# Patient Record
Sex: FEMALE | Race: WHITE | Employment: FULL TIME | URBAN - METROPOLITAN AREA
[De-identification: names, ages, dates, MRNs, and addresses within clinical notes are randomized per-mention and may not be internally consistent; named-entity substitution may affect disease eponyms.]

---

## 2017-01-05 ENCOUNTER — OFFICE VISIT (OUTPATIENT)
Dept: FAMILY MEDICINE | Facility: CLINIC | Age: 47
End: 2017-01-05
Payer: COMMERCIAL

## 2017-01-05 VITALS
DIASTOLIC BLOOD PRESSURE: 72 MMHG | TEMPERATURE: 97.1 F | SYSTOLIC BLOOD PRESSURE: 130 MMHG | HEART RATE: 86 BPM | OXYGEN SATURATION: 99 % | BODY MASS INDEX: 19.04 KG/M2 | HEIGHT: 71 IN | WEIGHT: 136 LBS

## 2017-01-05 DIAGNOSIS — R07.1 PAINFUL RESPIRATION: Primary | ICD-10-CM

## 2017-01-05 DIAGNOSIS — Z90.81 S/P SPLENECTOMY: ICD-10-CM

## 2017-01-05 DIAGNOSIS — J06.9 UPPER RESPIRATORY TRACT INFECTION, UNSPECIFIED TYPE: ICD-10-CM

## 2017-01-05 DIAGNOSIS — Z72.0 TOBACCO ABUSE: ICD-10-CM

## 2017-01-05 PROCEDURE — 99214 OFFICE O/P EST MOD 30 MIN: CPT | Performed by: FAMILY MEDICINE

## 2017-01-05 RX ORDER — PREDNISONE 5 MG/1
TABLET ORAL
Qty: 55 TABLET | Refills: 0 | Status: SHIPPED | OUTPATIENT
Start: 2017-01-05 | End: 2017-02-10

## 2017-01-05 RX ORDER — DEXTROMETHORPHAN POLISTIREX 30 MG/5ML
60 SUSPENSION ORAL 2 TIMES DAILY
Qty: 148 ML | Refills: 3 | Status: SHIPPED | OUTPATIENT
Start: 2017-01-05 | End: 2017-02-10

## 2017-01-05 RX ORDER — AZITHROMYCIN 250 MG/1
TABLET, FILM COATED ORAL
Qty: 6 TABLET | Refills: 0 | Status: SHIPPED | OUTPATIENT
Start: 2017-01-05 | End: 2017-02-10

## 2017-01-05 NOTE — MR AVS SNAPSHOT
After Visit Summary   1/5/2017    Coretta Tovar    MRN: [id number]           Patient Information     Date Of Birth          1970        Visit Information        Provider Department      1/5/2017 12:00 PM Cydney Paniagua MD [facility]        Today's Diagnoses     Painful respiration    -  1     S/P splenectomy         Upper respiratory tract infection, unspecified type           Care Instructions    [facility]    If you have any questions regarding to your visit please contact your care team:       Team Purple:   Clinic Hours Telephone Number   [address] Dr.   7am-7pm  Monday - Thursday   7am-5pm  Fridays  [phone]  (Appointment scheduling available 24/7)    Questions about your Visit?   Team Line:  [phone]   Urgent Care - [place] - 11am-9pm Monday-Friday Saturday-Sunday- 9am-5pm   [place] - 5pm-9pm Monday-Friday Saturday-Sunday- 9am-5pm  [phone] - [facility]  [phone] - [place]       What options do I have for visits at the clinic other than the traditional office visit?  To expand how we care for you, many of our providers are utilizing electronic visits (e-visits) and telephone visits, when medically appropriate, for interactions with their patients rather than a visit in the clinic.   We also offer nurse visits for many medical concerns. Just like any other service, we will bill your insurance company for this type of visit based on time spent on the phone with your provider. Not all insurance companies cover these visits. Please check with your medical insurance if this type of visit is covered. You will be responsible for any charges that are not paid by your insurance.      E-visits via [employer]:  generally incur a $35.00 fee.  Telephone visits:  Time spent on the phone: *charged based on time that is spent on the phone in increments of 10  "minutes. Estimated cost:   5-10 mins $30.00   11-20 mins. $59.00   21-30 mins. $85.00     Use Exindahart (secure email communication and access to your chart) to send your primary care provider a message or make an appointment. Ask someone on your Team how to sign up for [employer].  For a Price Quote for your services, please call our [employer] Line at [phone].  As always, Thank you for trusting us with your health care needs!            Follow-ups after your visit        Who to contact     If you have questions or need follow up information about today's clinic visit or your schedule please contact [facility] directly at [phone].  Normal or non-critical lab and imaging results will be communicated to you by Exindahart, letter or phone within 4 business days after the clinic has received the results. If you do not hear from us within 7 days, please contact the clinic through Exindahart or phone. If you have a critical or abnormal lab result, we will notify you by phone as soon as possible.  Submit refill requests through [employer] or call your pharmacy and they will forward the refill request to us. Please allow 3 business days for your refill to be completed.          Additional Information About Your Visit        [employer] Information     [employer] lets you send messages to your doctor, view your test results, renew your prescriptions, schedule appointments and more. To sign up, go to [email or web address] . Click on \"Log in\" on the left side of the screen, which will take you to the Welcome page. Then click on \"Sign up Now\" on the right side of the page.     You will be asked to enter the access code listed below, as well as some personal information. Please follow the directions to create your username and password.     Your access code is: [email or web address]  Expires: 2017  8:12 AM     Your access code will  in 90 days. If you need help or a new code, please call your [place] clinic or " "[phone].        Care EveryWhere ID     This is your Care EveryWhere ID. This could be used by other organizations to access your [place] medical records  [id number]        Your Vitals Were     Pulse Temperature Height BMI (Body Mass Index) Pulse Oximetry       86 97.1  F (36.2  C) 5' 11\" (1.803 m) 18.98 kg/m2 99%        Blood Pressure from Last 3 Encounters:   01/05/17 130/72   11/30/16 140/100   11/29/16 98/58    Weight from Last 3 Encounters:   01/05/17 136 lb (61.689 kg)   11/30/16 133 lb (60.328 kg)   11/29/16 130 lb (58.968 kg)              Today, you had the following     No orders found for display         Today's Medication Changes          These changes are accurate as of: 1/5/17 12:12 PM.  If you have any questions, ask your nurse or doctor.               Start taking these medicines.        Dose/Directions    azithromycin 250 MG tablet   Commonly known as:  ZITHROMAX   Used for:  S/P splenectomy, Upper respiratory tract infection, unspecified type        Two tablets first day, then one tablet daily for four days.   Quantity:  6 tablet   Refills:  0       predniSONE 5 MG tablet   Commonly known as:  DELTASONE   Used for:  Painful respiration        Take 10 tabs today and taper down by one daily   Quantity:  55 tablet   Refills:  0            Where to get your medicines      These medications were sent to [facility] - [place], [place] - [address] AT [facility] [address] [place]  [address] [address]     Phone:  [phone]    - azithromycin 250 MG tablet  - predniSONE 5 MG tablet             Primary Care Provider Office Phone #    [facility] [phone]       No address on file        Thank you!     Thank you for choosing [facility]  for your care. Our goal is always to provide you with excellent care. Hearing back from our patients is one way we can continue to improve our services. Please take a few minutes " to complete the written survey that you may receive in the mail after your visit with us. Thank you!             Your Updated Medication List - Protect others around you: Learn how to safely use, store and throw away your medicines at www.disposemymeds.org.          This list is accurate as of: 1/5/17 12:12 PM.  Always use your most recent med list.                   Brand Name Dispense Instructions for use    acyclovir 5 % ointment    ZOVIRAX    15 g    Apply topically 6 times daily       azithromycin 250 MG tablet    ZITHROMAX    6 tablet    Two tablets first day, then one tablet daily for four days.       omeprazole 40 MG capsule    priLOSEC    30 capsule    Take 1 capsule (40 mg) by mouth daily Take 30-60 minutes before a meal.       ondansetron 4 MG ODT tab    ZOFRAN ODT    12 tablet    Take 1-2 tablets (4-8 mg) by mouth 3 times daily (before meals)       predniSONE 5 MG tablet    DELTASONE    55 tablet    Take 10 tabs today and taper down by one daily       sucralfate 1 GM tablet    CARAFATE    40 tablet    Take 1 tablet (1 g) by mouth 4 times daily       traZODone 50 MG tablet    DESYREL    30 tablet    TAKE 1 TABLET(50 MG) BY MOUTH EVERY NIGHT AS NEEDED FOR SLEEP       TYLENOL PO      Take 1 tablet by mouth as needed       valACYclovir 1000 mg tablet    VALTREX    4 tablet    TAKE 2 TABLETS BY MOUTH TWICE DAILY       ZOLMitriptan 5 MG tablet    ZOMIG    12 tablet    TAKE 1 TABLET BY MOUTH AT ONSET OF HEADACHE FOR MIGRAINE. MAY REPEAT DOSE IN 2 HOURS. DO NOT EXCEED 10MG IN 24 HOURS

## 2017-01-05 NOTE — Clinical Note
[facility]  [address] [place] [address]  [phone]      January 5, 2017      Coretta Tovar  [id number] [facility] [address]        Re Coretta:    Patient was seen by me today. She is ill.           Sincerely,      BRYSON VALLES M.D.

## 2017-01-05 NOTE — PATIENT INSTRUCTIONS
[facility]    If you have any questions regarding to your visit please contact your care team:       Team Purple:   Clinic Hours Telephone Number   [address] Dr.   7am-7pm  Monday - Thursday   7am-5pm  Fridays  [phone]  (Appointment scheduling available 24/7)    Questions about your Visit?   Team Line:  [phone]   Urgent Care - [place] and [facility] - 11am-9pm Monday-Friday Saturday-Sunday- 9am-5pm   [place] - 5pm-9pm Monday-Friday Saturday-Sunday- 9am-5pm  [phone] - [facility]  [phone] - [place]       What options do I have for visits at the clinic other than the traditional office visit?  To expand how we care for you, many of our providers are utilizing electronic visits (e-visits) and telephone visits, when medically appropriate, for interactions with their patients rather than a visit in the clinic.   We also offer nurse visits for many medical concerns. Just like any other service, we will bill your insurance company for this type of visit based on time spent on the phone with your provider. Not all insurance companies cover these visits. Please check with your medical insurance if this type of visit is covered. You will be responsible for any charges that are not paid by your insurance.      E-visits via [employer]:  generally incur a $35.00 fee.  Telephone visits:  Time spent on the phone: *charged based on time that is spent on the phone in increments of 10 minutes. Estimated cost:   5-10 mins $30.00   11-20 mins. $59.00   21-30 mins. $85.00     Use Tizrat (secure email communication and access to your chart) to send your primary care provider a message or make an appointment. Ask someone on your Team how to sign up for [employer].  For a Price Quote for your services, please call our Consumer Price Line at [phone].  As always, Thank you for trusting us with your health care needs!

## 2017-01-05 NOTE — PROGRESS NOTES
SUBJECTIVE:                                                    Coretta Tovar is a 46 year old female who presents to clinic today for the following health issues:      ENT Symptoms             Symptoms: cc Present Absent Comment   Fever/Chills  x     Fatigue  x     Muscle Aches  x     Eye Irritation  x     Sneezing  x     Nasal Epifanio/Drg  x     Sinus Pressure/Pain  x     Loss of smell  x     Dental pain   x    Sore Throat  x     Swollen Glands   x    Ear Pain/Fullness  x     Cough  x     Wheeze  x     Chest Pain  x     Shortness of breath  x     Rash       Other         Symptom duration:  1 week   Symptom severity:  severe   Treatments tried:  mucinex,tesslon pearles, tylenol   Contacts:  no              Problem list and histories reviewed & adjusted, as indicated.  Additional history: as documented    Patient Active Problem List   Diagnosis     Tobacco abuse     CARDIOVASCULAR SCREENING; LDL GOAL LESS THAN 130     S/P splenectomy     Migraines     Idiopathic thrombocytopenic purpura (HCC)     Chemical dependency (H)     Insomnia     Mary-mary disease     Vomiting     Abdominal pain, acute     Cyclical vomiting with nausea, intractability of vomiting not specified     Abdominal pain, right upper quadrant     Past Surgical History   Procedure Laterality Date     Splenectomy  1995     Ankle surgery       Numerous right ankle surgeries     Hernia repair Left 1975     Neck surgery  2000's     Cervical spine     Hysterectomy, pap no longer indicated       benign, endometriosis     Tonsillectomy         Social History   Substance Use Topics     Smoking status: Current Every Day Smoker -- 1.00 packs/day for 15 years     Types: Cigarettes     Last Attempt to Quit: 11/06/2015     Smokeless tobacco: Never Used     Alcohol Use: Yes      Comment: Drinks once in 4 - 5 months     Family History   Problem Relation Age of Onset     Neurologic Disorder Maternal Grandmother      migraines     Breast Cancer Maternal Grandmother       Neurologic Disorder Maternal Grandfather      migraines     Neurologic Disorder Brother      migraines     Breast Cancer Maternal Aunt      x 2     Breast Cancer Paternal Grandmother          Allergies   Allergen Reactions     Acetaminophen-Codeine Hives     Augmentin Nausea and Vomiting and Hives     Compazine      Lock-jaw     Nsaids      D/t ITP     Propoxyphene N-Apap Hives     Sulfa Drugs      Tramadol Hcl Hives            BP Readings from Last 3 Encounters:   01/05/17 130/72   11/30/16 140/100   11/29/16 98/58    Wt Readings from Last 3 Encounters:   01/05/17 136 lb (61.689 kg)   11/30/16 133 lb (60.328 kg)   11/29/16 130 lb (58.968 kg)                  Labs reviewed in EPIC  Problem list, Medication list, Allergies, and Medical/Social/Surgical histories reviewed in [facility] and updated as appropriate.    ROS:  This 46 year old female is here today because she has been ill for about a week with very painful respirations. She is sure she has pleurisy again. She is a daily smoker of 1/2 ppd and is determined to quit this time. She hasn't smoked the past 2 days. She has the nicotine patch at home. Her lungs burn with every breath. No fevers, but her face hurts too. She works for [employer] and has been dragging herself to work every day. She has ITP and can't take NSAIDS so all she can take is tylenol for the pain. In the past, she has been given prednisone for pleurisy. She also has no spleen so she usually needs an antibiotic when she is on prednisone. She also would like an prescription for some cough syrup over the counter as she is allergic to codeine. Also, her insurance will pay for a humidifier if she has a prescription. She feels a humidifier would help so she doesn't get so many upper respiratory illnesses. She has albuterol inhaler at home that she can try for her cough as well.  All other review of systems are negative  Personal, family, and social history reviewed with patient and revised.      "    OBJECTIVE:                                                    /72 mmHg  Pulse 86  Temp(Src) 97.1  F (36.2  C)  Ht 5' 11\" (1.803 m)  Wt 136 lb (61.689 kg)  BMI 18.98 kg/m2  SpO2 99%  Body mass index is 18.98 kg/(m^2).  GENERAL: patient appears tired and ill.   EYES: Eyes grossly normal to inspection, PERRL and conjunctivae and sclerae normal  HENT: ear canals and TM's normal, nose and mouth without ulcers or lesions  NECK: no adenopathy, no asymmetry, masses, or scars and thyroid normal to palpation  RESP: lungs clear to auscultation - no rales, rhonchi or wheezes, but she is splinting with inspiration. She has pleurisy. She is trying hard not to cough.   CV: regular rate and rhythm, normal S1 S2, no S3 or S4, no murmur, click or rub, no peripheral edema  MS: no gross musculoskeletal defects noted, no edema  Mucous membranes are dry. She needs to drink more fluids      Diagnostic Test Results:  none      ASSESSMENT/PLAN:                                                             1. Painful respiration  As above   - predniSONE (DELTASONE) 5 MG tablet; Take 10 tabs today and taper down by one daily  Dispense: 55 tablet; Refill: 0  - order for DME; Equipment being ordered: cool humidifier  Dispense: 1 each; Refill: 0  OK to use albuterol inhaler for bronchospasm cough     2. S/P splenectomy  As above   - azithromycin (ZITHROMAX) 250 MG tablet; Two tablets first day, then one tablet daily for four days.  Dispense: 6 tablet; Refill: 0    3. Upper respiratory tract infection, unspecified type  As above   - azithromycin (ZITHROMAX) 250 MG tablet; Two tablets first day, then one tablet daily for four days.  Dispense: 6 tablet; Refill: 0  - dextromethorphan (DELSYM) 30 MG/5ML liquid; Take 10 mLs (60 mg) by mouth 2 times daily  Dispense: 148 mL; Refill: 3  - order for DME; Equipment being ordered: cool humidifier  Dispense: 1 each; Refill: 0    4. Tobacco abuse [Z72.0]  As above, we discussed this at length. " Very important that she quit smoking.     Return to clinic if no improvement     BRYSON VALLES MD  [facility]

## 2017-01-05 NOTE — NURSING NOTE
"Chief Complaint   Patient presents with     URI       Initial /72 mmHg  Pulse 86  Temp(Src) 97.1  F (36.2  C)  Ht 5' 11\" (1.803 m)  Wt 136 lb (61.689 kg)  BMI 18.98 kg/m2  SpO2 99% Estimated body mass index is 18.98 kg/(m^2) as calculated from the following:    Height as of this encounter: 5' 11\" (1.803 m).    Weight as of this encounter: 136 lb (61.689 kg).  BP completed using cuff size: chelsie Rivers MA      "

## 2017-01-30 ENCOUNTER — TELEPHONE (OUTPATIENT)
Dept: FAMILY MEDICINE | Facility: CLINIC | Age: 47
End: 2017-01-30

## 2017-01-30 DIAGNOSIS — B37.31 YEAST INFECTION OF THE VAGINA: Primary | ICD-10-CM

## 2017-01-30 NOTE — TELEPHONE ENCOUNTER
Spoke with patient she states she was exposed to strep by many family members, patient states she is having difficulty with her insurance and is not able to come in for a visit.  Patient advised she can try zipnosis for this and go in for a lab appointment.  Patient states she would really like for provider to prescribe her the Z-pack.  Patient also states she has a yeast infection, for last 2 days patient has had vaginal itching and odor.  Patient states the store bought meds do not work well so she would like provider to call in diflucan as well. Patient states she can use her flex plan to pay for prescriptions.   Please advise    Tita Brown RN

## 2017-01-31 RX ORDER — FLUCONAZOLE 150 MG/1
150 TABLET ORAL ONCE
Qty: 1 TABLET | Refills: 0 | Status: SHIPPED | OUTPATIENT
Start: 2017-01-31 | End: 2017-01-31

## 2017-01-31 NOTE — TELEPHONE ENCOUNTER
I have sent the script for diflucan for her.  Since strep is rare for adults, we really need to get a positive swab to treat[facility] most [facility] effective way to do that.    Corey Parsons PA-C

## 2017-02-01 NOTE — TELEPHONE ENCOUNTER
Patient notified of providers message as written.  Patient verbalized understanding, no further questions or concerns.    Tita Brown RN

## 2017-02-10 ENCOUNTER — OFFICE VISIT (OUTPATIENT)
Dept: FAMILY MEDICINE | Facility: CLINIC | Age: 47
End: 2017-02-10
Payer: COMMERCIAL

## 2017-02-10 VITALS
SYSTOLIC BLOOD PRESSURE: 106 MMHG | HEART RATE: 79 BPM | WEIGHT: 132 LBS | OXYGEN SATURATION: 99 % | DIASTOLIC BLOOD PRESSURE: 74 MMHG | BODY MASS INDEX: 18.42 KG/M2 | TEMPERATURE: 97.2 F

## 2017-02-10 DIAGNOSIS — F17.200 NEEDS SMOKING CESSATION EDUCATION: ICD-10-CM

## 2017-02-10 DIAGNOSIS — G43.809 OTHER MIGRAINE WITHOUT STATUS MIGRAINOSUS, NOT INTRACTABLE: ICD-10-CM

## 2017-02-10 DIAGNOSIS — B00.1 RECURRENT COLD SORES: ICD-10-CM

## 2017-02-10 DIAGNOSIS — F51.01 PRIMARY INSOMNIA: ICD-10-CM

## 2017-02-10 DIAGNOSIS — F41.8 DEPRESSION WITH ANXIETY: Primary | ICD-10-CM

## 2017-02-10 PROCEDURE — 99214 OFFICE O/P EST MOD 30 MIN: CPT | Performed by: PHYSICIAN ASSISTANT

## 2017-02-10 RX ORDER — ZOLMITRIPTAN 5 MG/1
TABLET, FILM COATED ORAL
Qty: 12 TABLET | Refills: 4 | Status: SHIPPED | OUTPATIENT
Start: 2017-02-10 | End: 2017-07-03

## 2017-02-10 RX ORDER — BUPROPION HYDROCHLORIDE 150 MG/1
150 TABLET ORAL EVERY MORNING
Qty: 30 TABLET | Refills: 3 | Status: SHIPPED | OUTPATIENT
Start: 2017-02-10 | End: 2017-07-03

## 2017-02-10 RX ORDER — TRAZODONE HYDROCHLORIDE 50 MG/1
TABLET, FILM COATED ORAL
Qty: 30 TABLET | Refills: 3 | Status: SHIPPED | OUTPATIENT
Start: 2017-02-10 | End: 2017-03-07

## 2017-02-10 RX ORDER — VALACYCLOVIR HYDROCHLORIDE 1 G/1
TABLET, FILM COATED ORAL
Qty: 4 TABLET | Refills: 11 | Status: SHIPPED | OUTPATIENT
Start: 2017-02-10 | End: 2017-06-05

## 2017-02-10 ASSESSMENT — ANXIETY QUESTIONNAIRES
IF YOU CHECKED OFF ANY PROBLEMS ON THIS QUESTIONNAIRE, HOW DIFFICULT HAVE THESE PROBLEMS MADE IT FOR YOU TO DO YOUR WORK, TAKE CARE OF THINGS AT HOME, OR GET ALONG WITH OTHER PEOPLE: NOT DIFFICULT AT ALL
7. FEELING AFRAID AS IF SOMETHING AWFUL MIGHT HAPPEN: SEVERAL DAYS
3. WORRYING TOO MUCH ABOUT DIFFERENT THINGS: MORE THAN HALF THE DAYS
5. BEING SO RESTLESS THAT IT IS HARD TO SIT STILL: MORE THAN HALF THE DAYS
2. NOT BEING ABLE TO STOP OR CONTROL WORRYING: MORE THAN HALF THE DAYS
1. FEELING NERVOUS, ANXIOUS, OR ON EDGE: MORE THAN HALF THE DAYS
GAD7 TOTAL SCORE: 14
6. BECOMING EASILY ANNOYED OR IRRITABLE: NEARLY EVERY DAY

## 2017-02-10 ASSESSMENT — PAIN SCALES - GENERAL: PAINLEVEL: NO PAIN (0)

## 2017-02-10 ASSESSMENT — PATIENT HEALTH QUESTIONNAIRE - PHQ9: 5. POOR APPETITE OR OVEREATING: MORE THAN HALF THE DAYS

## 2017-02-10 NOTE — PROGRESS NOTES
SUBJECTIVE:                                                    Coretta Tovar is a 46 year old female who presents to clinic today for the following health issues:      Abnormal Mood Symptoms      Duration: 1 month ago    Description:  Depression: YES  Anxiety: YES  Panic attacks: YES     Accompanying signs and symptoms: see PHQ-9 and KATHI scores    History (similar episodes/previous evaluation): Long time ago was on Wellbutrin    Precipitating or alleviating factors: home work and health issues    Therapies tried and outcome: none       Migraine Follow-Up    Headaches symptoms:  Stable     Frequency: every couple of week     Duration of headaches: 1 day    Able to do normal daily activities/work with migraines: Yes    Rescue/Relief medication:Zomig              Effectiveness: moderate relief    Preventative medication: None    Neurologic complications: No new stroke-like symptoms, loss of vision or speech, numbness or weakness    In the past 4 weeks, how often have you gone to Urgent Care or the emergency room because of your headaches?  0       Problem list and histories reviewed & adjusted, as indicated.  Additional history: 45 y/o female here c/o having a lot of stress and anxiety in her life.  She was in the hospital for over a week, and did have to miss some work.  She has also been dealing with some vertigo and has also has been missing work.  Is working with ENT right now.  They do have her diazepam TID for it.  She is not sure that is helping her anxiety.    In the past, she had been on wellbutrin, and did well with that.  She has done counseling in the past, not really she needs that at this time.          Problem list, Medication list, Allergies, and Medical/Social/Surgical histories reviewed in EPIC and updated as appropriate.    ROS:  Constitutional, HEENT, cardiovascular, pulmonary, gi and gu systems are negative, except as otherwise noted.    OBJECTIVE:                                                     /74  Pulse 79  Temp 97.2  F (36.2  C) (Oral)  Wt 132 lb (59.9 kg)  SpO2 99%  BMI 18.41 kg/m2  Body mass index is 18.41 kg/(m^2).  GENERAL: alert and no distress  EYES: Eyes grossly normal to inspection  NECK: no adenopathy, no asymmetry, masses, or scars and thyroid normal to palpation  RESP: lungs clear to auscultation - no rales, rhonchi or wheezes  CV: regular rate and rhythm, normal S1 S2, no S3 or S4, no murmur, click or rub, no peripheral edema and peripheral pulses strong    Diagnostic Test Results:  none      ASSESSMENT/PLAN:                                                            1. Depression with anxiety  Has been on this in the past, and it did help.  She is hopefull that it will also help her with her smoking.  She is already on large daily dose of diazepam, so not really a candidate for anything else for breakthrough anxiety.  Did offer her referral for counseling, she did decline.    - buPROPion (WELLBUTRIN XL) 150 MG 24 hr tablet; Take 1 tablet (150 mg) by mouth every morning  Dispense: 30 tablet; Refill: 3    2. Recurrent cold sores    - valACYclovir (VALTREX) 1000 mg tablet; TAKE 2 TABLETS BY MOUTH TWICE DAILY x 1 day  Dispense: 4 tablet; Refill: 11    3. Other migraine without status migrainosus, not intractable  stable  - ZOLMitriptan (ZOMIG) 5 MG tablet; TAKE 1 TABLET BY MOUTH AT ONSET OF HEADACHE FOR MIGRAINE. MAY REPEAT DOSE IN 2 HOURS. DO NOT EXCEED 10MG IN 24 HOURS  Dispense: 12 tablet; Refill: 4    4. Primary insomnia    - traZODone (DESYREL) 50 MG tablet; TAKE 1 TABLET(50 MG) BY MOUTH EVERY NIGHT AS NEEDED FOR SLEEP  Dispense: 30 tablet; Refill: 3    5. Needs smoking cessation education    - NOVU Referral Smoking Cessation    Follow up 1 month.    Law Parsons PA-C  [facility]

## 2017-02-10 NOTE — PATIENT INSTRUCTIONS
[facility]    If you have any questions regarding to your visit please contact your care team:       Team Purple:   Clinic Hours Telephone Number   [address] Dr[address] Dr.   7am-7pm  Monday - Thursday   7am-5pm  Fridays  [phone]  (Appointment scheduling available 24/7)    Questions about your Visit?   Team Line:  [phone]   Urgent Care - [place] and [facility] - 11am-9pm Monday-Friday Saturday-Sunday- 9am-5pm   [place] - 5pm-9pm Monday-Friday Saturday-Sunday- 9am-5pm  [phone] - [facility]  [phone] - [place]       What options do I have for visits at the clinic other than the traditional office visit?  To expand how we care for you, many of our providers are utilizing electronic visits (e-visits) and telephone visits, when medically appropriate, for interactions with their patients rather than a visit in the clinic.   We also offer nurse visits for many medical concerns. Just like any other service, we will bill your insurance company for this type of visit based on time spent on the phone with your provider. Not all insurance companies cover these visits. Please check with your medical insurance if this type of visit is covered. You will be responsible for any charges that are not paid by your insurance.      E-visits via [employer]:  generally incur a $35.00 fee.  Telephone visits:  Time spent on the phone: *charged based on time that is spent on the phone in increments of 10 minutes. Estimated cost:   5-10 mins $30.00   11-20 mins. $59.00   21-30 mins. $85.00     Use [employer] (secure email communication and access to your chart) to send your primary care provider a message or make an appointment. Ask someone on your Team how to sign up for [employer].  For a Price Quote for your services, please call our Consumer Price Line at [phone].  As always, Thank you for trusting us with your health care needs!

## 2017-02-10 NOTE — MR AVS SNAPSHOT
After Visit Summary   2/10/2017    Coretta Tovar    MRN: [id number]           Patient Information     Date Of Birth          1970        Visit Information        Provider Department      2/10/2017 12:00 PM Law Parsons PA-C [facility]        Today's Diagnoses     Depression with anxiety    -  1     Recurrent cold sores         Other migraine without status migrainosus, not intractable         Primary insomnia         Needs smoking cessation education           Care Instructions    [facility]    If you have any questions regarding to your visit please contact your care team:       Team Purple:   Clinic Hours Telephone Number   [address] Dr.   7am-7pm  Monday - Thursday   7am-5pm  Fridays  [phone]  (Appointment scheduling available 24/7)    Questions about your Visit?   Team Line:  [phone]   Urgent Care - [place] [facility] - 11am-9pm Monday-Friday Saturday-Sunday- 9am-5pm   [place] - 5pm-9pm Monday-Friday Saturday-Sunday- 9am-5pm  [phone] - [facility]  [phone] - [place]       What options do I have for visits at the clinic other than the traditional office visit?  To expand how we care for you, many of our providers are utilizing electronic visits (e-visits) and telephone visits, when medically appropriate, for interactions with their patients rather than a visit in the clinic.   We also offer nurse visits for many medical concerns. Just like any other service, we will bill your insurance company for this type of visit based on time spent on the phone with your provider. Not all insurance companies cover these visits. Please check with your medical insurance if this type of visit is covered. You will be responsible for any charges that are not paid by your insurance.      E-visits via RF Code:  generally incur a $35.00 fee.  Telephone visits:  Time  "spent on the phone: *charged based on time that is spent on the phone in increments of 10 minutes. Estimated cost:   5-10 mins $30.00   11-20 mins. $59.00   21-30 mins. $85.00     Use [employer] (secure email communication and access to your chart) to send your primary care provider a message or make an appointment. Ask someone on your Team how to sign up for ZenCardt.  For a Price Quote for your services, please call our Organizer Line at [phone].  As always, Thank you for trusting us with your health care needs!            Follow-ups after your visit        Additional Services     [facility] Referral Smoking Cessation       Whitt online at [email or web address]                  Who to contact     If you have questions or need follow up information about today's clinic visit or your schedule please contact [facility] directly at [phone].  Normal or non-critical lab and imaging results will be communicated to you by [employer]hart, letter or phone within 4 business days after the clinic has received the results. If you do not hear from us within 7 days, please contact the clinic through ZenCardt or phone. If you have a critical or abnormal lab result, we will notify you by phone as soon as possible.  Submit refill requests through [employer] or call your pharmacy and they will forward the refill request to us. Please allow 3 business days for your refill to be completed.          Additional Information About Your Visit        [employer] Information     [employer] lets you send messages to your doctor, view your test results, renew your prescriptions, schedule appointments and more. To sign up, go to [email or web address] . Click on \"Log in\" on the left side of the screen, which will take you to the Welcome page. Then click on \"Sign up Now\" on the right side of the page.     You will be asked to enter the access code listed below, as well as some personal information. Please follow the directions to " create your username and password.     Your access code is: [email or web address]  Expires: 2017  8:12 AM     Your access code will  in 90 days. If you need help or a new code, please call your [place] clinic or [phone].        Care EveryWhere ID     This is your Care EveryWhere ID. This could be used by other organizations to access your [place] medical records  [id number]        Your Vitals Were     Pulse Temperature Pulse Oximetry             79 97.2  F (36.2  C) (Oral) 99%          Blood Pressure from Last 3 Encounters:   02/10/17 106/74   17 130/72   16 140/100    Weight from Last 3 Encounters:   02/10/17 132 lb (59.875 kg)   17 136 lb (61.689 kg)   16 133 lb (60.328 kg)              We Performed the Following     [facility] Referral Smoking Cessation          Today's Medication Changes          These changes are accurate as of: 2/10/17 12:32 PM.  If you have any questions, ask your nurse or doctor.               Start taking these medicines.        Dose/Directions    buPROPion 150 MG 24 hr tablet   Commonly known as:  WELLBUTRIN XL   Used for:  Depression with anxiety   Started by:  Law Parsons PA-C        Dose:  150 mg   Take 1 tablet (150 mg) by mouth every morning   Quantity:  30 tablet   Refills:  3         These medicines have changed or have updated prescriptions.        Dose/Directions    traZODone 50 MG tablet   Commonly known as:  DESYREL   This may have changed:  See the new instructions.   Used for:  Primary insomnia   Changed by:  Law Parsons PA-C        TAKE 1 TABLET(50 MG) BY MOUTH EVERY NIGHT AS NEEDED FOR SLEEP   Quantity:  30 tablet   Refills:  3       valACYclovir 1000 mg tablet   Commonly known as:  VALTREX   This may have changed:  See the new instructions.   Used for:  Recurrent cold sores   Changed by:  Law Parsons PA-C        TAKE 2 TABLETS BY MOUTH TWICE DAILY x 1 day   Quantity:  4 tablet   Refills:  11            Where to  get your medicines      These medications were sent to [id number] - [place], [place] - [address] AT [facility] [address]  [address], [place] [address]     Phone:  [phone]    - buPROPion 150 MG 24 hr tablet  - traZODone 50 MG tablet  - valACYclovir 1000 mg tablet  - ZOLMitriptan 5 MG tablet             Primary Care Provider Office Phone #    [facility] [phone]       No address on file        Thank you!     Thank you for choosing [facility]  for your care. Our goal is always to provide you with excellent care. Hearing back from our patients is one way we can continue to improve our services. Please take a few minutes to complete the written survey that you may receive in the mail after your visit with us. Thank you!             Your Updated Medication List - Protect others around you: Learn how to safely use, store and throw away your medicines at www.disposemymeds.org.          This list is accurate as of: 2/10/17 12:32 PM.  Always use your most recent med list.                   Brand Name Dispense Instructions for use    acyclovir 5 % ointment    ZOVIRAX    15 g    Apply topically 6 times daily       buPROPion 150 MG 24 hr tablet    WELLBUTRIN XL    30 tablet    Take 1 tablet (150 mg) by mouth every morning       CHANTIX 1 MG tablet   Generic drug:  varenicline     56 tablet    TAKE 1 TABLET BY MOUTH TWICE DAILY       omeprazole 40 MG capsule    priLOSEC    30 capsule    Take 1 capsule (40 mg) by mouth daily Take 30-60 minutes before a meal.       ondansetron 4 MG ODT tab    ZOFRAN ODT    12 tablet    Take 1-2 tablets (4-8 mg) by mouth 3 times daily (before meals)       order for DME     1 each    Equipment being ordered: cool humidifier       sucralfate 1 GM tablet    CARAFATE    40 tablet    Take 1 tablet (1 g) by mouth 4 times daily       traZODone 50 MG tablet    DESYREL    30 tablet    TAKE 1 TABLET(50 MG) BY MOUTH EVERY  NIGHT AS NEEDED FOR SLEEP       TYLENOL PO      Take 1 tablet by mouth as needed       valACYclovir 1000 mg tablet    VALTREX    4 tablet    TAKE 2 TABLETS BY MOUTH TWICE DAILY x 1 day       ZOLMitriptan 5 MG tablet    ZOMIG    12 tablet    TAKE 1 TABLET BY MOUTH AT ONSET OF HEADACHE FOR MIGRAINE. MAY REPEAT DOSE IN 2 HOURS. DO NOT EXCEED 10MG IN 24 HOURS

## 2017-02-11 ASSESSMENT — ANXIETY QUESTIONNAIRES: GAD7 TOTAL SCORE: 14

## 2017-02-11 ASSESSMENT — PATIENT HEALTH QUESTIONNAIRE - PHQ9: SUM OF ALL RESPONSES TO PHQ QUESTIONS 1-9: 14

## 2017-02-14 RX ORDER — DIAZEPAM 2 MG
TABLET ORAL
COMMUNITY
Start: 2017-01-27 | End: 2017-09-07

## 2017-03-07 ENCOUNTER — TELEPHONE (OUTPATIENT)
Dept: FAMILY MEDICINE | Facility: CLINIC | Age: 47
End: 2017-03-07

## 2017-03-07 DIAGNOSIS — F51.01 PRIMARY INSOMNIA: ICD-10-CM

## 2017-03-07 RX ORDER — TRAZODONE HYDROCHLORIDE 150 MG/1
150 TABLET ORAL AT BEDTIME
Qty: 30 TABLET | Refills: 1 | Status: SHIPPED | OUTPATIENT
Start: 2017-03-07 | End: 2017-04-07

## 2017-03-07 NOTE — TELEPHONE ENCOUNTER
Increased to the 150 mg dose.  Did provide refill until she can get back in for recheck.    Corey Parsons PA-C

## 2017-03-07 NOTE — TELEPHONE ENCOUNTER
Patient called RN hotline asking if Vy Parsons was in.  Updated patient that Vy Parsons is now working at the [facility] location.  She stated that she might need to find a new PCP but declined to schedule at this time.  She is hoping Vy Parsons can still address her Trazodone prescription.    Patient stated that she was previously using up to Trazodone 300mg nightly.  Vy Parsons prescribed 50mg nightly which is ineffective.  She is wondering if the dose can be increased back up. Using 150mg tabs and take 1-2 tabs PRN  She is asking the updated dose be sent today as she is due for a refill at this time      Ok to leave detailed message  Val Teran RN

## 2017-03-07 NOTE — TELEPHONE ENCOUNTER
Patient notified of providers message as written.  Patient states she is also having anxiety, and was to wait until she was off of Valium to start new prescription.  Advised patient that to start a new medication she would need visit with provider.  Patient has person issues she is going through right now and will take care of that and then call to schedule a visit.   Tita Brown RN

## 2017-03-08 ENCOUNTER — THERAPY VISIT (OUTPATIENT)
Dept: PHYSICAL THERAPY | Facility: CLINIC | Age: 47
End: 2017-03-08
Payer: COMMERCIAL

## 2017-03-08 DIAGNOSIS — M54.2 CERVICAL PAIN: Primary | ICD-10-CM

## 2017-03-08 PROCEDURE — 97161 PT EVAL LOW COMPLEX 20 MIN: CPT | Mod: GP | Performed by: PHYSICAL THERAPIST

## 2017-03-08 PROCEDURE — 97110 THERAPEUTIC EXERCISES: CPT | Mod: GP | Performed by: PHYSICAL THERAPIST

## 2017-03-08 NOTE — PROGRESS NOTES
[facility] Initial Evaluation      Subjective:    Coretta Tovar is a 46 year old female with a cervical spine (Vertigo, dizziness, neck pain ) condition.  Condition occurred with:  Insidious onset.  Condition occurred: for unknown reasons.  This is a new condition  Pt presents with primary complaint of dizziness,  States the dizziness has been significantly improving. Has had 4 episodes of dizziness over the past week. States the dizziness has lasted about 30 seconds to 1 minute. This is much improved over the past several weeks. States the current dizziness is more associated with head movements. She presents with referral dated 2-28-17 for evaluate and treat for dizziness, neck pain. Pt primary issue is the dizziness at this time. She does have history of cervical fracture with fusion done 11 years ago and she has had migraines since. Her cervical spine has mild pain intermittently. The dizziness started in January for no apparent reason. .    Patient reports pain:  Cervical right side, cervical left side, central cervical spine, upper cervical spine, mid cervical spine and lower cervical spine.  Radiates to:  Head.  Pain is described as aching and is intermittent and reported as 4/10, 5/10, 8/10 and 7/10.  Associated symptoms:  Loss of balance, loss of motion/stiffness, dizziness and headache. Pain is worse during the day.  Symptoms are exacerbated by stress, rotating head, looking up or down and change of position and relieved by NSAID's and analgesics.  Since onset symptoms are unchanged and gradually worsening.  Special tests:  X-ray and MRI.      General health as reported by patient is fair.  Pertinent medical history includes:  Depression, history of fractures, osteoarthritis, smoking, implanted device and migraines (ITP ).  Medical allergies: yes (See EPIC).  Other surgeries include:  Orthopedic surgery.  Current medications:  Anti-depressants, sleep medication, muscle relaxants and  "pain medication.  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  Patient is working in normal job without restrictions.  Primary job tasks include:  Prolonged sitting and repetitive tasks.    Barriers include:  None as reported by the patient.    Red flags:  None as reported by the patient.                      Objective:    Standing Alignment:    Cervical/Thoracic:  Forward head  Shoulder/UE:  Rounded shoulders                                  Cervical/Thoracic Evaluation    AROM:  AROM Cervical:    Flexion:            Chin 2\" to chest, stretching  Extension:       60 deg, painful  Rotation:         Left: 55 sdeg, tightness     Right: 55 tightess   Side Bend:      Left: 45 tihtness     Right:  50 sightness       Headaches: migraine  Cervical Myotomes:  normal                  DTR's:  not assessed          Cervical Dermatomes:  normal                    Cervical Palpation:    Tenderness present at Left:    Sternocleidomstoid; Scalenes; Rhomboids; Upper Trap; Levator; Erector Spinae; Facet and Suboccipitals  Tenderness present at Right:    Sternocleidomstoid; Scalenes; Rhomboids; Upper Trap; Levator; Erector Spinae; Facet and Suboccipitals    Cervical Stability/Joint Clearing:  Stability/joint clearing spine: Oculomoter exam with eye ROM WNL, (-) sacchades, intact VOR and VORc. Halpike franklin testing for BPPV (-) for al l 6 canals     Left negative at: TLA LAT    Right negative at:  TLA LAT  Negative:ALAR Ligament and TLA AP  Spinal Segmental Conclusions:    Level:  Hypo at C2, C3, C4, C5, C6 and C7                                                General     ROS    Assessment/Plan:      Patient is a 46 year old female with cervical complaints.    Patient has the following significant findings with corresponding treatment plan.                Diagnosis 1:  Cervical pain  Pain -  hot/cold therapy, US, electric stimulation, manual therapy, self management, education, " directional preference exercise and home program  Decreased ROM/flexibility - manual therapy, therapeutic exercise and home program  Decreased joint mobility - manual therapy, therapeutic exercise and home program  Inflammation - cold therapy, US, electric stimulation and self management/home program  Impaired muscle performance - neuro re-education and home program  Decreased function - therapeutic activities and home program  Impaired posture - neuro re-education and home program    Therapy Evaluation Codes:   1) History comprised of:   Personal factors that impact the plan of care:      Profession.    Comorbidity factors that impact the plan of care are:      None.     Medications impacting care: Anti-depressant.  2) Examination of Body Systems comprised of:   Body structures and functions that impact the plan of care:      Cervical spine.   Activity limitations that impact the plan of care are:      Working.  3) Clinical presentation characteristics are:   Stable/Uncomplicated.  4) Decision-Making    Low complexity using standardized patient assessment instrument and/or measureable assessment of functional outcome.  Cumulative Therapy Evaluation is: Low complexity.    Previous and current functional limitations:  (See Goal Flow Sheet for this information)    Short term and Long term goals: (See Goal Flow Sheet for this information)     Communication ability:  Patient appears to be able to clearly communicate and understand verbal and written communication and follow directions correctly.  Treatment Explanation - The following has been discussed with the patient:   RX ordered/plan of care  Anticipated outcomes  Possible risks and side effects  This patient would benefit from PT intervention to resume normal activities.   Rehab potential is good.    Frequency:  2 X week, once daily  Duration:  for 2 weeks tapering to 1 X a week over 6 weeks  Discharge Plan:  Achieve all LTG.  Independent in home treatment  program.  Reach maximal therapeutic benefit.    Please refer to the daily flowsheet for treatment today, total treatment time and time spent performing 1:1 timed codes.

## 2017-03-08 NOTE — MR AVS SNAPSHOT
"              After Visit Summary   3/8/2017    Coretta Tovar    MRN: [id number]           Patient Information     Date Of Birth          1970        Visit Information        Provider Department      3/8/2017 5:10 PM Aureliano Lee PT [facility] Physical [facility]        Today's Diagnoses     Cervical pain    -  1       Follow-ups after your visit        Who to contact     If you have questions or need follow up information about today's clinic visit or your schedule please contact [facility] directly at [phone].  Normal or non-critical lab and imaging results will be communicated to you by [employer], letter or phone within 4 business days after the clinic has received the results. If you do not hear from us within 7 days, please contact the clinic through [employer] or phone. If you have a critical or abnormal lab result, we will notify you by phone as soon as possible.  Submit refill requests through [employer]iance or call your pharmacy and they will forward the refill request to us. Please allow 3 business days for your refill to be completed.          Additional Information About Your Visit        MyChart Information     Radiance lets you send messages to your doctor, view your test results, renew your prescriptions, schedule appointments and more. To sign up, go to [email or web address] . Click on \"Log in\" on the left side of the screen, which will take you to the Welcome page. Then click on \"Sign up Now\" on the right side of the page.     You will be asked to enter the access code listed below, as well as some personal information. Please follow the directions to create your username and password.     Your access code is: [email or web address]  Expires: 2017  5:50 PM     Your access code will  in 90 days. If you need help or a new code, please call your [place] clinic or [phone].        Care EveryWhere ID     This is your " Care EveryWhere ID. This could be used by other organizations to access your [place] medical records  [id number]         Blood Pressure from Last 3 Encounters:   02/10/17 106/74   01/05/17 130/72   11/30/16 (!) 140/100    Weight from Last 3 Encounters:   02/10/17 59.9 kg (132 lb)   01/05/17 61.7 kg (136 lb)   11/30/16 60.3 kg (133 lb)              We Performed the Following     HC PT EVAL, LOW COMPLEXITY     NATHAN INITIAL EVAL REPORT     THERAPEUTIC EXERCISES        Primary Care Provider Office Phone #    [facility] [phone]       No address on file        Thank you!     Thank you for choosing [place] [facility]  for your care. Our goal is always to provide you with excellent care. Hearing back from our patients is one way we can continue to improve our services. Please take a few minutes to complete the written survey that you may receive in the mail after your visit with us. Thank you!             Your Updated Medication List - Protect others around you: Learn how to safely use, store and throw away your medicines at www.disposemymeds.org.          This list is accurate as of: 3/8/17  5:50 PM.  Always use your most recent med list.                   Brand Name Dispense Instructions for use    acyclovir 5 % ointment    ZOVIRAX    15 g    Apply topically 6 times daily       buPROPion 150 MG 24 hr tablet    WELLBUTRIN XL    30 tablet    Take 1 tablet (150 mg) by mouth every morning       CHANTIX 1 MG tablet   Generic drug:  varenicline     56 tablet    TAKE 1 TABLET BY MOUTH TWICE DAILY       diazepam 2 MG tablet    VALIUM         omeprazole 40 MG capsule    priLOSEC    30 capsule    Take 1 capsule (40 mg) by mouth daily Take 30-60 minutes before a meal.       ondansetron 4 MG ODT tab    ZOFRAN ODT    12 tablet    Take 1-2 tablets (4-8 mg) by mouth 3 times daily (before meals)       order for DME     1 each    Equipment being ordered: cool  humidifier       sucralfate 1 GM tablet    CARAFATE    40 tablet    Take 1 tablet (1 g) by mouth 4 times daily       traZODone 150 MG tablet    DESYREL    30 tablet    Take 1 tablet (150 mg) by mouth At Bedtime TAKE 1 TABLET(50 MG) BY MOUTH EVERY NIGHT AS NEEDED FOR SLEEP       TYLENOL PO      Take 1 tablet by mouth as needed       valACYclovir 1000 mg tablet    VALTREX    4 tablet    TAKE 2 TABLETS BY MOUTH TWICE DAILY x 1 day       ZOLMitriptan 5 MG tablet    ZOMIG    12 tablet    TAKE 1 TABLET BY MOUTH AT ONSET OF HEADACHE FOR MIGRAINE. MAY REPEAT DOSE IN 2 HOURS. DO NOT EXCEED 10MG IN 24 HOURS

## 2017-03-08 NOTE — LETTER
[facility] PHYSICAL THERAPY  [address]  [facility] [id number] [phone]    2017    Re: Coretta Tovar   :   1970  MRN:  [id number]   REFERRING PHYSICIAN:   Raimundo Sosa    [facility]  Date of Initial Evaluation:  3/8/17  Visits:  Rxs Used: 1  Reason for Referral:  Cervical pain    EVALUATION SUMMARY    [facility]      Subjective:    Coretta Tovar is a 46 year old female with a cervical spine (Vertigo, dizziness, neck pain ) condition.  Condition occurred with:  Insidious onset.  Condition occurred: for unknown reasons.  This is a new condition  Pt presents with primary complaint of dizziness,  States the dizziness has been significantly improving. Has had 4 episodes of dizziness over the past week. States the dizziness has lasted about 30 seconds to 1 minute. This is much improved over the past several weeks. States the current dizziness is more associated with head movements. She presents with referral dated 17 for evaluate and treat for dizziness, neck pain. Pt primary issue is the dizziness at this time. She does have history of cervical fracture with fusion done 11 years ago and she has had migraines since. Her cervical spine has mild pain intermittently. The dizziness started in January for no apparent reason. .    Patient reports pain:  Cervical right side, cervical left side, central cervical spine, upper cervical spine, mid cervical spine and lower cervical spine.  Radiates to:  Head.  Pain is described as aching and is intermittent and reported as 4/10, 5/10, 8/10 and 7/10.  Associated symptoms:  Loss of balance, loss of motion/stiffness, dizziness and headache. Pain is worse during the day.  Symptoms are exacerbated by stress, rotating head, looking up or down and change of position and relieved by NSAID's and analgesics.  Since  "onset symptoms are unchanged and gradually worsening.  Special tests:  X-ray and MRI.      General health as reported by patient is fair.  Pertinent medical history includes:  Depression, history of fractures, osteoarthritis, smoking, implanted device and migraines (ITP ).  Medical allergies: yes (See EPIC).  Other surgeries include:  Orthopedic surgery.  Current medications:  Anti-depressants, sleep medication, muscle relaxants and pain medication.  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  Patient is working in normal job without restrictions.  Primary job tasks include:  Prolonged sitting and repetitive tasks.    Barriers include:  None as reported by the patient.  Red flags:  None as reported by the patient              Objective:    Standing Alignment:    Cervical/Thoracic:  Forward head  Shoulder/UE:  Rounded shoulders         Cervical/Thoracic Evaluation    AROM:  AROM Cervical:  Flexion:            Chin 2\" to chest, stretching  Extension:       60 deg, painful  Rotation:         Left: 55 sdeg, tightness     Right: 55 tightess   Side Bend:      Left: 45 tihtness     Right:  50 sightness     Headaches: migraine    Cervical Myotomes:  normal    DTR's:  not assessed    Cervical Dermatomes:  normal    Cervical Palpation:    Tenderness present at Left:    Sternocleidomstoid; Scalenes; Rhomboids; Upper Trap; Levator; Erector Spinae; Facet and Suboccipitals  Tenderness present at Right:    Sternocleidomstoid; Scalenes; Rhomboids; Upper Trap; Levator; Erector Spinae; Facet and Suboccipitals    Cervical Stability/Joint Clearing:  Stability/joint clearing spine: Oculomoter exam with eye ROM WNL, (-) sacchades, intact VOR and VORc. Halpike franklin testing for BPPV (-) for al l 6 canals   Left negative at: TLA LAT  Right negative at:  TLA LAT  Negative:ALAR Ligament and TLA AP    Spinal Segmental Conclusions:    Level:  Hypo at C2, C3, C4, C5, C6 and C7   "   Assessment/Plan:      Patient is a 46 year old female with cervical complaints.    Patient has the following significant findings with corresponding treatment plan.                Diagnosis 1:  Cervical pain  Pain -  hot/cold therapy, US, electric stimulation, manual therapy, self management, education, directional preference exercise and home program  Decreased ROM/flexibility - manual therapy, therapeutic exercise and home program  Decreased joint mobility - manual therapy, therapeutic exercise and home program  Inflammation - cold therapy, US, electric stimulation and self management/home program  Impaired muscle performance - neuro re-education and home program  Decreased function - therapeutic activities and home program  Impaired posture - neuro re-education and home program    Therapy Evaluation Codes:   1) History comprised of:   Personal factors that impact the plan of care:      Profession.    Comorbidity factors that impact the plan of care are:      None.     Medications impacting care: Anti-depressant.  2) Examination of Body Systems comprised of:   Body structures and functions that impact the plan of care:      Cervical spine.   Activity limitations that impact the plan of care are:      Working.  3) Clinical presentation characteristics are:   Stable/Uncomplicated.  4) Decision-Making    Low complexity using standardized patient assessment instrument and/or measureable assessment of functional outcome.  Cumulative Therapy Evaluation is: Low complexity.    Previous and current functional limitations:  (See Goal Flow Sheet for this information)    Short term and Long term goals: (See Goal Flow Sheet for this information)     Communication ability:  Patient appears to be able to clearly communicate and understand verbal and written communication and follow directions correctly.  Treatment Explanation - The following has been discussed with the patient:   RX ordered/plan of care  Anticipated  outcomes  Possible risks and side effects  This patient would benefit from PT intervention to resume normal activities.   Rehab potential is good.    Frequency:  2 X week, once daily  Duration:  for 2 weeks tapering to 1 X a week over 6 weeks  Discharge Plan:  Achieve all LTG.  Independent in home treatment program.  Reach maximal therapeutic benefit.      Thank you for your referral.    INQUIRIES  Therapist: Aureliano Lee   [facility]  [address] [place] [address] [address]  [facility] [address]  Phone: [phone]  Fax: [phone]

## 2017-03-10 NOTE — PROGRESS NOTES
"  SUBJECTIVE:                                                    Coretta Tovar is a 46 year old female who presents to clinic today for the following health issues:      HPI    Abnormal Mood Symptoms     Onset: \"Years\"    Description:   Depression: YES  Anxiety: YES    Accompanying Signs & Symptoms:  Still participating in activities that you used to enjoy: no  Fatigue: YES  Irritability: YES  Difficulty concentrating: YES  Changes in appetite: YES- \"can't hardly eat any more\"  Problems with sleep: YES  Heart racing/beating fast : YES  Thoughts of hurting yourself or others: none     History:   Recent stress: YES- Haven't been working for 2.5 months  Prior depression hospitalization: None  Family history of depression: YES- Daughter, aunt, brother, cousin  Family history of anxiety: YES- daughter, aunt, brother, cousin      Precipitating factors:   Alcohol/drug use: no     Alleviating factors:  Buspirone - daughter gave her medication when she had a melt down and it helped within 20 mins.       Therapies Tried and outcome: Wellbutrin (Bupropion) - doesn't feel like it's helping    Vertigo:  Left ear - vertigo, pain, vertigo-induced migraines, out of work because of vertigo/presyncope  No vertigo for 2 weeks    Neck pain: Fusion in neck, is doing physical therapy until April 1st  Been at job for 4 years, high stress for 2 years since role change    Weight loss: Before December - cramping pain stomach, vomiting, was hospitalized for one week, upper endoscopy at [facility] - diagnosed with ileus, bowels and bladder are currently working regularly. Appetite - not eating as much since then,  lost 35 lbs in past year.  2 weeks ago - panic attack, crying, took one of daughter's Buspar and it helped a lot    Depression/anxiety: Pt states she has been on antidepressants for years, but have never had symptoms as bad as recently. Daughter and her son moved in with them.  Grandson is 2.5 years old and she is not used to having a " "little one around. Had a panic attack, couldn't breathe, felt like she had a heart attack.  Has been on Wellbutrin for one month, not seeming to do anything but has decreased her sex drive.    Insomnia: Trazodone - 150 mg not effective, wakes up after 3 hours.  Tried 300 mg but makes too sleepy in the morning    Smoking: has quit with Chantix in the past.  Would like to quit again.      Establish care: PCP in [place] moved to [facility] and wants to establish care in [place]    Problem list and histories reviewed & adjusted, as indicated.  Additional history: as documented    Labs reviewed in EPIC    ROS:  Constitutional, HEENT, cardiovascular, pulmonary, gi and gu systems are negative, except as otherwise noted.    OBJECTIVE:                                                    BP (!) 88/58 (BP Location: Right arm, Patient Position: Chair, Cuff Size: Adult Regular)  Pulse 92  Temp 98.9  F (37.2  C) (Temporal)  Resp 16  Ht 5' 9.69\" (1.77 m)  Wt 125 lb 12.8 oz (57.1 kg)  SpO2 98%  BMI 18.21 kg/m2  Body mass index is 18.21 kg/(m^2).  GENERAL: healthy, alert and no distress  NECK: no adenopathy, no asymmetry, masses, or scars and thyroid normal to palpation  RESP: lungs clear to auscultation - no rales, rhonchi or wheezes  CV: regular rate and rhythm, normal S1 S2, no S3 or S4, no murmur, click or rub  ABDOMEN: soft, nontender, no hepatosplenomegaly, no masses and bowel sounds normal  MS: no gross musculoskeletal defects noted  SKIN: no suspicious lesions or rashes  NEURO: Normal strength and tone, mentation intact and speech normal  PSYCH: mentation appears normal, affect normal/bright, well-groomed    Diagnostic Test Results:  none      ASSESSMENT/PLAN:                                                      1. Generalized anxiety disorder  Patient has increased anxiety.  She took some of her daughter's Buspar and felt it worked well to reduce anxiety.  Will start on Buspar and taper up dose.  - busPIRone (BUSPAR) 5 " MG tablet; Take 1 tablet 3 times daily for 3 days, then 1.5 tablets 3 times daily for 3 days, then 2 tablets 3 times daily for 3 days, then 2.5 tablets 3 times daily, then 3 tabs 3 times daily.  Dispense: 150 tablet; Refill: 0    2. Moderate episode of recurrent major depressive disorder (H)  Stop Wellbutrin as patient feels it is not working and has had diminished sex drive.  Discussed that Wellbutrin is least likely to cause sexual side effects. Also discussed increasing to 300 mg daily for one month then reassessing.  Patient is losing weight which can be a side effect of Wellbutrin and ultimate decision was to stop Wellbutrin and start Prozac as patient's daughter is on this and she feels depression is in remission.  Recheck in 4 weeks.    - FLUoxetine (PROZAC) 10 MG capsule; Take 10 mg daily for 7 days then increase to 20 mg daily.  Dispense: 49 capsule; Refill: 0    3. Protein-calorie malnutrition (H)  In past year has lost 20 lbs.  She has not had an appetite particularly for the past several months due to depression and anxiety. Order for protein supplement to take bid.    - Protein POWD; Take 30 g by mouth 2 times daily  Dispense: 454 g; Refill: 1    4. Primary insomnia  Has been taking Trazodone 150 mg and wakes up after about 4 hours but 300 mg makes her too groggy the next day.  She has some trazodone 50 mg tabs at home.  Recommend trying 200 mg and titrating up by 25 mg to try to get to effective dose.  - traZODone (DESYREL) 50 MG tablet; Take 4-5 tablets (200-250 mg) by mouth nightly as needed for sleep  Dispense: 1 tablet; Refill: 0    5. Loss of weight  - Protein POWD; Take 30 g by mouth 2 times daily  Dispense: 454 g; Refill: 1    6. Chemical dependency (H)  History of chemical dependency, patient requesting no substances that she may become dependent on.     7. Tobacco abuse  - varenicline (CHANTIX) 1 MG tablet; Take 1 tablet (1 mg) by mouth 2 times daily  Dispense: 56 tablet; Refill: 3    8.  Idiopathic thrombocytopenic purpura (HCC)  History of ITP.  Currently in remission.    9. Encounter to establish care  Reviewed history with patient.     10. Encounter for smoking cessation counseling  - varenicline (CHANTIX) 1 MG tablet; Take 1 tablet (1 mg) by mouth 2 times daily  Dispense: 56 tablet; Refill: 3    11. Vertigo  Patient reports vertigo-induced migraine per diagnosis from neurologist.  Has been without vertigo for 2 weeks.    Greater than 50% of 45 minute visit were spent on counseling or coordination of care regarding depression, anxiety, weight loss, insomnia, smoking cessation.       Patient Instructions   Buspirone - taper up the dose according to the directions.    Prozac - 10 mg daily for 7 days then increase 20 mg daily.    Protein supplement sent to [facility].    Trazodone - increase to 200 mg nightly then increase to 225 mg or 250 mg if needed.    Stop Wellbutrin.    Adina Maciel, NP-C  [phone]          [facility]

## 2017-03-13 ENCOUNTER — OFFICE VISIT (OUTPATIENT)
Dept: FAMILY MEDICINE | Facility: OTHER | Age: 47
End: 2017-03-13
Payer: COMMERCIAL

## 2017-03-13 VITALS
OXYGEN SATURATION: 98 % | HEIGHT: 70 IN | RESPIRATION RATE: 16 BRPM | SYSTOLIC BLOOD PRESSURE: 88 MMHG | DIASTOLIC BLOOD PRESSURE: 58 MMHG | HEART RATE: 92 BPM | WEIGHT: 125.8 LBS | BODY MASS INDEX: 18.01 KG/M2 | TEMPERATURE: 98.9 F

## 2017-03-13 DIAGNOSIS — F19.20 CHEMICAL DEPENDENCY (H): ICD-10-CM

## 2017-03-13 DIAGNOSIS — Z72.0 TOBACCO ABUSE: ICD-10-CM

## 2017-03-13 DIAGNOSIS — F41.1 GENERALIZED ANXIETY DISORDER: Primary | ICD-10-CM

## 2017-03-13 DIAGNOSIS — F51.01 PRIMARY INSOMNIA: ICD-10-CM

## 2017-03-13 DIAGNOSIS — Z71.6 ENCOUNTER FOR SMOKING CESSATION COUNSELING: ICD-10-CM

## 2017-03-13 DIAGNOSIS — Z76.89 ENCOUNTER TO ESTABLISH CARE: ICD-10-CM

## 2017-03-13 DIAGNOSIS — D69.3 IDIOPATHIC THROMBOCYTOPENIC PURPURA (H): ICD-10-CM

## 2017-03-13 DIAGNOSIS — E46 PROTEIN-CALORIE MALNUTRITION (H): ICD-10-CM

## 2017-03-13 DIAGNOSIS — R42 VERTIGO: ICD-10-CM

## 2017-03-13 DIAGNOSIS — R63.4 LOSS OF WEIGHT: ICD-10-CM

## 2017-03-13 DIAGNOSIS — F33.1 MODERATE EPISODE OF RECURRENT MAJOR DEPRESSIVE DISORDER (H): ICD-10-CM

## 2017-03-13 PROCEDURE — 99215 OFFICE O/P EST HI 40 MIN: CPT | Performed by: STUDENT IN AN ORGANIZED HEALTH CARE EDUCATION/TRAINING PROGRAM

## 2017-03-13 RX ORDER — FLUOXETINE 10 MG/1
CAPSULE ORAL
Qty: 49 CAPSULE | Refills: 0 | Status: SHIPPED | OUTPATIENT
Start: 2017-03-13 | End: 2017-04-07

## 2017-03-13 RX ORDER — KETOCONAZOLE 20 MG/G
CREAM TOPICAL
COMMUNITY
Start: 2016-11-16

## 2017-03-13 RX ORDER — BACLOFEN 10 MG/1
TABLET ORAL
Refills: 11 | COMMUNITY
Start: 2017-03-01 | End: 2017-09-07

## 2017-03-13 RX ORDER — BUSPIRONE HYDROCHLORIDE 5 MG/1
TABLET ORAL
Qty: 150 TABLET | Refills: 0 | Status: SHIPPED
Start: 2017-03-13 | End: 2017-04-07 | Stop reason: DRUGHIGH

## 2017-03-13 RX ORDER — HYDROCORTISONE 2.5 %
CREAM (GRAM) TOPICAL
COMMUNITY
Start: 2016-11-16

## 2017-03-13 RX ORDER — ALBUTEROL SULFATE 90 UG/1
AEROSOL, METERED RESPIRATORY (INHALATION)
COMMUNITY
Start: 2006-04-19 | End: 2017-11-30

## 2017-03-13 RX ORDER — TRAZODONE HYDROCHLORIDE 50 MG/1
200-250 TABLET, FILM COATED ORAL
Qty: 1 TABLET | Refills: 0 | Status: SHIPPED | OUTPATIENT
Start: 2017-03-13 | End: 2017-04-07

## 2017-03-13 RX ORDER — VARENICLINE TARTRATE 1 MG/1
1 TABLET, FILM COATED ORAL 2 TIMES DAILY
Qty: 56 TABLET | Refills: 0 | Status: CANCELLED | OUTPATIENT
Start: 2017-03-13

## 2017-03-13 RX ORDER — TRETINOIN 0.25 MG/G
CREAM TOPICAL
COMMUNITY
Start: 2007-01-15 | End: 2017-07-05

## 2017-03-13 RX ORDER — DOXYCYCLINE 100 MG/1
100 CAPSULE ORAL
COMMUNITY
Start: 2016-11-16 | End: 2017-04-07

## 2017-03-13 ASSESSMENT — PATIENT HEALTH QUESTIONNAIRE - PHQ9
SUM OF ALL RESPONSES TO PHQ QUESTIONS 1-9: 18
10. IF YOU CHECKED OFF ANY PROBLEMS, HOW DIFFICULT HAVE THESE PROBLEMS MADE IT FOR YOU TO DO YOUR WORK, TAKE CARE OF THINGS AT HOME, OR GET ALONG WITH OTHER PEOPLE: VERY DIFFICULT

## 2017-03-13 ASSESSMENT — ANXIETY QUESTIONNAIRES
GAD7 TOTAL SCORE: 17
GAD7 TOTAL SCORE: 17
7. FEELING AFRAID AS IF SOMETHING AWFUL MIGHT HAPPEN: 0 = NOT AT ALL

## 2017-03-13 ASSESSMENT — PAIN SCALES - GENERAL: PAINLEVEL: NO PAIN (0)

## 2017-03-13 NOTE — MR AVS SNAPSHOT
After Visit Summary   3/13/2017    Coretta Tovar    MRN: [id number]           Patient Information     Date Of Birth          1970        Visit Information        Provider Department      3/13/2017 10:30 AM Adina Maciel APRN [facility]        Today's Diagnoses     Generalized anxiety disorder    -  1    Moderate episode of recurrent major depressive disorder (H)        Protein-calorie malnutrition (H)        Loss of weight          Care Instructions    Buspirone - taper up the dose according to the directions.    Prozac - 10 mg daily for 7 days then increase 20 mg daily.    Protein supplement sent to [facility].    Trazodone - increase to 200 mg nightly then increase to 225 mg or 250 mg if needed.    Stop Wellbutrin.    Adina Maciel, NP-C  [phone]            Follow-ups after your visit        Your next 10 appointments already scheduled     Mar 20, 2017  5:50 PM CDT   NATHAN Spine with Aureliano Lee PT   [facility] ([facility]  )    [address]  [facility] [address]   [phone]            Mar 27, 2017 12:00 PM CDT   NATHAN Spine with Aureliano Lee PT   [facility] ([facility]  )    [address]  [facility] [address]   [phone]            Apr 03, 2017  1:50 PM CDT   NATHAN Spine with Aureliano Lee PT   [facility] ([facility]  )    [address]  [facility] [address]   [phone]              Who to contact     If you have questions or need follow up information about today's clinic visit or your schedule please contact [facility] directly at [phone].  Normal or non-critical lab and imaging results will be communicated to you by MyChart, letter or phone within 4 business days after the clinic has received the results. If you do not  "hear from us within 7 days, please contact the clinic through [employer] or phone. If you have a critical or abnormal lab result, we will notify you by phone as soon as possible.  Submit refill requests through [employer] or call your pharmacy and they will forward the refill request to us. Please allow 3 business days for your refill to be completed.          Additional Information About Your Visit        [employer] Information     [employer] lets you send messages to your doctor, view your test results, renew your prescriptions, schedule appointments and more. To sign up, go to [email or web address] . Click on \"Log in\" on the left side of the screen, which will take you to the Welcome page. Then click on \"Sign up Now\" on the right side of the page.     You will be asked to enter the access code listed below, as well as some personal information. Please follow the directions to create your username and password.     Your access code is: [email or web address]  Expires: 2017  6:50 PM     Your access code will  in 90 days. If you need help or a new code, please call your [facility] clinic or [phone].        Care EveryWhere ID     This is your Care EveryWhere ID. This could be used by other organizations to access your [facility] medical records  [id number]        Your Vitals Were     Pulse Temperature Respirations Height Pulse Oximetry BMI (Body Mass Index)    92 98.9  F (37.2  C) (Temporal) 16 5' 9.69\" (1.77 m) 98% 18.21 kg/m2       Blood Pressure from Last 3 Encounters:   17 (!) 88/58   02/10/17 106/74   17 130/72    Weight from Last 3 Encounters:   17 125 lb 12.8 oz (57.1 kg)   02/10/17 132 lb (59.9 kg)   17 136 lb (61.7 kg)              Today, you had the following     No orders found for display         Today's Medication Changes          These changes are accurate as of: 3/13/17 11:34 AM.  If you have any questions, ask your nurse or doctor.               Start taking these medicines.        " Dose/Directions    busPIRone 5 MG tablet   Commonly known as:  BUSPAR   Used for:  Generalized anxiety disorder   Started by:  Adina Maciel APRN CNP        Take 1 tablet 3 times daily for 3 days, then 1.5 tablets 3 times daily for 3 days, then 2 tablets 3 times daily for 3 days, then 2.5 tablets 3 times daily, then 3 tabs 3 times daily.   Quantity:  150 tablet   Refills:  0       FLUoxetine 10 MG capsule   Commonly known as:  PROzac   Used for:  Moderate episode of recurrent major depressive disorder (H)   Started by:  Adina Maciel APRN CNP        Take 10 mg daily for 7 days then increase to 20 mg daily.   Quantity:  49 capsule   Refills:  0       Protein Powd   Used for:  Protein-calorie malnutrition (H), Loss of weight   Started by:  Adina Maciel APRN CNP        Dose:  30 g   Take 30 g by mouth 2 times daily   Quantity:  454 g   Refills:  1            Where to get your medicines      These medications were sent to [facility] [address] [place] [address] [facility] AT [place] [address] & [facility]  [address] [facility] [address]     Phone:  [phone]     busPIRone 5 MG tablet    FLUoxetine 10 MG capsule    Protein Powd                Primary Care Provider Office Phone #    [facility] [phone]       No address on file        Thank you!     Thank you for choosing [facility]  for your care. Our goal is always to provide you with excellent care. Hearing back from our patients is one way we can continue to improve our services. Please take a few minutes to complete the written survey that you may receive in the mail after your visit with us. Thank you!             Your Updated Medication List - Protect others around you: Learn how to safely use, store and throw away your medicines at www.disposemymeds.org.          This list is accurate as of: 3/13/17 11:34 AM.  Always use your most recent med list.                    Brand Name Dispense Instructions for use    acyclovir 5 % ointment    ZOVIRAX    15 g    Apply topically 6 times daily       albuterol 108 (90 BASE) MCG/ACT Inhaler    PROAIR HFA/PROVENTIL HFA/VENTOLIN HFA         baclofen 10 MG tablet    LIORESAL     TK 1 T PO  TID PRN       betamethasone valerate 0.1 % ointment    VALISONE     Apply once a day for 2 weeks as directed       buPROPion 150 MG 24 hr tablet    WELLBUTRIN XL    30 tablet    Take 1 tablet (150 mg) by mouth every morning       busPIRone 5 MG tablet    BUSPAR    150 tablet    Take 1 tablet 3 times daily for 3 days, then 1.5 tablets 3 times daily for 3 days, then 2 tablets 3 times daily for 3 days, then 2.5 tablets 3 times daily, then 3 tabs 3 times daily.       CHANTIX 1 MG tablet   Generic drug:  varenicline     56 tablet    TAKE 1 TABLET BY MOUTH TWICE DAILY       diazepam 2 MG tablet    VALIUM     Reported on 3/13/2017       doxycycline 100 MG capsule    VIBRAMYCIN     Take 100 mg by mouth       FLUoxetine 10 MG capsule    PROzac    49 capsule    Take 10 mg daily for 7 days then increase to 20 mg daily.       hydrocortisone 2.5 % cream          ketoconazole 2 % cream    NIZORAL         omeprazole 40 MG capsule    priLOSEC    30 capsule    Take 1 capsule (40 mg) by mouth daily Take 30-60 minutes before a meal.       ondansetron 4 MG ODT tab    ZOFRAN ODT    12 tablet    Take 1-2 tablets (4-8 mg) by mouth 3 times daily (before meals)       order for DME     1 each    Equipment being ordered: cool humidifier       Protein Powd     454 g    Take 30 g by mouth 2 times daily       RETIN-A 0.025 % cream   Generic drug:  tretinoin      apply to areas of face where acne appears daily       sucralfate 1 GM tablet    CARAFATE    40 tablet    Take 1 tablet (1 g) by mouth 4 times daily       traZODone 150 MG tablet    DESYREL    30 tablet    Take 1 tablet (150 mg) by mouth At Bedtime TAKE 1 TABLET(50 MG) BY MOUTH EVERY NIGHT AS NEEDED FOR SLEEP       TYLENOL  PO      Take 1 tablet by mouth as needed       valACYclovir 1000 mg tablet    VALTREX    4 tablet    TAKE 2 TABLETS BY MOUTH TWICE DAILY x 1 day       ZOLMitriptan 5 MG tablet    ZOMIG    12 tablet    TAKE 1 TABLET BY MOUTH AT ONSET OF HEADACHE FOR MIGRAINE. MAY REPEAT DOSE IN 2 HOURS. DO NOT EXCEED 10MG IN 24 HOURS

## 2017-03-13 NOTE — NURSING NOTE
"Chief Complaint   Patient presents with     Depression     Anxiety     Panel Management     tdap, pneumovax, lipid, height       Initial BP (!) 88/58 (BP Location: Right arm, Patient Position: Chair, Cuff Size: Adult Regular)  Pulse 92  Temp 98.9  F (37.2  C) (Temporal)  Resp 16  Ht 5' 9.69\" (1.77 m)  Wt 125 lb 12.8 oz (57.1 kg)  SpO2 98%  BMI 18.21 kg/m2 Estimated body mass index is 18.21 kg/(m^2) as calculated from the following:    Height as of this encounter: 5' 9.69\" (1.77 m).    Weight as of this encounter: 125 lb 12.8 oz (57.1 kg).  Medication Reconciliation: complete   Chari Simpson CMA      "

## 2017-03-13 NOTE — PATIENT INSTRUCTIONS
Buspirone - taper up the dose according to the directions.    Prozac - 10 mg daily for 7 days then increase 20 mg daily.    Protein supplement sent to [facility].    Trazodone - increase to 200 mg nightly then increase to 225 mg or 250 mg if needed.    Stop Wellbutrin.    Adina Maciel, NP-C  [phone]

## 2017-03-14 ENCOUNTER — TELEPHONE (OUTPATIENT)
Dept: FAMILY MEDICINE | Facility: OTHER | Age: 47
End: 2017-03-14

## 2017-03-14 DIAGNOSIS — F33.1 MODERATE EPISODE OF RECURRENT MAJOR DEPRESSIVE DISORDER (H): Primary | ICD-10-CM

## 2017-03-14 PROBLEM — R42 VERTIGO: Status: ACTIVE | Noted: 2017-03-14

## 2017-03-14 RX ORDER — VARENICLINE TARTRATE 1 MG/1
1 TABLET, FILM COATED ORAL 2 TIMES DAILY
Qty: 56 TABLET | Refills: 3 | Status: SHIPPED | OUTPATIENT
Start: 2017-03-14 | End: 2017-09-07

## 2017-03-14 ASSESSMENT — PATIENT HEALTH QUESTIONNAIRE - PHQ9: SUM OF ALL RESPONSES TO PHQ QUESTIONS 1-9: 18

## 2017-03-14 ASSESSMENT — ANXIETY QUESTIONNAIRES: GAD7 TOTAL SCORE: 17

## 2017-03-14 NOTE — TELEPHONE ENCOUNTER
Fax from [facility] [phone].  They have Rx for:      Protein POWD 454 g 1 3/13/2017  --   Sig: Take 30 g by mouth 2 times daily   Class: E-Prescribe   Route: Oral   Order: [id number]   E-Prescribing Status: Receipt confirmed by pharmacy (3/13/2017 11:34 AM CDT)       They are wondering if you could be more specific about what kind of powder?

## 2017-03-17 ENCOUNTER — TELEPHONE (OUTPATIENT)
Dept: FAMILY MEDICINE | Facility: OTHER | Age: 47
End: 2017-03-17

## 2017-03-17 NOTE — TELEPHONE ENCOUNTER
Med changes are done with a visit. Please refer to process for appointments - can do phone or OV.     Angeles Moran, RN, BSN

## 2017-03-17 NOTE — TELEPHONE ENCOUNTER
Pt was told to up her dosage of Trazodone by Adina Maciel and so she has and she is still having trouble sleeping.  What can she do?

## 2017-03-17 NOTE — TELEPHONE ENCOUNTER
Spoke with patient gave message, she is taking 300mg currently. Patient is waking up at 2 am, 2:40am, and 3 am. Patient wanted to let know that if she is going to be switched she wants something to not feel messed up in the morning and to be non addicting. Yasmin Carter CMA ([facility])  I did reiterate the plan from the last office visit with EM.

## 2017-03-20 ENCOUNTER — THERAPY VISIT (OUTPATIENT)
Dept: PHYSICAL THERAPY | Facility: CLINIC | Age: 47
End: 2017-03-20
Payer: COMMERCIAL

## 2017-03-20 ENCOUNTER — VIRTUAL VISIT (OUTPATIENT)
Dept: FAMILY MEDICINE | Facility: OTHER | Age: 47
End: 2017-03-20
Payer: COMMERCIAL

## 2017-03-20 DIAGNOSIS — M54.2 CERVICAL PAIN: ICD-10-CM

## 2017-03-20 DIAGNOSIS — F19.20 CHEMICAL DEPENDENCY (H): ICD-10-CM

## 2017-03-20 DIAGNOSIS — G43.009 MIGRAINE WITHOUT AURA AND WITHOUT STATUS MIGRAINOSUS, NOT INTRACTABLE: ICD-10-CM

## 2017-03-20 DIAGNOSIS — G47.00 PERSISTENT INSOMNIA: Primary | ICD-10-CM

## 2017-03-20 PROCEDURE — 97112 NEUROMUSCULAR REEDUCATION: CPT | Mod: GP | Performed by: PHYSICAL THERAPIST

## 2017-03-20 PROCEDURE — 97110 THERAPEUTIC EXERCISES: CPT | Mod: GP | Performed by: PHYSICAL THERAPIST

## 2017-03-20 PROCEDURE — 97140 MANUAL THERAPY 1/> REGIONS: CPT | Mod: GP | Performed by: PHYSICAL THERAPIST

## 2017-03-20 PROCEDURE — 98966 PH1 ASSMT&MGMT NQHP 5-10: CPT | Performed by: STUDENT IN AN ORGANIZED HEALTH CARE EDUCATION/TRAINING PROGRAM

## 2017-03-20 NOTE — PROGRESS NOTES
"Coretta Tovar is a 46 year old female who is being evaluated via a telephone visit.      The patient has been notified of following:     \"This telephone visit will be conducted via a call between you and your physician/provider. We have found that certain health care needs can be provided without the need for a physical exam.  This service lets us provide the care you need with a short phone conversation.  If a prescription is necessary we can send it directly to your pharmacy.  If lab work is needed we can place an order for that and you can then stop by our lab to have the test done at a later time.    We will bill your insurance company for this service.  Please check with your medical insurance if this type of visit is covered. You may be responsible for the cost of this type of visit if insurance coverage is denied.  The typical cost is $30 (10min), $59 (11-20min) and $85 (21-30min).  Most often these visits are shorter than 10 minutes.    If during the course of the call the physician/provider feels a telephone visit is not appropriate, you will not be charged for this service.\"       Consent has been obtained for this service by 2 care team members: yes. See the scanned image in the medical record.    Coretta Tovar complains of  Recheck Medication      I have reviewed and updated the patient's Past Medical History, Social History, Family History and Medication List.    ALLERGIES  Acetaminophen-codeine; Amoxicillin-pot clavulanate; Augmentin; Barbiturates; Compazine; Liquid adhesive; No clinical screening - see comments; Nsaids; Prochlorperazine; Propoxyphene n-apap; Sulfa drugs; Tramadol hcl; and Adhesive tape    Chari Simpson CMA    Additional provider notes: ***    Assessment/Plan:  No diagnosis found.    I have reviewed the note as documented above.  This accurately captures the substance of my conversation with the patient,  ***    Total time of call between patient and provider was *** minutes     "

## 2017-03-20 NOTE — PROGRESS NOTES
"Coretta Tovar is a 46 year old female who is being evaluated via a telephone visit.      The patient has been notified of following:     \"This telephone visit will be conducted via a call between you and your physician/provider. We have found that certain health care needs can be provided without the need for a physical exam.  This service lets us provide the care you need with a short phone conversation.  If a prescription is necessary we can send it directly to your pharmacy.  If lab work is needed we can place an order for that and you can then stop by our lab to have the test done at a later time.    We will bill your insurance company for this service.  Please check with your medical insurance if this type of visit is covered. You may be responsible for the cost of this type of visit if insurance coverage is denied.  The typical cost is $30 (10min), $59 (11-20min) and $85 (21-30min).  Most often these visits are shorter than 10 minutes.    If during the course of the call the physician/provider feels a telephone visit is not appropriate, you will not be charged for this service.\"       Consent has been obtained for this service by 2 care team members: yes. See the scanned image in the medical record.    Coretta Tovar complains of  Recheck Medication      I have reviewed and updated the patient's Past Medical History, Social History, Family History and Medication List.    ALLERGIES  Acetaminophen-codeine; Amoxicillin-pot clavulanate; Augmentin; Barbiturates; Compazine; Liquid adhesive; No clinical screening - see comments; Nsaids; Prochlorperazine; Propoxyphene n-apap; Sulfa drugs; Tramadol hcl; and Adhesive tape    Chari Simpson CMA    Additional provider notes:     Trazodone 300 mg nightly and is still not sleeping through the night.  Previously taking this amount of trazodone and made her groggy but not this time.  Requests something non-addicting.  Had amitriptyline in the past twice, once made her sleep " for 2 days, the other time tolerated well (10 mg at [facility] 11/16).  Goes to bed 9 -10 pm on weekends and weeknights 9p-5a.  She routinely wakes up at 2, 2:49, and 3 am.      Starting work back in 2 weeks and is concerned about not sleeping.    Many times this has happened in the past and sleeping only 3-4 hours per night and worsens depression, anxiety, and irritability.      Assessment/Plan:  1. Persistent insomnia  Start amitriptyline 25 q hs.  Patient to follow up in 4-7 days regarding efficacy.  If tolerating without significant side effects, can titrate up dose.  Other possible options if this fails is vistaril, remeron, phenergan, rozerem, surmontil.  - amitriptyline (ELAVIL) 25 MG tablet; Take 25 mg at bedtime.  May titrate dose up to 37.5 - 75 mg as needed.  Dispense: 60 tablet; Refill: 1    2. Migraine without aura and without status migrainosus, not intractable  Amitriptyline may help with prevention of co-morbid migraines.    3. Chemical dependency (H)  Avoiding medications that patient could become dependent on.    This accurately captures the substance of my conversation with the patient,  Coretta Tovar      Total time of call between patient and provider was 7.3 minutes

## 2017-03-20 NOTE — MR AVS SNAPSHOT
After Visit Summary   3/20/2017    Coretta Tovar    MRN: [id number]           Patient Information     Date Of Birth          1970        Visit Information        Provider Department      3/20/2017 8:50 AM Adina Maciel APRN CNP [facility]        Today's Diagnoses     Persistent insomnia    -  1    Migraine without aura and without status migrainosus, not intractable        Chemical dependency (H)           Follow-ups after your visit        Your next 10 appointments already scheduled     Mar 20, 2017  5:50 PM CDT   NATHAN Spine with Aureliano Lee [facility]    [address]  [facility] [address]   [phone]            Mar 27, 2017 12:00 PM CDT   NATHAN Spine with Aureliano Lee [facility]  )    [address]  [facility] [address]   [phone]            Apr 03, 2017  1:50 PM CDT   NATHAN Spine with Aureliano Lee [facility]  )    [address]  [facility] [address]   [phone]            Apr 04, 2017  2:00 PM CDT   Office Visit with NINOSKA Samuel CNP   [facility]    [address] [place] [address] [address]  [facility] [address]   [phone]           Bring a current list of meds and any records pertaining to this visit.  For Physicals, please bring immunization records and any forms needing to be filled out.  Please arrive 10 minutes early to complete paperwork.              Who to contact     If you have questions or need follow up information about today's clinic visit or your schedule please contact [facility] directly at [phone].  Normal or non-critical lab and imaging results will be communicated to you by MyChart, letter or phone within 4  "business days after the clinic has received the results. If you do not hear from us within 7 days, please contact the clinic through iNeed or phone. If you have a critical or abnormal lab result, we will notify you by phone as soon as possible.  Submit refill requests through iNeed or call your pharmacy and they will forward the refill request to us. Please allow 3 business days for your refill to be completed.          Additional Information About Your Visit        iNeed Information     iNeed lets you send messages to your doctor, view your test results, renew your prescriptions, schedule appointments and more. To sign up, go to [email or web address] . Click on \"Log in\" on the left side of the screen, which will take you to the Welcome page. Then click on \"Sign up Now\" on the right side of the page.     You will be asked to enter the access code listed below, as well as some personal information. Please follow the directions to create your username and password.     Your access code is: [email or web address]  Expires: 2017  6:50 PM     Your access code will  in 90 days. If you need help or a new code, please call your [facility] clinic or [phone].        Care EveryWhere ID     This is your Care EveryWhere ID. This could be used by other organizations to access your [facility] medical records  [id number]         Blood Pressure from Last 3 Encounters:   17 (!) 88/58   02/10/17 106/74   17 130/72    Weight from Last 3 Encounters:   17 125 lb 12.8 oz (57.1 kg)   02/10/17 132 lb (59.9 kg)   17 136 lb (61.7 kg)              Today, you had the following     No orders found for display         Today's Medication Changes          These changes are accurate as of: 3/20/17  2:14 PM.  If you have any questions, ask your nurse or doctor.               Start taking these medicines.        Dose/Directions    amitriptyline 25 MG tablet   Commonly known as:  ELAVIL   Used for:  Persistent " insomnia   Started by:  Adina Maciel APRN CNP        Take 25 mg at bedtime.  May titrate dose up to 37.5 - 75 mg as needed.   Quantity:  60 tablet   Refills:  1            Where to get your medicines      These medications [facility] to [facility] - [place], [place] [address] [facility] [address]  [address] [facility] [address], [facility] [address]     Phone:  [phone]     amitriptyline 25 MG tablet                Primary Care Provider Office Phone # Fax #    NINOSKA Samuel [facility] [phone]       [facility] [address]  [facility] [address]        Thank you!     Thank you for choosing [facility]  for your care. Our goal is always to provide you with excellent care. Hearing back from our patients is one way we can continue to improve our services. Please take a few minutes to complete the written survey that you may receive in the mail after your visit with us. Thank you!             Your Updated Medication List - Protect others around you: Learn how to safely use, store and throw away your medicines at www.disposemymeds.org.          This list is accurate as of: 3/20/17  2:14 PM.  Always use your most recent med list.                   Brand Name Dispense Instructions for use    acyclovir 5 % ointment    ZOVIRAX    15 g    Apply topically 6 times daily       albuterol 108 (90 BASE) MCG/ACT Inhaler    PROAIR HFA/PROVENTIL HFA/VENTOLIN HFA         amitriptyline 25 MG tablet    ELAVIL    60 tablet    Take 25 mg at bedtime.  May titrate dose up to 37.5 - 75 mg as needed.       baclofen 10 MG tablet    LIORESAL     TK 1 T PO  TID PRN       betamethasone valerate 0.1 % ointment    VALISONE     Apply once a day for 2 weeks as directed       buPROPion 150 MG 24 hr tablet    WELLBUTRIN XL    30 tablet    Take 1 tablet (150 mg) by mouth every morning       busPIRone 5 MG tablet    BUSPAR    150 tablet    Take 1 tablet  3 times daily for 3 days, then 1.5 tablets 3 times daily for 3 days, then 2 tablets 3 times daily for 3 days, then 2.5 tablets 3 times daily, then 3 tabs 3 times daily.       * CHANTIX 1 MG tablet   Generic drug:  varenicline     56 tablet    TAKE 1 TABLET BY MOUTH TWICE DAILY       * varenicline 1 MG tablet    CHANTIX    56 tablet    Take 1 tablet (1 mg) by mouth 2 times daily       diazepam 2 MG tablet    VALIUM     Reported on 3/13/2017       doxycycline 100 MG capsule    VIBRAMYCIN     Take 100 mg by mouth       FLUoxetine 10 MG capsule    PROzac    49 capsule    Take 10 mg daily for 7 days then increase to 20 mg daily.       hydrocortisone 2.5 % cream          ketoconazole 2 % cream    NIZORAL         omeprazole 40 MG capsule    priLOSEC    30 capsule    Take 1 capsule (40 mg) by mouth daily Take 30-60 minutes before a meal.       ondansetron 4 MG ODT tab    ZOFRAN ODT    12 tablet    Take 1-2 tablets (4-8 mg) by mouth 3 times daily (before meals)       order for DME     1 each    Equipment being ordered: cool humidifier       * Protein Powd     454 g    Take 30 g by mouth 2 times daily       * Protein Powd     454 g    30 g 2 times daily       RETIN-A 0.025 % cream   Generic drug:  tretinoin      Reported on 3/20/2017       sucralfate 1 GM tablet    CARAFATE    40 tablet    Take 1 tablet (1 g) by mouth 4 times daily       * traZODone 150 MG tablet    DESYREL    30 tablet    Take 1 tablet (150 mg) by mouth At Bedtime TAKE 1 TABLET(50 MG) BY MOUTH EVERY NIGHT AS NEEDED FOR SLEEP       * traZODone 50 MG tablet    DESYREL    1 tablet    Take 4-5 tablets (200-250 mg) by mouth nightly as needed for sleep       TYLENOL PO      Take 1 tablet by mouth as needed       valACYclovir 1000 mg tablet    VALTREX    4 tablet    TAKE 2 TABLETS BY MOUTH TWICE DAILY x 1 day       ZOLMitriptan 5 MG tablet    ZOMIG    12 tablet    TAKE 1 TABLET BY MOUTH AT ONSET OF HEADACHE FOR MIGRAINE. MAY REPEAT DOSE IN 2 HOURS. DO NOT EXCEED  10MG IN 24 HOURS       * Notice:  This list has 6 medication(s) that are the same as other medications prescribed for you. Read the directions carefully, and ask your doctor or other care provider to review them with you.

## 2017-03-21 ENCOUNTER — RADIANT APPOINTMENT (OUTPATIENT)
Dept: MAMMOGRAPHY | Facility: OTHER | Age: 47
End: 2017-03-21
Payer: COMMERCIAL

## 2017-03-21 DIAGNOSIS — Z12.31 VISIT FOR SCREENING MAMMOGRAM: ICD-10-CM

## 2017-03-21 PROCEDURE — G0202 SCR MAMMO BI INCL CAD: HCPCS | Mod: TC

## 2017-03-22 PROBLEM — E46 PROTEIN-CALORIE MALNUTRITION (H): Status: ACTIVE | Noted: 2017-03-22

## 2017-03-22 PROBLEM — F51.01 PRIMARY INSOMNIA: Status: ACTIVE | Noted: 2017-03-22

## 2017-03-22 PROBLEM — F33.1 MODERATE EPISODE OF RECURRENT MAJOR DEPRESSIVE DISORDER (H): Status: ACTIVE | Noted: 2017-03-22

## 2017-03-22 PROBLEM — F41.1 GENERALIZED ANXIETY DISORDER: Status: ACTIVE | Noted: 2017-03-22

## 2017-03-23 ENCOUNTER — RADIANT APPOINTMENT (OUTPATIENT)
Dept: GENERAL RADIOLOGY | Facility: OTHER | Age: 47
End: 2017-03-23
Attending: ORTHOPAEDIC SURGERY
Payer: COMMERCIAL

## 2017-03-23 ENCOUNTER — OFFICE VISIT (OUTPATIENT)
Dept: ORTHOPEDICS | Facility: OTHER | Age: 47
End: 2017-03-23
Payer: COMMERCIAL

## 2017-03-23 VITALS — BODY MASS INDEX: 18.18 KG/M2 | TEMPERATURE: 98.5 F | WEIGHT: 127 LBS | HEIGHT: 70 IN

## 2017-03-23 DIAGNOSIS — M25.562 LEFT KNEE PAIN: ICD-10-CM

## 2017-03-23 DIAGNOSIS — M25.562 ANTERIOR KNEE PAIN, LEFT: Primary | ICD-10-CM

## 2017-03-23 PROCEDURE — 20610 DRAIN/INJ JOINT/BURSA W/O US: CPT | Mod: LT | Performed by: ORTHOPAEDIC SURGERY

## 2017-03-23 PROCEDURE — 73564 X-RAY EXAM KNEE 4 OR MORE: CPT | Mod: LT

## 2017-03-23 PROCEDURE — 99203 OFFICE O/P NEW LOW 30 MIN: CPT | Mod: 25 | Performed by: ORTHOPAEDIC SURGERY

## 2017-03-23 RX ORDER — TRIAMCINOLONE ACETONIDE 40 MG/ML
40 INJECTION, SUSPENSION INTRA-ARTICULAR; INTRAMUSCULAR ONCE
Qty: 1 ML | Refills: 0
Start: 2017-03-23 | End: 2017-03-23

## 2017-03-23 ASSESSMENT — PAIN SCALES - GENERAL: PAINLEVEL: NO PAIN (0)

## 2017-03-23 NOTE — PROGRESS NOTES
ORTHOPEDIC CONSULT      Chief Complaint: Coretta Tovar is a 46 year old female who is being seen for Chief Complaint   Patient presents with     Knee Pain     Left knee pain x 1-2 weeks lately     Consult       History of Present Illness:   Mechanism of Injury: No trauma or inciting event.  Location: left knee anterior  Duration of Pain:  Approximately 5 years on and off  Rating of Pain:  mild.    Pain Quality: aching  Pain is better with: Rest  Pain is worse with:  stairs  Treatment so far consists of:  rest.   Associated Features: None  Prior history of related problems:   Received previous steroid injections which resolved her symptoms. She presents today requesting an injection. Unable to take anti-inflammatories secondary to ITP.              Patient's past medical, surgical, social and family histories reviewed.     Past Medical History:   Diagnosis Date     Endometriosis      Mary-mary disease      ITP (idiopathic thrombocytopaenic purpura) 1994    s/p splenectomy     Migraines        Past Surgical History:   Procedure Laterality Date     ANKLE SURGERY      Numerous right ankle surgeries     HERNIA REPAIR Left 1975     HYSTERECTOMY, PAP NO LONGER INDICATED      benign, endometriosis     NECK SURGERY  2000's    Cervical spine     SPLENECTOMY  1995     TONSILLECTOMY         Medications:    Current Outpatient Prescriptions on File Prior to Visit:  FLUoxetine (PROZAC) 20 MG capsule Take 1 capsule (20 mg) by mouth daily   amitriptyline (ELAVIL) 25 MG tablet Take 25 mg at bedtime.  May titrate dose up to 37.5 - 75 mg as needed.   Protein POWD 30 g 2 times daily   varenicline (CHANTIX) 1 MG tablet Take 1 tablet (1 mg) by mouth 2 times daily   albuterol (PROAIR HFA/PROVENTIL HFA/VENTOLIN HFA) 108 (90 BASE) MCG/ACT Inhaler    betamethasone valerate (VALISONE) 0.1 % ointment Apply once a day for 2 weeks as directed   tretinoin (RETIN-A) 0.025 % cream Reported on 3/20/2017   doxycycline (VIBRAMYCIN) 100 MG capsule  Take 100 mg by mouth   hydrocortisone 2.5 % cream    ketoconazole (NIZORAL) 2 % cream    baclofen (LIORESAL) 10 MG tablet TK 1 T PO  TID PRN   FLUoxetine (PROZAC) 10 MG capsule Take 10 mg daily for 7 days then increase to 20 mg daily.   busPIRone (BUSPAR) 5 MG tablet Take 1 tablet 3 times daily for 3 days, then 1.5 tablets 3 times daily for 3 days, then 2 tablets 3 times daily for 3 days, then 2.5 tablets 3 times daily, then 3 tabs 3 times daily.   Protein POWD Take 30 g by mouth 2 times daily   traZODone (DESYREL) 50 MG tablet Take 4-5 tablets (200-250 mg) by mouth nightly as needed for sleep   traZODone (DESYREL) 150 MG tablet Take 1 tablet (150 mg) by mouth At Bedtime TAKE 1 TABLET(50 MG) BY MOUTH EVERY NIGHT AS NEEDED FOR SLEEP   diazepam (VALIUM) 2 MG tablet Reported on 3/13/2017   valACYclovir (VALTREX) 1000 mg tablet TAKE 2 TABLETS BY MOUTH TWICE DAILY x 1 day   ZOLMitriptan (ZOMIG) 5 MG tablet TAKE 1 TABLET BY MOUTH AT ONSET OF HEADACHE FOR MIGRAINE. MAY REPEAT DOSE IN 2 HOURS. DO NOT EXCEED 10MG IN 24 HOURS   buPROPion (WELLBUTRIN XL) 150 MG 24 hr tablet Take 1 tablet (150 mg) by mouth every morning   CHANTIX 1 MG tablet TAKE 1 TABLET BY MOUTH TWICE DAILY   order for DME Equipment being ordered: cool humidifier   sucralfate (CARAFATE) 1 GM tablet Take 1 tablet (1 g) by mouth 4 times daily   omeprazole (PRILOSEC) 40 MG capsule Take 1 capsule (40 mg) by mouth daily Take 30-60 minutes before a meal.   ondansetron (ZOFRAN ODT) 4 MG disintegrating tablet Take 1-2 tablets (4-8 mg) by mouth 3 times daily (before meals)   acyclovir (ZOVIRAX) 5 % ointment Apply topically 6 times daily   Acetaminophen (TYLENOL PO) Take 1 tablet by mouth as needed     Current Facility-Administered Medications on File Prior to Visit:  Lidocaine 1 % injection 9 mL   sodium bicarbonate 8.4 % injection 1 mEq       Allergies   Allergen Reactions     Acetaminophen-Codeine Hives     Amoxicillin-Pot Clavulanate Nausea and Vomiting      "Augmentin Nausea and Vomiting and Hives     Barbiturates      Compazine      Lock-jaw     Liquid Adhesive      Other reaction(s): Other, see comments  blisters     No Clinical Screening - See Comments      PN: LW Reaction: blisters  PN: LW FI1: nka  PN: LW CM1: CONTRAST- nka Reaction :     Nsaids      D/t ITP     Prochlorperazine      Other reaction(s): Edema     Propoxyphene N-Apap Hives     Sulfa Drugs      Tramadol Hcl Hives            Adhesive Tape Rash       Social History     Occupational History     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[employer]     [employer]     Social History Main Topics     Smoking status: Current Every Day Smoker     Packs/day: 1.00     Years: 15.00     Types: Cigarettes     Last attempt to quit: 11/6/2015     Smokeless tobacco: Never Used     Alcohol use Yes      Comment: Drinks once in 4 - 5 months     Drug use: No      Comment: Last exposure to cocaine 2010     Sexual activity: Not Currently     Partners: Male     Birth control/ protection: Surgical       Family History   Problem Relation Age of Onset     Neurologic Disorder Maternal Grandmother      migraines     Breast Cancer Maternal Grandmother      Neurologic Disorder Maternal Grandfather      migraines     Neurologic Disorder Brother      migraines     Breast Cancer Maternal Aunt      x 2     Breast Cancer Paternal Grandmother      Depression Daughter      Anxiety Disorder Daughter        REVIEW OF SYSTEMS  10 point review systems performed otherwise negative as noted as per history of present illness.    Physical Exam:  Vitals: Temp 98.5  F (36.9  C) (Temporal)  Ht 5' 9.6\" (1.768 m)  Wt 127 lb (57.6 kg)  BMI 18.43 kg/m2  BMI= Body mass index is 18.43 kg/(m^2).  Constitutional: healthy, alert and no acute distress   Psychiatric: mentation appears normal and affect normal/bright  NEURO: no focal deficits  RESP: Normal with easy respirations and no use of accessory muscles to breathe, no audible wheezing or " retractions  CV: LLE:  no edema         Regular rate and rhythm by palpation  SKIN: No erythema, rashes, excoriation, or breakdown. No evidence of infection.   JOINT/EXTREMITIES:left knee: No effusion. 0-135  active motion. Negative Mary. Negative Lachman. Patella tracks midline. Some tenderness along the medial and lateral patellar facets. No joint line pain. No instability of varus and valgus testing at 0 and 30 .     GAIT: not tested     Diagnostic Modalities:  left knee X-ray: No fracture, dislocation and or lesion. Normal alignment.  Joint space maintained no significant arthritis. No appreciable soft tissue abnormality  Independent visualization of the images was performed.      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                                          INJECTION PROCEDURE:  The patient was counseled about an  injection, including discussion of risks (including infection), contents of the injection, rationale for performing the injection, and expected benefits of the injection. The skin was prepped with alcohol and betadine and then utilizing sterile technique an injection of the left knee joint from the anterolateral approach in the seated position was performed. The injection consisted 1ml of Kenalog (40mg per 1 ml) mixed with 1ml of 0.5% Marcaine. The patient tolerated the injection well, and there were no complications. The injection site was covered with a Band-Aid. The injection was performed by Pavel Dawn D.O.    Treatment options discussed. I also recommended physical therapy addressing anterior knee program. She is currently in physical therapy for another issue. She will as the therapist.    Return to clinic PRN, or sooner as needed for changes.  Re-x-ray on return: No    Pavel Dawn D.O.

## 2017-03-23 NOTE — NURSING NOTE
"Chief Complaint   Patient presents with     Knee Pain     Left knee pain x 1-2 weeks lately     Consult       Initial Temp 98.5  F (36.9  C) (Temporal)  Ht 5' 9.6\" (1.768 m)  Wt 127 lb (57.6 kg)  BMI 18.43 kg/m2 Estimated body mass index is 18.43 kg/(m^2) as calculated from the following:    Height as of this encounter: 5' 9.6\" (1.768 m).    Weight as of this encounter: 127 lb (57.6 kg).  Medication Reconciliation: complete   Jassi/SALAZAR     "

## 2017-03-23 NOTE — LETTER
3/23/2017       RE: Coretta Tovar  [address] [address] [facility] [address]           Dear Colleague,    Thank you for referring your patient, Coretta Tovar, to the [facility]. Please see a copy of my visit note below.    ORTHOPEDIC CONSULT      Chief Complaint: Coretta Tovar is a 46 year old female who is being seen for Chief Complaint   Patient presents with     Knee Pain     Left knee pain x 1-2 weeks lately     Consult       History of Present Illness:   Mechanism of Injury: No trauma or inciting event.  Location: left knee anterior  Duration of Pain:  Approximately 5 years on and off  Rating of Pain:  mild.    Pain Quality: aching  Pain is better with: Rest  Pain is worse with:  stairs  Treatment so far consists of:  rest.   Associated Features: None  Prior history of related problems:   Received previous steroid injections which resolved her symptoms. She presents today requesting an injection. Unable to take anti-inflammatories secondary to ITP.              Patient's past medical, surgical, social and family histories reviewed.     Past Medical History:   Diagnosis Date     Endometriosis      Mary-mary disease      ITP (idiopathic thrombocytopaenic purpura) 1994    s/p splenectomy     Migraines        Past Surgical History:   Procedure Laterality Date     ANKLE SURGERY      Numerous right ankle surgeries     HERNIA REPAIR Left 1975     HYSTERECTOMY, PAP NO LONGER INDICATED      benign, endometriosis     NECK SURGERY  2000's    Cervical spine     SPLENECTOMY  1995     TONSILLECTOMY         Medications:    Current Outpatient Prescriptions on File Prior to Visit:  FLUoxetine (PROZAC) 20 MG capsule Take 1 capsule (20 mg) by mouth daily   amitriptyline (ELAVIL) 25 MG tablet Take 25 mg at bedtime.  May titrate dose up to 37.5 - 75 mg as needed.   Protein POWD 30 g 2 times daily   varenicline (CHANTIX) 1 MG tablet Take 1 tablet (1 mg) by mouth 2 times daily   albuterol (PROAIR  HFA/PROVENTIL HFA/VENTOLIN HFA) 108 (90 BASE) MCG/ACT Inhaler    betamethasone valerate (VALISONE) 0.1 % ointment Apply once a day for 2 weeks as directed   tretinoin (RETIN-A) 0.025 % cream Reported on 3/20/2017   doxycycline (VIBRAMYCIN) 100 MG capsule Take 100 mg by mouth   hydrocortisone 2.5 % cream    ketoconazole (NIZORAL) 2 % cream    baclofen (LIORESAL) 10 MG tablet TK 1 T PO  TID PRN   FLUoxetine (PROZAC) 10 MG capsule Take 10 mg daily for 7 days then increase to 20 mg daily.   busPIRone (BUSPAR) 5 MG tablet Take 1 tablet 3 times daily for 3 days, then 1.5 tablets 3 times daily for 3 days, then 2 tablets 3 times daily for 3 days, then 2.5 tablets 3 times daily, then 3 tabs 3 times daily.   Protein POWD Take 30 g by mouth 2 times daily   traZODone (DESYREL) 50 MG tablet Take 4-5 tablets (200-250 mg) by mouth nightly as needed for sleep   traZODone (DESYREL) 150 MG tablet Take 1 tablet (150 mg) by mouth At Bedtime TAKE 1 TABLET(50 MG) BY MOUTH EVERY NIGHT AS NEEDED FOR SLEEP   diazepam (VALIUM) 2 MG tablet Reported on 3/13/2017   valACYclovir (VALTREX) 1000 mg tablet TAKE 2 TABLETS BY MOUTH TWICE DAILY x 1 day   ZOLMitriptan (ZOMIG) 5 MG tablet TAKE 1 TABLET BY MOUTH AT ONSET OF HEADACHE FOR MIGRAINE. MAY REPEAT DOSE IN 2 HOURS. DO NOT EXCEED 10MG IN 24 HOURS   buPROPion (WELLBUTRIN XL) 150 MG 24 hr tablet Take 1 tablet (150 mg) by mouth every morning   CHANTIX 1 MG tablet TAKE 1 TABLET BY MOUTH TWICE DAILY   order for DME Equipment being ordered: cool humidifier   sucralfate (CARAFATE) 1 GM tablet Take 1 tablet (1 g) by mouth 4 times daily   omeprazole (PRILOSEC) 40 MG capsule Take 1 capsule (40 mg) by mouth daily Take 30-60 minutes before a meal.   ondansetron (ZOFRAN ODT) 4 MG disintegrating tablet Take 1-2 tablets (4-8 mg) by mouth 3 times daily (before meals)   acyclovir (ZOVIRAX) 5 % ointment Apply topically 6 times daily   Acetaminophen (TYLENOL PO) Take 1 tablet by mouth as needed     Current  "Facility-Administered Medications on File Prior to Visit:  Lidocaine 1 % injection 9 mL   sodium bicarbonate 8.4 % injection 1 mEq       Allergies   Allergen Reactions     Acetaminophen-Codeine Hives     Amoxicillin-Pot Clavulanate Nausea and Vomiting     Augmentin Nausea and Vomiting and Hives     Barbiturates      Compazine      Lock-jaw     Liquid Adhesive      Other reaction(s): Other, see comments  blisters     No Clinical Screening - See Comments      PN: LW Reaction: blisters  PN: LW FI1: nka  PN: LW CM1: CONTRAST- nka Reaction :     Nsaids      D/t ITP     Prochlorperazine      Other reaction(s): Edema     Propoxyphene N-Apap Hives     Sulfa Drugs      Tramadol Hcl Hives            Adhesive Tape Rash       Social History     Occupational History     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[employer]     [employer]     Social History Main Topics     Smoking status: Current Every Day Smoker     Packs/day: 1.00     Years: 15.00     Types: Cigarettes     Last attempt to quit: 11/6/2015     Smokeless tobacco: Never Used     Alcohol use Yes      Comment: Drinks once in 4 - 5 months     Drug use: No      Comment: Last exposure to cocaine 2010     Sexual activity: Not Currently     Partners: Male     Birth control/ protection: Surgical       Family History   Problem Relation Age of Onset     Neurologic Disorder Maternal Grandmother      migraines     Breast Cancer Maternal Grandmother      Neurologic Disorder Maternal Grandfather      migraines     Neurologic Disorder Brother      migraines     Breast Cancer Maternal Aunt      x 2     Breast Cancer Paternal Grandmother      Depression Daughter      Anxiety Disorder Daughter        REVIEW OF SYSTEMS  10 point review systems performed otherwise negative as noted as per history of present illness.    Physical Exam:  Vitals: Temp 98.5  F (36.9  C) (Temporal)  Ht 5' 9.6\" (1.768 m)  Wt 127 lb (57.6 kg)  BMI 18.43 kg/m2  BMI= Body mass index is 18.43 " kg/(m^2).  Constitutional: healthy, alert and no acute distress   Psychiatric: mentation appears normal and affect normal/bright  NEURO: no focal deficits  RESP: Normal with easy respirations and no use of accessory muscles to breathe, no audible wheezing or retractions  CV: LLE:  no edema         Regular rate and rhythm by palpation  SKIN: No erythema, rashes, excoriation, or breakdown. No evidence of infection.   JOINT/EXTREMITIES:left knee: No effusion. 0-135  active motion. Negative Mary. Negative Lachman. Patella tracks midline. Some tenderness along the medial and lateral patellar facets. No joint line pain. No instability of varus and valgus testing at 0 and 30 .     GAIT: not tested     Diagnostic Modalities:  left knee X-ray: No fracture, dislocation and or lesion. Normal alignment.  Joint space maintained no significant arthritis. No appreciable soft tissue abnormality  Independent visualization of the images was performed.      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                                          INJECTION PROCEDURE:  The patient was counseled about an  injection, including discussion of risks (including infection), contents of the injection, rationale for performing the injection, and expected benefits of the injection. The skin was prepped with alcohol and betadine and then utilizing sterile technique an injection of the left knee joint from the anterolateral approach in the seated position was performed. The injection consisted 1ml of Kenalog (40mg per 1 ml) mixed with 1ml of 0.5% Marcaine. The patient tolerated the injection well, and there were no complications. The injection site was covered with a Band-Aid. The injection was performed by Pavel Dawn D.O.    Treatment options discussed. I also recommended physical therapy addressing anterior knee  program. She is currently in physical therapy for another issue. She will as the therapist.    Return to clinic PRN, or sooner as needed for changes.  Re-x-ray on return: No    Pavel Dawn D.O.    Again, thank you for allowing me to participate in the care of your patient.        Sincerely,    Bean Dawn, DO

## 2017-03-23 NOTE — MR AVS SNAPSHOT
After Visit Summary   3/23/2017    Coretta Tovar    MRN: [id number]           Patient Information     Date Of Birth          1970        Visit Information        Provider Department      3/23/2017 2:10 PM Bean Dawn DO [facility]        Today's Diagnoses     Left knee pain    -  1       Follow-ups after your visit        Your next 10 appointments already scheduled     Mar 23, 2017  2:10 PM CDT   New Visit with Bean Dawn DO   [facility] ([facility])    [address]  [address]  [facility] [address]   [phone]            Mar 27, 2017 12:00 PM CDT   NATHAN Spine with Aureliano Lee PT   [facility] ([facility]  )    [address]  [facility] [address]   [phone]            Apr 03, 2017  1:50 PM CDT   NATHAN Spine with Aureliano Lee PT   [place] [facility] ([facility]  )    [address]  [facility] [address]   [phone]            Apr 04, 2017  2:00 PM CDT   Office Visit with NINOSKA Samuel CNP   [facility] ([facility])    [address] [address]   [phone]           Bring a current list of meds and any records pertaining to this visit.  For Physicals, please bring immunization records and any forms needing to be filled out.  Please arrive 10 minutes early to complete paperwork.            Apr 10, 2017  1:50 PM CDT   NATHAN Spine with Aureliano Lee PT   [facility] [facility] ([facility]  )    [address]  [facility] [address]   [phone]              Who to contact     If you have questions or need follow up information about today's clinic visit or your schedule please contact [facility] directly at [phone].  Normal or  "non-critical lab and imaging results will be communicated to you by MyChart, letter or phone within 4 business days after the clinic has received the results. If you do not hear from us within 7 days, please contact the clinic through [employer] or phone. If you have a critical or abnormal lab result, we will notify you by phone as soon as possible.  Submit refill requests through [employer] or call your pharmacy and they will forward the refill request to us. Please allow 3 business days for your refill to be completed.          Additional Information About Your Visit        [facility] Information     [employer] lets you send messages to your doctor, view your test results, renew your prescriptions, schedule appointments and more. To sign up, go to [email or web address] . Click on \"Log in\" on the left side of the screen, which will take you to the Welcome page. Then click on \"Sign up Now\" on the right side of the page.     You will be asked to enter the access code listed below, as well as some personal information. Please follow the directions to create your username and password.     Your access code is: [email or web address]  Expires: 2017  6:50 PM     Your access code will  in 90 days. If you need help or a new code, please call your [facility] clinic or [phone].        Care EveryWhere ID     This is your Care EveryWhere ID. This could be used by other organizations to access your [facility] medical records  [id number]        Your Vitals Were     Temperature Height BMI (Body Mass Index)             98.5  F (36.9  C) (Temporal) 5' 9.6\" (1.768 m) 18.43 kg/m2          Blood Pressure from Last 3 Encounters:   17 (!) 88/58   02/10/17 106/74   17 130/72    Weight from Last 3 Encounters:   17 127 lb (57.6 kg)   17 125 lb 12.8 oz (57.1 kg)   02/10/17 132 lb (59.9 kg)               Primary Care Provider Office Phone # Fax #    NINOSKA Samuel [facility] [phone]       [facility] [address]  [facility] [address]        Thank you!     Thank you for choosing [facility]  for your care. Our goal is always to provide you with excellent care. Hearing back from our patients is one way we can continue to improve our services. Please take a few minutes to complete the written survey that you may receive in the mail after your visit with us. Thank you!             Your Updated Medication List - Protect others around you: Learn how to safely use, store and throw away your medicines at www.disposemymeds.org.          This list is accurate as of: 3/23/17  2:07 PM.  Always use your most recent med list.                   Brand Name Dispense Instructions for use    acyclovir 5 % ointment    ZOVIRAX    15 g    Apply topically 6 times daily       albuterol 108 (90 BASE) MCG/ACT Inhaler    PROAIR HFA/PROVENTIL HFA/VENTOLIN HFA         amitriptyline 25 MG tablet    ELAVIL    60 tablet    Take 25 mg at bedtime.  May titrate dose up to 37.5 - 75 mg as needed.       baclofen 10 MG tablet    LIORESAL     TK 1 T PO  TID PRN       betamethasone valerate 0.1 % ointment    VALISONE     Apply once a day for 2 weeks as directed       buPROPion 150 MG 24 hr tablet    WELLBUTRIN XL    30 tablet    Take 1 tablet (150 mg) by mouth every morning       busPIRone 5 MG tablet    BUSPAR    150 tablet    Take 1 tablet 3 times daily for 3 days, then 1.5 tablets 3 times daily for 3 days, then 2 tablets 3 times daily for 3 days, then 2.5 tablets 3 times daily, then 3 tabs 3 times daily.       * CHANTIX 1 MG tablet   Generic drug:  varenicline     56 tablet    TAKE 1 TABLET BY MOUTH TWICE DAILY       * varenicline 1 MG tablet    CHANTIX    56 tablet    Take 1 tablet (1 mg) by mouth 2 times daily       diazepam 2 MG tablet    VALIUM     Reported on 3/13/2017       doxycycline 100 MG capsule    VIBRAMYCIN     Take 100 mg by mouth       * FLUoxetine 10 MG capsule    PROzac    49 capsule     Take 10 mg daily for 7 days then increase to 20 mg daily.       * FLUoxetine 20 MG capsule    PROzac    30 capsule    Take 1 capsule (20 mg) by mouth daily       hydrocortisone 2.5 % cream          ketoconazole 2 % cream    NIZORAL         omeprazole 40 MG capsule    priLOSEC    30 capsule    Take 1 capsule (40 mg) by mouth daily Take 30-60 minutes before a meal.       ondansetron 4 MG ODT tab    ZOFRAN ODT    12 tablet    Take 1-2 tablets (4-8 mg) by mouth 3 times daily (before meals)       order for DME     1 each    Equipment being ordered: cool humidifier       * Protein Powd     454 g    Take 30 g by mouth 2 times daily       * Protein Powd     454 g    30 g 2 times daily       RETIN-A 0.025 % cream   Generic drug:  tretinoin      Reported on 3/20/2017       sucralfate 1 GM tablet    CARAFATE    40 tablet    Take 1 tablet (1 g) by mouth 4 times daily       * traZODone 150 MG tablet    DESYREL    30 tablet    Take 1 tablet (150 mg) by mouth At Bedtime TAKE 1 TABLET(50 MG) BY MOUTH EVERY NIGHT AS NEEDED FOR SLEEP       * traZODone 50 MG tablet    DESYREL    1 tablet    Take 4-5 tablets (200-250 mg) by mouth nightly as needed for sleep       TYLENOL PO      Take 1 tablet by mouth as needed       valACYclovir 1000 mg tablet    VALTREX    4 tablet    TAKE 2 TABLETS BY MOUTH TWICE DAILY x 1 day       ZOLMitriptan 5 MG tablet    ZOMIG    12 tablet    TAKE 1 TABLET BY MOUTH AT ONSET OF HEADACHE FOR MIGRAINE. MAY REPEAT DOSE IN 2 HOURS. DO NOT EXCEED 10MG IN 24 HOURS       * Notice:  This list has 8 medication(s) that are the same as other medications prescribed for you. Read the directions carefully, and ask your doctor or other care provider to review them with you.

## 2017-03-26 DIAGNOSIS — F41.1 GENERALIZED ANXIETY DISORDER: ICD-10-CM

## 2017-03-27 ENCOUNTER — THERAPY VISIT (OUTPATIENT)
Dept: PHYSICAL THERAPY | Facility: CLINIC | Age: 47
End: 2017-03-27
Payer: COMMERCIAL

## 2017-03-27 DIAGNOSIS — M54.2 CERVICAL PAIN: ICD-10-CM

## 2017-03-27 PROCEDURE — 97112 NEUROMUSCULAR REEDUCATION: CPT | Mod: GP | Performed by: PHYSICAL THERAPIST

## 2017-03-27 PROCEDURE — 97110 THERAPEUTIC EXERCISES: CPT | Mod: GP | Performed by: PHYSICAL THERAPIST

## 2017-03-27 PROCEDURE — 97140 MANUAL THERAPY 1/> REGIONS: CPT | Mod: GP | Performed by: PHYSICAL THERAPIST

## 2017-03-27 NOTE — TELEPHONE ENCOUNTER
Buspirone 5 mg       Last Written Prescription Date: 03/13/2017  Last Fill Quantity: 150; # refills: 0  Last Office Visit with [facility], [facility] or  Health prescribing provider:  03/13/2017   Next 5 appointments (look out 90 days)     Apr 04, 2017  2:00 PM CDT   Office Visit with NINOSKA Samuel CNP   [facility]    [address]  [place] [facility] [place] [address]   [phone]                   Last PHQ-9 score on record=   PHQ-9 SCORE 3/13/2017   Total Score -   Total Score MyChart 18 (Moderately severe depression)   Total Score -       Lab Results   Component Value Date    AST 41 11/21/2016     Lab Results   Component Value Date    ALT 46 11/21/2016

## 2017-03-28 DIAGNOSIS — G43.009 MIGRAINE WITHOUT AURA AND WITHOUT STATUS MIGRAINOSUS, NOT INTRACTABLE: ICD-10-CM

## 2017-03-28 RX ORDER — BUSPIRONE HYDROCHLORIDE 5 MG/1
TABLET ORAL
Qty: 150 TABLET | Refills: 0 | OUTPATIENT
Start: 2017-03-28

## 2017-03-28 RX ORDER — BUSPIRONE HYDROCHLORIDE 15 MG/1
15 TABLET ORAL 3 TIMES DAILY
Qty: 60 TABLET | Refills: 1 | Status: SHIPPED | OUTPATIENT
Start: 2017-03-28 | End: 2017-04-07

## 2017-03-29 NOTE — TELEPHONE ENCOUNTER
Spoke with pharmacy. They state patient does have refills on zolmitriptan at the pharmacy and they will fill this for patient. Please refuse this medications as a duplicate.  Altagracia Narayanan, CMA

## 2017-03-30 RX ORDER — ZOLMITRIPTAN 5 MG/1
TABLET, FILM COATED ORAL
Qty: 12 TABLET | Refills: 0 | OUTPATIENT
Start: 2017-03-30

## 2017-03-31 ENCOUNTER — TELEPHONE (OUTPATIENT)
Dept: FAMILY MEDICINE | Facility: OTHER | Age: 47
End: 2017-03-31

## 2017-03-31 NOTE — TELEPHONE ENCOUNTER
Placed in EM bin. Yasmin Carter [facility] ([facility])

## 2017-03-31 NOTE — TELEPHONE ENCOUNTER
Our goal is to have forms completed with 72 hours, however some forms may require a visit or additional information.    Who is the form from?: [facility] (if other please explain)  Where the form came from: form was faxed in  What clinic location was the form placed at?: [place]  Where the form was placed: 's Box  What number is listed as a contact on the form?: [phone]    Phone call message- patient request for a letter, form or note:    Date needed: as soon as possible  Please fax to [phone]  Has the patient signed a consent form for release of information? NO    Additional comments:     Call taken on 3/31/2017 at 2:16 PM by Solange Anthony    Type of letter, form or note: medical

## 2017-04-03 ENCOUNTER — THERAPY VISIT (OUTPATIENT)
Dept: PHYSICAL THERAPY | Facility: CLINIC | Age: 47
End: 2017-04-03
Payer: COMMERCIAL

## 2017-04-03 DIAGNOSIS — M54.2 CERVICAL PAIN: ICD-10-CM

## 2017-04-03 PROCEDURE — 97110 THERAPEUTIC EXERCISES: CPT | Mod: GP | Performed by: PHYSICAL THERAPIST

## 2017-04-03 PROCEDURE — 97140 MANUAL THERAPY 1/> REGIONS: CPT | Mod: GP | Performed by: PHYSICAL THERAPIST

## 2017-04-03 PROCEDURE — 97112 NEUROMUSCULAR REEDUCATION: CPT | Mod: GP | Performed by: PHYSICAL THERAPIST

## 2017-04-05 NOTE — TELEPHONE ENCOUNTER
Patient was scheduled for office visit yesterday and did not make it to her appointment. She has forms for [facility] that need to be filled out but I would like to visit with her in office regarding this. I have asked you to call her to schedule office visit.  Adina Maciel, PEPE-C

## 2017-04-05 NOTE — TELEPHONE ENCOUNTER
Huddled with EM this morning, pt needs a visit before EM can fill out form. Left message for pt to return call. Please help pt schedule appointment.

## 2017-04-05 NOTE — TELEPHONE ENCOUNTER
Spoke to pt, she made appointment for 4/7/17. Pt states she's not sure why form was sent here and will discuss at her follow up appointment.    Will route to EM for review. I made this a 40 minute appointment. If only need 20, I can change it.

## 2017-04-05 NOTE — TELEPHONE ENCOUNTER
Patient informed. She states she doesn't know of any forms she needs filled out? Please call her.   She did miss her appointment but that was for fu on fluoxetine.    Work -until one today [phone] cell is [phone]

## 2017-04-05 NOTE — PROGRESS NOTES
"  SUBJECTIVE:                                                    Coretta Tovar is a 46 year old female who presents to clinic today for the following health issues:      HPI    Medication Followup of Buspirone and Fluoxetine    Taking Medication as prescribed: yes    Side Effects:  None    Medication Helping Symptoms:  yes       Depression and Anxiety Follow-Up    Status since last visit: Improved     Other associated symptoms:None    Complicating factors:     Significant life event: No     Current substance abuse: None    PHQ-9 SCORE 6/16/2014 2/10/2017 3/13/2017   Total Score 10 - -   Total Score MyChart - - 18 (Moderately severe depression)   Total Score - 14 -     KATHI-7 SCORE 2/10/2017 3/13/2017   Total Score - 17 (severe anxiety)   Total Score 14 -        PHQ-9  English      PHQ-9   Any Language     GAD7     PHQ-9 SCORE 2/10/2017 3/13/2017 4/7/2017   Total Score - - -   Total Score MyChart - 18 (Moderately severe depression) -   Total Score 14 - 1     KATHI-7 SCORE 2/10/2017 3/13/2017 4/7/2017   Total Score - 17 (severe anxiety) -   Total Score 14 - 8     Allergy shots every week - outdoor, animals  Albuterol inhaler and nebulizer      Problem list and histories reviewed & adjusted, as indicated.  Additional history: as documented    Labs reviewed in EPIC    ROS:  Constitutional, HEENT, cardiovascular, pulmonary, gi and gu systems are negative, except as otherwise noted.    OBJECTIVE:                                                    /80 (BP Location: Left arm, Patient Position: Chair, Cuff Size: Adult Regular)  Pulse 90  Temp 99.7  F (37.6  C) (Temporal)  Resp 16  Ht 5' 9.61\" (1.768 m)  Wt 132 lb 12.8 oz (60.2 kg)  SpO2 98%  Breastfeeding? No  BMI 19.27 kg/m2  Body mass index is 19.27 kg/(m^2).  GENERAL: healthy, alert and no distress  RESP: lungs clear to auscultation - no rales, rhonchi or wheezes  CV: regular rate and rhythm, normal S1 S2, no S3 or S4, no murmur, click or rub, no peripheral " edema and peripheral pulses strong  MS: no gross musculoskeletal defects noted, no edema  NEURO: Normal strength and tone, mentation intact and speech normal  PSYCH: mentation appears normal, affect normal/bright    Diagnostic Test Results:  none      ASSESSMENT/PLAN:                                                      1. Primary insomnia  Patient felt the amitriptyline was working well for insomnia but was feeling groggy in the morning.  Would like to try Remeron.  Will start at 15 mg q hs and follow up in 4-6 weeks  - mirtazapine (REMERON) 15 MG tablet; Take 1 tablet (15 mg) by mouth At Bedtime  Dispense: 30 tablet; Refill: 1    2. Moderate episode of recurrent major depressive disorder (H)  PHQ-9 SCORE 2/10/2017 3/13/2017 4/7/2017   Total Score - - -   Total Score MyChart - 18 (Moderately severe depression) -   Total Score 14 - 1     PHQ-9 score has greatly improved.  Continue same dose.  Follow up in 3 months  - FLUoxetine (PROZAC) 20 MG capsule; Take 1 capsule (20 mg) by mouth daily  Dispense: 90 capsule; Refill: 0    3. Generalized anxiety disorder  KATHI-7 SCORE 2/10/2017 3/13/2017 4/7/2017   Total Score - 17 (severe anxiety) -   Total Score 14 - 8     Anxiety has greatly improved on Buspar.  Continue same dose and follow up in 3 months.    - busPIRone (BUSPAR) 15 MG tablet; Take 1 tablet (15 mg) by mouth 3 times daily  Dispense: 90 tablet; Refill: 0    4. Mild intermittent asthma without complication  Patient only uses albuterol as needed.  Has allergy shots every other week and needs to have albuterol with her.  She does not need refills at this time.    5. Tobacco abuse  Patient is starting Chantix in the next few weeks and has the medication at home.  Okay for refills as needed.    Greater than 50% of 25 minute visit were spent on counseling or coordination of care regarding depression, anxiety, insomnia, asthma, tobacco abuse.     NINOSKA [facility]

## 2017-04-07 ENCOUNTER — OFFICE VISIT (OUTPATIENT)
Dept: FAMILY MEDICINE | Facility: OTHER | Age: 47
End: 2017-04-07
Payer: COMMERCIAL

## 2017-04-07 VITALS
WEIGHT: 132.8 LBS | SYSTOLIC BLOOD PRESSURE: 132 MMHG | DIASTOLIC BLOOD PRESSURE: 80 MMHG | RESPIRATION RATE: 16 BRPM | HEART RATE: 90 BPM | BODY MASS INDEX: 19.01 KG/M2 | HEIGHT: 70 IN | OXYGEN SATURATION: 98 % | TEMPERATURE: 99.7 F

## 2017-04-07 DIAGNOSIS — Z72.0 TOBACCO ABUSE: ICD-10-CM

## 2017-04-07 DIAGNOSIS — K21.9 GASTROESOPHAGEAL REFLUX DISEASE, ESOPHAGITIS PRESENCE NOT SPECIFIED: ICD-10-CM

## 2017-04-07 DIAGNOSIS — J45.20 MILD INTERMITTENT ASTHMA WITHOUT COMPLICATION: ICD-10-CM

## 2017-04-07 DIAGNOSIS — F41.1 GENERALIZED ANXIETY DISORDER: ICD-10-CM

## 2017-04-07 DIAGNOSIS — F51.01 PRIMARY INSOMNIA: Primary | ICD-10-CM

## 2017-04-07 DIAGNOSIS — F33.1 MODERATE EPISODE OF RECURRENT MAJOR DEPRESSIVE DISORDER (H): ICD-10-CM

## 2017-04-07 PROCEDURE — 99214 OFFICE O/P EST MOD 30 MIN: CPT | Performed by: STUDENT IN AN ORGANIZED HEALTH CARE EDUCATION/TRAINING PROGRAM

## 2017-04-07 RX ORDER — BUSPIRONE HYDROCHLORIDE 15 MG/1
15 TABLET ORAL 3 TIMES DAILY
Qty: 90 TABLET | Refills: 0 | Status: SHIPPED | OUTPATIENT
Start: 2017-04-07 | End: 2017-05-26

## 2017-04-07 RX ORDER — CLOBETASOL PROPIONATE 0.5 MG/G
OINTMENT TOPICAL
Refills: 1 | COMMUNITY
Start: 2017-03-29 | End: 2019-01-01

## 2017-04-07 RX ORDER — MIRTAZAPINE 15 MG/1
15 TABLET, FILM COATED ORAL AT BEDTIME
Qty: 30 TABLET | Refills: 1 | Status: SHIPPED | OUTPATIENT
Start: 2017-04-07 | End: 2017-06-26

## 2017-04-07 ASSESSMENT — ANXIETY QUESTIONNAIRES
GAD7 TOTAL SCORE: 8
3. WORRYING TOO MUCH ABOUT DIFFERENT THINGS: SEVERAL DAYS
IF YOU CHECKED OFF ANY PROBLEMS ON THIS QUESTIONNAIRE, HOW DIFFICULT HAVE THESE PROBLEMS MADE IT FOR YOU TO DO YOUR WORK, TAKE CARE OF THINGS AT HOME, OR GET ALONG WITH OTHER PEOPLE: NOT DIFFICULT AT ALL
6. BECOMING EASILY ANNOYED OR IRRITABLE: MORE THAN HALF THE DAYS
5. BEING SO RESTLESS THAT IT IS HARD TO SIT STILL: NEARLY EVERY DAY
2. NOT BEING ABLE TO STOP OR CONTROL WORRYING: NOT AT ALL
1. FEELING NERVOUS, ANXIOUS, OR ON EDGE: SEVERAL DAYS
7. FEELING AFRAID AS IF SOMETHING AWFUL MIGHT HAPPEN: NOT AT ALL

## 2017-04-07 ASSESSMENT — PAIN SCALES - GENERAL: PAINLEVEL: MODERATE PAIN (5)

## 2017-04-07 ASSESSMENT — PATIENT HEALTH QUESTIONNAIRE - PHQ9: 5. POOR APPETITE OR OVEREATING: SEVERAL DAYS

## 2017-04-07 NOTE — MR AVS SNAPSHOT
"              After Visit Summary   2017    Coretta Tovar    MRN: [id number]           Patient Information     Date Of Birth          1970        Visit Information        Provider Department      2017 3:20 PM Adina Maciel APRN CNP [facility]        Today's Diagnoses     Primary insomnia    -  1    Moderate episode of recurrent major depressive disorder (H)        Generalized anxiety disorder        Mild intermittent asthma without complication        Tobacco abuse           Follow-ups after your visit        Who to contact     If you have questions or need follow up information about today's clinic visit or your schedule please contact [facility] directly at [phone].  Normal or non-critical lab and imaging results will be communicated to you by MyChart, letter or phone within 4 business days after the clinic has received the results. If you do not hear from us within 7 days, please contact the clinic through MyChart or phone. If you have a critical or abnormal lab result, we will notify you by phone as soon as possible.  Submit refill requests through [employer] or call your pharmacy and they will forward the refill request to us. Please allow 3 business days for your refill to be completed.          Additional Information About Your Visit        MyChart Information     [employer] lets you send messages to your doctor, view your test results, renew your prescriptions, schedule appointments and more. To sign up, go to [email or web address] . Click on \"Log in\" on the left side of the screen, which will take you to the Welcome page. Then click on \"Sign up Now\" on the right side of the page.     You will be asked to enter the access code listed below, as well as some personal information. Please follow the directions to create your username and password.     Your access code is: [email or web address]  Expires: 2017  6:50 PM     Your access code will  in 90 " "days. If you need help or a new code, please call your [place] clinic or [phone].        Care EveryWhere ID     This is your Care EveryWhere ID. This could be used by other organizations to access your [place] medical records  [id number]        Your Vitals Were     Pulse Temperature Respirations Height Pulse Oximetry Breastfeeding?    90 99.7  F (37.6  C) (Temporal) 16 5' 9.61\" (1.768 m) 98% No    BMI (Body Mass Index)                   19.27 kg/m2            Blood Pressure from Last 3 Encounters:   04/07/17 132/80   03/13/17 (!) 88/58   02/10/17 106/74    Weight from Last 3 Encounters:   04/07/17 132 lb 12.8 oz (60.2 kg)   03/23/17 127 lb (57.6 kg)   03/13/17 125 lb 12.8 oz (57.1 kg)              We Performed the Following     Asthma Action Plan (AAP)          Today's Medication Changes          These changes are accurate as of: 4/7/17 11:59 PM.  If you have any questions, ask your nurse or doctor.               Start taking these medicines.        Dose/Directions    mirtazapine 15 MG tablet   Commonly known as:  REMERON   Used for:  Primary insomnia   Started by:  Adina Maciel APRN CNP        Dose:  15 mg   Take 1 tablet (15 mg) by mouth At Bedtime   Quantity:  30 tablet   Refills:  1         These medicines have changed or have updated prescriptions.        Dose/Directions    busPIRone 15 MG tablet   Commonly known as:  BUSPAR   This may have changed:  Another medication with the same name was removed. Continue taking this medication, and follow the directions you see here.   Used for:  Generalized anxiety disorder   Changed by:  Adina Maciel APRN CNP        Dose:  15 mg   Take 1 tablet (15 mg) by mouth 3 times daily   Quantity:  90 tablet   Refills:  0       * omeprazole 40 MG capsule   Commonly known as:  priLOSEC   This may have changed:  Another medication with the same name was added. Make sure you understand how and when to take each.   Used for:  Gastroesophageal " reflux disease, esophagitis presence not specified        Dose:  40 mg   Take 1 capsule (40 mg) by mouth daily Take 30-60 minutes before a meal.   Quantity:  30 capsule   Refills:  0       * omeprazole 40 MG capsule   Commonly known as:  priLOSEC   This may have changed:  You were already taking a medication with the same name, and this prescription was added. Make sure you understand how and when to take each.   Used for:  Gastroesophageal reflux disease, esophagitis presence not specified   Changed by:  Law Parsons PA-C        TAKE 1 CAPSULE(40 MG) BY MOUTH DAILY 30 TO 60 MINUTES BEFORE A MEAL   Quantity:  90 capsule   Refills:  3       * Notice:  This list has 2 medication(s) that are the same as other medications prescribed for you. Read the directions carefully, and ask your doctor or other care provider to review them with you.      Stop taking these medicines if you haven't already. Please contact your care team if you have questions.     amitriptyline 25 MG tablet   Commonly known as:  ELAVIL   Stopped by:  Adina Maciel APRN CNP                Where to get your medicines      These medications were sent to [facility] [address] [place] [address] [facility] [address] [facility] [address]  [address] [facility], [facility] [address]     Phone:  [phone]     busPIRone 15 MG tablet    FLUoxetine 20 MG capsule    mirtazapine 15 MG tablet    omeprazole 40 MG capsule                Primary Care Provider Office Phone # Fax #    NINOSKA Samuel [phone]       [facility] [address] [facility] [address]  [facility] [address]        Thank you!     Thank you for choosing [facility]  for your care. Our goal is always to provide you with excellent care. Hearing back from our patients is one way we can continue to improve our services. Please take a few minutes to complete the written survey that you may receive in the mail after  your visit with us. Thank you!             Your Updated Medication List - Protect others around you: Learn how to safely use, store and throw away your medicines at www.disposemymeds.org.          This list is accurate as of: 4/7/17 11:59 PM.  Always use your most recent med list.                   Brand Name Dispense Instructions for use    acyclovir 5 % ointment    ZOVIRAX    15 g    Apply topically 6 times daily       albuterol 108 (90 BASE) MCG/ACT Inhaler    PROAIR HFA/PROVENTIL HFA/VENTOLIN HFA         baclofen 10 MG tablet    LIORESAL     TK 1 T PO  TID PRN       betamethasone valerate 0.1 % ointment    VALISONE     Apply once a day for 2 weeks as directed       buPROPion 150 MG 24 hr tablet    WELLBUTRIN XL    30 tablet    Take 1 tablet (150 mg) by mouth every morning       busPIRone 15 MG tablet    BUSPAR    90 tablet    Take 1 tablet (15 mg) by mouth 3 times daily       * CHANTIX 1 MG tablet   Generic drug:  varenicline     56 tablet    TAKE 1 TABLET BY MOUTH TWICE DAILY       * varenicline 1 MG tablet    CHANTIX    56 tablet    Take 1 tablet (1 mg) by mouth 2 times daily       clobetasol 0.05 % ointment    TEMOVATE     APPLY TOPICALLY BID       diazepam 2 MG tablet    VALIUM     Reported on 3/13/2017       FLUoxetine 20 MG capsule    PROzac    90 capsule    Take 1 capsule (20 mg) by mouth daily       hydrocortisone 2.5 % cream          ketoconazole 2 % cream    NIZORAL         mirtazapine 15 MG tablet    REMERON    30 tablet    Take 1 tablet (15 mg) by mouth At Bedtime       * omeprazole 40 MG capsule    priLOSEC    30 capsule    Take 1 capsule (40 mg) by mouth daily Take 30-60 minutes before a meal.       * omeprazole 40 MG capsule    priLOSEC    90 capsule    TAKE 1 CAPSULE(40 MG) BY MOUTH DAILY 30 TO 60 MINUTES BEFORE A MEAL       ondansetron 4 MG ODT tab    ZOFRAN ODT    12 tablet    Take 1-2 tablets (4-8 mg) by mouth 3 times daily (before meals)       order for DME     1 each    Equipment being  ordered: cool humidifier       * Protein Powd     454 g    Take 30 g by mouth 2 times daily       * Protein Powd     454 g    30 g 2 times daily       RETIN-A 0.025 % cream   Generic drug:  tretinoin      Reported on 3/20/2017       sucralfate 1 GM tablet    CARAFATE    40 tablet    Take 1 tablet (1 g) by mouth 4 times daily       TYLENOL PO      Take 1 tablet by mouth as needed       valACYclovir 1000 mg tablet    VALTREX    4 tablet    TAKE 2 TABLETS BY MOUTH TWICE DAILY x 1 day       ZOLMitriptan 5 MG tablet    ZOMIG    12 tablet    TAKE 1 TABLET BY MOUTH AT ONSET OF HEADACHE FOR MIGRAINE. MAY REPEAT DOSE IN 2 HOURS. DO NOT EXCEED 10MG IN 24 HOURS       * Notice:  This list has 6 medication(s) that are the same as other medications prescribed for you. Read the directions carefully, and ask your doctor or other care provider to review them with you.

## 2017-04-08 ASSESSMENT — ANXIETY QUESTIONNAIRES: GAD7 TOTAL SCORE: 8

## 2017-04-08 ASSESSMENT — PATIENT HEALTH QUESTIONNAIRE - PHQ9: SUM OF ALL RESPONSES TO PHQ QUESTIONS 1-9: 1

## 2017-04-10 ENCOUNTER — THERAPY VISIT (OUTPATIENT)
Dept: PHYSICAL THERAPY | Facility: CLINIC | Age: 47
End: 2017-04-10
Payer: COMMERCIAL

## 2017-04-10 DIAGNOSIS — M54.2 CERVICAL PAIN: ICD-10-CM

## 2017-04-10 PROCEDURE — 97140 MANUAL THERAPY 1/> REGIONS: CPT | Mod: GP | Performed by: PHYSICAL THERAPIST

## 2017-04-10 PROCEDURE — 97110 THERAPEUTIC EXERCISES: CPT | Mod: GP | Performed by: PHYSICAL THERAPIST

## 2017-04-10 PROCEDURE — 97112 NEUROMUSCULAR REEDUCATION: CPT | Mod: GP | Performed by: PHYSICAL THERAPIST

## 2017-04-10 RX ORDER — OMEPRAZOLE 40 MG/1
CAPSULE, DELAYED RELEASE ORAL
Qty: 90 CAPSULE | Refills: 3 | Status: SHIPPED | OUTPATIENT
Start: 2017-04-10 | End: 2017-09-07

## 2017-04-10 NOTE — TELEPHONE ENCOUNTER
omeprazole (PRILOSEC) 40 MG capsule      Last Written Prescription Date: 11/21/2016  Last Fill Quantity: 30,  # refills: 0   Last Office Visit with [facility], [facility] or [facility] prescribing provider: 04/07/2017                                             Digna Camarillo MA

## 2017-04-10 NOTE — TELEPHONE ENCOUNTER
Omeprazole  Prescription approved per [facility] Refill Protocol.  Stefani Brunner RN

## 2017-04-26 DIAGNOSIS — F33.1 MODERATE EPISODE OF RECURRENT MAJOR DEPRESSIVE DISORDER (H): ICD-10-CM

## 2017-05-15 ENCOUNTER — TELEPHONE (OUTPATIENT)
Dept: FAMILY MEDICINE | Facility: OTHER | Age: 47
End: 2017-05-15

## 2017-05-15 NOTE — TELEPHONE ENCOUNTER
Summary:    Patient is due/failing the following:   ACT and LDL    Action needed:   Patient needs to do ACT. and Patient needs fasting lab only appointment    Type of outreach:    Sent letter.    Questions for provider review:    None                                                                                                                                    Divine Hummel       Chart routed to Care Team .        Panel Management Review      Patient has the following on her problem list:     Depression / Dysthymia review  PHQ-9 SCORE 2/10/2017 3/13/2017 4/7/2017   Total Score - - -   Total Score MyChart - 18 (Moderately severe depression) -   Total Score 14 - 1      Patient is due for:  None    Asthma review   No flowsheet data found.   1. Is Asthma diagnosis on the Problem List? Yes    2. Is Asthma listed on Health Maintenance? Yes    3. Patient is due for:  ACT      Composite cancer screening  Chart review shows that this patient is due/due soon for the following None

## 2017-05-15 NOTE — LETTER
[address]   [facility] [address]  Phone: [phone]  May 15, 2017      Coretta Tovar  [address] [facility] [address]      Dear Coretta,    We care about your health and have reviewed your health plan including your medical conditions, medications, and lab results.  Based on this review, it is recommended that you follow up regarding the following health topic(s):  -Asthma    We recommend you take the following action(s):   -Complete and return the attached ASTHMA CONTROL TEST.  If your total score is 19 or less or you have been to the ER or urgent care for your asthma, then please schedule an asthma followup appointment.     Please call us at the [facility] - [phone] (or use Quid) to address the above recommendations.     Thank you for trusting [facility] and we appreciate the opportunity to serve you.  We look forward to supporting your healthcare needs in the future.    Healthy Regards,    Your Health Care Team  [facility]

## 2017-05-17 DIAGNOSIS — G47.00 PERSISTENT INSOMNIA: ICD-10-CM

## 2017-05-17 NOTE — TELEPHONE ENCOUNTER
amitriptyline (ELAVIL) 25 MG tablet (Discontinued)      Last Written Prescription Date:  3/20/2017  Last Fill Quantity: 60,   # refills: 1  Last Office Visit with [facility], [facility] or [facility] prescribing provider: 4/7/2017  Future Office visit:       Routing refill request to provider for review/approval because:  Drug not active on patient's medication list - side effects

## 2017-05-18 NOTE — TELEPHONE ENCOUNTER
Patient should no longer be taking this medication. Please call and clarify this with her. She is due for a recheck anyway, so please help her set this up.     Angeles Moran, RN, BSN

## 2017-05-19 NOTE — TELEPHONE ENCOUNTER
Called and spoke with patient regarding message below.  Patient stated she is still taking the amitriptyline (Elavil) and that she never picked up the Remeron (mirtazapine).  Patient stated she didn't feel like starting a new medication during the week while she works, cause she does not know the side effects.  Patient stated she will stop the Elavil and  the Remeron to try.  Informed patient that she does need to follow up in clinic soon- patient stated she would check her schedule and call back to make an appointment.  Informed patient to call back with any other concerns or questions.  Melanie Last CMA ([facility])

## 2017-05-26 DIAGNOSIS — F41.1 GENERALIZED ANXIETY DISORDER: ICD-10-CM

## 2017-05-26 DIAGNOSIS — F33.1 MODERATE EPISODE OF RECURRENT MAJOR DEPRESSIVE DISORDER (H): ICD-10-CM

## 2017-05-26 DIAGNOSIS — G47.00 PERSISTENT INSOMNIA: ICD-10-CM

## 2017-05-26 NOTE — TELEPHONE ENCOUNTER
buspar       Last Written Prescription Date: 04/07/17  Last Fill Quantity: 90; # refills: 0  Last Office Visit with [facility], [facility] or  Health prescribing provider:  04/07/17        Last PHQ-9 score on record=   PHQ-9 SCORE 4/7/2017   Total Score -   Total Score MyChart -   Total Score 1       Lab Results   Component Value Date    AST 41 11/21/2016     Lab Results   Component Value Date    ALT 46 11/21/2016       prozac     Last Written Prescription Date: 04/07/17  Last Fill Quantity: 90, # refills: 0  Last Office Visit with [facility] primary care provider:  04/07/17        Last PHQ-9 score on record=   PHQ-9 SCORE 4/7/2017   Total Score -   Total Score MyChart -   Total Score 1         amitripyline      Last Written Prescription Date:    Last Fill Quantity: ,   # refills:   Last Office Visit with [facility], [facility] or [facility] prescribing provider: 04/07/17  Future Office visit:   NA    Routing refill request to provider for review/approval because:  Drug not active on patient's medication list

## 2017-05-30 DIAGNOSIS — B00.1 COLD SORE: ICD-10-CM

## 2017-05-30 RX ORDER — BUSPIRONE HYDROCHLORIDE 15 MG/1
TABLET ORAL
Qty: 90 TABLET | Refills: 1 | Status: SHIPPED | OUTPATIENT
Start: 2017-05-30 | End: 2017-07-05 | Stop reason: DRUGHIGH

## 2017-05-30 NOTE — TELEPHONE ENCOUNTER
Acyclovir (Zovirax) 5 % ointment     Last Written Prescription Date: 4/19/16  Last Fill Quantity: 15 g, # refills: 3  Last Office Visit with [facility], P or [facility] prescribing provider: 4/7/17        Creatinine   Date Value Ref Range Status   11/21/2016 0.73 0.52 - 1.04 mg/dL Final

## 2017-05-30 NOTE — TELEPHONE ENCOUNTER
Buspar:  Prescription approved per [facility] Refill Protocol.  Prozac:  Should have medication through July  Elavil:  Medication was stopped 4/7/17. Refill not appropriate  Angeles Moran, RN, BSN

## 2017-06-01 RX ORDER — ACYCLOVIR 50 MG/G
OINTMENT TOPICAL
Qty: 15 G | Refills: 1 | Status: SHIPPED | OUTPATIENT
Start: 2017-06-01 | End: 2017-06-05

## 2017-06-01 NOTE — TELEPHONE ENCOUNTER
Prescription approved per [facility] Refill Protocol.    Due for Creatinine labs 11/2017.    Kasie Randall, RN, BSN

## 2017-06-02 NOTE — PROGRESS NOTES
"  SUBJECTIVE:                                                    Coretta Tovar is a 46 year old female who presents to clinic today for the following health issues:      HPI    Patient is in clinic to request referrals. She is on Medical Assistance and they are requiring referrals.     Lost job recently, is living with s/o who is an \"alcoholic\", states \"I know I need to get out but now I don't have a job\".  Her daughter lives with her and works for her s/o.  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   Was diagnosed by Dr. Mariajose Melo with chondritis and had some cartilage removed and biopsies.  Steroid injections into ear repeated, causing outer ear pain, pain rating 4-5/10, 10/10 at worst, taking Percocet 4 per day, started seeing pain management 4 months ago.  Saw Dr. Melo last time 4 months ago at [place].  Has an appointment with pain management provider NINOSKA De Leon, CNP at [facility] [place] in [place]     ENT.  Has allergies to pollens, grass, dust, mold, \"everything\", \"I was doing allergy shots but I stopped them\", did shots for over 2 years but doesn't think she can keep doing these, doesn't feel like they've done much    [place] [facility]:  Dr. Bran Lyons - Nela Nela disease treatment, has lesion on both groin and under right arm that Dr. Lyons is treating.    Insomnia  Remeron is working well, was on amitriptyline - was not remembering things    Problem list and histories reviewed & adjusted, as indicated.  Additional history: as documented    Labs reviewed in EPIC    ROS:  Constitutional, HEENT, cardiovascular, pulmonary, gi and gu systems are negative, except as otherwise noted.    OBJECTIVE:                                                    /70 (BP " Location: Left arm, Patient Position: Chair, Cuff Size: Adult Small)  Pulse 95  Temp 98.6  F (37  C) (Temporal)  Resp 20  Wt 137 lb (62.1 kg)  SpO2 98%  BMI 19.88 kg/m2  Body mass index is 19.88 kg/(m^2).  GENERAL: healthy, alert and no distress  HENT: normal cephalic/atraumatic, right ear: narrowed outer ear canal, left ear: narrowed ear canal, tenderness to palpation over tragus and outer canal of ear with examination, nose and mouth without ulcers or lesions, oropharynx clear and oral mucous membranes moist  NECK: no adenopathy, no asymmetry, masses, or scars and thyroid normal to palpation  RESP: lungs clear to auscultation - no rales, rhonchi or wheezes  CV: regular rate and rhythm, normal S1 S2, no S3 or S4, no murmur, click or rub, no peripheral edema  MS: no gross musculoskeletal defects noted, no edema  SKIN: rough scattered lesions on right underarm and bilateral groin, mild erythema  NEURO: Normal strength and tone, mentation intact and speech normal  PSYCH: mentation appears normal, concentration poor, inattentive, affect flat, fatigued, judgement and insight intact and appearance well groomed    Diagnostic Test Results:  none      ASSESSMENT/PLAN:                                                      1. Recurrent cold sores  Patient has history of frequent recurrent cold sores that become quite severe when they do occur.  Refills of oral and topical antivirals sent to pharmacy.   - valACYclovir (VALTREX) 1000 mg tablet; Take 1 tablet (1,000 mg) by mouth 2 times daily for 4 days  Dispense: 8 tablet; Refill: 11  - acyclovir (ZOVIRAX) 5 % ointment; Apply 1 g topically as needed  Dispense: 30 g; Refill: 11    2. Moderate episode of recurrent major depressive disorder (H)/5. Generalized anxiety disorder  Patient has worsening of depression and anxiety symptoms due to situational concerns with her significant other. Patient would like to move out of his home but she states she is unable as she is  "unemployed. Discussed option of living in an alternative location with family of friends but patient does not have ideas of others she could live with. I have increased her dose of Prozac and asked her to return in 3-4 weeks for recheck.  Counseling may be beneficial for her and will discuss this at next appointment.   - FLUoxetine (PROZAC) 40 MG capsule; Take 1 capsule (40 mg) by mouth daily  Dispense: 30 capsule; Refill: 0    3. Chondritis of left external ear/7. Chronic left ear pain  Patient is a part of the Medicaid provider restriction program in which she can only see one provider due to overuse of services.  Referral made to ENT and pain management for chondritis of left ear.  She has had repeated steroid injections into this tissue and also had tissue removed from the left ear due to chondritis.  She reports chronic left ear pain from this condition.  Her left tragus is tender to palpation and bilateral ear canals are narrowed.  I have referred her to pain management. She [facility] the next several weeks.  - OTOLARYNGOLOGY REFERRAL  - PAIN MANAGEMENT EXTERNAL REFERRAL    4. Mary-mary disease  I have referred patient to dermatology for Mary Mary disease management which is a chronic benign condition affecting the adhesion of epidermal keratinocytes.  She currently has lesions in the groin and axillae.   - DERMATOLOGY REFERRAL    6. Seasonal allergic rhinitis, unspecified allergic rhinitis trigger  Patient has chronic allergies to several allergens.  Was going through allergy shots but has stopped these, stating \"I   - olopatadine HCl (PATADAY) 0.2 % SOLN; Place 1 drop into both eyes daily  Dispense: 2.5 mL; Refill: 3  - fluticasone (FLONASE) 50 MCG/ACT spray; Spray 1-2 sprays into both nostrils daily  Dispense: 1 Bottle; Refill: 11  - loratadine (CLARITIN) 10 MG tablet; Take 1 tablet (10 mg) by mouth daily  Dispense: 30 tablet; Refill: 1    Greater than " 50% of 35 minute visit were spent on counseling or coordination of care regarding recurrent cold sores, depression, anxiety, chronic pain in left ear due to chondritis, Nela Nela disease, allergic rhinitis     NINOSKA [facility]

## 2017-06-05 ENCOUNTER — OFFICE VISIT (OUTPATIENT)
Dept: FAMILY MEDICINE | Facility: OTHER | Age: 47
End: 2017-06-05
Payer: MEDICAID

## 2017-06-05 VITALS
HEART RATE: 95 BPM | WEIGHT: 137 LBS | SYSTOLIC BLOOD PRESSURE: 108 MMHG | BODY MASS INDEX: 19.88 KG/M2 | RESPIRATION RATE: 20 BRPM | DIASTOLIC BLOOD PRESSURE: 70 MMHG | TEMPERATURE: 98.6 F | OXYGEN SATURATION: 98 %

## 2017-06-05 DIAGNOSIS — F33.1 MODERATE EPISODE OF RECURRENT MAJOR DEPRESSIVE DISORDER (H): ICD-10-CM

## 2017-06-05 DIAGNOSIS — H61.032 CHONDRITIS OF LEFT EXTERNAL EAR: ICD-10-CM

## 2017-06-05 DIAGNOSIS — Q82.8 HAILEY-HAILEY DISEASE: ICD-10-CM

## 2017-06-05 DIAGNOSIS — H92.02 CHRONIC LEFT EAR PAIN: ICD-10-CM

## 2017-06-05 DIAGNOSIS — F41.1 GENERALIZED ANXIETY DISORDER: ICD-10-CM

## 2017-06-05 DIAGNOSIS — J30.2 SEASONAL ALLERGIC RHINITIS, UNSPECIFIED ALLERGIC RHINITIS TRIGGER: ICD-10-CM

## 2017-06-05 DIAGNOSIS — G89.29 CHRONIC LEFT EAR PAIN: ICD-10-CM

## 2017-06-05 DIAGNOSIS — B00.1 RECURRENT COLD SORES: Primary | ICD-10-CM

## 2017-06-05 PROCEDURE — 99214 OFFICE O/P EST MOD 30 MIN: CPT | Performed by: STUDENT IN AN ORGANIZED HEALTH CARE EDUCATION/TRAINING PROGRAM

## 2017-06-05 RX ORDER — FLUTICASONE PROPIONATE 50 MCG
1-2 SPRAY, SUSPENSION (ML) NASAL DAILY
Qty: 1 BOTTLE | Refills: 11 | Status: SHIPPED | OUTPATIENT
Start: 2017-06-05 | End: 2018-02-05

## 2017-06-05 RX ORDER — FLUOXETINE 40 MG/1
40 CAPSULE ORAL DAILY
Qty: 30 CAPSULE | Refills: 0 | Status: SHIPPED | OUTPATIENT
Start: 2017-06-05 | End: 2017-07-15

## 2017-06-05 RX ORDER — LORATADINE 10 MG/1
10 TABLET ORAL DAILY
Qty: 30 TABLET | Refills: 1 | Status: SHIPPED | OUTPATIENT
Start: 2017-06-05 | End: 2017-09-14

## 2017-06-05 RX ORDER — ACYCLOVIR 50 MG/G
1 OINTMENT TOPICAL PRN
Qty: 30 G | Refills: 11 | Status: SHIPPED | OUTPATIENT
Start: 2017-06-05 | End: 2017-06-10

## 2017-06-05 RX ORDER — VALACYCLOVIR HYDROCHLORIDE 1 G/1
1000 TABLET, FILM COATED ORAL 2 TIMES DAILY
Qty: 8 TABLET | Refills: 11 | Status: SHIPPED | OUTPATIENT
Start: 2017-06-05 | End: 2017-09-07

## 2017-06-05 RX ORDER — OLOPATADINE HYDROCHLORIDE 2 MG/ML
1 SOLUTION/ DROPS OPHTHALMIC DAILY
Qty: 2.5 ML | Refills: 3 | Status: SHIPPED | OUTPATIENT
Start: 2017-06-05 | End: 2018-05-29

## 2017-06-05 NOTE — NURSING NOTE
"Chief Complaint   Patient presents with     Insomnia     Panel Management     tdap, pneumovax, migraine action plan, lipids, ACT       Initial /70 (BP Location: Left arm, Patient Position: Chair, Cuff Size: Adult Small)  Pulse 95  Temp 98.6  F (37  C) (Temporal)  Resp 20  Wt 137 lb (62.1 kg)  SpO2 98%  BMI 19.88 kg/m2 Estimated body mass index is 19.88 kg/(m^2) as calculated from the following:    Height as of 4/7/17: 5' 9.61\" (1.768 m).    Weight as of this encounter: 137 lb (62.1 kg).  Medication Reconciliation: complete     France Griffiths CMA      "

## 2017-06-05 NOTE — PATIENT INSTRUCTIONS
Buspar 30 mg twice daily.    Prozac 40 mg daily.    Allergy medication, Flonase, Claritin, Pataday eye drops sent in.    Derm referral  Pain management referral  ENT referral    LUCIA Forbes

## 2017-06-05 NOTE — MR AVS SNAPSHOT
After Visit Summary   6/5/2017    Coretta Tovar    MRN: [id number]           Patient Information     Date Of Birth          1970        Visit Information        Provider Department      6/5/2017 11:10 AM Adina Maciel APRN [facility]        Today's Diagnoses     Mary-mary disease    -  1    Need for prophylactic vaccination against Streptococcus pneumoniae (pneumococcus)        Need for prophylactic vaccination with tetanus-diphtheria (TD)        Cold sore        Recurrent cold sores        Chondritis of left external ear        Moderate episode of recurrent major depressive disorder (H)        Generalized anxiety disorder        Seasonal allergic rhinitis, unspecified allergic rhinitis trigger        Chronic left ear pain          Care Instructions    Buspar 30 mg twice daily.    Prozac 40 mg daily.    Allergy medication, Flonase, Claritin, Pataday eye drops sent in.    Derm referral  Pain management referral  ENT referral    Adina Maciel, NP-C            Follow-ups after your visit        Additional Services     DERMATOLOGY REFERRAL       Your provider has referred you to: Dr. Bran Lyons at [facility]    Please be aware that coverage of these services is subject to the terms and limitations of your health insurance plan.  Call member services at your health plan with any benefit or coverage questions.      Please bring the following with you to your appointment:    (1) Any X-Rays, CTs or MRIs which have been performed.  Contact the facility where they were done to arrange for  prior to your scheduled appointment.    (2) List of current medications  (3) This referral request   (4) Any documents/labs given to you for this referral            OTOLARYNGOLOGY REFERRAL       Your provider has referred you to: Dr. Mariajose Melo and [facility]    Please be aware that coverage of these services is subject to the terms and limitations of your health  insurance plan.  Call member services at your health plan with any benefit or coverage questions.      Please bring the following with you to your appointment:    (1) Any X-Rays, CTs or MRIs which have been performed.  Contact the facility where they were done to arrange for  prior to your scheduled appointment.   (2) List of current medications  (3) This referral request   (4) Any documents/labs given to you for this referral            PAIN MANAGEMENT EXTERNAL REFERRAL       Your provider has referred you to: [facility]    Please be aware that coverage of these services is subject to the terms and limitations of your health insurance plan.  Call member services at your health plan with any benefit or coverage questions.      Please bring the following with you to your appointment:    (1) Any X-Rays, CTs or MRIs which have been performed.  Contact the facility where they were done to arrange for  prior to your scheduled appointment.  Any new CT, MRI or other procedures ordered by your specialist must be performed at a [facility] facility or coordinated by your clinic's referral office.    (2) List of current medications   (3) This referral request   (4) Any documents/labs given to you for this referral                  Who to contact     If you have questions or need follow up information about today's clinic visit or your schedule please contact [facility] directly at [phone].  Normal or non-critical lab and imaging results will be communicated to you by MyChart, letter or phone within 4 business days after the clinic has received the results. If you do not hear from us within 7 days, please contact the clinic through MyChart or phone. If you have a critical or abnormal lab result, we will notify you by phone as soon as possible.  Submit refill requests through [employer] or call your pharmacy and they will forward the refill request to us. Please allow 3  "business days for your refill to be completed.          Additional Information About Your Visit        Screenhart Information     newScale lets you send messages to your doctor, view your test results, renew your prescriptions, schedule appointments and more. To sign up, go to [email or web address] . Click on \"Log in\" on the left side of the screen, which will take you to the Welcome page. Then click on \"Sign up Now\" on the right side of the page.     You will be asked to enter the access code listed below, as well as some personal information. Please follow the directions to create your username and password.     Your access code is: [email or web address]  Expires: 2017  6:50 PM     Your access code will  in 90 days. If you need help or a new code, please call your [facility] clinic or [phone].        Care EveryWhere ID     This is your Care EveryWhere ID. This could be used by other organizations to access your [facility] medical records  [id number]        Your Vitals Were     Pulse Temperature Respirations Pulse Oximetry BMI (Body Mass Index)       95 98.6  F (37  C) (Temporal) 20 98% 19.88 kg/m2        Blood Pressure from Last 3 Encounters:   17 108/70   17 132/80   17 (!) 88/58    Weight from Last 3 Encounters:   17 137 lb (62.1 kg)   17 132 lb 12.8 oz (60.2 kg)   17 127 lb (57.6 kg)              We Performed the Following     DERMATOLOGY REFERRAL     OTOLARYNGOLOGY REFERRAL     PAIN MANAGEMENT EXTERNAL REFERRAL          Today's Medication Changes          These changes are accurate as of: 17 12:24 PM.  If you have any questions, ask your nurse or doctor.               Start taking these medicines.        Dose/Directions    fluticasone 50 MCG/ACT spray   Commonly known as:  FLONASE   Used for:  Seasonal allergic rhinitis, unspecified allergic rhinitis trigger   Started by:  Adina Maciel APRN CNP        Dose:  1-2 spray   Spray 1-2 sprays into both " nostrils daily   Quantity:  1 Bottle   Refills:  11       loratadine 10 MG tablet   Commonly known as:  CLARITIN   Used for:  Seasonal allergic rhinitis, unspecified allergic rhinitis trigger   Started by:  Adina Maciel APRN CNP        Dose:  10 mg   Take 1 tablet (10 mg) by mouth daily   Quantity:  30 tablet   Refills:  1       olopatadine HCl 0.2 % Soln   Commonly known as:  PATADAY   Used for:  Seasonal allergic rhinitis, unspecified allergic rhinitis trigger   Started by:  Adina Maciel APRN CNP        Dose:  1 drop   Place 1 drop into both eyes daily   Quantity:  2.5 mL   Refills:  3         These medicines have changed or have updated prescriptions.        Dose/Directions    acyclovir 5 % ointment   Commonly known as:  ZOVIRAX   This may have changed:  See the new instructions.   Used for:  Cold sore   Changed by:  Adina Maciel APRN CNP        Dose:  1 g   Apply 1 g topically as needed   Quantity:  30 g   Refills:  11       * FLUoxetine 20 MG capsule   Commonly known as:  PROzac   This may have changed:  Another medication with the same name was added. Make sure you understand how and when to take each.   Used for:  Moderate episode of recurrent major depressive disorder (H)   Changed by:  Adina Maciel APRN CNP        Dose:  20 mg   Take 1 capsule (20 mg) by mouth daily   Quantity:  90 capsule   Refills:  0       * FLUoxetine 40 MG capsule   Commonly known as:  PROzac   This may have changed:  You were already taking a medication with the same name, and this prescription was added. Make sure you understand how and when to take each.   Used for:  Moderate episode of recurrent major depressive disorder (H), Generalized anxiety disorder   Changed by:  Adina Maciel APRN CNP        Dose:  40 mg   Take 1 capsule (40 mg) by mouth daily   Quantity:  30 capsule   Refills:  0       valACYclovir 1000 mg tablet   Commonly known as:  VALTREX   This  may have changed:    - how much to take  - how to take this  - when to take this  - additional instructions   Used for:  Recurrent cold sores   Changed by:  Adina Maciel APRN CNP        Dose:  1000 mg   Take 1 tablet (1,000 mg) by mouth 2 times daily for 4 days   Quantity:  8 tablet   Refills:  11       * Notice:  This list has 2 medication(s) that are the same as other medications prescribed for you. Read the directions carefully, and ask your doctor or other care provider to review them with you.         Where to get your medicines      These medications were sent to [id number] - [facility] [address] [address] AT [facility] [address]  [address] [address], [facility] [address]     Phone:  [phone]     acyclovir 5 % ointment    FLUoxetine 40 MG capsule    fluticasone 50 MCG/ACT spray    loratadine 10 MG tablet    olopatadine HCl 0.2 % Soln    valACYclovir 1000 mg tablet                Primary Care Provider Office Phone # Fax #    NINOSKA Samuel [facility] [phone]       [facility] [address]  [facility] [address]        Thank you!     Thank you for choosing [facility]  for your care. Our goal is always to provide you with excellent care. Hearing back from our patients is one way we can continue to improve our services. Please take a few minutes to complete the written survey that you may receive in the mail after your visit with us. Thank you!             Your Updated Medication List - Protect others around you: Learn how to safely use, store and throw away your medicines at www.disposemymeds.org.          This list is accurate as of: 6/5/17 12:24 PM.  Always use your most recent med list.                   Brand Name Dispense Instructions for use    acyclovir 5 % ointment    ZOVIRAX    30 g    Apply 1 g topically as needed       albuterol 108 (90 BASE) MCG/ACT Inhaler    PROAIR HFA/PROVENTIL HFA/VENTOLIN  HFA         baclofen 10 MG tablet    LIORESAL     TK 1 T PO  TID PRN       betamethasone valerate 0.1 % ointment    VALISONE     Apply once a day for 2 weeks as directed       buPROPion 150 MG 24 hr tablet    WELLBUTRIN XL    30 tablet    Take 1 tablet (150 mg) by mouth every morning       busPIRone 15 MG tablet    BUSPAR    90 tablet    TAKE 1 TABLET(15 MG) BY MOUTH THREE TIMES DAILY       * CHANTIX 1 MG tablet   Generic drug:  varenicline     56 tablet    TAKE 1 TABLET BY MOUTH TWICE DAILY       * varenicline 1 MG tablet    CHANTIX    56 tablet    Take 1 tablet (1 mg) by mouth 2 times daily       clobetasol 0.05 % ointment    TEMOVATE     APPLY TOPICALLY BID       diazepam 2 MG tablet    VALIUM     Reported on 3/13/2017       * FLUoxetine 20 MG capsule    PROzac    90 capsule    Take 1 capsule (20 mg) by mouth daily       * FLUoxetine 40 MG capsule    PROzac    30 capsule    Take 1 capsule (40 mg) by mouth daily       fluticasone 50 MCG/ACT spray    FLONASE    1 Bottle    Spray 1-2 sprays into both nostrils daily       hydrocortisone 2.5 % cream          ketoconazole 2 % cream    NIZORAL         loratadine 10 MG tablet    CLARITIN    30 tablet    Take 1 tablet (10 mg) by mouth daily       mirtazapine 15 MG tablet    REMERON    30 tablet    Take 1 tablet (15 mg) by mouth At Bedtime       olopatadine HCl 0.2 % Soln    PATADAY    2.5 mL    Place 1 drop into both eyes daily       * omeprazole 40 MG capsule    priLOSEC    30 capsule    Take 1 capsule (40 mg) by mouth daily Take 30-60 minutes before a meal.       * omeprazole 40 MG capsule    priLOSEC    90 capsule    TAKE 1 CAPSULE(40 MG) BY MOUTH DAILY 30 TO 60 MINUTES BEFORE A MEAL       ondansetron 4 MG ODT tab    ZOFRAN ODT    12 tablet    Take 1-2 tablets (4-8 mg) by mouth 3 times daily (before meals)       order for DME     1 each    Equipment being ordered: cool humidifier       * Protein Powd     454 g    Take 30 g by mouth 2 times daily       * Protein Powd      454 g    30 g 2 times daily       RETIN-A 0.025 % cream   Generic drug:  tretinoin      Reported on 3/20/2017       sucralfate 1 GM tablet    CARAFATE    40 tablet    Take 1 tablet (1 g) by mouth 4 times daily       TYLENOL PO      Take 1 tablet by mouth as needed       valACYclovir 1000 mg tablet    VALTREX    8 tablet    Take 1 tablet (1,000 mg) by mouth 2 times daily for 4 days       ZOLMitriptan 5 MG tablet    ZOMIG    12 tablet    TAKE 1 TABLET BY MOUTH AT ONSET OF HEADACHE FOR MIGRAINE. MAY REPEAT DOSE IN 2 HOURS. DO NOT EXCEED 10MG IN 24 HOURS       * Notice:  This list has 8 medication(s) that are the same as other medications prescribed for you. Read the directions carefully, and ask your doctor or other care provider to review them with you.

## 2017-06-10 ENCOUNTER — HOSPITAL ENCOUNTER (EMERGENCY)
Facility: CLINIC | Age: 47
Discharge: HOME | End: 2017-06-10
Attending: FAMILY MEDICINE | Admitting: FAMILY MEDICINE
Payer: MEDICAID

## 2017-06-10 VITALS
TEMPERATURE: 98.1 F | HEART RATE: 71 BPM | DIASTOLIC BLOOD PRESSURE: 77 MMHG | RESPIRATION RATE: 18 BRPM | SYSTOLIC BLOOD PRESSURE: 131 MMHG | OXYGEN SATURATION: 99 %

## 2017-06-10 DIAGNOSIS — R10.84 GENERALIZED ABDOMINAL PAIN: ICD-10-CM

## 2017-06-10 DIAGNOSIS — F19.91 HISTORY OF RECREATIONAL DRUG USE: ICD-10-CM

## 2017-06-10 DIAGNOSIS — E86.0 DEHYDRATION: ICD-10-CM

## 2017-06-10 PROCEDURE — 96375 TX/PRO/DX INJ NEW DRUG ADDON: CPT | Performed by: FAMILY MEDICINE

## 2017-06-10 PROCEDURE — 25000125 ZZHC RX 250: Performed by: FAMILY MEDICINE

## 2017-06-10 PROCEDURE — 25000128 H RX IP 250 OP 636: Performed by: FAMILY MEDICINE

## 2017-06-10 PROCEDURE — 96374 THER/PROPH/DIAG INJ IV PUSH: CPT | Performed by: FAMILY MEDICINE

## 2017-06-10 PROCEDURE — 96361 HYDRATE IV INFUSION ADD-ON: CPT | Performed by: FAMILY MEDICINE

## 2017-06-10 PROCEDURE — 99285 EMERGENCY DEPT VISIT HI MDM: CPT | Mod: 25 | Performed by: FAMILY MEDICINE

## 2017-06-10 PROCEDURE — 99285 EMERGENCY DEPT VISIT HI MDM: CPT | Mod: Z6 | Performed by: FAMILY MEDICINE

## 2017-06-10 RX ORDER — FENTANYL CITRATE 50 UG/ML
50 INJECTION, SOLUTION INTRAMUSCULAR; INTRAVENOUS ONCE
Status: COMPLETED | OUTPATIENT
Start: 2017-06-10 | End: 2017-06-10

## 2017-06-10 RX ORDER — ACYCLOVIR 50 MG/G
1 OINTMENT TOPICAL PRN
Qty: 30 G | Refills: 11 | Status: SHIPPED | OUTPATIENT
Start: 2017-06-10 | End: 2017-11-30

## 2017-06-10 RX ORDER — LORAZEPAM 2 MG/ML
.5-1 INJECTION INTRAMUSCULAR ONCE
Status: COMPLETED | OUTPATIENT
Start: 2017-06-10 | End: 2017-06-10

## 2017-06-10 RX ORDER — LIDOCAINE 40 MG/G
CREAM TOPICAL
Status: DISCONTINUED | OUTPATIENT
Start: 2017-06-10 | End: 2017-06-10 | Stop reason: HOSPADM

## 2017-06-10 RX ORDER — ONDANSETRON 2 MG/ML
4 INJECTION INTRAMUSCULAR; INTRAVENOUS EVERY 30 MIN PRN
Status: DISCONTINUED | OUTPATIENT
Start: 2017-06-10 | End: 2017-06-10 | Stop reason: HOSPADM

## 2017-06-10 RX ADMIN — FENTANYL CITRATE 50 MCG: 50 INJECTION INTRAMUSCULAR; INTRAVENOUS at 17:41

## 2017-06-10 RX ADMIN — SODIUM CHLORIDE 2000 ML: 9 INJECTION, SOLUTION INTRAVENOUS at 17:01

## 2017-06-10 RX ADMIN — LORAZEPAM 1 MG: 2 INJECTION INTRAMUSCULAR; INTRAVENOUS at 18:26

## 2017-06-10 RX ADMIN — ONDANSETRON 4 MG: 2 INJECTION INTRAMUSCULAR; INTRAVENOUS at 17:02

## 2017-06-10 ASSESSMENT — ENCOUNTER SYMPTOMS
DIARRHEA: 1
VOMITING: 1
NAUSEA: 1
TREMORS: 1
CHILLS: 1
ABDOMINAL PAIN: 1

## 2017-06-10 NOTE — ED AVS SNAPSHOT
[facility]    [address]     [place] [address]    Phone:  [phone]    Fax:  [phone]                                       Coretta Tovar   MRN: [id number]    Department:  [facility]   Date of Visit:  6/10/2017           Patient Information     Date Of Birth          1970        Your diagnoses for this visit were:     Generalized abdominal pain     Dehydration        You were seen by Jhonatan Bills MD.      Follow-up Information     Schedule an appointment as soon as possible for a visit with Adina Maciel APRN CNP.    Specialty:  Nurse Practitioner - Family    Why:  As needed    Contact information:    [facility]  [address]  [facility] [address]  [phone]          Discharge Instructions         *Abdominal Pain, likely due to Dehydration    The exact cause of your abdominal (stomach) pain is not certain. This does not mean that this is something to worry about, or the right tests were not done. Everyone likes to know the exact cause of the problem, but sometimes with abdominal pain, there is no clear-cut cause, and this could be a good thing. The good news is that your symptoms can be treated, and you will feel better.   Your condition does not seem serious now; however, sometimes the signs of a serious problem may take more time to appear. For this reason, it is important for you to watch for any new symptoms, problems, or worsening of your condition.  Over the next few days, the abdominal pain may come and go, or be continuous. Other common symptoms can include nausea and vomiting. Sometimes it can be difficult to tell if you feel nauseous, you may just feel bad and not associate that feeling with nausea. Constipation, diarrhea, and a fever may go along with the pain.  The pain may continue even if treated correctly over the following days. Depending on how things go, sometimes  the cause can become clear and may require further or different treatment. Additional evaluations, medications, or tests may be needed.  Home care  Your health care provider may prescribe medications for pain, symptoms, or an infection.  Follow the health care provider's instructions for taking these medications.  General care    Rest until your next exam. No strenuous activities.    Try to find positions that ease discomfort. A small pillow placed on the abdomen may help relieve pain.    Something warm on your abdomen (such as a heating pad) may help, but be careful not to burn yourself.  Diet    Do not force yourself to eat, especially if having cramps, vomiting, or diarrhea.    Water is important so you do not get dehydrated. Soup may also be good. Sports drinks may also help, especially if they are not too acidic. Make sure you don't drink sugary drinks as this can make things worse. Take liquids in small amounts. Do not guzzle them.    Caffeine sometimes makes the pain and cramping worse.    Avoid dairy products if you have vomiting or diarrhea.    Don't eat large amounts at a time. Wait a few minutes between bites.    Eat a diet low in fiber (called a low-residue diet). Foods allowed include refined breads, white rice, fruit and vegetable juices without pulp, tender meats. These foods will pass more easily through the intestine.    Avoid fried or fatty foods, dairy, alcohol and spicy foods until your symptoms go away.  Follow-up care  Follow up with your health care provider as instructed, or if your pain does not begin to improve in the next 24 hours.  When to seek medical care  Seek prompt medical care if any of the following occur:    Pain gets worse or moves to the right lower abdomen    New or worsening vomiting or diarrhea    Swelling of the abdomen    Unable to pass stool for more than three days    New fever over 101  F (38.3 C), or rising fever    Blood in vomit or bowel movements (dark red or black  color)    Jaundice (yellow color of eyes and skin)    Weakness, dizziness    Chest, arm, back, neck or jaw pain    Unexpected vaginal bleeding or missed period  Call 911  Call emergency services if any of the following occur:    Trouble breathing    Confusion    Fainting or loss of consciousness    Rapid heart rate    Seizure  Thank you for choosing our Emergency Department for your care.     Sincerely,    Dr Frederick Bills M.D.          24 Hour Appointment Hotline       To make an appointment at any [facility], call [phone] ([phone]). If you don't have a family doctor or clinic, we will help you find one. [place] clinics are conveniently located to serve the needs of you and your family.             Review of your medicines      Our records show that you are taking the medicines listed below. If these are incorrect, please call your family doctor or clinic.        Dose / Directions Last dose taken    acyclovir 5 % ointment   Commonly known as:  ZOVIRAX   Dose:  1 g   Quantity:  30 g        Apply 1 g topically as needed   Refills:  11        albuterol 108 (90 BASE) MCG/ACT Inhaler   Commonly known as:  PROAIR HFA/PROVENTIL HFA/VENTOLIN HFA        Refills:  0        baclofen 10 MG tablet   Commonly known as:  LIORESAL        TK 1 T PO  TID PRN   Refills:  11        betamethasone valerate 0.1 % ointment   Commonly known as:  VALISONE        Apply once a day for 2 weeks as directed   Refills:  0        buPROPion 150 MG 24 hr tablet   Commonly known as:  WELLBUTRIN XL   Dose:  150 mg   Quantity:  30 tablet        Take 1 tablet (150 mg) by mouth every morning   Refills:  3        busPIRone 15 MG tablet   Commonly known as:  BUSPAR   Quantity:  90 tablet        TAKE 1 TABLET(15 MG) BY MOUTH THREE TIMES DAILY   Refills:  1        * CHANTIX 1 MG tablet   Quantity:  56 tablet   Generic drug:  varenicline        TAKE 1 TABLET BY MOUTH TWICE DAILY   Refills:  0        * varenicline 1 MG tablet   Commonly  known as:  CHANTIX   Dose:  1 mg   Quantity:  56 tablet        Take 1 tablet (1 mg) by mouth 2 times daily   Refills:  3        clobetasol 0.05 % ointment   Commonly known as:  TEMOVATE        APPLY TOPICALLY BID   Refills:  1        diazepam 2 MG tablet   Commonly known as:  VALIUM        Reported on 3/13/2017   Refills:  0        * FLUoxetine 20 MG capsule   Commonly known as:  PROzac   Dose:  20 mg   Quantity:  90 capsule        Take 1 capsule (20 mg) by mouth daily   Refills:  0        * FLUoxetine 40 MG capsule   Commonly known as:  PROzac   Dose:  40 mg   Quantity:  30 capsule        Take 1 capsule (40 mg) by mouth daily   Refills:  0        fluticasone 50 MCG/ACT spray   Commonly known as:  FLONASE   Dose:  1-2 spray   Quantity:  1 Bottle        Spray 1-2 sprays into both nostrils daily   Refills:  11        hydrocortisone 2.5 % cream        Refills:  0        ketoconazole 2 % cream   Commonly known as:  NIZORAL        Refills:  0        loratadine 10 MG tablet   Commonly known as:  CLARITIN   Dose:  10 mg   Quantity:  30 tablet        Take 1 tablet (10 mg) by mouth daily   Refills:  1        mirtazapine 15 MG tablet   Commonly known as:  REMERON   Dose:  15 mg   Quantity:  30 tablet        Take 1 tablet (15 mg) by mouth At Bedtime   Refills:  1        olopatadine HCl 0.2 % Soln   Commonly known as:  PATADAY   Dose:  1 drop   Quantity:  2.5 mL        Place 1 drop into both eyes daily   Refills:  3        * omeprazole 40 MG capsule   Commonly known as:  priLOSEC   Dose:  40 mg   Quantity:  30 capsule        Take 1 capsule (40 mg) by mouth daily Take 30-60 minutes before a meal.   Refills:  0        * omeprazole 40 MG capsule   Commonly known as:  priLOSEC   Quantity:  90 capsule        TAKE 1 CAPSULE(40 MG) BY MOUTH DAILY 30 TO 60 MINUTES BEFORE A MEAL   Refills:  3        ondansetron 4 MG ODT tab   Commonly known as:  ZOFRAN ODT   Dose:  4-8 mg   Quantity:  12 tablet        Take 1-2 tablets (4-8 mg) by mouth  3 times daily (before meals)   Refills:  0        order for DME   Quantity:  1 each        Equipment being ordered: cool humidifier   Refills:  0        * Protein Powd   Dose:  30 g   Quantity:  454 g        Take 30 g by mouth 2 times daily   Refills:  1        * Protein Powd   Dose:  30 g   Quantity:  454 g        30 g 2 times daily   Refills:  1        RETIN-A 0.025 % cream   Generic drug:  tretinoin        Reported on 3/20/2017   Refills:  0        sucralfate 1 GM tablet   Commonly known as:  CARAFATE   Dose:  1 g   Quantity:  40 tablet        Take 1 tablet (1 g) by mouth 4 times daily   Refills:  1        TYLENOL PO   Dose:  1 tablet        Take 1 tablet by mouth as needed   Refills:  0        valACYclovir 1000 mg tablet   Commonly known as:  VALTREX   Dose:  1000 mg   Quantity:  8 tablet        Take 1 tablet (1,000 mg) by mouth 2 times daily for 4 days   Refills:  11        ZOLMitriptan 5 MG tablet   Commonly known as:  ZOMIG   Quantity:  12 tablet        TAKE 1 TABLET BY MOUTH AT ONSET OF HEADACHE FOR MIGRAINE. MAY REPEAT DOSE IN 2 HOURS. DO NOT EXCEED 10MG IN 24 HOURS   Refills:  4        * Notice:  This list has 8 medication(s) that are the same as other medications prescribed for you. Read the directions carefully, and ask your doctor or other care provider to review them with you.            Orders Needing Specimen Collection     None      Pending Results     No orders found from 6/8/2017 to 6/11/2017.            Pending Culture Results     No orders found from 6/8/2017 to 6/11/2017.            Pending Results Instructions     If you had any lab results that were not finalized at the time of your Discharge, you can call the ED Lab Result RN at [phone]. You will be contacted by this team for any positive Lab results or changes in treatment. The nurses are available 7 days a week from 10A to 6:30P.  You can leave a message 24 hours per day and they will return your call.        Thank you for choosing  "[facility]       Thank you for choosing [facility] for your care. Our goal is always to provide you with excellent care. Hearing back from our patients is one way we can continue to improve our services. Please take a few minutes to complete the written survey that you may receive in the mail after you visit with us. Thank you!        [employer]secharAWAK Information     [employer] lets you send messages to your doctor, view your test results, renew your prescriptions, schedule appointments and more. To sign up, go to [email or web address] . Click on \"Log in\" on the left side of the screen, which will take you to the Welcome page. Then click on \"Sign up Now\" on the right side of the page.     You will be asked to enter the access code listed below, as well as some personal information. Please follow the directions to create your username and password.     Your access code is: [email or web address]  Expires: 2017  7:09 PM     Your access code will  in 90 days. If you need help or a new code, please call your [facility] clinic or [phone].        Care EveryWhere ID     This is your Care EveryWhere ID. This could be used by other organizations to access your [facility] medical records  [id number]        After Visit Summary       This is your record. Keep this with you and show to your community pharmacist(s) and doctor(s) at your next visit.                  "

## 2017-06-10 NOTE — ED PROVIDER NOTES
"  History     Chief Complaint   Patient presents with     Abdominal Pain     The history is provided by the patient.     Coretta Tovar is a 46 year old female who presents to the emergency department with abdominal pain.  Patient was on a motorcycle ride when she started to get a bad abdominal pain and nausea. Once she arrived at her destination, she felt overheated and her mouth started watering.  She got off her motorcycle and vomited. She walked into the bar and applied ice to cool her off, but she states that her stomach pain was \"really bad'.  Patient says she filled 5 solo cups with vomit shortly after and experienced tremors and chills.  Patient is feeling slightly better now but is still experiencing abdominal pain and nausea. She is not producing hardly any urine and has diarrhea \"really bad\". Patient denies street drug usage.     I have reviewed the Medications, Allergies, Past Medical and Surgical History, and Social History in the Epic system.    Patient Active Problem List   Diagnosis     Tobacco abuse     S/P splenectomy     Migraines     Idiopathic thrombocytopenic purpura (HCC)     Chemical dependency (H)     Insomnia     Mary-mary disease     Vomiting     Abdominal pain, acute     Disorder of stomach     Abdominal pain, right upper quadrant     Cervical pain     Vertigo     Asthma     Nondependent cocaine abuse in remission     Gastroesophageal reflux disease     Herpetic gingivostomatitis     Immune thrombocytopenic purpura (H)     Muscle pain     Personality disorder     Nondependent cannabis abuse, episodic     Atopic rhinitis     Nutritional disorder     Abnormal weight loss     Mixed, or nondependent drug abuse     Moderate episode of recurrent major depressive disorder (H)     Generalized anxiety disorder     Protein-calorie malnutrition (H)     Primary insomnia     Past Medical History:   Diagnosis Date     Endometriosis      Mary-mary disease      ITP (idiopathic thrombocytopaenic " purpura) 1994    s/p splenectomy     Migraines        Past Surgical History:   Procedure Laterality Date     ANKLE SURGERY      Numerous right ankle surgeries     HERNIA REPAIR Left 1975     HYSTERECTOMY, PAP NO LONGER INDICATED      benign, endometriosis     NECK SURGERY  2000's    Cervical spine     SPLENECTOMY  1995     TONSILLECTOMY         Family History   Problem Relation Age of Onset     Neurologic Disorder Maternal Grandmother      migraines     Breast Cancer Maternal Grandmother      Neurologic Disorder Maternal Grandfather      migraines     Neurologic Disorder Brother      migraines     Breast Cancer Maternal Aunt      x 2     Breast Cancer Paternal Grandmother      Depression Daughter      Anxiety Disorder Daughter        Social History   Substance Use Topics     Smoking status: Current Every Day Smoker     Packs/day: 1.00     Years: 15.00     Types: Cigarettes     Last attempt to quit: 11/6/2015     Smokeless tobacco: Never Used     Alcohol use Yes      Comment: Drinks once in 4 - 5 months        Immunization History   Administered Date(s) Administered     Influenza (IIV3) 11/03/2006, 09/23/2014, 10/01/2016     Pneumococcal 23 valent 10/24/2002          Allergies   Allergen Reactions     Acetaminophen-Codeine Hives     Amoxicillin-Pot Clavulanate Nausea and Vomiting     Augmentin Nausea and Vomiting and Hives     Barbiturates      Compazine      Lock-jaw     Liquid Adhesive      Other reaction(s): Other, see comments  blisters     No Clinical Screening - See Comments      PN: LW Reaction: blisters  PN: LW FI1: nka  PN: LW CM1: CONTRAST- nka Reaction :     Nsaids      D/t ITP     Prochlorperazine      Other reaction(s): Edema     Propoxyphene N-Apap Hives     Sulfa Drugs      Tramadol Hcl Hives            Adhesive Tape Rash       Current Outpatient Prescriptions   Medication Sig Dispense Refill     acyclovir (ZOVIRAX) 5 % ointment Apply 1 g topically as needed 30 g 11     valACYclovir (VALTREX) 1000 mg  tablet Take 1 tablet (1,000 mg) by mouth 2 times daily for 4 days 8 tablet 11     FLUoxetine (PROZAC) 40 MG capsule Take 1 capsule (40 mg) by mouth daily 30 capsule 0     olopatadine HCl (PATADAY) 0.2 % SOLN Place 1 drop into both eyes daily 2.5 mL 3     fluticasone (FLONASE) 50 MCG/ACT spray Spray 1-2 sprays into both nostrils daily 1 Bottle 11     loratadine (CLARITIN) 10 MG tablet Take 1 tablet (10 mg) by mouth daily 30 tablet 1     busPIRone (BUSPAR) 15 MG tablet TAKE 1 TABLET(15 MG) BY MOUTH THREE TIMES DAILY 90 tablet 1     omeprazole (PRILOSEC) 40 MG capsule TAKE 1 CAPSULE(40 MG) BY MOUTH DAILY 30 TO 60 MINUTES BEFORE A MEAL 90 capsule 3     clobetasol (TEMOVATE) 0.05 % ointment APPLY TOPICALLY BID  1     mirtazapine (REMERON) 15 MG tablet Take 1 tablet (15 mg) by mouth At Bedtime 30 tablet 1     FLUoxetine (PROZAC) 20 MG capsule Take 1 capsule (20 mg) by mouth daily 90 capsule 0     Protein POWD 30 g 2 times daily 454 g 1     varenicline (CHANTIX) 1 MG tablet Take 1 tablet (1 mg) by mouth 2 times daily 56 tablet 3     albuterol (PROAIR HFA/PROVENTIL HFA/VENTOLIN HFA) 108 (90 BASE) MCG/ACT Inhaler        betamethasone valerate (VALISONE) 0.1 % ointment Apply once a day for 2 weeks as directed       tretinoin (RETIN-A) 0.025 % cream Reported on 3/20/2017       hydrocortisone 2.5 % cream        ketoconazole (NIZORAL) 2 % cream        baclofen (LIORESAL) 10 MG tablet TK 1 T PO  TID PRN  11     Protein POWD Take 30 g by mouth 2 times daily 454 g 1     diazepam (VALIUM) 2 MG tablet Reported on 3/13/2017       ZOLMitriptan (ZOMIG) 5 MG tablet TAKE 1 TABLET BY MOUTH AT ONSET OF HEADACHE FOR MIGRAINE. MAY REPEAT DOSE IN 2 HOURS. DO NOT EXCEED 10MG IN 24 HOURS 12 tablet 4     buPROPion (WELLBUTRIN XL) 150 MG 24 hr tablet Take 1 tablet (150 mg) by mouth every morning (Patient not taking: Reported on 6/5/2017) 30 tablet 3     CHANTIX 1 MG tablet TAKE 1 TABLET BY MOUTH TWICE DAILY 56 tablet 0     order for DME Equipment  being ordered: cool humidifier 1 each 0     sucralfate (CARAFATE) 1 GM tablet Take 1 tablet (1 g) by mouth 4 times daily 40 tablet 1     omeprazole (PRILOSEC) 40 MG capsule Take 1 capsule (40 mg) by mouth daily Take 30-60 minutes before a meal. 30 capsule 0     ondansetron (ZOFRAN ODT) 4 MG disintegrating tablet Take 1-2 tablets (4-8 mg) by mouth 3 times daily (before meals) 12 tablet 0     Acetaminophen (TYLENOL PO) Take 1 tablet by mouth as needed         Allergies:   Allergies   Allergen Reactions     Acetaminophen-Codeine Hives     Amoxicillin-Pot Clavulanate Nausea and Vomiting     Augmentin Nausea and Vomiting and Hives     Barbiturates      Compazine      Lock-jaw     Liquid Adhesive      Other reaction(s): Other, see comments  blisters     No Clinical Screening - See Comments      PN: LW Reaction: blisters  PN: LW FI1: nka  PN: LW CM1: CONTRAST- nka Reaction :     Nsaids      D/t ITP     Prochlorperazine      Other reaction(s): Edema     Propoxyphene N-Apap Hives     Sulfa Drugs      Tramadol Hcl Hives            Adhesive Tape Rash         Current Facility-Administered Medications on File Prior to Encounter:  Lidocaine 1 % injection 9 mL   sodium bicarbonate 8.4 % injection 1 mEq     Current Outpatient Prescriptions on File Prior to Encounter:  acyclovir (ZOVIRAX) 5 % ointment Apply 1 g topically as needed   valACYclovir (VALTREX) 1000 mg tablet Take 1 tablet (1,000 mg) by mouth 2 times daily for 4 days   FLUoxetine (PROZAC) 40 MG capsule Take 1 capsule (40 mg) by mouth daily   olopatadine HCl (PATADAY) 0.2 % SOLN Place 1 drop into both eyes daily   fluticasone (FLONASE) 50 MCG/ACT spray Spray 1-2 sprays into both nostrils daily   loratadine (CLARITIN) 10 MG tablet Take 1 tablet (10 mg) by mouth daily   busPIRone (BUSPAR) 15 MG tablet TAKE 1 TABLET(15 MG) BY MOUTH THREE TIMES DAILY   omeprazole (PRILOSEC) 40 MG capsule TAKE 1 CAPSULE(40 MG) BY MOUTH DAILY 30 TO 60 MINUTES BEFORE A MEAL   clobetasol  (TEMOVATE) 0.05 % ointment APPLY TOPICALLY BID   mirtazapine (REMERON) 15 MG tablet Take 1 tablet (15 mg) by mouth At Bedtime   FLUoxetine (PROZAC) 20 MG capsule Take 1 capsule (20 mg) by mouth daily   Protein POWD 30 g 2 times daily   varenicline (CHANTIX) 1 MG tablet Take 1 tablet (1 mg) by mouth 2 times daily   albuterol (PROAIR HFA/PROVENTIL HFA/VENTOLIN HFA) 108 (90 BASE) MCG/ACT Inhaler    betamethasone valerate (VALISONE) 0.1 % ointment Apply once a day for 2 weeks as directed   tretinoin (RETIN-A) 0.025 % cream Reported on 3/20/2017   hydrocortisone 2.5 % cream    ketoconazole (NIZORAL) 2 % cream    baclofen (LIORESAL) 10 MG tablet TK 1 T PO  TID PRN   Protein POWD Take 30 g by mouth 2 times daily   diazepam (VALIUM) 2 MG tablet Reported on 3/13/2017   ZOLMitriptan (ZOMIG) 5 MG tablet TAKE 1 TABLET BY MOUTH AT ONSET OF HEADACHE FOR MIGRAINE. MAY REPEAT DOSE IN 2 HOURS. DO NOT EXCEED 10MG IN 24 HOURS   buPROPion (WELLBUTRIN XL) 150 MG 24 hr tablet Take 1 tablet (150 mg) by mouth every morning (Patient not taking: Reported on 6/5/2017)   CHANTIX 1 MG tablet TAKE 1 TABLET BY MOUTH TWICE DAILY   order for DME Equipment being ordered: cool humidifier   sucralfate (CARAFATE) 1 GM tablet Take 1 tablet (1 g) by mouth 4 times daily   omeprazole (PRILOSEC) 40 MG capsule Take 1 capsule (40 mg) by mouth daily Take 30-60 minutes before a meal.   ondansetron (ZOFRAN ODT) 4 MG disintegrating tablet Take 1-2 tablets (4-8 mg) by mouth 3 times daily (before meals)   Acetaminophen (TYLENOL PO) Take 1 tablet by mouth as needed       Patient Active Problem List   Diagnosis     Tobacco abuse     S/P splenectomy     Migraines     Idiopathic thrombocytopenic purpura (HCC)     Chemical dependency (H)     Insomnia     Mary-mary disease     Vomiting     Abdominal pain, acute     Disorder of stomach     Abdominal pain, right upper quadrant     Cervical pain     Vertigo     Asthma     Nondependent cocaine abuse in remission      "Gastroesophageal reflux disease     Herpetic gingivostomatitis     Immune thrombocytopenic purpura (H)     Muscle pain     Personality disorder     Nondependent cannabis abuse, episodic     Atopic rhinitis     Nutritional disorder     Abnormal weight loss     Mixed, or nondependent drug abuse     Moderate episode of recurrent major depressive disorder (H)     Generalized anxiety disorder     Protein-calorie malnutrition (H)     Primary insomnia       Past Surgical History:   Procedure Laterality Date     ANKLE SURGERY      Numerous right ankle surgeries     HERNIA REPAIR Left 1975     HYSTERECTOMY, PAP NO LONGER INDICATED      benign, endometriosis     NECK SURGERY  2000's    Cervical spine     SPLENECTOMY  1995     TONSILLECTOMY         Social History   Substance Use Topics     Smoking status: Current Every Day Smoker     Packs/day: 1.00     Years: 15.00     Types: Cigarettes     Last attempt to quit: 11/6/2015     Smokeless tobacco: Never Used     Alcohol use Yes      Comment: Drinks once in 4 - 5 months       Most Recent Immunizations   Administered Date(s) Administered     Influenza (IIV3) 10/01/2016     Pneumococcal 23 valent 10/24/2002       BMI: Estimated body mass index is 19.88 kg/(m^2) as calculated from the following:    Height as of 4/7/17: 1.768 m (5' 9.61\").    Weight as of 6/5/17: 62.1 kg (137 lb).      Review of Systems   Constitutional: Positive for chills.        Overheated.   Gastrointestinal: Positive for abdominal pain, diarrhea, nausea and vomiting.   Genitourinary: Positive for decreased urine volume.   Neurological: Positive for tremors.   All other systems reviewed and are negative.      Physical Exam   BP: (!) 116/98  Pulse: 73  Temp: 98.7  F (37.1  C)  Resp: 18  SpO2: 98 %    Physical Exam   Constitutional: She is oriented to person, place, and time. She appears well-developed and well-nourished. She appears distressed.   HENT:   Head: Normocephalic and atraumatic.   Right Ear: External " ear normal.   Left Ear: External ear normal.   Eyes: Conjunctivae and EOM are normal.   Neck: Normal range of motion. Neck supple.   Cardiovascular: Normal rate, regular rhythm, normal heart sounds and intact distal pulses.    No murmur heard.  Pulmonary/Chest: Effort normal and breath sounds normal. No respiratory distress. She has no wheezes.   Abdominal: Soft. Bowel sounds are normal. She exhibits no distension. There is tenderness. There is guarding.   Musculoskeletal: She exhibits no edema or tenderness.   Neurological: She is alert and oriented to person, place, and time.   Skin: Skin is warm and dry. No rash noted. She is not diaphoretic. No erythema.   Nursing note and vitals reviewed.      ED Course     ED Course     Procedures             Critical Care time:  none      No results found for this or any previous visit (from the past 24 hour(s)).    Medications   lidocaine 1 % 1 mL (not administered)   lidocaine (LMX4) kit (not administered)   sodium chloride (PF) 0.9% PF flush 3 mL (not administered)   sodium chloride (PF) 0.9% PF flush 3 mL (not administered)   ondansetron (ZOFRAN) injection 4 mg (4 mg Intravenous Given 6/10/17 1702)   0.9% sodium chloride BOLUS (0 mLs Intravenous Stopped 6/10/17 1902)   fentaNYL Citrate (PF) (SUBLIMAZE) injection 50 mcg (50 mcg Intravenous Given 6/10/17 1741)   LORazepam (ATIVAN) injection 0.5-1 mg (1 mg Intravenous Given 6/10/17 1826)       Assessments & Plan (with Medical Decision Making)  Coretta is a 46-year-old female here with abdominal pain.  She was on a motorcycle ride and that the next body stopped as she had retching and abdominal pain.  It's very, very hot hot outside and very dry and it't likely she is dehydrated.  She does have a history of street drug use, but denies any street drug use today.  Her vital signs on arrival were normal.  Her exam was pretty unremarkable, although she reports tenderness in her abdomen with any palpation.  We gave her 2 L of fluid,  1 dose of fentanyl and 1 dose of Ativan.  She did get Zofran.  She had improvement of her symptoms and we did not give her any pain medicine to go home with.  The patient was discharged from the ED.       I have reviewed the nursing notes.    I have reviewed the findings, diagnosis, plan and need for follow up with the patient.      Discharge Medication List as of 6/10/2017  7:09 PM          Final diagnoses:   Generalized abdominal pain   Dehydration     This document serves as a record of services personally performed by Jhonatan Bills MD. It was created on their behalf by Ced Slade, a trained medical scribe. The creation of this record is based on the provider's personal observations and the statements of the patient. This document has been checked and approved by the attending provider.    Note: Chart documentation done in part with Dragon Voice Recognition software. Although reviewed after completion, some word and grammatical errors may remain.    6/10/2017   [facility] EMERGENCY DEPARTMENT     Jhonatan Bills MD  06/10/17 1926

## 2017-06-10 NOTE — ED NOTES
Pt feeling better after 1st liter in.  REalizes that she was dehydrated.  Heat packs to abd seem to help her pain.  Ice chips also given for oral comfort per pt request.

## 2017-06-10 NOTE — ED NOTES
"Pt was out riding a motorcycle when she noted \"severe periumbilical pain\" and vomited x 2 -no food-and moderate amt of diarrhea.  Medics arrived and pt was given fentanyl 150mcg IV 8/10-constant achy sharp pain.  IV started by medics.  Pt to ED w/pain improved some, but cont to have abd discomfort.  States she has had this b/4 and been seen for it.    Hx of total hysterectomy, denies any uti sx.   "

## 2017-06-10 NOTE — ED AVS SNAPSHOT
[facility] Emergency Department    [address] [place] [address]    Phone:  [phone]    Fax:  [phone]                                       Coretta Tovar   MRN: [id number]    Department:  [facility] Emergency Department   Date of Visit:  6/10/2017           After Visit Summary Signature Page     I have received my discharge instructions, and my questions have been answered. I have discussed any challenges I see with this plan with the nurse or doctor.    ..........................................................................................................................................  Patient/Patient Representative Signature      ..........................................................................................................................................  Patient Representative Print Name and Relationship to Patient    ..................................................               ................................................  Date                                            Time    ..........................................................................................................................................  Reviewed by Signature/Title    ...................................................              ..............................................  Date                                                            Time

## 2017-06-11 NOTE — DISCHARGE INSTRUCTIONS
*Abdominal Pain, likely due to Dehydration    The exact cause of your abdominal (stomach) pain is not certain. This does not mean that this is something to worry about, or the right tests were not done. Everyone likes to know the exact cause of the problem, but sometimes with abdominal pain, there is no clear-cut cause, and this could be a good thing. The good news is that your symptoms can be treated, and you will feel better.   Your condition does not seem serious now; however, sometimes the signs of a serious problem may take more time to appear. For this reason, it is important for you to watch for any new symptoms, problems, or worsening of your condition.  Over the next few days, the abdominal pain may come and go, or be continuous. Other common symptoms can include nausea and vomiting. Sometimes it can be difficult to tell if you feel nauseous, you may just feel bad and not associate that feeling with nausea. Constipation, diarrhea, and a fever may go along with the pain.  The pain may continue even if treated correctly over the following days. Depending on how things go, sometimes the cause can become clear and may require further or different treatment. Additional evaluations, medications, or tests may be needed.  Home care  Your health care provider may prescribe medications for pain, symptoms, or an infection.  Follow the health care provider's instructions for taking these medications.  General care    Rest until your next exam. No strenuous activities.    Try to find positions that ease discomfort. A small pillow placed on the abdomen may help relieve pain.    Something warm on your abdomen (such as a heating pad) may help, but be careful not to burn yourself.  Diet    Do not force yourself to eat, especially if having cramps, vomiting, or diarrhea.    Water is important so you do not get dehydrated. Soup may also be good. Sports drinks may also help, especially if they are not too acidic. Make sure  you don't drink sugary drinks as this can make things worse. Take liquids in small amounts. Do not guzzle them.    Caffeine sometimes makes the pain and cramping worse.    Avoid dairy products if you have vomiting or diarrhea.    Don't eat large amounts at a time. Wait a few minutes between bites.    Eat a diet low in fiber (called a low-residue diet). Foods allowed include refined breads, white rice, fruit and vegetable juices without pulp, tender meats. These foods will pass more easily through the intestine.    Avoid fried or fatty foods, dairy, alcohol and spicy foods until your symptoms go away.  Follow-up care  Follow up with your health care provider as instructed, or if your pain does not begin to improve in the next 24 hours.  When to seek medical care  Seek prompt medical care if any of the following occur:    Pain gets worse or moves to the right lower abdomen    New or worsening vomiting or diarrhea    Swelling of the abdomen    Unable to pass stool for more than three days    New fever over 101  F (38.3 C), or rising fever    Blood in vomit or bowel movements (dark red or black color)    Jaundice (yellow color of eyes and skin)    Weakness, dizziness    Chest, arm, back, neck or jaw pain    Unexpected vaginal bleeding or missed period  Call 911  Call emergency services if any of the following occur:    Trouble breathing    Confusion    Fainting or loss of consciousness    Rapid heart rate    Seizure  Thank you for choosing our Emergency Department for your care.     Sincerely,    Dr Frederick Bills M.D.

## 2017-06-23 ENCOUNTER — RADIANT APPOINTMENT (OUTPATIENT)
Dept: GENERAL RADIOLOGY | Facility: CLINIC | Age: 47
End: 2017-06-23
Attending: FAMILY MEDICINE
Payer: MEDICAID

## 2017-06-23 ENCOUNTER — OFFICE VISIT (OUTPATIENT)
Dept: URGENT CARE | Facility: URGENT CARE | Age: 47
End: 2017-06-23
Payer: MEDICAID

## 2017-06-23 VITALS
TEMPERATURE: 97.4 F | SYSTOLIC BLOOD PRESSURE: 106 MMHG | HEART RATE: 74 BPM | DIASTOLIC BLOOD PRESSURE: 64 MMHG | BODY MASS INDEX: 20.17 KG/M2 | WEIGHT: 139 LBS

## 2017-06-23 DIAGNOSIS — M79.671 RIGHT FOOT PAIN: Primary | ICD-10-CM

## 2017-06-23 DIAGNOSIS — M79.671 RIGHT FOOT PAIN: ICD-10-CM

## 2017-06-23 PROCEDURE — 99213 OFFICE O/P EST LOW 20 MIN: CPT | Performed by: FAMILY MEDICINE

## 2017-06-23 PROCEDURE — 73630 X-RAY EXAM OF FOOT: CPT | Mod: RT

## 2017-06-23 ASSESSMENT — PAIN SCALES - GENERAL: PAINLEVEL: EXTREME PAIN (8)

## 2017-06-23 NOTE — MR AVS SNAPSHOT
After Visit Summary   6/23/2017    Coretta Tovar    MRN: [id number]           Patient Information     Date Of Birth          1970        Visit Information        Provider Department      6/23/2017 6:55 PM Julio Mensah MD [facility]        Today's Diagnoses     Right foot pain    -  1      Care Instructions      Foot Sprain    A sprain is a stretching or tearing of the ligaments that hold a joint together. There are no broken bones. Sprains generally take from 3-6 weeks to heal. A sprain may be treated with a splint, walking cast, or special boot. Mild sprains may not need any additional support.  Home care  The following guidelines will help you care for your injury at home:    Keep your leg elevated when sitting or lying down. This is very important during the first 48 hours to reduce swelling. Stay off the injured foot as much as possible until you can walk on it without pain. If needed, you may use crutches during the first week for this purpose. Crutches can be rented at many pharmacies or surgical/orthopedic supply stores.    You may be given a cast shoe to wear to prevent movement in your foot. If not, you can use a sandal or any shoe that does not put pressure on the injured area until the swelling and pain go away. If using a sandal, be careful not to hit your foot against anything, since another injury could make the sprain worse.    Apply an ice pack over the injured area for 15 to 20 minutes every 3 to 6 hours. You should do this for the first 24 to 48 hours. You can make an ice pack by filling a plastic bag that seals at the top with ice cubes and then wrapping it with a thin towel. Continue to use ice packs for relief of pain and swelling as needed. As the ice melts, avoid getting any wrap, splint, or cast wet. After 48 hours, apply heat from a warm shower or bath for 20 minutes several times daily. Alternating ice and heat may also be helpful.    You may  use over-the-counter pain medicine to control pain, unless another medicine was prescribed. If you have chronic liver or kidney disease or ever had a stomach ulcer or GI bleeding, talk with your healthcare provider before using these medicines.    If you were given a splint or cast, keep it dry. Bathe with your splint or cast well out of the water, protected with 2 large plastic bags, rubber-banded at the top end. If a fiberglass splint or cast gets wet, you can dry it with a hair dryer.    You may return to sports after healing, when you can run without pain.  Follow-up care  Follow up with your healthcare provider as directed. Sometimes fractures don t show up on the first X-ray. Bruises and sprains can sometimes hurt as much as a fracture. These injuries can take time to heal completely. If your symptoms don t improve or they get worse, talk with your healthcare provider. You may need a repeat X-ray.  When to seek medical advice  Call your healthcare provider right away if any of these occur:    The plaster cast or splint gets wet or soft    The fiberglass cast or splint gets wet and does not dry for 24 hours    Pain or swelling increases, or redness appears    A bad odor comes from within the cast    Fever of 100.4 F (38 C) or above lasting for 24 to 48 hours    Toes on the injured foot become cold, blue, numb, or tingly  Date Last Reviewed: 11/20/2015 2000-2017 [employer]. [address] [place] [address]. All rights reserved. This information is not intended as a substitute for professional medical care. Always follow your healthcare professional's instructions.        R.I.C.E.    R.I.C.E. stands for Rest, Ice, Compression, and Elevation. Doing these things helps limit pain and swelling after an injury. R.I.C.E. also helps injuries heal faster. Use R.I.C.E. for sprains, strains, and severe bruises or bumps. Follow the tips on this handout and begin R.I.C.E. as soon as possible after  an injury.  ? Rest  Pain is your body s way of telling you to rest an injured area. Whether you have hurt an elbow, hand, foot, or knee, limiting its use will prevent further injury and help you heal.  ? Ice  Applying ice right after an injury helps prevent swelling and reduce pain. Don t place ice directly on your skin.    Wrap a cold pack or bag of ice in a thin cloth. Place it over the injured area.    Ice for 10 minutes every 3 hours. Don t ice for more than 20 minutes at a time.  ? Compression  Putting pressure (compression) on an injury helps prevent swelling and provides support.    Wrap the injured area firmly with an elastic bandage. If your hand or foot tingles, becomes discolored, or feels cold to the touch, the bandage may be too tight. Rewrap it more loosely.    If your bandage becomes too loose, rewrap it.    Do not wear an elastic bandage overnight.  ? Elevation  Keeping an injury elevated helps reduce swelling, pain, and throbbing. Elevation is most effective when the injury is kept elevated higher than the heart.     Call your healthcare provider if you notice any of the following:    Fingers or toes feel numb, are cold to the touch, or change color    Skin looks shiny or tight    Pain, swelling, or bruising worsens and is not improved with elevation   Date Last Reviewed: 9/3/2015    6491-2168 [employer]. [address] [place] [address]. All rights reserved. This information is not intended as a substitute for professional medical care. Always follow your healthcare professional's instructions.                Follow-ups after your visit        Who to contact     If you have questions or need follow up information about today's clinic visit or your schedule please contact [facility] directly at [phone].  Normal or non-critical lab and imaging results will be communicated to you by MyChart, letter or phone within 4 business days after the clinic has received  "the results. If you do not hear from us within 7 days, please contact the clinic through [employer] or phone. If you have a critical or abnormal lab result, we will notify you by phone as soon as possible.  Submit refill requests through [employer] or call your pharmacy and they will forward the refill request to us. Please allow 3 business days for your refill to be completed.          Additional Information About Your Visit        [employer] Information     [employer] lets you send messages to your doctor, view your test results, renew your prescriptions, schedule appointments and more. To sign up, go to [email or web address] . Click on \"Log in\" on the left side of the screen, which will take you to the Welcome page. Then click on \"Sign up Now\" on the right side of the page.     You will be asked to enter the access code listed below, as well as some personal information. Please follow the directions to create your username and password.     Your access code is: [email or web address]  Expires: 2017  7:09 PM     Your access code will  in 90 days. If you need help or a new code, please call your [facility] clinic or [phone].        Care EveryWhere ID     This is your Care EveryWhere ID. This could be used by other organizations to access your [facility] medical records  [id number]        Your Vitals Were     Pulse Temperature BMI (Body Mass Index)             74 97.4  F (36.3  C) (Oral) 20.17 kg/m2          Blood Pressure from Last 3 Encounters:   17 106/64   06/10/17 131/77   17 108/70    Weight from Last 3 Encounters:   17 139 lb (63 kg)   17 137 lb (62.1 kg)   17 132 lb 12.8 oz (60.2 kg)               Primary Care Provider Office Phone # Fax NINOSKA Alves [facility] [phone]       [facility] [address]  [facility] [address]        Equal Access to Services     ARELY JASON AH: Andrea coleman Soadama, waaxda luqadaha, qaybta " pelon bucio kendellpaul hitesh alegre ah. [facility] [phone].    ATENCIÓN: Si rhett edwards, tiene a roberts disposición servicios gratuitos de asistencia lingüística. Sravanthi al [phone].    We comply with applicable federal civil rights laws and [place] laws. We do not discriminate on the basis of race, color, national origin, age, disability sex, sexual orientation or gender identity.            Thank you!     Thank you for choosing [facility]  for your care. Our goal is always to provide you with excellent care. Hearing back from our patients is one way we can continue to improve our services. Please take a few minutes to complete the written survey that you may receive in the mail after your visit with us. Thank you!             Your Updated Medication List - Protect others around you: Learn how to safely use, store and throw away your medicines at www.disposemymeds.org.          This list is accurate as of: 6/23/17  7:28 PM.  Always use your most recent med list.                   Brand Name Dispense Instructions for use Diagnosis    acyclovir 5 % ointment    ZOVIRAX    30 g    Apply 1 g topically as needed    Recurrent cold sores       albuterol 108 (90 BASE) MCG/ACT Inhaler    PROAIR HFA/PROVENTIL HFA/VENTOLIN HFA          baclofen 10 MG tablet    LIORESAL     TK 1 T PO  TID PRN        betamethasone valerate 0.1 % ointment    VALISONE     Apply once a day for 2 weeks as directed        buPROPion 150 MG 24 hr tablet    WELLBUTRIN XL    30 tablet    Take 1 tablet (150 mg) by mouth every morning    Depression with anxiety       busPIRone 15 MG tablet    BUSPAR    90 tablet    TAKE 1 TABLET(15 MG) BY MOUTH THREE TIMES DAILY    Generalized anxiety disorder       * CHANTIX 1 MG tablet   Generic drug:  varenicline     56 tablet    TAKE 1 TABLET BY MOUTH TWICE DAILY    Tobacco abuse       * varenicline 1 MG tablet    CHANTIX    56 tablet    Take 1 tablet (1 mg) by mouth 2 times  daily    Tobacco abuse, Encounter for smoking cessation counseling       clobetasol 0.05 % ointment    TEMOVATE     APPLY TOPICALLY BID        diazepam 2 MG tablet    VALIUM     Reported on 3/13/2017        * FLUoxetine 20 MG capsule    PROzac    90 capsule    Take 1 capsule (20 mg) by mouth daily    Moderate episode of recurrent major depressive disorder (H)       * FLUoxetine 40 MG capsule    PROzac    30 capsule    Take 1 capsule (40 mg) by mouth daily    Moderate episode of recurrent major depressive disorder (H), Generalized anxiety disorder       fluticasone 50 MCG/ACT spray    FLONASE    1 Bottle    Spray 1-2 sprays into both nostrils daily    Seasonal allergic rhinitis, unspecified allergic rhinitis trigger       hydrocortisone 2.5 % cream           ketoconazole 2 % cream    NIZORAL          loratadine 10 MG tablet    CLARITIN    30 tablet    Take 1 tablet (10 mg) by mouth daily    Seasonal allergic rhinitis, unspecified allergic rhinitis trigger       mirtazapine 15 MG tablet    REMERON    30 tablet    Take 1 tablet (15 mg) by mouth At Bedtime    Primary insomnia       olopatadine HCl 0.2 % Soln    PATADAY    2.5 mL    Place 1 drop into both eyes daily    Seasonal allergic rhinitis, unspecified allergic rhinitis trigger       * omeprazole 40 MG capsule    priLOSEC    30 capsule    Take 1 capsule (40 mg) by mouth daily Take 30-60 minutes before a meal.    Gastroesophageal reflux disease, esophagitis presence not specified       * omeprazole 40 MG capsule    priLOSEC    90 capsule    TAKE 1 CAPSULE(40 MG) BY MOUTH DAILY 30 TO 60 MINUTES BEFORE A MEAL    Gastroesophageal reflux disease, esophagitis presence not specified       ondansetron 4 MG ODT tab    ZOFRAN ODT    12 tablet    Take 1-2 tablets (4-8 mg) by mouth 3 times daily (before meals)    Migraine without status migrainosus, not intractable, unspecified migraine type       order for DME     1 each    Equipment being ordered: cool humidifier    Painful  respiration, Upper respiratory tract infection, unspecified type       * Protein Powd     454 g    Take 30 g by mouth 2 times daily    Protein-calorie malnutrition (H), Loss of weight       * Protein Powd     454 g    30 g 2 times daily    Protein-calorie malnutrition (H)       RETIN-A 0.025 % cream   Generic drug:  tretinoin      Reported on 3/20/2017        sucralfate 1 GM tablet    CARAFATE    40 tablet    Take 1 tablet (1 g) by mouth 4 times daily    Gastroesophageal reflux disease without esophagitis       TYLENOL PO      Take 1 tablet by mouth as needed    Chondritis of both external ears       valACYclovir 1000 mg tablet    VALTREX    8 tablet    Take 1 tablet (1,000 mg) by mouth 2 times daily for 4 days    Recurrent cold sores       ZOLMitriptan 5 MG tablet    ZOMIG    12 tablet    TAKE 1 TABLET BY MOUTH AT ONSET OF HEADACHE FOR MIGRAINE. MAY REPEAT DOSE IN 2 HOURS. DO NOT EXCEED 10MG IN 24 HOURS    Other migraine without status migrainosus, not intractable       * Notice:  This list has 8 medication(s) that are the same as other medications prescribed for you. Read the directions carefully, and ask your doctor or other care provider to review them with you.

## 2017-06-24 NOTE — PATIENT INSTRUCTIONS
Foot Sprain    A sprain is a stretching or tearing of the ligaments that hold a joint together. There are no broken bones. Sprains generally take from 3-6 weeks to heal. A sprain may be treated with a splint, walking cast, or special boot. Mild sprains may not need any additional support.  Home care  The following guidelines will help you care for your injury at home:    Keep your leg elevated when sitting or lying down. This is very important during the first 48 hours to reduce swelling. Stay off the injured foot as much as possible until you can walk on it without pain. If needed, you may use crutches during the first week for this purpose. Crutches can be rented at many pharmacies or surgical/orthopedic supply stores.    You may be given a cast shoe to wear to prevent movement in your foot. If not, you can use a sandal or any shoe that does not put pressure on the injured area until the swelling and pain go away. If using a sandal, be careful not to hit your foot against anything, since another injury could make the sprain worse.    Apply an ice pack over the injured area for 15 to 20 minutes every 3 to 6 hours. You should do this for the first 24 to 48 hours. You can make an ice pack by filling a plastic bag that seals at the top with ice cubes and then wrapping it with a thin towel. Continue to use ice packs for relief of pain and swelling as needed. As the ice melts, avoid getting any wrap, splint, or cast wet. After 48 hours, apply heat from a warm shower or bath for 20 minutes several times daily. Alternating ice and heat may also be helpful.    You may use over-the-counter pain medicine to control pain, unless another medicine was prescribed. If you have chronic liver or kidney disease or ever had a stomach ulcer or GI bleeding, talk with your healthcare provider before using these medicines.    If you were given a splint or cast, keep it dry. Bathe with your splint or cast well out of the water,  protected with 2 large plastic bags, rubber-banded at the top end. If a fiberglass splint or cast gets wet, you can dry it with a hair dryer.    You may return to sports after healing, when you can run without pain.  Follow-up care  Follow up with your healthcare provider as directed. Sometimes fractures don t show up on the first X-ray. Bruises and sprains can sometimes hurt as much as a fracture. These injuries can take time to heal completely. If your symptoms don t improve or they get worse, talk with your healthcare provider. You may need a repeat X-ray.  When to seek medical advice  Call your healthcare provider right away if any of these occur:    The plaster cast or splint gets wet or soft    The fiberglass cast or splint gets wet and does not dry for 24 hours    Pain or swelling increases, or redness appears    A bad odor comes from within the cast    Fever of 100.4 F (38 C) or above lasting for 24 to 48 hours    Toes on the injured foot become cold, blue, numb, or tingly  Date Last Reviewed: 11/20/2015 2000-2017 [employer]. [address] [place] [address]. All rights reserved. This information is not intended as a substitute for professional medical care. Always follow your healthcare professional's instructions.        R.I.C.E.    R.I.C.E. stands for Rest, Ice, Compression, and Elevation. Doing these things helps limit pain and swelling after an injury. R.I.C.E. also helps injuries heal faster. Use R.I.C.E. for sprains, strains, and severe bruises or bumps. Follow the tips on this handout and begin R.I.C.E. as soon as possible after an injury.  ? Rest  Pain is your body s way of telling you to rest an injured area. Whether you have hurt an elbow, hand, foot, or knee, limiting its use will prevent further injury and help you heal.  ? Ice  Applying ice right after an injury helps prevent swelling and reduce pain. Don t place ice directly on your skin.    Wrap a cold pack or bag  of ice in a thin cloth. Place it over the injured area.    Ice for 10 minutes every 3 hours. Don t ice for more than 20 minutes at a time.  ? Compression  Putting pressure (compression) on an injury helps prevent swelling and provides support.    Wrap the injured area firmly with an elastic bandage. If your hand or foot tingles, becomes discolored, or feels cold to the touch, the bandage may be too tight. Rewrap it more loosely.    If your bandage becomes too loose, rewrap it.    Do not wear an elastic bandage overnight.  ? Elevation  Keeping an injury elevated helps reduce swelling, pain, and throbbing. Elevation is most effective when the injury is kept elevated higher than the heart.     Call your healthcare provider if you notice any of the following:    Fingers or toes feel numb, are cold to the touch, or change color    Skin looks shiny or tight    Pain, swelling, or bruising worsens and is not improved with elevation   Date Last Reviewed: 9/3/2015    1089-1471 [employer]. [address] [place] [address], [place] [address]. All rights reserved. This information is not intended as a substitute for professional medical care. Always follow your healthcare professional's instructions.

## 2017-06-24 NOTE — NURSING NOTE
"Chief Complaint   Patient presents with     Musculoskeletal Problem     right foot pain        Initial /64  Pulse 74  Temp 97.4  F (36.3  C) (Oral)  Wt 139 lb (63 kg)  BMI 20.17 kg/m2 Estimated body mass index is 20.17 kg/(m^2) as calculated from the following:    Height as of 4/7/17: 5' 9.61\" (1.768 m).    Weight as of this encounter: 139 lb (63 kg).  Medication Reconciliation: complete       Toya Gibbons CMA      "

## 2017-06-24 NOTE — PROGRESS NOTES
SUBJECTIVE:                                                    Coretta Tovar is a 46 year old female who presents to clinic today with self because of:    Chief Complaint   Patient presents with     Musculoskeletal Problem     right foot pain         HPI:  Concerns: Patient is here today for a right foot injury. Patient states she fell yesterday, was getting up when foot get rapped up on the blanket, she tripped and fell. No head injury or LOC. Patient rates her pain currently 8/10.        ROS:  Negative for constitutional, eye, ear, nose, throat, skin, respiratory, cardiac, and gastrointestinal other than those outlined in the HPI.    PROBLEM LIST:  Patient Active Problem List    Diagnosis Date Noted     Moderate episode of recurrent major depressive disorder (H) 03/22/2017     Priority: Medium     Generalized anxiety disorder 03/22/2017     Priority: Medium     Protein-calorie malnutrition (H) 03/22/2017     Priority: Medium     Primary insomnia 03/22/2017     Priority: Medium     Vertigo 03/14/2017     Priority: Medium     Cervical pain 03/08/2017     Priority: Medium     Nondependent cocaine abuse in remission 11/23/2016     Priority: Medium     Nutritional disorder 11/21/2016     Priority: Medium     Disorder of stomach 11/20/2016     Priority: Medium     Overview:   Per Biopsy obtained on EGD 11/22/2016. Reactive gastropathy was quoted to be frequently associated with ongoing non-inflammatory type mucosal injury due to a chemical type of injury; this may be due to ingestion of non-steroidal anti-inflammatory drugs, aspirin, excess alcohol, or corticosteroids.        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  History of crack cocaine abuse.       Asthma 12/20/2006     Priority: Medium     Gastroesophageal reflux disease 12/20/2006     Priority: Medium     Herpetic gingivostomatitis 12/20/2006     Priority: Medium     Muscle pain 12/20/2006     Priority: Medium     Atopic rhinitis 12/20/2006     Priority: Medium     Abnormal weight loss 12/20/2006     Priority: Medium     Chemical dependency (H) 02/24/2015     Insomnia 02/24/2015     Migraines      Idiopathic thrombocytopenic purpura (HCC)      s/p splenectomy        MEDICATIONS:  Current Outpatient Prescriptions   Medication Sig Dispense Refill     acyclovir (ZOVIRAX) 5 % ointment Apply 1 g topically as needed 30 g 11     FLUoxetine (PROZAC) 40 MG capsule Take 1 capsule (40 mg) by mouth daily 30 capsule 0     olopatadine HCl (PATADAY) 0.2 % SOLN Place 1 drop into both eyes daily 2.5 mL 3     fluticasone (FLONASE) 50 MCG/ACT spray Spray 1-2 sprays into both nostrils daily 1 Bottle 11     loratadine (CLARITIN) 10 MG tablet Take 1 tablet (10 mg) by mouth daily 30 tablet 1     busPIRone (BUSPAR) 15 MG tablet TAKE 1 TABLET(15 MG) BY MOUTH THREE TIMES DAILY 90 tablet 1     omeprazole (PRILOSEC) 40 MG capsule TAKE 1 CAPSULE(40 MG) BY MOUTH DAILY 30 TO 60 MINUTES BEFORE A MEAL 90 capsule 3     clobetasol (TEMOVATE) 0.05 % ointment APPLY TOPICALLY BID  1     mirtazapine (REMERON) 15 MG tablet Take 1 tablet (15 mg) by mouth At Bedtime 30 tablet 1     FLUoxetine (PROZAC) 20 MG capsule Take 1 capsule (20 mg) by mouth daily 90 capsule 0     Protein POWD 30 g 2 times daily 454 g 1     varenicline (CHANTIX) 1 MG tablet Take  1 tablet (1 mg) by mouth 2 times daily 56 tablet 3     albuterol (PROAIR HFA/PROVENTIL HFA/VENTOLIN HFA) 108 (90 BASE) MCG/ACT Inhaler        betamethasone valerate (VALISONE) 0.1 % ointment Apply once a day for 2 weeks as directed       tretinoin (RETIN-A) 0.025 % cream Reported on 3/20/2017       hydrocortisone 2.5 % cream        ketoconazole (NIZORAL) 2 % cream        baclofen (LIORESAL) 10 MG tablet TK 1 T PO  TID PRN  11     Protein POWD Take 30 g by mouth 2 times daily 454 g 1     diazepam (VALIUM) 2 MG tablet Reported on 3/13/2017       ZOLMitriptan (ZOMIG) 5 MG tablet TAKE 1 TABLET BY MOUTH AT ONSET OF HEADACHE FOR MIGRAINE. MAY REPEAT DOSE IN 2 HOURS. DO NOT EXCEED 10MG IN 24 HOURS 12 tablet 4     buPROPion (WELLBUTRIN XL) 150 MG 24 hr tablet Take 1 tablet (150 mg) by mouth every morning 30 tablet 3     CHANTIX 1 MG tablet TAKE 1 TABLET BY MOUTH TWICE DAILY 56 tablet 0     order for DME Equipment being ordered: cool humidifier 1 each 0     sucralfate (CARAFATE) 1 GM tablet Take 1 tablet (1 g) by mouth 4 times daily 40 tablet 1     omeprazole (PRILOSEC) 40 MG capsule Take 1 capsule (40 mg) by mouth daily Take 30-60 minutes before a meal. 30 capsule 0     ondansetron (ZOFRAN ODT) 4 MG disintegrating tablet Take 1-2 tablets (4-8 mg) by mouth 3 times daily (before meals) 12 tablet 0     Acetaminophen (TYLENOL PO) Take 1 tablet by mouth as needed       valACYclovir (VALTREX) 1000 mg tablet Take 1 tablet (1,000 mg) by mouth 2 times daily for 4 days 8 tablet 11      ALLERGIES:  Allergies   Allergen Reactions     Acetaminophen-Codeine Hives     Amoxicillin-Pot Clavulanate Nausea and Vomiting     Augmentin Nausea and Vomiting and Hives     Barbiturates      Compazine      Lock-jaw     Liquid Adhesive      Other reaction(s): Other, see comments  blisters     No Clinical Screening - See Comments      PN: LW Reaction: blisters  PN: LW FI1: nka  PN: LW CM1: CONTRAST- nka Reaction :     Nsaids      D/t ITP      Prochlorperazine      Other reaction(s): Edema     Propoxyphene N-Apap Hives     Sulfa Drugs      Tramadol Hcl Hives            Adhesive Tape Rash       Problem list and histories reviewed & adjusted, as indicated.    OBJECTIVE:                                                      /64  Pulse 74  Temp 97.4  F (36.3  C) (Oral)  Wt 139 lb (63 kg)  BMI 20.17 kg/m2   GENERAL: Active, alert, in no acute distress.  HEAD: Normocephalic.  EYES:  No discharge or erythema. Normal pupils and EOM.  EARS: Normal canals. Tympanic membranes are normal; gray and translucent.  NOSE: Normal without discharge.  MOUTH/THROAT: Clear. No oral lesions. Teeth intact without obvious abnormalities.  NECK: Supple, no masses.  LYMPH NODES: No adenopathy  LUNGS: Clear. No rales, rhonchi, wheezing or retractions  EXTREMITIES: mildly swollen right forefoot, tender with some bruising, ROM limited due to pain, sensation to touch and pressure intact pulses 3+  RIGHT FOOT THREE OR MORE VIEWS   6/23/2017 7:20 PM      HISTORY: Foot pain, fell yesterday. Pain in right foot.     COMPARISON: 11/18/2014.     FINDINGS: Negative right foot. Prior fusion right ankle. Nothing  acute. No interval change.         IMPRESSION: Nothing acute and no interval change.    ASSESSMENT/PLAN:                                                        ICD-10-CM    1. Right foot pain M79.671 XR Foot Right G/E 3 Views       Discussed in detail differentials and further management. Symptoms are likely secondary to right foot sprain. X-ray findings explained. Recommended RICE therapy, OTC analgesia and well hydration. Written instructions/information provided. Patient understood and in agreement with the above plan. All questions are answered. Follow-up if symptoms persist or worsen.      Patient Instructions       Foot Sprain    A sprain is a stretching or tearing of the ligaments that hold a joint together. There are no broken bones. Sprains generally take from 3-6 weeks  to heal. A sprain may be treated with a splint, walking cast, or special boot. Mild sprains may not need any additional support.  Home care  The following guidelines will help you care for your injury at home:    Keep your leg elevated when sitting or lying down. This is very important during the first 48 hours to reduce swelling. Stay off the injured foot as much as possible until you can walk on it without pain. If needed, you may use crutches during the first week for this purpose. Crutches can be rented at many pharmacies or surgical/orthopedic supply stores.    You may be given a cast shoe to wear to prevent movement in your foot. If not, you can use a sandal or any shoe that does not put pressure on the injured area until the swelling and pain go away. If using a sandal, be careful not to hit your foot against anything, since another injury could make the sprain worse.    Apply an ice pack over the injured area for 15 to 20 minutes every 3 to 6 hours. You should do this for the first 24 to 48 hours. You can make an ice pack by filling a plastic bag that seals at the top with ice cubes and then wrapping it with a thin towel. Continue to use ice packs for relief of pain and swelling as needed. As the ice melts, avoid getting any wrap, splint, or cast wet. After 48 hours, apply heat from a warm shower or bath for 20 minutes several times daily. Alternating ice and heat may also be helpful.    You may use over-the-counter pain medicine to control pain, unless another medicine was prescribed. If you have chronic liver or kidney disease or ever had a stomach ulcer or GI bleeding, talk with your healthcare provider before using these medicines.    If you were given a splint or cast, keep it dry. Bathe with your splint or cast well out of the water, protected with 2 large plastic bags, rubber-banded at the top end. If a fiberglass splint or cast gets wet, you can dry it with a hair dryer.    You may return to sports  after healing, when you can run without pain.  Follow-up care  Follow up with your healthcare provider as directed. Sometimes fractures don t show up on the first X-ray. Bruises and sprains can sometimes hurt as much as a fracture. These injuries can take time to heal completely. If your symptoms don t improve or they get worse, talk with your healthcare provider. You may need a repeat X-ray.  When to seek medical advice  Call your healthcare provider right away if any of these occur:    The plaster cast or splint gets wet or soft    The fiberglass cast or splint gets wet and does not dry for 24 hours    Pain or swelling increases, or redness appears    A bad odor comes from within the cast    Fever of 100.4 F (38 C) or above lasting for 24 to 48 hours    Toes on the injured foot become cold, blue, numb, or tingly  Date Last Reviewed: 11/20/2015 2000-2017 [employer]. [address] [place] [address]. All rights reserved. This information is not intended as a substitute for professional medical care. Always follow your healthcare professional's instructions.        R.I.C.E.    R.I.C.E. stands for Rest, Ice, Compression, and Elevation. Doing these things helps limit pain and swelling after an injury. R.I.C.E. also helps injuries heal faster. Use R.I.C.E. for sprains, strains, and severe bruises or bumps. Follow the tips on this handout and begin R.I.C.E. as soon as possible after an injury.  ? Rest  Pain is your body s way of telling you to rest an injured area. Whether you have hurt an elbow, hand, foot, or knee, limiting its use will prevent further injury and help you heal.  ? Ice  Applying ice right after an injury helps prevent swelling and reduce pain. Don t place ice directly on your skin.    Wrap a cold pack or bag of ice in a thin cloth. Place it over the injured area.    Ice for 10 minutes every 3 hours. Don t ice for more than 20 minutes at a time.  ? Compression  Putting pressure  (compression) on an injury helps prevent swelling and provides support.    Wrap the injured area firmly with an elastic bandage. If your hand or foot tingles, becomes discolored, or feels cold to the touch, the bandage may be too tight. Rewrap it more loosely.    If your bandage becomes too loose, rewrap it.    Do not wear an elastic bandage overnight.  ? Elevation  Keeping an injury elevated helps reduce swelling, pain, and throbbing. Elevation is most effective when the injury is kept elevated higher than the heart.     Call your healthcare provider if you notice any of the following:    Fingers or toes feel numb, are cold to the touch, or change color    Skin looks shiny or tight    Pain, swelling, or bruising worsens and is not improved with elevation   Date Last Reviewed: 9/3/2015    [id number] [employer]. [address] [place] [address]. All rights reserved. This information is not intended as a substitute for professional medical care. Always follow your healthcare professional's instructions.            Julio Mensah MD

## 2017-06-26 DIAGNOSIS — F51.01 PRIMARY INSOMNIA: ICD-10-CM

## 2017-06-26 DIAGNOSIS — F41.1 GENERALIZED ANXIETY DISORDER: ICD-10-CM

## 2017-06-26 DIAGNOSIS — F33.1 MODERATE EPISODE OF RECURRENT MAJOR DEPRESSIVE DISORDER (H): ICD-10-CM

## 2017-06-26 NOTE — TELEPHONE ENCOUNTER
prozac     Last Written Prescription Date: 06/05/17  Last Fill Quantity: 30, # refills: 0  [place] Office Visit with [facility] [place] provider:  06/05/17        Last PHQ-9 score on record=   PHQ-9 SCORE 4/7/2017   Total Score -   Total Score MyChart -   Total Score 1

## 2017-06-26 NOTE — TELEPHONE ENCOUNTER
remeron       Last Written Prescription Date: 04/07/17  Last Fill Quantity: 30; # refills: 1  Last Office Visit with [facility], [facility] or  Health prescribing provider:  06/05/17        Last PHQ-9 score on record=   PHQ-9 SCORE 4/7/2017   Total Score -   Total Score MyChart -   Total Score 1       Lab Results   Component Value Date    AST 41 11/21/2016     Lab Results   Component Value Date    ALT 46 11/21/2016

## 2017-06-28 RX ORDER — MIRTAZAPINE 15 MG/1
TABLET, FILM COATED ORAL
Qty: 30 TABLET | Refills: 0 | Status: SHIPPED | OUTPATIENT
Start: 2017-06-28 | End: 2017-07-03 | Stop reason: SINTOL

## 2017-06-28 RX ORDER — FLUOXETINE 40 MG/1
CAPSULE ORAL
Qty: 30 CAPSULE | Refills: 0 | OUTPATIENT
Start: 2017-06-28

## 2017-06-28 NOTE — TELEPHONE ENCOUNTER
Medication is being filled for 1 time refill only due to:  Patient needs to be seen because due for mood follow up..   Angeles Moran, RN, BSN

## 2017-06-28 NOTE — TELEPHONE ENCOUNTER
Upcoming appointment scheduled for 6/30. Last script was sent 6/5/17, which should get her through until the appointment. Medication prepped in visit.     Next 5 appointments (look out 90 days)     Jun 30, 2017  3:20 PM CDT   Office Visit with [facility]    [address] [address]   [phone]                Angeles Moran, RN, BSN

## 2017-07-03 ENCOUNTER — OFFICE VISIT (OUTPATIENT)
Dept: FAMILY MEDICINE | Facility: OTHER | Age: 47
End: 2017-07-03
Payer: MEDICAID

## 2017-07-03 VITALS
RESPIRATION RATE: 16 BRPM | TEMPERATURE: 98.9 F | HEART RATE: 90 BPM | DIASTOLIC BLOOD PRESSURE: 72 MMHG | HEIGHT: 70 IN | OXYGEN SATURATION: 97 % | SYSTOLIC BLOOD PRESSURE: 130 MMHG | BODY MASS INDEX: 20.13 KG/M2 | WEIGHT: 140.6 LBS

## 2017-07-03 DIAGNOSIS — F51.01 PRIMARY INSOMNIA: Primary | ICD-10-CM

## 2017-07-03 DIAGNOSIS — Z72.0 TOBACCO ABUSE: ICD-10-CM

## 2017-07-03 DIAGNOSIS — G43.909 MIGRAINE WITHOUT STATUS MIGRAINOSUS, NOT INTRACTABLE, UNSPECIFIED MIGRAINE TYPE: ICD-10-CM

## 2017-07-03 DIAGNOSIS — K21.9 GASTROESOPHAGEAL REFLUX DISEASE WITHOUT ESOPHAGITIS: ICD-10-CM

## 2017-07-03 DIAGNOSIS — R63.4 ABNORMAL WEIGHT LOSS: ICD-10-CM

## 2017-07-03 DIAGNOSIS — Q82.8 HAILEY-HAILEY DISEASE: ICD-10-CM

## 2017-07-03 DIAGNOSIS — F41.1 GENERALIZED ANXIETY DISORDER: ICD-10-CM

## 2017-07-03 PROCEDURE — 99214 OFFICE O/P EST MOD 30 MIN: CPT | Performed by: STUDENT IN AN ORGANIZED HEALTH CARE EDUCATION/TRAINING PROGRAM

## 2017-07-03 RX ORDER — ZOLMITRIPTAN 5 MG/1
TABLET, FILM COATED ORAL
Qty: 12 TABLET | Refills: 4 | Status: SHIPPED | OUTPATIENT
Start: 2017-07-03 | End: 2017-10-31

## 2017-07-03 RX ORDER — BUSPIRONE HYDROCHLORIDE 30 MG/1
30 TABLET ORAL 2 TIMES DAILY
Qty: 60 TABLET | Refills: 3 | Status: SHIPPED | OUTPATIENT
Start: 2017-07-03 | End: 2017-09-14

## 2017-07-03 RX ORDER — SUCRALFATE 1 G/1
1 TABLET ORAL 4 TIMES DAILY
Qty: 40 TABLET | Refills: 1 | Status: SHIPPED | OUTPATIENT
Start: 2017-07-03 | End: 2017-11-30

## 2017-07-03 RX ORDER — DIAZEPAM 2 MG
TABLET ORAL
Qty: 60 TABLET | Status: CANCELLED | OUTPATIENT
Start: 2017-07-03

## 2017-07-03 RX ORDER — TRETINOIN 0.25 MG/G
CREAM TOPICAL
Status: CANCELLED | OUTPATIENT
Start: 2017-07-03

## 2017-07-03 RX ORDER — TRAZODONE HYDROCHLORIDE 100 MG/1
100-300 TABLET ORAL
Qty: 90 TABLET | Refills: 1 | Status: SHIPPED | OUTPATIENT
Start: 2017-07-03 | End: 2017-09-07

## 2017-07-03 RX ORDER — ONDANSETRON 4 MG/1
4-8 TABLET, ORALLY DISINTEGRATING ORAL
Qty: 12 TABLET | Refills: 0 | Status: SHIPPED | OUTPATIENT
Start: 2017-07-03 | End: 2017-07-25

## 2017-07-03 ASSESSMENT — PAIN SCALES - GENERAL: PAINLEVEL: NO PAIN (0)

## 2017-07-03 NOTE — NURSING NOTE
"Chief Complaint   Patient presents with     Depression     Insomnia       Initial /72 (BP Location: Right arm, Patient Position: Chair, Cuff Size: Adult Regular)  Pulse 90  Temp 98.9  F (37.2  C) (Oral)  Resp 16  Ht 5' 9.61\" (1.768 m)  Wt 140 lb 9.6 oz (63.8 kg)  SpO2 97%  BMI 20.4 kg/m2 Estimated body mass index is 20.4 kg/(m^2) as calculated from the following:    Height as of this encounter: 5' 9.61\" (1.768 m).    Weight as of this encounter: 140 lb 9.6 oz (63.8 kg).  Medication Reconciliation: complete   Chari Simpson CMA      "

## 2017-07-03 NOTE — MR AVS SNAPSHOT
"              After Visit Summary   7/3/2017    Coretta Tovar    MRN: [id number]           Patient Information     Date Of Birth          1970        Visit Information        Provider Department      7/3/2017 4:30 PM Adina Maciel APRN CNP [facility]        Today's Diagnoses     Primary insomnia    -  1    Generalized anxiety disorder        Migraine without status migrainosus, not intractable, unspecified migraine type        Gastroesophageal reflux disease without esophagitis        Tobacco abuse        Mary-mary disease        Abnormal weight loss           Follow-ups after your visit        Who to contact     If you have questions or need follow up information about today's clinic visit or your schedule please contact [facility] directly at [phone].  Normal or non-critical lab and imaging results will be communicated to you by valuklikhart, letter or phone within 4 business days after the clinic has received the results. If you do not hear from us within 7 days, please contact the clinic through valuklikhart or phone. If you have a critical or abnormal lab result, we will notify you by phone as soon as possible.  Submit refill requests through [facility] or call your pharmacy and they will forward the refill request to us. Please allow 3 business days for your refill to be completed.          Additional Information About Your Visit        MyChart Information     [facility] lets you send messages to your doctor, view your test results, renew your prescriptions, schedule appointments and more. To sign up, go to [email or web address] . Click on \"Log in\" on the left side of the screen, which will take you to the Welcome page. Then click on \"Sign up Now\" on the right side of the page.     You will be asked to enter the access code listed below, as well as some personal information. Please follow the directions to create your username and password.     Your access code " "is: [email or web address]  Expires: 2017  7:09 PM     Your access code will  in 90 days. If you need help or a new code, please call your [facility] clinic or [phone].        Care EveryWhere ID     This is your Care EveryWhere ID. This could be used by other organizations to access your [facility] medical records  [id number]        Your Vitals Were     Pulse Temperature Respirations Height Pulse Oximetry BMI (Body Mass Index)    90 98.9  F (37.2  C) (Oral) 16 5' 9.61\" (1.768 m) 97% 20.4 kg/m2       Blood Pressure from Last 3 Encounters:   17 130/72   17 106/64   06/10/17 131/77    Weight from Last 3 Encounters:   17 140 lb 9.6 oz (63.8 kg)   17 139 lb (63 kg)   17 137 lb (62.1 kg)              Today, you had the following     No orders found for display         Today's Medication Changes          These changes are accurate as of: 7/3/17 11:59 PM.  If you have any questions, ask your nurse or doctor.               Start taking these medicines.        Dose/Directions    traZODone 100 MG tablet   Commonly known as:  DESYREL   Used for:  Primary insomnia   Started by:  Adina Maciel APRN CNP        Dose:  100-300 mg   Take 1-3 tablets (100-300 mg) by mouth nightly as needed for sleep   Quantity:  90 tablet   Refills:  1         These medicines have changed or have updated prescriptions.        Dose/Directions    BusPIRone HCl 30 MG Tabs   This may have changed:  See the new instructions.   Used for:  Generalized anxiety disorder   Changed by:  Adina Maciel APRN CNP        Dose:  30 mg   Take 30 mg by mouth 2 times daily   Quantity:  60 tablet   Refills:  3       FLUoxetine 40 MG capsule   Commonly known as:  PROzac   This may have changed:  Another medication with the same name was removed. Continue taking this medication, and follow the directions you see here.   Used for:  Moderate episode of recurrent major depressive disorder (H), Generalized anxiety " disorder   Changed by:  Adina Maciel APRN CNP        Dose:  40 mg   Take 1 capsule (40 mg) by mouth daily   Quantity:  30 capsule   Refills:  0         Stop taking these medicines if you haven't already. Please contact your care team if you have questions.     mirtazapine 15 MG tablet   Commonly known as:  REMERON   Stopped by:  Adina Maciel APRN CNP                Where to get your medicines      These medications were sent to [facility] [address] [place] [address] [address] [facility]  [address] [place] [id number]     Phone:  [phone]     BusPIRone HCl 30 MG Tabs    ondansetron 4 MG ODT tab    sucralfate 1 GM tablet    traZODone 100 MG tablet    ZOLMitriptan 5 MG tablet                Primary Care Provider Office Phone # Fax #    NINOSKA Samuel [phone]       [address]  [facility] [address]        Equal Access to Services     [facility] AH: Hadii aad ku hadasho Soomaali, waaxda luqadaha, qaybta kaalmada adeegyada, pelon alegre . So [facility] [phone].    ATENCIÓN: Si habla español, tiene a roberts disposición servicios gratuitos de asistencia lingüística. [facility] [phone].    We comply with applicable federal civil rights laws and [place] laws. We do not discriminate on the basis of race, color, national origin, age, disability sex, sexual orientation or gender identity.            Thank you!     Thank you for choosing [facility]  for your care. Our goal is always to provide you with excellent care. Hearing back from our patients is one way we can continue to improve our services. Please take a few minutes to complete the written survey that you may receive in the mail after your visit with us. Thank you!             Your Updated Medication List - Protect others around you: Learn how to safely use, store and throw away your  medicines at www.disposemymeds.org.          This list is accurate as of: 7/3/17 11:59 PM.  Always use your most recent med list.                   Brand Name Dispense Instructions for use Diagnosis    acyclovir 5 % ointment    ZOVIRAX    30 g    Apply 1 g topically as needed    Recurrent cold sores       albuterol 108 (90 BASE) MCG/ACT Inhaler    PROAIR HFA/PROVENTIL HFA/VENTOLIN HFA          baclofen 10 MG tablet    LIORESAL     TK 1 T PO  TID PRN        betamethasone valerate 0.1 % ointment    VALISONE     Apply once a day for 2 weeks as directed        BusPIRone HCl 30 MG Tabs     60 tablet    Take 30 mg by mouth 2 times daily    Generalized anxiety disorder       * CHANTIX 1 MG tablet   Generic drug:  varenicline     56 tablet    TAKE 1 TABLET BY MOUTH TWICE DAILY    Tobacco abuse       * varenicline 1 MG tablet    CHANTIX    56 tablet    Take 1 tablet (1 mg) by mouth 2 times daily    Tobacco abuse, Encounter for smoking cessation counseling       clobetasol 0.05 % ointment    TEMOVATE     APPLY TOPICALLY BID        diazepam 2 MG tablet    VALIUM     Reported on 3/13/2017        FLUoxetine 40 MG capsule    PROzac    30 capsule    Take 1 capsule (40 mg) by mouth daily    Moderate episode of recurrent major depressive disorder (H), Generalized anxiety disorder       fluticasone 50 MCG/ACT spray    FLONASE    1 Bottle    Spray 1-2 sprays into both nostrils daily    Seasonal allergic rhinitis, unspecified allergic rhinitis trigger       hydrocortisone 2.5 % cream           ketoconazole 2 % cream    NIZORAL          loratadine 10 MG tablet    CLARITIN    30 tablet    Take 1 tablet (10 mg) by mouth daily    Seasonal allergic rhinitis, unspecified allergic rhinitis trigger       olopatadine HCl 0.2 % Soln    PATADAY    2.5 mL    Place 1 drop into both eyes daily    Seasonal allergic rhinitis, unspecified allergic rhinitis trigger       * omeprazole 40 MG capsule    priLOSEC    30 capsule    Take 1 capsule (40 mg) by  mouth daily Take 30-60 minutes before a meal.    Gastroesophageal reflux disease, esophagitis presence not specified       * omeprazole 40 MG capsule    priLOSEC    90 capsule    TAKE 1 CAPSULE(40 MG) BY MOUTH DAILY 30 TO 60 MINUTES BEFORE A MEAL    Gastroesophageal reflux disease, esophagitis presence not specified       ondansetron 4 MG ODT tab    ZOFRAN ODT    12 tablet    Take 1-2 tablets (4-8 mg) by mouth 3 times daily (before meals)    Migraine without status migrainosus, not intractable, unspecified migraine type       order for DME     1 each    Equipment being ordered: cool humidifier    Painful respiration, Upper respiratory tract infection, unspecified type       * Protein Powd     454 g    Take 30 g by mouth 2 times daily    Protein-calorie malnutrition (H), Loss of weight       * Protein Powd     454 g    30 g 2 times daily    Protein-calorie malnutrition (H)       sucralfate 1 GM tablet    CARAFATE    40 tablet    Take 1 tablet (1 g) by mouth 4 times daily    Gastroesophageal reflux disease without esophagitis       traZODone 100 MG tablet    DESYREL    90 tablet    Take 1-3 tablets (100-300 mg) by mouth nightly as needed for sleep    Primary insomnia       TYLENOL PO      Take 1 tablet by mouth as needed    Chondritis of both external ears       valACYclovir 1000 mg tablet    VALTREX    8 tablet    Take 1 tablet (1,000 mg) by mouth 2 times daily for 4 days    Recurrent cold sores       ZOLMitriptan 5 MG tablet    ZOMIG    12 tablet    TAKE 1 TABLET BY MOUTH AT ONSET OF HEADACHE FOR MIGRAINE. MAY REPEAT DOSE IN 2 HOURS. DO NOT EXCEED 10MG IN 24 HOURS        * Notice:  This list has 6 medication(s) that are the same as other medications prescribed for you. Read the directions carefully, and ask your doctor or other care provider to review them with you.

## 2017-07-04 ASSESSMENT — ASTHMA QUESTIONNAIRES: ACT_TOTALSCORE: 22

## 2017-07-15 DIAGNOSIS — F33.1 MODERATE EPISODE OF RECURRENT MAJOR DEPRESSIVE DISORDER (H): ICD-10-CM

## 2017-07-15 DIAGNOSIS — F41.1 GENERALIZED ANXIETY DISORDER: ICD-10-CM

## 2017-07-17 ENCOUNTER — TRANSFERRED RECORDS (OUTPATIENT)
Dept: HEALTH INFORMATION MANAGEMENT | Facility: CLINIC | Age: 47
End: 2017-07-17

## 2017-07-17 LAB
ALT SERPL-CCNC: 15 IU/L (ref 8–45)
AST SERPL-CCNC: 19 IU/L (ref 2–40)

## 2017-07-17 NOTE — TELEPHONE ENCOUNTER
FLUoxetine (PROZAC) 40 MG capsule     Last Written Prescription Date: 6/5/17  Last Fill Quantity: 30, # refills: 0  Last Office Visit with G primary care provider:  7/3/17        Last PHQ-9 score on record=   PHQ-9 SCORE 4/7/2017   Total Score -   Total Score MyChart -   Total Score 1

## 2017-07-18 ENCOUNTER — TRANSFERRED RECORDS (OUTPATIENT)
Dept: HEALTH INFORMATION MANAGEMENT | Facility: CLINIC | Age: 47
End: 2017-07-18

## 2017-07-19 RX ORDER — FLUOXETINE 40 MG/1
CAPSULE ORAL
Qty: 30 CAPSULE | Refills: 5 | Status: SHIPPED | OUTPATIENT
Start: 2017-07-19 | End: 2017-11-06

## 2017-07-21 ENCOUNTER — TELEPHONE (OUTPATIENT)
Dept: CARE COORDINATION | Facility: CLINIC | Age: 47
End: 2017-07-21

## 2017-07-21 ENCOUNTER — CARE COORDINATION (OUTPATIENT)
Dept: CARE COORDINATION | Facility: CLINIC | Age: 47
End: 2017-07-21

## 2017-07-21 DIAGNOSIS — R11.2 INTRACTABLE VOMITING WITH NAUSEA: Primary | ICD-10-CM

## 2017-07-21 NOTE — PROGRESS NOTES
Clinic Care Coordination Contact  [facility]/Voicemail       Clinical Data: Care Coordinator Outreach  DC'd from [facility] on 7/20 to home self care   Primary Problem: Intractable vomiting  LOS: 2.6    Outreach attempted x 1.  Left message on voicemail with call back information and requested return call.  Plan: Care Coordinator will mail out care coordination introduction letter with care coordinator contact information and explanation of care coordination services. Care Coordinator will try to reach patient again in 1-2 business days.    Justin Fletcher RN  Clinic Care Coordinator  [facility]  [phone]

## 2017-07-21 NOTE — TELEPHONE ENCOUNTER
DC'd from [facility] on 7/20 to home self care   Primary Problem: Intractable vomiting  LOS: 2.6

## 2017-07-25 DIAGNOSIS — G43.909 MIGRAINE WITHOUT STATUS MIGRAINOSUS, NOT INTRACTABLE, UNSPECIFIED MIGRAINE TYPE: ICD-10-CM

## 2017-07-26 ENCOUNTER — TELEPHONE (OUTPATIENT)
Dept: FAMILY MEDICINE | Facility: OTHER | Age: 47
End: 2017-07-26

## 2017-07-26 NOTE — TELEPHONE ENCOUNTER
Ondansetron (Zofran) 4 MG      Last Written Prescription Date: 7/3/17  Last Fill Quantity: 12,  # refills: 0   Last Office Visit with [facility], [facility] or [facility] prescribing provider: 7/3/17

## 2017-07-26 NOTE — TELEPHONE ENCOUNTER
Coretta Tovar is a 47 year old female who calls with extreme abdominal pain and vomiting.    NURSING ASSESSMENT:  Description:  Pt states she was admitted to [facility] last week, Monday through Thursday.  She states she was discharged on Thursday due to being able to keep 2 bites of food down.  She states she doesn't think she was well enough to leave and they didn't find out what was causing her abdominal pain.  Pt states she has been able to keep water and 7-up down but as soon as she eats food (toast) she vomits.   Onset/duration:  Last week  Precip. factors: none  Associated symptoms:   Lower abdominal pain (across her whole lower abdomen below her navel), vomiting, nausea, weakness, dizziness, light headedness.  Pt denies fever.  Improves/worsens symptoms:  Has been taking Zofran but doesn't seem to be helping.  Pain scale (0-10)   9/10    Allergies:   Allergies   Allergen Reactions     Acetaminophen-Codeine Hives     Amoxicillin-Pot Clavulanate Nausea and Vomiting     Augmentin Nausea and Vomiting and Hives     Barbiturates      Compazine      Lock-jaw     Liquid Adhesive      Other reaction(s): Other, see comments  blisters     No Clinical Screening - See Comments      PN: LW Reaction: blisters  PN: LW FI1: nka  PN: LW CM1: CONTRAST- nka Reaction :     Nsaids      D/t ITP     Prochlorperazine      Other reaction(s): Edema     Propoxyphene N-Apap Hives     Sulfa Drugs      Tramadol Hcl Hives            Adhesive Tape Rash       RECOMMENDED DISPOSITION:  To ED.  Pt states she is going to go to the [facility].  Will comply with recommendation: Yes  If further questions/concerns or if symptoms do not improve, worsen or new symptoms develop, call your PCP or [facility] as soon as possible.      Guideline used: abdominal pain  Telephone Triage Protocols for Nurses, Fifth Edition, Halley Castro RN

## 2017-07-27 NOTE — TELEPHONE ENCOUNTER
Routing refill request to provider for review/approval because:  Unsure if medication is indicated for chronic use. First prescription 07/03/17    Rosaura Fierro RN, BSN

## 2017-07-28 RX ORDER — ONDANSETRON 4 MG/1
4-8 TABLET, ORALLY DISINTEGRATING ORAL DAILY PRN
Qty: 12 TABLET | Refills: 0 | Status: SHIPPED | OUTPATIENT
Start: 2017-07-28 | End: 2017-09-07

## 2017-08-01 ENCOUNTER — OFFICE VISIT (OUTPATIENT)
Dept: FAMILY MEDICINE | Facility: CLINIC | Age: 47
End: 2017-08-01
Payer: MEDICAID

## 2017-08-01 VITALS
DIASTOLIC BLOOD PRESSURE: 74 MMHG | HEART RATE: 89 BPM | SYSTOLIC BLOOD PRESSURE: 115 MMHG | HEIGHT: 71 IN | WEIGHT: 130 LBS | BODY MASS INDEX: 18.2 KG/M2 | TEMPERATURE: 99.3 F

## 2017-08-01 DIAGNOSIS — R21 RASH: Primary | ICD-10-CM

## 2017-08-01 DIAGNOSIS — M54.50 ACUTE BILATERAL LOW BACK PAIN WITHOUT SCIATICA: ICD-10-CM

## 2017-08-01 PROCEDURE — 99214 OFFICE O/P EST MOD 30 MIN: CPT | Performed by: FAMILY MEDICINE

## 2017-08-01 RX ORDER — CYCLOBENZAPRINE HCL 10 MG
5-10 TABLET ORAL 3 TIMES DAILY PRN
Qty: 30 TABLET | Refills: 1 | Status: SHIPPED | OUTPATIENT
Start: 2017-08-01 | End: 2017-09-07

## 2017-08-01 RX ORDER — TRIAMCINOLONE ACETONIDE 1 MG/G
OINTMENT TOPICAL
Qty: 80 G | Refills: 0 | Status: SHIPPED | OUTPATIENT
Start: 2017-08-01

## 2017-08-01 NOTE — NURSING NOTE
"Chief Complaint   Patient presents with     Back Pain       Initial /74  Pulse 89  Temp 99.3  F (37.4  C) (Oral)  Ht 5' 11\" (1.803 m)  Wt 130 lb (59 kg)  BMI 18.13 kg/m2 Estimated body mass index is 18.13 kg/(m^2) as calculated from the following:    Height as of this encounter: 5' 11\" (1.803 m).    Weight as of this encounter: 130 lb (59 kg).  Medication Reconciliation: complete     Iram Pena MA      "

## 2017-08-01 NOTE — PROGRESS NOTES
"  SUBJECTIVE:                                                    Coretta Tovar is a 47 year old female who presents to clinic today for the following health issues:        Back Pain       Duration: 2 days         Specific cause: none    Description:   Location of pain: low back both  Character of pain: burning  Pain radiation:none  New numbness or weakness in legs, not attributed to pain:  no     Intensity: severe    History:   Pain interferes with job: yes with daily life, unemployed  History of back problems: no prior back problems  Any previous MRI or X-rays: None  Sees a specialist for back pain:  No  Therapies tried without relief: icey hot patch, burnt back    Tylenol     Alleviating factors:   Improved by: none       Precipitating factors:  Worsened by: touching     Functional and Psychosocial Screen (Juanita STarT Back):      Not performed today            Accompanying Signs & Symptoms:  Risk of Fracture:  None  Risk of Cauda Equina:  None  Risk of Infection:  None  Risk of Cancer:  None  Risk of Ankylosing Spondylitis:  Onset at age <35, male, AND morning back stiffness. no       Pt here with symptoms above. No radicular pain  Has had intermittent back pains but none this bad  Put a pad with icy hot on her back overnight and next day woke up with her back all irritated and painful, probably from the menthol in the pad    Reviewed . Pt was seeing pain clinic but she states she quit.             Problem list and histories reviewed & adjusted, as indicated.  Additional history: as documented    Labs reviewed in EPIC    Reviewed and updated as needed this visit by clinical staff  Tobacco  Allergies  Meds  Med Hx  Surg Hx  Fam Hx  Soc Hx      Reviewed and updated as needed this visit by Provider         ROS:  Constitutional, HEENT, cardiovascular, pulmonary, gi and gu systems are negative, except as otherwise noted.      OBJECTIVE:   /74  Pulse 89  Temp 99.3  F (37.4  C) (Oral)  Ht 5' 11\" " (1.803 m)  Wt 130 lb (59 kg)  BMI 18.13 kg/m2  Body mass index is 18.13 kg/(m^2).  GENERAL: healthy, alert and no distress  SKIN: erythematous confluent patch of skin over most of lower back with rectangular shape consistent with icy hot patch. No vesicular lesions noted  BACK: Pt jumped and gasped at very light touch to her spine, however her rash is over the skin on her spine, difficult back exam due to skin irritation  Negative SLR test.     Diagnostic Test Results:  none     ASSESSMENT/PLAN:     1. Rash  FU if not improving  - triamcinolone (KENALOG) 0.1 % ointment; Apply sparingly to affected area on back two times daily til clear.  Dispense: 80 g; Refill: 0    2. Acute bilateral low back pain without sciatica  Muscle relaxers as needed. FU if not improving  - cyclobenzaprine (FLEXERIL) 10 MG tablet; Take 0.5-1 tablets (5-10 mg) by mouth 3 times daily as needed for muscle spasms  Dispense: 30 tablet; Refill: 1        Niki Kwong MD  [facility]

## 2017-08-01 NOTE — MR AVS SNAPSHOT
"              After Visit Summary   2017    Coretta Tovar    MRN: [id number]           Patient Information     Date Of Birth          1970        Visit Information        Provider Department      2017 9:40 AM Niki Brush MD [facility]        Today's Diagnoses     Rash    -  1    Acute bilateral low back pain without sciatica           Follow-ups after your visit        Who to contact     If you have questions or need follow up information about today's clinic visit or your schedule please contact [facility] directly at [phone].  Normal or non-critical lab and imaging results will be communicated to you by [employer], letter or phone within 4 business days after the clinic has received the results. If you do not hear from us within 7 days, please contact the clinic through [employer] or phone. If you have a critical or abnormal lab result, we will notify you by phone as soon as possible.  Submit refill requests through [employer] or call your pharmacy and they will forward the refill request to us. Please allow 3 business days for your refill to be completed.          Additional Information About Your Visit        MyChart Information     [employer] lets you send messages to your doctor, view your test results, renew your prescriptions, schedule appointments and more. To sign up, go to [email or web address] . Click on \"Log in\" on the left side of the screen, which will take you to the Welcome page. Then click on \"Sign up Now\" on the right side of the page.     You will be asked to enter the access code listed below, as well as some personal information. Please follow the directions to create your username and password.     Your access code is: [email or web address]  Expires: 2017  7:09 PM     Your access code will  in 90 days. If you need help or a new code, please call your [facility] or [phone].        Care EveryWhere ID     This is your Care EveryWhere ID. This " "could be used by other organizations to access your [facility] medical records  [id number]        Your Vitals Were     Pulse Temperature Height BMI (Body Mass Index)          89 99.3  F (37.4  C) (Oral) 5' 11\" (1.803 m) 18.13 kg/m2         Blood Pressure from Last 3 Encounters:   08/01/17 115/74   07/03/17 130/72   06/23/17 106/64    Weight from Last 3 Encounters:   08/01/17 130 lb (59 kg)   07/03/17 140 lb 9.6 oz (63.8 kg)   06/23/17 139 lb (63 kg)              Today, you had the following     No orders found for display         Today's Medication Changes          These changes are accurate as of: 8/1/17 10:58 AM.  If you have any questions, ask your nurse or doctor.               Start taking these medicines.        Dose/Directions    cyclobenzaprine 10 MG tablet   Commonly known as:  FLEXERIL   Used for:  Acute bilateral low back pain without sciatica   Started by:  Niki Brush MD        Dose:  5-10 mg   Take 0.5-1 tablets (5-10 mg) by mouth 3 times daily as needed for muscle spasms   Quantity:  30 tablet   Refills:  1       triamcinolone 0.1 % ointment   Commonly known as:  KENALOG   Used for:  Rash   Started by:  Niki Brush MD        Apply sparingly to affected area on back two times daily til clear.   Quantity:  80 g   Refills:  0            Where to get your medicines      These medications were sent to [facility] [address] [place] [address], [facility] [[email or web address]] [address]  [address] [address] [address]     Phone:  [phone]     cyclobenzaprine 10 MG tablet    triamcinolone 0.1 % ointment                Primary Care Provider Office Phone # Fax #    NINOSKA [facility] [phone]       [facility] [address] [facility] [address]  [facility] [address]        Equal Access to Services     ARELY JASON AH: Andrea coleman Soadama, waaxda luqadaha, qaybta kaalsera stevenson, pelon alegre ah. So [facility] " [phone].    ATENCIÓN: Si rhett [facility], tiene a roberts disposición servicios gratuitos de asistencia lingüística. Sravanthi song [phone].    We comply with applicable federal civil rights laws and [place] laws. We do not discriminate on the basis of race, color, national origin, age, disability sex, sexual orientation or gender identity.            Thank you!     Thank you for [facility]  [facility]. Our goal is always to provide you with excellent care. Hearing back from our patients is one way we can continue to improve our services. Please take a few minutes to complete the written survey that you may receive in the mail after your visit with us. Thank you!             Your Updated Medication List - Protect others around you: Learn how to safely use, store and throw away your medicines at www.disposemymeds.org.          This list is accurate as of: 8/1/17 10:58 AM.  Always use your most recent med list.                   Brand Name Dispense Instructions for use Diagnosis    acyclovir 5 % ointment    ZOVIRAX    30 g    Apply 1 g topically as needed    Recurrent cold sores       albuterol 108 (90 BASE) MCG/ACT Inhaler    PROAIR HFA/PROVENTIL HFA/VENTOLIN HFA          baclofen 10 MG tablet    LIORESAL     TK 1 T PO  TID PRN        betamethasone valerate 0.1 % ointment    VALISONE     Apply once a day for 2 weeks as directed        BusPIRone HCl 30 MG Tabs     60 tablet    Take 30 mg by mouth 2 times daily    Generalized anxiety disorder       * CHANTIX 1 MG tablet   Generic drug:  varenicline     56 tablet    TAKE 1 TABLET BY MOUTH TWICE DAILY    Tobacco abuse       * varenicline 1 MG tablet    CHANTIX    56 tablet    Take 1 tablet (1 mg) by mouth 2 times daily    Tobacco abuse, Encounter for smoking cessation counseling       clobetasol 0.05 % ointment    TEMOVATE     APPLY TOPICALLY BID        cyclobenzaprine 10 MG tablet    FLEXERIL    30 tablet    Take 0.5-1 tablets (5-10 mg) by mouth  3 times daily as needed for muscle spasms    Acute bilateral low back pain without sciatica       diazepam 2 MG tablet    VALIUM     Reported on 3/13/2017        FLUoxetine 40 MG capsule    PROzac    30 capsule    TAKE 1 CAPSULE(40 MG) BY MOUTH DAILY    Moderate episode of recurrent major depressive disorder (H), Generalized anxiety disorder       fluticasone 50 MCG/ACT spray    FLONASE    1 Bottle    Spray 1-2 sprays into both nostrils daily    Seasonal allergic rhinitis, unspecified allergic rhinitis trigger       hydrocortisone 2.5 % cream           ketoconazole 2 % cream    NIZORAL          loratadine 10 MG tablet    CLARITIN    30 tablet    Take 1 tablet (10 mg) by mouth daily    Seasonal allergic rhinitis, unspecified allergic rhinitis trigger       olopatadine HCl 0.2 % Soln    PATADAY    2.5 mL    Place 1 drop into both eyes daily    Seasonal allergic rhinitis, unspecified allergic rhinitis trigger       * omeprazole 40 MG capsule    priLOSEC    30 capsule    Take 1 capsule (40 mg) by mouth daily Take 30-60 minutes before a meal.    Gastroesophageal reflux disease, esophagitis presence not specified       * omeprazole 40 MG capsule    priLOSEC    90 capsule    TAKE 1 CAPSULE(40 MG) BY MOUTH DAILY 30 TO 60 MINUTES BEFORE A MEAL    Gastroesophageal reflux disease, esophagitis presence not specified       ondansetron 4 MG ODT tab    ZOFRAN-ODT    12 tablet    Take 1-2 tablets (4-8 mg) by mouth daily as needed for nausea    Migraine without status migrainosus, not intractable, unspecified migraine type       order for DME     1 each    Equipment being ordered: cool humidifier    Painful respiration, Upper respiratory tract infection, unspecified type       * Protein Powd     454 g    Take 30 g by mouth 2 times daily    Protein-calorie malnutrition (H), Loss of weight       * Protein Powd     454 g    30 g 2 times daily    Protein-calorie malnutrition (H)       sucralfate 1 GM tablet    CARAFATE    40 tablet     Take 1 tablet (1 g) by mouth 4 times daily    Gastroesophageal reflux disease without esophagitis       traZODone 100 MG tablet    DESYREL    90 tablet    Take 1-3 tablets (100-300 mg) by mouth nightly as needed for sleep    Primary insomnia       triamcinolone 0.1 % ointment    KENALOG    80 g    Apply sparingly to affected area on back two times daily til clear.    Rash       TYLENOL PO      Take 1 tablet by mouth as needed    Chondritis of both external ears       valACYclovir 1000 mg tablet    VALTREX    8 tablet    Take 1 tablet (1,000 mg) by mouth 2 times daily for 4 days    Recurrent cold sores       ZOLMitriptan 5 MG tablet    ZOMIG    12 tablet    TAKE 1 TABLET BY MOUTH AT ONSET OF HEADACHE FOR MIGRAINE. MAY REPEAT DOSE IN 2 HOURS. DO NOT EXCEED 10MG IN 24 HOURS        * Notice:  This list has 6 medication(s) that are the same as other medications prescribed for you. Read the directions carefully, and ask your doctor or other care provider to review them with you.

## 2017-08-03 ENCOUNTER — TELEPHONE (OUTPATIENT)
Dept: FAMILY MEDICINE | Facility: OTHER | Age: 47
End: 2017-08-03

## 2017-08-03 NOTE — TELEPHONE ENCOUNTER
"Coretta Tovar is a 47 year old female who calls with burn on her lower back from Icy Hot Patch.    NURSING ASSESSMENT:  Description:  Burn on her lower back after using Icy Hot Patch that was applied for 6 hours. Applying ointment on her back that is called Triamcinolone ointment that was prescribed by Urgent Care 3 days ago. Has been taking Tylenol and has not been helping. Stated after the patch was removed the area was red, raised, inflamed and blistered. At this time the blisters have popped and the area is very painful.  Onset/duration:  Sunday   Pain scale (0-10)   \"very painful\"  LMP/preg/breast feeding:  No LMP recorded. Patient has had a hysterectomy.  Last exam/Treatment:  08/01/2017  Allergies:   Allergies   Allergen Reactions     Acetaminophen-Codeine Hives     Amoxicillin-Pot Clavulanate Nausea and Vomiting     Augmentin Nausea and Vomiting and Hives     Barbiturates      Compazine      Lock-jaw     Liquid Adhesive      Other reaction(s): Other, see comments  blisters     No Clinical Screening - See Comments      PN: LW Reaction: blisters  PN: LW FI1: nka  PN: LW CM1: CONTRAST- nka Reaction :     Nsaids      D/t ITP     Prochlorperazine      Other reaction(s): Edema     Propoxyphene N-Apap Hives     Sulfa Drugs      Tramadol Hcl Hives            Adhesive Tape Rash     NURSING PLAN: Nursing advice to patient to follow up in clinic tomorrow with EM per specialty notes only to see EM. Discussed if worsens tonight then to be seen in a more urgent care center such as back to the  or ED    RECOMMENDED DISPOSITION:  See in 24 hours - recheck burned area  Will comply with recommendation: Yes  If further questions/concerns or if symptoms do not improve, worsen or new symptoms develop, call your PCP or [facility] as soon as possible.    NOTES:  Disposition was determined by the first positive assessment question, therefore all previous assessment questions were negative    Guideline " used:  Telephone Triage Protocols for Nurses, Fifth Edition, Halley Vazquez  Conrad, Chemical  Conrad, Thermal   Nursing Judgement     Kasie Randall, RN, BSN

## 2017-08-03 NOTE — TELEPHONE ENCOUNTER
Requested Provider:  Shahnaz or TITO    PCP: Adina Maciel    Reason for visit: burn form a icy hot patch    Duration of symptoms: this happened on Sunday    Have you been treated for this in the past? No    Additional comments: I offered her appts with a different provider tomorrow and she declined. She will only see Shahnaz or TITO

## 2017-08-04 ENCOUNTER — OFFICE VISIT (OUTPATIENT)
Dept: FAMILY MEDICINE | Facility: OTHER | Age: 47
End: 2017-08-04
Payer: MEDICAID

## 2017-08-04 VITALS
WEIGHT: 135.2 LBS | HEART RATE: 94 BPM | HEIGHT: 71 IN | TEMPERATURE: 98.2 F | DIASTOLIC BLOOD PRESSURE: 56 MMHG | RESPIRATION RATE: 12 BRPM | OXYGEN SATURATION: 98 % | BODY MASS INDEX: 18.93 KG/M2 | SYSTOLIC BLOOD PRESSURE: 122 MMHG

## 2017-08-04 DIAGNOSIS — M54.50 ACUTE BILATERAL LOW BACK PAIN WITHOUT SCIATICA: ICD-10-CM

## 2017-08-04 DIAGNOSIS — L23.1 ALLERGIC CONTACT DERMATITIS DUE TO ADHESIVES: Primary | ICD-10-CM

## 2017-08-04 DIAGNOSIS — F14.10 COCAINE ABUSE (H): ICD-10-CM

## 2017-08-04 PROCEDURE — 99214 OFFICE O/P EST MOD 30 MIN: CPT | Performed by: STUDENT IN AN ORGANIZED HEALTH CARE EDUCATION/TRAINING PROGRAM

## 2017-08-04 RX ORDER — OXYCODONE AND ACETAMINOPHEN 5; 325 MG/1; MG/1
1 TABLET ORAL EVERY 4 HOURS PRN
Qty: 4 TABLET | Refills: 0 | Status: SHIPPED | OUTPATIENT
Start: 2017-08-04 | End: 2017-09-07

## 2017-08-04 ASSESSMENT — PAIN SCALES - GENERAL: PAINLEVEL: EXTREME PAIN (8)

## 2017-08-04 NOTE — PROGRESS NOTES
"  SUBJECTIVE:                                                    Coretta Tovar is a 47 year old female who presents to clinic today for the following health issues:    HPI    Rash  Onset: 4 days    Description:   Location: Low back  Character: round, painful, burning, red  Itching (Pruritis): no     Progression of Symptoms:  worsening    Accompanying Signs & Symptoms:  Fever: no   Body aches or joint pain: no   Sore throat symptoms: no   Recent cold symptoms: no     History:   Previous similar rash: no     Precipitating factors:   Exposure to similar rash: no   New exposures: medication Icy Hot patches   Recent travel: no     Alleviating factors:  None    Therapies Tried and outcome: triamcinolone acetonide - unable to tell if it helps    Patient states she used icy hot patches on 8/1 on low back and broke out from rash. Was given triamcinolone acetonide 0.1% ointment, patient states she doesn't know if it helps. States back is burning and painful.     Relapsed with cocaine, has been a long time since last relapse, break up with boyfriend sent her into a depression, is working with sponsor to get sober and has a plan to quit.    Problem list and histories reviewed & adjusted, as indicated.  Additional history: as documented    Labs reviewed in EPIC    ROS:  Constitutional, HEENT, cardiovascular, pulmonary, gi and gu systems are negative, except as otherwise noted.      OBJECTIVE:   /56 (BP Location: Right arm, Patient Position: Chair, Cuff Size: Adult Regular)  Pulse 94  Temp 98.2  F (36.8  C) (Oral)  Resp 12  Ht 5' 10.98\" (1.803 m)  Wt 135 lb 3.2 oz (61.3 kg)  SpO2 98%  BMI 18.87 kg/m2  Body mass index is 18.87 kg/(m^2).  GENERAL: healthy, alert and no distress  EYES: Eyes grossly normal to inspection, PERRL and conjunctivae and sclerae normal  NECK: no adenopathy, no asymmetry, masses, or scars and thyroid normal to palpation  RESP: lungs clear to auscultation - no rales, rhonchi or wheezes  CV: " regular rate and rhythm, normal S1 S2, no S3 or S4, no murmur, click or rub, no peripheral edema and   ABDOMEN: soft, nontender, no hepatosplenomegaly, no masses and bowel sounds normal  MS: no gross musculoskeletal defects noted, no edema  SKIN: scabbing rash on entire portion of lower back that is also mildly edematous and erythematous, very tender to palpation, no purulent drainage.  NEURO: Normal strength and tone  PSYCH: mentation appears normal, inattentive, tangential, affect flat, tearful, anxious, judgement and insight intact and appearance disheveled    Diagnostic Test Results:  none     ASSESSMENT/PLAN:     1. Allergic contact dermatitis due to adhesives  Advised patient to use an ointment on rash as it has scabbed over and I believe an ointment will provide some relief from the pain she is experiencing.  She had been using triamcinolone which was not effective and may have been too aggressive given the nature of the wound.  I did give her 4 tablets of Percocet to use only at night.  She may NOT get any refills of this.  She admitted to a relapse with cocaine and is at high risk for abuse of Percocet.  She was clearly in pain during the visit and was the reasoning for the 4 tablets to be used at night to help her pain so she can sleep.  - Neomycin-Bacitracin-Polymyxin ([employer] TRIPLE ANTIBIOTIC) OINT; Externally apply topically 4 times daily  Dispense: 60 g; Refill: 3    2. Acute bilateral low back pain without sciatica  - oxyCODONE-acetaminophen (PERCOCET) 5-325 MG per tablet; Take 1 tablet by mouth every 4 hours as needed for pain maximum 1 tablet(s) per day  Dispense: 4 tablet; Refill: 0    3. Cocaine abuse  Current relapse.  Is working with sponsor to gain sobriety again.    NINOSKA Wadsworth [facility] [place]

## 2017-08-04 NOTE — NURSING NOTE
"Chief Complaint   Patient presents with     Derm Problem       Initial /56 (BP Location: Right arm, Patient Position: Chair, Cuff Size: Adult Regular)  Pulse 94  Temp 98.2  F (36.8  C) (Oral)  Resp 12  Ht 5' 10.98\" (1.803 m)  Wt 135 lb 3.2 oz (61.3 kg)  SpO2 98%  BMI 18.87 kg/m2 Estimated body mass index is 18.87 kg/(m^2) as calculated from the following:    Height as of this encounter: 5' 10.98\" (1.803 m).    Weight as of this encounter: 135 lb 3.2 oz (61.3 kg).  Medication Reconciliation: complete   Chari Simpson CMA      "

## 2017-08-04 NOTE — MR AVS SNAPSHOT
"              After Visit Summary   2017    Coretta Tovar    MRN: [id number]           Patient Information     Date Of Birth          1970        Visit Information        Provider Department      2017 1:20 PM Adina Maciel APRN CNP [facility]        Today's Diagnoses     Allergic contact dermatitis due to adhesives    -  1    Acute bilateral low back pain without sciatica        Cocaine abuse           Follow-ups after your visit        Who to contact     If you have questions or need follow up information about today's clinic visit or your schedule please contact [facility] directly at [phone].  Normal or non-critical lab and imaging results will be communicated to you by Siva Powerhart, letter or phone within 4 business days after the clinic has received the results. If you do not hear from us within 7 days, please contact the clinic through Siva Powerhart or phone. If you have a critical or abnormal lab result, we will notify you by phone as soon as possible.  Submit refill requests through [facility] or call your pharmacy and they will forward the refill request to us. Please allow 3 business days for your refill to be completed.          Additional Information About Your Visit        MyChart Information     [facility] lets you send messages to your doctor, view your test results, renew your prescriptions, schedule appointments and more. To sign up, go to [email or web address] . Click on \"Log in\" on the left side of the screen, which will take you to the Welcome page. Then click on \"Sign up Now\" on the right side of the page.     You will be asked to enter the access code listed below, as well as some personal information. Please follow the directions to create your username and password.     Your access code is: [email or web address]  Expires: 2017  7:09 PM     Your access code will  in 90 days. If you need help or a new code, please call your [facility] or " "[phone].        Care EveryWhere ID     This is your Care EveryWhere ID. This could be used by other organizations to access your [facility] medical records  [id number]        Your Vitals Were     Pulse Temperature Respirations Height Pulse Oximetry BMI (Body Mass Index)    94 98.2  F (36.8  C) (Oral) 12 5' 10.98\" (1.803 m) 98% 18.87 kg/m2       Blood Pressure from Last 3 Encounters:   08/04/17 122/56   08/01/17 115/74   07/03/17 130/72    Weight from Last 3 Encounters:   08/04/17 135 lb 3.2 oz (61.3 kg)   08/01/17 130 lb (59 kg)   07/03/17 140 lb 9.6 oz (63.8 kg)              Today, you had the following     No orders found for display         Today's Medication Changes          These changes are accurate as of: 8/4/17 11:59 PM.  If you have any questions, ask your nurse or doctor.               Start taking these medicines.        Dose/Directions    [employer] TRIPLE ANTIBIOTIC Oint   Used for:  Allergic contact dermatitis due to adhesives   Started by:  Adina Maciel APRN CNP        Externally apply topically 4 times daily   Quantity:  60 g   Refills:  3       oxyCODONE-acetaminophen 5-325 MG per tablet   Commonly known as:  PERCOCET   Used for:  Acute bilateral low back pain without sciatica   Started by:  Adina Maciel APRN CNP        Dose:  1 tablet   Take 1 tablet by mouth every 4 hours as needed for pain maximum 1 tablet(s) per day   Quantity:  4 tablet   Refills:  0            Where to get your medicines      These medications were sent to [facility] [id number] [place] [address] - [address] [facility] AT [facility]  [address] [facility] [address]     Phone:  [phone]     [facility] TRIPLE ANTIBIOTIC Oint         Some of these will need a paper prescription and others can be bought over the counter.  Ask your nurse if you have questions.     Bring a paper prescription for each of these medications     oxyCODONE-acetaminophen 5-325 MG per tablet                Primary " Care Provider Office Phone # Fax #    Adina Maciel, APRN [facility] [phone]       [address] [place] [address] [address]  [facility] [address]        Equal Access to Services     ARELY JASON : Hadii teodoro ferrer hadlyo Soomaali, waaxda luqadaha, qaybta kaalmada adeegyada, waxdannie ashvinin hayaan hitesh munir sis malcolm. So [facility] [phone].    ATENCIÓN: Si habla español, tiene a roberts disposición servicios gratuitos de asistencia lingüística. Llame al [phone].    We comply with applicable federal civil rights laws and [place] laws. We do not discriminate on the basis of race, color, national origin, age, disability sex, sexual orientation or gender identity.            Thank you!     Thank you for choosing [facility]  for your care. Our goal is always to provide you with excellent care. Hearing back from our patients is one way we can continue to improve our services. Please take a few minutes to complete the written survey that you may receive in the mail after your visit with us. Thank you!             Your Updated Medication List - Protect others around you: Learn how to safely use, store and throw away your medicines at www.disposemymeds.org.          This list is accurate as of: 8/4/17 11:59 PM.  Always use your most recent med list.                   Brand Name Dispense Instructions for use Diagnosis    acyclovir 5 % ointment    ZOVIRAX    30 g    Apply 1 g topically as needed    Recurrent cold sores       albuterol 108 (90 BASE) MCG/ACT Inhaler    PROAIR HFA/PROVENTIL HFA/VENTOLIN HFA          baclofen 10 MG tablet    LIORESAL     TK 1 T PO  TID PRN        betamethasone valerate 0.1 % ointment    VALISONE     Apply once a day for 2 weeks as directed        BusPIRone HCl 30 MG Tabs     60 tablet    Take 30 mg by mouth 2 times daily    Generalized anxiety disorder       * CHANTIX 1 MG tablet   Generic drug:  varenicline     56 tablet    TAKE 1 TABLET BY MOUTH TWICE DAILY    Tobacco abuse        * varenicline 1 MG tablet    CHANTIX    56 tablet    Take 1 tablet (1 mg) by mouth 2 times daily    Tobacco abuse, Encounter for smoking cessation counseling       clobetasol 0.05 % ointment    TEMOVATE     APPLY TOPICALLY BID        [employer]S TRIPLE ANTIBIOTIC Oint     60 g    Externally apply topically 4 times daily    Allergic contact dermatitis due to adhesives       cyclobenzaprine 10 MG tablet    FLEXERIL    30 tablet    Take 0.5-1 tablets (5-10 mg) by mouth 3 times daily as needed for muscle spasms    Acute bilateral low back pain without sciatica       diazepam 2 MG tablet    VALIUM     Reported on 3/13/2017        FLUoxetine 40 MG capsule    PROzac    30 capsule    TAKE 1 CAPSULE(40 MG) BY MOUTH DAILY    Moderate episode of recurrent major depressive disorder (H), Generalized anxiety disorder       fluticasone 50 MCG/ACT spray    FLONASE    1 Bottle    Spray 1-2 sprays into both nostrils daily    Seasonal allergic rhinitis, unspecified allergic rhinitis trigger       hydrocortisone 2.5 % cream           ketoconazole 2 % cream    NIZORAL          loratadine 10 MG tablet    CLARITIN    30 tablet    Take 1 tablet (10 mg) by mouth daily    Seasonal allergic rhinitis, unspecified allergic rhinitis trigger       olopatadine HCl 0.2 % Soln    PATADAY    2.5 mL    Place 1 drop into both eyes daily    Seasonal allergic rhinitis, unspecified allergic rhinitis trigger       * omeprazole 40 MG capsule    priLOSEC    30 capsule    Take 1 capsule (40 mg) by mouth daily Take 30-60 minutes before a meal.    Gastroesophageal reflux disease, esophagitis presence not specified       * omeprazole 40 MG capsule    priLOSEC    90 capsule    TAKE 1 CAPSULE(40 MG) BY MOUTH DAILY 30 TO 60 MINUTES BEFORE A MEAL    Gastroesophageal reflux disease, esophagitis presence not specified       ondansetron 4 MG ODT tab    ZOFRAN-ODT    12 tablet    Take 1-2 tablets (4-8 mg) by mouth daily as needed for nausea    Migraine without status  migrainosus, not intractable, unspecified migraine type       order for DME     1 each    Equipment being ordered: cool humidifier    Painful respiration, Upper respiratory tract infection, unspecified type       oxyCODONE-acetaminophen 5-325 MG per tablet    PERCOCET    4 tablet    Take 1 tablet by mouth every 4 hours as needed for pain maximum 1 tablet(s) per day    Acute bilateral low back pain without sciatica       * Protein Powd     454 g    Take 30 g by mouth 2 times daily    Protein-calorie malnutrition (H), Loss of weight       * Protein Powd     454 g    30 g 2 times daily    Protein-calorie malnutrition (H)       sucralfate 1 GM tablet    CARAFATE    40 tablet    Take 1 tablet (1 g) by mouth 4 times daily    Gastroesophageal reflux disease without esophagitis       traZODone 100 MG tablet    DESYREL    90 tablet    Take 1-3 tablets (100-300 mg) by mouth nightly as needed for sleep    Primary insomnia       triamcinolone 0.1 % ointment    KENALOG    80 g    Apply sparingly to affected area on back two times daily til clear.    Rash       TYLENOL PO      Take 1 tablet by mouth as needed    Chondritis of both external ears       valACYclovir 1000 mg tablet    VALTREX    8 tablet    Take 1 tablet (1,000 mg) by mouth 2 times daily for 4 days    Recurrent cold sores       ZOLMitriptan 5 MG tablet    ZOMIG    12 tablet    TAKE 1 TABLET BY MOUTH AT ONSET OF HEADACHE FOR MIGRAINE. MAY REPEAT DOSE IN 2 HOURS. DO NOT EXCEED 10MG IN 24 HOURS        * Notice:  This list has 6 medication(s) that are the same as other medications prescribed for you. Read the directions carefully, and ask your doctor or other care provider to review them with you.

## 2017-08-08 PROBLEM — F14.10 COCAINE ABUSE (H): Status: ACTIVE | Noted: 2017-08-08

## 2017-09-05 NOTE — PROGRESS NOTES
SUBJECTIVE:   Coretta Tovar is a 47 year old female who presents to clinic today for the following health issues:      ABDOMINAL   PAIN     Onset: 2 months    Description:   Character: Cramping  Location: loren-umbilical region  Radiation: None    Intensity: severe    Progression of Symptoms:  worsening and intermittent    Accompanying Signs & Symptoms:  Fever/Chills?: no   Gas/Bloating: YES  Nausea: YES  Vomitting: YES  Diarrhea?: YES  Constipation:YES  Dysuria or Hematuria: no    History:   Trauma: no   Previous similar pain: YES   Previous tests done: CT and EGD    Precipitating factors:   Does the pain change with:     Food: YES     BM: YES    Urination: no     Alleviating factors:  nothing    Therapies Tried and outcome: Yes,  Unsure of medications    LMP:  not applicable     Patient's symptoms started in 11/2016.  She developed severe abdominal pain and was seen in the ED 3 times and was finally hospitalized at [facility] for an Illeus in 11/2016.  Patient was hospitalized again at [facility] for intractable nausea and vomiting.  Patient had EGD in 11/2016 which showed non-erosive gastropathy.  Patient had normal CT of abdomen/pelvis again in 7/17.   Patient is now developing worsening abdominal pain near her umbilicus.  It occasionally happens after eating.  She notes that she has diarrhea more frequently than constipation.  She denies melena, hematochezia.  Patient denies nausea, vomiting in recent weeks.  She thinks she may have had food poisoning several weeks ago.  She continues to have nausea.  Patient has not had a colonoscopy.  Patient denies excessive GERD and is not taking omeprazole consistently.    Patient has multiple other concerns that we do not have time for today.    Problem list and histories reviewed & adjusted, as indicated.  Additional history: as documented    Patient Active Problem List   Diagnosis     Tobacco abuse     S/P splenectomy     Migraines     Idiopathic thrombocytopenic purpura (HCC)      Chemical dependency (H)     Insomnia     Amry-mary disease     Vomiting     Abdominal pain, acute     Disorder of stomach     Cervical pain     Vertigo     Asthma     Gastroesophageal reflux disease     Herpetic gingivostomatitis     Immune thrombocytopenic purpura (H)     Muscle pain     Personality disorder     Nondependent cannabis abuse, episodic     Atopic rhinitis     Nutritional disorder     Abnormal weight loss     Mixed, or nondependent drug abuse     Moderate episode of recurrent major depressive disorder (H)     Generalized anxiety disorder     Protein-calorie malnutrition (H)     Primary insomnia     History of cocaine dependence (H)     Past Surgical History:   Procedure Laterality Date     ANKLE SURGERY      Numerous right ankle surgeries     HERNIA REPAIR Left 1975     HYSTERECTOMY, PAP NO LONGER INDICATED      benign, endometriosis     NECK SURGERY  2000's    Cervical spine     SPLENECTOMY  1995     TONSILLECTOMY         Social History   Substance Use Topics     Smoking status: Current Every Day Smoker     Packs/day: 1.00     Years: 15.00     Types: Cigarettes     Last attempt to quit: 11/6/2015     Smokeless tobacco: Never Used     Alcohol use Yes      Comment: Drinks once in 4 - 5 months     Family History   Problem Relation Age of Onset     Neurologic Disorder Maternal Grandmother      migraines     Breast Cancer Maternal Grandmother      Neurologic Disorder Maternal Grandfather      migraines     Neurologic Disorder Brother      migraines     Breast Cancer Maternal Aunt      x 2     Breast Cancer Paternal Grandmother      Depression Daughter      Anxiety Disorder Daughter          Current Outpatient Prescriptions   Medication Sig Dispense Refill     valACYclovir (VALTREX) 1000 mg tablet Take 1 tablet (1,000 mg) by mouth 2 times daily 8 tablet 11     ondansetron (ZOFRAN-ODT) 4 MG ODT tab Take 1-2 tablets (4-8 mg) by mouth daily as needed for nausea 12 tablet 0     traZODone (DESYREL) 100  MG tablet Take 1-3 tablets (100-300 mg) by mouth nightly as needed for sleep 90 tablet 1     omeprazole (PRILOSEC) 40 MG capsule TAKE 1 CAPSULE(40 MG) BY MOUTH DAILY 30 TO 60 MINUTES BEFORE A MEAL 90 capsule 3     dicyclomine (BENTYL) 10 MG capsule Take 1 capsule (10 mg) by mouth 4 times daily as needed 120 capsule 1     Neomycin-Bacitracin-Polymyxin (CVS TRIPLE ANTIBIOTIC) OINT Externally apply topically 4 times daily 60 g 3     triamcinolone (KENALOG) 0.1 % ointment Apply sparingly to affected area on back two times daily til clear. 80 g 0     FLUoxetine (PROZAC) 40 MG capsule TAKE 1 CAPSULE(40 MG) BY MOUTH DAILY 30 capsule 5     sucralfate (CARAFATE) 1 GM tablet Take 1 tablet (1 g) by mouth 4 times daily 40 tablet 1     ZOLMitriptan (ZOMIG) 5 MG tablet TAKE 1 TABLET BY MOUTH AT ONSET OF HEADACHE FOR MIGRAINE. MAY REPEAT DOSE IN 2 HOURS. DO NOT EXCEED 10MG IN 24 HOURS 12 tablet 4     BusPIRone HCl 30 MG TABS Take 30 mg by mouth 2 times daily 60 tablet 3     acyclovir (ZOVIRAX) 5 % ointment Apply 1 g topically as needed 30 g 11     olopatadine HCl (PATADAY) 0.2 % SOLN Place 1 drop into both eyes daily 2.5 mL 3     fluticasone (FLONASE) 50 MCG/ACT spray Spray 1-2 sprays into both nostrils daily 1 Bottle 11     loratadine (CLARITIN) 10 MG tablet Take 1 tablet (10 mg) by mouth daily 30 tablet 1     clobetasol (TEMOVATE) 0.05 % ointment APPLY TOPICALLY BID  1     albuterol (PROAIR HFA/PROVENTIL HFA/VENTOLIN HFA) 108 (90 BASE) MCG/ACT Inhaler        betamethasone valerate (VALISONE) 0.1 % ointment Apply once a day for 2 weeks as directed       hydrocortisone 2.5 % cream        ketoconazole (NIZORAL) 2 % cream        Acetaminophen (TYLENOL PO) Take 1 tablet by mouth as needed       [DISCONTINUED] traZODone (DESYREL) 100 MG tablet Take 1-3 tablets (100-300 mg) by mouth nightly as needed for sleep 90 tablet 1     [DISCONTINUED] valACYclovir (VALTREX) 1000 mg tablet Take 1 tablet (1,000 mg) by mouth 2 times daily for 4 days  8 tablet 11     Protein POWD 30 g 2 times daily (Patient not taking: Reported on 9/7/2017) 454 g 1     Protein POWD Take 30 g by mouth 2 times daily (Patient not taking: Reported on 9/7/2017) 454 g 1     order for DME Equipment being ordered: cool humidifier 1 each 0     Allergies   Allergen Reactions     Acetaminophen-Codeine Hives     Amoxicillin-Pot Clavulanate Nausea and Vomiting     Augmentin Nausea and Vomiting and Hives     Barbiturates      Compazine      Lock-jaw     Liquid Adhesive      Other reaction(s): Other, see comments  blisters     No Clinical Screening - See Comments      PN: LW Reaction: blisters  PN: LW FI1: nka  PN: LW CM1: CONTRAST- nka Reaction :     Nsaids      D/t ITP     Prochlorperazine      Other reaction(s): Edema     Propoxyphene N-Apap Hives     Sulfa Drugs      Tramadol Hcl Hives            Adhesive Tape Rash     BP Readings from Last 3 Encounters:   09/07/17 96/60   08/04/17 122/56   08/01/17 115/74    Wt Readings from Last 3 Encounters:   09/07/17 131 lb (59.4 kg)   08/04/17 135 lb 3.2 oz (61.3 kg)   08/01/17 130 lb (59 kg)                  Labs reviewed in EPIC        Reviewed and updated as needed this visit by clinical staff       Reviewed and updated as needed this visit by Provider         ROS:  Constitutional, HEENT, cardiovascular, pulmonary, gi and gu systems are negative, except as otherwise noted.      OBJECTIVE:   BP 96/60  Pulse 94  Temp 98.2  F (36.8  C) (Oral)  Wt 131 lb (59.4 kg)  SpO2 100%  BMI 18.28 kg/m2  Body mass index is 18.28 kg/(m^2).  GENERAL: alert, no distress and fatigued  RESP: lungs clear to auscultation - no rales, rhonchi or wheezes  CV: regular rate and rhythm, normal S1 S2, no S3 or S4, no murmur, click or rub, no peripheral edema and peripheral pulses strong  ABDOMEN: tenderness generalized, no organomegaly or masses, bowel sounds normal and no palpable or pulsatile masses  MS: no gross musculoskeletal defects noted, no edema    Diagnostic Test  Results:  pending    ASSESSMENT/PLAN:     1. Abdominal pain, generalized  Possibly IBS, but patient has not undergone colonoscopy in the past.  Will recheck labs, send for colonoscopy and try bentyl.  - CBC with platelets differential  - Comprehensive metabolic panel  - *UA reflex to Microscopic and Culture ([place] and [facility] (except [place] and [place])  - GASTROENTEROLOGY ADULT REF PROCEDURE ONLY  - dicyclomine (BENTYL) 10 MG capsule; Take 1 capsule (10 mg) by mouth 4 times daily as needed  Dispense: 120 capsule; Refill: 1    2. Recurrent cold sores  Refill requested.  - valACYclovir (VALTREX) 1000 mg tablet; Take 1 tablet (1,000 mg) by mouth 2 times daily  Dispense: 8 tablet; Refill: 11    3. Migraine without status migrainosus, not intractable, unspecified migraine type    - ondansetron (ZOFRAN-ODT) 4 MG ODT tab; Take 1-2 tablets (4-8 mg) by mouth daily as needed for nausea  Dispense: 12 tablet; Refill: 0    4. Primary insomnia    - traZODone (DESYREL) 100 MG tablet; Take 1-3 tablets (100-300 mg) by mouth nightly as needed for sleep  Dispense: 90 tablet; Refill: 1    5. History of cocaine dependence (H)  Patient reports a relapse in July, but is now sober again and is attending AA regularly.    6. Gastroesophageal reflux disease, esophagitis presence not specified  Patient to resume.  - omeprazole (PRILOSEC) 40 MG capsule; TAKE 1 CAPSULE(40 MG) BY MOUTH DAILY 30 TO 60 MINUTES BEFORE A MEAL  Dispense: 90 capsule; Refill: 3    7. S/P splenectomy  Patient needs to have immunizations updated.  We discussed and will address further at next appointment.    8. Need for prophylactic vaccination and inoculation against influenza    - FLU VAC, SPLIT VIRUS IM > 3 YO (QUADRIVALENT) [70977]  - Vaccine Administration, Initial [03623]    FUTURE APPOINTMENTS:       - Follow-up visit in 1 week.    NINOSKA Smith CNP  [facility]

## 2017-09-07 ENCOUNTER — OFFICE VISIT (OUTPATIENT)
Dept: INTERNAL MEDICINE | Facility: CLINIC | Age: 47
End: 2017-09-07
Payer: MEDICAID

## 2017-09-07 VITALS
HEART RATE: 94 BPM | SYSTOLIC BLOOD PRESSURE: 96 MMHG | TEMPERATURE: 98.2 F | BODY MASS INDEX: 18.28 KG/M2 | WEIGHT: 131 LBS | OXYGEN SATURATION: 100 % | DIASTOLIC BLOOD PRESSURE: 60 MMHG

## 2017-09-07 DIAGNOSIS — F51.01 PRIMARY INSOMNIA: ICD-10-CM

## 2017-09-07 DIAGNOSIS — K21.9 GASTROESOPHAGEAL REFLUX DISEASE, ESOPHAGITIS PRESENCE NOT SPECIFIED: ICD-10-CM

## 2017-09-07 DIAGNOSIS — Z23 INFLUENZA VACCINE NEEDED: ICD-10-CM

## 2017-09-07 DIAGNOSIS — B00.1 RECURRENT COLD SORES: ICD-10-CM

## 2017-09-07 DIAGNOSIS — G43.909 MIGRAINE WITHOUT STATUS MIGRAINOSUS, NOT INTRACTABLE, UNSPECIFIED MIGRAINE TYPE: ICD-10-CM

## 2017-09-07 DIAGNOSIS — R10.84 ABDOMINAL PAIN, GENERALIZED: Primary | ICD-10-CM

## 2017-09-07 DIAGNOSIS — F14.21 HISTORY OF COCAINE DEPENDENCE (H): ICD-10-CM

## 2017-09-07 DIAGNOSIS — Z90.81 S/P SPLENECTOMY: ICD-10-CM

## 2017-09-07 DIAGNOSIS — Z23 NEED FOR PROPHYLACTIC VACCINATION AND INOCULATION AGAINST INFLUENZA: ICD-10-CM

## 2017-09-07 PROBLEM — F14.10 COCAINE ABUSE (H): Status: RESOLVED | Noted: 2017-08-08 | Resolved: 2017-09-07

## 2017-09-07 LAB
ALBUMIN SERPL-MCNC: 4.1 G/DL (ref 3.4–5)
ALBUMIN UR-MCNC: NEGATIVE MG/DL
ALP SERPL-CCNC: 71 U/L (ref 40–150)
ALT SERPL W P-5'-P-CCNC: 24 U/L (ref 0–50)
ANION GAP SERPL CALCULATED.3IONS-SCNC: 6 MMOL/L (ref 3–14)
APPEARANCE UR: CLEAR
AST SERPL W P-5'-P-CCNC: 16 U/L (ref 0–45)
BASOPHILS # BLD AUTO: 0.1 10E9/L (ref 0–0.2)
BASOPHILS NFR BLD AUTO: 0.6 %
BILIRUB SERPL-MCNC: 0.3 MG/DL (ref 0.2–1.3)
BILIRUB UR QL STRIP: NEGATIVE
BUN SERPL-MCNC: 7 MG/DL (ref 7–30)
CALCIUM SERPL-MCNC: 9.1 MG/DL (ref 8.5–10.1)
CHLORIDE SERPL-SCNC: 103 MMOL/L (ref 94–109)
CO2 SERPL-SCNC: 29 MMOL/L (ref 20–32)
COLOR UR AUTO: YELLOW
CREAT SERPL-MCNC: 0.67 MG/DL (ref 0.52–1.04)
DIFFERENTIAL METHOD BLD: ABNORMAL
EOSINOPHIL # BLD AUTO: 0.1 10E9/L (ref 0–0.7)
EOSINOPHIL NFR BLD AUTO: 1.8 %
ERYTHROCYTE [DISTWIDTH] IN BLOOD BY AUTOMATED COUNT: 16.4 % (ref 10–15)
GFR SERPL CREATININE-BSD FRML MDRD: >90 ML/MIN/1.7M2
GLUCOSE SERPL-MCNC: 89 MG/DL (ref 70–99)
GLUCOSE UR STRIP-MCNC: NEGATIVE MG/DL
HCT VFR BLD AUTO: 40.3 % (ref 35–47)
HGB BLD-MCNC: 13.5 G/DL (ref 11.7–15.7)
HGB UR QL STRIP: NEGATIVE
KETONES UR STRIP-MCNC: NEGATIVE MG/DL
LEUKOCYTE ESTERASE UR QL STRIP: NEGATIVE
LYMPHOCYTES # BLD AUTO: 2.4 10E9/L (ref 0.8–5.3)
LYMPHOCYTES NFR BLD AUTO: 30.5 %
MCH RBC QN AUTO: 31.3 PG (ref 26.5–33)
MCHC RBC AUTO-ENTMCNC: 33.5 G/DL (ref 31.5–36.5)
MCV RBC AUTO: 93 FL (ref 78–100)
MONOCYTES # BLD AUTO: 0.7 10E9/L (ref 0–1.3)
MONOCYTES NFR BLD AUTO: 8.5 %
NEUTROPHILS # BLD AUTO: 4.5 10E9/L (ref 1.6–8.3)
NEUTROPHILS NFR BLD AUTO: 58.6 %
NITRATE UR QL: NEGATIVE
PH UR STRIP: 6 PH (ref 5–7)
PLATELET # BLD AUTO: 462 10E9/L (ref 150–450)
POTASSIUM SERPL-SCNC: 4 MMOL/L (ref 3.4–5.3)
PROT SERPL-MCNC: 8.1 G/DL (ref 6.8–8.8)
RBC # BLD AUTO: 4.32 10E12/L (ref 3.8–5.2)
SODIUM SERPL-SCNC: 138 MMOL/L (ref 133–144)
SOURCE: NORMAL
SP GR UR STRIP: 1.01 (ref 1–1.03)
UROBILINOGEN UR STRIP-ACNC: 0.2 EU/DL (ref 0.2–1)
WBC # BLD AUTO: 7.8 10E9/L (ref 4–11)

## 2017-09-07 PROCEDURE — 80053 COMPREHEN METABOLIC PANEL: CPT | Performed by: NURSE PRACTITIONER

## 2017-09-07 PROCEDURE — 81003 URINALYSIS AUTO W/O SCOPE: CPT | Performed by: NURSE PRACTITIONER

## 2017-09-07 PROCEDURE — 99214 OFFICE O/P EST MOD 30 MIN: CPT | Mod: 25 | Performed by: NURSE PRACTITIONER

## 2017-09-07 PROCEDURE — 90471 IMMUNIZATION ADMIN: CPT | Performed by: NURSE PRACTITIONER

## 2017-09-07 PROCEDURE — 36415 COLL VENOUS BLD VENIPUNCTURE: CPT | Performed by: NURSE PRACTITIONER

## 2017-09-07 PROCEDURE — 85025 COMPLETE CBC W/AUTO DIFF WBC: CPT | Performed by: NURSE PRACTITIONER

## 2017-09-07 PROCEDURE — 90686 IIV4 VACC NO PRSV 0.5 ML IM: CPT | Performed by: NURSE PRACTITIONER

## 2017-09-07 RX ORDER — OMEPRAZOLE 40 MG/1
CAPSULE, DELAYED RELEASE ORAL
Qty: 90 CAPSULE | Refills: 3 | Status: SHIPPED | OUTPATIENT
Start: 2017-09-07 | End: 2017-12-01

## 2017-09-07 RX ORDER — TRAZODONE HYDROCHLORIDE 100 MG/1
100-300 TABLET ORAL
Qty: 90 TABLET | Refills: 1 | Status: SHIPPED | OUTPATIENT
Start: 2017-09-07 | End: 2017-11-03

## 2017-09-07 RX ORDER — DICYCLOMINE HYDROCHLORIDE 10 MG/1
10 CAPSULE ORAL 4 TIMES DAILY PRN
Qty: 120 CAPSULE | Refills: 1 | Status: SHIPPED | OUTPATIENT
Start: 2017-09-07 | End: 2017-09-14

## 2017-09-07 RX ORDER — ONDANSETRON 4 MG/1
4-8 TABLET, ORALLY DISINTEGRATING ORAL DAILY PRN
Qty: 12 TABLET | Refills: 0 | Status: SHIPPED | OUTPATIENT
Start: 2017-09-07 | End: 2018-01-02

## 2017-09-07 RX ORDER — VALACYCLOVIR HYDROCHLORIDE 1 G/1
1000 TABLET, FILM COATED ORAL 2 TIMES DAILY
Qty: 8 TABLET | Refills: 11 | Status: SHIPPED | OUTPATIENT
Start: 2017-09-07 | End: 2017-11-30

## 2017-09-07 ASSESSMENT — PAIN SCALES - GENERAL: PAINLEVEL: SEVERE PAIN (6)

## 2017-09-07 NOTE — MR AVS SNAPSHOT
After Visit Summary   9/7/2017    Coretta Tovar    MRN: [id number]           Patient Information     Date Of Birth          1970        Visit Information        Provider Department      9/7/2017 10:40 AM Sarah Montemayor APRN [facility]        Today's Diagnoses     Abdominal pain, generalized    -  1    Recurrent cold sores        Migraine without status migrainosus, not intractable, unspecified migraine type        Primary insomnia        History of cocaine dependence (H)        Gastroesophageal reflux disease, esophagitis presence not specified        Influenza vaccine needed        S/P splenectomy          Care Instructions    Restart omeprazole.  Schedule colonoscopy.  Try bentyl for pain.  Follow-up in 1 week.    [facility]    If you have any questions regarding to your visit please contact your care team:     Team Pink:   Clinic Hours Telephone Number   Internal Medicine:  Dr. Kristie Montemayor, NP       7am-7pm  Monday - Thursday   7am-5pm  Fridays  [phone]  (Appointment scheduling available 24/7)    Questions about your visit?  Team Line  [phone]   Urgent Care - [place] - 11am-9pm Monday-Friday Saturday-Sunday- 9am-5pm   [place] - 5pm-9pm Monday-Friday Saturday-Sunday- 9am-5pm  [phone] - [place]   [phone] - [place]       What options do I have for visits at the clinic other than the traditional office visit?  To expand how we care for you, many of our providers are utilizing electronic visits (e-visits) and telephone visits, when medically appropriate, for interactions with their patients rather than a visit in the clinic.   We also offer nurse visits for many medical concerns. Just like any other service, we will bill your insurance company for this type of visit based on time spent on the phone with your provider. Not all insurance companies cover these  visits. Please check with your medical insurance if this type of visit is covered. You will be responsible for any charges that are not paid by your insurance.      E-visits via Paydianthart:  generally incur a $35.00 fee.  Telephone visits:  Time spent on the phone: *charged based on time that is spent on the phone in increments of 10 minutes. Estimated cost:   5-10 mins $30.00   11-20 mins. $59.00   21-30 mins. $85.00   Use Mashed Pixel (secure email communication and access to your chart) to send your primary care provider a message or make an appointment. Ask someone on your Team how to sign up for Mashed Pixel.    For a Price Quote for your services, please call our Vixlo Line at [phone].    As always, Thank you for trusting us with your health care needs!    Discharge by RICHARD OLIVARES             Follow-ups after your visit        Additional Services     GASTROENTEROLOGY ADULT REF PROCEDURE ONLY       Last Lab Result: Creatinine (mg/dL)       Date                     Value                 11/21/2016               0.73             ----------  Body mass index is 18.28 kg/(m^2).      Patient will be contacted to schedule procedure.     Please be aware that coverage of these services is subject to the terms and limitations of your health insurance plan.  Call member services at your health plan with any benefit or coverage questions.  Any procedures must be performed at a [facility] facility OR coordinated by your clinic's referral office.    Please bring the following with you to your appointment:    (1) Any X-Rays, CTs or MRIs which have been performed.  Contact the facility where they were done to arrange for  prior to your scheduled appointment.    (2) List of current medications   (3) This referral request   (4) Any documents/labs given to you for this referral                  Who to contact     If you have questions or need follow up information about today's clinic visit or your schedule please contact  "[facility] directly at [phone].  Normal or non-critical lab and imaging results will be communicated to you by MyChart, letter or phone within 4 business days after the clinic has received the results. If you do not hear from us within 7 days, please contact the clinic through Prism Microwavehart or phone. If you have a critical or abnormal lab result, we will notify you by phone as soon as possible.  Submit refill requests through Apptera or call your pharmacy and they will forward the refill request to us. Please allow 3 business days for your refill to be completed.          Additional Information About Your Visit        Prism MicrowaveharParastructure Information     Apptera lets you send messages to your doctor, view your test results, renew your prescriptions, schedule appointments and more. To sign up, go to [email or web address] . Click on \"Log in\" on the left side of the screen, which will take you to the Welcome page. Then click on \"Sign up Now\" on the right side of the page.     You will be asked to enter the access code listed below, as well as some personal information. Please follow the directions to create your username and password.     Your access code is: [email or web address]  Expires: 2017  7:09 PM     Your access code will  in 90 days. If you need help or a new code, please call your [place] clinic or [phone].        Care EveryWhere ID     This is your Care EveryWhere ID. This could be used by other organizations to access your [place] medical records  [id number]        Your Vitals Were     Pulse Temperature Pulse Oximetry BMI (Body Mass Index)          94 98.2  F (36.8  C) (Oral) 100% 18.28 kg/m2         Blood Pressure from Last 3 Encounters:   17 96/60   17 122/56   17 115/74    Weight from Last 3 Encounters:   17 131 lb (59.4 kg)   17 135 lb 3.2 oz (61.3 kg)   17 130 lb (59 kg)              We Performed the Following     *UA reflex to Microscopic and Culture " ([place] [facility] (except [place] and [place])     CBC with platelets differential     Comprehensive metabolic panel     GASTROENTEROLOGY ADULT REF PROCEDURE ONLY          Today's Medication Changes          These changes are accurate as of: 9/7/17 11:29 AM.  If you have any questions, ask your nurse or doctor.               Start taking these medicines.        Dose/Directions    dicyclomine 10 MG capsule   Commonly known as:  BENTYL   Used for:  Abdominal pain, generalized   Started by:  Sarah Montemayor APRN CNP        Dose:  10 mg   Take 1 capsule (10 mg) by mouth 4 times daily as needed   Quantity:  120 capsule   Refills:  1         These medicines have changed or have updated prescriptions.        Dose/Directions    omeprazole 40 MG capsule   Commonly known as:  priLOSEC   This may have changed:  See the new instructions.   Used for:  Gastroesophageal reflux disease, esophagitis presence not specified   Changed by:  Sarah Montemayor APRN CNP        TAKE 1 CAPSULE(40 MG) BY MOUTH DAILY 30 TO 60 MINUTES BEFORE A MEAL   Quantity:  90 capsule   Refills:  3            Where to get your medicines      These medications were sent to [facility] [address] [place] [address] & [address] [address]     Phone:  [phone]     dicyclomine 10 MG capsule    omeprazole 40 MG capsule    ondansetron 4 MG ODT tab    traZODone 100 MG tablet    valACYclovir 1000 mg tablet                Primary Care Provider Office Phone # Fax #    Adina NINOSKA Maldonado [phone]       [address] [place] [id number]        Equal Access to Services     [facility] : Hadii teodoro ferrer hadlyo Sozebali, waaxda luqadaha, qaybta kaalmada adeegyada, pelon malcolm. So [facility] [phone].    ATENCIÓN: Si habla español, tiene a roberts disposición servicios gratuitos de asistencia lingüística. Llame al  [phone].    We comply with applicable federal civil rights laws and [place] laws. We do not discriminate on the basis of race, color, national origin, age, disability sex, sexual orientation or gender identity.            Thank you!     Thank you for choosing [facility]  for your care. Our goal is always to provide you with excellent care. Hearing back from our patients is one way we can continue to improve our services. Please take a few minutes to complete the written survey that you may receive in the mail after your visit with us. Thank you!             Your Updated Medication List - Protect others around you: Learn how to safely use, store and throw away your medicines at www.disposemymeds.org.          This list is accurate as of: 9/7/17 11:29 AM.  Always use your most recent med list.                   Brand Name Dispense Instructions for use Diagnosis    acyclovir 5 % ointment    ZOVIRAX    30 g    Apply 1 g topically as needed    Recurrent cold sores       albuterol 108 (90 BASE) MCG/ACT Inhaler    PROAIR HFA/PROVENTIL HFA/VENTOLIN HFA          betamethasone valerate 0.1 % ointment    VALISONE     Apply once a day for 2 weeks as directed        BusPIRone HCl 30 MG Tabs     60 tablet    Take 30 mg by mouth 2 times daily    Generalized anxiety disorder       clobetasol 0.05 % ointment    TEMOVATE     APPLY TOPICALLY BID        [employer] TRIPLE ANTIBIOTIC Oint     60 g    Externally apply topically 4 times daily    Allergic contact dermatitis due to adhesives       dicyclomine 10 MG capsule    BENTYL    120 capsule    Take 1 capsule (10 mg) by mouth 4 times daily as needed    Abdominal pain, generalized       FLUoxetine 40 MG capsule    PROzac    30 capsule    TAKE 1 CAPSULE(40 MG) BY MOUTH DAILY    Moderate episode of recurrent major depressive disorder (H), Generalized anxiety disorder       fluticasone 50 MCG/ACT spray    FLONASE    1 Bottle    Spray 1-2 sprays into both nostrils daily     Seasonal allergic rhinitis, unspecified allergic rhinitis trigger       hydrocortisone 2.5 % cream           ketoconazole 2 % cream    NIZORAL          loratadine 10 MG tablet    CLARITIN    30 tablet    Take 1 tablet (10 mg) by mouth daily    Seasonal allergic rhinitis, unspecified allergic rhinitis trigger       olopatadine HCl 0.2 % Soln    PATADAY    2.5 mL    Place 1 drop into both eyes daily    Seasonal allergic rhinitis, unspecified allergic rhinitis trigger       omeprazole 40 MG capsule    priLOSEC    90 capsule    TAKE 1 CAPSULE(40 MG) BY MOUTH DAILY 30 TO 60 MINUTES BEFORE A MEAL    Gastroesophageal reflux disease, esophagitis presence not specified       ondansetron 4 MG ODT tab    ZOFRAN-ODT    12 tablet    Take 1-2 tablets (4-8 mg) by mouth daily as needed for nausea    Migraine without status migrainosus, not intractable, unspecified migraine type       order for DME     1 each    Equipment being ordered: cool humidifier    Painful respiration, Upper respiratory tract infection, unspecified type       * Protein Powd     454 g    Take 30 g by mouth 2 times daily    Protein-calorie malnutrition (H), Loss of weight       * Protein Powd     454 g    30 g 2 times daily    Protein-calorie malnutrition (H)       sucralfate 1 GM tablet    CARAFATE    40 tablet    Take 1 tablet (1 g) by mouth 4 times daily    Gastroesophageal reflux disease without esophagitis       traZODone 100 MG tablet    DESYREL    90 tablet    Take 1-3 tablets (100-300 mg) by mouth nightly as needed for sleep    Primary insomnia       triamcinolone 0.1 % ointment    KENALOG    80 g    Apply sparingly to affected area on back two times daily til clear.    Rash       TYLENOL PO      Take 1 tablet by mouth as needed    Chondritis of both external ears       valACYclovir 1000 mg tablet    VALTREX    8 tablet    Take 1 tablet (1,000 mg) by mouth 2 times daily    Recurrent cold sores       ZOLMitriptan 5 MG tablet    ZOMIG    12 tablet    TAKE  1 TABLET BY MOUTH AT ONSET OF HEADACHE FOR MIGRAINE. MAY REPEAT DOSE IN 2 HOURS. DO NOT EXCEED 10MG IN 24 HOURS        * Notice:  This list has 2 medication(s) that are the same as other medications prescribed for you. Read the directions carefully, and ask your doctor or other care provider to review them with you.

## 2017-09-07 NOTE — PROGRESS NOTES
Injectable Influenza Immunization Documentation    1.  Is the person to be vaccinated sick today?  No    2. Does the person to be vaccinated have an allergy to eggs or to a component of the vaccine?  No    3. Has the person to be vaccinated today ever had a serious reaction to influenza vaccine in the past?  No    4. Has the person to be vaccinated ever had Guillain-[place] syndrome?  No     Form completed by Patient

## 2017-09-07 NOTE — PATIENT INSTRUCTIONS
Restart omeprazole.  Schedule colonoscopy.  Try bentyl for pain.  Follow-up in 1 week.    [facility]    If you have any questions regarding to your visit please contact your care team:     Team Pink:   Clinic Hours Telephone Number   Internal Medicine:  Dr. Kristie Montemayor, NP       7am-7pm  Monday - Thursday   7am-5pm  Fridays  [phone]  (Appointment scheduling available 24/7)    Questions about your visit?  Team Line  [phone]   [facility] [place] [facility] 11am-9pm Monday-Friday Saturday-Sunday- 9am-5pm   [place] - 5pm-9pm Monday-Friday Saturday-Sunday- 9am-5pm  [phone] - [place]   [phone] - [place]       What options do I have for visits at the clinic other than the traditional office visit?  To expand how we care for you, many of our providers are utilizing electronic visits (e-visits) and telephone visits, when medically appropriate, for interactions with their patients rather than a visit in the clinic.   We also offer nurse visits for many medical concerns. Just like any other service, we will bill your insurance company for this type of visit based on time spent on the phone with your provider. Not all insurance companies cover these visits. Please check with your medical insurance if this type of visit is covered. You will be responsible for any charges that are not paid by your insurance.      E-visits via 360Learning:  generally incur a $35.00 fee.  Telephone visits:  Time spent on the phone: *charged based on time that is spent on the phone in increments of 10 minutes. Estimated cost:   5-10 mins $30.00   11-20 mins. $59.00   21-30 mins. $85.00   Use Play It Interactivet (secure email communication and access to your chart) to send your primary care provider a message or make an appointment. Ask someone on your Team how to sign up for 360Learning.    For a Price Quote for your services, please call our Consumer Price Line at  [phone].    As always, Thank you for trusting us with your health care needs!    Discharge by RICHARD OLIVARES

## 2017-09-07 NOTE — LETTER
[address] [place] [address]    September 8, 2017    Coretta Tovar  [address] [facility] [address]          Dear Coretta,    Normal kidney function and electrolytes.   Elevated platelets are consistent with history of splenectomy.   Normal urine    Enclosed is a copy of your results.     Results for orders placed or performed in visit on 09/07/17   CBC with platelets differential   Result Value Ref [place]    WBC 7.8 4.0 - 11.0 10e9/L    RBC Count 4.32 3.8 - 5.2 10e12/L    Hemoglobin 13.5 11.7 - 15.7 g/dL    Hematocrit 40.3 35.0 - 47.0 %    MCV 93 78 - 100 fl    MCH 31.3 26.5 - 33.0 pg    MCHC 33.5 31.5 - 36.5 g/dL    RDW 16.4 (H) 10.0 - 15.0 %    Platelet Count 462 (H) 150 - 450 10e9/L    Diff Method Automated Method     % Neutrophils 58.6 %    % Lymphocytes 30.5 %    % Monocytes 8.5 %    % Eosinophils 1.8 %    % Basophils 0.6 %    Absolute Neutrophil 4.5 1.6 - 8.3 10e9/L    Absolute Lymphocytes 2.4 0.8 - 5.3 10e9/L    Absolute Monocytes 0.7 0.0 - 1.3 10e9/L    Absolute Eosinophils 0.1 0.0 - 0.7 10e9/L    Absolute Basophils 0.1 0.0 - 0.2 10e9/L   Comprehensive metabolic panel   Result Value Ref [place]    Sodium 138 133 - 144 mmol/L    Potassium 4.0 3.4 - 5.3 mmol/L    Chloride 103 94 - 109 mmol/L    Carbon Dioxide 29 20 - 32 mmol/L    Anion Gap 6 3 - 14 mmol/L    Glucose 89 70 - 99 mg/dL    Urea Nitrogen 7 7 - 30 mg/dL    Creatinine 0.67 0.52 - 1.04 mg/dL    GFR Estimate >90 >60 mL/min/1.7m2    GFR Estimate If Black >90 >60 mL/min/1.7m2    Calcium 9.1 8.5 - 10.1 mg/dL    Bilirubin Total 0.3 0.2 - 1.3 mg/dL    Albumin 4.1 3.4 - 5.0 g/dL    Protein Total 8.1 6.8 - 8.8 g/dL    Alkaline Phosphatase 71 40 - 150 U/L    ALT 24 0 - 50 U/L    AST 16 0 - 45 U/L   *UA reflex to Microscopic and Culture ([place] [facility] (except [place] and [place])   Result Value Ref [place]    Color Urine Yellow     Appearance Urine  Clear     Glucose Urine Negative NEG^Negative mg/dL    Bilirubin Urine Negative NEG^Negative    Ketones Urine Negative NEG^Negative mg/dL    Specific Gravity Urine 1.010 1.003 - 1.035    Blood Urine Negative NEG^Negative    pH Urine 6.0 5.0 - 7.0 pH    Protein Albumin Urine Negative NEG^Negative mg/dL    Urobilinogen Urine 0.2 0.2 - 1.0 EU/dL    Nitrite Urine Negative NEG^Negative    Leukocyte Esterase Urine Negative NEG^Negative    Source Midstream Urine        If you have any questions or concerns, please call myself or my nurse at [phone].      Sincerely,        Sarah Montemayor CNP/ALEXANDER

## 2017-09-08 ENCOUNTER — RADIANT APPOINTMENT (OUTPATIENT)
Dept: GENERAL RADIOLOGY | Facility: CLINIC | Age: 47
End: 2017-09-08
Attending: ORTHOPAEDIC SURGERY
Payer: MEDICAID

## 2017-09-08 ENCOUNTER — OFFICE VISIT (OUTPATIENT)
Dept: ORTHOPEDICS | Facility: CLINIC | Age: 47
End: 2017-09-08
Payer: MEDICAID

## 2017-09-08 VITALS
WEIGHT: 130.8 LBS | BODY MASS INDEX: 18.31 KG/M2 | SYSTOLIC BLOOD PRESSURE: 86 MMHG | DIASTOLIC BLOOD PRESSURE: 65 MMHG | HEIGHT: 71 IN | HEART RATE: 102 BPM | OXYGEN SATURATION: 96 %

## 2017-09-08 DIAGNOSIS — G89.29 CHRONIC PAIN OF BOTH KNEES: Primary | ICD-10-CM

## 2017-09-08 DIAGNOSIS — M25.561 KNEE PAIN, BILATERAL: ICD-10-CM

## 2017-09-08 DIAGNOSIS — M22.42 CHONDROMALACIA OF PATELLOFEMORAL JOINT, LEFT: ICD-10-CM

## 2017-09-08 DIAGNOSIS — M25.561 CHRONIC PAIN OF BOTH KNEES: Primary | ICD-10-CM

## 2017-09-08 DIAGNOSIS — M22.41 CHONDROMALACIA OF PATELLOFEMORAL JOINT, RIGHT: ICD-10-CM

## 2017-09-08 DIAGNOSIS — M25.562 CHRONIC PAIN OF BOTH KNEES: Primary | ICD-10-CM

## 2017-09-08 DIAGNOSIS — M25.562 KNEE PAIN, BILATERAL: ICD-10-CM

## 2017-09-08 PROCEDURE — 73562 X-RAY EXAM OF KNEE 3: CPT | Mod: LT

## 2017-09-08 PROCEDURE — 20610 DRAIN/INJ JOINT/BURSA W/O US: CPT | Mod: 50 | Performed by: ORTHOPAEDIC SURGERY

## 2017-09-08 PROCEDURE — 99203 OFFICE O/P NEW LOW 30 MIN: CPT | Mod: 25 | Performed by: ORTHOPAEDIC SURGERY

## 2017-09-08 RX ORDER — METHYLPREDNISOLONE ACETATE 80 MG/ML
80 INJECTION, SUSPENSION INTRA-ARTICULAR; INTRALESIONAL; INTRAMUSCULAR; SOFT TISSUE ONCE
Qty: 2 ML | Refills: 0 | OUTPATIENT
Start: 2017-09-08 | End: 2017-09-08

## 2017-09-08 ASSESSMENT — PAIN SCALES - GENERAL: PAINLEVEL: SEVERE PAIN (7)

## 2017-09-08 NOTE — PROGRESS NOTES
chief complaint:   Chief Complaint   Patient presents with     Knee Pain     Bilateral knee pain. Onset: years ago. Left knee is worse than right. NKI. Pain just came on and has gotten worse. Pain is mainly anterior but has some posterior pain. She has had cortisone injections in the past, last one in left knee only with Dr. Dawn on 3/23/17. Pain with walking up and down stairs.        HISTORY OF PRESENT ILLNESS    Coretta Tovar is a 47 year old female seen for evaluation of a bilateral anterior knee pain (L>R) that started years ago. She presents today with severe pain, rated a 7/10. Pain is located over the anterior knee, under the kneecaps, and posterior knees. Aggravated with stairs and prolonged walking. She has had injections in the past, last injection being on 3/23/2017. She notes that injections work well for her, lasting 8 months to 1 year. Previously had injections with Dr. Dawn.    History of ankle injury.    Present symptoms: severe pain.  Anterior, diffuse. No locking, catching or giving way.  The frequency of symptoms frequently.  Pain severity: 7/10  Pain quality: aching and sharp  Exacerbating Factors: weight bearing, stairs, prolonged walking  Relieving Factors: at rest   Night Pain: No  Pain while at rest: No   Patient has tried:     NSAIDS: No      Physical Therapy: No      Activity modification: Yes      Bracing: No      Injections: Yes, 3/23/2017     Ice: No      Other: none      Usual level of recreational activity: sedentary  Usual level of work activity: unemployed    Orthopedic PMH: known bilateral knee osteoarthritis     Other PMH:  has a past medical history of Endometriosis; Mary-mary disease; ITP (idiopathic thrombocytopaenic purpura) (1994); and Migraines.  Patient Active Problem List    Diagnosis Date Noted     History of cocaine dependence (H) 09/07/2017     Priority: Medium     Moderate episode of recurrent major depressive disorder (H) 03/22/2017     Priority: Medium      Generalized anxiety disorder 03/22/2017     Priority: Medium     Protein-calorie malnutrition (H) 03/22/2017     Priority: Medium     Primary insomnia 03/22/2017     Priority: Medium     Vertigo 03/14/2017     Priority: Medium     Cervical pain 03/08/2017     Priority: Medium     Nutritional disorder 11/21/2016     Priority: Medium     Disorder of stomach 11/20/2016     Priority: Medium     Overview:   Per Biopsy obtained on EGD 11/22/2016. Reactive gastropathy was quoted to be frequently associated with ongoing non-inflammatory type mucosal injury due to a chemical type of injury; this may be due to ingestion of non-steroidal anti-inflammatory drugs, aspirin, excess alcohol, or corticosteroids.        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     Priority: Medium     Chemical dependency (H) 02/24/2015     Priority: Medium     Insomnia 02/24/2015     Priority: Medium     S/P splenectomy 01/13/2015     Priority: Medium     Migraines      Priority: Medium     Idiopathic thrombocytopenic purpura (HCC)      Priority: Medium     s/p splenectomy       Tobacco abuse 05/30/2014     Priority: Medium     Immune thrombocytopenic purpura (H) 01/14/2008     Priority: Medium     Personality disorder 01/14/2008     Priority: Medium     Overview:   cluster B traits  borderline, antisocial       Nondependent cannabis abuse, episodic 01/14/2008     Priority: Medium     Mixed, or nondependent drug abuse 01/26/2007     Priority: Medium     Overview:   recurrent issues with opioids.  History of crack cocaine abuse.       Asthma 12/20/2006     Priority: Medium     Gastroesophageal reflux disease 12/20/2006     Priority: Medium     Herpetic gingivostomatitis 12/20/2006     Priority: Medium     Muscle pain 12/20/2006     Priority: Medium     Atopic rhinitis 12/20/2006     Priority: Medium     Abnormal weight loss 12/20/2006     Priority: Medium        Surgical Hx:  has a past surgical history that includes splenectomy (1995); Ankle surgery; hernia repair (Left, 1975); Neck surgery (2000's); hysterectomy, pap no longer indicated; and tonsillectomy.    Medications:   Current Outpatient Prescriptions:      valACYclovir (VALTREX) 1000 mg tablet, Take 1 tablet (1,000 mg) by mouth 2 times daily, Disp: 8 tablet, Rfl: 11     ondansetron (ZOFRAN-ODT) 4 MG ODT tab, Take 1-2 tablets (4-8 mg) by mouth daily as needed for nausea, Disp: 12 tablet, Rfl: 0     traZODone (DESYREL) 100 MG tablet, Take 1-3 tablets (100-300 mg) by mouth nightly as needed for sleep, Disp: 90 tablet, Rfl: 1     omeprazole (PRILOSEC) 40 MG capsule, TAKE 1 CAPSULE(40 MG) BY MOUTH DAILY 30 TO 60 MINUTES BEFORE A MEAL, Disp: 90 capsule, Rfl: 3     dicyclomine (BENTYL) 10 MG capsule, Take 1 capsule (10 mg) by mouth 4 times daily as needed, Disp: 120 capsule, Rfl: 1     Neomycin-Bacitracin-Polymyxin ([employer] TRIPLE ANTIBIOTIC) OINT, Externally apply topically 4 times daily, Disp: 60 g, Rfl: 3     triamcinolone (KENALOG) 0.1 % ointment, Apply sparingly to affected area on back two times daily til clear., Disp: 80 g, Rfl: 0     FLUoxetine (PROZAC) 40 MG capsule, TAKE 1 CAPSULE(40 MG) BY MOUTH DAILY, Disp: 30 capsule, Rfl: 5     sucralfate (CARAFATE) 1 GM tablet, Take 1 tablet (1 g) by mouth 4 times daily, Disp: 40 tablet, Rfl: 1     ZOLMitriptan (ZOMIG) 5 MG tablet, TAKE 1 TABLET BY MOUTH AT ONSET OF HEADACHE FOR MIGRAINE. MAY REPEAT DOSE IN 2 HOURS. DO NOT EXCEED 10MG IN 24 HOURS, Disp: 12 tablet, Rfl: 4     BusPIRone HCl 30 MG TABS, Take 30 mg by mouth 2 times daily, Disp: 60 tablet, Rfl: 3     acyclovir (ZOVIRAX) 5 % ointment, Apply 1 g topically as needed, Disp: 30 g, Rfl: 11     olopatadine HCl (PATADAY) 0.2 % SOLN, Place 1 drop into both eyes daily, Disp: 2.5 mL, Rfl: 3     fluticasone (FLONASE) 50 MCG/ACT spray, Spray 1-2 sprays into both nostrils daily, Disp: 1 Bottle, Rfl: 11     loratadine  (CLARITIN) 10 MG tablet, Take 1 tablet (10 mg) by mouth daily, Disp: 30 tablet, Rfl: 1     clobetasol (TEMOVATE) 0.05 % ointment, APPLY TOPICALLY BID, Disp: , Rfl: 1     Protein POWD, 30 g 2 times daily, Disp: 454 g, Rfl: 1     albuterol (PROAIR HFA/PROVENTIL HFA/VENTOLIN HFA) 108 (90 BASE) MCG/ACT Inhaler, , Disp: , Rfl:      betamethasone valerate (VALISONE) 0.1 % ointment, Apply once a day for 2 weeks as directed, Disp: , Rfl:      hydrocortisone 2.5 % cream, , Disp: , Rfl:      ketoconazole (NIZORAL) 2 % cream, , Disp: , Rfl:      Protein POWD, Take 30 g by mouth 2 times daily, Disp: 454 g, Rfl: 1     order for DME, Equipment being ordered: cool humidifier, Disp: 1 each, Rfl: 0     Acetaminophen (TYLENOL PO), Take 1 tablet by mouth as needed, Disp: , Rfl:   No current facility-administered medications for this visit.     Facility-Administered Medications Ordered in Other Visits:      Lidocaine 1 % injection 9 mL, 9 mL, Intradermal, Once, Susanna Li, RN     sodium bicarbonate 8.4 % injection 1 mEq, 1 mEq, Intradermal, Once, Susanna Li, RN    Allergies:   Allergies   Allergen Reactions     Acetaminophen-Codeine Hives     Amoxicillin-Pot Clavulanate Nausea and Vomiting     Augmentin Nausea and Vomiting and Hives     Barbiturates      Compazine      Lock-jaw     Liquid Adhesive      Other reaction(s): Other, see comments  blisters     No Clinical Screening - See Comments      PN: LW Reaction: blisters  PN: LW FI1: nka  PN: LW CM1: CONTRAST- nka Reaction :     Nsaids      D/t ITP     Prochlorperazine      Other reaction(s): Edema     Propoxyphene N-Apap Hives     Sulfa Drugs      Tramadol Hcl Hives            Adhesive Tape Rash       Social Hx: unemployed.  reports that she has been smoking Cigarettes.  She has a 15.00 pack-year smoking history. She has never used smokeless tobacco. She reports that she drinks alcohol. She reports that she does not use illicit drugs.    Family Hx: family history  "includes Anxiety Disorder in her daughter; Breast Cancer in her maternal aunt, maternal grandmother, and paternal grandmother; Depression in her daughter; Neurologic Disorder in her brother, maternal grandfather, and maternal grandmother.    REVIEW OF SYSTEMS: 10 point ROS neg other than the symptoms noted above in the HPI and PMH. Notables include  CONSTITUTIONAL:NEGATIVE for fever, chills, change in weight  INTEGUMENTARY/SKIN: NEGATIVE for worrisome rashes, moles or lesions  MUSCULOSKELETAL:See HPI above  NEURO: NEGATIVE for weakness, dizziness or paresthesias    This document serves as a record of the services and decisions personally performed and made by Lance Jefferson MD. It was created on his behalf by Justyna Fonseca, a trained medical scribe. The creation of this document is based the provider's statements to the medical scribe.    Scribe Justyna Fonseca 9:38 AM 9/8/2017     PHYSICAL EXAM:  BP (!) 86/65  Pulse 102  Ht 1.803 m (5' 11\")  Wt 59.3 kg (130 lb 12.8 oz)  SpO2 96%  BMI 18.24 kg/m2   GENERAL APPEARANCE: healthy, alert, no distress  SKIN: no suspicious lesions or rashes  NEURO: Normal strength and tone, mentation intact and speech normal  PSYCH:  mentation appears normal and affect normal, not anxious  RESPIRATORY: No increased work of breathing.    Hands: no clubbing, nail pitting or nodes.    BILATERAL LOWER EXTREMITIES:  Gait: normal  Alignment: varus.  No gross deformities or masses.  Bilateral Quad atrophy, strength normal.  Intact sensation deep peroneal nerve, superficial peroneal nerve, med/lat tibial nerve, sural nerve, saphenous nerve  Intact EHL, EDL, TA, FHL, GS, quadriceps hamstrings and hip flexors  Toes warm and well perfused, brisk capillary refill. Palpable 2+ dp pulses.  Bilateral calf soft and nttp or squeeze.  No palpable popliteal lymphadenopathy.  DTRs: achilles 2+, patella 2+.  Edema: none    LEFT KNEE EXAM:    Skin: intact, no ecchymosis or erythema  Squat: 25 %, with anterior knee " pain     ROM: 0 extension to 145 flexion  Tight hamstrings on straight leg raise.  Effusion: none  Tender: hypersensitivity to light touch along med/lat joint line, anterior knee diffuse tenderness.  McMurrays: negative    Valgus stress/MCL: stable, and non-painful at both 0 and 30 degrees knee flexion  Varus stress: stable, and non-painful at both 0 and 30 degrees knee flexion  Lachmans: neg, firm endpoint  Posterior Drawer stable  Patellofemoral joint:                Extensor Lag: none              Q Angle: normal              Patellar Mobility: normal              Apprehension: negative              Crepitations: minimal   Grind: positive    RIGHT KNEE EXAM:    Skin: intact, no ecchymosis or erythema  Squat: 25 %, with anterior knee pain    ROM: 0 extension to 145 flexion  Tight hamstrings on straight leg raise.  Effusion: trace  Tender: hypersensitivity to light touch along med/lat joint line, anterior knee, diffuse tenderness.  McMurrays: negative    Valgus stress/MCL: stable, and non-painful at both 0 and 30 degrees knee flexion  Varus stress: stable, and non-painful at both 0 and 30 degrees knee flexion  Lachmans: neg, firm endpoint  Posterior Drawer stable  Patellofemoral joint:                Extensor Lag: none              Q Angle: normal              Patellar Mobility: normal              Apprehension: negative              Crepitations: minimal   Grind: positive    X-RAY:  3 views bilateral knee from 9/8/2017 were reviewed in clinic today. On my review, no obvious fractures or dislocations. Mild lateral patello-femoral joint space narrowing bilateral, right more than the left.      Impression:  47 year old female with bilateral anterior knee pain, patello-femoral chondromalacia.    Plan:    * reviewed imaging studies with patient  * exam, history consistent with patello-femoral chondromalacia, anterior knee pain.      * rest, sitting  * Activity modification - avoid impact activities or activities that  "aggravate symptoms.  * NSAIDS (non-steroidal anti-inflammatory medications; e.g. Aleve, advil, motrin, ibuprofen) - regular use for inflammation ( twice daily or three times daily), with food, as long as no contra-indications Please discuss with primary care doctor if needed  * ice, 15-20 minutes at a time several times a day or as needed.  * Strengthening of quadriceps muscles  * Physical Therapy for strengthening, stretching and range of motion exercises of legs  * Tylenol as needed for pain, consider Tylenol arthritis or similar  * Weight loss: Weight loss:  Body mass index is 18.24 kg/(m^2).. weight loss benefits, not only for the current pain symptoms, but also overall health. Recommend a good diet plan that works for the patient, with the assistance of a dietician or primary care doctor as needed. Also, a good, low-impact exercise program for at least 20 minutes per day, 3 times per week, such as exercise bike, elliptical , or pool.  * Exercise: low impact such as stationary bike, elliptical, pool.  * Injections: cortisone versus viscosupplementation (hyaluronic acid, \"rooster comb\", \"gel shots\"); risks and perceived benefits discussed today. Patient elects to proceed.  * Bracing: bracing the knee may offer some relief of symptoms when worn and provide some stability.  * over the counter supplements such as glucosamine and chondroitin sulfate may help with joint pain.  * topical ointments may help as well    * return to clinic as needed.    PROCEDURE NOTE:  The risks, perceived benefits and potential complications (including but not limited to: bleeding, infection, pain, scar, damage to adjacent structures, atrophy or necrosis of soft tissue, skin blanching, failure to relieve symptoms, worsening of symptoms, allergic reaction) of injection were discussed with the patient. Questions were addressed and answered.The patient elected to proceed. Written informed consent was obtained. The correct procedural " site was identified and confirmed. A RIGHT knee intraarticular injection was performed using 1mL Depo Medrol 80mg per mL and 7mL (4mL 1% lidocaine, 3mL 0.25% marcaine)  of local anesthetic after sterile prep, to the correct procedural site. Sterile bandaid applied. This was tolerated well by the patient. No apparent complications. Did also discuss that if diabetic, recommend close monitoring of blood sugars over the next week as cortisone injections can temporarily elevate blood sugars.    The risks, perceived benefits and potential complications (including but not limited to: bleeding, infection, pain, scar, damage to adjacent structures, atrophy or necrosis of soft tissue, skin blanching, failure to relieve symptoms, worsening of symptoms, allergic reaction) of injection were discussed with the patient. Questions were addressed and answered.The patient elected to proceed. Written informed consent was obtained. The correct procedural site was identified and confirmed. A LEFT knee intraarticular injection was performed using 1mL Depo Medrol 80mg per mL and 7mL (4mL 1% lidocaine, 3mL 0.25% marcaine)  of local anesthetic after sterile prep, to the correct procedural site. Sterile bandaid applied. This was tolerated well by the patient. No apparent complications. Did also discuss that if diabetic, recommend close monitoring of blood sugars over the next week as cortisone injections can temporarily elevate blood sugars.       The information in this document, created by a scribe for me, accurately reflects the services I personally performed and the decisions made by me. I have reviewed and approved this document for accuracy.      Lance Jefferson M.D., M.S.  Dept. of Orthopaedic Surgery  Health system

## 2017-09-08 NOTE — PROGRESS NOTES
The patient's Bilateral knees were prepped with betadine solution after verification of allergies. Area approximately 10 cm x 10 cm prepped in a sterile fashion. After injection, betadine removed with soap and water and band-aids applied.    4cc Lidocaine 1% NDC 9913-6039-74, LOT -[id number],  [id number]  3cc Bupivacaine 0.25% NDC 2200-7810-91, LOT -[id number],  2017  1cc Depo Medrol 80 mg/ml NDC 1656-3008-45, LOT [id number],  2019     injected into patient's Bilateral Knees by:  Ollie Brown PA-C, CAQ (Ortho)  Supervising Physician: Lance Jefferson M.D., M.S.  Dept. of Orthopaedic Surgery  [facility]

## 2017-09-08 NOTE — LETTER
9/8/2017         RE: Coretta Tovar  [address] [facility] [address]        Dear Colleague,    Thank you for referring your patient, Coretta Tovar, to the [facility]. Please see a copy of my visit note below.    chief complaint:   Chief Complaint   Patient presents with     Knee Pain     Bilateral knee pain. Onset: years ago. Left knee is worse than right. NKI. Pain just came on and has gotten worse. Pain is mainly anterior but has some posterior pain. She has had cortisone injections in the past, last one in left knee only with Dr. Dawn on 3/23/17. Pain with walking up and down stairs.        HISTORY OF PRESENT ILLNESS    Coretta Tovar is a 47 year old female seen for evaluation of a bilateral anterior knee pain (L>R) that started years ago. She presents today with severe pain, rated a 7/10. Pain is located over the anterior knee, under the kneecaps, and posterior knees. Aggravated with stairs and prolonged walking. She has had injections in the past, last injection being on 3/23/2017. She notes that injections work well for her, lasting 8 months to 1 year. Previously had injections with Dr. Dawn.    History of ankle injury.    Present symptoms: severe pain.  Anterior, diffuse. No locking, catching or giving way.  The frequency of symptoms frequently.  Pain severity: 7/10  Pain quality: aching and sharp  Exacerbating Factors: weight bearing, stairs, prolonged walking  Relieving Factors: at rest   Night Pain: No  Pain while at rest: No   Patient has tried:     NSAIDS: No      Physical Therapy: No      Activity modification: Yes      Bracing: No      Injections: Yes, 3/23/2017     Ice: No      Other: none      Usual level of recreational activity: sedentary  Usual level of work activity: unemployed    Orthopedic PMH: known bilateral knee osteoarthritis     Other PMH:  has a past medical history of Endometriosis; Mary-mary disease; ITP (idiopathic thrombocytopaenic purpura) (1994);  and Migraines.  Patient Active Problem List    Diagnosis Date Noted     History of cocaine dependence (H) 09/07/2017     Priority: Medium     Moderate episode of recurrent major depressive disorder (H) 03/22/2017     Priority: Medium     Generalized anxiety disorder 03/22/2017     Priority: Medium     Protein-calorie malnutrition (H) 03/22/2017     Priority: Medium     Primary insomnia 03/22/2017     Priority: Medium     Vertigo 03/14/2017     Priority: Medium     Cervical pain 03/08/2017     Priority: Medium     Nutritional disorder 11/21/2016     Priority: Medium     Disorder of stomach 11/20/2016     Priority: Medium     Overview:   Per Biopsy obtained on EGD 11/22/2016. Reactive gastropathy was quoted to be frequently associated with ongoing non-inflammatory type mucosal injury due to a chemical type of injury; this may be due to ingestion of non-steroidal anti-inflammatory drugs, aspirin, excess alcohol, or corticosteroids.        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     Priority: Medium     Chemical dependency (H) 02/24/2015     Priority: Medium     Insomnia 02/24/2015     Priority: Medium     S/P splenectomy 01/13/2015     Priority: Medium     Migraines      Priority: Medium     Idiopathic thrombocytopenic purpura (HCC)      Priority: Medium     s/p splenectomy       Tobacco abuse 05/30/2014     Priority: Medium     Immune thrombocytopenic purpura (H) 01/14/2008     Priority: Medium     Personality disorder 01/14/2008     Priority: Medium     Overview:   cluster B traits  borderline, antisocial       Nondependent cannabis abuse, episodic 01/14/2008     Priority: Medium     Mixed, or nondependent drug abuse 01/26/2007     Priority: Medium     Overview:   recurrent issues with opioids.  History of crack cocaine abuse.       Asthma 12/20/2006     Priority: Medium     Gastroesophageal reflux disease 12/20/2006      Priority: Medium     Herpetic gingivostomatitis 12/20/2006     Priority: Medium     Muscle pain 12/20/2006     Priority: Medium     Atopic rhinitis 12/20/2006     Priority: Medium     Abnormal weight loss 12/20/2006     Priority: Medium       Surgical Hx:  has a past surgical history that includes splenectomy (1995); Ankle surgery; hernia repair (Left, 1975); Neck surgery (2000's); hysterectomy, pap no longer indicated; and tonsillectomy.    Medications:   Current Outpatient Prescriptions:      valACYclovir (VALTREX) 1000 mg tablet, Take 1 tablet (1,000 mg) by mouth 2 times daily, Disp: 8 tablet, Rfl: 11     ondansetron (ZOFRAN-ODT) 4 MG ODT tab, Take 1-2 tablets (4-8 mg) by mouth daily as needed for nausea, Disp: 12 tablet, Rfl: 0     traZODone (DESYREL) 100 MG tablet, Take 1-3 tablets (100-300 mg) by mouth nightly as needed for sleep, Disp: 90 tablet, Rfl: 1     omeprazole (PRILOSEC) 40 MG capsule, TAKE 1 CAPSULE(40 MG) BY MOUTH DAILY 30 TO 60 MINUTES BEFORE A MEAL, Disp: 90 capsule, Rfl: 3     dicyclomine (BENTYL) 10 MG capsule, Take 1 capsule (10 mg) by mouth 4 times daily as needed, Disp: 120 capsule, Rfl: 1     Neomycin-Bacitracin-Polymyxin ([employer] TRIPLE ANTIBIOTIC) OINT, Externally apply topically 4 times daily, Disp: 60 g, Rfl: 3     triamcinolone (KENALOG) 0.1 % ointment, Apply sparingly to affected area on back two times daily til clear., Disp: 80 g, Rfl: 0     FLUoxetine (PROZAC) 40 MG capsule, TAKE 1 CAPSULE(40 MG) BY MOUTH DAILY, Disp: 30 capsule, Rfl: 5     sucralfate (CARAFATE) 1 GM tablet, Take 1 tablet (1 g) by mouth 4 times daily, Disp: 40 tablet, Rfl: 1     ZOLMitriptan (ZOMIG) 5 MG tablet, TAKE 1 TABLET BY MOUTH AT ONSET OF HEADACHE FOR MIGRAINE. MAY REPEAT DOSE IN 2 HOURS. DO NOT EXCEED 10MG IN 24 HOURS, Disp: 12 tablet, Rfl: 4     BusPIRone HCl 30 MG TABS, Take 30 mg by mouth 2 times daily, Disp: 60 tablet, Rfl: 3     acyclovir (ZOVIRAX) 5 % ointment, Apply 1 g topically as needed, Disp: 30 g,  Rfl: 11     olopatadine HCl (PATADAY) 0.2 % SOLN, Place 1 drop into both eyes daily, Disp: 2.5 mL, Rfl: 3     fluticasone (FLONASE) 50 MCG/ACT spray, Spray 1-2 sprays into both nostrils daily, Disp: 1 Bottle, Rfl: 11     loratadine (CLARITIN) 10 MG tablet, Take 1 tablet (10 mg) by mouth daily, Disp: 30 tablet, Rfl: 1     clobetasol (TEMOVATE) 0.05 % ointment, APPLY TOPICALLY BID, Disp: , Rfl: 1     Protein POWD, 30 g 2 times daily, Disp: 454 g, Rfl: 1     albuterol (PROAIR HFA/PROVENTIL HFA/VENTOLIN HFA) 108 (90 BASE) MCG/ACT Inhaler, , Disp: , Rfl:      betamethasone valerate (VALISONE) 0.1 % ointment, Apply once a day for 2 weeks as directed, Disp: , Rfl:      hydrocortisone 2.5 % cream, , Disp: , Rfl:      ketoconazole (NIZORAL) 2 % cream, , Disp: , Rfl:      Protein POWD, Take 30 g by mouth 2 times daily, Disp: 454 g, Rfl: 1     order for DME, Equipment being ordered: cool humidifier, Disp: 1 each, Rfl: 0     Acetaminophen (TYLENOL PO), Take 1 tablet by mouth as needed, Disp: , Rfl:   No current facility-administered medications for this visit.     Facility-Administered Medications Ordered in Other Visits:      Lidocaine 1 % injection 9 mL, 9 mL, Intradermal, Once, Susanna Li RN     sodium bicarbonate 8.4 % injection 1 mEq, 1 mEq, Intradermal, Once, Susanna Li RN    Allergies:   Allergies   Allergen Reactions     Acetaminophen-Codeine Hives     Amoxicillin-Pot Clavulanate Nausea and Vomiting     Augmentin Nausea and Vomiting and Hives     Barbiturates      Compazine      Lock-jaw     Liquid Adhesive      Other reaction(s): Other, see comments  blisters     No Clinical Screening - See Comments      PN: LW Reaction: blisters  PN: LW FI1: nka  PN: LW CM1: CONTRAST- nka Reaction :     Nsaids      D/t ITP     Prochlorperazine      Other reaction(s): Edema     Propoxyphene N-Apap Hives     Sulfa Drugs      Tramadol Hcl Hives            Adhesive Tape Rash       Social Hx: unemployed.  reports that she  "has been smoking Cigarettes.  She has a 15.00 pack-year smoking history. She has never used smokeless tobacco. She reports that she drinks alcohol. She reports that she does not use illicit drugs.    Family Hx: family history includes Anxiety Disorder in her daughter; Breast Cancer in her maternal aunt, maternal grandmother, and paternal grandmother; Depression in her daughter; Neurologic Disorder in her brother, maternal grandfather, and maternal grandmother.    REVIEW OF SYSTEMS: 10 point ROS neg other than the symptoms noted above in the HPI and PMH. Notables include  CONSTITUTIONAL:NEGATIVE for fever, chills, change in weight  INTEGUMENTARY/SKIN: NEGATIVE for worrisome rashes, moles or lesions  MUSCULOSKELETAL:See HPI above  NEURO: NEGATIVE for weakness, dizziness or paresthesias    This document serves as a record of the services and decisions personally performed and made by Lance Jefferson MD. It was created on his behalf by Justyna Fonseca, a trained medical scribe. The creation of this document is based the provider's statements to the medical scribe.    Scribe Justyna Fonseca 9:38 AM 9/8/2017     PHYSICAL EXAM:  BP (!) 86/65  Pulse 102  Ht 1.803 m (5' 11\")  Wt 59.3 kg (130 lb 12.8 oz)  SpO2 96%  BMI 18.24 kg/m2   GENERAL APPEARANCE: healthy, alert, no distress  SKIN: no suspicious lesions or rashes  NEURO: Normal strength and tone, mentation intact and speech normal  PSYCH:  mentation appears normal and affect normal, not anxious  RESPIRATORY: No increased work of breathing.    Hands: no clubbing, nail pitting or nodes.    BILATERAL LOWER EXTREMITIES:  Gait: normal  Alignment: varus.  No gross deformities or masses.  Bilateral Quad atrophy, strength normal.  Intact sensation deep peroneal nerve, superficial peroneal nerve, med/lat tibial nerve, sural nerve, saphenous nerve  Intact EHL, EDL, TA, FHL, GS, quadriceps hamstrings and hip flexors  Toes warm and well perfused, brisk capillary refill. Palpable 2+ dp " pulses.  Bilateral calf soft and nttp or squeeze.  No palpable popliteal lymphadenopathy.  DTRs: achilles 2+, patella 2+.  Edema: none    LEFT KNEE EXAM:    Skin: intact, no ecchymosis or erythema  Squat: 25 %, with anterior knee pain     ROM: 0 extension to 145 flexion  Tight hamstrings on straight leg raise.  Effusion: none  Tender: hypersensitivity to light touch along med/lat joint line, anterior knee diffuse tenderness.  McMurrays: negative    Valgus stress/MCL: stable, and non-painful at both 0 and 30 degrees knee flexion  Varus stress: stable, and non-painful at both 0 and 30 degrees knee flexion  Lachmans: neg, firm endpoint  Posterior Drawer stable  Patellofemoral joint:                Extensor Lag: none              Q Angle: normal              Patellar Mobility: normal              Apprehension: negative              Crepitations: minimal   Grind: positive    RIGHT KNEE EXAM:    Skin: intact, no ecchymosis or erythema  Squat: 25 %, with anterior knee pain    ROM: 0 extension to 145 flexion  Tight hamstrings on straight leg raise.  Effusion: trace  Tender: hypersensitivity to light touch along med/lat joint line, anterior knee, diffuse tenderness.  McMurrays: negative    Valgus stress/MCL: stable, and non-painful at both 0 and 30 degrees knee flexion  Varus stress: stable, and non-painful at both 0 and 30 degrees knee flexion  Lachmans: neg, firm endpoint  Posterior Drawer stable  Patellofemoral joint:                Extensor Lag: none              Q Angle: normal              Patellar Mobility: normal              Apprehension: negative              Crepitations: minimal   Grind: positive    X-RAY:  3 views bilateral knee from 9/8/2017 were reviewed in clinic today. On my review, no obvious fractures or dislocations. Mild lateral patello-femoral joint space narrowing bilateral, right more than the left.      Impression:  47 year old female with bilateral anterior knee pain, patello-femoral  "chondromalacia.    Plan:    * reviewed imaging studies with patient  * exam, history consistent with patello-femoral chondromalacia, anterior knee pain.      * rest, sitting  * Activity modification - avoid impact activities or activities that aggravate symptoms.  * NSAIDS (non-steroidal anti-inflammatory medications; e.g. Aleve, advil, motrin, ibuprofen) - regular use for inflammation ( twice daily or three times daily), with food, as long as no contra-indications Please discuss with primary care doctor if needed  * ice, 15-20 minutes at a time several times a day or as needed.  * Strengthening of quadriceps muscles  * Physical Therapy for strengthening, stretching and range of motion exercises of legs  * Tylenol as needed for pain, consider Tylenol arthritis or similar  * Weight loss: Weight loss:  Body mass index is 18.24 kg/(m^2).. weight loss benefits, not only for the current pain symptoms, but also overall health. Recommend a good diet plan that works for the patient, with the assistance of a dietician or primary care doctor as needed. Also, a good, low-impact exercise program for at least 20 minutes per day, 3 times per week, such as exercise bike, elliptical , or pool.  * Exercise: low impact such as stationary bike, elliptical, pool.  * Injections: cortisone versus viscosupplementation (hyaluronic acid, \"rooster comb\", \"gel shots\"); risks and perceived benefits discussed today. Patient elects to proceed.  * Bracing: bracing the knee may offer some relief of symptoms when worn and provide some stability.  * over the counter supplements such as glucosamine and chondroitin sulfate may help with joint pain.  * topical ointments may help as well    * return to clinic as needed.    PROCEDURE NOTE:  The risks, perceived benefits and potential complications (including but not limited to: bleeding, infection, pain, scar, damage to adjacent structures, atrophy or necrosis of soft tissue, skin blanching, " failure to relieve symptoms, worsening of symptoms, allergic reaction) of injection were discussed with the patient. Questions were addressed and answered.The patient elected to proceed. Written informed consent was obtained. The correct procedural site was identified and confirmed. A RIGHT knee intraarticular injection was performed using 1mL Depo Medrol 80mg per mL and 7mL (4mL 1% lidocaine, 3mL 0.25% marcaine)  of local anesthetic after sterile prep, to the correct procedural site. Sterile bandaid applied. This was tolerated well by the patient. No apparent complications. Did also discuss that if diabetic, recommend close monitoring of blood sugars over the next week as cortisone injections can temporarily elevate blood sugars.    The risks, perceived benefits and potential complications (including but not limited to: bleeding, infection, pain, scar, damage to adjacent structures, atrophy or necrosis of soft tissue, skin blanching, failure to relieve symptoms, worsening of symptoms, allergic reaction) of injection were discussed with the patient. Questions were addressed and answered.The patient elected to proceed. Written informed consent was obtained. The correct procedural site was identified and confirmed. A LEFT knee intraarticular injection was performed using 1mL Depo Medrol 80mg per mL and 7mL (4mL 1% lidocaine, 3mL 0.25% marcaine)  of local anesthetic after sterile prep, to the correct procedural site. Sterile bandaid applied. This was tolerated well by the patient. No apparent complications. Did also discuss that if diabetic, recommend close monitoring of blood sugars over the next week as cortisone injections can temporarily elevate blood sugars.       The information in this document, created by a scribe for me, accurately reflects the services I personally performed and the decisions made by me. I have reviewed and approved this document for accuracy.      Lance Jefferson M.D., M.S.  Dept. of  Orthopaedic Surgery  [facility]      The patient's Bilateral knees were prepped with betadine solution after verification of allergies. Area approximately 10 cm x 10 cm prepped in a sterile fashion. After injection, betadine removed with soap and water and band-aids applied.    4cc Lidocaine 1% NDC 9642-5645-30, LOT -[id number],  [id number]  3cc Bupivacaine 0.25% NDC 8523-9630-41, LOT -[id number],  2017  1cc Depo Medrol 80 mg/ml NDC 0849-6956-33, LOT [id number],  2019     injected into patient's Bilateral Knees by:  Ollie Brown PA-C, CAQ (Ortho)  Supervising Physician: Lance Jefferson M.D., M.S.  Dept. of Orthopaedic Surgery  [facility]              Again, thank you for allowing me to participate in the care of your patient.        Sincerely,        Lance Jefferson MD

## 2017-09-08 NOTE — MR AVS SNAPSHOT
After Visit Summary   9/8/2017    Coretta Tovar    MRN: [id number]           Patient Information     Date Of Birth          1970        Visit Information        Provider Department      9/8/2017 9:15 AM Lance Jefferson MD [facility]        Today's Diagnoses     Knee pain, bilateral    -  1      Care Instructions    Please remember to call and schedule a follow up appointment if one was recommended at your earliest convenience.  Orthopedics CLINIC HOURS TELEPHONE NUMBER   Dr. Ronny Frias  Certified Medical Assistant   Monday & Wednesday   8am - 5pm  Thursday 1pm - 5pm  Friday 8am -11:30am Specialty schedulers:   [phone] to schedule your surgery.  Main Clinic:   [phone] to make an appointment with any provider.    Urgent Care locations:    [facility] Monday-Friday Closed  Saturday-Sunday 9am-5pm      Monday-Friday 12pm - 8pm  Saturday-Sunday 9am-5pm [phone]     If SURGERY has been recommended, please call our Specialty Schedulers at [phone] to schedule your procedure.    If you need a medication refill, please contact your pharmacy. Please allow 3 business days for your refill to be completed.    If an MRI or CT scan has been recommended, please call [facility] Schedulers at [phone] to schedule your appointment.  Use Adyent (secure e-mail communication and access to your chart) to send a message or to make an appointment. Please ask how you can sign up for Ideabove.  Your care team's suggested websites for health information:   [email or web address] : Up to date and easily searchable information on multiple topics.   [email or web address] : [place] dept of heatlh, public health issues in [place], N1N1              Follow-ups after your visit        Your next 10 appointments already scheduled     Sep 14, 2017 10:40 AM CDT   SHORT with NINOSKA Mitchell CNP   [facility]  "([facility])    [address]  [place] [address]   [phone]              Future tests that were ordered for you today     Open Future Orders        Priority Expected Expires Ordered    XR Knee Bilateral 3 Views Routine 2017            Who to contact     If you have questions or need follow up information about today's clinic visit or your schedule please contact [facility] directly at [phone].  Normal or non-critical lab and imaging results will be communicated to you by MAG Interactivehart, letter or phone within 4 business days after the clinic has received the results. If you do not hear from us within 7 days, please contact the clinic through MAG Interactivehart or phone. If you have a critical or abnormal lab result, we will notify you by phone as soon as possible.  Submit refill requests through Zanbato or call your pharmacy and they will forward the refill request to us. Please allow 3 business days for your refill to be completed.          Additional Information About Your Visit        Zanbato Information     Zanbato lets you send messages to your doctor, view your test results, renew your prescriptions, schedule appointments and more. To sign up, go to [email or web address] . Click on \"Log in\" on the left side of the screen, which will take you to the Welcome page. Then click on \"Sign up Now\" on the right side of the page.     You will be asked to enter the access code listed below, as well as some personal information. Please follow the directions to create your username and password.     Your access code is: [email or web address]  Expires: 2017  7:09 PM     Your access code will  in 90 days. If you need help or a new code, please call your [place] clinic or [phone].        Care EveryWhere ID     This is your Care EveryWhere ID. This could be used by other organizations to access your [place] medical records  [id number]        Your Vitals Were     " "Pulse Height Pulse Oximetry BMI (Body Mass Index)          102 1.803 m (5' 11\") 96% 18.24 kg/m2         Blood Pressure from Last 3 Encounters:   09/08/17 (!) 86/65   09/07/17 96/60   08/04/17 122/56    Weight from Last 3 Encounters:   09/08/17 59.3 kg (130 lb 12.8 oz)   09/07/17 59.4 kg (131 lb)   08/04/17 61.3 kg (135 lb 3.2 oz)               Primary Care Provider Office Phone # Fax #    Adina Chahaln, APRN [phone]       [address]  [place] [place] [facility] [address]        Equal Access to Services     ARELY JASON : Hadii aad ku hadasho Sozebali, waaxda luqadaha, qaybta kaalmada adeegyada, waxdannie alegre . So [facility] [phone].    ATENCIÓN: Si habla español, tiene a orberts disposición servicios gratuitos de asistencia lingüística. [facility] [phone].    We comply with applicable federal civil rights laws and [place] laws. We do not discriminate on the basis of race, color, national origin, age, disability sex, sexual orientation or gender identity.            Thank you!     Thank you for choosing [facility]  for your care. Our goal is always to provide you with excellent care. Hearing back from our patients is one way we can continue to improve our services. Please take a few minutes to complete the written survey that you may receive in the mail after your visit with us. Thank you!             Your Updated Medication List - Protect others around you: Learn how to safely use, store and throw away your medicines at www.disposemymeds.org.          This list is accurate as of: 9/8/17  9:15 AM.  Always use your most recent med list.                   Brand Name Dispense Instructions for use Diagnosis    acyclovir 5 % ointment    ZOVIRAX    30 g    Apply 1 g topically as needed    Recurrent cold sores       albuterol 108 (90 BASE) MCG/ACT Inhaler    PROAIR HFA/PROVENTIL HFA/VENTOLIN HFA          betamethasone valerate 0.1 % ointment    VALISONE     " Apply once a day for 2 weeks as directed        BusPIRone HCl 30 MG Tabs     60 tablet    Take 30 mg by mouth 2 times daily    Generalized anxiety disorder       clobetasol 0.05 % ointment    TEMOVATE     APPLY TOPICALLY BID        [employer] TRIPLE ANTIBIOTIC Oint     60 g    Externally apply topically 4 times daily    Allergic contact dermatitis due to adhesives       dicyclomine 10 MG capsule    BENTYL    120 capsule    Take 1 capsule (10 mg) by mouth 4 times daily as needed    Abdominal pain, generalized       FLUoxetine 40 MG capsule    PROzac    30 capsule    TAKE 1 CAPSULE(40 MG) BY MOUTH DAILY    Moderate episode of recurrent major depressive disorder (H), Generalized anxiety disorder       fluticasone 50 MCG/ACT spray    FLONASE    1 Bottle    Spray 1-2 sprays into both nostrils daily    Seasonal allergic rhinitis, unspecified allergic rhinitis trigger       hydrocortisone 2.5 % cream           ketoconazole 2 % cream    NIZORAL          loratadine 10 MG tablet    CLARITIN    30 tablet    Take 1 tablet (10 mg) by mouth daily    Seasonal allergic rhinitis, unspecified allergic rhinitis trigger       olopatadine HCl 0.2 % Soln    PATADAY    2.5 mL    Place 1 drop into both eyes daily    Seasonal allergic rhinitis, unspecified allergic rhinitis trigger       omeprazole 40 MG capsule    priLOSEC    90 capsule    TAKE 1 CAPSULE(40 MG) BY MOUTH DAILY 30 TO 60 MINUTES BEFORE A MEAL    Gastroesophageal reflux disease, esophagitis presence not specified       ondansetron 4 MG ODT tab    ZOFRAN-ODT    12 tablet    Take 1-2 tablets (4-8 mg) by mouth daily as needed for nausea    Migraine without status migrainosus, not intractable, unspecified migraine type       order for DME     1 each    Equipment being ordered: cool humidifier    Painful respiration, Upper respiratory tract infection, unspecified type       * Protein Powd     454 g    Take 30 g by mouth 2 times daily    Protein-calorie malnutrition (H), Loss of weight        * Protein Powd     454 g    30 g 2 times daily    Protein-calorie malnutrition (H)       sucralfate 1 GM tablet    CARAFATE    40 tablet    Take 1 tablet (1 g) by mouth 4 times daily    Gastroesophageal reflux disease without esophagitis       traZODone 100 MG tablet    DESYREL    90 tablet    Take 1-3 tablets (100-300 mg) by mouth nightly as needed for sleep    Primary insomnia       triamcinolone 0.1 % ointment    KENALOG    80 g    Apply sparingly to affected area on back two times daily til clear.    Rash       TYLENOL PO      Take 1 tablet by mouth as needed    Chondritis of both external ears       valACYclovir 1000 mg tablet    VALTREX    8 tablet    Take 1 tablet (1,000 mg) by mouth 2 times daily    Recurrent cold sores       ZOLMitriptan 5 MG tablet    ZOMIG    12 tablet    TAKE 1 TABLET BY MOUTH AT ONSET OF HEADACHE FOR MIGRAINE. MAY REPEAT DOSE IN 2 HOURS. DO NOT EXCEED 10MG IN 24 HOURS        * Notice:  This list has 2 medication(s) that are the same as other medications prescribed for you. Read the directions carefully, and ask your doctor or other care provider to review them with you.

## 2017-09-08 NOTE — NURSING NOTE
"Chief Complaint   Patient presents with     Knee Pain     Bilateral knee pain. Onset: years ago. Left knee is worse than right. NKI. Pain just came on and has gotten worse. Pain is mainly anterior but has some posterior pain. She has had cortisone injections in the past, last one in left knee only with Dr. Dawn on 3/23/17. Pain with walking up and down stairs.        Initial BP (!) 86/65  Pulse 102  Ht 1.803 m (5' 11\")  Wt 59.3 kg (130 lb 12.8 oz)  SpO2 96%  BMI 18.24 kg/m2 Estimated body mass index is 18.24 kg/(m^2) as calculated from the following:    Height as of this encounter: 1.803 m (5' 11\").    Weight as of this encounter: 59.3 kg (130 lb 12.8 oz).  Medication Reconciliation: complete   Altagracia Eastman Certified Medical Assistant   "

## 2017-09-08 NOTE — PATIENT INSTRUCTIONS
Please remember to call and schedule a follow up appointment if one was recommended at your earliest convenience.  Orthopedics CLINIC HOURS TELEPHONE NUMBER   Dr. Ronny Frias  Certified Medical Assistant   Monday & Wednesday   8am - 5pm  Thursday 1pm - 5pm  Friday 8am -11:30am Specialty schedulers:   [phone] to schedule your surgery.  Main Clinic:   [phone] to make an appointment with any provider.    Urgent Care locations:    [facility] Monday-Friday Closed  Saturday-Sunday 9am-5pm      Monday-Friday 12pm - 8pm  Saturday-Sunday 9am-5pm [phone]     If SURGERY has been recommended, please call our Specialty Schedulers at [phone] to schedule your procedure.    If you need a medication refill, please contact your pharmacy. Please allow 3 business days for your refill to be completed.    If an MRI or CT scan has been recommended, please call [facility] Schedulers at [phone] to schedule your appointment.  Use [employer] (secure e-mail communication and access to your chart) to send a message or to make an appointment. Please ask how you can sign up for [employer].  Your care team's suggested websites for health information:   [email or web address] : Up to date and easily searchable information on multiple topics.   [email or web address] : [place] dept of heat, public health issues in [place], N1N1

## 2017-09-08 NOTE — PROGRESS NOTES
Dear Coretta,    Your recent test results are attached.      Normal kidney function and electrolytes.  Elevated platelets are consistent with history of splenectomy.  Normal urine.      NARCISA Smith CNP      If you have any questions please feel free to contact [phone] or myself via [employer].    Sincerely,  Sarah Montemayor CNP

## 2017-09-14 ENCOUNTER — OFFICE VISIT (OUTPATIENT)
Dept: INTERNAL MEDICINE | Facility: CLINIC | Age: 47
End: 2017-09-14
Payer: MEDICAID

## 2017-09-14 VITALS
WEIGHT: 128.2 LBS | SYSTOLIC BLOOD PRESSURE: 90 MMHG | BODY MASS INDEX: 17.88 KG/M2 | DIASTOLIC BLOOD PRESSURE: 58 MMHG | HEART RATE: 97 BPM | TEMPERATURE: 97.4 F | OXYGEN SATURATION: 98 %

## 2017-09-14 DIAGNOSIS — R10.84 ABDOMINAL PAIN, GENERALIZED: Primary | ICD-10-CM

## 2017-09-14 DIAGNOSIS — F41.1 GENERALIZED ANXIETY DISORDER: ICD-10-CM

## 2017-09-14 DIAGNOSIS — J30.89 CHRONIC NONSEASONAL ALLERGIC RHINITIS DUE TO POLLEN: ICD-10-CM

## 2017-09-14 DIAGNOSIS — Z23 NEED FOR VACCINATION: ICD-10-CM

## 2017-09-14 DIAGNOSIS — Z90.81 S/P SPLENECTOMY: ICD-10-CM

## 2017-09-14 DIAGNOSIS — Z23 NEED FOR VACCINATION WITH 13-POLYVALENT PNEUMOCOCCAL CONJUGATE VACCINE: ICD-10-CM

## 2017-09-14 PROCEDURE — 90472 IMMUNIZATION ADMIN EACH ADD: CPT | Performed by: NURSE PRACTITIONER

## 2017-09-14 PROCEDURE — 90670 PCV13 VACCINE IM: CPT | Performed by: NURSE PRACTITIONER

## 2017-09-14 PROCEDURE — 90715 TDAP VACCINE 7 YRS/> IM: CPT | Performed by: NURSE PRACTITIONER

## 2017-09-14 PROCEDURE — 90471 IMMUNIZATION ADMIN: CPT | Performed by: NURSE PRACTITIONER

## 2017-09-14 PROCEDURE — 99213 OFFICE O/P EST LOW 20 MIN: CPT | Mod: 25 | Performed by: NURSE PRACTITIONER

## 2017-09-14 RX ORDER — LORATADINE 10 MG/1
10 TABLET ORAL DAILY
Qty: 30 TABLET | Refills: 11 | Status: SHIPPED | OUTPATIENT
Start: 2017-09-14 | End: 2017-11-30

## 2017-09-14 RX ORDER — BUSPIRONE HYDROCHLORIDE 30 MG/1
30 TABLET ORAL 2 TIMES DAILY
Qty: 60 TABLET | Refills: 3 | Status: SHIPPED | OUTPATIENT
Start: 2017-09-14 | End: 2018-01-02

## 2017-09-14 ASSESSMENT — PAIN SCALES - GENERAL: PAINLEVEL: NO PAIN (0)

## 2017-09-14 ASSESSMENT — PATIENT HEALTH QUESTIONNAIRE - PHQ9: SUM OF ALL RESPONSES TO PHQ QUESTIONS 1-9: 6

## 2017-09-14 NOTE — NURSING NOTE
"Chief Complaint   Patient presents with     RECHECK     Abdominal pain, has not been able to schedule colonoscopy.       Initial BP 90/58  Pulse 97  Temp 97.4  F (36.3  C) (Oral)  Wt 128 lb 3.2 oz (58.2 kg)  SpO2 98%  BMI 17.88 kg/m2 Estimated body mass index is 17.88 kg/(m^2) as calculated from the following:    Height as of 9/8/17: 5' 11\" (1.803 m).    Weight as of this encounter: 128 lb 3.2 oz (58.2 kg).  Medication Reconciliation: complete    "

## 2017-09-14 NOTE — PROGRESS NOTES
"  SUBJECTIVE:   Coretta Tovar is a 47 year old female who presents to clinic today for the following health issues:      Chief Complaint   Patient presents with     RECHECK     Abdominal pain, has not been able to schedule colonoscopy.     Patient has not yet been able to schedule her colonoscopy.  She tried bentyl, but felt that it made her dizzy.  Patient feels her abdominal pain has been \"okay\".  Patient note diarrhea currently.  She denies melena, hematochezia.  She continues to have some nausea, but feels it's improved.  Patient is taking omeprazole consisently.    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  She is not up to date on her immunizations.  Per patient, she was told by her oncologist that she would only need pneumonia vaccines and flu vaccines.  She is not immunized for HIB or meningitis.    Problem list and histories reviewed & adjusted, as indicated.  Additional history: as documented    Patient Active Problem List   Diagnosis     Tobacco abuse     S/P splenectomy     Migraines     Idiopathic thrombocytopenic purpura (HCC)     Chemical dependency (H)     Insomnia     Mary-mary disease     Vomiting     Abdominal pain, acute     Disorder of stomach     Cervical pain     Vertigo     Asthma     Gastroesophageal reflux disease     Herpetic gingivostomatitis     Immune thrombocytopenic purpura (H)     Muscle pain     Personality disorder     Nondependent cannabis abuse, episodic     Atopic rhinitis     Nutritional disorder     Abnormal weight loss     Mixed, or nondependent drug abuse     Moderate episode of recurrent major depressive disorder (H)     Generalized anxiety disorder     Protein-calorie malnutrition (H)     Primary insomnia     History of cocaine dependence (H)     Past Surgical History:   Procedure Laterality Date     ANKLE SURGERY      Numerous right ankle surgeries     HERNIA REPAIR Left 1975     HYSTERECTOMY, PAP NO LONGER INDICATED      benign, endometriosis     NECK SURGERY  " 2000's    Cervical spine     SPLENECTOMY  1995     TONSILLECTOMY         Social History   Substance Use Topics     Smoking status: Current Every Day Smoker     Packs/day: 1.00     Years: 15.00     Types: Cigarettes     Last attempt to quit: 11/6/2015     Smokeless tobacco: Never Used     Alcohol use Yes      Comment: Drinks once in 4 - 5 months     Family History   Problem Relation Age of Onset     Neurologic Disorder Maternal Grandmother      migraines     Breast Cancer Maternal Grandmother      Neurologic Disorder Maternal Grandfather      migraines     Neurologic Disorder Brother      migraines     Breast Cancer Maternal Aunt      x 2     Breast Cancer Paternal Grandmother      Depression Daughter      Anxiety Disorder Daughter          Current Outpatient Prescriptions   Medication Sig Dispense Refill     loratadine (CLARITIN) 10 MG tablet Take 1 tablet (10 mg) by mouth daily 30 tablet 11     BusPIRone HCl 30 MG TABS Take 30 mg by mouth 2 times daily 60 tablet 3     valACYclovir (VALTREX) 1000 mg tablet Take 1 tablet (1,000 mg) by mouth 2 times daily 8 tablet 11     ondansetron (ZOFRAN-ODT) 4 MG ODT tab Take 1-2 tablets (4-8 mg) by mouth daily as needed for nausea 12 tablet 0     traZODone (DESYREL) 100 MG tablet Take 1-3 tablets (100-300 mg) by mouth nightly as needed for sleep 90 tablet 1     omeprazole (PRILOSEC) 40 MG capsule TAKE 1 CAPSULE(40 MG) BY MOUTH DAILY 30 TO 60 MINUTES BEFORE A MEAL 90 capsule 3     Neomycin-Bacitracin-Polymyxin (CVS TRIPLE ANTIBIOTIC) OINT Externally apply topically 4 times daily 60 g 3     triamcinolone (KENALOG) 0.1 % ointment Apply sparingly to affected area on back two times daily til clear. 80 g 0     FLUoxetine (PROZAC) 40 MG capsule TAKE 1 CAPSULE(40 MG) BY MOUTH DAILY 30 capsule 5     sucralfate (CARAFATE) 1 GM tablet Take 1 tablet (1 g) by mouth 4 times daily 40 tablet 1     ZOLMitriptan (ZOMIG) 5 MG tablet TAKE 1 TABLET BY MOUTH AT ONSET OF HEADACHE FOR MIGRAINE. MAY  REPEAT DOSE IN 2 HOURS. DO NOT EXCEED 10MG IN 24 HOURS 12 tablet 4     acyclovir (ZOVIRAX) 5 % ointment Apply 1 g topically as needed 30 g 11     olopatadine HCl (PATADAY) 0.2 % SOLN Place 1 drop into both eyes daily 2.5 mL 3     fluticasone (FLONASE) 50 MCG/ACT spray Spray 1-2 sprays into both nostrils daily 1 Bottle 11     clobetasol (TEMOVATE) 0.05 % ointment APPLY TOPICALLY BID  1     albuterol (PROAIR HFA/PROVENTIL HFA/VENTOLIN HFA) 108 (90 BASE) MCG/ACT Inhaler        betamethasone valerate (VALISONE) 0.1 % ointment Apply once a day for 2 weeks as directed       hydrocortisone 2.5 % cream        ketoconazole (NIZORAL) 2 % cream        order for DME Equipment being ordered: cool humidifier 1 each 0     Acetaminophen (TYLENOL PO) Take 1 tablet by mouth as needed       Protein POWD 30 g 2 times daily (Patient not taking: Reported on 9/14/2017) 454 g 1     Protein POWD Take 30 g by mouth 2 times daily (Patient not taking: Reported on 9/14/2017) 454 g 1     Allergies   Allergen Reactions     Acetaminophen-Codeine Hives     Amoxicillin-Pot Clavulanate Nausea and Vomiting     Augmentin Nausea and Vomiting and Hives     Barbiturates      Bentyl [Dicyclomine] Other (See Comments)     dizziness     Compazine      Lock-jaw     Liquid Adhesive      Other reaction(s): Other, see comments  blisters     No Clinical Screening - See Comments      PN: LW Reaction: blisters  PN: LW FI1: nka  PN: LW CM1: CONTRAST- nka Reaction :     Nsaids      D/t ITP     Prochlorperazine      Other reaction(s): Edema     Propoxyphene N-Apap Hives     Sulfa Drugs      Tramadol Hcl Hives            Adhesive Tape Rash     BP Readings from Last 3 Encounters:   09/14/17 90/58   09/08/17 (!) 86/65   09/07/17 96/60    Wt Readings from Last 3 Encounters:   09/14/17 128 lb 3.2 oz (58.2 kg)   09/08/17 130 lb 12.8 oz (59.3 kg)   09/07/17 131 lb (59.4 kg)                  Labs reviewed in EPIC        Reviewed and updated as needed this visit by clinical  [facility]  Allergies  Meds       Reviewed and updated as needed this visit by Provider         ROS:  Constitutional, HEENT, cardiovascular, pulmonary, gi and gu systems are negative, except as otherwise noted.      OBJECTIVE:   BP 90/58  Pulse 97  Temp 97.4  F (36.3  C) (Oral)  Wt 128 lb 3.2 oz (58.2 kg)  SpO2 98%  BMI 17.88 kg/m2  Body mass index is 17.88 kg/(m^2).  GENERAL: healthy, alert and no distress  RESP: lungs clear to auscultation - no rales, rhonchi or wheezes  CV: regular rate and rhythm, normal S1 S2, no S3 or S4, no murmur, click or rub, no peripheral edema and peripheral pulses strong  ABDOMEN: soft, nontender, no hepatosplenomegaly, no masses and bowel sounds normal  MS: no gross musculoskeletal defects noted, no edema    Diagnostic Test Results:  none     ASSESSMENT/PLAN:     1. Abdominal pain, generalized  Improved.  Patient encouraged to schedule colonoscopy and continue omeprazole.  If colonoscopy negative, consider gastric emptying study.    2. Generalized anxiety disorder  Stable.  Continue current treatment plan and medications.    - BusPIRone HCl 30 MG TABS; Take 30 mg by mouth 2 times daily  Dispense: 60 tablet; Refill: 3    3. Chronic nonseasonal allergic rhinitis due to pollen    - loratadine (CLARITIN) 10 MG tablet; Take 1 tablet (10 mg) by mouth daily  Dispense: 30 tablet; Refill: 11    4. Need for vaccination with 13-polyvalent pneumococcal conjugate vaccine    - Pneumococcal vaccine 13 valent PCV13 IM (Prevnar) [91079]  - ADMIN: Vaccine, Initial (50477)    5. S/P splenectomy  Patient declines HIB, meningococcal vaccination.  - Pneumococcal vaccine 13 valent PCV13 IM (Prevnar) [17069]    6. Need for vaccination    - TDAP VACCINE (ADACEL) [77919.002]  - 1st  Administration  [06575]    FUTURE APPOINTMENTS:       - Follow-up visit after colonoscopy.    NINOSKA Smith [facility]

## 2017-09-14 NOTE — MR AVS SNAPSHOT
After Visit Summary   9/14/2017    Coretta Tovar    MRN: [id number]           Patient Information     Date Of Birth          1970        Visit Information        Provider Department      9/14/2017 10:40 AM Sarah Montemayor APRN [facility] (formerly [facility])        Today's Diagnoses     Abdominal pain, generalized    -  1    Generalized anxiety disorder        Chronic nonseasonal allergic rhinitis due to pollen        Need for diphtheria-tetanus-pertussis (Tdap) vaccine        Need for vaccination with 13-polyvalent pneumococcal conjugate vaccine        S/P splenectomy          Care Instructions    For scheduling at [facility] ([facility], [facility], [place]), call [phone] or [phone]     For scheduling in the North ([facility], [facility]) call  [phone] or  [phone]        For scheduling in the South ([facility], [facility]) call [phone]  or   [phone]        [facility]    If you have any questions regarding to your visit please contact your care team:     Team Pink:   Clinic Hours Telephone Number   Internal Medicine:  Dr. Kristie Montemayor, NP       7am-7pm  Monday - Thursday   7am-5pm  Fridays  [phone]  (Appointment scheduling available 24/7)    Questions about your visit?  Team Line  [phone]   Urgent Care - [place] - 11am-9pm Monday-Friday Saturday-Sunday- 9am-5pm   [place] - 5pm-9pm Monday-Friday Saturday-Sunday- 9am-5pm  [phone] - [place]   [phone] - [place]       What options do I have for visits at the clinic other than the traditional office visit?  To expand how we care for you, many of our providers are utilizing electronic visits (e-visits) and telephone visits, when medically appropriate, for interactions with their patients rather than a visit in the clinic.   We also  offer nurse visits for many medical concerns. Just like any other service, we will bill your insurance company for this type of visit based on time spent on the phone with your provider. Not all insurance companies cover these visits. Please check with your medical insurance if this type of visit is covered. You will be responsible for any charges that are not paid by your insurance.      E-visits via [employer]:  generally incur a $35.00 fee.  Telephone visits:  Time spent on the phone: *charged based on time that is spent on the phone in increments of 10 minutes. Estimated cost:   5-10 mins $30.00   11-20 mins. $59.00   21-30 mins. $85.00   Use Dapper (secure email communication and access to your chart) to send your primary care provider a message or make an appointment. Ask someone on your Team how to sign up for Dapper.    For a Price Quote for your services, please call our Yupp[employer] Line at [phone].    As always, Thank you for trusting us with your health care needs!              Follow-ups after your visit        Who to contact     If you have questions or need follow up information about today's clinic visit or your schedule please contact [facility] directly at [phone].  Normal or non-critical lab and imaging results will be communicated to you by [employer], letter or phone within 4 business days after the clinic has received the results. If you do not hear from us within 7 days, please contact the clinic through [employer] or phone. If you have a critical or abnormal lab result, we will notify you by phone as soon as possible.  Submit refill requests through Dapper or call your pharmacy and they will forward the refill request to us. Please allow 3 business days for your refill to be completed.          Additional Information About Your Visit        Dapper Information     Dapper lets you send messages to your doctor, view your test results, renew your prescriptions, schedule  "appointments and more. To sign up, go to [email or web address] . Click on \"Log in\" on the left side of the screen, which will take you to the Welcome page. Then click on \"Sign up Now\" on the right side of the page.     You will be asked to enter the access code listed below, as well as some personal information. Please follow the directions to create your username and password.     Your access code is: [email or web address]  Expires: 2017 11:18 AM     Your access code will  in 90 days. If you need help or a new code, please call your [place] clinic or [phone].        Care EveryWhere ID     This is your Care EveryWhere ID. This could be used by other organizations to access your [place] medical records  [id number]        Your Vitals Were     Pulse Temperature Pulse Oximetry BMI (Body Mass Index)          97 97.4  F (36.3  C) (Oral) 98% 17.88 kg/m2         Blood Pressure from Last 3 Encounters:   17 90/58   17 (!) 86/65   17 96/60    Weight from Last 3 Encounters:   17 128 lb 3.2 oz (58.2 kg)   17 130 lb 12.8 oz (59.3 kg)   17 131 lb (59.4 kg)              We Performed the Following     ADMIN: Vaccine, Initial (25818)     Pneumococcal vaccine 13 valent PCV13 IM (Prevnar) [12899]     TDAP VACCINE (BOOSTRIX)          Where to get your medicines      [facility] [address] [place] [address] -  [facility] AT [facility]  [address] [facility] [address]     Phone:  [phone]     BusPIRone HCl 30 MG Tabs    loratadine 10 MG tablet          Primary Care Provider Office Phone # Fax #    NINOSKA Samuel [phone]       [address] [place] [address]        Equal Access to Services     [facility] AH: juan Llanosadaha, de stevenson, pelon malcolm. [facility]ly [facility] [phone].    ATENCIÓN: Si jessica betts a " roberts disposición servicios gratuitos de asistencia lingüística. Sravanthi song [phone].    We comply with applicable federal civil rights laws and [place] laws. We do not discriminate on the basis of race, color, national origin, age, disability sex, sexual orientation or gender identity.            Thank you!     Thank you for choosing [facility]  for your care. Our goal is always to provide you with excellent care. Hearing back from our patients is one way we can continue to improve our services. Please take a few minutes to complete the written survey that you may receive in the mail after your visit with us. Thank you!             Your Updated Medication List - Protect others around you: Learn how to safely use, store and throw away your medicines at www.disposemymeds.org.          This list is accurate as of: 9/14/17 11:18 AM.  Always use your most recent med list.                   Brand Name Dispense Instructions for use Diagnosis    acyclovir 5 % ointment    ZOVIRAX    30 g    Apply 1 g topically as needed    Recurrent cold sores       albuterol 108 (90 BASE) MCG/ACT Inhaler    PROAIR HFA/PROVENTIL HFA/VENTOLIN HFA          betamethasone valerate 0.1 % ointment    VALISONE     Apply once a day for 2 weeks as directed        BusPIRone HCl 30 MG Tabs     60 tablet    Take 30 mg by mouth 2 times daily    Generalized anxiety disorder       clobetasol 0.05 % ointment    TEMOVATE     APPLY TOPICALLY BID        [employer]S TRIPLE ANTIBIOTIC Oint     60 g    Externally apply topically 4 times daily    Allergic contact dermatitis due to adhesives       FLUoxetine 40 MG capsule    PROzac    30 capsule    TAKE 1 CAPSULE(40 MG) BY MOUTH DAILY    Moderate episode of recurrent major depressive disorder (H), Generalized anxiety disorder       fluticasone 50 MCG/ACT spray    FLONASE    1 Bottle    Spray 1-2 sprays into both nostrils daily    Seasonal allergic rhinitis, unspecified allergic rhinitis trigger        hydrocortisone 2.5 % cream           ketoconazole 2 % cream    NIZORAL          loratadine 10 MG tablet    CLARITIN    30 tablet    Take 1 tablet (10 mg) by mouth daily    Chronic nonseasonal allergic rhinitis due to pollen       olopatadine HCl 0.2 % Soln    PATADAY    2.5 mL    Place 1 drop into both eyes daily    Seasonal allergic rhinitis, unspecified allergic rhinitis trigger       omeprazole 40 MG capsule    priLOSEC    90 capsule    TAKE 1 CAPSULE(40 MG) BY MOUTH DAILY 30 TO 60 MINUTES BEFORE A MEAL    Gastroesophageal reflux disease, esophagitis presence not specified       ondansetron 4 MG ODT tab    ZOFRAN-ODT    12 tablet    Take 1-2 tablets (4-8 mg) by mouth daily as needed for nausea    Migraine without status migrainosus, not intractable, unspecified migraine type       order for DME     1 each    Equipment being ordered: cool humidifier    Painful respiration, Upper respiratory tract infection, unspecified type       * Protein Powd     454 g    Take 30 g by mouth 2 times daily    Protein-calorie malnutrition (H), Loss of weight       * Protein Powd     454 g    30 g 2 times daily    Protein-calorie malnutrition (H)       sucralfate 1 GM tablet    CARAFATE    40 tablet    Take 1 tablet (1 g) by mouth 4 times daily    Gastroesophageal reflux disease without esophagitis       traZODone 100 MG tablet    DESYREL    90 tablet    Take 1-3 tablets (100-300 mg) by mouth nightly as needed for sleep    Primary insomnia       triamcinolone 0.1 % ointment    KENALOG    80 g    Apply sparingly to affected area on back two times daily til clear.    Rash       TYLENOL PO      Take 1 tablet by mouth as needed    Chondritis of both external ears       valACYclovir 1000 mg tablet    VALTREX    8 tablet    Take 1 tablet (1,000 mg) by mouth 2 times daily    Recurrent cold sores       ZOLMitriptan 5 MG tablet    ZOMIG    12 tablet    TAKE 1 TABLET BY MOUTH AT ONSET OF HEADACHE FOR MIGRAINE. MAY REPEAT DOSE IN 2 HOURS. DO  NOT EXCEED 10MG IN 24 HOURS        * Notice:  This list has 2 medication(s) that are the same as other medications prescribed for you. Read the directions carefully, and ask your doctor or other care provider to review them with you.

## 2017-09-14 NOTE — PATIENT INSTRUCTIONS
For scheduling at [facility] ([facility], [facility], [place]), call [phone] or [phone]     For scheduling in the North ([place], [facility], and [place]) call  [phone] or  [phone]        For scheduling in the South ([facility], [facility]) call [phone]  or   [phone]        [facility]    If you have any questions regarding to your visit please contact your care team:     Team Pink:   Clinic Hours Telephone Number   Internal Medicine:  Dr. Kristie Montemayor, NP       7am-7pm  Monday - Thursday   7am-5pm  Fridays  [phone]  (Appointment scheduling available 24/7)    Questions about your visit?  Team Line  [phone]   [facility] [place] [facility] - 11am-9pm Monday-Friday Saturday-Sunday- 9am-5pm   [place] - 5pm-9pm Monday-Friday Saturday-Sunday- 9am-5pm  [phone] - [place]   [phone] - [place]       What options do I have for visits at the clinic other than the traditional office visit?  To expand how we care for you, many of our providers are utilizing electronic visits (e-visits) and telephone visits, when medically appropriate, for interactions with their patients rather than a visit in the clinic.   We also offer nurse visits for many medical concerns. Just like any other service, we will bill your insurance company for this type of visit based on time spent on the phone with your provider. Not all insurance companies cover these visits. Please check with your medical insurance if this type of visit is covered. You will be responsible for any charges that are not paid by your insurance.      E-visits via [employer]:  generally incur a $35.00 fee.  Telephone visits:  Time spent on the phone: *charged based on time that is spent on the phone in increments of 10 minutes. Estimated cost:   5-10 mins $30.00   11-20 mins. $59.00   21-30  mins. $85.00   Use Factor.iot (secure email communication and access to your chart) to send your primary care provider a message or make an appointment. Ask someone on your Team how to sign up for Zixi.    For a Price Quote for your services, please call our Consumer Price Line at [phone].    As always, Thank you for trusting us with your health care needs!

## 2017-09-21 ENCOUNTER — TELEPHONE (OUTPATIENT)
Dept: GASTROENTEROLOGY | Facility: OUTPATIENT CENTER | Age: 47
End: 2017-09-21

## 2017-09-21 DIAGNOSIS — R10.84 ABDOMINAL PAIN, GENERALIZED: Primary | ICD-10-CM

## 2017-09-21 NOTE — TELEPHONE ENCOUNTER
Patient taking any blood thinners ? no    Heart disease ? denies    Lung disease ? Asthma. Advised patient to bring inhaler      Sleep apnea ? denies    Diabetic ? denies    Kidney disease ? denies    Dialysis ? n/a     Electronic implanted medical devices ? denies    Are you taking any narcotic pain medication ? no  What is your daily dosage ?    PTSD ? n/a    Prep instructions reviewed with patient ? Instructions,  policy, MAC sedation plan reviewed. Advised patient to have someone stay with her post exam    Pharmacy : [facility]    Indication for procedure :   Associated Diagnoses      Abdominal pain, generalized [R10.84]  - Primary            Referring provider :  Providers      Authorizing Provider Encounter Provider     Sarah Montemayor, NINOSKA CNP

## 2017-09-25 ENCOUNTER — DOCUMENTATION ONLY (OUTPATIENT)
Dept: GASTROENTEROLOGY | Facility: OUTPATIENT CENTER | Age: 47
End: 2017-09-25

## 2017-09-25 ENCOUNTER — TRANSFERRED RECORDS (OUTPATIENT)
Dept: HEALTH INFORMATION MANAGEMENT | Facility: CLINIC | Age: 47
End: 2017-09-25

## 2017-09-25 DIAGNOSIS — R10.84 ABDOMINAL PAIN, GENERALIZED: Primary | ICD-10-CM

## 2017-09-25 DIAGNOSIS — Z80.41 FAMILY HISTORY OF MALIGNANT NEOPLASM OF OVARY: Primary | ICD-10-CM

## 2017-09-27 LAB — COPATH REPORT: NORMAL

## 2017-10-04 ENCOUNTER — TELEPHONE (OUTPATIENT)
Dept: SURGERY | Facility: CLINIC | Age: 47
End: 2017-10-04

## 2017-10-04 NOTE — TELEPHONE ENCOUNTER
Called patient, inform her of her biopsy results. Asked her if she had made an appt to see a genetic counselor per Dr. Hernandez's recommendation. Patient said she's more concern with getting the rest of the polyps remove. She wants to discuss that first before making an appt to see the genetic counselor. Inform her Dr. Hernandez is off this week, I will talk with him next week and connect with her again to see what Dr. Hernandez recommends.   Kylah ODELL LPN

## 2017-10-14 ENCOUNTER — TRANSFERRED RECORDS (OUTPATIENT)
Dept: HEALTH INFORMATION MANAGEMENT | Facility: CLINIC | Age: 47
End: 2017-10-14

## 2017-10-16 ENCOUNTER — TELEPHONE (OUTPATIENT)
Dept: SURGERY | Facility: CLINIC | Age: 47
End: 2017-10-16

## 2017-10-16 NOTE — TELEPHONE ENCOUNTER
Per Dr. Hernandez:   It's not clear that the rest of the polyps need to be removed because they are all hyperplastic-- not associated with cancer.  But they could possibly be related to her FH of breast cancer,  So:   1.  She should see a genetic counselor   2.  I'd probably re-colonoscope in a year to be sure things arenot progressing but would not suggest coming back to remove this kind of polyp   3.  I'd be very happy to see her in clinic to discuss as it is a bit confusing.     Lm for patient regarding Dr. Hernandez's recommendation. Instruct patient to call back with any questions or if she would like to make an appt with Dr. Hernandez to discuss in more details.   Kylah ODELL LPN

## 2017-10-16 NOTE — TELEPHONE ENCOUNTER
FREDDIE for patient, called to discuss colonoscopy path results. Inform patient will call her back later to discuss.  Kylah ODELL LPN

## 2017-10-30 NOTE — PROGRESS NOTES
SUBJECTIVE:   Coretta Tovar is a 47 year old female who presents to clinic today for the following health issues:      ENT Symptoms             Symptoms: cc Present Absent Comment   Fever/Chills  x  Chill  Low grade fever   Fatigue  x     Muscle Aches  x     Eye Irritation   x    Sneezing   x    Nasal Epifanio/Drg   x    Sinus Pressure/Pain  x     Loss of smell   x    Dental pain   x    Sore Throat   x    Swollen Glands   x    Ear Pain/Fullness  x     Cough  x  Dry cough   Wheeze  x     Chest Pain  x     Shortness of breath  x     Rash       Other         Symptom duration:  3 weeks   Symptom severity:  Severe   Treatments tried:  AROLDO orr,    Contacts:         Patient complains of poor appetite, but is drinking and urinating per normal.    Patient admits to narcotic drug abuse and is looking to go to treatment.  She reports frequent cravings and is wondering about suboxone.  She was told she needed to get on it prior to treatment.  She notes that she has not used narcotics in the past 3 days, but has tried her nephew's suboxone with improvement in cravings.    Patient recently had colonoscopy with multiple polyps noted.  She was asked to pursue genetic counseling by GI and repeat colonoscopy in 1 year.  Patient has not yet completed genetic counseling.    Problem list and histories reviewed & adjusted, as indicated.  Additional history: as documented    Patient Active Problem List   Diagnosis     Tobacco abuse     S/P splenectomy     Migraines     Idiopathic thrombocytopenic purpura (HCC)     Chemical dependency (H)     Insomnia     Mary-mary disease     Vomiting     Abdominal pain, acute     Disorder of stomach     Cervical pain     Vertigo     Asthma     Gastroesophageal reflux disease     Herpetic gingivostomatitis     Immune thrombocytopenic purpura (H)     Muscle pain     Personality disorder     Nondependent cannabis abuse, episodic     Atopic rhinitis     Nutritional disorder     Abnormal weight loss      Mixed, or nondependent drug abuse     Moderate episode of recurrent major depressive disorder (H)     Generalized anxiety disorder     Protein-calorie malnutrition (H)     Primary insomnia     History of cocaine dependence (H)     Past Surgical History:   Procedure Laterality Date     ANKLE SURGERY      Numerous right ankle surgeries     HERNIA REPAIR Left 1975     HYSTERECTOMY, PAP NO LONGER INDICATED      benign, endometriosis     NECK SURGERY  2000's    Cervical spine     SPLENECTOMY  1995     TONSILLECTOMY         Social History   Substance Use Topics     Smoking status: Current Every Day Smoker     Packs/day: 1.00     Years: 15.00     Types: Cigarettes     Last attempt to quit: 11/6/2015     Smokeless tobacco: Never Used     Alcohol use Yes      Comment: Drinks once in 4 - 5 months     Family History   Problem Relation Age of Onset     Neurologic Disorder Maternal Grandmother      migraines     Breast Cancer Maternal Grandmother      Neurologic Disorder Maternal Grandfather      migraines     Neurologic Disorder Brother      migraines     Breast Cancer Maternal Aunt      x 2     Breast Cancer Paternal Grandmother      Depression Daughter      Anxiety Disorder Daughter          Current Outpatient Prescriptions   Medication Sig Dispense Refill     albuterol (PROAIR HFA/PROVENTIL HFA/VENTOLIN HFA) 108 (90 BASE) MCG/ACT Inhaler Inhale 2 puffs into the lungs every 6 hours as needed for shortness of breath / dyspnea or wheezing 1 Inhaler 0     cefdinir (OMNICEF) 300 MG capsule Take 1 capsule (300 mg) by mouth 2 times daily 20 capsule 0     ZOLMitriptan (ZOMIG) 5 MG tablet TAKE 1 TABLET BY MOUTH AT ONSET OF HEADACHE FOR MIGRAINE. MAY REPEAT DOSE IN 2 HOURS. DO NOT EXCEED 10MG IN 24 HOURS 12 tablet 4     ISOtretinoin (ACCUTANE PO)        loratadine (CLARITIN) 10 MG tablet Take 1 tablet (10 mg) by mouth daily 30 tablet 11     BusPIRone HCl 30 MG TABS Take 30 mg by mouth 2 times daily 60 tablet 3     valACYclovir  (VALTREX) 1000 mg tablet Take 1 tablet (1,000 mg) by mouth 2 times daily 8 tablet 11     ondansetron (ZOFRAN-ODT) 4 MG ODT tab Take 1-2 tablets (4-8 mg) by mouth daily as needed for nausea 12 tablet 0     traZODone (DESYREL) 100 MG tablet Take 1-3 tablets (100-300 mg) by mouth nightly as needed for sleep 90 tablet 1     omeprazole (PRILOSEC) 40 MG capsule TAKE 1 CAPSULE(40 MG) BY MOUTH DAILY 30 TO 60 MINUTES BEFORE A MEAL 90 capsule 3     Neomycin-Bacitracin-Polymyxin (CVS TRIPLE ANTIBIOTIC) OINT Externally apply topically 4 times daily 60 g 3     triamcinolone (KENALOG) 0.1 % ointment Apply sparingly to affected area on back two times daily til clear. 80 g 0     FLUoxetine (PROZAC) 40 MG capsule TAKE 1 CAPSULE(40 MG) BY MOUTH DAILY 30 capsule 5     sucralfate (CARAFATE) 1 GM tablet Take 1 tablet (1 g) by mouth 4 times daily 40 tablet 1     acyclovir (ZOVIRAX) 5 % ointment Apply 1 g topically as needed 30 g 11     olopatadine HCl (PATADAY) 0.2 % SOLN Place 1 drop into both eyes daily 2.5 mL 3     fluticasone (FLONASE) 50 MCG/ACT spray Spray 1-2 sprays into both nostrils daily 1 Bottle 11     clobetasol (TEMOVATE) 0.05 % ointment APPLY TOPICALLY BID  1     albuterol (PROAIR HFA/PROVENTIL HFA/VENTOLIN HFA) 108 (90 BASE) MCG/ACT Inhaler        betamethasone valerate (VALISONE) 0.1 % ointment Apply once a day for 2 weeks as directed       hydrocortisone 2.5 % cream        ketoconazole (NIZORAL) 2 % cream        order for DME Equipment being ordered: cool humidifier 1 each 0     Acetaminophen (TYLENOL PO) Take 1 tablet by mouth as needed       [DISCONTINUED] ZOLMitriptan (ZOMIG) 5 MG tablet TAKE 1 TABLET BY MOUTH AT ONSET OF HEADACHE FOR MIGRAINE. MAY REPEAT DOSE IN 2 HOURS. DO NOT EXCEED 10MG IN 24 HOURS 12 tablet 4     Protein POWD 30 g 2 times daily (Patient not taking: Reported on 9/14/2017) 454 g 1     Protein POWD Take 30 g by mouth 2 times daily (Patient not taking: Reported on 10/31/2017) 454 g 1     Allergies    Allergen Reactions     Acetaminophen-Codeine Hives     Amoxicillin-Pot Clavulanate Nausea and Vomiting     Augmentin Nausea and Vomiting and Hives     Barbiturates      Bentyl [Dicyclomine] Other (See Comments)     dizziness     Compazine      Lock-jaw     Liquid Adhesive      Other reaction(s): Other, see comments  blisters     No Clinical Screening - See Comments      PN: LW Reaction: blisters  PN: LW FI1: nka  PN: LW CM1: CONTRAST- nka Reaction :     Nsaids      D/t ITP     Prochlorperazine      Other reaction(s): Edema     Propoxyphene N-Apap Hives     Sulfa Drugs      Tramadol Hcl Hives            Adhesive Tape Rash     BP Readings from Last 3 Encounters:   10/31/17 98/66   09/14/17 90/58   09/08/17 (!) 86/65    Wt Readings from Last 3 Encounters:   10/31/17 130 lb (59 kg)   09/14/17 128 lb 3.2 oz (58.2 kg)   09/08/17 130 lb 12.8 oz (59.3 kg)                  Labs reviewed in EPIC        Reviewed and updated as needed this visit by clinical staff     Reviewed and updated as needed this visit by Provider         ROS:  Constitutional, HEENT, cardiovascular, pulmonary, gi and gu systems are negative, except as otherwise noted.      OBJECTIVE:   BP 98/66  Pulse 81  Temp 98.8  F (37.1  C) (Oral)  Wt 130 lb (59 kg)  SpO2 95%  BMI 18.13 kg/m2  Body mass index is 18.13 kg/(m^2).  GENERAL: healthy, alert and no distress  EYES: Eyes grossly normal to inspection, PERRL and conjunctivae and sclerae normal  HENT: normal cephalic/atraumatic, ear canals and TM's normal, nose and mouth without ulcers or lesions, nasal mucosa edematous , oropharynx clear and oral mucous membranes moist  NECK: no adenopathy, no asymmetry, masses, or scars and thyroid normal to palpation  RESP: lungs clear to auscultation - no rales, rhonchi or wheezes  CV: regular rate and rhythm, normal S1 S2, no S3 or S4, no murmur, click or rub, no peripheral edema and peripheral pulses strong  MS: no gross musculoskeletal defects noted, no  edema    Diagnostic Test Results:  none     ASSESSMENT/PLAN:     1. Chemical dependency (H)  Patient is looking into treatment.  Will have care coordination assist patient and will refer to addiction medicine for potential suboxone prescription.  - CARE COORDINATION REFERRAL  - ADDICTION MEDICINE REFERRAL    2. Multiple polyps of sigmoid colon    - GENETICS REFERRAL    3. Acute sinusitis with symptoms > 10 days    - cefdinir (OMNICEF) 300 MG capsule; Take 1 capsule (300 mg) by mouth 2 times daily  Dispense: 20 capsule; Refill: 0    4. Acute bronchitis with symptoms > 10 days    - albuterol (PROAIR HFA/PROVENTIL HFA/VENTOLIN HFA) 108 (90 BASE) MCG/ACT Inhaler; Inhale 2 puffs into the lungs every 6 hours as needed for shortness of breath / dyspnea or wheezing  Dispense: 1 Inhaler; Refill: 0  - cefdinir (OMNICEF) 300 MG capsule; Take 1 capsule (300 mg) by mouth 2 times daily  Dispense: 20 capsule; Refill: 0    5. Migraine without aura and without status migrainosus, not intractable    - ZOLMitriptan (ZOMIG) 5 MG tablet; TAKE 1 TABLET BY MOUTH AT ONSET OF HEADACHE FOR MIGRAINE. MAY REPEAT DOSE IN 2 HOURS. DO NOT EXCEED 10MG IN 24 HOURS  Dispense: 12 tablet; Refill: 4    FUTURE APPOINTMENTS:       - Follow-up for annual visit or as needed    NINOSKA [facility]

## 2017-10-31 ENCOUNTER — OFFICE VISIT (OUTPATIENT)
Dept: INTERNAL MEDICINE | Facility: CLINIC | Age: 47
End: 2017-10-31
Payer: MEDICAID

## 2017-10-31 VITALS
DIASTOLIC BLOOD PRESSURE: 66 MMHG | SYSTOLIC BLOOD PRESSURE: 98 MMHG | BODY MASS INDEX: 18.13 KG/M2 | OXYGEN SATURATION: 95 % | TEMPERATURE: 98.8 F | WEIGHT: 130 LBS | HEART RATE: 81 BPM

## 2017-10-31 DIAGNOSIS — F19.20 CHEMICAL DEPENDENCY (H): ICD-10-CM

## 2017-10-31 DIAGNOSIS — J20.9 ACUTE BRONCHITIS WITH SYMPTOMS > 10 DAYS: ICD-10-CM

## 2017-10-31 DIAGNOSIS — G43.009 MIGRAINE WITHOUT AURA AND WITHOUT STATUS MIGRAINOSUS, NOT INTRACTABLE: ICD-10-CM

## 2017-10-31 DIAGNOSIS — J01.90 ACUTE SINUSITIS WITH SYMPTOMS > 10 DAYS: Primary | ICD-10-CM

## 2017-10-31 DIAGNOSIS — K63.5 MULTIPLE POLYPS OF SIGMOID COLON: ICD-10-CM

## 2017-10-31 PROCEDURE — 99214 OFFICE O/P EST MOD 30 MIN: CPT | Performed by: NURSE PRACTITIONER

## 2017-10-31 RX ORDER — ALBUTEROL SULFATE 90 UG/1
2 AEROSOL, METERED RESPIRATORY (INHALATION) EVERY 6 HOURS PRN
Qty: 1 INHALER | Refills: 0 | Status: SHIPPED | OUTPATIENT
Start: 2017-10-31 | End: 2017-11-18

## 2017-10-31 RX ORDER — CEFDINIR 300 MG/1
300 CAPSULE ORAL 2 TIMES DAILY
Qty: 20 CAPSULE | Refills: 0 | Status: SHIPPED | OUTPATIENT
Start: 2017-10-31 | End: 2017-11-30

## 2017-10-31 RX ORDER — ZOLMITRIPTAN 5 MG/1
TABLET, FILM COATED ORAL
Qty: 12 TABLET | Refills: 4 | Status: SHIPPED | OUTPATIENT
Start: 2017-10-31 | End: 2017-11-30

## 2017-10-31 ASSESSMENT — PAIN SCALES - GENERAL: PAINLEVEL: NO PAIN (0)

## 2017-10-31 NOTE — PATIENT INSTRUCTIONS
[facility]    If you have any questions regarding to your visit please contact your care team:     Team Pink:   Clinic Hours Telephone Number   Internal Medicine:  Dr. Kristie Montemayor NP       7am-7pm  Monday - Thursday   7am-5pm  Fridays  [phone]  (Appointment scheduling available 24/7)    Questions about your visit?  Team Line  [phone]   Urgent Care - [place] and [facility] [place] - 11am-9pm Monday-Friday Saturday-Sunday- 9am-5pm   [place] - 5pm-9pm Monday-Friday Saturday-Sunday- 9am-5pm  [phone] - [place]   [phone] - [place]       What options do I have for visits at the clinic other than the traditional office visit?  To expand how we care for you, many of our providers are utilizing electronic visits (e-visits) and telephone visits, when medically appropriate, for interactions with their patients rather than a visit in the clinic.   We also offer nurse visits for many medical concerns. Just like any other service, we will bill your insurance company for this type of visit based on time spent on the phone with your provider. Not all insurance companies cover these visits. Please check with your medical insurance if this type of visit is covered. You will be responsible for any charges that are not paid by your insurance.      E-visits via [employer]:  generally incur a $35.00 fee.  Telephone visits:  Time spent on the phone: *charged based on time that is spent on the phone in increments of 10 minutes. Estimated cost:   5-10 mins $30.00   11-20 mins. $59.00   21-30 mins. $85.00   Use [employer] (secure email communication and access to your chart) to send your primary care provider a message or make an appointment. Ask someone on your Team how to sign up for [employer].    For a Price Quote for your services, please call our Consumer Price Line at [phone].    As always, Thank you for trusting us with your health care  needs!      Meeta Cox, CMA

## 2017-10-31 NOTE — NURSING NOTE
"Chief Complaint   Patient presents with     Cold Symptoms     Personal Problem     Discuss Colonoscopy  DAP       Initial BP 98/66  Pulse 81  Temp 98.8  F (37.1  C) (Oral)  Wt 130 lb (59 kg)  SpO2 95%  BMI 18.13 kg/m2 Estimated body mass index is 18.13 kg/(m^2) as calculated from the following:    Height as of 9/8/17: 5' 11\" (1.803 m).    Weight as of this encounter: 130 lb (59 kg).  Medication Reconciliation: complete   Sandy Rivers MA    "

## 2017-10-31 NOTE — MR AVS SNAPSHOT
After Visit Summary   10/31/2017    Coretta Tovar    MRN: [id number]           Patient Information     Date Of Birth          1970        Visit Information        Provider Department      10/31/2017 12:20 PM Sarah Montemayor APRN [facility]        Today's Diagnoses     Acute sinusitis with symptoms > 10 days    -  1    Chemical dependency (H)        Multiple polyps of sigmoid colon        Acute bronchitis with symptoms > 10 days        Migraine without aura and without status migrainosus, not intractable          Care Instructions    [facility]    If you have any questions regarding to your visit please contact your care team:     Team Pink:   Clinic Hours Telephone Number   Internal Medicine:  Dr. Kristie Montemayor, NP       7am-7pm  Monday - Thursday   7am-5pm  Fridays  [phone]  (Appointment scheduling available 24/7)    Questions about your visit?  Team Line  [phone]   Urgent Care - [place] and [place] - 11am-9pm Monday-Friday Saturday-Sunday- 9am-5pm   [place] - 5pm-9pm Monday-Friday Saturday-Sunday- 9am-5pm  [phone] - [place]   [phone] - [place]       What options do I have for visits at the clinic other than the traditional office visit?  To expand how we care for you, many of our providers are utilizing electronic visits (e-visits) and telephone visits, when medically appropriate, for interactions with their patients rather than a visit in the clinic.   We also offer nurse visits for many medical concerns. Just like any other service, we will bill your insurance company for this type of visit based on time spent on the phone with your provider. Not all insurance companies cover these visits. Please check with your medical insurance if this type of visit is covered. You will be responsible for any charges that are not paid by your insurance.      E-visits via [employer]:   generally incur a $35.00 fee.  Telephone visits:  Time spent on the phone: *charged based on time that is spent on the phone in increments of 10 minutes. Estimated cost:   5-10 mins $30.00   11-20 mins. $59.00   21-30 mins. $85.00   Use [employer] (secure email communication and access to your chart) to send your primary care provider a message or make an appointment. Ask someone on your Team how to sign up for [employer].    For a Price Quote for your services, please call our [employer] Connections Line at [phone].    As always, Thank you for trusting us with your health care needs!      Meeta Cox CMA            Follow-ups after your visit        Additional Services     ADDICTION MEDICINE REFERRAL       The Addiction Medicine Service is prepared to provide consultation for and, if necessary, ongoing care for patients with the disease of addiction, ages 16 and up.     For acute detox, please send your patient to the ER or contact [facility] at [phone].     Common problems that may warrant referral to the Addiction Medicine Service are:  Alcohol use disorder - diagnosis, treatment referral, acute and protracted withdrawal management, and ongoing medication assisted treatment including Antabuse and Naltrexone.  Opioid Use Disorder - medication assisted treatment including Buprenorphine (Suboxone) or extended release Naltrexone (Vivitrol)  Benzodiazepine dependence - extended outpatient detoxification  Many other issues pertaining to addiction, relapse, and recovery    Please contact our scheduling staff at [phone] with any questions.    Referrals to the Addiction Medicine Service assume that the referring provider has discussed the referral with the patient.  Referral will be reviewed and if appropriate, the patient will be contacted to schedule appointment.  If not appropriate, the provider will be contacted with further information.    Please answer the following questions so we can better  service your patient:    Drug of choice: opoids  Do they have a [place] PCP:  Yes     Please bring the following to your appointment:  >>   List of current medications   >>   Any relevant history            CARE COORDINATION REFERRAL       Services are provided by a Care Coordinator for people with complex needs such as: medical, social, or financial troubles.  The Care Coordinator works with the patient and their Primary Care Provider to determine health goals, obtain resources, achieve outcomes, and develop care plans that help coordinate the patient's care.     Reason for Referral: Chemical Dependence: Patient interested in treatment options    Provide additional details for Care Coordination to best meet the patient's current needs: Patient has a narcotic dependence and wishes to go through treatment.  She interested in suboxone, but was told she would need it prescribed outside of treatment.  No providers here prescribe suboxone.  Can you help her navigate this?    Clinical Staff have discussed the Care Coordination Referral with the patient and/or caregiver: yes            GENETICS REFERRAL       Your provider has referred you to: [facility]: [facility] [phone]   [email or web address]     Please be aware that coverage of these services is subject to the terms and limitations of your health insurance plan.  Call member services at your health plan with any benefit or coverage questions.      Please bring the following with you to your appointment:    (1) Any X-Rays, CTs or MRIs which have been performed.  Contact the facility where they were done to arrange for  prior to your scheduled appointment.   (2) List of current medications   (3) This referral request   (4) Any documents/labs given to you for this referral                  Who to contact     If you have questions or need follow up information  "about today's clinic visit or your schedule please contact [facility] directly at [phone].  Normal or non-critical lab and imaging results will be communicated to you by [employer], letter or phone within 4 business days after the clinic has received the results. If you do not hear from us within 7 days, please contact the clinic through [employer] or phone. If you have a critical or abnormal lab result, we will notify you by phone as soon as possible.  Submit refill requests through [employer] or call your pharmacy and they will forward the refill request to us. Please allow 3 business days for your refill to be completed.          Additional Information About Your Visit        [employer] Information     [employer] lets you send messages to your doctor, view your test results, renew your prescriptions, schedule appointments and more. To sign up, go to [email or web address] . Click on \"Log in\" on the left side of the screen, which will take you to the Welcome page. Then click on \"Sign up Now\" on the right side of the page.     You will be asked to enter the access code listed below, as well as some personal information. Please follow the directions to create your username and password.     Your access code is: [email or web address]  Expires: 2017 11:18 AM     Your access code will  in 90 days. If you need help or a new code, please call your [place] clinic or [phone].        Care EveryWhere ID     This is your Care EveryWhere ID. This could be used by other organizations to access your [place] medical records  [id number]        Your Vitals Were     Pulse Temperature Pulse Oximetry BMI (Body Mass Index)          81 98.8  F (37.1  C) (Oral) 95% 18.13 kg/m2         Blood Pressure from Last 3 Encounters:   10/31/17 98/66   17 90/58   17 (!) 86/65    Weight from Last 3 Encounters:   10/31/17 130 lb (59 kg)   17 128 lb 3.2 oz (58.2 kg)   17 130 lb 12.8 oz (59.3 kg)              We " Performed the Following     ADDICTION MEDICINE REFERRAL     CARE COORDINATION REFERRAL     GENETICS REFERRAL          Today's Medication Changes          These changes are accurate as of: 10/31/17  1:08 PM.  If you have any questions, ask your nurse or doctor.               Start taking these medicines.        Dose/Directions    cefdinir 300 MG capsule   Commonly known as:  OMNICEF   Used for:  Acute bronchitis with symptoms > 10 days, Acute sinusitis with symptoms > 10 days   Started by:  Sarah Montemayor APRN CNP        Dose:  300 mg   Take 1 capsule (300 mg) by mouth 2 times daily   Quantity:  20 capsule   Refills:  0         These medicines have changed or have updated prescriptions.        Dose/Directions    * albuterol 108 (90 BASE) MCG/ACT Inhaler   Commonly known as:  PROAIR HFA/PROVENTIL HFA/VENTOLIN HFA   This may have changed:  Another medication with the same name was added. Make sure you understand how and when to take each.   Changed by:  Adina Maciel APRN CNP        Refills:  0       * albuterol 108 (90 BASE) MCG/ACT Inhaler   Commonly known as:  PROAIR HFA/PROVENTIL HFA/VENTOLIN HFA   This may have changed:  You were already taking a medication with the same name, and this prescription was added. Make sure you understand how and when to take each.   Used for:  Acute bronchitis with symptoms > 10 days   Changed by:  Sarah Montemayor APRN CNP        Dose:  2 puff   Inhale 2 puffs into the lungs every 6 hours as needed for shortness of breath / dyspnea or wheezing   Quantity:  1 Inhaler   Refills:  0       * Notice:  This list has 2 medication(s) that are the same as other medications prescribed for you. Read the directions carefully, and ask your doctor or other care provider to review them with you.         Where to get your medicines      These medications were sent to [facility] [address] [place] [address] [address] [place] [address] & [address], [place] [address]     Phone:  [phone]     albuterol 108 (90 BASE) MCG/ACT Inhaler    cefdinir 300 MG capsule    ZOLMitriptan 5 MG tablet                Primary Care Provider Office Phone # Fax #    NINOSKA Samuel [phone]       [address]  [facility] [address]        Equal Access to Services     SALEEM JASON : Hadii aad ku hadasho Soomaali, waaxda luqadaha, qaybta kaalmada adeegyada, waxay idiin hayaan adeeg kharash la'mary . So [facility] [phone].    ATENCIÓN: Si liatla espaimee, tiene a roberts disposición servicios gratuitos de asistencia lingüística. [facility] [phone].    We comply with applicable federal civil rights laws and [place] laws. We do not discriminate on the basis of race, color, national origin, age, disability, sex, sexual orientation, or gender identity.            Thank you!     Thank you for choosing [facility]  for your care. Our goal is always to provide you with excellent care. Hearing back from our patients is one way we can continue to improve our services. Please take a few minutes to complete the written survey that you may receive in the mail after your visit with us. Thank you!             Your Updated Medication List - Protect others around you: Learn how to safely use, store and throw away your medicines at www.disposemymeds.org.          This list is accurate as of: 10/31/17  1:08 PM.  Always use your most recent med list.                   Brand Name Dispense Instructions for use Diagnosis    ACCUTANE PO           acyclovir 5 % ointment    ZOVIRAX    30 g    Apply 1 g topically as needed    Recurrent cold sores       * albuterol 108 (90 BASE) MCG/ACT Inhaler    PROAIR HFA/PROVENTIL HFA/VENTOLIN HFA          * albuterol 108 (90 BASE) MCG/ACT Inhaler    PROAIR HFA/PROVENTIL HFA/VENTOLIN HFA    1 Inhaler    Inhale 2 puffs into the lungs every 6 hours as needed for shortness of breath / dyspnea or wheezing    Acute  bronchitis with symptoms > 10 days       betamethasone valerate 0.1 % ointment    VALISONE     Apply once a day for 2 weeks as directed        BusPIRone HCl 30 MG Tabs     60 tablet    Take 30 mg by mouth 2 times daily    Generalized anxiety disorder       cefdinir 300 MG capsule    OMNICEF    20 capsule    Take 1 capsule (300 mg) by mouth 2 times daily    Acute bronchitis with symptoms > 10 days, Acute sinusitis with symptoms > 10 days       clobetasol 0.05 % ointment    TEMOVATE     APPLY TOPICALLY BID        [employer] TRIPLE ANTIBIOTIC Oint     60 g    Externally apply topically 4 times daily    Allergic contact dermatitis due to adhesives       FLUoxetine 40 MG capsule    PROzac    30 capsule    TAKE 1 CAPSULE(40 MG) BY MOUTH DAILY    Moderate episode of recurrent major depressive disorder (H), Generalized anxiety disorder       fluticasone 50 MCG/ACT spray    FLONASE    1 Bottle    Spray 1-2 sprays into both nostrils daily    Seasonal allergic rhinitis, unspecified allergic rhinitis trigger       hydrocortisone 2.5 % cream           ketoconazole 2 % cream    NIZORAL          loratadine 10 MG tablet    CLARITIN    30 tablet    Take 1 tablet (10 mg) by mouth daily    Chronic nonseasonal allergic rhinitis due to pollen       olopatadine HCl 0.2 % Soln    PATADAY    2.5 mL    Place 1 drop into both eyes daily    Seasonal allergic rhinitis, unspecified allergic rhinitis trigger       omeprazole 40 MG capsule    priLOSEC    90 capsule    TAKE 1 CAPSULE(40 MG) BY MOUTH DAILY 30 TO 60 MINUTES BEFORE A MEAL    Gastroesophageal reflux disease, esophagitis presence not specified       ondansetron 4 MG ODT tab    ZOFRAN-ODT    12 tablet    Take 1-2 tablets (4-8 mg) by mouth daily as needed for nausea    Migraine without status migrainosus, not intractable, unspecified migraine type       order for DME     1 each    Equipment being ordered: cool humidifier    Painful respiration, Upper respiratory tract infection, unspecified  type       * Protein Powd     454 g    Take 30 g by mouth 2 times daily    Protein-calorie malnutrition (H), Loss of weight       * Protein Powd     454 g    30 g 2 times daily    Protein-calorie malnutrition (H)       sucralfate 1 GM tablet    CARAFATE    40 tablet    Take 1 tablet (1 g) by mouth 4 times daily    Gastroesophageal reflux disease without esophagitis       traZODone 100 MG tablet    DESYREL    90 tablet    Take 1-3 tablets (100-300 mg) by mouth nightly as needed for sleep    Primary insomnia       triamcinolone 0.1 % ointment    KENALOG    80 g    Apply sparingly to affected area on back two times daily til clear.    Rash       TYLENOL PO      Take 1 tablet by mouth as needed    Chondritis of both external ears       valACYclovir 1000 mg tablet    VALTREX    8 tablet    Take 1 tablet (1,000 mg) by mouth 2 times daily    Recurrent cold sores       ZOLMitriptan 5 MG tablet    ZOMIG    12 tablet    TAKE 1 TABLET BY MOUTH AT ONSET OF HEADACHE FOR MIGRAINE. MAY REPEAT DOSE IN 2 HOURS. DO NOT EXCEED 10MG IN 24 HOURS    Migraine without aura and without status migrainosus, not intractable       * Notice:  This list has 4 medication(s) that are the same as other medications prescribed for you. Read the directions carefully, and ask your doctor or other care provider to review them with you.

## 2017-11-01 ENCOUNTER — TELEPHONE (OUTPATIENT)
Dept: ADDICTION MEDICINE | Facility: CLINIC | Age: 47
End: 2017-11-01

## 2017-11-01 NOTE — TELEPHONE ENCOUNTER
----- Message from Jennifer Chong sent at 10/31/2017  2:23 PM CDT -----  Regarding: Addiction Med Referral  Please review.

## 2017-11-01 NOTE — TELEPHONE ENCOUNTER
Writer attempted to reach pt; no answer. LVM requesting a call back for an appt. Two more attempts will be made.     Kasie Mcginnis

## 2017-11-03 DIAGNOSIS — J30.2 SEASONAL ALLERGIC RHINITIS: ICD-10-CM

## 2017-11-06 DIAGNOSIS — F33.1 MODERATE EPISODE OF RECURRENT MAJOR DEPRESSIVE DISORDER (H): ICD-10-CM

## 2017-11-06 DIAGNOSIS — F41.1 GENERALIZED ANXIETY DISORDER: ICD-10-CM

## 2017-11-06 RX ORDER — LORATADINE 10 MG/1
TABLET ORAL
Qty: 30 TABLET | Refills: 0 | OUTPATIENT
Start: 2017-11-06

## 2017-11-06 NOTE — TELEPHONE ENCOUNTER
Writer spoke with pt; she is scheduled to be seen 11/15/17. Writer is closing this encounter, no further actions needed.     Kasie Mcginnis

## 2017-11-07 RX ORDER — FLUOXETINE 40 MG/1
CAPSULE ORAL
Qty: 30 CAPSULE | Refills: 5 | Status: SHIPPED | OUTPATIENT
Start: 2017-11-07 | End: 2017-12-01

## 2017-11-07 NOTE — TELEPHONE ENCOUNTER
Requested Prescriptions   Pending Prescriptions Disp Refills     FLUoxetine (PROZAC) 40 MG capsule 30 capsule 5    SSRIs Protocol Failed    11/6/2017 11:39 AM       Failed - PHQ-9 score less than 5 in past 6 months    Please review PHQ-9 score.   PHQ-9 score:    PHQ-9 SCORE 9/14/2017   Total Score -   Total Score MyChart -   Total Score 6            Passed - Medication is NOT Bupropion    If the medication is Bupropion (Wellbutrin), and the patient is taking for smoking cessation; OK to refill.         Passed - Patient is age 18 or older       Passed - No active pregnancy on record       Passed - No positive pregnancy test in last 12 months       Passed - Recent (6 mo) or future visit with authorizing provider's specialty    Patient had office visit in the last 6 months or has a visit in the next 30 days with authorizing provider.  See chart review.     Last ov 10/31/2017          Prescription approved per [facility] Refill Protocol.  Mayco Keys, RN, BSN

## 2017-11-07 NOTE — TELEPHONE ENCOUNTER
Refill not appropriate.  Rx sent to [facility] in [place] on 9/14/17 with a one month supply and 11 additional refills.    Solange Castro RN

## 2017-11-08 ENCOUNTER — TELEPHONE (OUTPATIENT)
Dept: SURGERY | Facility: CLINIC | Age: 47
End: 2017-11-08

## 2017-11-08 NOTE — TELEPHONE ENCOUNTER
Discussed path results showing only hyperplastic polyps.  Given unusally large size (some 1cm) I think these bear f/u.  Rec repeat colonoscopy in 1-2 years.  Pt understands and agrees.

## 2017-11-15 ENCOUNTER — OFFICE VISIT (OUTPATIENT)
Dept: ADDICTION MEDICINE | Facility: CLINIC | Age: 47
End: 2017-11-15
Payer: MEDICAID

## 2017-11-15 VITALS — SYSTOLIC BLOOD PRESSURE: 118 MMHG | HEART RATE: 89 BPM | DIASTOLIC BLOOD PRESSURE: 62 MMHG | TEMPERATURE: 98.5 F

## 2017-11-15 DIAGNOSIS — F17.200 NICOTINE USE DISORDER: ICD-10-CM

## 2017-11-15 DIAGNOSIS — F14.21 HISTORY OF COCAINE DEPENDENCE (H): ICD-10-CM

## 2017-11-15 DIAGNOSIS — D69.3 IDIOPATHIC THROMBOCYTOPENIC PURPURA (H): ICD-10-CM

## 2017-11-15 DIAGNOSIS — F41.1 GENERALIZED ANXIETY DISORDER: ICD-10-CM

## 2017-11-15 DIAGNOSIS — F11.20 OPIOID USE DISORDER, SEVERE, DEPENDENCE (H): Primary | ICD-10-CM

## 2017-11-15 DIAGNOSIS — F33.1 MODERATE EPISODE OF RECURRENT MAJOR DEPRESSIVE DISORDER (H): ICD-10-CM

## 2017-11-15 LAB
AMPHETAMINES UR QL: NOT DETECTED NG/ML
BARBITURATES UR QL SCN: NOT DETECTED NG/ML
BENZODIAZ UR QL SCN: NOT DETECTED NG/ML
BUPRENORPHINE UR QL: NOT DETECTED NG/ML
CANNABINOIDS UR QL: ABNORMAL NG/ML
COCAINE UR QL SCN: NOT DETECTED NG/ML
D-METHAMPHET UR QL: NOT DETECTED NG/ML
METHADONE UR QL SCN: NOT DETECTED NG/ML
OPIATES UR QL SCN: NOT DETECTED NG/ML
OXYCODONE UR QL SCN: ABNORMAL NG/ML
PCP UR QL SCN: NOT DETECTED NG/ML
PROPOXYPH UR QL: NOT DETECTED NG/ML
TRICYCLICS UR QL SCN: NOT DETECTED NG/ML

## 2017-11-15 PROCEDURE — 99205 OFFICE O/P NEW HI 60 MIN: CPT | Performed by: PEDIATRICS

## 2017-11-15 PROCEDURE — 80306 DRUG TEST PRSMV INSTRMNT: CPT | Performed by: PEDIATRICS

## 2017-11-15 RX ORDER — BUPRENORPHINE 8 MG/1
8 TABLET SUBLINGUAL ONCE
Qty: 5 EACH | Refills: 0 | Status: SHIPPED | OUTPATIENT
Start: 2017-11-15 | End: 2017-11-15

## 2017-11-15 NOTE — PROGRESS NOTES
SUBJECTIVE:                                                        Coretta Tovar is a 47 year old female who presents for  initial visit for addiction consultation and management referred by Sarah Rodriguez CNP    HPI:   Currently looking into going into treatment in [place], [place].        CD History:     DRUG OF CHOICE - Opioids.        LAST USE:  11/14  Hx of multiple surgeries (>15).   Since age 14  9 surgeries on right foot in past 15months for cpmplex fx ankle and foot.  C spine fusion many years ago.   Hx migraine headache.  S/p hysterectomy, tonsillectomy, splenectomy, ear surgeries.  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      Taking percocet 10mg up to 15 tab/days on average.  Previous Oxycodone until tapered.  Heroin use few times.    Has been getting from additional sources.   Seeking treatment as above.  They are ok with Suboxone.      Last percocet 30mg yesterday and few x this week.   Suboxone 8mg tab from friend (started with 4mg some days up to 16mg ).  Has had loss of control, craving, withdrawal sx, use despite consequences.          LONGEST PERIOD OF SOBRIETY-minimal    PREVIOUS DETOX/TREATMENT PROGRAMS-Previous for cocaine use     HISTORY OF OVERDOSE-none    PREVIOUS MEDICATION ASSISTED TREATMENT:  None prescribed.       Other Substances:    ALCOHOL-hardly ever  MARIJUANA-sporadically.    COCAINE/CRACK-Last July few days.  Crack regular use living on streets 6-7yr.    METH/AMPHETAMINES-few x  OPIATES-above  BENZODIAZEPINES-taken and has withdrawn off of in past.  No recent use.     NICOTINE-smoke PPD   Desire to quit -wants to quit           PAST PSYCHIATRIC HISTORY   Depression, anxiety.  No SI.    Several illness in past year with weight loss.      Patient Active Problem List    Diagnosis Date Noted     History of cocaine dependence (H) 09/07/2017     Priority: Medium     Moderate episode of recurrent major depressive disorder (H) 03/22/2017     Priority: Medium     Generalized anxiety disorder  03/22/2017     Priority: Medium     Protein-calorie malnutrition (H) 03/22/2017     Priority: Medium     Primary insomnia 03/22/2017     Priority: Medium     Vertigo 03/14/2017     Priority: Medium     Cervical pain 03/08/2017     Priority: Medium     Nutritional disorder 11/21/2016     Priority: Medium     Disorder of stomach 11/20/2016     Priority: Medium     Overview:   Per Biopsy obtained on EGD 11/22/2016. Reactive gastropathy was quoted to be frequently associated with ongoing non-inflammatory type mucosal injury due to a chemical type of injury; this may be due to ingestion of non-steroidal anti-inflammatory drugs, aspirin, excess alcohol, or corticosteroids.        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     Priority: Medium     Chemical dependency (H) 02/24/2015     Priority: Medium     Insomnia 02/24/2015     Priority: Medium     S/P splenectomy 01/13/2015     Priority: Medium     Migraines      Priority: Medium     Idiopathic thrombocytopenic purpura (HCC)      Priority: Medium     s/p splenectomy       Tobacco abuse 05/30/2014     Priority: Medium     Immune thrombocytopenic purpura (H) 01/14/2008     Priority: Medium     Personality disorder 01/14/2008     Priority: Medium     Overview:   cluster B traits  borderline, antisocial       Nondependent cannabis abuse, episodic 01/14/2008     Priority: Medium     Mixed, or nondependent drug abuse 01/26/2007     Priority: Medium     Overview:   recurrent issues with opioids.  History of crack cocaine abuse.       Asthma 12/20/2006     Priority: Medium     Gastroesophageal reflux disease 12/20/2006     Priority: Medium     Herpetic gingivostomatitis 12/20/2006     Priority: Medium     Muscle pain 12/20/2006     Priority: Medium     Atopic rhinitis 12/20/2006     Priority: Medium     Abnormal weight loss 12/20/2006     Priority: Medium       Problem list and  histories reviewed & adjusted, as indicated.  Additional history: as documented     Past Medical History:   Diagnosis Date     Endometriosis      Mary-mary disease      ITP (idiopathic thrombocytopaenic purpura) 1994    s/p splenectomy     Migraines        Past Surgical History:   Procedure Laterality Date     ANKLE SURGERY      Numerous right ankle surgeries     HERNIA REPAIR Left 1975     HYSTERECTOMY, PAP NO LONGER INDICATED      benign, endometriosis     NECK SURGERY  2000's    Cervical spine     SPLENECTOMY  1995     TONSILLECTOMY           Family History   Problem Relation Age of Onset     Neurologic Disorder Maternal Grandmother      migraines     Breast Cancer Maternal Grandmother      Neurologic Disorder Maternal Grandfather      migraines     Neurologic Disorder Brother      migraines     Breast Cancer Maternal Aunt      x 2     Breast Cancer Paternal Grandmother      Depression Daughter      Anxiety Disorder Daughter          Social History     Social History Narrative    Lives with dad and brother in [place].      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      Daughter with recent DWI (she has child).          Current Outpatient Prescriptions   Medication Sig Dispense Refill     FLUoxetine (PROZAC) 40 MG capsule TAKE 1 CAPSULE(40 MG) BY MOUTH DAILY 30 capsule 5     traZODone (DESYREL) 100 MG tablet TAKE 1 TO 3 TABLETS(100  MG) BY MOUTH NIGHTLY AS NEEDED FOR SLEEP 90 tablet 3     albuterol (PROAIR HFA/PROVENTIL HFA/VENTOLIN HFA) 108 (90 BASE) MCG/ACT Inhaler Inhale 2 puffs into the lungs every 6 hours as needed for shortness of breath / dyspnea or wheezing 1 Inhaler 0     cefdinir (OMNICEF) 300 MG capsule Take 1 capsule (300 mg) by mouth 2 times daily 20 capsule 0     ZOLMitriptan (ZOMIG) 5 MG tablet TAKE 1 TABLET BY MOUTH AT ONSET OF HEADACHE FOR MIGRAINE. MAY REPEAT DOSE IN 2 HOURS. DO NOT EXCEED 10MG IN 24 HOURS 12 tablet 4     ISOtretinoin (ACCUTANE PO)        loratadine (CLARITIN) 10 MG tablet Take 1  tablet (10 mg) by mouth daily 30 tablet 11     BusPIRone HCl 30 MG TABS Take 30 mg by mouth 2 times daily 60 tablet 3     valACYclovir (VALTREX) 1000 mg tablet Take 1 tablet (1,000 mg) by mouth 2 times daily 8 tablet 11     ondansetron (ZOFRAN-ODT) 4 MG ODT tab Take 1-2 tablets (4-8 mg) by mouth daily as needed for nausea 12 tablet 0     omeprazole (PRILOSEC) 40 MG capsule TAKE 1 CAPSULE(40 MG) BY MOUTH DAILY 30 TO 60 MINUTES BEFORE A MEAL 90 capsule 3     Neomycin-Bacitracin-Polymyxin (CVS TRIPLE ANTIBIOTIC) OINT Externally apply topically 4 times daily 60 g 3     triamcinolone (KENALOG) 0.1 % ointment Apply sparingly to affected area on back two times daily til clear. 80 g 0     sucralfate (CARAFATE) 1 GM tablet Take 1 tablet (1 g) by mouth 4 times daily 40 tablet 1     acyclovir (ZOVIRAX) 5 % ointment Apply 1 g topically as needed 30 g 11     olopatadine HCl (PATADAY) 0.2 % SOLN Place 1 drop into both eyes daily 2.5 mL 3     fluticasone (FLONASE) 50 MCG/ACT spray Spray 1-2 sprays into both nostrils daily 1 Bottle 11     clobetasol (TEMOVATE) 0.05 % ointment APPLY TOPICALLY BID  1     Protein POWD 30 g 2 times daily (Patient not taking: Reported on 9/14/2017) 454 g 1     albuterol (PROAIR HFA/PROVENTIL HFA/VENTOLIN HFA) 108 (90 BASE) MCG/ACT Inhaler        betamethasone valerate (VALISONE) 0.1 % ointment Apply once a day for 2 weeks as directed       hydrocortisone 2.5 % cream        ketoconazole (NIZORAL) 2 % cream        Protein POWD Take 30 g by mouth 2 times daily (Patient not taking: Reported on 10/31/2017) 454 g 1     order for DME Equipment being ordered: cool humidifier 1 each 0     Acetaminophen (TYLENOL PO) Take 1 tablet by mouth as needed           Allergies   Allergen Reactions     Acetaminophen-Codeine Hives     Amoxicillin-Pot Clavulanate Nausea and Vomiting     Augmentin Nausea and Vomiting and Hives     Barbiturates      Bentyl [Dicyclomine] Other (See Comments)     dizziness     Compazine       Lock-jaw     Liquid Adhesive      Other reaction(s): Other, see comments  blisters     No Clinical Screening - See Comments      PN: LW Reaction: blisters  PN: LW FI1: nka  PN: LW CM1: CONTRAST- nka Reaction :     Nsaids      D/t ITP     Prochlorperazine      Other reaction(s): Edema     Propoxyphene N-Apap Hives     Sulfa Drugs      Tramadol Hcl Hives            Adhesive Tape Rash         [place] Board of Pharmacy Data Base Reviewed:    YES; No Concerns Noted   11/16/2017        REVIEW OF SYSTEMS:    General: No acute withdrawal symptoms.  No recent infections or fever  Eyes: Negative for vision changes or eye problems  ENT: No problems with ears, nose or throat.  No difficulty swallowing.  Resp: No coughing, wheezing or shortness of breath  CV: No chest pains or palpitations  GI: No nausea, vomiting, abdominal pain, diarrhea, constipation  : No urinary frequency or dysuria,     Musculoskeletal: No significant muscle or joint pains, No edema  Neurologic: No numbness, tingling, weakness, problems with balance or coordination  Psychiatric: see above.   Skin: No rashes        OBJECTIVE:                                                      EXAM    Blood pressure 118/62, pulse 89, temperature 98.5  F (36.9  C), temperature source Oral, not currently breastfeeding.    GENERAL APPEARANCE: healthy, alert and no distress  EYES: Eyes grossly normal to inspection, PERRL and no nystagmus   HENT: ear canals and TM's normal, nose and mouth without ulcers or lesions and normal cephalic/atraumatic  NECK: no adenopathy, thyromegaly or masses  RESP: lungs clear to auscultation - no rales, rhonchi or wheezes and no resp distress  CV: regular rates and rhythm, normal S1 S2, no S3 or S4 and no murmur, click or rub  ABDOMEN: soft, nontender, without hepatosplenomegaly or masses  MS: extremities normal- no gross deformities noted, gait normal, peripheral pulses normal and no edema  SKIN: no rashes, no jaundice, no obvious masses.    NEURO: Normal strength and tone, sensory exam grossly normal, no tremor  MENTAL STATUS EXAM:  Appearance/Behavior: No apparent distress  Speech: Normal  Mood/Affect: normal affect  Insight: Adequate    Results for orders placed or performed in visit on 11/15/17   Urine Drugs of Abuse Screen Panel 13   Result Value Ref Range    Cannabinoids (07-[id number]-4-[id number]-9-THC) Detected, Abnormal Result (A) NDET^Not Detected ng/mL    Phencyclidine (Phencyclidine) Not Detected NDET^Not Detected ng/mL    Cocaine (Benzoylecgonine) Not Detected NDET^Not Detected ng/mL    Methamphetamine (d-Methamphetamine) Not Detected NDET^Not Detected ng/mL    Opiates (Morphine) Not Detected NDET^Not Detected ng/mL    Amphetamine (d-Amphetamine) Not Detected NDET^Not Detected ng/mL    Benzodiazepines (Nordiazepam) Not Detected NDET^Not Detected ng/mL    Tricyclic Antidepressants (Desipramine) Not Detected NDET^Not Detected ng/mL    Methadone (Methadone) Not Detected NDET^Not Detected ng/mL    Barbiturates (Butalbital) Not Detected NDET^Not Detected ng/mL    Oxycodone (Oxycodone) Detected, Abnormal Result (A) NDET^Not Detected ng/mL    Propoxyphene (Norpropoxyphene) Not Detected NDET^Not Detected ng/mL    Buprenorphine (Buprenorphine) Not Detected NDET^Not Detected ng/mL                        ASSESSMENT/PLAN:    (F11.20) Opioid use disorder, severe, dependence (H)  (primary encounter diagnosis)  Comment: Recent use.   Plan: buprenorphine (SUBUTEX) 8 MG SUBL sublingual         tablet        Begin with Suboxone 4 mg once withdrawal sx well established (>24 hr from last use) to avoid precipitated withdrawal.   Patient is aware of need for this.  May repeat dose in several hours if tolerated.   Then begin Suboxone  8mg daily  Follow up 2 days     Suboxone risk/benefit/side effect and intended purposes reviewed.    Risk of relapse/overdose with abrupt discontinuation discussed.    Risk of use of other substances such as other opioids/other medications  especially benzodiazepines/alcohol including risk of overdose/death reviewed  Encourage other services    Counseling   12 step or other self-help organizations     Refer to higher level of services as needed    Naloxone offered.  Patient has RX.       Opioid warning reviewed.  Risk of overdose following a period of abstinence due to decrease tolerance was discussed including risk of death.   Risk of overdose if using Suboxone with other substances particuarly benzodiazepines/alcohol was reviewed.       (F14.21) History of cocaine dependence (H)  Plan: no recent use.  Encourage ongoing sobriety    (F33.1) Moderate episode of recurrent major depressive disorder (H)  (F41.1) Generalized anxiety disorder  Plan: Continue prozac.  Follow with PCP    (D69.3) Idiopathic thrombocytopenic purpura (HCC)  Comment: No recent episodes  Plan: follow with PCP.  Avoid NSAIDS    (F17.200) Nicotine use disorder  Comment: not ready to quit  Plan: Encouraged Abstinence.  Increase risk of relapse with other substances with return to nicotine use discussed.  Risk of Ecig/Vaping also reviewed.                  ENCOUNTER FOR LONG TERM USE OF HIGH RISK MEDICATION   High Risk Drug Monitoring?  YES   Drug being monitored: Suboxone   Reason for drug: Opioid Use Disorder   What is being monitored?: Dosage, Cravings, Triggers and side effects         Jessy Brenner MD  [facility] PRIMARY CARE

## 2017-11-15 NOTE — MR AVS SNAPSHOT
After Visit Summary   11/15/2017    Coretta Tovar    MRN: [id number]           Patient Information     Date Of Birth          1970        Visit Information        Provider Department      11/15/2017 3:20 PM Jessy Brenner MD [facility]        Today's Diagnoses     Opioid use disorder, severe, dependence (H)    -  1    History of cocaine dependence (H)        Moderate episode of recurrent major depressive disorder (H)        Generalized anxiety disorder        Idiopathic thrombocytopenic purpura (HCC)        Nicotine use disorder           Follow-ups after your visit        Your next 10 appointments already scheduled     Nov 17, 2017  2:00 PM CST   [place] Visit with Jessy Brenner [place]   [facility] [place]    [address] [place] [address]  [address]  [facility] [id number] [phone]              Future tests that were ordered for you today     Open Standing Orders        Priority Remaining Interval Expires Ordered    Urine Drugs of Abuse Screen Panel 13 Routine 11/12 11/13/2018 11/13/2017            Who to contact     If you have questions or need follow up information about today's clinic visit or your schedule please contact [facility] [place] at [phone].  Normal or non-critical lab and imaging results will be communicated to you by MyChart, letter or phone within 4 business days after the clinic has received the results. If you do not hear from us within 7 days, please contact the clinic through MyChart or phone. If you have a critical or abnormal lab result, we will notify you by phone as soon as possible.  Submit refill requests through [employer] or call your pharmacy and they will forward the refill request to us. Please allow 3 business days for your refill to be completed.          Additional Information About Your Visit        MyChart Information      "Front Up lets you send messages to your doctor, view your test results, renew your prescriptions, schedule appointments and more. To sign up, go to [email or web address] . Click on \"Log in\" on the left side of the screen, which will take you to the Welcome page. Then click on \"Sign up Now\" on the right side of the page.     You will be asked to enter the access code listed below, as well as some personal information. Please follow the directions to create your username and password.     Your access code is: [email or web address]  Expires: 2017 10:18 AM     Your access code will  in 90 days. If you need help or a new code, please call your [place] clinic or [phone].        Care EveryWhere ID     This is your Care EveryWhere ID. This could be used by other organizations to access your [facility] [place] medical records  [id number]        Your Vitals Were     Pulse Temperature                89 98.5  F (36.9  C) (Oral)           Blood Pressure from Last 3 Encounters:   11/15/17 118/62   10/31/17 98/66   17 90/58    Weight from Last 3 Encounters:   10/31/17 130 lb (59 kg)   17 128 lb 3.2 oz (58.2 kg)   17 130 lb 12.8 oz (59.3 kg)              We Performed the Following     Urine Drugs of Abuse Screen Panel 13          Today's Medication Changes          These changes are accurate as of: 11/15/17 11:59 PM.  If you have any questions, ask your nurse or doctor.               Start taking these medicines.        Dose/Directions    buprenorphine 8 MG Subl sublingual tablet   Commonly known as:  SUBUTEX   Used for:  Opioid use disorder, severe, dependence (H)   Started by:  Jessy Brenner MD        Dose:  8 mg   Place 1 tablet (8 mg) under the tongue once for 1 dose   Quantity:  5 each   Refills:  0            Where to get your medicines      Some of these will need a paper prescription and others can be bought over the counter.  Ask your nurse if you have questions.     Bring a paper prescription " for each of these medications     buprenorphine 8 MG Subl sublingual tablet                Primary Care Provider Office Phone # Fax #    NINOSKA [facility] [phone] [address] [address] [place] [id number]        Equal Access to Services     ARELY JASON : Hadii aad ku hadasho Soomaali, waaxda luqadaha, qaybta kaalmada adeegyada, waxay ashvinin haysapnan adeeg munir sis . So [facility] [phone].    ATENCIÓN: Si habla español, tiene a roberts disposición servicios gratuitos de asistencia lingüística. Llame al [phone].    We comply with applicable federal civil rights laws and [place] laws. We do not discriminate on the basis of race, color, national origin, age, disability, sex, sexual orientation, or gender identity.            Thank you!     Thank you for choosing [facility] PRIMARY [facility]ARE  for your care. Our goal is always to provide you with excellent care. Hearing back from our patients is one way we can continue to improve our services. Please take a few minutes to complete the written survey that you may receive in the mail after your visit with us. Thank you!             Your Updated Medication List - Protect others around you: Learn how to safely use, store and throw away your medicines at www.disposemymeds.org.          This list is accurate as of: 11/15/17 11:59 PM.  Always use your most recent med list.                   Brand Name Dispense Instructions for use Diagnosis    ACCUTANE PO           acyclovir 5 % ointment    ZOVIRAX    30 g    Apply 1 g topically as needed    Recurrent cold sores       * albuterol 108 (90 BASE) MCG/ACT Inhaler    PROAIR HFA/PROVENTIL HFA/VENTOLIN HFA          * albuterol 108 (90 BASE) MCG/ACT Inhaler    PROAIR HFA/PROVENTIL HFA/VENTOLIN HFA    1 Inhaler    Inhale 2 puffs into the lungs every 6 hours as needed for shortness of breath / dyspnea or wheezing    Acute bronchitis with symptoms > 10 days       betamethasone valerate  0.1 % ointment    VALISONE     Apply once a day for 2 weeks as directed        buprenorphine 8 MG Subl sublingual tablet    SUBUTEX    5 each    Place 1 tablet (8 mg) under the tongue once for 1 dose    Opioid use disorder, severe, dependence (H)       BusPIRone HCl 30 MG Tabs     60 tablet    Take 30 mg by mouth 2 times daily    Generalized anxiety disorder       cefdinir 300 MG capsule    OMNICEF    20 capsule    Take 1 capsule (300 mg) by mouth 2 times daily    Acute bronchitis with symptoms > 10 days, Acute sinusitis with symptoms > 10 days       clobetasol 0.05 % ointment    TEMOVATE     APPLY TOPICALLY BID        CVS TRIPLE ANTIBIOTIC Oint     60 g    Externally apply topically 4 times daily    Allergic contact dermatitis due to adhesives       FLUoxetine 40 MG capsule    PROzac    30 capsule    TAKE 1 CAPSULE(40 MG) BY MOUTH DAILY    Moderate episode of recurrent major depressive disorder (H), Generalized anxiety disorder       fluticasone 50 MCG/ACT spray    FLONASE    1 Bottle    Spray 1-2 sprays into both nostrils daily    Seasonal allergic rhinitis, unspecified allergic rhinitis trigger       hydrocortisone 2.5 % cream           ketoconazole 2 % cream    NIZORAL          loratadine 10 MG tablet    CLARITIN    30 tablet    Take 1 tablet (10 mg) by mouth daily    Chronic nonseasonal allergic rhinitis due to pollen       olopatadine HCl 0.2 % Soln    PATADAY    2.5 mL    Place 1 drop into both eyes daily    Seasonal allergic rhinitis, unspecified allergic rhinitis trigger       omeprazole 40 MG capsule    priLOSEC    90 capsule    TAKE 1 CAPSULE(40 MG) BY MOUTH DAILY 30 TO 60 MINUTES BEFORE A MEAL    Gastroesophageal reflux disease, esophagitis presence not specified       ondansetron 4 MG ODT tab    ZOFRAN-ODT    12 tablet    Take 1-2 tablets (4-8 mg) by mouth daily as needed for nausea    Migraine without status migrainosus, not intractable, unspecified migraine type       order for DME     1 each     Equipment being ordered: cool humidifier    Painful respiration, Upper respiratory tract infection, unspecified type       * Protein Powd     454 g    Take 30 g by mouth 2 times daily    Protein-calorie malnutrition (H), Loss of weight       * Protein Powd     454 g    30 g 2 times daily    Protein-calorie malnutrition (H)       sucralfate 1 GM tablet    CARAFATE    40 tablet    Take 1 tablet (1 g) by mouth 4 times daily    Gastroesophageal reflux disease without esophagitis       traZODone 100 MG tablet    DESYREL    90 tablet    TAKE 1 TO 3 TABLETS(100  MG) BY MOUTH NIGHTLY AS NEEDED FOR SLEEP    Primary insomnia       triamcinolone 0.1 % ointment    KENALOG    80 g    Apply sparingly to affected area on back two times daily til clear.    Rash       TYLENOL PO      Take 1 tablet by mouth as needed    Chondritis of both external ears       valACYclovir 1000 mg tablet    VALTREX    8 tablet    Take 1 tablet (1,000 mg) by mouth 2 times daily    Recurrent cold sores       ZOLMitriptan 5 MG tablet    ZOMIG    12 tablet    TAKE 1 TABLET BY MOUTH AT ONSET OF HEADACHE FOR MIGRAINE. MAY REPEAT DOSE IN 2 HOURS. DO NOT EXCEED 10MG IN 24 HOURS    Migraine without aura and without status migrainosus, not intractable       * Notice:  This list has 4 medication(s) that are the same as other medications prescribed for you. Read the directions carefully, and ask your doctor or other care provider to review them with you.

## 2017-11-16 ENCOUNTER — TELEPHONE (OUTPATIENT)
Dept: INTERNAL MEDICINE | Facility: CLINIC | Age: 47
End: 2017-11-16

## 2017-11-16 ENCOUNTER — TELEPHONE (OUTPATIENT)
Dept: ADDICTION MEDICINE | Facility: CLINIC | Age: 47
End: 2017-11-16

## 2017-11-16 NOTE — TELEPHONE ENCOUNTER
She has an appt tomorrow and will discuss with the provider. Shes crying, panic attacks, some depression. Going to treatment soon and is having anxiety around that as well. Asked her to call us back if needing more help or appt..Marlena Lou RN

## 2017-11-16 NOTE — TELEPHONE ENCOUNTER
Prior Authorization Approval    Authorization Effective Date: 11/16/2017  Authorization Expiration Date: 11/10/2018  Medication: Buprenorphine HCI- approved   Approved Dose/Quantity:    Reference #:     Insurance Company: [place] Medicaid ([facility]) - Phone [phone] Fax [phone]  Expected CoPay: 0.00     CoPay Card Available:      Foundation Assistance Needed:    Which Pharmacy is filling the prescription (Not needed for infusion/clinic administered): [facility] [address] [place] [address]  Pharmacy Notified: Yes  Patient Notified: Yes

## 2017-11-16 NOTE — TELEPHONE ENCOUNTER
Reason for call:  Other   Patient called regarding (reason for call): call back  Additional comments: Patient would like to speak with A Rohit Roberts regarding medications and dosage. She is having anxiety.      Phone number to reach patient:  Home number on file [phone] (home)    Best Time:  Anytime    Can we leave a detailed message on this number?  YES

## 2017-11-16 NOTE — TELEPHONE ENCOUNTER
PA Initiation    Medication: Buprenorphine HCI- INITIATED   Insurance Company: [place] Medicaid ([facility] - Phone [phone] Fax [phone]  Pharmacy Filling the Rx: [facility] [address] [place] [address] - [address] [place] [address] & [facility]  Filling Pharmacy Phone: [phone]  Filling Pharmacy Fax:    Start Date: 11/16/2017

## 2017-11-16 NOTE — TELEPHONE ENCOUNTER
Prior Authorization Retail Medication Request  Medication/Dose: Buprenorphine HCI  Diagnosis and ICD code:   New/Renewal/Insurance Change PA: New  Previously Tried and Failed Therapies:     Insurance ID (if provided): Submit via [employer]. Key: [id number]  Insurance Phone (if provided):     Any additional info from fax request:     If you received a fax notification from an outside Pharmacy:  Pharmacy Name:[facility]  Pharmacy #:[phone]  Pharmacy Fax:[phone]

## 2017-11-17 ENCOUNTER — OFFICE VISIT (OUTPATIENT)
Dept: ADDICTION MEDICINE | Facility: CLINIC | Age: 47
End: 2017-11-17
Payer: MEDICAID

## 2017-11-17 VITALS
WEIGHT: 128.5 LBS | BODY MASS INDEX: 17.92 KG/M2 | OXYGEN SATURATION: 98 % | SYSTOLIC BLOOD PRESSURE: 102 MMHG | RESPIRATION RATE: 16 BRPM | DIASTOLIC BLOOD PRESSURE: 68 MMHG | HEART RATE: 88 BPM

## 2017-11-17 DIAGNOSIS — F11.20 OPIOID USE DISORDER, SEVERE, DEPENDENCE (H): ICD-10-CM

## 2017-11-17 DIAGNOSIS — F51.01 PRIMARY INSOMNIA: ICD-10-CM

## 2017-11-17 LAB
AMPHETAMINES UR QL: NOT DETECTED NG/ML
BARBITURATES UR QL SCN: NOT DETECTED NG/ML
BENZODIAZ UR QL SCN: ABNORMAL NG/ML
BUPRENORPHINE UR QL: ABNORMAL NG/ML
CANNABINOIDS UR QL: ABNORMAL NG/ML
COCAINE UR QL SCN: NOT DETECTED NG/ML
D-METHAMPHET UR QL: NOT DETECTED NG/ML
METHADONE UR QL SCN: NOT DETECTED NG/ML
OPIATES UR QL SCN: NOT DETECTED NG/ML
OXYCODONE UR QL SCN: ABNORMAL NG/ML
PCP UR QL SCN: NOT DETECTED NG/ML
PROPOXYPH UR QL: NOT DETECTED NG/ML
TRICYCLICS UR QL SCN: NOT DETECTED NG/ML

## 2017-11-17 PROCEDURE — 80306 DRUG TEST PRSMV INSTRMNT: CPT | Performed by: PEDIATRICS

## 2017-11-17 PROCEDURE — 80346 BENZODIAZEPINES1-12: CPT | Mod: 90 | Performed by: PEDIATRICS

## 2017-11-17 PROCEDURE — 99000 SPECIMEN HANDLING OFFICE-LAB: CPT | Performed by: PEDIATRICS

## 2017-11-17 PROCEDURE — 99214 OFFICE O/P EST MOD 30 MIN: CPT | Performed by: PEDIATRICS

## 2017-11-17 RX ORDER — TRAZODONE HYDROCHLORIDE 100 MG/1
150 TABLET ORAL AT BEDTIME
Qty: 90 TABLET | Refills: 3 | COMMUNITY
Start: 2017-11-17 | End: 2018-01-02

## 2017-11-17 RX ORDER — MIRTAZAPINE 15 MG/1
15 TABLET, FILM COATED ORAL AT BEDTIME
Qty: 30 TABLET | Refills: 0 | Status: SHIPPED | OUTPATIENT
Start: 2017-11-17 | End: 2017-11-30

## 2017-11-17 RX ORDER — BUPRENORPHINE HYDROCHLORIDE AND NALOXONE HYDROCHLORIDE DIHYDRATE 8; 2 MG/1; MG/1
1 TABLET SUBLINGUAL 2 TIMES DAILY
Qty: 28 EACH | Refills: 0 | Status: SHIPPED | OUTPATIENT
Start: 2017-11-17 | End: 2017-12-01

## 2017-11-17 NOTE — PROGRESS NOTES
SUBJECTIVE:                                                    OPIOID USE DISORDER - SUBOXONE FOLLOW UP:    Coretta Tovar is a 47 year old female who presents to clinic today for follow up of Suboxone MAT for opioid use disorder.     Date of last visit:  11/16/2017      Plan at last visit:   Suboxone start 4mg then increase to 8mg daily    [place] Board of Pharmacy Data Base Reviewed:    YES; No Concerns Noted   11/22/2017      HPI:  Patient was not able to fill medication until few days after last visit.   Started with Suboxone 4mg yesterday and currently taking 8mg daily. .  Has helped with craving.  Has been treated in past with Suboxone 8mg bid with good effect.  Still having high craving in pm. Still planning follow up treatment in [place], [place].   Intial rx was filled inadvertently for Subutex.  Differences between Suboxone and Subutex discussed.  Request refill of Remeron.   Denies use since last visit.        Status since last visit:      Since last visit patient has been:stable    There has been: moderate craving.      Suboxone Dose: too little.   Progression of Symptoms:     Cues to use and relapse triggers: craving    Recovery program has been: active.   Accompanying Signs & Symptoms:    Side Effects: none.    Sobriety:     Status: no use since last visit.      Drug Screen: obtained.   Precipitating factors:    Triggers have been: mild.   Alleviating factors:    Contact with sponsor has been: no sponsor.     Family and support system has been: helpful.   Other Therapies Tried :     Patient has been going to recovery meetings:sporadically.      Patient has been participating in professional counseling/therapy: planning            Social History     Social History Narrative    Lives with dad and brother in [place].      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      Daughter with recent DWI (she has child).        Patient Active Problem List    Diagnosis Date Noted     History of cocaine dependence (H) 09/07/2017      Priority: Medium     Moderate episode of recurrent major depressive disorder (H) 03/22/2017     Priority: Medium     Generalized anxiety disorder 03/22/2017     Priority: Medium     Protein-calorie malnutrition (H) 03/22/2017     Priority: Medium     Primary insomnia 03/22/2017     Priority: Medium     Vertigo 03/14/2017     Priority: Medium     Cervical pain 03/08/2017     Priority: Medium     Nutritional disorder 11/21/2016     Priority: Medium     Disorder of stomach 11/20/2016     Priority: Medium     Overview:   Per Biopsy obtained on EGD 11/22/2016. Reactive gastropathy was quoted to be frequently associated with ongoing non-inflammatory type mucosal injury due to a chemical type of injury; this may be due to ingestion of non-steroidal anti-inflammatory drugs, aspirin, excess alcohol, or corticosteroids.        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     Priority: Medium     Chemical dependency (H) 02/24/2015     Priority: Medium     Insomnia 02/24/2015     Priority: Medium     S/P splenectomy 01/13/2015     Priority: Medium     Migraines      Priority: Medium     Idiopathic thrombocytopenic purpura (HCC)      Priority: Medium     s/p splenectomy       Tobacco abuse 05/30/2014     Priority: Medium     Immune thrombocytopenic purpura (H) 01/14/2008     Priority: Medium     Personality disorder 01/14/2008     Priority: Medium     Overview:   cluster B traits  borderline, antisocial       Nondependent cannabis abuse, episodic 01/14/2008     Priority: Medium     Mixed, or nondependent drug abuse 01/26/2007     Priority: Medium     Overview:   recurrent issues with opioids.  History of crack cocaine abuse.       Asthma 12/20/2006     Priority: Medium     Gastroesophageal reflux disease 12/20/2006     Priority: Medium     Herpetic gingivostomatitis 12/20/2006     Priority: Medium     Muscle pain 12/20/2006     Priority:  Medium     Atopic rhinitis 12/20/2006     Priority: Medium     Abnormal weight loss 12/20/2006     Priority: Medium     Problem list and histories reviewed & adjusted, as indicated.  Additional history: as documented        Current Outpatient Prescriptions on File Prior to Visit:  FLUoxetine (PROZAC) 40 MG capsule TAKE 1 CAPSULE(40 MG) BY MOUTH DAILY   traZODone (DESYREL) 100 MG tablet TAKE 1 TO 3 TABLETS(100  MG) BY MOUTH NIGHTLY AS NEEDED FOR SLEEP   albuterol (PROAIR HFA/PROVENTIL HFA/VENTOLIN HFA) 108 (90 BASE) MCG/ACT Inhaler Inhale 2 puffs into the lungs every 6 hours as needed for shortness of breath / dyspnea or wheezing   cefdinir (OMNICEF) 300 MG capsule Take 1 capsule (300 mg) by mouth 2 times daily   ZOLMitriptan (ZOMIG) 5 MG tablet TAKE 1 TABLET BY MOUTH AT ONSET OF HEADACHE FOR MIGRAINE. MAY REPEAT DOSE IN 2 HOURS. DO NOT EXCEED 10MG IN 24 HOURS   ISOtretinoin (ACCUTANE PO)    loratadine (CLARITIN) 10 MG tablet Take 1 tablet (10 mg) by mouth daily   BusPIRone HCl 30 MG TABS Take 30 mg by mouth 2 times daily   valACYclovir (VALTREX) 1000 mg tablet Take 1 tablet (1,000 mg) by mouth 2 times daily   ondansetron (ZOFRAN-ODT) 4 MG ODT tab Take 1-2 tablets (4-8 mg) by mouth daily as needed for nausea   omeprazole (PRILOSEC) 40 MG capsule TAKE 1 CAPSULE(40 MG) BY MOUTH DAILY 30 TO 60 MINUTES BEFORE A MEAL   Neomycin-Bacitracin-Polymyxin ([employer]S TRIPLE ANTIBIOTIC) OINT Externally apply topically 4 times daily   triamcinolone (KENALOG) 0.1 % ointment Apply sparingly to affected area on back two times daily til clear.   sucralfate (CARAFATE) 1 GM tablet Take 1 tablet (1 g) by mouth 4 times daily   acyclovir (ZOVIRAX) 5 % ointment Apply 1 g topically as needed   olopatadine HCl (PATADAY) 0.2 % SOLN Place 1 drop into both eyes daily   fluticasone (FLONASE) 50 MCG/ACT spray Spray 1-2 sprays into both nostrils daily   clobetasol (TEMOVATE) 0.05 % ointment APPLY TOPICALLY BID   Protein POWD 30 g 2 times daily  (Patient not taking: Reported on 9/14/2017)   albuterol (PROAIR HFA/PROVENTIL HFA/VENTOLIN HFA) 108 (90 BASE) MCG/ACT Inhaler    betamethasone valerate (VALISONE) 0.1 % ointment Apply once a day for 2 weeks as directed   hydrocortisone 2.5 % cream    ketoconazole (NIZORAL) 2 % cream    Protein POWD Take 30 g by mouth 2 times daily (Patient not taking: Reported on 10/31/2017)   order for DME Equipment being ordered: cool humidifier   Acetaminophen (TYLENOL PO) Take 1 tablet by mouth as needed     Current Facility-Administered Medications on File Prior to Visit:  Lidocaine 1 % injection 9 mL   sodium bicarbonate 8.4 % injection 1 mEq          Allergies   Allergen Reactions     Acetaminophen-Codeine Hives     Amoxicillin-Pot Clavulanate Nausea and Vomiting     Augmentin Nausea and Vomiting and Hives     Barbiturates      Bentyl [Dicyclomine] Other (See Comments)     dizziness     Compazine      Lock-jaw     Liquid Adhesive      Other reaction(s): Other, see comments  blisters     No Clinical Screening - See Comments      PN: LW Reaction: blisters  PN: LW FI1: nka  PN: LW CM1: CONTRAST- nka Reaction :     Nsaids      D/t ITP     Prochlorperazine      Other reaction(s): Edema     Propoxyphene N-Apap Hives     Sulfa Drugs      Tramadol Hcl Hives            Adhesive Tape Rash           REVIEW OF SYSTEMS:  General: No acute withdrawal symptoms.  No recent infections or fever  Resp: No coughing, wheezing or shortness of breath  CV: No chest pains or palpitations  GI: No nausea, vomiting, abdominal pain, diarrhea, constipation  : No urinary frequency or dysuria,     Musculoskeletal: No significant muscle or joint pains, No edema  Neurologic: No numbness, tingling, weakness, problems with balance or coordination  Psychiatric: see above  Skin: No rashes    OBJECTIVE:    PHYSICAL EXAM:  /68  Pulse 88  Resp 16  Wt 128 lb 8 oz (58.3 kg)  SpO2 98%  BMI 17.92 kg/m2    GENERAL APPEARANCE: healthy, alert and no  distress  EYES: Eyes grossly normal to inspection, PERRL and conjunctivae and sclerae normal  NEURO:  Gait normal.  No tremor. Coordination intact.   MENTAL STATUS EXAM:  Appearance/Behavior: No apparent distress  Speech: Normal  Mood/Affect: normal affect  Insight: Adequate      Results for orders placed or performed in visit on 11/17/17   Urine Drugs of Abuse Screen Panel 13   Result Value Ref Range    Cannabinoids (27-[id number]-[id number]-9-THC) Detected, Abnormal Result (A) NDET^Not Detected ng/mL    Phencyclidine (Phencyclidine) Not Detected NDET^Not Detected ng/mL    Cocaine (Benzoylecgonine) Not Detected NDET^Not Detected ng/mL    Methamphetamine (d-Methamphetamine) Not Detected NDET^Not Detected ng/mL    Opiates (Morphine) Not Detected NDET^Not Detected ng/mL    Amphetamine (d-Amphetamine) Not Detected NDET^Not Detected ng/mL    Benzodiazepines (Nordiazepam) Detected, Abnormal Result (A) NDET^Not Detected ng/mL    Tricyclic Antidepressants (Desipramine) Not Detected NDET^Not Detected ng/mL    Methadone (Methadone) Not Detected NDET^Not Detected ng/mL    Barbiturates (Butalbital) Not Detected NDET^Not Detected ng/mL    Oxycodone (Oxycodone) Detected, Abnormal Result (A) NDET^Not Detected ng/mL    Propoxyphene (Norpropoxyphene) Not Detected NDET^Not Detected ng/mL    Buprenorphine (Buprenorphine) Detected, Abnormal Result (A) NDET^Not Detected ng/mL       ASSESSMENT/PLAN:    (F11.20) Opioid use disorder, severe, dependence (H)  (primary encounter diagnosis)  Comment: Recent use.   Plan:    Suboxone  8mg bid.  Rx #28  Follow up 2 weeks.      Suboxone risk/benefit/side effect and intended purposes reviewed.    Risk of relapse/overdose with abrupt discontinuation discussed.    Risk of use of other substances such as other opioids/other medications especially benzodiazepines/alcohol including risk of overdose/death reviewed  Encourage other services                         Counseling                        12 step or  other self-help organizations      Refer to higher level of services as needed        Opioid warning reviewed.  Risk of overdose following a period of abstinence due to decrease tolerance was discussed including risk of death.   Risk of overdose if using Suboxone with other substances particuarly benzodiazepines/alcohol was reviewed.         (F14.21) History of cocaine dependence (H)  Plan: no recent use.  Encourage ongoing sobriety     (F33.1) Moderate episode of recurrent major depressive disorder (H)  (F41.1) Generalized anxiety disorder  Plan: Continue prozac.  Follow with PCP     (D69.3) Idiopathic thrombocytopenic purpura (HCC)  Comment: No recent episodes  Plan: follow with PCP.  Avoid NSAIDS     (F17.200) Nicotine use disorder  Comment: not ready to quit  Plan: Encouraged Abstinence.  Increase risk of relapse with other substances with return to nicotine use discussed.  Risk of Ecig/Vaping also reviewed.             ENCOUNTER FOR LONG TERM USE OF HIGH RISK MEDICATION   High Risk Drug Monitoring?  YES   Drug being monitored: Suboxone   Reason for drug: Opioid Use Disorder   What is being monitored?: Dosage, Cravings, Trigger, side effects, and continued abstinence.      Opioid warning reviewed.  Risk of overdose following a period of abstinence due to decrease tolerance was discussed including risk of death.   Risk of overdose if using Suboxone with other substances particuarly benzodiazepines/alcohol was reviewed.           Jessy Brenner MD  [facility]  [phone]

## 2017-11-17 NOTE — MR AVS SNAPSHOT
"              After Visit Summary   11/17/2017    Coretta Tovar    MRN: [id number]           Patient Information     Date Of Birth          1970        Visit Information        Provider Department      11/17/2017 2:00 PM Jessy Brenner MD [facility]        Today's Diagnoses     Opioid use disorder, severe, dependence (H)        Primary insomnia           Follow-ups after your visit        Your next 10 appointments already scheduled     Dec 01, 2017  8:00 AM CST   Return Visit with Jessy Brenner MD   [facility] ([facility]    [address]  [address]  [facility] [address] [phone]            Dec 01, 2017  8:30 AM CST   New Visit with Radha Winston MD   [facility] ([facility])    [address]  [facility] [id number] [phone]              Who to contact     If you have questions or need follow up information about today's clinic visit or your schedule please contact [facility] directly at [phone].  Normal or non-critical lab and imaging results will be communicated to you by Unifysquarehart, letter or phone within 4 business days after the clinic has received the results. If you do not hear from us within 7 days, please contact the clinic through Unifysquarehart or phone. If you have a critical or abnormal lab result, we will notify you by phone as soon as possible.  Submit refill requests through [employer] or call your pharmacy and they will forward the refill request to us. Please allow 3 business days for your refill to be completed.          Additional Information About Your Visit        Unifysquarehart Information     [employer]Spaces lets you send messages to your doctor, view your test results, renew your prescriptions, schedule appointments and more. To sign up, go to [email or web address] . Click on \"Log in\" on the " "left side of the screen, which will take you to the Welcome page. Then click on \"Sign up Now\" on the right side of the page.     You will be asked to enter the access code listed below, as well as some personal information. Please follow the directions to create your username and password.     Your access code is: [email or web address]  Expires: 2017 10:18 AM     Your access code will  in 90 days. If you need help or a new code, please call your [facility] or [phone].        Care EveryWhere ID     This is your Care EveryWhere ID. This could be used by other organizations to access your [facility] medical records  [id number]        Your Vitals Were     Pulse Respirations Pulse Oximetry BMI (Body Mass Index)          88 16 98% 17.92 kg/m2         Blood Pressure from Last 3 Encounters:   17 102/68   11/15/17 118/62   10/31/17 98/66    Weight from Last 3 Encounters:   17 128 lb 8 oz (58.3 kg)   10/31/17 130 lb (59 kg)   17 128 lb 3.2 oz (58.2 kg)              We Performed the Following     Benzodiazepine Confirmatory Urine Qual     Urine Drugs of Abuse Screen Panel 13          Today's Medication Changes          These changes are accurate as of: 17 11:59 PM.  If you have any questions, ask your nurse or doctor.               Start taking these medicines.        Dose/Directions    buprenorphine-naloxone 8-2 MG Subl sublingual tablet   Commonly known as:  SUBOXONE   Used for:  Opioid use disorder, severe, dependence (H)   Started by:  Jessy Brenner MD        Dose:  1 tablet   Place 1 tablet under the tongue 2 times daily   Quantity:  28 each   Refills:  0       mirtazapine 15 MG tablet   Commonly known as:  REMERON   Used for:  Primary insomnia   Started by:  Jessy Brenner MD        Dose:  15 mg   Take 1 tablet (15 mg) by mouth At Bedtime   Quantity:  30 tablet   Refills:  0         These medicines have changed or have updated prescriptions.        Dose/Directions    " traZODone 100 MG tablet   Commonly known as:  DESYREL   This may have changed:  See the new instructions.   Used for:  Primary insomnia   Changed by:  Jessy Brenner MD        Dose:  150 mg   1.5 tablets (150 mg) At Bedtime   Quantity:  90 tablet   Refills:  3            Where to get your medicines      These medications were sent to [facility] [address] [place], [place] - [address] [facility] [address] [facility]  [address] [facility] [address]     Phone:  [phone]     mirtazapine 15 MG tablet         Some of these will need a paper prescription and others can be bought over the counter.  Ask your nurse if you have questions.     Bring a paper prescription for each of these medications     buprenorphine-naloxone 8-2 MG Subl sublingual tablet                Primary Care Provider Office Phone # Fax #    Adina NINOSKA Maldonado [facility] [phone] [address] [address] [place] [id number]        Equal Access to Services     SALEEM JASON AH: Hadii aad ku hadasho Soomaali, waaxda luqadaha, qaybta kaalmada adeegyada, waxay idiin hayaan hitesh alegre . So [facility] [phone].    ATENCIÓN: Si habla español, tiene a roberts disposición servicios gratuitos de asistencia lingüística. [facility] [phone].    We comply with applicable federal civil rights laws and [place] laws. We do not discriminate on the basis of race, color, national origin, age, disability, sex, sexual orientation, or gender identity.            Thank you!     Thank you for choosing [facility]  for your care. Our goal is always to provide you with excellent care. Hearing back from our patients is one way we can continue to improve our services. Please take a few minutes to complete the written survey that you may receive in the mail after your visit with us. Thank you!             Your Updated Medication List - Protect others around you: Learn how to safely use, store and throw away  your medicines at www.disposemymeds.org.          This list is accurate as of: 11/17/17 11:59 PM.  Always use your most recent med list.                   Brand Name Dispense Instructions for use Diagnosis    ACCUTANE PO           acyclovir 5 % ointment    ZOVIRAX    30 g    Apply 1 g topically as needed    Recurrent cold sores       albuterol 108 (90 BASE) MCG/ACT Inhaler    PROAIR HFA/PROVENTIL HFA/VENTOLIN HFA          betamethasone valerate 0.1 % ointment    VALISONE     Apply once a day for 2 weeks as directed        buprenorphine-naloxone 8-2 MG Subl sublingual tablet    SUBOXONE    28 each    Place 1 tablet under the tongue 2 times daily    Opioid use disorder, severe, dependence (H)       BusPIRone HCl 30 MG Tabs     60 tablet    Take 30 mg by mouth 2 times daily    Generalized anxiety disorder       cefdinir 300 MG capsule    OMNICEF    20 capsule    Take 1 capsule (300 mg) by mouth 2 times daily    Acute bronchitis with symptoms > 10 days, Acute sinusitis with symptoms > 10 days       clobetasol 0.05 % ointment    TEMOVATE     APPLY TOPICALLY BID        [employer]S TRIPLE ANTIBIOTIC Oint     60 g    Externally apply topically 4 times daily    Allergic contact dermatitis due to adhesives       FLUoxetine 40 MG capsule    PROzac    30 capsule    TAKE 1 CAPSULE(40 MG) BY MOUTH DAILY    Moderate episode of recurrent major depressive disorder (H), Generalized anxiety disorder       fluticasone 50 MCG/ACT spray    FLONASE    1 Bottle    Spray 1-2 sprays into both nostrils daily    Seasonal allergic rhinitis, unspecified allergic rhinitis trigger       hydrocortisone 2.5 % cream           ketoconazole 2 % cream    NIZORAL          loratadine 10 MG tablet    CLARITIN    30 tablet    Take 1 tablet (10 mg) by mouth daily    Chronic nonseasonal allergic rhinitis due to pollen       mirtazapine 15 MG tablet    REMERON    30 tablet    Take 1 tablet (15 mg) by mouth At Bedtime    Primary insomnia       olopatadine HCl 0.2 %  Soln    PATADAY    2.5 mL    Place 1 drop into both eyes daily    Seasonal allergic rhinitis, unspecified allergic rhinitis trigger       omeprazole 40 MG capsule    priLOSEC    90 capsule    TAKE 1 CAPSULE(40 MG) BY MOUTH DAILY 30 TO 60 MINUTES BEFORE A MEAL    Gastroesophageal reflux disease, esophagitis presence not specified       ondansetron 4 MG ODT tab    ZOFRAN-ODT    12 tablet    Take 1-2 tablets (4-8 mg) by mouth daily as needed for nausea    Migraine without status migrainosus, not intractable, unspecified migraine type       order for DME     1 each    Equipment being ordered: cool humidifier    Painful respiration, Upper respiratory tract infection, unspecified type       * Protein Powd     454 g    Take 30 g by mouth 2 times daily    Protein-calorie malnutrition (H), Loss of weight       * Protein Powd     454 g    30 g 2 times daily    Protein-calorie malnutrition (H)       sucralfate 1 GM tablet    CARAFATE    40 tablet    Take 1 tablet (1 g) by mouth 4 times daily    Gastroesophageal reflux disease without esophagitis       traZODone 100 MG tablet    DESYREL    90 tablet    1.5 tablets (150 mg) At Bedtime    Primary insomnia       triamcinolone 0.1 % ointment    KENALOG    80 g    Apply sparingly to affected area on back two times daily til clear.    Rash       TYLENOL PO      Take 1 tablet by mouth as needed    Chondritis of both external ears       valACYclovir 1000 mg tablet    VALTREX    8 tablet    Take 1 tablet (1,000 mg) by mouth 2 times daily    Recurrent cold sores       ZOLMitriptan 5 MG tablet    ZOMIG    12 tablet    TAKE 1 TABLET BY MOUTH AT ONSET OF HEADACHE FOR MIGRAINE. MAY REPEAT DOSE IN 2 HOURS. DO NOT EXCEED 10MG IN 24 HOURS    Migraine without aura and without status migrainosus, not intractable       * Notice:  This list has 2 medication(s) that are the same as other medications prescribed for you. Read the directions carefully, and ask your doctor or other care provider to  review them with you.

## 2017-11-18 DIAGNOSIS — J20.9 ACUTE BRONCHITIS WITH SYMPTOMS > 10 DAYS: ICD-10-CM

## 2017-11-21 ENCOUNTER — TELEPHONE (OUTPATIENT)
Dept: ADDICTION MEDICINE | Facility: CLINIC | Age: 47
End: 2017-11-21

## 2017-11-21 ENCOUNTER — TELEPHONE (OUTPATIENT)
Dept: INTERNAL MEDICINE | Facility: CLINIC | Age: 47
End: 2017-11-21

## 2017-11-21 RX ORDER — ALBUTEROL SULFATE 90 UG/1
AEROSOL, METERED RESPIRATORY (INHALATION)
Qty: 18 G | Refills: 2 | Status: SHIPPED | OUTPATIENT
Start: 2017-11-21 | End: 2018-01-29

## 2017-11-21 NOTE — TELEPHONE ENCOUNTER
Reason for Call:  Other call back    Detailed comments: Pt would like a call back from a nurse to discuss what to do while her subx PA is being processed. Pt is restricted to a PCP at the [facility], and that clinic has no yet completed a request for Dr. Brenner to be added as a prescribing provider, pending. Pt is very worried about going to withdrawal and would like a call back to discuss what to do next, since she has been off subx for a few days.     Phone Number Patient can be reached at: Home number on file [phone] (home)    Best Time: anytime    Can we leave a detailed message on this number? YES    Call taken on 11/21/2017 at 3:22 PM by Kasie Mcginnis

## 2017-11-21 NOTE — TELEPHONE ENCOUNTER
See 10/31/17 addiction medicine referral  Please advise if there is something missing in this    Val Teran RN

## 2017-11-21 NOTE — TELEPHONE ENCOUNTER
Review of chart indicates that this is actively being worked on from Pt's PCP office.      Writer called Pt and discussed that writer reviewed chart and sees that clinic is actively working on RRR.    Uzair Flores RN

## 2017-11-21 NOTE — TELEPHONE ENCOUNTER
Sending to referrals team to please assist with the referral form for restricted patients    Val Teran RN

## 2017-11-21 NOTE — TELEPHONE ENCOUNTER
Reason for Call:  Other prescription    Detailed comments: juanjo Huerta  prior [facility] department called to state:  Due to the patient being restricted to [facility], an referral form to see Dr. Brenner is needed for the patient to receive the script Suboxone,     Phone Number Patient can be reached at: Other phone number:  Please contact juanjo to discuss further if needed    Best Time: today    Can we leave a detailed message on this number? Not Applicable    Call taken on 11/21/2017 at 2:23 PM by Jhonny Gary

## 2017-11-21 NOTE — TELEPHONE ENCOUNTER
A referral needs to be put into [facility] before we can complete the restricted referral. Please advise when this has been done and we will process.     Thank you,   Tenisha Farias   Referral Department  [phone]

## 2017-11-21 NOTE — TELEPHONE ENCOUNTER
Patient is asking for urgency to get the referral sent over to [facility] and to Dr. Brenner office. She is having withdraws. VM OK.  Thank-you,  .Ofe Alvarez

## 2017-11-22 NOTE — TELEPHONE ENCOUNTER
Call to Aniya Clay and received all information to complete restricted [facility] form online. This has been submitted for treatment dates of 11/21/17-11/20/18 for patient to see Dr. Jessy Brenner.    Thank you,   Tenisha Farias   [facility]  [phone]

## 2017-11-25 LAB
A-OH ALPRAZ UR QL CFM: NEGATIVE
ALPRAZ UR QL CFM: NEGATIVE
LORAZEPAM UR QL CFM: NEGATIVE
NORDIAZEPAM UR QL CFM: NEGATIVE
OXAZEPAM UR QL CFM: NEGATIVE
TEMAZEPAM UR QL CFM: NEGATIVE

## 2017-11-29 NOTE — PROGRESS NOTES
SUBJECTIVE:   Coretta Tovar is a 47 year old female who presents to clinic today for the following health issues:      RESPIRATORY SYMPTOMS      Duration: 4 days    Description  nasal congestion, rhinorrhea, sore throat, facial pain/pressure, cough, wheezing, chills, headache, fatigue/malaise, hoarse voice, myalgias and nausea    Severity: moderate    Accompanying signs and symptoms: None    History (predisposing factors):  Inpatient treatment and was exposed to strep and other respiratory issues    Precipitating or alleviating factors: None    Therapies tried and outcome:  None, not able to get medications.    Patient has history of splenectomy.  She is currently in inpatient rehab at [facility].  Patient is also restricted to [facility] through [facility] and asks for referrals so that she can continues to see her dermatologist, ENT, and get care for colds through providers at Rehab.  She also needs several of her medications changed to prn so that rehab staff does not give her routinely.    Problem list and histories reviewed & adjusted, as indicated.  Additional history: as documented    Patient Active Problem List   Diagnosis     Tobacco abuse     S/P splenectomy     Migraines     Idiopathic thrombocytopenic purpura (HCC)     Chemical dependency (H)     Insomnia     Mary-mary disease     Vomiting     Abdominal pain, acute     Disorder of stomach     Cervical pain     Vertigo     Asthma     Gastroesophageal reflux disease     Herpetic gingivostomatitis     Immune thrombocytopenic purpura (H)     Muscle pain     Personality disorder     Nondependent cannabis abuse, episodic     Atopic rhinitis     Nutritional disorder     Abnormal weight loss     Mixed, or nondependent drug abuse     Moderate episode of recurrent major depressive disorder (H)     Generalized anxiety disorder     Protein-calorie malnutrition (H)     Primary insomnia     History of cocaine dependence (H)     Past Surgical History:   Procedure  Laterality Date     ANKLE SURGERY      Numerous right ankle surgeries     HERNIA REPAIR Left 1975     HYSTERECTOMY, PAP NO LONGER INDICATED      benign, endometriosis     NECK SURGERY  2000's    Cervical spine     SPLENECTOMY  1995     TONSILLECTOMY         Social History   Substance Use Topics     Smoking status: Current Every Day Smoker     Packs/day: 1.00     Years: 15.00     Types: Cigarettes     Last attempt to quit: 11/6/2015     Smokeless tobacco: Never Used     Alcohol use Yes      Comment: Drinks once in 4 - 5 months     Family History   Problem Relation Age of Onset     Neurologic Disorder Maternal Grandmother      migraines     Breast Cancer Maternal Grandmother      Neurologic Disorder Maternal Grandfather      migraines     Neurologic Disorder Brother      migraines     Breast Cancer Maternal Aunt      x 2     Breast Cancer Paternal Grandmother      Depression Daughter      Anxiety Disorder Daughter          Current Outpatient Prescriptions   Medication Sig Dispense Refill     ZOLMitriptan (ZOMIG) 5 MG tablet TAKE 1 TABLET BY MOUTH AT ONSET OF HEADACHE FOR MIGRAINE. MAY REPEAT DOSE IN 2 HOURS. DO NOT EXCEED 10MG IN 24 HOURS 12 tablet 4     loratadine (CLARITIN) 10 MG tablet Take 1 tablet (10 mg) by mouth daily as needed for allergies 30 tablet 11     valACYclovir (VALTREX) 1000 mg tablet Take 1 tablet (1,000 mg) by mouth 2 times daily At the onset of skin lesions as needed 8 tablet 11     acyclovir (ZOVIRAX) 5 % ointment Apply 1 g topically as needed 30 g 11     nicotine (NICODERM CQ) 21 MG/24HR 24 hr patch Place 1 patch onto the skin every 24 hours As needed. 28 patch 1     albuterol (PROAIR HFA/PROVENTIL HFA/VENTOLIN HFA) 108 (90 BASE) MCG/ACT Inhaler Inhale 1-2 puffs into the lungs every 4 hours as needed for shortness of breath / dyspnea or wheezing 18 g 3     azithromycin (ZITHROMAX) 250 MG tablet Two tablets first day, then one tablet daily for four days. 6 tablet 0     dextromethorphan (DELSYM)  30 MG/5ML liquid Take 5 mLs (30 mg) by mouth 2 times daily as needed for cough 148 mL 1     [DISCONTINUED] mirtazapine (REMERON) 15 MG tablet Take 1 tablet (15 mg) by mouth At Bedtime 30 tablet 3     VENTOLIN  (90 BASE) MCG/ACT Inhaler INHALE 2 PUFFS INTO THE LUNGS EVERY 6 HOURS AS NEEDED FOR SHORTNESS OF BREATH OR DIFFICULT BREATHING OR WHEEZING 18 g 2     traZODone (DESYREL) 100 MG tablet 1.5 tablets (150 mg) At Bedtime 90 tablet 3     [DISCONTINUED] FLUoxetine (PROZAC) 40 MG capsule TAKE 1 CAPSULE(40 MG) BY MOUTH DAILY 30 capsule 5     ISOtretinoin (ACCUTANE PO)        BusPIRone HCl 30 MG TABS Take 30 mg by mouth 2 times daily 60 tablet 3     ondansetron (ZOFRAN-ODT) 4 MG ODT tab Take 1-2 tablets (4-8 mg) by mouth daily as needed for nausea 12 tablet 0     Neomycin-Bacitracin-Polymyxin ([employer] TRIPLE ANTIBIOTIC) OINT Externally apply topically 4 times daily 60 g 3     triamcinolone (KENALOG) 0.1 % ointment Apply sparingly to affected area on back two times daily til clear. 80 g 0     olopatadine HCl (PATADAY) 0.2 % SOLN Place 1 drop into both eyes daily 2.5 mL 3     fluticasone (FLONASE) 50 MCG/ACT spray Spray 1-2 sprays into both nostrils daily 1 Bottle 11     clobetasol (TEMOVATE) 0.05 % ointment APPLY TOPICALLY BID  1     betamethasone valerate (VALISONE) 0.1 % ointment Apply once a day for 2 weeks as directed       hydrocortisone 2.5 % cream        ketoconazole (NIZORAL) 2 % cream        Acetaminophen (TYLENOL PO) Take 1 tablet by mouth as needed       FLUoxetine (PROZAC) 40 MG capsule TAKE 1 CAPSULE(40 MG) BY MOUTH DAILY. Take with 20mg Fluoxetine by mouth daily to equal total daily dose of 60mg per day. 30 capsule 0     FLUoxetine (PROZAC) 20 MG capsule Take 1 capsule (20 mg) by mouth daily Take with 40mg of fluoxetine daily to equal total daily dose of 60 mg per day. 30 capsule 0     mirtazapine (REMERON) 7.5 MG TABS tablet Take 1 tablet (7.5 mg) by mouth At Bedtime 30 tablet 0     traZODone (DESYREL)  "50 MG tablet Take 50 to 100mg by mouth at night as needed for insomnia. 30 tablet 0     omeprazole (PRILOSEC) 40 MG capsule TAKE 1 CAPSULE(40 MG) BY MOUTH DAILY 30 TO 60 MINUTES BEFORE A MEAL 90 capsule 3     docusate sodium (COLACE) 100 MG tablet Take 100 mg by mouth 2 times daily as needed for constipation 60 tablet 1     buprenorphine-naloxone (SUBOXONE) 8-2 MG SUBL sublingual tablet Place 1 tablet under the tongue 2 times daily 60 each 1     order for DME Equipment being ordered: cool humidifier 1 each 0     Allergies   Allergen Reactions     Acetaminophen-Codeine Hives     Amoxicillin-Pot Clavulanate Nausea and Vomiting     Augmentin Nausea and Vomiting and Hives     Barbiturates      Bentyl [Dicyclomine] Other (See Comments)     dizziness     Compazine      Lock-jaw     Liquid Adhesive      Other reaction(s): Other, see comments  blisters     No Clinical Screening - See Comments      PN: LW Reaction: blisters  PN: LW FI1: nka  PN: LW CM1: CONTRAST- nka Reaction :     Nsaids      D/t ITP     Prochlorperazine      Other reaction(s): Edema     Propoxyphene N-Apap Hives     Sulfa Drugs      Tramadol Hcl Hives            Adhesive Tape Rash     BP Readings from Last 3 Encounters:   12/01/17 98/62   11/30/17 90/60   11/17/17 102/68    Wt Readings from Last 3 Encounters:   12/01/17 129 lb (58.5 kg)   11/30/17 131 lb (59.4 kg)   11/17/17 128 lb 8 oz (58.3 kg)                  Labs reviewed in EPIC        Reviewed and updated as needed this visit by clinical staff       Reviewed and updated as needed this visit by Provider         ROS:  Constitutional, HEENT, cardiovascular, pulmonary, gi and gu systems are negative, except as otherwise noted.      OBJECTIVE:   BP 90/60  Pulse 74  Temp 97.3  F (36.3  C) (Oral)  Resp 16  Ht 5' 11\" (1.803 m)  Wt 131 lb (59.4 kg)  SpO2 98%  Breastfeeding? No  BMI 18.27 kg/m2  Body mass index is 18.27 kg/(m^2).  GENERAL: healthy, alert and no distress  EYES: Eyes grossly normal to " inspection, PERRL and conjunctivae and sclerae normal  HENT: normal cephalic/atraumatic, ear canals and TM's normal, nose and mouth without ulcers or lesions, nasal mucosa edematous , oropharynx clear and oral mucous membranes moist  NECK: no adenopathy, no asymmetry, masses, or scars and thyroid normal to palpation  RESP: lungs clear to auscultation - no rales, rhonchi or wheezes  CV: regular rate and rhythm, normal S1 S2, no S3 or S4, no murmur, click or rub, no peripheral edema and peripheral pulses strong  MS: no gross musculoskeletal defects noted, no edema    Diagnostic Test Results:  Results for orders placed or performed in visit on 11/30/17   Strep, Rapid Screen   Result Value Ref Range    Specimen Description Throat     Rapid Strep A Screen       NEGATIVE: No Group A streptococcal antigen detected by immunoassay, await culture report.   Beta strep group A culture   Result Value Ref Range    Specimen Description Throat     Culture Micro No beta hemolytic Streptococcus Group A isolated        ASSESSMENT/PLAN:     1. URI with cough and congestion  Will treat given history of splenectomy.  - azithromycin (ZITHROMAX) 250 MG tablet; Two tablets first day, then one tablet daily for four days.  Dispense: 6 tablet; Refill: 0  - dextromethorphan (DELSYM) 30 MG/5ML liquid; Take 5 mLs (30 mg) by mouth 2 times daily as needed for cough  Dispense: 148 mL; Refill: 1    2. Throat pain  Referral placed for rehab provider to see patient in the future for minor illness.  - Strep, Rapid Screen  - FAMILY PRACTICE REFERRAL  - Beta strep group A culture    3. Tobacco use disorder  Patient wishes to continue to quit smoking.  - nicotine (NICODERM CQ) 21 MG/24HR 24 hr patch; Place 1 patch onto the skin every 24 hours As needed.  Dispense: 28 patch; Refill: 1    4. Migraine without aura and without status migrainosus, not intractable    - ZOLMitriptan (ZOMIG) 5 MG tablet; TAKE 1 TABLET BY MOUTH AT ONSET OF HEADACHE FOR MIGRAINE.  MAY REPEAT DOSE IN 2 HOURS. DO NOT EXCEED 10MG IN 24 HOURS  Dispense: 12 tablet; Refill: 4    5. Chronic nonseasonal allergic rhinitis due to pollen    - loratadine (CLARITIN) 10 MG tablet; Take 1 tablet (10 mg) by mouth daily as needed for allergies  Dispense: 30 tablet; Refill: 11    6. Recurrent cold sores    - valACYclovir (VALTREX) 1000 mg tablet; Take 1 tablet (1,000 mg) by mouth 2 times daily At the onset of skin lesions as needed  Dispense: 8 tablet; Refill: 11  - acyclovir (ZOVIRAX) 5 % ointment; Apply 1 g topically as needed  Dispense: 30 g; Refill: 11    7. Acne, unspecified acne type    - DERMATOLOGY REFERRAL    8. Chronic pain of both ears    - OTOLARYNGOLOGY REFERRAL    9. Mild intermittent reactive airway disease without complication    - albuterol (PROAIR HFA/PROVENTIL HFA/VENTOLIN HFA) 108 (90 BASE) MCG/ACT Inhaler; Inhale 1-2 puffs into the lungs every 4 hours as needed for shortness of breath / dyspnea or wheezing  Dispense: 18 g; Refill: 3      FUTURE APPOINTMENTS:       - Follow-up for annual visit or as needed    NINOSKA Smith [facility]

## 2017-11-30 ENCOUNTER — OFFICE VISIT (OUTPATIENT)
Dept: FAMILY MEDICINE | Facility: CLINIC | Age: 47
End: 2017-11-30
Payer: MEDICAID

## 2017-11-30 VITALS
BODY MASS INDEX: 18.34 KG/M2 | OXYGEN SATURATION: 98 % | RESPIRATION RATE: 16 BRPM | TEMPERATURE: 97.3 F | WEIGHT: 131 LBS | HEART RATE: 74 BPM | SYSTOLIC BLOOD PRESSURE: 90 MMHG | DIASTOLIC BLOOD PRESSURE: 60 MMHG | HEIGHT: 71 IN

## 2017-11-30 DIAGNOSIS — G89.29 CHRONIC PAIN OF BOTH EARS: ICD-10-CM

## 2017-11-30 DIAGNOSIS — J30.89 CHRONIC NONSEASONAL ALLERGIC RHINITIS DUE TO POLLEN: ICD-10-CM

## 2017-11-30 DIAGNOSIS — F17.200 TOBACCO USE DISORDER: ICD-10-CM

## 2017-11-30 DIAGNOSIS — L70.9 ACNE, UNSPECIFIED ACNE TYPE: ICD-10-CM

## 2017-11-30 DIAGNOSIS — H92.03 CHRONIC PAIN OF BOTH EARS: ICD-10-CM

## 2017-11-30 DIAGNOSIS — B00.1 RECURRENT COLD SORES: ICD-10-CM

## 2017-11-30 DIAGNOSIS — R07.0 THROAT PAIN: ICD-10-CM

## 2017-11-30 DIAGNOSIS — J45.20 MILD INTERMITTENT REACTIVE AIRWAY DISEASE WITHOUT COMPLICATION: ICD-10-CM

## 2017-11-30 DIAGNOSIS — J06.9 URI WITH COUGH AND CONGESTION: Primary | ICD-10-CM

## 2017-11-30 DIAGNOSIS — G43.009 MIGRAINE WITHOUT AURA AND WITHOUT STATUS MIGRAINOSUS, NOT INTRACTABLE: ICD-10-CM

## 2017-11-30 LAB
DEPRECATED S PYO AG THROAT QL EIA: NORMAL
SPECIMEN SOURCE: NORMAL

## 2017-11-30 PROCEDURE — 87081 CULTURE SCREEN ONLY: CPT | Performed by: NURSE PRACTITIONER

## 2017-11-30 PROCEDURE — 87880 STREP A ASSAY W/OPTIC: CPT | Performed by: NURSE PRACTITIONER

## 2017-11-30 PROCEDURE — 99214 OFFICE O/P EST MOD 30 MIN: CPT | Performed by: NURSE PRACTITIONER

## 2017-11-30 RX ORDER — NICOTINE 21 MG/24HR
1 PATCH, TRANSDERMAL 24 HOURS TRANSDERMAL EVERY 24 HOURS
Qty: 28 PATCH | Refills: 1 | Status: SHIPPED | OUTPATIENT
Start: 2017-11-30 | End: 2018-01-29

## 2017-11-30 RX ORDER — DEXTROMETHORPHAN POLISTIREX 30 MG/5ML
30 SUSPENSION ORAL 2 TIMES DAILY PRN
Qty: 148 ML | Refills: 1 | Status: SHIPPED | OUTPATIENT
Start: 2017-11-30 | End: 2018-01-29

## 2017-11-30 RX ORDER — ZOLMITRIPTAN 5 MG/1
TABLET, FILM COATED ORAL
Qty: 12 TABLET | Refills: 4 | Status: SHIPPED | OUTPATIENT
Start: 2017-11-30 | End: 2019-01-01

## 2017-11-30 RX ORDER — AZITHROMYCIN 250 MG/1
TABLET, FILM COATED ORAL
Qty: 6 TABLET | Refills: 0 | Status: SHIPPED | OUTPATIENT
Start: 2017-11-30 | End: 2018-01-29

## 2017-11-30 RX ORDER — MIRTAZAPINE 15 MG/1
15 TABLET, FILM COATED ORAL AT BEDTIME
Qty: 30 TABLET | Refills: 3 | Status: SHIPPED | OUTPATIENT
Start: 2017-11-30 | End: 2017-12-01

## 2017-11-30 RX ORDER — ALBUTEROL SULFATE 90 UG/1
1-2 AEROSOL, METERED RESPIRATORY (INHALATION) EVERY 4 HOURS PRN
Qty: 18 G | Refills: 3 | Status: SHIPPED | OUTPATIENT
Start: 2017-11-30 | End: 2018-01-01

## 2017-11-30 RX ORDER — VALACYCLOVIR HYDROCHLORIDE 1 G/1
1000 TABLET, FILM COATED ORAL 2 TIMES DAILY
Qty: 8 TABLET | Refills: 11 | Status: SHIPPED | OUTPATIENT
Start: 2017-11-30 | End: 2019-01-01

## 2017-11-30 RX ORDER — LORATADINE 10 MG/1
10 TABLET ORAL DAILY PRN
Qty: 30 TABLET | Refills: 11 | Status: SHIPPED | OUTPATIENT
Start: 2017-11-30 | End: 2019-01-01

## 2017-11-30 RX ORDER — ACYCLOVIR 50 MG/G
1 OINTMENT TOPICAL PRN
Qty: 30 G | Refills: 11 | Status: SHIPPED | OUTPATIENT
Start: 2017-11-30

## 2017-11-30 ASSESSMENT — PAIN SCALES - GENERAL: PAINLEVEL: MODERATE PAIN (4)

## 2017-11-30 NOTE — NURSING NOTE
"Chief Complaint   Patient presents with     URI       Initial BP 90/60  Pulse 74  Temp 97.3  F (36.3  C) (Oral)  Resp 16  Ht 5' 11\" (1.803 m)  Wt 131 lb (59.4 kg)  SpO2 98%  Breastfeeding? No  BMI 18.27 kg/m2 Estimated body mass index is 18.27 kg/(m^2) as calculated from the following:    Height as of this encounter: 5' 11\" (1.803 m).    Weight as of this encounter: 131 lb (59.4 kg).  Medication Reconciliation: complete   Mandy Corrales CMA (AAMA)      "

## 2017-11-30 NOTE — MR AVS SNAPSHOT
After Visit Summary   11/30/2017    Coretta Tovar    MRN: [id number]           Patient Information     Date Of Birth          1970        Visit Information        Provider Department      11/30/2017 9:00 AM Sarah Montemayor APRN [facility]        Today's Diagnoses     URI with cough and congestion    -  1    Throat pain        Tobacco use disorder        Primary insomnia        Migraine without aura and without status migrainosus, not intractable        Chronic nonseasonal allergic rhinitis due to pollen        Recurrent cold sores        Acne, unspecified acne type        Chronic pain of both ears        Mild intermittent reactive airway disease without complication          Care Instructions    [facility]    If you have any questions regarding to your visit please contact your care team:     Team Pink:   Clinic Hours Telephone Number   Internal Medicine:  Dr. Kristie Montemayor, NP       7am-7pm  Monday - Thursday   7am-5pm  Fridays  [phone]  (Appointment scheduling available 24/7)    Questions about your visit?  Team Line  [phone]   Urgent Care - [place] - 11am-9pm Monday-Friday Saturday-Sunday- 9am-5pm   [place] - 5pm-9pm Monday-Friday Saturday-Sunday- 9am-5pm  [phone] - [place]   [phone] - [place]       What options do I have for visits at the clinic other than the traditional office visit?  To expand how we care for you, many of our providers are utilizing electronic visits (e-visits) and telephone visits, when medically appropriate, for interactions with their patients rather than a visit in the clinic.   We also offer nurse visits for many medical concerns. Just like any other service, we will bill your insurance company for this type of visit based on time spent on the phone with your provider. Not all insurance companies cover these visits. Please check  with your medical insurance if this type of visit is covered. You will be responsible for any charges that are not paid by your insurance.      E-visits via [employer]:  generally incur a $35.00 fee.  Telephone visits:  Time spent on the phone: *charged based on time that is spent on the phone in increments of 10 minutes. Estimated cost:   5-10 mins $30.00   11-20 mins. $59.00   21-30 mins. $85.00   Use Snaptt (secure email communication and access to your chart) to send your primary care provider a message or make an appointment. Ask someone on your Team how to sign up for [employer].    For a Price Quote for your services, please call our [employer] Line at [phone].    As always, Thank you for trusting us with your health care needs!    Discharged by SANDY Weinstein            Follow-ups after your visit        Additional Services     DERMATOLOGY REFERRAL       Your provider has referred you to: Dr. Bran Lyons, Dr. Kosta Lyons, Dr. Garrido- [facility]    Please be aware that coverage of these services is subject to the terms and limitations of your health insurance plan.  Call member services at your health plan with any benefit or coverage questions.      Please bring the following with you to your appointment:    (1) Any X-Rays, CTs or MRIs which have been performed.  Contact the facility where they were done to arrange for  prior to your scheduled appointment.    (2) List of current medications  (3) This referral request   (4) Any documents/labs given to you for this referral            FAMILY PRACTICE REFERRAL       Your provider has referred you to: [facility] (physician at [facility])    Please be aware that coverage of these services is subject to the terms and limitations of your health insurance plan.  Call member services at your health plan with any benefit or coverage questions.      Please bring the following with you to your appointment:    (1) Any X-Rays, CTs  or MRIs which have been performed.  Contact the facility where they were done to arrange for  prior to your scheduled appointment.    (2) List of current medications   (3) This referral request   (4) Any documents/labs given to you for this referral            OTOLARYNGOLOGY REFERRAL       Your provider has referred you to: Dr. Aury Garcia    Please be aware that coverage of these services is subject to the terms and limitations of your health insurance plan.  Call member services at your health plan with any benefit or coverage questions.      Please bring the following with you to your appointment:    (1) Any X-Rays, CTs or MRIs which have been performed.  Contact the facility where they were done to arrange for  prior to your scheduled appointment.   (2) List of current medications  (3) This referral request   (4) Any documents/labs given to you for this referral                  Your next 10 appointments already scheduled     Dec 01, 2017  8:00 AM CST   Return Visit with Jessy Brenner MD   [facility]    [address]  [address]  [facility] [address]   [phone]            Dec 01, 2017  8:30 AM CST   New Visit with Radha Winston MD   [facility] ([facility])    [address]  [facility] [address]   [phone]              Who to contact     If you have questions or need follow up information about today's clinic visit or your schedule please contact [facility] directly at [phone].  Normal or non-critical lab and imaging results will be communicated to you by MyChart, letter or phone within 4 business days after the clinic has received the results. If you do not hear from us within 7 days, please contact the clinic through MyChart or phone. If you have a critical or abnormal lab result, we will notify you by phone as soon as  "possible.  Submit refill requests through DigitalMR or call your pharmacy and they will forward the refill request to us. Please allow 3 business days for your refill to be completed.          Additional Information About Your Visit        DigitalMR Information     DigitalMR lets you send messages to your doctor, view your test results, renew your prescriptions, schedule appointments and more. To sign up, go to [email or web address] . Click on \"Log in\" on the left side of the screen, which will take you to the Welcome page. Then click on \"Sign up Now\" on the right side of the page.     You will be asked to enter the access code listed below, as well as some personal information. Please follow the directions to create your username and password.     Your access code is: [email or web address]  Expires: 2017 10:18 AM     Your access code will  in 90 days. If you need help or a new code, please call your [place] clinic or [phone].        Care EveryWhere ID     This is your Care EveryWhere ID. This could be used by other organizations to access your [place] medical records  [id number]        Your Vitals Were     Pulse Temperature Respirations Height Pulse Oximetry Breastfeeding?    74 97.3  F (36.3  C) (Oral) 16 5' 11\" (1.803 m) 98% No    BMI (Body Mass Index)                   18.27 kg/m2            Blood Pressure from Last 3 Encounters:   17 90/60   17 102/68   11/15/17 118/62    Weight from Last 3 Encounters:   17 131 lb (59.4 kg)   17 128 lb 8 oz (58.3 kg)   10/31/17 130 lb (59 kg)              We Performed the Following     DERMATOLOGY REFERRAL     FAMILY PRACTICE REFERRAL     OTOLARYNGOLOGY REFERRAL     Strep, Rapid Screen          Today's Medication Changes          These changes are accurate as of: 17 10:15 AM.  If you have any questions, ask your nurse or doctor.               Start taking these medicines.        Dose/Directions    azithromycin 250 MG tablet   Commonly known " as:  ZITHROMAX   Used for:  URI with cough and congestion   Started by:  Sarah Montemayor APRN CNP        Two tablets first day, then one tablet daily for four days.   Quantity:  6 tablet   Refills:  0       dextromethorphan 30 MG/5ML liquid   Commonly known as:  DELSYM   Used for:  URI with cough and congestion   Started by:  Sarah Montemayor APRN CNP        Dose:  30 mg   Take 5 mLs (30 mg) by mouth 2 times daily as needed for cough   Quantity:  148 mL   Refills:  1       nicotine 21 MG/24HR 24 hr patch   Commonly known as:  NICODERM CQ   Used for:  Tobacco use disorder   Started by:  Sarah Montemayor APRN CNP        Dose:  1 patch   Place 1 patch onto the skin every 24 hours As needed.   Quantity:  28 patch   Refills:  1         These medicines have changed or have updated prescriptions.        Dose/Directions    loratadine 10 MG tablet   Commonly known as:  CLARITIN   This may have changed:    - when to take this  - reasons to take this   Used for:  Chronic nonseasonal allergic rhinitis due to pollen   Changed by:  Sarah Montemayor APRN CNP        Dose:  10 mg   Take 1 tablet (10 mg) by mouth daily as needed for allergies   Quantity:  30 tablet   Refills:  11       valACYclovir 1000 mg tablet   Commonly known as:  VALTREX   This may have changed:  additional instructions   Used for:  Recurrent cold sores   Changed by:  Sarah Montemayor APRN CNP        Dose:  1000 mg   Take 1 tablet (1,000 mg) by mouth 2 times daily At the onset of skin lesions as needed   Quantity:  8 tablet   Refills:  11       * VENTOLIN  (90 BASE) MCG/ACT Inhaler   This may have changed:  Another medication with the same name was changed. Make sure you understand how and when to take each.   Used for:  Acute bronchitis with symptoms > 10 days   Generic drug:  albuterol   Changed by:  Sarah Montemayor APRN CNP        INHALE 2 PUFFS INTO THE LUNGS EVERY 6 HOURS AS NEEDED FOR SHORTNESS  OF BREATH OR DIFFICULT BREATHING OR WHEEZING   Quantity:  18 g   Refills:  2       * albuterol 108 (90 BASE) MCG/ACT Inhaler   Commonly known as:  PROAIR HFA/PROVENTIL HFA/VENTOLIN HFA   This may have changed:    - how much to take  - when to take this  - reasons to take this   Used for:  Mild intermittent reactive airway disease without complication   Changed by:  Sarah Montemayor APRN CNP        Dose:  1-2 puff   Inhale 1-2 puffs into the lungs every 4 hours as needed for shortness of breath / dyspnea or wheezing   Quantity:  18 g   Refills:  3       * Notice:  This list has 2 medication(s) that are the same as other medications prescribed for you. Read the directions carefully, and ask your doctor or other care provider to review them with you.      Stop taking these medicines if you haven't already. Please contact your care team if you have questions.     Protein Powd   Stopped by:  Sarah Montemayor APRN CNP           sucralfate 1 GM tablet   Commonly known as:  CARAFATE   Stopped by:  Sarah Montemayor APRN CNP                Where to get your medicines      These medications were sent to [facility] [address] [place] [address] [facility]  [address] [facility] [address]     Phone:  [phone]     acyclovir 5 % ointment    albuterol 108 (90 BASE) MCG/ACT Inhaler    azithromycin 250 MG tablet    dextromethorphan 30 MG/5ML liquid    loratadine 10 MG tablet    mirtazapine 15 MG tablet    nicotine 21 MG/24HR 24 hr patch    valACYclovir 1000 mg tablet    ZOLMitriptan 5 MG tablet                Primary Care Provider Office Phone # Fax #    Adina NINOSKA Maldonado [phone] [address] [address] [place] [address]        Equal Access to Services     ARELY JASON AH: Andrea Weeks, waaxda luqadaha, qaybta kaalmada graham, pelon malcolm. So [facility] [phone].    ATENCIÓN: Si  rhett edwards, tiene a roberts disposición servicios gratuitos de asistencia lingüística. Sravanthi song [phone].    We comply with applicable federal civil rights laws and [place] laws. We do not discriminate on the basis of race, color, national origin, age, disability, sex, sexual orientation, or gender identity.            Thank you!     Thank you for choosing [facility]  for your care. Our goal is always to provide you with excellent care. Hearing back from our patients is one way we can continue to improve our services. Please take a few minutes to complete the written survey that you may receive in the mail after your visit with us. Thank you!             Your Updated Medication List - Protect others around you: Learn how to safely use, store and throw away your medicines at www.disposemymeds.org.          This list is accurate as of: 11/30/17 10:15 AM.  Always use your most recent med list.                   Brand Name Dispense Instructions for use Diagnosis    ACCUTANE PO           acyclovir 5 % ointment    ZOVIRAX    30 g    Apply 1 g topically as needed    Recurrent cold sores       azithromycin 250 MG tablet    ZITHROMAX    6 tablet    Two tablets first day, then one tablet daily for four days.    URI with cough and congestion       betamethasone valerate 0.1 % ointment    VALISONE     Apply once a day for 2 weeks as directed        buprenorphine-naloxone 8-2 MG Subl sublingual tablet    SUBOXONE    28 each    Place 1 tablet under the tongue 2 times daily    Opioid use disorder, severe, dependence (H)       BusPIRone HCl 30 MG Tabs     60 tablet    Take 30 mg by mouth 2 times daily    Generalized anxiety disorder       clobetasol 0.05 % ointment    TEMOVATE     APPLY TOPICALLY BID        [employer] TRIPLE ANTIBIOTIC Oint     60 g    Externally apply topically 4 times daily    Allergic contact dermatitis due to adhesives       dextromethorphan 30 MG/5ML liquid    DELSYM    148 mL    Take 5 mLs (30 mg)  by mouth 2 times daily as needed for cough    URI with cough and congestion       FLUoxetine 40 MG capsule    PROzac    30 capsule    TAKE 1 CAPSULE(40 MG) BY MOUTH DAILY    Moderate episode of recurrent major depressive disorder (H), Generalized anxiety disorder       fluticasone 50 MCG/ACT spray    FLONASE    1 Bottle    Spray 1-2 sprays into both nostrils daily    Seasonal allergic rhinitis, unspecified allergic rhinitis trigger       hydrocortisone 2.5 % cream           ketoconazole 2 % cream    NIZORAL          loratadine 10 MG tablet    CLARITIN    30 tablet    Take 1 tablet (10 mg) by mouth daily as needed for allergies    Chronic nonseasonal allergic rhinitis due to pollen       mirtazapine 15 MG tablet    REMERON    30 tablet    Take 1 tablet (15 mg) by mouth At Bedtime    Primary insomnia       nicotine 21 MG/24HR 24 hr patch    NICODERM CQ    28 patch    Place 1 patch onto the skin every 24 hours As needed.    Tobacco use disorder       olopatadine HCl 0.2 % Soln    PATADAY    2.5 mL    Place 1 drop into both eyes daily    Seasonal allergic rhinitis, unspecified allergic rhinitis trigger       omeprazole 40 MG capsule    priLOSEC    90 capsule    TAKE 1 CAPSULE(40 MG) BY MOUTH DAILY 30 TO 60 MINUTES BEFORE A MEAL    Gastroesophageal reflux disease, esophagitis presence not specified       ondansetron 4 MG ODT tab    ZOFRAN-ODT    12 tablet    Take 1-2 tablets (4-8 mg) by mouth daily as needed for nausea    Migraine without status migrainosus, not intractable, unspecified migraine type       order for DME     1 each    Equipment being ordered: cool humidifier    Painful respiration, Upper respiratory tract infection, unspecified type       traZODone 100 MG tablet    DESYREL    90 tablet    1.5 tablets (150 mg) At Bedtime    Primary insomnia       triamcinolone 0.1 % ointment    KENALOG    80 g    Apply sparingly to affected area on back two times daily til clear.    Rash       TYLENOL PO      Take 1 tablet  by mouth as needed    Chondritis of both external ears       valACYclovir 1000 mg tablet    VALTREX    8 tablet    Take 1 tablet (1,000 mg) by mouth 2 times daily At the onset of skin lesions as needed    Recurrent cold sores       * VENTOLIN  (90 BASE) MCG/ACT Inhaler   Generic drug:  albuterol     18 g    INHALE 2 PUFFS INTO THE LUNGS EVERY 6 HOURS AS NEEDED FOR SHORTNESS OF BREATH OR DIFFICULT BREATHING OR WHEEZING    Acute bronchitis with symptoms > 10 days       * albuterol 108 (90 BASE) MCG/ACT Inhaler    PROAIR HFA/PROVENTIL HFA/VENTOLIN HFA    18 g    Inhale 1-2 puffs into the lungs every 4 hours as needed for shortness of breath / dyspnea or wheezing    Mild intermittent reactive airway disease without complication       ZOLMitriptan 5 MG tablet    ZOMIG    12 tablet    TAKE 1 TABLET BY MOUTH AT ONSET OF HEADACHE FOR MIGRAINE. MAY REPEAT DOSE IN 2 HOURS. DO NOT EXCEED 10MG IN 24 HOURS    Migraine without aura and without status migrainosus, not intractable       * Notice:  This list has 2 medication(s) that are the same as other medications prescribed for you. Read the directions carefully, and ask your doctor or other care provider to review them with you.

## 2017-11-30 NOTE — PATIENT INSTRUCTIONS
[facility]    If you have any questions regarding to your visit please contact your care team:     Team Pink:   Clinic Hours Telephone Number   Internal Medicine:  Dr. Kristie Montemayor NP       7am-7pm  Monday - Thursday   7am-5pm  Fridays  [phone]  (Appointment scheduling available 24/7)    Questions about your visit?  Team Line  [phone]   Urgent Care - [place] [facility] - 11am-9pm Monday-Friday Saturday-Sunday- 9am-5pm   [place] - 5pm-9pm Monday-Friday Saturday-Sunday- 9am-5pm  [phone] - [place]   [phone] - [place]       What options do I have for visits at the clinic other than the traditional office visit?  To expand how we care for you, many of our providers are utilizing electronic visits (e-visits) and telephone visits, when medically appropriate, for interactions with their patients rather than a visit in the clinic.   We also offer nurse visits for many medical concerns. Just like any other service, we will bill your insurance company for this type of visit based on time spent on the phone with your provider. Not all insurance companies cover these visits. Please check with your medical insurance if this type of visit is covered. You will be responsible for any charges that are not paid by your insurance.      E-visits via [employer]:  generally incur a $35.00 fee.  Telephone visits:  Time spent on the phone: *charged based on time that is spent on the phone in increments of 10 minutes. Estimated cost:   5-10 mins $30.00   11-20 mins. $59.00   21-30 mins. $85.00   Use [employer] (secure email communication and access to your chart) to send your primary care provider a message or make an appointment. Ask someone on your Team how to sign up for [employer].    For a Price Quote for your services, please call our Consumer Price Line at [phone].    As always, Thank you for trusting us with your health care  needs!    Discharged by SANDY Weinstein

## 2017-12-01 ENCOUNTER — OFFICE VISIT (OUTPATIENT)
Dept: PSYCHIATRY | Facility: CLINIC | Age: 47
End: 2017-12-01
Payer: MEDICAID

## 2017-12-01 ENCOUNTER — OFFICE VISIT (OUTPATIENT)
Dept: ADDICTION MEDICINE | Facility: CLINIC | Age: 47
End: 2017-12-01
Payer: MEDICAID

## 2017-12-01 VITALS
HEART RATE: 83 BPM | BODY MASS INDEX: 17.99 KG/M2 | SYSTOLIC BLOOD PRESSURE: 98 MMHG | DIASTOLIC BLOOD PRESSURE: 62 MMHG | RESPIRATION RATE: 20 BRPM | WEIGHT: 129 LBS | OXYGEN SATURATION: 94 %

## 2017-12-01 DIAGNOSIS — F41.1 GENERALIZED ANXIETY DISORDER: ICD-10-CM

## 2017-12-01 DIAGNOSIS — F33.1 MODERATE EPISODE OF RECURRENT MAJOR DEPRESSIVE DISORDER (H): ICD-10-CM

## 2017-12-01 DIAGNOSIS — F51.01 PRIMARY INSOMNIA: ICD-10-CM

## 2017-12-01 DIAGNOSIS — F17.200 NICOTINE USE DISORDER: ICD-10-CM

## 2017-12-01 DIAGNOSIS — K21.9 GASTROESOPHAGEAL REFLUX DISEASE, ESOPHAGITIS PRESENCE NOT SPECIFIED: ICD-10-CM

## 2017-12-01 DIAGNOSIS — F11.20 OPIOID USE DISORDER, SEVERE, DEPENDENCE (H): Primary | ICD-10-CM

## 2017-12-01 LAB
AMPHETAMINES UR QL: NOT DETECTED NG/ML
BACTERIA SPEC CULT: NORMAL
BARBITURATES UR QL SCN: NOT DETECTED NG/ML
BENZODIAZ UR QL SCN: NOT DETECTED NG/ML
BUPRENORPHINE UR QL: ABNORMAL NG/ML
CANNABINOIDS UR QL: NOT DETECTED NG/ML
COCAINE UR QL SCN: NOT DETECTED NG/ML
D-METHAMPHET UR QL: NOT DETECTED NG/ML
METHADONE UR QL SCN: NOT DETECTED NG/ML
OPIATES UR QL SCN: NOT DETECTED NG/ML
OXYCODONE UR QL SCN: NOT DETECTED NG/ML
PCP UR QL SCN: NOT DETECTED NG/ML
PROPOXYPH UR QL: NOT DETECTED NG/ML
SPECIMEN SOURCE: NORMAL
TRICYCLICS UR QL SCN: NOT DETECTED NG/ML

## 2017-12-01 PROCEDURE — 99215 OFFICE O/P EST HI 40 MIN: CPT | Performed by: PSYCHIATRY & NEUROLOGY

## 2017-12-01 PROCEDURE — 80306 DRUG TEST PRSMV INSTRMNT: CPT | Performed by: PEDIATRICS

## 2017-12-01 PROCEDURE — 99214 OFFICE O/P EST MOD 30 MIN: CPT | Performed by: PEDIATRICS

## 2017-12-01 RX ORDER — OMEPRAZOLE 40 MG/1
CAPSULE, DELAYED RELEASE ORAL
Qty: 90 CAPSULE | Refills: 3 | Status: SHIPPED | OUTPATIENT
Start: 2017-12-01 | End: 2017-12-06

## 2017-12-01 RX ORDER — BUPRENORPHINE HYDROCHLORIDE AND NALOXONE HYDROCHLORIDE DIHYDRATE 8; 2 MG/1; MG/1
1 TABLET SUBLINGUAL 2 TIMES DAILY
Qty: 60 EACH | Refills: 1 | Status: SHIPPED | OUTPATIENT
Start: 2017-12-01 | End: 2017-12-06

## 2017-12-01 RX ORDER — ASPIRIN 81 MG
100 TABLET, DELAYED RELEASE (ENTERIC COATED) ORAL 2 TIMES DAILY PRN
Qty: 60 TABLET | Refills: 1 | Status: SHIPPED | OUTPATIENT
Start: 2017-12-01 | End: 2017-12-06

## 2017-12-01 RX ORDER — FLUOXETINE 40 MG/1
CAPSULE ORAL
Qty: 30 CAPSULE | Refills: 0 | Status: SHIPPED | OUTPATIENT
Start: 2017-12-01 | End: 2017-12-06

## 2017-12-01 RX ORDER — MIRTAZAPINE 7.5 MG/1
7.5 TABLET, FILM COATED ORAL AT BEDTIME
Qty: 30 TABLET | Refills: 0 | Status: SHIPPED | OUTPATIENT
Start: 2017-12-01 | End: 2017-12-06

## 2017-12-01 RX ORDER — TRAZODONE HYDROCHLORIDE 50 MG/1
TABLET, FILM COATED ORAL
Qty: 30 TABLET | Refills: 0 | Status: SHIPPED | OUTPATIENT
Start: 2017-12-01 | End: 2017-12-06

## 2017-12-01 NOTE — MR AVS SNAPSHOT
"              After Visit Summary   12/1/2017    Coretta Tovar    MRN: [id number]           Patient Information     Date Of Birth          1970        Visit Information        Provider Department      12/1/2017 8:00 AM Jessy Brenner MD [facility]        Today's Diagnoses     Opioid use disorder, severe, dependence (H)    -  1    Moderate episode of recurrent major depressive disorder (H)        Generalized anxiety disorder        Primary insomnia        Gastroesophageal reflux disease, esophagitis presence not specified        Nicotine use disorder           Follow-ups after your visit        Your next 10 appointments already scheduled     Jan 05, 2018  9:00 AM CST   Return Visit with Jessy Brenner MD   [facility] ([facility])    [address] [place] [address]  [address]  [facility] [address] [phone]              Who to contact     If you have questions or need follow up information about today's clinic visit or your schedule please contact [facility] directly at [phone].  Normal or non-critical lab and imaging results will be communicated to you by [email or web address], letter or phone within 4 business days after the clinic has received the results. If you do not hear from us within 7 days, please contact the clinic through FSIt or phone. If you have a critical or abnormal lab result, we will notify you by phone as soon as possible.  Submit refill requests through HiWiFi or call your pharmacy and they will forward the refill request to us. Please allow 3 business days for your refill to be completed.          Additional Information About Your Visit        [email or web address] Information     HiWiFi lets you send messages to your doctor, view your test results, renew your prescriptions, schedule appointments and more. To sign up, go to [email or web address] . Click on \"Log in\" on the left " "side of the screen, which will take you to the Welcome page. Then click on \"Sign up Now\" on the right side of the page.     You will be asked to enter the access code listed below, as well as some personal information. Please follow the directions to create your username and password.     Your access code is: [email or web address]  Expires: 2017 10:18 AM     Your access code will  in 90 days. If you need help or a new code, please call your [place] clinic or [phone].        Care EveryWhere ID     This is your Care EveryWhere ID. This could be used by other organizations to access your [place] medical records  [id number]        Your Vitals Were     Pulse Respirations Pulse Oximetry BMI (Body Mass Index)          83 20 94% 17.99 kg/m2         Blood Pressure from Last 3 Encounters:   17 98/62   17 90/60   17 102/68    Weight from Last 3 Encounters:   17 129 lb (58.5 kg)   17 131 lb (59.4 kg)   17 128 lb 8 oz (58.3 kg)              We Performed the Following     Urine Drugs of Abuse Screen Panel 13          Today's Medication Changes          These changes are accurate as of: 17 10:34 PM.  If you have any questions, ask your nurse or doctor.               Start taking these medicines.        Dose/Directions    docusate sodium 100 MG tablet   Commonly known as:  COLACE   Used for:  Gastroesophageal reflux disease, esophagitis presence not specified   Started by:  Jessy Brenner MD        Dose:  100 mg   Take 100 mg by mouth 2 times daily as needed for constipation   Quantity:  60 tablet   Refills:  1         These medicines have changed or have updated prescriptions.        Dose/Directions    * FLUoxetine 40 MG capsule   Commonly known as:  PROzac   This may have changed:  additional instructions   Used for:  Moderate episode of recurrent major depressive disorder (H), Generalized anxiety disorder   Changed by:  Radha Winston MD        TAKE 1 CAPSULE(40 MG) BY " MOUTH DAILY. Take with 20mg Fluoxetine by mouth daily to equal total daily dose of 60mg per day.   Quantity:  30 capsule   Refills:  0       * FLUoxetine 20 MG capsule   Commonly known as:  PROzac   This may have changed:  You were already taking a medication with the same name, and this prescription was added. Make sure you understand how and when to take each.   Used for:  Moderate episode of recurrent major depressive disorder (H), Generalized anxiety disorder   Changed by:  Radha Winston MD        Dose:  20 mg   Take 1 capsule (20 mg) by mouth daily Take with 40mg of fluoxetine daily to equal total daily dose of 60 mg per day.   Quantity:  30 capsule   Refills:  0       mirtazapine 7.5 MG Tabs tablet   Commonly known as:  REMERON   This may have changed:    - medication strength  - how much to take   Used for:  Primary insomnia   Changed by:  Radha Winston MD        Dose:  7.5 mg   Take 1 tablet (7.5 mg) by mouth At Bedtime   Quantity:  30 tablet   Refills:  0       * traZODone 100 MG tablet   Commonly known as:  DESYREL   This may have changed:  Another medication with the same name was added. Make sure you understand how and when to take each.   Used for:  Primary insomnia   Changed by:  Jessy Brenner MD        Dose:  150 mg   1.5 tablets (150 mg) At Bedtime   Quantity:  90 tablet   Refills:  3       * traZODone 50 MG tablet   Commonly known as:  DESYREL   This may have changed:  You were already taking a medication with the same name, and this prescription was added. Make sure you understand how and when to take each.   Used for:  Primary insomnia   Changed by:  Radha Winston MD        Take 50 to 100mg by mouth at night as needed for insomnia.   Quantity:  30 tablet   Refills:  0       * Notice:  This list has 4 medication(s) that are the same as other medications prescribed for you. Read the directions carefully, and ask your doctor or other care provider to review them with you.         Where to get  your medicines      These medications were sent to [facility] - [place], [place] - [address]  [address] [facility] [address] [address] [facility] [address]     Phone:  [phone]     docusate sodium 100 MG tablet    FLUoxetine 20 MG capsule    FLUoxetine 40 MG capsule    mirtazapine 7.5 MG Tabs tablet    omeprazole 40 MG capsule    traZODone 50 MG tablet         Some of these will need a paper prescription and others can be bought over the counter.  Ask your nurse if you have questions.     Bring a paper prescription for each of these medications     buprenorphine-naloxone 8-2 MG Subl sublingual tablet                Primary Care Provider Office Phone # Fax #    Adina Maciel, APRN [facility] [phone] [address] [facility] [address]        Equal Access to Services     ARELY JASON : Andrea phippso Soadama, waaxda luqadaha, qaybta kaalmada adeegyada, pelon alegre . So [facility] [phone].    ATENCIÓN: Si habla español, tiene a roberts disposición servicios gratuitos de asistencia lingüística. [facility] [phone].    We comply with applicable federal civil rights laws and [place] laws. We do not discriminate on the basis of race, color, national origin, age, disability, sex, sexual orientation, or gender identity.            Thank you!     Thank you for choosing [facility] PRIMARY [facility]  for your care. Our goal is always to provide you with excellent care. Hearing back from our patients is one way we can continue to improve our services. Please take a few minutes to complete the written survey that you may receive in the mail after your visit with us. Thank you!             Your Updated Medication List - Protect others around you: Learn how to safely use, store and throw away your medicines at www.disposemymeds.org.          This list is accurate as of: 12/1/17 10:34 PM.  Always use your most recent med list.                   Brand Name Dispense  Instructions for use Diagnosis    ACCUTANE PO           acyclovir 5 % ointment    ZOVIRAX    30 g    Apply 1 g topically as needed    Recurrent cold sores       azithromycin 250 MG tablet    ZITHROMAX    6 tablet    Two tablets first day, then one tablet daily for four days.    URI with cough and congestion       betamethasone valerate 0.1 % ointment    VALISONE     Apply once a day for 2 weeks as directed        buprenorphine-naloxone 8-2 MG Subl sublingual tablet    SUBOXONE    60 each    Place 1 tablet under the tongue 2 times daily    Opioid use disorder, severe, dependence (H)       BusPIRone HCl 30 MG Tabs     60 tablet    Take 30 mg by mouth 2 times daily    Generalized anxiety disorder       clobetasol 0.05 % ointment    TEMOVATE     APPLY TOPICALLY BID        [employer] TRIPLE ANTIBIOTIC Oint     60 g    Externally apply topically 4 times daily    Allergic contact dermatitis due to adhesives       dextromethorphan 30 MG/5ML liquid    DELSYM    148 mL    Take 5 mLs (30 mg) by mouth 2 times daily as needed for cough    URI with cough and congestion       docusate sodium 100 MG tablet    COLACE    60 tablet    Take 100 mg by mouth 2 times daily as needed for constipation    Gastroesophageal reflux disease, esophagitis presence not specified       * FLUoxetine 40 MG capsule    PROzac    30 capsule    TAKE 1 CAPSULE(40 MG) BY MOUTH DAILY. Take with 20mg Fluoxetine by mouth daily to equal total daily dose of 60mg per day.    Moderate episode of recurrent major depressive disorder (H), Generalized anxiety disorder       * FLUoxetine 20 MG capsule    PROzac    30 capsule    Take 1 capsule (20 mg) by mouth daily Take with 40mg of fluoxetine daily to equal total daily dose of 60 mg per day.    Moderate episode of recurrent major depressive disorder (H), Generalized anxiety disorder       fluticasone 50 MCG/ACT spray    FLONASE    1 Bottle    Spray 1-2 sprays into both nostrils daily    Seasonal allergic rhinitis,  unspecified allergic rhinitis trigger       hydrocortisone 2.5 % cream           ketoconazole 2 % cream    NIZORAL          loratadine 10 MG tablet    CLARITIN    30 tablet    Take 1 tablet (10 mg) by mouth daily as needed for allergies    Chronic nonseasonal allergic rhinitis due to pollen       mirtazapine 7.5 MG Tabs tablet    REMERON    30 tablet    Take 1 tablet (7.5 mg) by mouth At Bedtime    Primary insomnia       nicotine 21 MG/24HR 24 hr patch    NICODERM CQ    28 patch    Place 1 patch onto the skin every 24 hours As needed.    Tobacco use disorder       olopatadine HCl 0.2 % Soln    PATADAY    2.5 mL    Place 1 drop into both eyes daily    Seasonal allergic rhinitis, unspecified allergic rhinitis trigger       omeprazole 40 MG capsule    priLOSEC    90 capsule    TAKE 1 CAPSULE(40 MG) BY MOUTH DAILY 30 TO 60 MINUTES BEFORE A MEAL    Gastroesophageal reflux disease, esophagitis presence not specified       ondansetron 4 MG ODT tab    ZOFRAN-ODT    12 tablet    Take 1-2 tablets (4-8 mg) by mouth daily as needed for nausea    Migraine without status migrainosus, not intractable, unspecified migraine type       order for DME     1 each    Equipment being ordered: cool humidifier    Painful respiration, Upper respiratory tract infection, unspecified type       * traZODone 100 MG tablet    DESYREL    90 tablet    1.5 tablets (150 mg) At Bedtime    Primary insomnia       * traZODone 50 MG tablet    DESYREL    30 tablet    Take 50 to 100mg by mouth at night as needed for insomnia.    Primary insomnia       triamcinolone 0.1 % ointment    KENALOG    80 g    Apply sparingly to affected area on back two times daily til clear.    Rash       TYLENOL PO      Take 1 tablet by mouth as needed    Chondritis of both external ears       valACYclovir 1000 mg tablet    VALTREX    8 tablet    Take 1 tablet (1,000 mg) by mouth 2 times daily At the onset of skin lesions as needed    Recurrent cold sores       * VENTOLIN   (90 BASE) MCG/ACT Inhaler   Generic drug:  albuterol     18 g    INHALE 2 PUFFS INTO THE LUNGS EVERY 6 HOURS AS NEEDED FOR SHORTNESS OF BREATH OR DIFFICULT BREATHING OR WHEEZING    Acute bronchitis with symptoms > 10 days       * albuterol 108 (90 BASE) MCG/ACT Inhaler    PROAIR HFA/PROVENTIL HFA/VENTOLIN HFA    18 g    Inhale 1-2 puffs into the lungs every 4 hours as needed for shortness of breath / dyspnea or wheezing    Mild intermittent reactive airway disease without complication       ZOLMitriptan 5 MG tablet    ZOMIG    12 tablet    TAKE 1 TABLET BY MOUTH AT ONSET OF HEADACHE FOR MIGRAINE. MAY REPEAT DOSE IN 2 HOURS. DO NOT EXCEED 10MG IN 24 HOURS    Migraine without aura and without status migrainosus, not intractable       * Notice:  This list has 6 medication(s) that are the same as other medications prescribed for you. Read the directions carefully, and ask your doctor or other care provider to review them with you.

## 2017-12-01 NOTE — MR AVS SNAPSHOT
MRN:[id number]                      After Visit Summary   12/1/2017    Coretta Tovar    MRN: [id number]           Visit Information        Provider Department      12/1/2017 8:30 AM Radha Winston MD [facility]        Care Instructions    - Increase Prozac (FLuoxetine) to 60mg by mouth once daily.  - Decrease Remeron (Mirtazapine) to 7.5mg by mouth at bedtime.  - Decrease Trazodone to 50- 100mg by mouth at bedtime as needed.  - Further follow ups with primary care provider and Dr. Brenner.  - No further follow ups with me.   - 24/7 Crisis Hotlines:    National Suicide Prevention Hotline   744-829-GAIQ (1631)    Crisis Hotlines by County:    [place] Crisis Line     [phone]  [place] Crisis Line   [phone]  [place] Crisis Line     [phone]  [place] COPE for Adults   [phone]  [place] for Children    [phone]  [facility] for Adults    [phone]  [place] for Children    [phone]  [facility] Crisis Line    [phone]    [facility] Crisis Response Team  [phone] ([phone])  [facility] Crisis is available 24 hours a day. The team provides callers with a needs assessment and referrals to appropriate resources. If medically necessary, the Crisis Response Team assists individuals by traveling to their location to provide face-to-face interventions and assessments. Crisis services are available for adults and children in [facility] and [facility]. Adult Crisis beds are also available if appropriate.    Walk-In Centers and Mobile Teams  [facility] Acute [facility] Service Walk-In Crisis Intervention  [phone]  [facility] COPE (18+) Crisis Mobile Team    [phone]  [facility] Child (under 17) Crisis Mobile Team [phone]  [place] [facility] for [facility] Walk-In & Mobile Teams [phone]    Additional Crisis Hotlines:  Bridge for Youth      769.938.9229  Crisis Connection  "     [phone]  [facility] ([facility] Crisis Services)  [phone]  [facility] ([facility])    [phone]  Men s Crisis Line      411-070-ZSWD ([phone])  National Suicide Prevention Hotline   127-425-RYHY (6430)  [facility] Crisis Line    [phone]  Suicide Hotline      258.148.7862  [employer] (formerly First Call for Help)  Dial -3 (977-226-7554)    Domestic Violence, Rape and Sexual Abuse Hotlines:  [place] Crisis Line     [phone]  [place]: [facility] Helpline  [phone]  Domestic Abuse Project (DAP)    1-392.124.5555*  [place] Crisis Line     [phone]  [facility] for Battered Women  2-567-731-5259*  [facility]       [phone] ([phone]*)  National Domestic Violence Hotline   2-232-048-CZHL  Rape/Sexual Abuse Center Crisis Line    [phone]   Sexual Violence Center (SVC)    [phone]  [employer]     [phone] ([phone]*)    ESTEPHANIA Shipman           MyChart Information     Wiperhart lets you send messages to your doctor, view your test results, renew your prescriptions, schedule appointments and more. To sign up, go to [email or web address] . Click on \"Log in\" on the left side of the screen, which will take you to the Welcome page. Then click on \"Sign up Now\" on the right side of the page.     You will be asked to enter the access code listed below, as well as some personal information. Please follow the directions to create your username and password.     Your access code is: [email or web address]  Expires: 2017 10:18 AM     Your access code will  in 90 days. If you need help or a new code, please call your [place] clinic or [phone].        Care EveryWhere ID     This is your Care EveryWhere ID. This could be used by other organizations to access your [place] medical records  [id number]        Equal Access to Services     ARELY ROBLES: Andrea Weeks, " juan cheney, warrenta marley stevenson, pelon alegre ah. So [facility] [phone].    ATENCIÓN: Si habla español, tiene a roberts disposición servicios gratuitos de asistencia lingüística. Llame al [phone].    We comply with applicable federal civil rights laws and [place] laws. We do not discriminate on the basis of race, color, national origin, age, disability, sex, sexual orientation, or gender identity.

## 2017-12-01 NOTE — PATIENT INSTRUCTIONS
- Increase Prozac (FLuoxetine) to 60mg by mouth once daily.  - Decrease Remeron (Mirtazapine) to 7.5mg by mouth at bedtime.  - Decrease Trazodone to 50- 100mg by mouth at bedtime as needed.  - Further follow ups with primary care provider and Dr. Brenner.  - No further follow ups with me.   - 24/7 Crisis Hotlines:    National Suicide Prevention Hotline   798-889-CFKM (8868)    Crisis Hotlines by County:    [facility] Crisis Line     614.614.1345  [place] Crisis Line   [phone]  [facility] Crisis Line     [phone]  [facility] COPE for Adults   [phone]  [facility] for Children    [phone]  [facility] for Adults    [phone]  [facility] for Children    [phone]  [facility] Crisis Line    [phone]    [facility] Crisis Response Team  [phone] ([phone])  [facility] Crisis is available 24 hours a day. The team provides callers with a needs assessment and referrals to appropriate resources. If medically necessary, the Crisis Response Team assists individuals by traveling to their location to provide face-to-face interventions and assessments. Crisis services are available for adults and children in [facility] and [facility]. Adult Crisis beds are also available if appropriate.    Walk-In Centers and Mobile Teams  [facility] Walk-In Crisis Intervention  [phone]  [facility] COPE (18+) Crisis Mobile Team    [phone]  [facility] Child (under 17) Crisis Mobile Team [phone]  [facility] UrgentCare for Adults Walk-In & Mobile Teams [phone]    Additional Crisis Hotlines:  Bridge for Youth      705.480.9820  Crisis Connection      [phone]  [facility] ([facility] Crisis Services)  [phone]  [facility] Social Service (S)    [phone]  Men s Crisis Line      889-102-ILXT ([phone])  National Suicide Prevention Hotline   329-825-NJVF (7052)  [facility] Crisis Line    931.959.9679  Suicide  Hotline      770.906.5803  [facility] (formerly First Call for Help)  Dial 2-1-3 (212-197-4505)    Domestic Violence, Rape and Sexual Abuse Hotlines:  [place] Crisis Line     [phone]  [place]: [facility] Helpline  [phone]  Domestic Abuse Project (DAP)    5-735-092-4852*  [facility] Crisis Line     [phone]  [place] [facility] for Battered Women  1-183.495.8608*  [facility]       [phone] ([phone]*)  National Domestic Violence Hotline   2-745-755-OLXT  Rape/Sexual Abuse Center Crisis Line    [phone]   Sexual Violence Center (SVC)    [phone]  [employer]     [phone] ([phone]*)    ZEFERINO ShipmanBS

## 2017-12-01 NOTE — PROGRESS NOTES
SUBJECTIVE:                                                    OPIOID USE DISORDER - SUBOXONE FOLLOW UP:    Coretta Tovar is a 47 year old female who presents to clinic today for follow up of Suboxone MAT for opioid use disorder.     Date of last visit:  11/21/2017      Plan at last visit:   Suboxone increase to 8mg bid    [place] Board [employer] Data Base Reviewed:    YES; 11/22 Suboxone 8mg #28    HPI:    Having URI sx and dry heaving-sx improving after several days.  Currently in treatment [facility] currently since Monday. Feels this will be helpful.   At last visit Suboxone Increase to 8mg bid  Saw Dr. Winston today.   Increase Prozac and decrease Remeron and trazodone continue for sleep.  Not sleeping well with current URI sx.  Needs refill of omeprazole for treatment.       Status since last visit:      Since last visit patient has been:stable    There has been: no craving.      Suboxone Dose: adequate.   Progression of Symptoms:     Cues to use and relapse triggers: poor sleep    Recovery program has been: active.   Accompanying Signs & Symptoms:    Side Effects: none.    Sobriety:     Status: no use since last visit.      Drug Screen: obtained.   Precipitating factors:    Triggers have been: mild.   Alleviating factors:    Contact with sponsor has been: no sponsor.     Family and support system has been: helpful.   Other Therapies Tried :     Patient has been going to recovery meetings:sporadically.      Patient has been participating in professional counseling/therapy: YES            Social History     Social History Narrative    Lives with dad and brother in [place].      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      Daughter with recent DWI (she has child).        Patient Active Problem List    Diagnosis Date Noted     History of cocaine dependence (H) 09/07/2017     Priority: Medium     Moderate episode of recurrent major depressive disorder (H) 03/22/2017     Priority: Medium     Generalized anxiety disorder  03/22/2017     Priority: Medium     Protein-calorie malnutrition (H) 03/22/2017     Priority: Medium     Primary insomnia 03/22/2017     Priority: Medium     Vertigo 03/14/2017     Priority: Medium     Cervical pain 03/08/2017     Priority: Medium     Nutritional disorder 11/21/2016     Priority: Medium     Disorder of stomach 11/20/2016     Priority: Medium     Overview:   Per Biopsy obtained on EGD 11/22/2016. Reactive gastropathy was quoted to be frequently associated with ongoing non-inflammatory type mucosal injury due to a chemical type of injury; this may be due to ingestion of non-steroidal anti-inflammatory drugs, aspirin, excess alcohol, or corticosteroids.        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     Priority: Medium     Chemical dependency (H) 02/24/2015     Priority: Medium     Insomnia 02/24/2015     Priority: Medium     S/P splenectomy 01/13/2015     Priority: Medium     Migraines      Priority: Medium     Idiopathic thrombocytopenic purpura (HCC)      Priority: Medium     s/p splenectomy       Tobacco abuse 05/30/2014     Priority: Medium     Immune thrombocytopenic purpura (H) 01/14/2008     Priority: Medium     Personality disorder 01/14/2008     Priority: Medium     Overview:   cluster B traits  borderline, antisocial       Nondependent cannabis abuse, episodic 01/14/2008     Priority: Medium     Mixed, or nondependent drug abuse 01/26/2007     Priority: Medium     Overview:   recurrent issues with opioids.  History of crack cocaine abuse.       Asthma 12/20/2006     Priority: Medium     Gastroesophageal reflux disease 12/20/2006     Priority: Medium     Herpetic gingivostomatitis 12/20/2006     Priority: Medium     Muscle pain 12/20/2006     Priority: Medium     Atopic rhinitis 12/20/2006     Priority: Medium     Abnormal weight loss 12/20/2006     Priority: Medium     Problem list and histories  reviewed & adjusted, as indicated.  Additional history: as documented        Current Outpatient Prescriptions on File Prior to Visit:  mirtazapine (REMERON) 15 MG tablet Take 1 tablet (15 mg) by mouth At Bedtime   ZOLMitriptan (ZOMIG) 5 MG tablet TAKE 1 TABLET BY MOUTH AT ONSET OF HEADACHE FOR MIGRAINE. MAY REPEAT DOSE IN 2 HOURS. DO NOT EXCEED 10MG IN 24 HOURS   loratadine (CLARITIN) 10 MG tablet Take 1 tablet (10 mg) by mouth daily as needed for allergies   valACYclovir (VALTREX) 1000 mg tablet Take 1 tablet (1,000 mg) by mouth 2 times daily At the onset of skin lesions as needed   acyclovir (ZOVIRAX) 5 % ointment Apply 1 g topically as needed   nicotine (NICODERM CQ) 21 MG/24HR 24 hr patch Place 1 patch onto the skin every 24 hours As needed.   albuterol (PROAIR HFA/PROVENTIL HFA/VENTOLIN HFA) 108 (90 BASE) MCG/ACT Inhaler Inhale 1-2 puffs into the lungs every 4 hours as needed for shortness of breath / dyspnea or wheezing   azithromycin (ZITHROMAX) 250 MG tablet Two tablets first day, then one tablet daily for four days.   dextromethorphan (DELSYM) 30 MG/5ML liquid Take 5 mLs (30 mg) by mouth 2 times daily as needed for cough   VENTOLIN  (90 BASE) MCG/ACT Inhaler INHALE 2 PUFFS INTO THE LUNGS EVERY 6 HOURS AS NEEDED FOR SHORTNESS OF BREATH OR DIFFICULT BREATHING OR WHEEZING   buprenorphine-naloxone (SUBOXONE) 8-2 MG SUBL sublingual tablet Place 1 tablet under the tongue 2 times daily   traZODone (DESYREL) 100 MG tablet 1.5 tablets (150 mg) At Bedtime   FLUoxetine (PROZAC) 40 MG capsule TAKE 1 CAPSULE(40 MG) BY MOUTH DAILY   ISOtretinoin (ACCUTANE PO)    BusPIRone HCl 30 MG TABS Take 30 mg by mouth 2 times daily   ondansetron (ZOFRAN-ODT) 4 MG ODT tab Take 1-2 tablets (4-8 mg) by mouth daily as needed for nausea   omeprazole (PRILOSEC) 40 MG capsule TAKE 1 CAPSULE(40 MG) BY MOUTH DAILY 30 TO 60 MINUTES BEFORE A MEAL   Neomycin-Bacitracin-Polymyxin ([employer] TRIPLE ANTIBIOTIC) OINT Externally apply topically 4  times daily   triamcinolone (KENALOG) 0.1 % ointment Apply sparingly to affected area on back two times daily til clear.   olopatadine HCl (PATADAY) 0.2 % SOLN Place 1 drop into both eyes daily   fluticasone (FLONASE) 50 MCG/ACT spray Spray 1-2 sprays into both nostrils daily   clobetasol (TEMOVATE) 0.05 % ointment APPLY TOPICALLY BID   betamethasone valerate (VALISONE) 0.1 % ointment Apply once a day for 2 weeks as directed   hydrocortisone 2.5 % cream    ketoconazole (NIZORAL) 2 % cream    order for DME Equipment being ordered: cool humidifier   Acetaminophen (TYLENOL PO) Take 1 tablet by mouth as needed     Current Facility-Administered Medications on File Prior to Visit:  Lidocaine 1 % injection 9 mL   sodium bicarbonate 8.4 % injection 1 mEq          Allergies   Allergen Reactions     Acetaminophen-Codeine Hives     Amoxicillin-Pot Clavulanate Nausea and Vomiting     Augmentin Nausea and Vomiting and Hives     Barbiturates      Bentyl [Dicyclomine] Other (See Comments)     dizziness     Compazine      Lock-jaw     Liquid Adhesive      Other reaction(s): Other, see comments  blisters     No Clinical Screening - See Comments      PN: LW Reaction: blisters  PN: LW FI1: nka  PN: LW CM1: CONTRAST- nka Reaction :     Nsaids      D/t ITP     Prochlorperazine      Other reaction(s): Edema     Propoxyphene N-Apap Hives     Sulfa Drugs      Tramadol Hcl Hives            Adhesive Tape Rash           REVIEW OF SYSTEMS:  General: No acute withdrawal symptoms.  No recent infections or fever  Resp: No coughing, wheezing or shortness of breath  CV: No chest pains or palpitations  GI: No nausea, vomiting, abdominal pain, diarrhea, constipation  : No urinary frequency or dysuria,     Musculoskeletal: No significant muscle or joint pains, No edema  Neurologic: No numbness, tingling, weakness, problems with balance or coordination  Psychiatric: see above  Skin: No rashes    OBJECTIVE:    PHYSICAL EXAM:  BP 98/62  Pulse 83   Resp 20  Wt 129 lb (58.5 kg)  SpO2 94%  BMI 17.99 kg/m2    GENERAL APPEARANCE: healthy, alert and no distress  EYES: Eyes grossly normal to inspection, PERRL and conjunctivae and sclerae normal  NEURO:  Gait normal.  No tremor. Coordination intact.   MENTAL STATUS EXAM:  Appearance/Behavior: No apparent distress  Speech: Normal  Mood/Affect: normal affect  Insight: Adequate      Results for orders placed or performed in visit on 12/01/17   Urine Drugs of Abuse Screen Panel 13   Result Value Ref Range    Cannabinoids ([id number]-THC) Not Detected NDET^Not Detected ng/mL    Phencyclidine (Phencyclidine) Not Detected NDET^Not Detected ng/mL    Cocaine (Benzoylecgonine) Not Detected NDET^Not Detected ng/mL    Methamphetamine (d-Methamphetamine) Not Detected NDET^Not Detected ng/mL    Opiates (Morphine) Not Detected NDET^Not Detected ng/mL    Amphetamine (d-Amphetamine) Not Detected NDET^Not Detected ng/mL    Benzodiazepines (Nordiazepam) Not Detected NDET^Not Detected ng/mL    Tricyclic Antidepressants (Desipramine) Not Detected NDET^Not Detected ng/mL    Methadone (Methadone) Not Detected NDET^Not Detected ng/mL    Barbiturates (Butalbital) Not Detected NDET^Not Detected ng/mL    Oxycodone (Oxycodone) Not Detected NDET^Not Detected ng/mL    Propoxyphene (Norpropoxyphene) Not Detected NDET^Not Detected ng/mL    Buprenorphine (Buprenorphine) Detected, Abnormal Result (A) NDET^Not Detected ng/mL       ASSESSMENT/PLAN:      (F11.20) Opioid use disorder, severe, dependence (H)  (primary encounter diagnosis)  Comment:    Suboxone  8mg bid.  Rx #60  1 refill  Follow up 5-6 weeks due to provider availability.  Follow up by phone sooner with any concerns.    Continue current treatment.   Encourage meeting attendance and sponsorship       Opioid warning reviewed.  Risk of overdose following a period of abstinence due to decrease tolerance was discussed including risk of death.   Risk of overdose if using Suboxone with  other substances particuarly benzodiazepines/alcohol was reviewed.           (F14.21) History of cocaine dependence (H)  Plan: no recent use.  Encourage ongoing sobriety      (F33.1) Moderate episode of recurrent major depressive disorder (H)  (F41.1) Generalized anxiety disorder  Plan: Recommendations as above.     Follow with PCP     (D69.3) Idiopathic thrombocytopenic purpura (HCC)  Comment: No recent episodes  Plan: follow with PCP.  Avoid NSAIDS      (F17.200) Nicotine use disorder  Comment: not ready to quit  Plan: Encouraged Abstinence.  Increase risk of relapse with other substances with return to nicotine use discussed.  Risk of Ecig/Vaping also reviewed.            ENCOUNTER FOR LONG TERM USE OF HIGH RISK MEDICATION   High Risk Drug Monitoring?  YES   Drug being monitored: Suboxone   Reason for drug: Opioid Use Disorder   What is being monitored?: Dosage, Cravings, Trigger, side effects, and continued abstinence.      Opioid warning reviewed.  Risk of overdose following a period of abstinence due to decrease tolerance was discussed including risk of death.   Risk of overdose if using Suboxone with other substances particuarly benzodiazepines/alcohol was reviewed.           Jessy Brenner MD  [facility]  [phone]
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
## 2017-12-06 DIAGNOSIS — F51.01 PRIMARY INSOMNIA: ICD-10-CM

## 2017-12-06 DIAGNOSIS — F41.1 GENERALIZED ANXIETY DISORDER: ICD-10-CM

## 2017-12-06 DIAGNOSIS — K21.9 GASTROESOPHAGEAL REFLUX DISEASE, ESOPHAGITIS PRESENCE NOT SPECIFIED: ICD-10-CM

## 2017-12-06 DIAGNOSIS — F11.20 OPIOID USE DISORDER, SEVERE, DEPENDENCE (H): ICD-10-CM

## 2017-12-06 DIAGNOSIS — F33.1 MODERATE EPISODE OF RECURRENT MAJOR DEPRESSIVE DISORDER (H): ICD-10-CM

## 2017-12-06 RX ORDER — TRAZODONE HYDROCHLORIDE 50 MG/1
TABLET, FILM COATED ORAL
Qty: 30 TABLET | Refills: 0 | Status: SHIPPED | OUTPATIENT
Start: 2017-12-06 | End: 2018-01-29

## 2017-12-06 RX ORDER — MIRTAZAPINE 7.5 MG/1
7.5 TABLET, FILM COATED ORAL AT BEDTIME
Qty: 30 TABLET | Refills: 0 | Status: SHIPPED | OUTPATIENT
Start: 2017-12-06 | End: 2018-01-02

## 2017-12-06 RX ORDER — FLUOXETINE 40 MG/1
CAPSULE ORAL
Qty: 30 CAPSULE | Refills: 0 | Status: SHIPPED | OUTPATIENT
Start: 2017-12-06 | End: 2018-01-02

## 2017-12-06 RX ORDER — BUPRENORPHINE HYDROCHLORIDE AND NALOXONE HYDROCHLORIDE DIHYDRATE 8; 2 MG/1; MG/1
1 TABLET SUBLINGUAL 2 TIMES DAILY
Qty: 60 EACH | Refills: 1 | Status: SHIPPED | OUTPATIENT
Start: 2017-12-06 | End: 2018-01-05

## 2017-12-06 RX ORDER — ASPIRIN 81 MG
100 TABLET, DELAYED RELEASE (ENTERIC COATED) ORAL 2 TIMES DAILY PRN
Qty: 60 TABLET | Refills: 1 | Status: SHIPPED | OUTPATIENT
Start: 2017-12-06 | End: 2018-01-02

## 2017-12-06 RX ORDER — OMEPRAZOLE 40 MG/1
CAPSULE, DELAYED RELEASE ORAL
Qty: 90 CAPSULE | Refills: 3 | Status: SHIPPED | OUTPATIENT
Start: 2017-12-06 | End: 2018-01-02

## 2017-12-06 NOTE — PROGRESS NOTES
Received call from treatment center that rX sent 12-1-17 could not be filled at [facility] pharmacy due to restriction.  All sent (suboxone called) to [facility] as requested.

## 2017-12-13 NOTE — PROGRESS NOTES
SUBJECTIVE:   Coretta Tovar is a 47 year old female who presents to clinic today for the following health issues:      ENT Symptoms             Symptoms: cc Present Absent Comment   Fever/Chills  x  Chills   Fatigue  x     Muscle Aches  x     Eye Irritation   x    Sneezing  x     Nasal Epifanio/Drg  x     Sinus Pressure/Pain  x     Loss of smell   x    Dental pain   x    Sore Throat  x     Swollen Glands   x    Ear Pain/Fullness  x  Left ear   Cough  x  Productive cough   Wheeze  x     Chest Pain   x    Shortness of breath  x     Rash   x    Other   x      Symptom duration:  3 weeks on and off   Symptom severity:  Moderate   Treatments tried:  Cough Drops, azithromycin   Contacts:  Yes, girls in her home.      Patient is not using her inhaler.  She states she does not have asthma, but uses inhaler with her seasonal allergies are bad.  She also notes it generally takes several antibiotics for her to get through a URI due to her history splenectomy.    Patient notes increasing panic attacks.  She wonders what else she can take for panic attacks.  She is currently on fluoxetine, Remeron, buspirone, trazodone.    Problem list and histories reviewed & adjusted, as indicated.  Additional history: as documented    Patient Active Problem List   Diagnosis     Tobacco abuse     S/P splenectomy     Migraines     Idiopathic thrombocytopenic purpura (HCC)     Chemical dependency (H)     Insomnia     Mary-mary disease     Vomiting     Abdominal pain, acute     Disorder of stomach     Cervical pain     Vertigo     Gastroesophageal reflux disease     Herpetic gingivostomatitis     Immune thrombocytopenic purpura (H)     Muscle pain     Personality disorder     Nondependent cannabis abuse, episodic     Atopic rhinitis     Nutritional disorder     Abnormal weight loss     Mixed, or nondependent drug abuse     Moderate episode of recurrent major depressive disorder (H)     Generalized anxiety disorder     Protein-calorie  malnutrition (H)     Primary insomnia     History of cocaine dependence (H)     Past Surgical History:   Procedure Laterality Date     ANKLE SURGERY      Numerous right ankle surgeries     HERNIA REPAIR Left 1975     HYSTERECTOMY, PAP NO LONGER INDICATED      benign, endometriosis     NECK SURGERY  2000's    Cervical spine     SPLENECTOMY  1995     TONSILLECTOMY         Social History   Substance Use Topics     Smoking status: Current Every Day Smoker     Packs/day: 1.00     Years: 15.00     Types: Cigarettes     Last attempt to quit: 11/6/2015     Smokeless tobacco: Never Used     Alcohol use Yes      Comment: Drinks once in 4 - 5 months     Family History   Problem Relation Age of Onset     Neurologic Disorder Maternal Grandmother      migraines     Breast Cancer Maternal Grandmother      Neurologic Disorder Maternal Grandfather      migraines     Neurologic Disorder Brother      migraines     Breast Cancer Maternal Aunt      x 2     Breast Cancer Paternal Grandmother      Depression Daughter      Anxiety Disorder Daughter          Current Outpatient Prescriptions   Medication Sig Dispense Refill     hydrOXYzine (ATARAX) 25 MG tablet Take 1 tablet (25 mg) by mouth every 8 hours as needed for anxiety 60 tablet 1     cefdinir (OMNICEF) 300 MG capsule Take 1 capsule (300 mg) by mouth 2 times daily for 7 days 14 capsule 0     FLUoxetine (PROZAC) 40 MG capsule TAKE 1 CAPSULE(40 MG) BY MOUTH DAILY. Take with 20mg Fluoxetine by mouth daily to equal total daily dose of 60mg per day. 30 capsule 0     FLUoxetine (PROZAC) 20 MG capsule Take 1 capsule (20 mg) by mouth daily Take with 40mg of fluoxetine daily to equal total daily dose of 60 mg per day. 30 capsule 0     mirtazapine (REMERON) 7.5 MG TABS tablet Take 1 tablet (7.5 mg) by mouth At Bedtime 30 tablet 0     traZODone (DESYREL) 50 MG tablet Take 50 to 100mg by mouth at night as needed for insomnia. 30 tablet 0     omeprazole (PRILOSEC) 40 MG capsule TAKE 1 CAPSULE(40  MG) BY MOUTH DAILY 30 TO 60 MINUTES BEFORE A MEAL 90 capsule 3     docusate sodium (COLACE) 100 MG tablet Take 100 mg by mouth 2 times daily as needed for constipation 60 tablet 1     buprenorphine-naloxone (SUBOXONE) 8-2 MG SUBL sublingual tablet Place 1 tablet under the tongue 2 times daily 60 each 1     ZOLMitriptan (ZOMIG) 5 MG tablet TAKE 1 TABLET BY MOUTH AT ONSET OF HEADACHE FOR MIGRAINE. MAY REPEAT DOSE IN 2 HOURS. DO NOT EXCEED 10MG IN 24 HOURS 12 tablet 4     loratadine (CLARITIN) 10 MG tablet Take 1 tablet (10 mg) by mouth daily as needed for allergies 30 tablet 11     valACYclovir (VALTREX) 1000 mg tablet Take 1 tablet (1,000 mg) by mouth 2 times daily At the onset of skin lesions as needed 8 tablet 11     acyclovir (ZOVIRAX) 5 % ointment Apply 1 g topically as needed 30 g 11     nicotine (NICODERM CQ) 21 MG/24HR 24 hr patch Place 1 patch onto the skin every 24 hours As needed. 28 patch 1     albuterol (PROAIR HFA/PROVENTIL HFA/VENTOLIN HFA) 108 (90 BASE) MCG/ACT Inhaler Inhale 1-2 puffs into the lungs every 4 hours as needed for shortness of breath / dyspnea or wheezing 18 g 3     azithromycin (ZITHROMAX) 250 MG tablet Two tablets first day, then one tablet daily for four days. 6 tablet 0     dextromethorphan (DELSYM) 30 MG/5ML liquid Take 5 mLs (30 mg) by mouth 2 times daily as needed for cough 148 mL 1     VENTOLIN  (90 BASE) MCG/ACT Inhaler INHALE 2 PUFFS INTO THE LUNGS EVERY 6 HOURS AS NEEDED FOR SHORTNESS OF BREATH OR DIFFICULT BREATHING OR WHEEZING 18 g 2     traZODone (DESYREL) 100 MG tablet 1.5 tablets (150 mg) At Bedtime 90 tablet 3     ISOtretinoin (ACCUTANE PO)        BusPIRone HCl 30 MG TABS Take 30 mg by mouth 2 times daily 60 tablet 3     ondansetron (ZOFRAN-ODT) 4 MG ODT tab Take 1-2 tablets (4-8 mg) by mouth daily as needed for nausea 12 tablet 0     Neomycin-Bacitracin-Polymyxin ([employer] TRIPLE ANTIBIOTIC) OINT Externally apply topically 4 times daily 60 g 3     triamcinolone  (KENALOG) 0.1 % ointment Apply sparingly to affected area on back two times daily til clear. 80 g 0     olopatadine HCl (PATADAY) 0.2 % SOLN Place 1 drop into both eyes daily 2.5 mL 3     fluticasone (FLONASE) 50 MCG/ACT spray Spray 1-2 sprays into both nostrils daily 1 Bottle 11     clobetasol (TEMOVATE) 0.05 % ointment APPLY TOPICALLY BID  1     betamethasone valerate (VALISONE) 0.1 % ointment Apply once a day for 2 weeks as directed       hydrocortisone 2.5 % cream        ketoconazole (NIZORAL) 2 % cream        order for DME Equipment being ordered: cool humidifier 1 each 0     Acetaminophen (TYLENOL PO) Take 1 tablet by mouth as needed       Allergies   Allergen Reactions     Acetaminophen-Codeine Hives     Amoxicillin-Pot Clavulanate Nausea and Vomiting     Augmentin Nausea and Vomiting and Hives     Barbiturates      Bentyl [Dicyclomine] Other (See Comments)     dizziness     Compazine      Lock-jaw     Liquid Adhesive      Other reaction(s): Other, see comments  blisters     No Clinical Screening - See Comments      PN: LW Reaction: blisters  PN: LW FI1: nka  PN: LW CM1: CONTRAST- nka Reaction :     Nsaids      D/t ITP     Prochlorperazine      Other reaction(s): Edema     Propoxyphene N-Apap Hives     Sulfa Drugs      Tramadol Hcl Hives            Adhesive Tape Rash     BP Readings from Last 3 Encounters:   12/15/17 100/62   12/01/17 98/62   11/30/17 90/60    Wt Readings from Last 3 Encounters:   12/15/17 132 lb 8 oz (60.1 kg)   12/01/17 129 lb (58.5 kg)   11/30/17 131 lb (59.4 kg)                  Labs reviewed in EPIC        Reviewed and updated as needed this visit by clinical staff     Reviewed and updated as needed this visit by Provider         ROS:  Constitutional, HEENT, cardiovascular, pulmonary, gi and gu systems are negative, except as otherwise noted.      OBJECTIVE:   /62  Pulse 72  Temp 97.7  F (36.5  C) (Oral)  Wt 132 lb 8 oz (60.1 kg)  SpO2 97%  BMI 18.48 kg/m2  Body mass index is  18.48 kg/(m^2).  GENERAL: healthy, alert and no distress  EYES: Eyes grossly normal to inspection, PERRL and conjunctivae and sclerae normal  HENT: normal cephalic/atraumatic, ear canals and TM's normal, nose and mouth without ulcers or lesions, nasal mucosa edematous , oropharynx clear and oral mucous membranes moist  NECK: no adenopathy, no asymmetry, masses, or scars and thyroid normal to palpation  RESP: expiratory wheezes throughout  CV: regular rate and rhythm, normal S1 S2, no S3 or S4, no murmur, click or rub, no peripheral edema and peripheral pulses strong  MS: no gross musculoskeletal defects noted, no edema    Diagnostic Test Results:  none     ASSESSMENT/PLAN:     1. URI with cough and congestion    - cefdinir (OMNICEF) 300 MG capsule; Take 1 capsule (300 mg) by mouth 2 times daily for 7 days  Dispense: 14 capsule; Refill: 0    2. Generalized anxiety disorder    - hydrOXYzine (ATARAX) 25 MG tablet; Take 1 tablet (25 mg) by mouth every 8 hours as needed for anxiety  Dispense: 60 tablet; Refill: 1    FUTURE APPOINTMENTS:       - Follow-up for annual visit or as needed    NINOSKA Smith [facility]

## 2017-12-15 ENCOUNTER — OFFICE VISIT (OUTPATIENT)
Dept: FAMILY MEDICINE | Facility: CLINIC | Age: 47
End: 2017-12-15
Payer: MEDICAID

## 2017-12-15 VITALS
TEMPERATURE: 97.7 F | BODY MASS INDEX: 18.48 KG/M2 | OXYGEN SATURATION: 97 % | SYSTOLIC BLOOD PRESSURE: 100 MMHG | DIASTOLIC BLOOD PRESSURE: 62 MMHG | WEIGHT: 132.5 LBS | HEART RATE: 72 BPM

## 2017-12-15 DIAGNOSIS — F41.1 GENERALIZED ANXIETY DISORDER: ICD-10-CM

## 2017-12-15 DIAGNOSIS — J06.9 URI WITH COUGH AND CONGESTION: Primary | ICD-10-CM

## 2017-12-15 PROCEDURE — 99213 OFFICE O/P EST LOW 20 MIN: CPT | Performed by: NURSE PRACTITIONER

## 2017-12-15 RX ORDER — CEFDINIR 300 MG/1
300 CAPSULE ORAL 2 TIMES DAILY
Qty: 14 CAPSULE | Refills: 0 | Status: SHIPPED | OUTPATIENT
Start: 2017-12-15 | End: 2017-12-22

## 2017-12-15 RX ORDER — HYDROXYZINE HYDROCHLORIDE 25 MG/1
25 TABLET, FILM COATED ORAL EVERY 8 HOURS PRN
Qty: 60 TABLET | Refills: 1 | Status: SHIPPED | OUTPATIENT
Start: 2017-12-15 | End: 2018-01-02

## 2017-12-15 NOTE — NURSING NOTE
"Chief Complaint   Patient presents with     URI     Due for DAP and ACT     URI     x 3 weeks       Initial /62  Pulse 72  Temp 97.7  F (36.5  C) (Oral)  Wt 132 lb 8 oz (60.1 kg)  SpO2 97%  BMI 18.48 kg/m2 Estimated body mass index is 18.48 kg/(m^2) as calculated from the following:    Height as of 11/30/17: 5' 11\" (1.803 m).    Weight as of this encounter: 132 lb 8 oz (60.1 kg).  Medication Reconciliation: complete   Kaycee Parsons MA      "

## 2017-12-15 NOTE — PATIENT INSTRUCTIONS
Use your albuterol inhaler 2 puffs every 4 hours as needed.  Start hydroxyzine 25 mg every 8 hours as needed for anxiety.  Start doxycycline 100 mg twice daily for 7 days.      [facility]    If you have any questions regarding to your visit please contact your care team:     Team Pink:   Clinic Hours Telephone Number   Internal Medicine:  Dr. Kristie Montemayor NP       7am-7pm  Monday - Thursday   7am-5pm  Fridays  [phone]  (Appointment scheduling available 24/7)    Questions about your visit?  Team Line  [phone]   [facility] [place] [facility] 11am-9pm Monday-Friday Saturday-Sunday- 9am-5pm   [place] - 5pm-9pm Monday-Friday Saturday-Sunday- 9am-5pm  [phone] - [place]   [phone] - [place]       What options do I have for visits at the clinic other than the traditional office visit?  To expand how we care for you, many of our providers are utilizing electronic visits (e-visits) and telephone visits, when medically appropriate, for interactions with their patients rather than a visit in the clinic.   We also offer nurse visits for many medical concerns. Just like any other service, we will bill your insurance company for this type of visit based on time spent on the phone with your provider. Not all insurance companies cover these visits. Please check with your medical insurance if this type of visit is covered. You will be responsible for any charges that are not paid by your insurance.      E-visits via [employer]:  generally incur a $35.00 fee.  Telephone visits:  Time spent on the phone: *charged based on time that is spent on the phone in increments of 10 minutes. Estimated cost:   5-10 mins $30.00   11-20 mins. $59.00   21-30 mins. $85.00   Use [employer] (secure email communication and access to your chart) to send your primary care provider a message or make an appointment. Ask someone on your Team how to sign up for  [place].    For a Price Quote for your services, please call our Consumer Price Line at [phone].    As always, Thank you for trusting us with your health care needs!  Kaycee Parsons MA

## 2017-12-15 NOTE — MR AVS SNAPSHOT
After Visit Summary   12/15/2017    Coretta Tovar    MRN: [id number]           Patient Information     Date Of Birth          1970        Visit Information        Provider Department      12/15/2017 10:00 AM Sarah Montemayor APRN [facility]        Today's Diagnoses     URI with cough and congestion    -  1    Generalized anxiety disorder          Care Instructions    Use your albuterol inhaler 2 puffs every 4 hours as needed.  Start hydroxyzine 25 mg every 8 hours as needed for anxiety.  Start doxycycline 100 mg twice daily for 7 days.      [facility]    If you have any questions regarding to your visit please contact your care team:     Team Pink:   Clinic Hours Telephone Number   Internal Medicine:  Dr. Kristie Montemayor, NP       7am-7pm  Monday - Thursday   7am-5pm  Fridays  [phone]  (Appointment scheduling available 24/7)    Questions about your visit?  Team Line  [phone]   Urgent Care - [place] and [place] - 11am-9pm Monday-Friday Saturday-Sunday- 9am-5pm   [place] - 5pm-9pm Monday-Friday Saturday-Sunday- 9am-5pm  [phone] - [place]enny   [phone] - Arnett       What options do I have for visits at the clinic other than the traditional office visit?  To expand how we care for you, many of our providers are utilizing electronic visits (e-visits) and telephone visits, when medically appropriate, for interactions with their patients rather than a visit in the clinic.   We also offer nurse visits for many medical concerns. Just like any other service, we will bill your insurance company for this type of visit based on time spent on the phone with your provider. Not all insurance companies cover these visits. Please check with your medical insurance if this type of visit is covered. You will be responsible for any charges that are not paid by your insurance.      E-visits via  [employer]:  generally incur a $35.00 fee.  Telephone visits:  Time spent on the phone: *charged based on time that is spent on the phone in increments of 10 minutes. Estimated cost:   5-10 mins $30.00   11-20 mins. $59.00   21-30 mins. $85.00   Use [employer]hart (secure email communication and access to your chart) to send your primary care provider a message or make an appointment. Ask someone on your Team how to sign up for iChartst.    For a Price Quote for your services, please call our Is That [facility] Price Line at [phone].    As always, Thank you for trusting us with your health care needs!  Kaycee Parsons MA            Follow-ups after your visit        Your next 10 appointments already scheduled     Dec 15, 2017 10:00 AM CST   [facility] with NINOSKA Mitchell CNP   [facility]    [address] [place] [id number]   [phone]            Jan 05, 2018  9:00 AM CST   Return Visit with Jessy Brenner MD   [facility] ([facility])    [address] [place] [address]  [address]  [facility] [address] [phone]              Who to contact     If you have questions or need follow up information about today's clinic visit or your schedule please contact [facility] directly at [phone].  Normal or non-critical lab and imaging results will be communicated to you by MyChart, letter or phone within 4 business days after the clinic has received the results. If you do not hear from us within 7 days, please contact the clinic through MyChart or phone. If you have a critical or abnormal lab result, we will notify you by phone as soon as possible.  Submit refill requests through [employer] or call your pharmacy and they will forward the refill request to us. Please allow 3 business days for your refill to be completed.          Additional Information About Your Visit        MyChart Information     iChartst  "lets you send messages to your doctor, view your test results, renew your prescriptions, schedule appointments and more. To sign up, go to [email or web address] . Click on \"Log in\" on the left side of the screen, which will take you to the Welcome page. Then click on \"Sign up Now\" on the right side of the page.     You will be asked to enter the access code listed below, as well as some personal information. Please follow the directions to create your username and password.     Your access code is: [email or web address]  Expires: 3/15/2018  9:50 AM     Your access code will  in 90 days. If you need help or a new code, please call your [place] clinic or [phone].        Care EveryWhere ID     This is your Care EveryWhere ID. This could be used by other organizations to access your [place] medical records  [id number]        Your Vitals Were     Pulse Temperature Pulse Oximetry BMI (Body Mass Index)          72 97.7  F (36.5  C) (Oral) 97% 18.48 kg/m2         Blood Pressure from Last 3 Encounters:   12/15/17 100/62   17 98/62   17 90/60    Weight from Last 3 Encounters:   12/15/17 132 lb 8 oz (60.1 kg)   17 129 lb (58.5 kg)   17 131 lb (59.4 kg)              Today, you had the following     No orders found for display         Today's Medication Changes          These changes are accurate as of: 12/15/17  9:50 AM.  If you have any questions, ask your nurse or doctor.               Start taking these medicines.        Dose/Directions    cefdinir 300 MG capsule   Commonly known as:  OMNICEF   Used for:  URI with cough and congestion   Started by:  Sarah Montemayor APRN CNP        Dose:  300 mg   Take 1 capsule (300 mg) by mouth 2 times daily for 7 days   Quantity:  14 capsule   Refills:  0       hydrOXYzine 25 MG tablet   Commonly known as:  ATARAX   Used for:  Generalized anxiety disorder   Started by:  Sarah Montemayor APRN CNP        Dose:  25 mg   Take 1 tablet (25 mg) " by mouth every 8 hours as needed for anxiety   Quantity:  60 tablet   Refills:  1            Where to get your medicines      [facility] [place], [place] - [address] AT [facility]  [address] [place] [address]     Phone:  [phone]     cefdinir 300 MG capsule    hydrOXYzine 25 MG tablet                Primary Care Provider Office Phone # Fax #    Sarah Montemayor, APRN [facility] [phone] [address] [address]        Equal Access to Services     [facility]: Hadii aad ku hadasho Soomaali, waaxda luqadaha, qaybta kaalmada adeegyada, waxay idiin hayaan adeeg khmoe alegre . So [facility] [phone].    ATENCIÓN: Si habla español, tiene a roberts disposición servicios gratuitos de asistencia lingüística. [facility] [phone].    We comply with applicable federal civil rights laws and [place] laws. We do not discriminate on the basis of race, color, national origin, age, disability, sex, sexual orientation, or gender identity.            Thank you!     Thank you for choosing [facility]  for your care. Our goal is always to provide you with excellent care. Hearing back from our patients is one way we can continue to improve our services. Please take a few minutes to complete the written survey that you may receive in the mail after your visit with us. Thank you!             Your Updated Medication List - Protect others around you: Learn how to safely use, store and throw away your medicines at www.disposemymeds.org.          This list is accurate as of: 12/15/17  9:50 AM.  Always use your most recent med list.                   Brand Name Dispense Instructions for use Diagnosis    ACCUTANE PO           acyclovir 5 % ointment    ZOVIRAX    30 g    Apply 1 g topically as needed    Recurrent cold sores       azithromycin 250 MG tablet    ZITHROMAX    6 tablet    Two tablets first day, then one  tablet daily for four days.    URI with cough and congestion       betamethasone valerate 0.1 % ointment    VALISONE     Apply once a day for 2 weeks as directed        buprenorphine-naloxone 8-2 MG Subl sublingual tablet    SUBOXONE    60 each    Place 1 tablet under the tongue 2 times daily    Opioid use disorder, severe, dependence (H)       BusPIRone HCl 30 MG Tabs     60 tablet    Take 30 mg by mouth 2 times daily    Generalized anxiety disorder       cefdinir 300 MG capsule    OMNICEF    14 capsule    Take 1 capsule (300 mg) by mouth 2 times daily for 7 days    URI with cough and congestion       clobetasol 0.05 % ointment    TEMOVATE     APPLY TOPICALLY BID        [employer] TRIPLE ANTIBIOTIC Oint     60 g    Externally apply topically 4 times daily    Allergic contact dermatitis due to adhesives       dextromethorphan 30 MG/5ML liquid    DELSYM    148 mL    Take 5 mLs (30 mg) by mouth 2 times daily as needed for cough    URI with cough and congestion       docusate sodium 100 MG tablet    COLACE    60 tablet    Take 100 mg by mouth 2 times daily as needed for constipation    Gastroesophageal reflux disease, esophagitis presence not specified       * FLUoxetine 40 MG capsule    PROzac    30 capsule    TAKE 1 CAPSULE(40 MG) BY MOUTH DAILY. Take with 20mg Fluoxetine by mouth daily to equal total daily dose of 60mg per day.    Moderate episode of recurrent major depressive disorder (H), Generalized anxiety disorder       * FLUoxetine 20 MG capsule    PROzac    30 capsule    Take 1 capsule (20 mg) by mouth daily Take with 40mg of fluoxetine daily to equal total daily dose of 60 mg per day.    Moderate episode of recurrent major depressive disorder (H), Generalized anxiety disorder       fluticasone 50 MCG/ACT spray    FLONASE    1 Bottle    Spray 1-2 sprays into both nostrils daily    Seasonal allergic rhinitis, unspecified allergic rhinitis trigger       hydrocortisone 2.5 % cream           hydrOXYzine 25 MG tablet     ATARAX    60 tablet    Take 1 tablet (25 mg) by mouth every 8 hours as needed for anxiety    Generalized anxiety disorder       ketoconazole 2 % cream    NIZORAL          loratadine 10 MG tablet    CLARITIN    30 tablet    Take 1 tablet (10 mg) by mouth daily as needed for allergies    Chronic nonseasonal allergic rhinitis due to pollen       mirtazapine 7.5 MG Tabs tablet    REMERON    30 tablet    Take 1 tablet (7.5 mg) by mouth At Bedtime    Primary insomnia       nicotine 21 MG/24HR 24 hr patch    NICODERM CQ    28 patch    Place 1 patch onto the skin every 24 hours As needed.    Tobacco use disorder       olopatadine HCl 0.2 % Soln    PATADAY    2.5 mL    Place 1 drop into both eyes daily    Seasonal allergic rhinitis, unspecified allergic rhinitis trigger       omeprazole 40 MG capsule    priLOSEC    90 capsule    TAKE 1 CAPSULE(40 MG) BY MOUTH DAILY 30 TO 60 MINUTES BEFORE A MEAL    Gastroesophageal reflux disease, esophagitis presence not specified       ondansetron 4 MG ODT tab    ZOFRAN-ODT    12 tablet    Take 1-2 tablets (4-8 mg) by mouth daily as needed for nausea    Migraine without status migrainosus, not intractable, unspecified migraine type       order for DME     1 each    Equipment being ordered: cool humidifier    Painful respiration, Upper respiratory tract infection, unspecified type       * traZODone 100 MG tablet    DESYREL    90 tablet    1.5 tablets (150 mg) At Bedtime    Primary insomnia       * traZODone 50 MG tablet    DESYREL    30 tablet    Take 50 to 100mg by mouth at night as needed for insomnia.    Primary insomnia       triamcinolone 0.1 % ointment    KENALOG    80 g    Apply sparingly to affected area on back two times daily til clear.    Rash       TYLENOL PO      Take 1 tablet by mouth as needed    Chondritis of both external ears       valACYclovir 1000 mg tablet    VALTREX    8 tablet    Take 1 tablet (1,000 mg) by mouth 2 times daily At the onset of skin lesions as needed     Recurrent cold sores       * VENTOLIN  (90 BASE) MCG/ACT Inhaler   Generic drug:  albuterol     18 g    INHALE 2 PUFFS INTO THE LUNGS EVERY 6 HOURS AS NEEDED FOR SHORTNESS OF BREATH OR DIFFICULT BREATHING OR WHEEZING    Acute bronchitis with symptoms > 10 days       * albuterol 108 (90 BASE) MCG/ACT Inhaler    PROAIR HFA/PROVENTIL HFA/VENTOLIN HFA    18 g    Inhale 1-2 puffs into the lungs every 4 hours as needed for shortness of breath / dyspnea or wheezing    Mild intermittent reactive airway disease without complication       ZOLMitriptan 5 MG tablet    ZOMIG    12 tablet    TAKE 1 TABLET BY MOUTH AT ONSET OF HEADACHE FOR MIGRAINE. MAY REPEAT DOSE IN 2 HOURS. DO NOT EXCEED 10MG IN 24 HOURS    Migraine without aura and without status migrainosus, not intractable       * Notice:  This list has 6 medication(s) that are the same as other medications prescribed for you. Read the directions carefully, and ask your doctor or other care provider to review them with you.

## 2017-12-22 ENCOUNTER — TELEPHONE (OUTPATIENT)
Dept: ADDICTION MEDICINE | Facility: CLINIC | Age: 47
End: 2017-12-22

## 2017-12-22 NOTE — TELEPHONE ENCOUNTER
[facility] stating that pt requested that her subx dose be increased.   Please follow up. Contact info: [phone]    Kasie Mcginnis

## 2017-12-22 NOTE — TELEPHONE ENCOUNTER
Writer called Anna back.      Pt is currently in IP Treatment at [facility].      Pt is craving really badly in the middle of the day as per Anna.    Pt has been trying to distracting herself and staying busy to help symptoms.      Writer was told that before pt was admitted she was on a higher dose of Suboxone.      Next Appt is on 1/5/17.      Will forward to Dr. Brenner to review and advise.    Uzair Flores RN

## 2017-12-22 NOTE — TELEPHONE ENCOUNTER
Currently taking 8mg bid.  Will trial 4mg qid for now.  Nursing staff at facility will relay and also have her report sx.  Will discuss with team about other coping strategies.  appt 1/5

## 2017-12-27 NOTE — PROGRESS NOTES
SUBJECTIVE:   Coretta Tovar is a 47 year old female who presents to clinic today for the following health issues:    Patient presents with:  Recheck Medication    Patient is here for refills of her anxiety and depression meds.  She was seen by psychiatry once and her fluoxetine was increased to 60 mg daily and she was started on remeron and was to taper down her trazodone.  She is taking hydroxyzine prn, but can't take it during the day due to fatigue.  Patient notes her anxiety is high and she is having difficulty with it throughout the day.  Patient denies thoughts of suicide or self harm.  Patient is not sleeping well.  She feels the hydroxyzine helps at night and would like to increase to 50 mg at bedtime prn.  She continues to live at [facility] for opioid abuse    Patient feels her colace is causing gas and bloating, along with cramping.  She is only having bowel movements about once per week.  She denies melena, hematochezia.      Problem list and histories reviewed & adjusted, as indicated.  Additional history: as documented    Patient Active Problem List   Diagnosis     Tobacco abuse     S/P splenectomy     Migraines     Idiopathic thrombocytopenic purpura (HCC)     Chemical dependency (H)     Insomnia     Mary-mary disease     Vomiting     Abdominal pain, acute     Disorder of stomach     Cervical pain     Vertigo     Gastroesophageal reflux disease     Herpetic gingivostomatitis     Immune thrombocytopenic purpura (H)     Muscle pain     Personality disorder     Nondependent cannabis abuse, episodic     Atopic rhinitis     Nutritional disorder     Abnormal weight loss     Mixed, or nondependent drug abuse     Moderate episode of recurrent major depressive disorder (H)     Generalized anxiety disorder     Protein-calorie malnutrition (H)     Primary insomnia     History of cocaine dependence (H)     Past Surgical History:   Procedure Laterality Date     ANKLE SURGERY      Numerous  right ankle surgeries     HERNIA REPAIR Left 1975     HYSTERECTOMY, PAP NO LONGER INDICATED      benign, endometriosis     NECK SURGERY  2000's    Cervical spine     SPLENECTOMY  1995     TONSILLECTOMY         Social History   Substance Use Topics     Smoking status: Current Every Day Smoker     Packs/day: 1.00     Years: 15.00     Types: Cigarettes     Last attempt to quit: 11/6/2015     Smokeless tobacco: Never Used     Alcohol use Yes      Comment: Drinks once in 4 - 5 months     Family History   Problem Relation Age of Onset     Neurologic Disorder Maternal Grandmother      migraines     Breast Cancer Maternal Grandmother      Neurologic Disorder Maternal Grandfather      migraines     Neurologic Disorder Brother      migraines     Breast Cancer Maternal Aunt      x 2     Breast Cancer Paternal Grandmother      Depression Daughter      Anxiety Disorder Daughter          Current Outpatient Prescriptions   Medication Sig Dispense Refill     hydrOXYzine (ATARAX) 25 MG tablet Take 1 tablet (25 mg) by mouth every 8 hours as needed for anxiety 60 tablet 1     FLUoxetine (PROZAC) 40 MG capsule TAKE 1 CAPSULE(40 MG) BY MOUTH DAILY. Take with 20mg Fluoxetine by mouth daily to equal total daily dose of 60mg per day. 30 capsule 0     FLUoxetine (PROZAC) 20 MG capsule Take 1 capsule (20 mg) by mouth daily Take with 40mg of fluoxetine daily to equal total daily dose of 60 mg per day. 30 capsule 0     mirtazapine (REMERON) 7.5 MG TABS tablet Take 1 tablet (7.5 mg) by mouth At Bedtime 30 tablet 0     omeprazole (PRILOSEC) 40 MG capsule TAKE 1 CAPSULE(40 MG) BY MOUTH DAILY 30 TO 60 MINUTES BEFORE A MEAL 90 capsule 3     docusate sodium (COLACE) 100 MG tablet Take 100 mg by mouth 2 times daily as needed for constipation 60 tablet 1     buprenorphine-naloxone (SUBOXONE) 8-2 MG SUBL sublingual tablet Place 1 tablet under the tongue 2 times daily 60 each 1     ZOLMitriptan (ZOMIG) 5 MG tablet TAKE 1 TABLET BY MOUTH AT ONSET OF  HEADACHE FOR MIGRAINE. MAY REPEAT DOSE IN 2 HOURS. DO NOT EXCEED 10MG IN 24 HOURS 12 tablet 4     loratadine (CLARITIN) 10 MG tablet Take 1 tablet (10 mg) by mouth daily as needed for allergies 30 tablet 11     valACYclovir (VALTREX) 1000 mg tablet Take 1 tablet (1,000 mg) by mouth 2 times daily At the onset of skin lesions as needed 8 tablet 11     VENTOLIN  (90 BASE) MCG/ACT Inhaler INHALE 2 PUFFS INTO THE LUNGS EVERY 6 HOURS AS NEEDED FOR SHORTNESS OF BREATH OR DIFFICULT BREATHING OR WHEEZING 18 g 2     traZODone (DESYREL) 100 MG tablet 1.5 tablets (150 mg) At Bedtime 90 tablet 3     BusPIRone HCl 30 MG TABS Take 30 mg by mouth 2 times daily 60 tablet 3     ondansetron (ZOFRAN-ODT) 4 MG ODT tab Take 1-2 tablets (4-8 mg) by mouth daily as needed for nausea 12 tablet 0     triamcinolone (KENALOG) 0.1 % ointment Apply sparingly to affected area on back two times daily til clear. 80 g 0     clobetasol (TEMOVATE) 0.05 % ointment APPLY TOPICALLY BID  1     order for DME Equipment being ordered: cool humidifier 1 each 0     Acetaminophen (TYLENOL PO) Take 1 tablet by mouth as needed       traZODone (DESYREL) 50 MG tablet Take 50 to 100mg by mouth at night as needed for insomnia. (Patient not taking: Reported on 1/2/2018) 30 tablet 0     acyclovir (ZOVIRAX) 5 % ointment Apply 1 g topically as needed 30 g 11     nicotine (NICODERM CQ) 21 MG/24HR 24 hr patch Place 1 patch onto the skin every 24 hours As needed. (Patient not taking: Reported on 1/2/2018) 28 patch 1     albuterol (PROAIR HFA/PROVENTIL HFA/VENTOLIN HFA) 108 (90 BASE) MCG/ACT Inhaler Inhale 1-2 puffs into the lungs every 4 hours as needed for shortness of breath / dyspnea or wheezing (Patient not taking: Reported on 1/2/2018) 18 g 3     azithromycin (ZITHROMAX) 250 MG tablet Two tablets first day, then one tablet daily for four days. (Patient not taking: Reported on 1/2/2018) 6 tablet 0     dextromethorphan (DELSYM) 30 MG/5ML liquid Take 5 mLs (30 mg)  "by mouth 2 times daily as needed for cough (Patient not taking: Reported on 1/2/2018) 148 mL 1     ISOtretinoin (ACCUTANE PO)        Neomycin-Bacitracin-Polymyxin ([employer] TRIPLE ANTIBIOTIC) OINT Externally apply topically 4 times daily (Patient not taking: Reported on 1/2/2018) 60 g 3     olopatadine HCl (PATADAY) 0.2 % SOLN Place 1 drop into both eyes daily (Patient not taking: Reported on 1/2/2018) 2.5 mL 3     fluticasone (FLONASE) 50 MCG/ACT spray Spray 1-2 sprays into both nostrils daily (Patient not taking: Reported on 1/2/2018) 1 Bottle 11     betamethasone valerate (VALISONE) 0.1 % ointment Apply once a day for 2 weeks as directed       hydrocortisone 2.5 % cream        ketoconazole (NIZORAL) 2 % cream        Allergies   Allergen Reactions     Acetaminophen-Codeine Hives     Amoxicillin-Pot Clavulanate Nausea and Vomiting     Augmentin Nausea and Vomiting and Hives     Barbiturates      Bentyl [Dicyclomine] Other (See Comments)     dizziness     Compazine      Lock-jaw     Liquid Adhesive      Other reaction(s): Other, see comments  blisters     No Clinical Screening - See Comments      PN: LW Reaction: blisters  PN: LW FI1: nka  PN: LW CM1: CONTRAST- nka Reaction :     Nsaids      D/t ITP     Prochlorperazine      Other reaction(s): Edema     Propoxyphene N-Apap Hives     Sulfa Drugs      Tramadol Hcl Hives            Adhesive Tape Rash         Reviewed and updated as needed this visit by clinical staff     Reviewed and updated as needed this visit by Provider         ROS:  Constitutional, HEENT, cardiovascular, pulmonary, gi and gu systems are negative, except as otherwise noted.      OBJECTIVE:   BP 96/58  Pulse 73  Temp 97.2  F (36.2  C) (Oral)  Resp 20  Ht 5' 10\" (1.778 m)  Wt 136 lb (61.7 kg)  SpO2 98%  BMI 19.51 kg/m2  Body mass index is 19.51 kg/(m^2).  GENERAL: healthy, alert and no distress  RESP: lungs clear to auscultation - no rales, rhonchi or wheezes  CV: regular rate and rhythm, normal S1 " S2, no S3 or S4, no murmur, click or rub, no peripheral edema and peripheral pulses strong  MS: no gross musculoskeletal defects noted, no edema  PSYCH: mentation appears normal, affect normal/bright, speech pressured, judgement and insight intact and appearance well groomed    Diagnostic Test Results:  none     ASSESSMENT/PLAN:     1. Moderate episode of recurrent major depressive disorder (H)  Patient to follow-up with psychiatry.  I feel her history of mental health disorders and substance abuse, make her difficult to treat from a primary care standpoint.  Patient prefers to follow-up with [facility] in [place].  Continue prozac at 60 mg daily.  - FLUoxetine (PROZAC) 40 MG capsule; TAKE 1 CAPSULE(40 MG) BY MOUTH DAILY. Take with 20mg Fluoxetine by mouth daily to equal total daily dose of 60mg per day.  Dispense: 30 capsule; Refill: 3  - FLUoxetine (PROZAC) 20 MG capsule; Take 1 capsule (20 mg) by mouth daily With 40 mg pill daily for total dose of 60 mg daily.  Dispense: 30 capsule; Refill: 1  - MENTAL HEALTH REFERRAL  - Adult; Psychiatry and Medication Management; Psychiatry; Other: Not Listed - Enter Referral Details in Scheduling Comments Below; Patient call to schedule    2. Generalized anxiety disorder  Patient to take hydroxyzine at night.  Will dalia increasing remeron at bedtime to help with anxiety and sleep.  May need to further wean trazodone if patient is too sedated during the day.  - FLUoxetine (PROZAC) 40 MG capsule; TAKE 1 CAPSULE(40 MG) BY MOUTH DAILY. Take with 20mg Fluoxetine by mouth daily to equal total daily dose of 60mg per day.  Dispense: 30 capsule; Refill: 3  - hydrOXYzine (ATARAX) 25 MG tablet; Take 2 tablets (50 mg) by mouth nightly as needed for anxiety  Dispense: 60 tablet; Refill: 3  - BusPIRone HCl 30 MG TABS; Take 30 mg by mouth 2 times daily  Dispense: 60 tablet; Refill: 3  - MENTAL HEALTH REFERRAL  - Adult; Psychiatry and Medication Management; Psychiatry; Other: Not  Listed - Enter Referral Details in Scheduling Comments Below; Patient call to schedule    3. Primary insomnia  As above.   - mirtazapine (REMERON) 15 MG tablet; Take 1 tablet (15 mg) by mouth At Bedtime Please contact clinic if this makes you too sedated during the day.  Dispense: 30 tablet; Refill: 3  - traZODone (DESYREL) 100 MG tablet; Take 1 tablet (100 mg) by mouth At Bedtime  Dispense: 90 tablet; Refill: 1    4. Gastroesophageal reflux disease, esophagitis presence not specified    - omeprazole (PRILOSEC) 40 MG capsule; TAKE 1 CAPSULE(40 MG) BY MOUTH DAILY 30 TO 60 MINUTES BEFORE A MEAL  Dispense: 90 capsule; Refill: 3  - docusate sodium (COLACE) 100 MG tablet; Take 100 mg by mouth 2 times daily as needed for constipation  Dispense: 60 tablet; Refill: 1    5. Migraine without status migrainosus, not intractable, unspecified migraine type  Refill requested.  - ondansetron (ZOFRAN-ODT) 4 MG ODT tab; Take 1-2 tablets (4-8 mg) by mouth every 8 hours as needed for nausea  Dispense: 30 tablet; Refill: 1    6. Drug-induced constipation    - polyethylene glycol (MIRALAX) powder; Take 17 g (1 capful) by mouth daily  Dispense: 510 g; Refill: 3    FUTURE APPOINTMENTS:       - Follow-up visit in 2-3 weeks.    NINOSKA Smith [facility]

## 2017-12-28 ENCOUNTER — TELEPHONE (OUTPATIENT)
Dept: ADDICTION MEDICINE | Facility: CLINIC | Age: 47
End: 2017-12-28

## 2017-12-28 NOTE — TELEPHONE ENCOUNTER
Second incoming call from Gabi with [facility] checking on the status request. She would like a call back as soon as possible.  Please follow up. Contact info: [phone], direct line (Ok to leave detailed message)    Kasie Mcginnis

## 2017-12-28 NOTE — TELEPHONE ENCOUNTER
Gabi wanted to clarify that pt continue her subx as ordered, writer advised that pt should continue her subx as ordered.  Lynn Mobley RN

## 2017-12-28 NOTE — TELEPHONE ENCOUNTER
Incoming call from Gabi with [facility] stating that pt was prescribed percocet by an ER provider yesterday, and staff is wondering if it is appropriate for pt continue taking subx as well.     Please follow up. Contact info: [phone], Gabi (Ok to leave detailed message)      Kasie Mcginnis

## 2017-12-28 NOTE — TELEPHONE ENCOUNTER
FYI  Patient received two tablets of Percocet from [place] ED yesterday for left hand pain, acute chest pain. Pt gave conflicting story to treatment center staff. Did not receive script for percocet just administered two tablets in ED.  Lynn Mobley RN

## 2018-01-01 ENCOUNTER — TRANSFERRED RECORDS (OUTPATIENT)
Dept: HEALTH INFORMATION MANAGEMENT | Facility: CLINIC | Age: 48
End: 2018-01-01

## 2018-01-01 ENCOUNTER — RADIANT APPOINTMENT (OUTPATIENT)
Dept: GENERAL RADIOLOGY | Facility: CLINIC | Age: 48
End: 2018-01-01
Attending: FAMILY MEDICINE
Payer: COMMERCIAL

## 2018-01-01 ENCOUNTER — TELEPHONE (OUTPATIENT)
Dept: FAMILY MEDICINE | Facility: CLINIC | Age: 48
End: 2018-01-01

## 2018-01-01 ENCOUNTER — TELEPHONE (OUTPATIENT)
Dept: INTERNAL MEDICINE | Facility: CLINIC | Age: 48
End: 2018-01-01

## 2018-01-01 ENCOUNTER — TELEPHONE (OUTPATIENT)
Dept: ADDICTION MEDICINE | Facility: CLINIC | Age: 48
End: 2018-01-01

## 2018-01-01 ENCOUNTER — OFFICE VISIT (OUTPATIENT)
Dept: URGENT CARE | Facility: URGENT CARE | Age: 48
End: 2018-01-01
Payer: COMMERCIAL

## 2018-01-01 ENCOUNTER — OFFICE VISIT (OUTPATIENT)
Dept: RHEUMATOLOGY | Facility: CLINIC | Age: 48
End: 2018-01-01
Attending: NURSE PRACTITIONER
Payer: COMMERCIAL

## 2018-01-01 ENCOUNTER — HEALTH MAINTENANCE LETTER (OUTPATIENT)
Age: 48
End: 2018-01-01

## 2018-01-01 ENCOUNTER — OFFICE VISIT (OUTPATIENT)
Dept: ADDICTION MEDICINE | Facility: CLINIC | Age: 48
End: 2018-01-01
Payer: COMMERCIAL

## 2018-01-01 ENCOUNTER — RADIANT APPOINTMENT (OUTPATIENT)
Dept: GENERAL RADIOLOGY | Facility: CLINIC | Age: 48
End: 2018-01-01
Attending: NURSE PRACTITIONER
Payer: COMMERCIAL

## 2018-01-01 ENCOUNTER — TELEPHONE (OUTPATIENT)
Dept: RHEUMATOLOGY | Facility: CLINIC | Age: 48
End: 2018-01-01

## 2018-01-01 ENCOUNTER — OFFICE VISIT (OUTPATIENT)
Dept: FAMILY MEDICINE | Facility: CLINIC | Age: 48
End: 2018-01-01
Payer: COMMERCIAL

## 2018-01-01 VITALS
BODY MASS INDEX: 20.58 KG/M2 | SYSTOLIC BLOOD PRESSURE: 103 MMHG | TEMPERATURE: 97.3 F | DIASTOLIC BLOOD PRESSURE: 73 MMHG | OXYGEN SATURATION: 97 % | HEART RATE: 86 BPM | WEIGHT: 143.4 LBS

## 2018-01-01 VITALS
WEIGHT: 146 LBS | RESPIRATION RATE: 16 BRPM | OXYGEN SATURATION: 97 % | SYSTOLIC BLOOD PRESSURE: 115 MMHG | DIASTOLIC BLOOD PRESSURE: 58 MMHG | HEART RATE: 68 BPM | BODY MASS INDEX: 20.95 KG/M2 | TEMPERATURE: 98.7 F

## 2018-01-01 VITALS
OXYGEN SATURATION: 97 % | SYSTOLIC BLOOD PRESSURE: 100 MMHG | BODY MASS INDEX: 21.64 KG/M2 | WEIGHT: 151.2 LBS | HEART RATE: 80 BPM | HEIGHT: 70 IN | TEMPERATURE: 97.2 F | RESPIRATION RATE: 20 BRPM | DIASTOLIC BLOOD PRESSURE: 50 MMHG

## 2018-01-01 VITALS — BODY MASS INDEX: 21.52 KG/M2 | WEIGHT: 150 LBS

## 2018-01-01 VITALS
SYSTOLIC BLOOD PRESSURE: 111 MMHG | DIASTOLIC BLOOD PRESSURE: 69 MMHG | WEIGHT: 144 LBS | OXYGEN SATURATION: 97 % | RESPIRATION RATE: 15 BRPM | BODY MASS INDEX: 20.66 KG/M2 | HEART RATE: 98 BPM | TEMPERATURE: 99.3 F

## 2018-01-01 VITALS
BODY MASS INDEX: 22.81 KG/M2 | DIASTOLIC BLOOD PRESSURE: 64 MMHG | OXYGEN SATURATION: 96 % | HEART RATE: 73 BPM | WEIGHT: 159 LBS | TEMPERATURE: 98.5 F | SYSTOLIC BLOOD PRESSURE: 107 MMHG

## 2018-01-01 VITALS
DIASTOLIC BLOOD PRESSURE: 68 MMHG | RESPIRATION RATE: 20 BRPM | TEMPERATURE: 97.7 F | SYSTOLIC BLOOD PRESSURE: 104 MMHG | OXYGEN SATURATION: 98 % | WEIGHT: 147 LBS | HEART RATE: 109 BPM | BODY MASS INDEX: 21.09 KG/M2

## 2018-01-01 VITALS
HEART RATE: 56 BPM | DIASTOLIC BLOOD PRESSURE: 61 MMHG | BODY MASS INDEX: 20.76 KG/M2 | HEIGHT: 70 IN | SYSTOLIC BLOOD PRESSURE: 103 MMHG | OXYGEN SATURATION: 97 % | WEIGHT: 145 LBS

## 2018-01-01 DIAGNOSIS — F14.21 HISTORY OF COCAINE DEPENDENCE (H): Primary | ICD-10-CM

## 2018-01-01 DIAGNOSIS — M65.932 EXTENSOR TENOSYNOVITIS OF LEFT WRIST: ICD-10-CM

## 2018-01-01 DIAGNOSIS — F33.1 MODERATE EPISODE OF RECURRENT MAJOR DEPRESSIVE DISORDER (H): ICD-10-CM

## 2018-01-01 DIAGNOSIS — K59.00 CONSTIPATION, UNSPECIFIED CONSTIPATION TYPE: ICD-10-CM

## 2018-01-01 DIAGNOSIS — D64.9 ANEMIA, UNSPECIFIED TYPE: ICD-10-CM
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
## 2018-01-01 DIAGNOSIS — F41.1 GENERALIZED ANXIETY DISORDER: ICD-10-CM

## 2018-01-01 DIAGNOSIS — N30.00 ACUTE CYSTITIS WITHOUT HEMATURIA: Primary | ICD-10-CM

## 2018-01-01 DIAGNOSIS — F19.20 CHEMICAL DEPENDENCY (H): ICD-10-CM

## 2018-01-01 DIAGNOSIS — Z72.0 TOBACCO ABUSE: ICD-10-CM

## 2018-01-01 DIAGNOSIS — F11.20 OPIOID USE DISORDER, SEVERE, DEPENDENCE (H): ICD-10-CM

## 2018-01-01 DIAGNOSIS — F51.01 PRIMARY INSOMNIA: ICD-10-CM

## 2018-01-01 DIAGNOSIS — M79.642 BILATERAL HAND PAIN: Primary | ICD-10-CM

## 2018-01-01 DIAGNOSIS — G43.009 MIGRAINE WITHOUT AURA AND WITHOUT STATUS MIGRAINOSUS, NOT INTRACTABLE: ICD-10-CM

## 2018-01-01 DIAGNOSIS — M79.641 BILATERAL HAND PAIN: Primary | ICD-10-CM

## 2018-01-01 DIAGNOSIS — Z12.11 SPECIAL SCREENING FOR MALIGNANT NEOPLASMS, COLON: ICD-10-CM

## 2018-01-01 DIAGNOSIS — R76.8 POSITIVE ANA (ANTINUCLEAR ANTIBODY): ICD-10-CM

## 2018-01-01 DIAGNOSIS — F14.21 HISTORY OF COCAINE DEPENDENCE (H): ICD-10-CM

## 2018-01-01 DIAGNOSIS — M79.642 BILATERAL HAND PAIN: ICD-10-CM

## 2018-01-01 DIAGNOSIS — M79.641 BILATERAL HAND PAIN: ICD-10-CM

## 2018-01-01 DIAGNOSIS — Z13.220 LIPID SCREENING: ICD-10-CM

## 2018-01-01 DIAGNOSIS — M25.50 ARTHRALGIA, UNSPECIFIED JOINT: Primary | ICD-10-CM

## 2018-01-01 DIAGNOSIS — Z13.220 LIPID SCREENING: Primary | ICD-10-CM

## 2018-01-01 DIAGNOSIS — J45.20 MILD INTERMITTENT REACTIVE AIRWAY DISEASE WITHOUT COMPLICATION: ICD-10-CM

## 2018-01-01 DIAGNOSIS — Z12.31 VISIT FOR SCREENING MAMMOGRAM: Primary | ICD-10-CM

## 2018-01-01 DIAGNOSIS — F11.20 OPIOID USE DISORDER, SEVERE, DEPENDENCE (H): Primary | ICD-10-CM

## 2018-01-01 DIAGNOSIS — R60.0 PEDAL EDEMA: Primary | ICD-10-CM

## 2018-01-01 DIAGNOSIS — J30.89 CHRONIC NONSEASONAL ALLERGIC RHINITIS DUE TO POLLEN: Primary | ICD-10-CM

## 2018-01-01 DIAGNOSIS — R60.0 PEDAL EDEMA: ICD-10-CM

## 2018-01-01 DIAGNOSIS — R74.8 ELEVATED LIVER ENZYMES: ICD-10-CM

## 2018-01-01 DIAGNOSIS — J30.1 ACUTE SEASONAL ALLERGIC RHINITIS DUE TO POLLEN: ICD-10-CM

## 2018-01-01 DIAGNOSIS — M25.50 ARTHRALGIA, UNSPECIFIED JOINT: ICD-10-CM

## 2018-01-01 DIAGNOSIS — Z02.89 ENCOUNTER FOR EXAMINATION REQUIRED BY DEPARTMENT OF TRANSPORTATION (DOT): Primary | ICD-10-CM

## 2018-01-01 DIAGNOSIS — G43.909 MIGRAINE WITHOUT STATUS MIGRAINOSUS, NOT INTRACTABLE, UNSPECIFIED MIGRAINE TYPE: ICD-10-CM

## 2018-01-01 DIAGNOSIS — Z71.6 ENCOUNTER FOR SMOKING CESSATION COUNSELING: ICD-10-CM

## 2018-01-01 DIAGNOSIS — F11.20 CONTINUOUS OPIOID DEPENDENCE (H): Primary | ICD-10-CM

## 2018-01-01 DIAGNOSIS — T78.40XD ALLERGIC STATE, SUBSEQUENT ENCOUNTER: ICD-10-CM

## 2018-01-01 DIAGNOSIS — F33.1 MODERATE EPISODE OF RECURRENT MAJOR DEPRESSIVE DISORDER (H): Primary | ICD-10-CM

## 2018-01-01 LAB
A-OH ALPRAZ UR QL CFM: NEGATIVE
A-OH ALPRAZ UR QL CFM: NEGATIVE
ALBUMIN SERPL-MCNC: 3.4 G/DL (ref 3.4–5)
ALBUMIN SERPL-MCNC: 3.6 G/DL (ref 3.4–5)
ALBUMIN SERPL-MCNC: 3.7 G/DL (ref 3.4–5)
ALBUMIN SERPL-MCNC: NORMAL G/DL (ref 3.4–5)
ALBUMIN UR-MCNC: ABNORMAL MG/DL
ALBUMIN UR-MCNC: NEGATIVE MG/DL
ALP SERPL-CCNC: 88 U/L (ref 40–150)
ALP SERPL-CCNC: 89 U/L (ref 40–150)
ALP SERPL-CCNC: 95 U/L (ref 40–150)
ALP SERPL-CCNC: NORMAL U/L (ref 40–150)
ALPRAZ UR QL CFM: NEGATIVE
ALPRAZ UR QL CFM: NEGATIVE
ALT SERPL W P-5'-P-CCNC: 213 U/L (ref 0–50)
ALT SERPL W P-5'-P-CCNC: 22 U/L (ref 0–50)
ALT SERPL W P-5'-P-CCNC: 34 U/L (ref 0–50)
ALT SERPL W P-5'-P-CCNC: NORMAL U/L (ref 0–50)
AMPHETAMINES UR CFM-MCNC: NEGATIVE NG/ML
AMPHETAMINES UR QL: NOT DETECTED NG/ML
ANA PAT SER IF-IMP: ABNORMAL
ANA SER QL IF: ABNORMAL
ANA TITR SER IF: ABNORMAL {TITER}
ANION GAP SERPL CALCULATED.3IONS-SCNC: 4 MMOL/L (ref 3–14)
ANION GAP SERPL CALCULATED.3IONS-SCNC: 6 MMOL/L (ref 3–14)
ANION GAP SERPL CALCULATED.3IONS-SCNC: NORMAL MMOL/L (ref 6–17)
APPEARANCE UR: ABNORMAL
APPEARANCE UR: CLEAR
AST SERPL W P-5'-P-CCNC: 176 U/L (ref 0–45)
AST SERPL W P-5'-P-CCNC: 21 U/L (ref 0–45)
AST SERPL W P-5'-P-CCNC: 23 U/L (ref 0–45)
AST SERPL W P-5'-P-CCNC: NORMAL U/L (ref 0–45)
BACTERIA #/AREA URNS HPF: ABNORMAL /HPF
BACTERIA SPEC CULT: NORMAL
BARBITURATES UR QL SCN: NOT DETECTED NG/ML
BASOPHILS # BLD AUTO: 0.1 10E9/L (ref 0–0.2)
BASOPHILS NFR BLD AUTO: 1.3 %
BENZODIAZ UR QL SCN: ABNORMAL NG/ML
BENZODIAZ UR QL SCN: ABNORMAL NG/ML
BENZODIAZ UR QL SCN: NOT DETECTED NG/ML
BILIRUB DIRECT SERPL-MCNC: <0.1 MG/DL (ref 0–0.2)
BILIRUB SERPL-MCNC: 0.2 MG/DL (ref 0.2–1.3)
BILIRUB SERPL-MCNC: 0.4 MG/DL (ref 0.2–1.3)
BILIRUB SERPL-MCNC: 0.5 MG/DL (ref 0.2–1.3)
BILIRUB SERPL-MCNC: NORMAL MG/DL (ref 0.2–1.3)
BILIRUB UR QL STRIP: ABNORMAL
BILIRUB UR QL STRIP: NEGATIVE
BUN SERPL-MCNC: 7 MG/DL (ref 7–30)
BUN SERPL-MCNC: 7 MG/DL (ref 7–30)
BUN SERPL-MCNC: NORMAL MG/DL (ref 7–30)
BUPRENORPHINE UR QL: ABNORMAL NG/ML
C3 SERPL-MCNC: 91 MG/DL (ref 76–169)
C3 SERPL-MCNC: 97 MG/DL (ref 76–169)
C4 SERPL-MCNC: 18 MG/DL (ref 15–50)
C4 SERPL-MCNC: 19 MG/DL (ref 15–50)
CALCIUM SERPL-MCNC: 8.8 MG/DL (ref 8.5–10.1)
CALCIUM SERPL-MCNC: 8.8 MG/DL (ref 8.5–10.1)
CALCIUM SERPL-MCNC: NORMAL MG/DL (ref 8.5–10.1)
CANNABINOIDS UR QL: NOT DETECTED NG/ML
CCP AB SER IA-ACNC: 2 U/ML
CHLORIDE SERPL-SCNC: 107 MMOL/L (ref 94–109)
CHLORIDE SERPL-SCNC: 108 MMOL/L (ref 94–109)
CHLORIDE SERPL-SCNC: NORMAL MMOL/L (ref 94–109)
CHOLEST SERPL-MCNC: 190 MG/DL
CO2 SERPL-SCNC: 28 MMOL/L (ref 20–32)
CO2 SERPL-SCNC: 32 MMOL/L (ref 20–32)
CO2 SERPL-SCNC: NORMAL MMOL/L (ref 20–32)
COCAINE UR QL SCN: NOT DETECTED NG/ML
COLOR UR AUTO: ABNORMAL
COLOR UR AUTO: YELLOW
CREAT SERPL-MCNC: 0.66 MG/DL (ref 0.52–1.04)
CREAT SERPL-MCNC: 0.68 MG/DL (ref 0.52–1.04)
CREAT SERPL-MCNC: NORMAL MG/DL (ref 0.52–1.04)
CRP SERPL-MCNC: 3.4 MG/L (ref 0–8)
CRP SERPL-MCNC: <2.9 MG/L (ref 0–8)
D DIMER PPP FEU-MCNC: NORMAL UG/ML FEU (ref 0–0.5)
D-METHAMPHET UR QL: ABNORMAL NG/ML
D-METHAMPHET UR QL: NOT DETECTED NG/ML
DIFFERENTIAL METHOD BLD: ABNORMAL
DSDNA AB SER-ACNC: 1 IU/ML
ENA RNP IGG SER IA-ACNC: <0.2 AI (ref 0–0.9)
ENA SCL70 IGG SER IA-ACNC: <0.2 AI (ref 0–0.9)
ENA SM IGG SER-ACNC: <0.2 AI (ref 0–0.9)
ENA SS-A IGG SER IA-ACNC: <0.2 AI (ref 0–0.9)
ENA SS-B IGG SER IA-ACNC: <0.2 AI (ref 0–0.9)
EOSINOPHIL # BLD AUTO: 1.3 10E9/L (ref 0–0.7)
EOSINOPHIL NFR BLD AUTO: 17 %
ERYTHROCYTE [DISTWIDTH] IN BLOOD BY AUTOMATED COUNT: 15.3 % (ref 10–15)
ERYTHROCYTE [DISTWIDTH] IN BLOOD BY AUTOMATED COUNT: 15.5 % (ref 10–15)
ERYTHROCYTE [DISTWIDTH] IN BLOOD BY AUTOMATED COUNT: 16.1 % (ref 10–15)
ERYTHROCYTE [SEDIMENTATION RATE] IN BLOOD BY WESTERGREN METHOD: 23 MM/H (ref 0–20)
ERYTHROCYTE [SEDIMENTATION RATE] IN BLOOD BY WESTERGREN METHOD: 26 MM/H (ref 0–20)
GAMMA INTERFERON BACKGROUND BLD IA-ACNC: 0.05 IU/ML
GFR SERPL CREATININE-BSD FRML MDRD: >90 ML/MIN/1.7M2
GFR SERPL CREATININE-BSD FRML MDRD: >90 ML/MIN/1.7M2
GFR SERPL CREATININE-BSD FRML MDRD: NORMAL ML/MIN/1.7M2
GLUCOSE SERPL-MCNC: 106 MG/DL (ref 70–99)
GLUCOSE SERPL-MCNC: 70 MG/DL (ref 70–99)
GLUCOSE SERPL-MCNC: NORMAL MG/DL (ref 70–99)
GLUCOSE UR STRIP-MCNC: ABNORMAL MG/DL
GLUCOSE UR STRIP-MCNC: NEGATIVE MG/DL
HBV CORE AB SERPL QL IA: NONREACTIVE
HBV SURFACE AG SERPL QL IA: NONREACTIVE
HCT VFR BLD AUTO: 31.2 % (ref 35–47)
HCT VFR BLD AUTO: 33.8 % (ref 35–47)
HCT VFR BLD AUTO: 35.9 % (ref 35–47)
HCV AB SERPL QL IA: NONREACTIVE
HDLC SERPL-MCNC: 35 MG/DL
HGB BLD-MCNC: 10.3 G/DL (ref 11.7–15.7)
HGB BLD-MCNC: 11.3 G/DL (ref 11.7–15.7)
HGB BLD-MCNC: 11.7 G/DL (ref 11.7–15.7)
HGB UR QL STRIP: ABNORMAL
HGB UR QL STRIP: ABNORMAL
KETONES UR STRIP-MCNC: ABNORMAL MG/DL
KETONES UR STRIP-MCNC: NEGATIVE MG/DL
LDLC SERPL CALC-MCNC: 132 MG/DL
LEUKOCYTE ESTERASE UR QL STRIP: ABNORMAL
LEUKOCYTE ESTERASE UR QL STRIP: ABNORMAL
LORAZEPAM UR QL CFM: NEGATIVE
LORAZEPAM UR QL CFM: NEGATIVE
LYMPHOCYTES # BLD AUTO: 2.5 10E9/L (ref 0.8–5.3)
LYMPHOCYTES NFR BLD AUTO: 33.7 %
M TB IFN-G BLD-IMP: NEGATIVE
M TB IFN-G CD4+ BCKGRND COR BLD-ACNC: 7.66 IU/ML
MCH RBC QN AUTO: 29.2 PG (ref 26.5–33)
MCH RBC QN AUTO: 30.1 PG (ref 26.5–33)
MCH RBC QN AUTO: 30.7 PG (ref 26.5–33)
MCHC RBC AUTO-ENTMCNC: 32.6 G/DL (ref 31.5–36.5)
MCHC RBC AUTO-ENTMCNC: 33 G/DL (ref 31.5–36.5)
MCHC RBC AUTO-ENTMCNC: 33.4 G/DL (ref 31.5–36.5)
MCV RBC AUTO: 90 FL (ref 78–100)
MCV RBC AUTO: 90 FL (ref 78–100)
MCV RBC AUTO: 93 FL (ref 78–100)
METHADONE UR QL SCN: NOT DETECTED NG/ML
METHAMPHET UR CFM-MCNC: NEGATIVE NG/ML
MITOGEN IGNF BCKGRD COR BLD-ACNC: 0 IU/ML
MITOGEN IGNF BCKGRD COR BLD-ACNC: 0.02 IU/ML
MONOCYTES # BLD AUTO: 0.8 10E9/L (ref 0–1.3)
MONOCYTES NFR BLD AUTO: 11.2 %
NEUTROPHILS # BLD AUTO: 2.7 10E9/L (ref 1.6–8.3)
NEUTROPHILS NFR BLD AUTO: 36.8 %
NITRATE UR QL: ABNORMAL
NITRATE UR QL: NEGATIVE
NON-SQ EPI CELLS #/AREA URNS LPF: ABNORMAL /LPF
NONHDLC SERPL-MCNC: 155 MG/DL
NORDIAZEPAM UR QL CFM: NEGATIVE
NORDIAZEPAM UR QL CFM: NEGATIVE
NT-PROBNP SERPL-MCNC: NORMAL PG/ML (ref 0–125)
OPIATES UR QL SCN: NOT DETECTED NG/ML
OXAZEPAM UR QL CFM: NEGATIVE
OXAZEPAM UR QL CFM: NEGATIVE
OXYCODONE UR QL SCN: NOT DETECTED NG/ML
PCP UR QL SCN: NOT DETECTED NG/ML
PH UR STRIP: 6 PH (ref 5–7)
PH UR STRIP: ABNORMAL PH (ref 5–7)
PLATELET # BLD AUTO: 344 10E9/L (ref 150–450)
PLATELET # BLD AUTO: 387 10E9/L (ref 150–450)
PLATELET # BLD AUTO: 405 10E9/L (ref 150–450)
POTASSIUM SERPL-SCNC: 3.4 MMOL/L (ref 3.4–5.3)
POTASSIUM SERPL-SCNC: 3.5 MMOL/L (ref 3.4–5.3)
POTASSIUM SERPL-SCNC: NORMAL MMOL/L (ref 3.4–5.3)
PROPOXYPH UR QL: NOT DETECTED NG/ML
PROT SERPL-MCNC: 6.7 G/DL (ref 6.8–8.8)
PROT SERPL-MCNC: 7.4 G/DL (ref 6.8–8.8)
PROT SERPL-MCNC: 7.5 G/DL (ref 6.8–8.8)
PROT SERPL-MCNC: NORMAL G/DL (ref 6.8–8.8)
RBC # BLD AUTO: 3.36 10E12/L (ref 3.8–5.2)
RBC # BLD AUTO: 3.76 10E12/L (ref 3.8–5.2)
RBC # BLD AUTO: 4.01 10E12/L (ref 3.8–5.2)
RBC #/AREA URNS AUTO: ABNORMAL /HPF
RHEUMATOID FACT SER NEPH-ACNC: <20 IU/ML (ref 0–20)
SODIUM SERPL-SCNC: 142 MMOL/L (ref 133–144)
SODIUM SERPL-SCNC: 143 MMOL/L (ref 133–144)
SODIUM SERPL-SCNC: NORMAL MMOL/L (ref 133–144)
SOURCE: ABNORMAL
SOURCE: ABNORMAL
SP GR UR STRIP: <=1.005 (ref 1–1.03)
SP GR UR STRIP: ABNORMAL (ref 1–1.03)
SPECIMEN SOURCE: NORMAL
TEMAZEPAM UR QL CFM: NEGATIVE
TEMAZEPAM UR QL CFM: NEGATIVE
TRICYCLICS UR QL SCN: ABNORMAL NG/ML
TRICYCLICS UR QL SCN: NOT DETECTED NG/ML
TRIGL SERPL-MCNC: 116 MG/DL
TROPONIN I SERPL-MCNC: NORMAL UG/L (ref 0–0.04)
UROBILINOGEN UR STRIP-ACNC: 0.2 EU/DL (ref 0.2–1)
UROBILINOGEN UR STRIP-ACNC: ABNORMAL EU/DL (ref 0.2–1)
WBC # BLD AUTO: 7.1 10E9/L (ref 4–11)
WBC # BLD AUTO: 7.5 10E9/L (ref 4–11)
WBC # BLD AUTO: 9.7 10E9/L (ref 4–11)
WBC #/AREA URNS AUTO: ABNORMAL /HPF

## 2018-01-01 PROCEDURE — 71046 X-RAY EXAM CHEST 2 VIEWS: CPT | Mod: FY

## 2018-01-01 PROCEDURE — 93000 ELECTROCARDIOGRAM COMPLETE: CPT | Performed by: FAMILY MEDICINE

## 2018-01-01 PROCEDURE — 86431 RHEUMATOID FACTOR QUANT: CPT | Performed by: NURSE PRACTITIONER

## 2018-01-01 PROCEDURE — 86140 C-REACTIVE PROTEIN: CPT | Performed by: STUDENT IN AN ORGANIZED HEALTH CARE EDUCATION/TRAINING PROGRAM

## 2018-01-01 PROCEDURE — 36415 COLL VENOUS BLD VENIPUNCTURE: CPT | Performed by: NURSE PRACTITIONER

## 2018-01-01 PROCEDURE — 86235 NUCLEAR ANTIGEN ANTIBODY: CPT | Performed by: STUDENT IN AN ORGANIZED HEALTH CARE EDUCATION/TRAINING PROGRAM

## 2018-01-01 PROCEDURE — 86140 C-REACTIVE PROTEIN: CPT | Performed by: NURSE PRACTITIONER

## 2018-01-01 PROCEDURE — 85027 COMPLETE CBC AUTOMATED: CPT | Performed by: STUDENT IN AN ORGANIZED HEALTH CARE EDUCATION/TRAINING PROGRAM

## 2018-01-01 PROCEDURE — 99213 OFFICE O/P EST LOW 20 MIN: CPT | Performed by: NURSE PRACTITIONER

## 2018-01-01 PROCEDURE — 86038 ANTINUCLEAR ANTIBODIES: CPT | Performed by: NURSE PRACTITIONER

## 2018-01-01 PROCEDURE — 85025 COMPLETE CBC W/AUTO DIFF WBC: CPT | Performed by: NURSE PRACTITIONER

## 2018-01-01 PROCEDURE — 99214 OFFICE O/P EST MOD 30 MIN: CPT | Performed by: PEDIATRICS

## 2018-01-01 PROCEDURE — 80306 DRUG TEST PRSMV INSTRMNT: CPT | Performed by: PEDIATRICS

## 2018-01-01 PROCEDURE — 80346 BENZODIAZEPINES1-12: CPT | Mod: 90 | Performed by: PEDIATRICS

## 2018-01-01 PROCEDURE — 86225 DNA ANTIBODY NATIVE: CPT | Performed by: NURSE PRACTITIONER

## 2018-01-01 PROCEDURE — 99204 OFFICE O/P NEW MOD 45 MIN: CPT | Performed by: STUDENT IN AN ORGANIZED HEALTH CARE EDUCATION/TRAINING PROGRAM

## 2018-01-01 PROCEDURE — 86803 HEPATITIS C AB TEST: CPT | Performed by: STUDENT IN AN ORGANIZED HEALTH CARE EDUCATION/TRAINING PROGRAM

## 2018-01-01 PROCEDURE — 80061 LIPID PANEL: CPT | Performed by: NURSE PRACTITIONER

## 2018-01-01 PROCEDURE — 86039 ANTINUCLEAR ANTIBODIES (ANA): CPT | Performed by: NURSE PRACTITIONER

## 2018-01-01 PROCEDURE — 99000 SPECIMEN HANDLING OFFICE-LAB: CPT | Performed by: PEDIATRICS

## 2018-01-01 PROCEDURE — 99214 OFFICE O/P EST MOD 30 MIN: CPT | Performed by: NURSE PRACTITIONER

## 2018-01-01 PROCEDURE — 86160 COMPLEMENT ANTIGEN: CPT | Performed by: STUDENT IN AN ORGANIZED HEALTH CARE EDUCATION/TRAINING PROGRAM

## 2018-01-01 PROCEDURE — 99214 OFFICE O/P EST MOD 30 MIN: CPT | Performed by: FAMILY MEDICINE

## 2018-01-01 PROCEDURE — 99215 OFFICE O/P EST HI 40 MIN: CPT | Performed by: PEDIATRICS

## 2018-01-01 PROCEDURE — 86480 TB TEST CELL IMMUN MEASURE: CPT | Performed by: STUDENT IN AN ORGANIZED HEALTH CARE EDUCATION/TRAINING PROGRAM

## 2018-01-01 PROCEDURE — 85652 RBC SED RATE AUTOMATED: CPT | Performed by: NURSE PRACTITIONER

## 2018-01-01 PROCEDURE — 85652 RBC SED RATE AUTOMATED: CPT | Performed by: STUDENT IN AN ORGANIZED HEALTH CARE EDUCATION/TRAINING PROGRAM

## 2018-01-01 PROCEDURE — G0499 HEPB SCREEN HIGH RISK INDIV: HCPCS | Performed by: STUDENT IN AN ORGANIZED HEALTH CARE EDUCATION/TRAINING PROGRAM

## 2018-01-01 PROCEDURE — 80076 HEPATIC FUNCTION PANEL: CPT | Performed by: NURSE PRACTITIONER

## 2018-01-01 PROCEDURE — 85027 COMPLETE CBC AUTOMATED: CPT | Performed by: FAMILY MEDICINE

## 2018-01-01 PROCEDURE — 86160 COMPLEMENT ANTIGEN: CPT | Performed by: NURSE PRACTITIONER

## 2018-01-01 PROCEDURE — 36415 COLL VENOUS BLD VENIPUNCTURE: CPT | Performed by: STUDENT IN AN ORGANIZED HEALTH CARE EDUCATION/TRAINING PROGRAM

## 2018-01-01 PROCEDURE — 86200 CCP ANTIBODY: CPT | Performed by: NURSE PRACTITIONER

## 2018-01-01 PROCEDURE — 80053 COMPREHEN METABOLIC PANEL: CPT | Performed by: STUDENT IN AN ORGANIZED HEALTH CARE EDUCATION/TRAINING PROGRAM

## 2018-01-01 PROCEDURE — 86704 HEP B CORE ANTIBODY TOTAL: CPT | Performed by: STUDENT IN AN ORGANIZED HEALTH CARE EDUCATION/TRAINING PROGRAM

## 2018-01-01 PROCEDURE — 36415 COLL VENOUS BLD VENIPUNCTURE: CPT | Performed by: FAMILY MEDICINE

## 2018-01-01 PROCEDURE — 81001 URINALYSIS AUTO W/SCOPE: CPT | Performed by: NURSE PRACTITIONER

## 2018-01-01 PROCEDURE — 73130 X-RAY EXAM OF HAND: CPT | Mod: LT

## 2018-01-01 PROCEDURE — 80053 COMPREHEN METABOLIC PANEL: CPT | Performed by: NURSE PRACTITIONER

## 2018-01-01 PROCEDURE — 87086 URINE CULTURE/COLONY COUNT: CPT | Performed by: NURSE PRACTITIONER

## 2018-01-01 RX ORDER — PREDNISONE 5 MG/1
TABLET ORAL
Qty: 90 TABLET | Refills: 2 | Status: SHIPPED | OUTPATIENT
Start: 2018-01-01 | End: 2018-01-01

## 2018-01-01 RX ORDER — BUPRENORPHINE HYDROCHLORIDE AND NALOXONE HYDROCHLORIDE DIHYDRATE 8; 2 MG/1; MG/1
1 TABLET SUBLINGUAL 2 TIMES DAILY
Qty: 60 EACH | Refills: 1 | Status: SHIPPED | OUTPATIENT
Start: 2018-01-01 | End: 2018-01-01

## 2018-01-01 RX ORDER — BUPRENORPHINE HYDROCHLORIDE AND NALOXONE HYDROCHLORIDE DIHYDRATE 8; 2 MG/1; MG/1
1 TABLET SUBLINGUAL 2 TIMES DAILY
Qty: 60 EACH | Refills: 0 | Status: SHIPPED | OUTPATIENT
Start: 2018-01-01 | End: 2018-01-01

## 2018-01-01 RX ORDER — NITROFURANTOIN 25; 75 MG/1; MG/1
100 CAPSULE ORAL 2 TIMES DAILY
Qty: 14 CAPSULE | Refills: 0 | Status: SHIPPED | OUTPATIENT
Start: 2018-01-01 | End: 2018-01-01

## 2018-01-01 RX ORDER — PREDNISONE 5 MG/1
TABLET ORAL
Qty: 90 TABLET | Refills: 2 | Status: SHIPPED | OUTPATIENT
Start: 2018-01-01 | End: 2019-01-01

## 2018-01-01 RX ORDER — FLUOXETINE 40 MG/1
CAPSULE ORAL
Qty: 30 CAPSULE | Refills: 1 | Status: SHIPPED | OUTPATIENT
Start: 2018-01-01 | End: 2018-01-01

## 2018-01-01 RX ORDER — DOCUSATE SODIUM 100 MG/1
CAPSULE, LIQUID FILLED ORAL
Qty: 60 CAPSULE | Refills: 3 | Status: SHIPPED | OUTPATIENT
Start: 2018-01-01 | End: 2019-01-01

## 2018-01-01 RX ORDER — BUPRENORPHINE HYDROCHLORIDE AND NALOXONE HYDROCHLORIDE DIHYDRATE 8; 2 MG/1; MG/1
1 TABLET SUBLINGUAL 2 TIMES DAILY
Qty: 60 EACH | Refills: 1 | Status: SHIPPED | OUTPATIENT
Start: 2018-01-01 | End: 2019-01-01

## 2018-01-01 RX ORDER — HYDROXYZINE HYDROCHLORIDE 25 MG/1
TABLET, FILM COATED ORAL
Qty: 60 TABLET | Refills: 0 | OUTPATIENT
Start: 2018-01-01

## 2018-01-01 RX ORDER — FLUOXETINE 40 MG/1
CAPSULE ORAL
Qty: 30 CAPSULE | Refills: 0 | Status: SHIPPED | OUTPATIENT
Start: 2018-01-01 | End: 2018-01-01

## 2018-01-01 RX ORDER — NITROFURANTOIN 25; 75 MG/1; MG/1
100 CAPSULE ORAL 2 TIMES DAILY
Qty: 14 CAPSULE | Refills: 0 | Status: SHIPPED | OUTPATIENT
Start: 2018-01-01 | End: 2019-01-01

## 2018-01-01 RX ORDER — LIDOCAINE 50 MG/G
OINTMENT TOPICAL 4 TIMES DAILY PRN
Qty: 50 G | Refills: 1 | Status: SHIPPED | OUTPATIENT
Start: 2018-01-01 | End: 2019-01-01

## 2018-01-01 RX ORDER — VARENICLINE TARTRATE 1 MG/1
1 TABLET, FILM COATED ORAL 2 TIMES DAILY
Qty: 56 TABLET | Refills: 1 | Status: SHIPPED | OUTPATIENT
Start: 2018-01-01 | End: 2019-01-01

## 2018-01-01 RX ORDER — GABAPENTIN 300 MG/1
300 CAPSULE ORAL 3 TIMES DAILY
Qty: 90 CAPSULE | Refills: 0 | Status: SHIPPED | OUTPATIENT
Start: 2018-01-01 | End: 2019-01-01

## 2018-01-01 RX ORDER — OXYCODONE AND ACETAMINOPHEN 5; 325 MG/1; MG/1
1 TABLET ORAL EVERY 6 HOURS PRN
Qty: 12 TABLET | Refills: 0 | Status: SHIPPED | OUTPATIENT
Start: 2018-01-01 | End: 2019-01-01

## 2018-01-01 RX ORDER — BUPRENORPHINE HYDROCHLORIDE AND NALOXONE HYDROCHLORIDE DIHYDRATE 8; 2 MG/1; MG/1
1 TABLET SUBLINGUAL 2 TIMES DAILY
Qty: 70 EACH | Refills: 0 | Status: SHIPPED | OUTPATIENT
Start: 2018-01-01 | End: 2018-01-01

## 2018-01-01 RX ORDER — ALBUTEROL SULFATE 90 UG/1
1-2 AEROSOL, METERED RESPIRATORY (INHALATION) EVERY 4 HOURS PRN
Qty: 18 G | Refills: 3 | Status: SHIPPED | OUTPATIENT
Start: 2018-01-01

## 2018-01-01 RX ORDER — VARENICLINE TARTRATE 1 MG/1
1 TABLET, FILM COATED ORAL 2 TIMES DAILY
Qty: 56 TABLET | Refills: 0 | Status: SHIPPED | OUTPATIENT
Start: 2018-01-01 | End: 2018-01-01

## 2018-01-01 RX ORDER — VARENICLINE TARTRATE 1 MG/1
TABLET, FILM COATED ORAL
Qty: 56 TABLET | Refills: 0 | Status: SHIPPED | OUTPATIENT
Start: 2018-01-01 | End: 2018-01-01

## 2018-01-01 RX ORDER — CETIRIZINE HYDROCHLORIDE 10 MG/1
10 TABLET ORAL EVERY EVENING
Qty: 90 TABLET | Refills: 1 | Status: SHIPPED | OUTPATIENT
Start: 2018-01-01 | End: 2019-01-01

## 2018-01-01 RX ORDER — GABAPENTIN 300 MG/1
300 CAPSULE ORAL 3 TIMES DAILY
Qty: 90 CAPSULE | Refills: 1 | Status: SHIPPED | OUTPATIENT
Start: 2018-01-01 | End: 2018-01-01

## 2018-01-01 RX ORDER — MIRTAZAPINE 15 MG/1
TABLET, FILM COATED ORAL
Qty: 30 TABLET | Refills: 0 | Status: SHIPPED | OUTPATIENT
Start: 2018-01-01 | End: 2018-01-01

## 2018-01-01 RX ORDER — FLUOXETINE 40 MG/1
CAPSULE ORAL
Qty: 30 CAPSULE | Refills: 1 | Status: SHIPPED | OUTPATIENT
Start: 2018-01-01 | End: 2019-01-01

## 2018-01-01 RX ORDER — METHOCARBAMOL 500 MG/1
500 TABLET, FILM COATED ORAL 3 TIMES DAILY
Qty: 90 TABLET | Refills: 0 | Status: SHIPPED | OUTPATIENT
Start: 2018-01-01 | End: 2019-01-01

## 2018-01-01 RX ORDER — CLONIDINE HYDROCHLORIDE 0.1 MG/1
0.1 TABLET ORAL AT BEDTIME
Qty: 60 TABLET | Refills: 3 | Status: SHIPPED | OUTPATIENT
Start: 2018-01-01 | End: 2019-01-01

## 2018-01-01 RX ORDER — ONDANSETRON 4 MG/1
4-8 TABLET, ORALLY DISINTEGRATING ORAL EVERY 8 HOURS PRN
Qty: 30 TABLET | Refills: 1 | Status: SHIPPED | OUTPATIENT
Start: 2018-01-01

## 2018-01-01 RX ORDER — HYDROXYZINE PAMOATE 25 MG/1
50 CAPSULE ORAL
Qty: 120 CAPSULE | Refills: 1 | Status: SHIPPED | OUTPATIENT
Start: 2018-01-01

## 2018-01-01 ASSESSMENT — PAIN SCALES - GENERAL
PAINLEVEL: SEVERE PAIN (7)
PAINLEVEL: EXTREME PAIN (8)
PAINLEVEL: EXTREME PAIN (9)
PAINLEVEL: MILD PAIN (2)
PAINLEVEL: SEVERE PAIN (6)

## 2018-01-01 ASSESSMENT — PATIENT HEALTH QUESTIONNAIRE - PHQ9: SUM OF ALL RESPONSES TO PHQ QUESTIONS 1-9: 8

## 2018-01-02 ENCOUNTER — OFFICE VISIT (OUTPATIENT)
Dept: FAMILY MEDICINE | Facility: CLINIC | Age: 48
End: 2018-01-02
Payer: COMMERCIAL

## 2018-01-02 VITALS
RESPIRATION RATE: 20 BRPM | TEMPERATURE: 97.2 F | HEIGHT: 70 IN | HEART RATE: 73 BPM | OXYGEN SATURATION: 98 % | DIASTOLIC BLOOD PRESSURE: 58 MMHG | WEIGHT: 136 LBS | BODY MASS INDEX: 19.47 KG/M2 | SYSTOLIC BLOOD PRESSURE: 96 MMHG

## 2018-01-02 DIAGNOSIS — K21.9 GASTROESOPHAGEAL REFLUX DISEASE, ESOPHAGITIS PRESENCE NOT SPECIFIED: ICD-10-CM

## 2018-01-02 DIAGNOSIS — F41.1 GENERALIZED ANXIETY DISORDER: ICD-10-CM

## 2018-01-02 DIAGNOSIS — F33.1 MODERATE EPISODE OF RECURRENT MAJOR DEPRESSIVE DISORDER (H): Primary | ICD-10-CM

## 2018-01-02 DIAGNOSIS — K59.03 DRUG-INDUCED CONSTIPATION: ICD-10-CM

## 2018-01-02 DIAGNOSIS — G43.909 MIGRAINE WITHOUT STATUS MIGRAINOSUS, NOT INTRACTABLE, UNSPECIFIED MIGRAINE TYPE: ICD-10-CM

## 2018-01-02 DIAGNOSIS — F51.01 PRIMARY INSOMNIA: ICD-10-CM

## 2018-01-02 PROCEDURE — 99213 OFFICE O/P EST LOW 20 MIN: CPT | Performed by: NURSE PRACTITIONER

## 2018-01-02 RX ORDER — TRAZODONE HYDROCHLORIDE 100 MG/1
100 TABLET ORAL AT BEDTIME
Qty: 90 TABLET | Refills: 1 | COMMUNITY
Start: 2018-01-02 | End: 2018-06-13

## 2018-01-02 RX ORDER — HYDROXYZINE HYDROCHLORIDE 25 MG/1
50 TABLET, FILM COATED ORAL
Qty: 60 TABLET | Refills: 3 | Status: SHIPPED | OUTPATIENT
Start: 2018-01-02 | End: 2018-06-13

## 2018-01-02 RX ORDER — ASPIRIN 81 MG
100 TABLET, DELAYED RELEASE (ENTERIC COATED) ORAL 2 TIMES DAILY PRN
Qty: 60 TABLET | Refills: 1 | Status: SHIPPED | OUTPATIENT
Start: 2018-01-02 | End: 2018-02-21

## 2018-01-02 RX ORDER — BUSPIRONE HYDROCHLORIDE 30 MG/1
30 TABLET ORAL 2 TIMES DAILY
Qty: 60 TABLET | Refills: 3 | Status: SHIPPED | OUTPATIENT
Start: 2018-01-02 | End: 2018-06-09

## 2018-01-02 RX ORDER — ONDANSETRON 4 MG/1
4-8 TABLET, ORALLY DISINTEGRATING ORAL EVERY 8 HOURS PRN
Qty: 30 TABLET | Refills: 1 | Status: SHIPPED | OUTPATIENT
Start: 2018-01-02 | End: 2018-01-01

## 2018-01-02 RX ORDER — OMEPRAZOLE 40 MG/1
CAPSULE, DELAYED RELEASE ORAL
Qty: 90 CAPSULE | Refills: 3 | Status: SHIPPED | OUTPATIENT
Start: 2018-01-02 | End: 2019-01-01

## 2018-01-02 RX ORDER — MIRTAZAPINE 15 MG/1
15 TABLET, FILM COATED ORAL AT BEDTIME
Qty: 30 TABLET | Refills: 3 | Status: SHIPPED | OUTPATIENT
Start: 2018-01-02 | End: 2018-04-14

## 2018-01-02 RX ORDER — POLYETHYLENE GLYCOL 3350 17 G/17G
1 POWDER, FOR SOLUTION ORAL DAILY
Qty: 510 G | Refills: 3 | Status: SHIPPED | OUTPATIENT
Start: 2018-01-02 | End: 2019-01-01

## 2018-01-02 RX ORDER — FLUOXETINE 40 MG/1
CAPSULE ORAL
Qty: 30 CAPSULE | Refills: 3 | Status: SHIPPED | OUTPATIENT
Start: 2018-01-02 | End: 2018-04-14

## 2018-01-02 ASSESSMENT — PATIENT HEALTH QUESTIONNAIRE - PHQ9: SUM OF ALL RESPONSES TO PHQ QUESTIONS 1-9: 16

## 2018-01-02 ASSESSMENT — PAIN SCALES - GENERAL: PAINLEVEL: NO PAIN (0)

## 2018-01-02 NOTE — MR AVS SNAPSHOT
After Visit Summary   1/2/2018    Coretta Tovar    MRN: [id number]           Patient Information     Date Of Birth          1970        Visit Information        Provider Department      1/2/2018 10:20 AM Sarah Montemayor APRN [facility]        Today's Diagnoses     Moderate episode of recurrent major depressive disorder (H)    -  1    Generalized anxiety disorder        Primary insomnia        Gastroesophageal reflux disease, esophagitis presence not specified        Migraine without status migrainosus, not intractable, unspecified migraine type        Drug-induced constipation          Care Instructions    Increase remeron to 15 mg at bedtime.  Start miralax 1 capful daily for constipation.  Schedule psychiatry appointment.  Follow-up in 2-3 weeks.    [facility]    If you have any questions regarding to your visit please contact your care team:     Team Pink:   Clinic Hours Telephone Number   Internal Medicine:  Dr. Kristie Montemayor, NP       7am-7pm  Monday - Thursday   7am-5pm  Fridays  [phone]  (Appointment scheduling available 24/7)    Questions about your visit?  Team Line  [phone]   Urgent Care - [place] and [place] - 11am-9pm Monday-Friday Saturday-Sunday- 9am-5pm   [place] - 5pm-9pm Monday-Friday Saturday-Sunday- 9am-5pm  [phone] - [place]   [phone] - [place]       What options do I have for visits at the clinic other than the traditional office visit?  To expand how we care for you, many of our providers are utilizing electronic visits (e-visits) and telephone visits, when medically appropriate, for interactions with their patients rather than a visit in the clinic.   We also offer nurse visits for many medical concerns. Just like any other service, we will bill your insurance company for this type of visit based on time spent on the phone with your provider. Not all  insurance companies cover these visits. Please check with your medical insurance if this type of visit is covered. You will be responsible for any charges that are not paid by your insurance.      E-visits via Jaman[employer]:  generally incur a $35.00 fee.  Telephone visits:  Time spent on the phone: *charged based on time that is spent on the phone in increments of 10 minutes. Estimated cost:   5-10 mins $30.00   11-20 mins. $59.00   21-30 mins. $85.00   Use Witel (secure email communication and access to your chart) to send your primary care provider a message or make an appointment. Ask someone on your Team how to sign up for Witel.    For a Price Quote for your services, please call our [employer] Line at [phone].    As always, Thank you for trusting us with your health care needs!    Kaycee LARSON MA            Follow-ups after your visit        Additional Services     MENTAL HEALTH REFERRAL  - Adult; Psychiatry and Medication Management; Psychiatry; Other: Not Listed - Enter Referral Details in Scheduling Comments Below; Patient call to schedule       [facility]   [address]  [place], [place]  [phone]          All scheduling is subject to the client's specific insurance plan & benefits, provider/location availability, and provider clinical specialities.  Please arrive 15 minutes early for your first appointment and bring your completed paperwork.    Please be aware that coverage of these services is subject to the terms and limitations of your health insurance plan.  Call member services at your health plan with any benefit or coverage questions.                  Your next 10 appointments already scheduled     Jan 05, 2018  9:00 AM CST   Return Visit with Jessy Brenner MD   [facility] ([facility])    [address] [place] [address]  [address]  [facility] [address] [phone]              Who to contact     If you  "have questions or need follow up information about today's clinic visit or your schedule please contact [facility] directly at [phone].  Normal or non-critical lab and imaging results will be communicated to you by MyChart, letter or phone within 4 business days after the clinic has received the results. If you do not hear from us within 7 days, please contact the clinic through [employer] or phone. If you have a critical or abnormal lab result, we will notify you by phone as soon as possible.  Submit refill requests through CellPly or call your pharmacy and they will forward the refill request to us. Please allow 3 business days for your refill to be completed.          Additional Information About Your Visit        [employer] Information     CellPly lets you send messages to your doctor, view your test results, renew your prescriptions, schedule appointments and more. To sign up, go to [email or web address] . Click on \"Log in\" on the left side of the screen, which will take you to the Welcome page. Then click on \"Sign up Now\" on the right side of the page.     You will be asked to enter the access code listed below, as well as some personal information. Please follow the directions to create your username and password.     Your access code is: [email or web address]  Expires: 3/15/2018  9:50 AM     Your access code will  in 90 days. If you need help or a new code, please call your [place] clinic or [phone].        Care EveryWhere ID     This is your Care EveryWhere ID. This could be used by other organizations to access your [place] medical records  [id number]        Your Vitals Were     Pulse Temperature Respirations Height Pulse Oximetry BMI (Body Mass Index)    73 97.2  F (36.2  C) (Oral) 20 5' 10\" (1.778 m) 98% 19.51 kg/m2       Blood Pressure from Last 3 Encounters:   18 96/58   12/15/17 100/62   17 98/62    Weight from Last 3 Encounters:   18 136 lb (61.7 kg)   12/15/17 " 132 lb 8 oz (60.1 kg)   12/01/17 129 lb (58.5 kg)              We Performed the Following     MENTAL HEALTH REFERRAL  - Adult; Psychiatry and Medication Management; Psychiatry; Other: Not Listed - Enter Referral Details in Scheduling Comments Below; Patient call to schedule          Today's Medication Changes          These changes are accurate as of: 1/2/18 11:05 AM.  If you have any questions, ask your nurse or doctor.               Start taking these medicines.        Dose/Directions    polyethylene glycol powder   Commonly known as:  MIRALAX   Used for:  Drug-induced constipation   Started by:  Sarah Montemayor APRN CNP        Dose:  1 capful   Take 17 g (1 capful) by mouth daily   Quantity:  510 g   Refills:  3         These medicines have changed or have updated prescriptions.        Dose/Directions    * FLUoxetine 20 MG capsule   Commonly known as:  PROzac   This may have changed:  Another medication with the same name was added. Make sure you understand how and when to take each.   Used for:  Moderate episode of recurrent major depressive disorder (H), Generalized anxiety disorder   Changed by:  Jessy Brenner MD        Dose:  20 mg   Take 1 capsule (20 mg) by mouth daily Take with 40mg of fluoxetine daily to equal total daily dose of 60 mg per day.   Quantity:  30 capsule   Refills:  0       * FLUoxetine 40 MG capsule   Commonly known as:  PROzac   This may have changed:  Another medication with the same name was added. Make sure you understand how and when to take each.   Used for:  Moderate episode of recurrent major depressive disorder (H), Generalized anxiety disorder   Changed by:  Sarah Montemayor APRN CNP        TAKE 1 CAPSULE(40 MG) BY MOUTH DAILY. Take with 20mg Fluoxetine by mouth daily to equal total daily dose of 60mg per day.   Quantity:  30 capsule   Refills:  3       * FLUoxetine 20 MG capsule   Commonly known as:  PROzac   This may have changed:  You were already  taking a medication with the same name, and this prescription was added. Make sure you understand how and when to take each.   Used for:  Moderate episode of recurrent major depressive disorder (H)   Changed by:  Sarah Montemayor APRN CNP        Dose:  20 mg   Take 1 capsule (20 mg) by mouth daily With 40 mg pill daily for total dose of 60 mg daily.   Quantity:  30 capsule   Refills:  1       hydrOXYzine 25 MG tablet   Commonly known as:  ATARAX   This may have changed:    - how much to take  - when to take this   Used for:  Generalized anxiety disorder   Changed by:  Sarah Montemayor APRN CNP        Dose:  50 mg   Take 2 tablets (50 mg) by mouth nightly as needed for anxiety   Quantity:  60 tablet   Refills:  3       mirtazapine 15 MG tablet   Commonly known as:  REMERON   This may have changed:    - medication strength  - how much to take  - additional instructions   Used for:  Primary insomnia   Changed by:  Sarah Montemayor APRN CNP        Dose:  15 mg   Take 1 tablet (15 mg) by mouth At Bedtime Please contact clinic if this makes you too sedated during the day.   Quantity:  30 tablet   Refills:  3       ondansetron 4 MG ODT tab   Commonly known as:  ZOFRAN-ODT   This may have changed:  when to take this   Used for:  Migraine without status migrainosus, not intractable, unspecified migraine type   Changed by:  Sarah Montemayor APRN CNP        Dose:  4-8 mg   Take 1-2 tablets (4-8 mg) by mouth every 8 hours as needed for nausea   Quantity:  30 tablet   Refills:  1       * traZODone 50 MG tablet   Commonly known as:  DESYREL   This may have changed:  Another medication with the same name was changed. Make sure you understand how and when to take each.   Used for:  Primary insomnia   Changed by:  Jessy Brenner MD        Take 50 to 100mg by mouth at night as needed for insomnia.   Quantity:  30 tablet   Refills:  0       * traZODone 100 MG tablet   Commonly known as:   CONSTANTINO   This may have changed:    - how much to take  - how to take this   Used for:  Primary insomnia   Changed by:  Sarah Montemayor APRN CNP        Dose:  100 mg   Take 1 tablet (100 mg) by mouth At Bedtime   Quantity:  90 tablet   Refills:  1       * Notice:  This list has 5 medication(s) that are the same as other medications prescribed for you. Read the directions carefully, and ask your doctor or other care provider to review them with you.         Where to get your medicines      These medications were sent to [facility] [address] [place] [address] [address] [facility] & [address] [address]     Phone:  [phone]     BusPIRone HCl 30 MG Tabs    docusate sodium 100 MG tablet    FLUoxetine 20 MG capsule    FLUoxetine 40 MG capsule    hydrOXYzine 25 MG tablet    mirtazapine 15 MG tablet    omeprazole 40 MG capsule    ondansetron 4 MG ODT tab    polyethylene glycol powder                Primary Care Provider Office Phone # Fax #    NINOSKA Mitchell [phone]       [address] [place] [address]        Equal Access to Services     [facility] AH: Hadii aad ku hadasho Soomaali, waaxda luqadaha, qaybta kaalmada adeegyada, waxay idiin hayaan hitehs mendozaarasanti alegre . So [facility] [phone].    ATENCIÓN: Si habla español, tiene a roberts disposición servicios gratuitos de asistencia lingüística. [facility] [phone].    We comply with applicable federal civil rights laws and [place] laws. We do not discriminate on the basis of race, color, national origin, age, disability, sex, sexual orientation, or gender identity.            Thank you!     Thank you for choosing [facility]  for your care. Our goal is always to provide you with excellent care. Hearing back from our patients is one way we can continue to improve our services. Please take a few minutes to complete the written survey that you may receive in the  mail after your visit with us. Thank you!             Your Updated Medication List - Protect others around you: Learn how to safely use, store and throw away your medicines at www.disposemymeds.org.          This list is accurate as of: 1/2/18 11:05 AM.  Always use your most recent med list.                   Brand Name Dispense Instructions for use Diagnosis    ACCUTANE PO           acyclovir 5 % ointment    ZOVIRAX    30 g    Apply 1 g topically as needed    Recurrent cold sores       azithromycin 250 MG tablet    ZITHROMAX    6 tablet    Two tablets first day, then one tablet daily for four days.    URI with cough and congestion       betamethasone valerate 0.1 % ointment    VALISONE     Apply once a day for 2 weeks as directed        buprenorphine-naloxone 8-2 MG Subl sublingual tablet    SUBOXONE    60 each    Place 1 tablet under the tongue 2 times daily    Opioid use disorder, severe, dependence (H)       BusPIRone HCl 30 MG Tabs     60 tablet    Take 30 mg by mouth 2 times daily    Generalized anxiety disorder       clobetasol 0.05 % ointment    TEMOVATE     APPLY TOPICALLY BID        [employer] TRIPLE ANTIBIOTIC Oint     60 g    Externally apply topically 4 times daily    Allergic contact dermatitis due to adhesives       dextromethorphan 30 MG/5ML liquid    DELSYM    148 mL    Take 5 mLs (30 mg) by mouth 2 times daily as needed for cough    URI with cough and congestion       docusate sodium 100 MG tablet    COLACE    60 tablet    Take 100 mg by mouth 2 times daily as needed for constipation    Gastroesophageal reflux disease, esophagitis presence not specified       * FLUoxetine 20 MG capsule    PROzac    30 capsule    Take 1 capsule (20 mg) by mouth daily Take with 40mg of fluoxetine daily to equal total daily dose of 60 mg per day.    Moderate episode of recurrent major depressive disorder (H), Generalized anxiety disorder       * FLUoxetine 40 MG capsule    PROzac    30 capsule    TAKE 1 CAPSULE(40 MG) BY  MOUTH DAILY. Take with 20mg Fluoxetine by mouth daily to equal total daily dose of 60mg per day.    Moderate episode of recurrent major depressive disorder (H), Generalized anxiety disorder       * FLUoxetine 20 MG capsule    PROzac    30 capsule    Take 1 capsule (20 mg) by mouth daily With 40 mg pill daily for total dose of 60 mg daily.    Moderate episode of recurrent major depressive disorder (H)       fluticasone 50 MCG/ACT spray    FLONASE    1 Bottle    Spray 1-2 sprays into both nostrils daily    Seasonal allergic rhinitis, unspecified allergic rhinitis trigger       hydrocortisone 2.5 % cream           hydrOXYzine 25 MG tablet    ATARAX    60 tablet    Take 2 tablets (50 mg) by mouth nightly as needed for anxiety    Generalized anxiety disorder       ketoconazole 2 % cream    NIZORAL          loratadine 10 MG tablet    CLARITIN    30 tablet    Take 1 tablet (10 mg) by mouth daily as needed for allergies    Chronic nonseasonal allergic rhinitis due to pollen       mirtazapine 15 MG tablet    REMERON    30 tablet    Take 1 tablet (15 mg) by mouth At Bedtime Please contact clinic if this makes you too sedated during the day.    Primary insomnia       nicotine 21 MG/24HR 24 hr patch    NICODERM CQ    28 patch    Place 1 patch onto the skin every 24 hours As needed.    Tobacco use disorder       olopatadine HCl 0.2 % Soln    PATADAY    2.5 mL    Place 1 drop into both eyes daily    Seasonal allergic rhinitis, unspecified allergic rhinitis trigger       omeprazole 40 MG capsule    priLOSEC    90 capsule    TAKE 1 CAPSULE(40 MG) BY MOUTH DAILY 30 TO 60 MINUTES BEFORE A MEAL    Gastroesophageal reflux disease, esophagitis presence not specified       ondansetron 4 MG ODT tab    ZOFRAN-ODT    30 tablet    Take 1-2 tablets (4-8 mg) by mouth every 8 hours as needed for nausea    Migraine without status migrainosus, not intractable, unspecified migraine type       order for DME     1 each    Equipment being ordered:  cool humidifier    Painful respiration, Upper respiratory tract infection, unspecified type       polyethylene glycol powder    MIRALAX    510 g    Take 17 g (1 capful) by mouth daily    Drug-induced constipation       * traZODone 50 MG tablet    DESYREL    30 tablet    Take 50 to 100mg by mouth at night as needed for insomnia.    Primary insomnia       * traZODone 100 MG tablet    DESYREL    90 tablet    Take 1 tablet (100 mg) by mouth At Bedtime    Primary insomnia       triamcinolone 0.1 % ointment    KENALOG    80 g    Apply sparingly to affected area on back two times daily til clear.    Rash       TYLENOL PO      Take 1 tablet by mouth as needed    Chondritis of both external ears       valACYclovir 1000 mg tablet    VALTREX    8 tablet    Take 1 tablet (1,000 mg) by mouth 2 times daily At the onset of skin lesions as needed    Recurrent cold sores       * VENTOLIN  (90 BASE) MCG/ACT Inhaler   Generic drug:  albuterol     18 g    INHALE 2 PUFFS INTO THE LUNGS EVERY 6 HOURS AS NEEDED FOR SHORTNESS OF BREATH OR DIFFICULT BREATHING OR WHEEZING    Acute bronchitis with symptoms > 10 days       * albuterol 108 (90 BASE) MCG/ACT Inhaler    PROAIR HFA/PROVENTIL HFA/VENTOLIN HFA    18 g    Inhale 1-2 puffs into the lungs every 4 hours as needed for shortness of breath / dyspnea or wheezing    Mild intermittent reactive airway disease without complication       ZOLMitriptan 5 MG tablet    ZOMIG    12 tablet    TAKE 1 TABLET BY MOUTH AT ONSET OF HEADACHE FOR MIGRAINE. MAY REPEAT DOSE IN 2 HOURS. DO NOT EXCEED 10MG IN 24 HOURS    Migraine without aura and without status migrainosus, not intractable       * Notice:  This list has 7 medication(s) that are the same as other medications prescribed for you. Read the directions carefully, and ask your doctor or other care provider to review them with you.

## 2018-01-02 NOTE — LETTER
My Depression Action Plan  Name: Coretta Tovar   Date of Birth 1970  Date: 1/2/2018    My doctor: Sarah Montemayor   My clinic: [address]  [place] [address]  [phone]          GREEN    ZONE   Good Control    What it looks like:     Things are going generally well. You have normal up s and down s. You may even feel depressed from time to time, but bad moods usually last less than a day.   What you need to do:  1. Continue to care for yourself (see self care plan)  2. Check your depression survival kit and update it as needed  3. Follow your physician s recommendations including any medication.  4. Do not stop taking medication unless you consult with your physician first.           YELLOW         ZONE Getting Worse    What it looks like:     Depression is starting to interfere with your life.     It may be hard to get out of bed; you may be starting to isolate yourself from others.    Symptoms of depression are starting to last most all day and this has happened for several days.     You may have suicidal thoughts but they are not constant.   What you need to do:     1. Call your care team, your response to treatment will improve if you keep your care team informed of your progress. Yellow periods are signs an adjustment may need to be made.     2. Continue your self-care, even if you have to fake it!    3. Talk to someone in your support network    4. Open up your depression survival kit           RED    ZONE Medical Alert - Get Help    What it looks like:     Depression is seriously interfering with your life.     You may experience these or other symptoms: You can t get out of bed most days, can t work or engage in other necessary activities, you have trouble taking care of basic hygiene, or basic responsibilities, thoughts of suicide or death that will not go away, self-injurious behavior.     What you need to do:  1. Call your care team  and request a same-day appointment. If they are not available (weekends or after hours) call your local crisis line, emergency room or 911.      Electronically signed by: Sandy Rivers, January 2, 2018    Depression Self Care Plan / Survival Kit    Self-Care for Depression  Here s the deal. Your body and mind are really not as separate as most people think.  What you do and think affects how you feel and how you feel influences what you do and think. This means if you do things that people who feel good do, it will help you feel better.  Sometimes this is all it takes.  There is also a place for medication and therapy depending on how severe your depression is, so be sure to consult with your medical provider and/ or Behavioral Health Consultant if your symptoms are worsening or not improving.     In order to better manage my stress, I will:    Exercise  Get some form of exercise, every day. This will help reduce pain and release endorphins, the  feel good  chemicals in your brain. This is almost as good as taking antidepressants!  This is not the same as joining a gym and then never going! (they count on that by the way ) It can be as simple as just going for a walk or doing some gardening, anything that will get you moving.      Hygiene   Maintain good hygiene (Get out of bed in the morning, Make your bed, Brush your teeth, Take a shower, and Get dressed like you were going to work, even if you are unemployed).  If your clothes don't fit try to get ones that do.    Diet  I will strive to eat foods that are good for me, drink plenty of water, and avoid excessive sugar, caffeine, alcohol, and other mood-altering substances.  Some foods that are helpful in depression are: complex carbohydrates, B vitamins, flaxseed, fish or fish oil, fresh fruits and vegetables.    Psychotherapy  I agree to participate in Individual Therapy (if recommended).    Medication  If prescribed medications, I agree to take them.  Missing  doses can result in serious side effects.  I understand that drinking alcohol, or other illicit drug use, may cause potential side effects.  I will not stop my medication abruptly without first discussing it with my provider.    Staying Connected With Others  I will stay in touch with my friends, family members, and my primary care provider/team.    Use your imagination  Be creative.  We all have a creative side; it doesn t matter if it s oil painting, sand castles, or mud pies! This will also kick up the endorphins.    Witness Beauty  (AKA stop and smell the roses) Take a look outside, even in mid-winter. Notice colors, textures. Watch the squirrels and birds.     Service to others  Be of service to others.  There is always someone else in need.  By helping others we can  get out of ourselves  and remember the really important things.  This also provides opportunities for practicing all the other parts of the program.    Humor  Laugh and be silly!  Adjust your TV habits for less news and crime-drama and more comedy.    Control your stress  Try breathing deep, massage therapy, biofeedback, and meditation. Find time to relax each day.     My support system    Clinic Contact:  Phone number:    Contact 1:  Phone number:    Contact 2:  Phone number:    Amish/:  Phone number:    Therapist:  Phone number:    Local crisis center:    Phone number:    Other community support:  Phone number:

## 2018-01-02 NOTE — PROCEDURES
"  SUBJECTIVE:   Coretta Tovar is a 47 year old female who presents to clinic today for the following health issues:      Medication Followup     Taking Medication as prescribed: yes    Side Effects:  None    Medication Helping Symptoms:  yes         {additional problems for provider to add:324256}    Problem list and histories reviewed & adjusted, as indicated.  Additional history: {NONE - AS DOCUMENTED:365353::\"as documented\"}    {HIST REVIEW/ LINKS 2:779193}    Reviewed and updated as needed this visit by clinical staffTobacco  Allergies  Meds  Med Hx  Surg Hx  Fam Hx  Soc Hx      Reviewed and updated as needed this visit by Provider         {PROVIDER CHARTING PREFERENCE:096701}  "

## 2018-01-02 NOTE — PATIENT INSTRUCTIONS
Increase remeron to 15 mg at bedtime.  Start miralax 1 capful daily for constipation.  Schedule psychiatry appointment.  Follow-up in 2-3 weeks.    [facility]    If you have any questions regarding to your visit please contact your care team:     Team Pink:   Clinic Hours Telephone Number   Internal Medicine:  Dr. Kristie Montemayor, NP       7am-7pm  Monday - Thursday   7am-5pm  Fridays  [phone]  (Appointment scheduling available 24/7)    Questions about your visit?  Team Line  [phone]   Urgent Care - [place] - 11am-9pm Monday-Friday Saturday-Sunday- 9am-5pm   [place] - 5pm-9pm Monday-Friday Saturday-Sunday- 9am-5pm  [phone] - [place]   [phone] - [place]       What options do I have for visits at the clinic other than the traditional office visit?  To expand how we care for you, many of our providers are utilizing electronic visits (e-visits) and telephone visits, when medically appropriate, for interactions with their patients rather than a visit in the clinic.   We also offer nurse visits for many medical concerns. Just like any other service, we will bill your insurance company for this type of visit based on time spent on the phone with your provider. Not all insurance companies cover these visits. Please check with your medical insurance if this type of visit is covered. You will be responsible for any charges that are not paid by your insurance.      E-visits via Face to Face Live:  generally incur a $35.00 fee.  Telephone visits:  Time spent on the phone: *charged based on time that is spent on the phone in increments of 10 minutes. Estimated cost:   5-10 mins $30.00   11-20 mins. $59.00   21-30 mins. $85.00   Use Face to Face Live (secure email communication and access to your chart) to send your primary care provider a message or make an appointment. Ask someone on your Team how to sign up for Face to Face Live.    For a Price Quote  for your services, please call our Consumer Price Line at [phone].    As always, Thank you for trusting us with your health care needs!    Kaycee LARSON MA

## 2018-01-05 ENCOUNTER — OFFICE VISIT (OUTPATIENT)
Dept: ADDICTION MEDICINE | Facility: CLINIC | Age: 48
End: 2018-01-05
Payer: COMMERCIAL

## 2018-01-05 ENCOUNTER — TELEPHONE (OUTPATIENT)
Dept: ADDICTION MEDICINE | Facility: CLINIC | Age: 48
End: 2018-01-05

## 2018-01-05 VITALS
WEIGHT: 139 LBS | DIASTOLIC BLOOD PRESSURE: 68 MMHG | BODY MASS INDEX: 19.94 KG/M2 | SYSTOLIC BLOOD PRESSURE: 102 MMHG | RESPIRATION RATE: 18 BRPM | OXYGEN SATURATION: 95 % | HEART RATE: 87 BPM

## 2018-01-05 DIAGNOSIS — F11.20 OPIOID USE DISORDER, SEVERE, DEPENDENCE (H): ICD-10-CM

## 2018-01-05 LAB
AMPHETAMINES UR QL: NOT DETECTED NG/ML
BARBITURATES UR QL SCN: NOT DETECTED NG/ML
BENZODIAZ UR QL SCN: NOT DETECTED NG/ML
BUPRENORPHINE UR QL: ABNORMAL NG/ML
CANNABINOIDS UR QL: NOT DETECTED NG/ML
COCAINE UR QL SCN: NOT DETECTED NG/ML
D-METHAMPHET UR QL: NOT DETECTED NG/ML
METHADONE UR QL SCN: NOT DETECTED NG/ML
OPIATES UR QL SCN: NOT DETECTED NG/ML
OXYCODONE UR QL SCN: NOT DETECTED NG/ML
PCP UR QL SCN: NOT DETECTED NG/ML
PROPOXYPH UR QL: NOT DETECTED NG/ML
TRICYCLICS UR QL SCN: NOT DETECTED NG/ML

## 2018-01-05 PROCEDURE — 99215 OFFICE O/P EST HI 40 MIN: CPT | Performed by: PEDIATRICS

## 2018-01-05 PROCEDURE — 80306 DRUG TEST PRSMV INSTRMNT: CPT | Performed by: PEDIATRICS

## 2018-01-05 RX ORDER — BUPRENORPHINE HYDROCHLORIDE AND NALOXONE HYDROCHLORIDE DIHYDRATE 8; 2 MG/1; MG/1
1 TABLET SUBLINGUAL 2 TIMES DAILY
Qty: 60 EACH | Refills: 0 | Status: SHIPPED | OUTPATIENT
Start: 2018-01-05 | End: 2018-02-05

## 2018-01-05 RX ORDER — CLONIDINE HYDROCHLORIDE 0.1 MG/1
0.1 TABLET ORAL 2 TIMES DAILY
Qty: 60 TABLET | Refills: 1 | Status: SHIPPED | OUTPATIENT
Start: 2018-01-05 | End: 2018-02-21

## 2018-01-05 NOTE — PROGRESS NOTES
SUBJECTIVE:                                                    OPIOID USE DISORDER - BUPRENORPHINE FOLLOW UP:    Coretta Tovar is a 47 year old female who presents to clinic today for follow up of  MAT for opioid use disorder.     Date of last visit:  12/06/2017    Dose at last visit:   Suboxone tab 8mg bid.     [place] Board of Pharmacy Data Base Reviewed:    YES;     Suboxone 8mg #60 12/6    HPI: Current taking 4mg qid but would prefer 8mg bid.  Currently at [facility].     Seen by PCP 1/2. Referred to psychiatry.  Increased remeron. Miralax recommended.    Will going to new psychiatry soon in [place].    Seen in ED given 2 tab Percocet 12/27. Denies other use.   Some meeting attendance.  Worried about daughter who is using opioids.  Wondering about referral process.     Status since last visit: Since last visit patient has been:stable    Intensity:     There has been: no craving    Suboxone Dose: adequate.   Progression of Symptoms:     Cues to use and relapse triggers: None    Recovery program has been: active  Accompanying Signs & Symptoms:    Side Effects:     constipation  Sobriety:     Status: No substance use     Drug Screen: obtained  Precipitating factors:    Triggers have been: mild.   Alleviating factors:    Contact with sponsor has been: helpful.     Family and support system has been: neutral.   Other Therapies Tried :     Patient has been going to recovery meetings: regularly    Patient has been participating in professional counseling/therapy: YES        Social History     Social History Narrative    Lives with dad and brother in [place].      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      Daughter with recent DWI (she has child).        Patient Active Problem List    Diagnosis Date Noted     History of cocaine dependence (H) 09/07/2017     Priority: Medium     Moderate episode of recurrent major depressive disorder (H) 03/22/2017     Priority: Medium     Generalized anxiety disorder  03/22/2017     Priority: Medium     Protein-calorie malnutrition (H) 03/22/2017     Priority: Medium     Primary insomnia 03/22/2017     Priority: Medium     Vertigo 03/14/2017     Priority: Medium     Cervical pain 03/08/2017     Priority: Medium     Nutritional disorder 11/21/2016     Priority: Medium     Disorder of stomach 11/20/2016     Priority: Medium     Overview:   Per Biopsy obtained on EGD 11/22/2016. Reactive gastropathy was quoted to be frequently associated with ongoing non-inflammatory type mucosal injury due to a chemical type of injury; this may be due to ingestion of non-steroidal anti-inflammatory drugs, aspirin, excess alcohol, or corticosteroids.        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     Priority: Medium     Chemical dependency (H) 02/24/2015     Priority: Medium     Insomnia 02/24/2015     Priority: Medium     S/P splenectomy 01/13/2015     Priority: Medium     Migraines      Priority: Medium     Idiopathic thrombocytopenic purpura (HCC)      Priority: Medium     s/p splenectomy       Tobacco abuse 05/30/2014     Priority: Medium     Immune thrombocytopenic purpura (H) 01/14/2008     Priority: Medium     Personality disorder 01/14/2008     Priority: Medium     Overview:   cluster B traits  borderline, antisocial       Nondependent cannabis abuse, episodic 01/14/2008     Priority: Medium     Mixed, or nondependent drug abuse 01/26/2007     Priority: Medium     Overview:   recurrent issues with opioids.  History of crack cocaine abuse.       Gastroesophageal reflux disease 12/20/2006     Priority: Medium     Herpetic gingivostomatitis 12/20/2006     Priority: Medium     Muscle pain 12/20/2006     Priority: Medium     Atopic rhinitis 12/20/2006     Priority: Medium     Abnormal weight loss 12/20/2006     Priority: Medium       Problem list and histories reviewed & adjusted, as  indicated.  Additional history: as documented        Current Outpatient Prescriptions on File Prior to Visit:  FLUoxetine (PROZAC) 40 MG capsule TAKE 1 CAPSULE(40 MG) BY MOUTH DAILY. Take with 20mg Fluoxetine by mouth daily to equal total daily dose of 60mg per day.   hydrOXYzine (ATARAX) 25 MG tablet Take 2 tablets (50 mg) by mouth nightly as needed for anxiety   mirtazapine (REMERON) 15 MG tablet Take 1 tablet (15 mg) by mouth At Bedtime Please contact clinic if this makes you too sedated during the day.   omeprazole (PRILOSEC) 40 MG capsule TAKE 1 CAPSULE(40 MG) BY MOUTH DAILY 30 TO 60 MINUTES BEFORE A MEAL   docusate sodium (COLACE) 100 MG tablet Take 100 mg by mouth 2 times daily as needed for constipation   ondansetron (ZOFRAN-ODT) 4 MG ODT tab Take 1-2 tablets (4-8 mg) by mouth every 8 hours as needed for nausea   BusPIRone HCl 30 MG TABS Take 30 mg by mouth 2 times daily   FLUoxetine (PROZAC) 20 MG capsule Take 1 capsule (20 mg) by mouth daily With 40 mg pill daily for total dose of 60 mg daily.   traZODone (DESYREL) 100 MG tablet Take 1 tablet (100 mg) by mouth At Bedtime   polyethylene glycol (MIRALAX) powder Take 17 g (1 capful) by mouth daily   FLUoxetine (PROZAC) 20 MG capsule Take 1 capsule (20 mg) by mouth daily Take with 40mg of fluoxetine daily to equal total daily dose of 60 mg per day.   traZODone (DESYREL) 50 MG tablet Take 50 to 100mg by mouth at night as needed for insomnia. (Patient not taking: Reported on 1/2/2018)   buprenorphine-naloxone (SUBOXONE) 8-2 MG SUBL sublingual tablet Place 1 tablet under the tongue 2 times daily   ZOLMitriptan (ZOMIG) 5 MG tablet TAKE 1 TABLET BY MOUTH AT ONSET OF HEADACHE FOR MIGRAINE. MAY REPEAT DOSE IN 2 HOURS. DO NOT EXCEED 10MG IN 24 HOURS   loratadine (CLARITIN) 10 MG tablet Take 1 tablet (10 mg) by mouth daily as needed for allergies   valACYclovir (VALTREX) 1000 mg tablet Take 1 tablet (1,000 mg) by mouth 2 times daily At the onset of skin lesions as  needed   acyclovir (ZOVIRAX) 5 % ointment Apply 1 g topically as needed   nicotine (NICODERM CQ) 21 MG/24HR 24 hr patch Place 1 patch onto the skin every 24 hours As needed. (Patient not taking: Reported on 1/2/2018)   albuterol (PROAIR HFA/PROVENTIL HFA/VENTOLIN HFA) 108 (90 BASE) MCG/ACT Inhaler Inhale 1-2 puffs into the lungs every 4 hours as needed for shortness of breath / dyspnea or wheezing (Patient not taking: Reported on 1/2/2018)   azithromycin (ZITHROMAX) 250 MG tablet Two tablets first day, then one tablet daily for four days. (Patient not taking: Reported on 1/2/2018)   dextromethorphan (DELSYM) 30 MG/5ML liquid Take 5 mLs (30 mg) by mouth 2 times daily as needed for cough (Patient not taking: Reported on 1/2/2018)   VENTOLIN  (90 BASE) MCG/ACT Inhaler INHALE 2 PUFFS INTO THE LUNGS EVERY 6 HOURS AS NEEDED FOR SHORTNESS OF BREATH OR DIFFICULT BREATHING OR WHEEZING   ISOtretinoin (ACCUTANE PO)    Neomycin-Bacitracin-Polymyxin ([employer] TRIPLE ANTIBIOTIC) OINT Externally apply topically 4 times daily (Patient not taking: Reported on 1/2/2018)   triamcinolone (KENALOG) 0.1 % ointment Apply sparingly to affected area on back two times daily til clear.   olopatadine HCl (PATADAY) 0.2 % SOLN Place 1 drop into both eyes daily (Patient not taking: Reported on 1/2/2018)   fluticasone (FLONASE) 50 MCG/ACT spray Spray 1-2 sprays into both nostrils daily (Patient not taking: Reported on 1/2/2018)   clobetasol (TEMOVATE) 0.05 % ointment APPLY TOPICALLY BID   betamethasone valerate (VALISONE) 0.1 % ointment Apply once a day for 2 weeks as directed   hydrocortisone 2.5 % cream    ketoconazole (NIZORAL) 2 % cream    order for DME Equipment being ordered: cool humidifier   Acetaminophen (TYLENOL PO) Take 1 tablet by mouth as needed     Current Facility-Administered Medications on File Prior to Visit:  Lidocaine 1 % injection 9 mL   sodium bicarbonate 8.4 % injection 1 mEq          Allergies   Allergen Reactions      Acetaminophen-Codeine Hives     Amoxicillin-Pot Clavulanate Nausea and Vomiting     Augmentin Nausea and Vomiting and Hives     Barbiturates      Bentyl [Dicyclomine] Other (See Comments)     dizziness     Compazine      Lock-jaw     Liquid Adhesive      Other reaction(s): Other, see comments  blisters     No Clinical Screening - See Comments      PN: LW Reaction: blisters  PN: LW FI1: nka  PN: LW CM1: CONTRAST- nka Reaction :     Nsaids      D/t ITP     Prochlorperazine      Other reaction(s): Edema     Propoxyphene N-Apap Hives     Sulfa Drugs      Tramadol Hcl Hives            Adhesive Tape Rash         REVIEW OF SYSTEMS:  General: No acute withdrawal symptoms.  No recent infections or fever  Resp: No coughing, wheezing or shortness of breath  CV: No chest pains or palpitations  GI: No nausea, vomiting, abdominal pain, diarrhea, constipation  : No urinary frequency or dysuria,     Musculoskeletal: No significant muscle or joint pains, No edema  Neurologic: No numbness, tingling, weakness, problems with balance or coordination  Psychiatric: No acute concerns  Skin: No rashes    OBJECTIVE:    PHYSICAL EXAM:  There were no vitals taken for this visit.    GENERAL APPEARANCE:  alert, comfortable appearing  EYES:Eyes grossly normal to inspection  NEURO:  Gait normal.  No tremor. Coordination intact.   MENTAL STATUS EXAM:  Appearance/Behavior: No appearant distress  Speech: Normal  Mood/Affect: normal affect  Insight: Adequate      Results for orders placed or performed in visit on 01/05/18   Urine Drugs of Abuse Screen Panel 13   Result Value Ref Range    Cannabinoids (91-chm-2-carboxy-9-THC) Not Detected NDET^Not Detected ng/mL    Phencyclidine (Phencyclidine) Not Detected NDET^Not Detected ng/mL    Cocaine (Benzoylecgonine) Not Detected NDET^Not Detected ng/mL    Methamphetamine (d-Methamphetamine) Not Detected NDET^Not Detected ng/mL    Opiates (Morphine) Not Detected NDET^Not Detected ng/mL    Amphetamine  (d-Amphetamine) Not Detected NDET^Not Detected ng/mL    Benzodiazepines (Nordiazepam) Not Detected NDET^Not Detected ng/mL    Tricyclic Antidepressants (Desipramine) Not Detected NDET^Not Detected ng/mL    Methadone (Methadone) Not Detected NDET^Not Detected ng/mL    Barbiturates (Butalbital) Not Detected NDET^Not Detected ng/mL    Oxycodone (Oxycodone) Not Detected NDET^Not Detected ng/mL    Propoxyphene (Norpropoxyphene) Not Detected NDET^Not Detected ng/mL    Buprenorphine (Buprenorphine) Detected, Abnormal Result (A) NDET^Not Detected ng/mL           ASSESSMENT/PLAN:      (F11.20) Opioid use disorder, severe, dependence (H)  (primary encounter diagnosis)   Suboxone  8mg bid.  Rx #60  1 refill  Follow up 2/5/18 at 2:20    Continue current treatment.   Encourage meeting attendance and sponsorship        Opioid warning reviewed.  Risk of overdose following a period of abstinence due to decrease tolerance was discussed including risk of death.   Risk of overdose if using Suboxone with other substances particuarly benzodiazepines/alcohol was reviewed.           (F14.21) History of cocaine dependence (H)  Plan: no recent use.  Encourage ongoing sobriety      (F33.1) Moderate episode of recurrent major depressive disorder (H)  (F41.1) Generalized anxiety disorder  Plan: Recommendations as above.     Follow with PCP/psychiatry as planned.        (D69.3) Idiopathic thrombocytopenic purpura (HCC)  Comment: No recent episodes  Plan: follow with PCP.  Avoid NSAIDS      (F17.200) Nicotine use disorder  Comment: not ready to quit  Plan: Encouraged Abstinence.  Increase risk of relapse with other substances with return to nicotine use discussed.  Risk of Ecig/Vaping also reviewed.      Constipation      ENCOUNTER FOR LONG TERM USE OF HIGH RISK MEDICATION   High Risk Drug Monitoring?  YES   Drug being monitored: Buprenorphine   Reason for drug: Opioid Use Disorder   What is being monitored?: Dosage, Cravings, Trigger, side  effects, and continued abstinence.      Opioid warning reviewed.  Risk of overdose following a period of abstinence due to decrease tolerance was discussed including risk of death.   Risk of overdose if using Suboxone with other substances particuarly benzodiazepines/alcohol was reviewed.    Prescription refills for Suboxone are ONLY done at in person patient visits.  If you cannot make your appointment please reschedule immediately.  The addiction medicine clinic number is [phone].  A Suboxone medication bridge will not be given until your appointment is rescheduled.  Our clinic is open from M-Friday 0800-4:30pm and there is not an ON CALL after hours service.  If medical care is needed on the weekend you will need to contact your primary care physician or go to an Urgent Care or ER.          Jessy Brenner MD  [facility]  [phone]

## 2018-01-05 NOTE — PATIENT INSTRUCTIONS
Clonidine 0.1 mg prn bid for late withdrawal sx.   Suboxone change dosing to 8mg 2 x day  Follow up Feb 5th 2:20 pm.

## 2018-01-05 NOTE — MR AVS SNAPSHOT
"              After Visit Summary   1/5/2018    Coretta Tovar    MRN: [id number]           Patient Information     Date Of Birth          1970        Visit Information        Provider Department      1/5/2018 9:00 AM Jessy Brenner MD [facility] Primary [facility]        Today's Diagnoses     Opioid use disorder, severe, dependence (H)          Care Instructions    Clonidine 0.1 mg prn bid for late withdrawal sx.   Suboxone change dosing to 8mg 2 x day  Follow up Feb 5th 2:20 pm.           Follow-ups after your visit        Who to contact     If you have questions or need follow up information about today's clinic visit or your schedule please contact [facility] directly at [phone].  Normal or non-critical lab and imaging results will be communicated to you by Good[employer], letter or phone within 4 business days after the clinic has received the results. If you do not hear from us within 7 days, please contact the clinic through Goodfilms[employer] or phone. If you have a critical or abnormal lab result, we will notify you by phone as soon as possible.  Submit refill requests through [employer] or call your pharmacy and they will forward the refill request to us. Please allow 3 business days for your refill to be completed.          Additional Information About Your Visit        MyChart Information     [employer] lets you send messages to your doctor, view your test results, renew your prescriptions, schedule appointments and more. To sign up, go to [email or web address] . Click on \"Log in\" on the left side of the screen, which will take you to the Welcome page. Then click on \"Sign up Now\" on the right side of the page.     You will be asked to enter the access code listed below, as well as some personal information. Please follow the directions to create your username and password.     Your access code is: [email or web address]  Expires: 3/15/2018  9:50 AM     Your access code will "  in 90 days. If you need help or a new code, please call your [place] clinic or [phone].        Care EveryWhere ID     This is your Care EveryWhere ID. This could be used by other organizations to access your [place] medical records  [id number]        Your Vitals Were     Pulse Respirations Pulse Oximetry BMI (Body Mass Index)          87 18 95% 19.94 kg/m2         Blood Pressure from Last 3 Encounters:   18 102/68   18 96/58   12/15/17 100/62    Weight from Last 3 Encounters:   18 139 lb (63 kg)   18 136 lb (61.7 kg)   12/15/17 132 lb 8 oz (60.1 kg)              We Performed the Following     Urine Drugs of Abuse Screen Panel 13          Today's Medication Changes          These changes are accurate as of: 18  9:29 AM.  If you have any questions, ask your nurse or doctor.               Start taking these medicines.        Dose/Directions    cloNIDine 0.1 MG tablet   Commonly known as:  CATAPRES   Used for:  Opioid use disorder, severe, dependence (H)   Started by:  Jessy Brenner MD        Dose:  0.1 mg   Take 1 tablet (0.1 mg) by mouth 2 times daily Prn late withdrawal symptoms.   Quantity:  60 tablet   Refills:  1            Where to get your medicines      These medications were sent to [facility] [address] [place] [address] & [address] [address]     Phone:  [phone]     cloNIDine 0.1 MG tablet         Some of these will need a paper prescription and others can be bought over the counter.  Ask your nurse if you have questions.     Bring a paper prescription for each of these medications     buprenorphine-naloxone 8-2 MG Subl sublingual tablet                Primary Care Provider Office Phone # Fax #    NINOSKA Mitchell [facility] [phone] [address] [facility] [address]        Equal Access to Services     ARELY JASON AH: juan Llanos  arabellade lozano waxay idiin hayaan adeeg kharash la'aan ah. [facility] [phone].    ATENCIÓN: Si rhett edwards, tiene a roberts disposición servicios gratuitos de asistencia lingüística. Sravanthi al [phone].    We comply with applicable federal civil rights laws and [place] laws. We do not discriminate on the basis of race, color, national origin, age, disability, sex, sexual orientation, or gender identity.            Thank you!     Thank you for choosing [facility]  for your care. Our goal is always to provide you with excellent care. Hearing back from our patients is one way we can continue to improve our services. Please take a few minutes to complete the written survey that you may receive in the mail after your visit with us. Thank you!             Your Updated Medication List - Protect others around you: Learn how to safely use, store and throw away your medicines at www.disposemymeds.org.          This list is accurate as of: 1/5/18  9:29 AM.  Always use your most recent med list.                   Brand Name Dispense Instructions for use Diagnosis    ACCUTANE PO           acyclovir 5 % ointment    ZOVIRAX    30 g    Apply 1 g topically as needed    Recurrent cold sores       azithromycin 250 MG tablet    ZITHROMAX    6 tablet    Two tablets first day, then one tablet daily for four days.    URI with cough and congestion       betamethasone valerate 0.1 % ointment    VALISONE     Apply once a day for 2 weeks as directed        buprenorphine-naloxone 8-2 MG Subl sublingual tablet    SUBOXONE    60 each    Place 1 tablet under the tongue 2 times daily    Opioid use disorder, severe, dependence (H)       BusPIRone HCl 30 MG Tabs     60 tablet    Take 30 mg by mouth 2 times daily    Generalized anxiety disorder       clobetasol 0.05 % ointment    TEMOVATE     APPLY TOPICALLY BID        cloNIDine 0.1 MG tablet    CATAPRES    60 tablet    Take 1 tablet (0.1 mg) by  mouth 2 times daily Prn late withdrawal symptoms.    Opioid use disorder, severe, dependence (H)       [employer] TRIPLE ANTIBIOTIC Oint     60 g    Externally apply topically 4 times daily    Allergic contact dermatitis due to adhesives       dextromethorphan 30 MG/5ML liquid    DELSYM    148 mL    Take 5 mLs (30 mg) by mouth 2 times daily as needed for cough    URI with cough and congestion       docusate sodium 100 MG tablet    COLACE    60 tablet    Take 100 mg by mouth 2 times daily as needed for constipation    Gastroesophageal reflux disease, esophagitis presence not specified       * FLUoxetine 20 MG capsule    PROzac    30 capsule    Take 1 capsule (20 mg) by mouth daily Take with 40mg of fluoxetine daily to equal total daily dose of 60 mg per day.    Moderate episode of recurrent major depressive disorder (H), Generalized anxiety disorder       * FLUoxetine 40 MG capsule    PROzac    30 capsule    TAKE 1 CAPSULE(40 MG) BY MOUTH DAILY. Take with 20mg Fluoxetine by mouth daily to equal total daily dose of 60mg per day.    Moderate episode of recurrent major depressive disorder (H), Generalized anxiety disorder       * FLUoxetine 20 MG capsule    PROzac    30 capsule    Take 1 capsule (20 mg) by mouth daily With 40 mg pill daily for total dose of 60 mg daily.    Moderate episode of recurrent major depressive disorder (H)       fluticasone 50 MCG/ACT spray    FLONASE    1 Bottle    Spray 1-2 sprays into both nostrils daily    Seasonal allergic rhinitis, unspecified allergic rhinitis trigger       hydrocortisone 2.5 % cream           hydrOXYzine 25 MG tablet    ATARAX    60 tablet    Take 2 tablets (50 mg) by mouth nightly as needed for anxiety    Generalized anxiety disorder       ketoconazole 2 % cream    NIZORAL          loratadine 10 MG tablet    CLARITIN    30 tablet    Take 1 tablet (10 mg) by mouth daily as needed for allergies    Chronic nonseasonal allergic rhinitis due to pollen       mirtazapine 15 MG  tablet    REMERON    30 tablet    Take 1 tablet (15 mg) by mouth At Bedtime Please contact clinic if this makes you too sedated during the day.    Primary insomnia       nicotine 21 MG/24HR 24 hr patch    NICODERM CQ    28 patch    Place 1 patch onto the skin every 24 hours As needed.    Tobacco use disorder       olopatadine HCl 0.2 % Soln    PATADAY    2.5 mL    Place 1 drop into both eyes daily    Seasonal allergic rhinitis, unspecified allergic rhinitis trigger       omeprazole 40 MG capsule    priLOSEC    90 capsule    TAKE 1 CAPSULE(40 MG) BY MOUTH DAILY 30 TO 60 MINUTES BEFORE A MEAL    Gastroesophageal reflux disease, esophagitis presence not specified       ondansetron 4 MG ODT tab    ZOFRAN-ODT    30 tablet    Take 1-2 tablets (4-8 mg) by mouth every 8 hours as needed for nausea    Migraine without status migrainosus, not intractable, unspecified migraine type       order for DME     1 each    Equipment being ordered: cool humidifier    Painful respiration, Upper respiratory tract infection, unspecified type       polyethylene glycol powder    MIRALAX    510 g    Take 17 g (1 capful) by mouth daily    Drug-induced constipation       * traZODone 50 MG tablet    DESYREL    30 tablet    Take 50 to 100mg by mouth at night as needed for insomnia.    Primary insomnia       * traZODone 100 MG tablet    DESYREL    90 tablet    Take 1 tablet (100 mg) by mouth At Bedtime    Primary insomnia       triamcinolone 0.1 % ointment    KENALOG    80 g    Apply sparingly to affected area on back two times daily til clear.    Rash       TYLENOL PO      Take 1 tablet by mouth as needed    Chondritis of both external ears       valACYclovir 1000 mg tablet    VALTREX    8 tablet    Take 1 tablet (1,000 mg) by mouth 2 times daily At the onset of skin lesions as needed    Recurrent cold sores       * VENTOLIN  (90 BASE) MCG/ACT Inhaler   Generic drug:  albuterol     18 g    INHALE 2 PUFFS INTO THE LUNGS EVERY 6 HOURS AS  NEEDED FOR SHORTNESS OF BREATH OR DIFFICULT BREATHING OR WHEEZING    Acute bronchitis with symptoms > 10 days       * albuterol 108 (90 BASE) MCG/ACT Inhaler    PROAIR HFA/PROVENTIL HFA/VENTOLIN HFA    18 g    Inhale 1-2 puffs into the lungs every 4 hours as needed for shortness of breath / dyspnea or wheezing    Mild intermittent reactive airway disease without complication       ZOLMitriptan 5 MG tablet    ZOMIG    12 tablet    TAKE 1 TABLET BY MOUTH AT ONSET OF HEADACHE FOR MIGRAINE. MAY REPEAT DOSE IN 2 HOURS. DO NOT EXCEED 10MG IN 24 HOURS    Migraine without aura and without status migrainosus, not intractable       * Notice:  This list has 7 medication(s) that are the same as other medications prescribed for you. Read the directions carefully, and ask your doctor or other care provider to review them with you.

## 2018-01-05 NOTE — TELEPHONE ENCOUNTER
Reason for Call:  Suboxnoe 8-2 mg     Detailed comments: [facility] called they receive Rx for Subx to dispense # 60 tabs today, but yesterday pt  60 tablets, they need to know was this Rx send in error or should they fill it.       Phone Number Patient can be reached at: Other phone number:  Phar  [phone]    Best Time: anytime    Can we leave a detailed message on this number? Not Applicable    Call taken on 1/5/2018 at 9:45 AM by Samm Alcala

## 2018-01-05 NOTE — TELEPHONE ENCOUNTER
Per Dr. Brenner. Staff was informed to call [facility] and have them hold and file the prescription for today, due to patient just got last prescription filled yesterday. Pharmacist stated they will file until next month  Twyla Valle MA

## 2018-01-16 ENCOUNTER — TELEPHONE (OUTPATIENT)
Dept: INTERNAL MEDICINE | Facility: CLINIC | Age: 48
End: 2018-01-16

## 2018-01-16 ENCOUNTER — TELEPHONE (OUTPATIENT)
Dept: ADDICTION MEDICINE | Facility: CLINIC | Age: 48
End: 2018-01-16

## 2018-01-16 NOTE — TELEPHONE ENCOUNTER
Added providers below to patients chart.  Please advise on the medication below.    Sharmila Tabor, CMA

## 2018-01-16 NOTE — TELEPHONE ENCOUNTER
Please clarify what patient needs.  She has referrals in place for all of those providers already.      Thanks,  Sarah Montemayor, CNP

## 2018-01-16 NOTE — TELEPHONE ENCOUNTER
Called patient and could not leave message due to phone not being hooked up.     Giovanna HOOD CMA ([facility])

## 2018-01-16 NOTE — TELEPHONE ENCOUNTER
Writer attempted to call [facility] to get more information, No answer.  M for callback.    Will forward to Dr. Brenner to see if this request is OK.    Uzair Flores RN

## 2018-01-16 NOTE — TELEPHONE ENCOUNTER
Patient calling. She is restricted and needs to have her medication for claravis  Added to her medication list to be refilled. And her dermatologist.     Dr Kosta Matute,  Dr cheri Garrido, and Dr mikey matute.  derm    And add dr Jessy Jackson @ [facility]     Dr Aury Garcia @ [facility].

## 2018-01-16 NOTE — TELEPHONE ENCOUNTER
Incoming call from [facility] wondering if pt's subx can be switched back to 1/2 tab 4 times daily, per pt's request.     Please follow up. Contact info: [phone] ask for a nurse (Ok to leave detailed message)    Fax number: [phone]    Kasie Mcginnis

## 2018-01-16 NOTE — TELEPHONE ENCOUNTER
Writer called and LVM with OK to change back.  Request callback with any questions or concerns    Uzair Flores RN

## 2018-01-18 NOTE — TELEPHONE ENCOUNTER
Called the patient back and she said that when she was referred to these providers she was strictly on [place], but now she has [employer], so she needs all the providers on her [employer] insurance. She is requesting that the  [place] dermatologist is done as soon as possible because she is 7 days without Claravis.     Please advise and let us know what we need to do?     Amita Bliss MA

## 2018-01-18 NOTE — TELEPHONE ENCOUNTER
Reason for Call:  Other call back    Detailed comments: Patient called again regarding medication. Patient states the contact phone number we have is correct. She said to ask for nursing. Please call. Thank you.     Phone Number Patient can be reached at: Other phone number:  [phone]    Best Time: ASAP    Can we leave a detailed message on this number? YES    Call taken on 1/18/2018 at 12:48 PM by Renetta Awad

## 2018-01-18 NOTE — TELEPHONE ENCOUNTER
2nd attempt. Called patient and could not leave a message due to phone still not being hooked up.     Amita Arellano MA

## 2018-01-18 NOTE — TELEPHONE ENCOUNTER
All restricted referrals have been completed with [employer]. I called phone number patient gave and left a voice message to have her call me back.     Thank you,   Tenisha Farias   Referral Department  [phone]

## 2018-01-19 NOTE — TELEPHONE ENCOUNTER
Call from patient, gave her and her care coordinator all information about [employer] restricted referrals that are completed.    Thank you,   Tenisha Farias   Referral Department  [phone]

## 2018-01-26 ENCOUNTER — TELEPHONE (OUTPATIENT)
Dept: INTERNAL MEDICINE | Facility: CLINIC | Age: 48
End: 2018-01-26

## 2018-01-26 DIAGNOSIS — J06.9 URI WITH COUGH AND CONGESTION: ICD-10-CM

## 2018-01-26 RX ORDER — DEXTROMETHORPHAN POLISTIREX 30 MG/5ML
60 SUSPENSION ORAL 2 TIMES DAILY PRN
Qty: 148 ML | Refills: 1 | Status: CANCELLED | OUTPATIENT
Start: 2018-01-26

## 2018-01-26 NOTE — TELEPHONE ENCOUNTER
Reason for Call:  Other call back    Detailed comments:  Staff calling. Coretta has a cough and flu symptoms. Chest tightness. Please call to advise.     Phone Number Patient can be reached at: [phone]    Best  Time: any     Can we leave a detailed message on this number? YES    Call taken on 1/26/2018 at 10:46 AM by Bessy Pena

## 2018-01-26 NOTE — TELEPHONE ENCOUNTER
Per Dr. Alcazar: patient is at a facility where there are other patients. If in fact it is the influenza, she is posing a risk to many others. Needs seen. Will not make any med changes. She can keep the appointment with Sarah Montemayor NP for Monday 1/29/18 but strongly urge nursing staff at her facility to help her get to a clinic and be tested for influenza asap before Monday    Called and spoke with Diane.  She stated that patient is not in any distress or hurry to be seen and patient feels like she can wait until Monday  Explained to Diane the concerns for other patients at the facility as well.  Diane verbalized understanding and will contact her nurse manager for further advice and see what they can do    Val Teran RN

## 2018-01-26 NOTE — TELEPHONE ENCOUNTER
Per Diane, patient has Delsym extended release. Please advise if ok to increase to 60mg BID PRN  Patient is unable to get transportation today and over the weekend.   Appointment would have to be for next week  Appointment made with Sarah Montemayor NP for Monday 1/29/18 just in case.    Please advise    Val Teran RN

## 2018-01-26 NOTE — TELEPHONE ENCOUNTER
Delsym is dosed 10 to 20 mg every 4 hours or 20 to 30 mg every 6 to 8 hours; extended release: 60 mg twice daily; maximum: 120 mg/24 hours.  Is she getting extended release or regular release?   Finally, if she has chest tightness and body aches, she may very well have the flu.  She should be tested for influenza as this has implications for all the residents.  She needs seen and tested.    Dr. Alcazar

## 2018-01-26 NOTE — TELEPHONE ENCOUNTER
Called and spoke with patient's nurse, Diane.  Per Diane, patient has been complaining of a wet cough that started 1/22/18 but has not reported that she is coughing up anything  VSS, no fever, no N/V/D or wheezing noted.  No confirmed cases of influenza at their facility   Patient c/o chest tightness when she tries to take a deep breath only and has body aches  Has sinus/nasal drainage and headaches. Tylenol helping some  Their nursing staff has assessed and there are no emergent symptoms   No other symptoms reported     They have orders for Delsym 5mls (30mg) BID but this is not helping.  Diane noted on the back of the bottle that this is a pediatric dose.   She is wondering if this can be changed to an adult dose of 10mls (60mg) BID prn  Any other orders for symptoms reported?    Sarah Montemayor NP not in today.  They are requesting covering providers address and a call back today     Pharmacy: [facility]  Please call them back and can speak with any nursing staff    Val Teran RN

## 2018-01-29 ENCOUNTER — OFFICE VISIT (OUTPATIENT)
Dept: FAMILY MEDICINE | Facility: CLINIC | Age: 48
End: 2018-01-29
Payer: COMMERCIAL

## 2018-01-29 ENCOUNTER — RADIANT APPOINTMENT (OUTPATIENT)
Dept: GENERAL RADIOLOGY | Facility: CLINIC | Age: 48
End: 2018-01-29
Attending: NURSE PRACTITIONER
Payer: COMMERCIAL

## 2018-01-29 VITALS
HEART RATE: 72 BPM | SYSTOLIC BLOOD PRESSURE: 98 MMHG | RESPIRATION RATE: 22 BRPM | OXYGEN SATURATION: 97 % | DIASTOLIC BLOOD PRESSURE: 60 MMHG | TEMPERATURE: 96.9 F | BODY MASS INDEX: 19.77 KG/M2 | WEIGHT: 137.8 LBS

## 2018-01-29 DIAGNOSIS — J20.9 ACUTE BRONCHITIS WITH SYMPTOMS > 10 DAYS: Primary | ICD-10-CM

## 2018-01-29 DIAGNOSIS — J06.9 URI WITH COUGH AND CONGESTION: ICD-10-CM

## 2018-01-29 DIAGNOSIS — F41.1 GENERALIZED ANXIETY DISORDER: ICD-10-CM

## 2018-01-29 DIAGNOSIS — F33.1 MODERATE EPISODE OF RECURRENT MAJOR DEPRESSIVE DISORDER (H): ICD-10-CM

## 2018-01-29 DIAGNOSIS — G56.03 BILATERAL CARPAL TUNNEL SYNDROME: ICD-10-CM

## 2018-01-29 LAB
FLUAV+FLUBV AG SPEC QL: NEGATIVE
FLUAV+FLUBV AG SPEC QL: NEGATIVE
SPECIMEN SOURCE: NORMAL

## 2018-01-29 PROCEDURE — 87804 INFLUENZA ASSAY W/OPTIC: CPT | Performed by: NURSE PRACTITIONER

## 2018-01-29 PROCEDURE — 71046 X-RAY EXAM CHEST 2 VIEWS: CPT

## 2018-01-29 PROCEDURE — 99213 OFFICE O/P EST LOW 20 MIN: CPT | Performed by: NURSE PRACTITIONER

## 2018-01-29 RX ORDER — PREDNISONE 20 MG/1
40 TABLET ORAL DAILY
Qty: 10 TABLET | Refills: 0 | Status: SHIPPED | OUTPATIENT
Start: 2018-01-29 | End: 2018-02-03

## 2018-01-29 RX ORDER — IPRATROPIUM BROMIDE AND ALBUTEROL SULFATE 2.5; .5 MG/3ML; MG/3ML
1 SOLUTION RESPIRATORY (INHALATION) EVERY 6 HOURS PRN
Qty: 90 VIAL | Refills: 3 | Status: SHIPPED | OUTPATIENT
Start: 2018-01-29 | End: 2018-02-08

## 2018-01-29 RX ORDER — AZITHROMYCIN 250 MG/1
TABLET, FILM COATED ORAL
Qty: 6 TABLET | Refills: 0 | Status: SHIPPED | OUTPATIENT
Start: 2018-01-29 | End: 2018-02-08

## 2018-01-29 RX ORDER — DEXTROMETHORPHAN POLISTIREX 30 MG/5ML
60 SUSPENSION ORAL 2 TIMES DAILY PRN
Qty: 148 ML | Refills: 3 | Status: SHIPPED | OUTPATIENT
Start: 2018-01-29 | End: 2018-02-05

## 2018-01-29 ASSESSMENT — PAIN SCALES - GENERAL: PAINLEVEL: NO PAIN (0)

## 2018-01-29 NOTE — PROGRESS NOTES
SUBJECTIVE:   Coretta Tovar is a 47 year old female who presents to clinic today for the following health issues:      ENT Symptoms             Symptoms: cc Present Absent Comment   Fever/Chills  x  Temperature not checked, complains of chills   Fatigue  x     Muscle Aches  x     Eye Irritation   x    Sneezing   x    Nasal Epifanio/Drg  x     Sinus Pressure/Pain  x     Loss of smell  x     Dental pain   x    Sore Throat  x     Swollen Glands  x     Ear Pain/Fullness  x     Cough  x  Yellow sputum   Wheeze  x     Chest Pain  x     Shortness of breath  x     Rash   x    Other   x      Symptom duration:  x2.5 weeks   Symptom severity:  severe   Treatments tried:  Delsym and tylenol   Contacts:  Yes     Patient is eating, drinking, and urinating per normal.  Patient is using her albuterol inhaler 7-8 times per day.    Patient complains of intermittent numbness and tingling to her bilateral hands.  She notes it while holding a book or doing repetitive motions with her hands.    Problem list and histories reviewed & adjusted, as indicated.  Additional history: as documented    Patient Active Problem List   Diagnosis     Tobacco abuse     S/P splenectomy     Migraines     Idiopathic thrombocytopenic purpura (HCC)     Chemical dependency (H)     Insomnia     Mary-mary disease     Vomiting     Abdominal pain, acute     Disorder of stomach     Cervical pain     Vertigo     Gastroesophageal reflux disease     Herpetic gingivostomatitis     Immune thrombocytopenic purpura (H)     Muscle pain     Personality disorder     Nondependent cannabis abuse, episodic     Atopic rhinitis     Nutritional disorder     Abnormal weight loss     Mixed, or nondependent drug abuse     Moderate episode of recurrent major depressive disorder (H)     Generalized anxiety disorder     Protein-calorie malnutrition (H)     Primary insomnia     History of cocaine dependence (H)     Past Surgical History:   Procedure Laterality Date     ANKLE SURGERY       Numerous right ankle surgeries     HERNIA REPAIR Left 1975     HYSTERECTOMY, PAP NO LONGER INDICATED      benign, endometriosis     NECK SURGERY  2000's    Cervical spine     SPLENECTOMY  1995     TONSILLECTOMY         Social History   Substance Use Topics     Smoking status: Current Every Day Smoker     Packs/day: 0.20     Years: 15.00     Types: Cigarettes     Last attempt to quit: 11/6/2015     Smokeless tobacco: Never Used     Alcohol use Yes      Comment: Drinks once in 4 - 5 months     Family History   Problem Relation Age of Onset     Neurologic Disorder Maternal Grandmother      migraines     Breast Cancer Maternal Grandmother      Neurologic Disorder Maternal Grandfather      migraines     Neurologic Disorder Brother      migraines     Breast Cancer Maternal Aunt      x 2     Breast Cancer Paternal Grandmother      Depression Daughter      Anxiety Disorder Daughter          Current Outpatient Prescriptions   Medication Sig Dispense Refill     FLUoxetine (PROZAC) 20 MG capsule Take 1 capsule (20 mg) by mouth daily Take with 40mg of fluoxetine daily to equal total daily dose of 60 mg per day. 30 capsule 1     dextromethorphan (DELSYM) 30 MG/5ML liquid Take 10 mLs (60 mg) by mouth 2 times daily as needed for cough 148 mL 3     order for DME Equipment being ordered: bilateral wrist splints 2 each 0     cloNIDine (CATAPRES) 0.1 MG tablet Take 1 tablet (0.1 mg) by mouth 2 times daily Prn late withdrawal symptoms. 60 tablet 1     buprenorphine-naloxone (SUBOXONE) 8-2 MG SUBL sublingual tablet Place 1 tablet under the tongue 2 times daily 60 each 0     FLUoxetine (PROZAC) 40 MG capsule TAKE 1 CAPSULE(40 MG) BY MOUTH DAILY. Take with 20mg Fluoxetine by mouth daily to equal total daily dose of 60mg per day. 30 capsule 3     hydrOXYzine (ATARAX) 25 MG tablet Take 2 tablets (50 mg) by mouth nightly as needed for anxiety 60 tablet 3     mirtazapine (REMERON) 15 MG tablet Take 1 tablet (15 mg) by mouth At Bedtime  Please contact clinic if this makes you too sedated during the day. 30 tablet 3     omeprazole (PRILOSEC) 40 MG capsule TAKE 1 CAPSULE(40 MG) BY MOUTH DAILY 30 TO 60 MINUTES BEFORE A MEAL 90 capsule 3     docusate sodium (COLACE) 100 MG tablet Take 100 mg by mouth 2 times daily as needed for constipation 60 tablet 1     ondansetron (ZOFRAN-ODT) 4 MG ODT tab Take 1-2 tablets (4-8 mg) by mouth every 8 hours as needed for nausea 30 tablet 1     BusPIRone HCl 30 MG TABS Take 30 mg by mouth 2 times daily 60 tablet 3     traZODone (DESYREL) 100 MG tablet Take 1 tablet (100 mg) by mouth At Bedtime 90 tablet 1     polyethylene glycol (MIRALAX) powder Take 17 g (1 capful) by mouth daily 510 g 3     ZOLMitriptan (ZOMIG) 5 MG tablet TAKE 1 TABLET BY MOUTH AT ONSET OF HEADACHE FOR MIGRAINE. MAY REPEAT DOSE IN 2 HOURS. DO NOT EXCEED 10MG IN 24 HOURS 12 tablet 4     loratadine (CLARITIN) 10 MG tablet Take 1 tablet (10 mg) by mouth daily as needed for allergies 30 tablet 11     valACYclovir (VALTREX) 1000 mg tablet Take 1 tablet (1,000 mg) by mouth 2 times daily At the onset of skin lesions as needed 8 tablet 11     acyclovir (ZOVIRAX) 5 % ointment Apply 1 g topically as needed 30 g 11     albuterol (PROAIR HFA/PROVENTIL HFA/VENTOLIN HFA) 108 (90 BASE) MCG/ACT Inhaler Inhale 1-2 puffs into the lungs every 4 hours as needed for shortness of breath / dyspnea or wheezing 18 g 3     ISOtretinoin (ACCUTANE PO)        Neomycin-Bacitracin-Polymyxin ([employer] TRIPLE ANTIBIOTIC) OINT Externally apply topically 4 times daily 60 g 3     triamcinolone (KENALOG) 0.1 % ointment Apply sparingly to affected area on back two times daily til clear. 80 g 0     olopatadine HCl (PATADAY) 0.2 % SOLN Place 1 drop into both eyes daily 2.5 mL 3     fluticasone (FLONASE) 50 MCG/ACT spray Spray 1-2 sprays into both nostrils daily 1 Bottle 11     clobetasol (TEMOVATE) 0.05 % ointment APPLY TOPICALLY BID  1     betamethasone valerate (VALISONE) 0.1 % ointment  Apply once a day for 2 weeks as directed       hydrocortisone 2.5 % cream        ketoconazole (NIZORAL) 2 % cream        order for DME Equipment being ordered: cool humidifier 1 each 0     Acetaminophen (TYLENOL PO) Take 1 tablet by mouth as needed       [DISCONTINUED] FLUoxetine (PROZAC) 20 MG capsule Take 1 capsule (20 mg) by mouth daily With 40 mg pill daily for total dose of 60 mg daily. (Patient not taking: Reported on 1/29/2018) 30 capsule 1     [DISCONTINUED] FLUoxetine (PROZAC) 20 MG capsule Take 1 capsule (20 mg) by mouth daily Take with 40mg of fluoxetine daily to equal total daily dose of 60 mg per day. 30 capsule 0     [DISCONTINUED] traZODone (DESYREL) 50 MG tablet Take 50 to 100mg by mouth at night as needed for insomnia. (Patient not taking: Reported on 1/2/2018) 30 tablet 0     [DISCONTINUED] VENTOLIN  (90 BASE) MCG/ACT Inhaler INHALE 2 PUFFS INTO THE LUNGS EVERY 6 HOURS AS NEEDED FOR SHORTNESS OF BREATH OR DIFFICULT BREATHING OR WHEEZING (Patient not taking: Reported on 1/29/2018) 18 g 2     Allergies   Allergen Reactions     Acetaminophen-Codeine Hives     Amoxicillin-Pot Clavulanate Nausea and Vomiting     Augmentin Nausea and Vomiting and Hives     Barbiturates      Bentyl [Dicyclomine] Other (See Comments)     dizziness     Compazine      Lock-jaw     Liquid Adhesive      Other reaction(s): Other, see comments  blisters     No Clinical Screening - See Comments      PN: LW Reaction: blisters  PN: LW FI1: nka  PN: LW CM1: CONTRAST- nka Reaction :     Nsaids      D/t ITP     Prochlorperazine      Other reaction(s): Edema     Propoxyphene N-Apap Hives     Sulfa Drugs      Tramadol Hcl Hives            Adhesive Tape Rash     BP Readings from Last 3 Encounters:   01/29/18 98/60   01/05/18 102/68   01/02/18 96/58    Wt Readings from Last 3 Encounters:   01/29/18 137 lb 12.8 oz (62.5 kg)   01/05/18 139 lb (63 kg)   01/02/18 136 lb (61.7 kg)                  Labs reviewed in EPIC    Reviewed and  updated as needed this visit by clinical staff  Tobacco  Allergies  Meds  Med Hx  Surg Hx  Fam Hx  Soc Hx      Reviewed and updated as needed this visit by Provider         ROS:  Constitutional, HEENT, cardiovascular, pulmonary, gi and gu systems are negative, except as otherwise noted.    OBJECTIVE:     BP 98/60 (BP Location: Right arm, Cuff Size: Adult Regular)  Pulse 72  Temp 96.9  F (36.1  C) (Oral)  Resp 22  Wt 137 lb 12.8 oz (62.5 kg)  SpO2 97%  Breastfeeding? No  BMI 19.77 kg/m2  Body mass index is 19.77 kg/(m^2).  GENERAL: healthy, alert and no distress  EYES: Eyes grossly normal to inspection, PERRL and conjunctivae and sclerae normal  HENT: normal cephalic/atraumatic, ear canals and TM's normal, nose and mouth without ulcers or lesions, nasal mucosa edematous , oropharynx clear and oral mucous membranes moist  NECK: no adenopathy, no asymmetry, masses, or scars and thyroid normal to palpation  RESP: expiratory wheezes throughout  CV: regular rate and rhythm, normal S1 S2, no S3 or S4, no murmur, click or rub, no peripheral edema and peripheral pulses strong  MS: no gross musculoskeletal defects noted, no edema; 5/5  strength bilaterally; positive Tinel bilaterally.    Diagnostic Test Results:  Results for orders placed or performed in visit on 01/29/18 (from the past 24 hour(s))   Influenza A/B antigen   Result Value Ref Range    Influenza A/B Agn Specimen Nasal     Influenza A Negative NEG^Negative    Influenza B Negative NEG^Negative       ASSESSMENT/PLAN:     1. Acute bronchitis with symptoms > 10 days    - order for DME; Equipment being ordered: Nebulizer, tubing, and mask  Dispense: 1 each; Refill: 0  - ipratropium - albuterol 0.5 mg/2.5 mg/3 mL (DUONEB) 0.5-2.5 (3) MG/3ML neb solution; Take 1 vial (3 mLs) by nebulization every 6 hours as needed for shortness of breath / dyspnea or wheezing  Dispense: 90 vial; Refill: 3  - predniSONE (DELTASONE) 20 MG tablet; Take 2 tablets (40 mg) by  mouth daily for 5 days  Dispense: 10 tablet; Refill: 0  - azithromycin (ZITHROMAX) 250 MG tablet; Two tablets first day, then one tablet daily for four days.  Dispense: 6 tablet; Refill: 0    2. URI with cough and congestion    - dextromethorphan (DELSYM) 30 MG/5ML liquid; Take 10 mLs (60 mg) by mouth 2 times daily as needed for cough  Dispense: 148 mL; Refill: 3  - XR Chest 2 Views; Future  - Influenza A/B antigen    3. Moderate episode of recurrent major depressive disorder (H)  Patient still awaiting psychiatry follow-up.  She feels symptoms are stable currently.  - FLUoxetine (PROZAC) 20 MG capsule; Take 1 capsule (20 mg) by mouth daily Take with 40mg of fluoxetine daily to equal total daily dose of 60 mg per day.  Dispense: 30 capsule; Refill: 1    4. Generalized anxiety disorder  As above.   - FLUoxetine (PROZAC) 20 MG capsule; Take 1 capsule (20 mg) by mouth daily Take with 40mg of fluoxetine daily to equal total daily dose of 60 mg per day.  Dispense: 30 capsule; Refill: 1    5. Bilateral carpal tunnel syndrome    - order for DME; Equipment being ordered: bilateral wrist splints  Dispense: 2 each; Refill: 0    FUTURE APPOINTMENTS:       - Follow-up for annual visit or as needed    NINOSKA Smith [facility]

## 2018-01-29 NOTE — NURSING NOTE
"Chief Complaint   Patient presents with     URI     x2.5 weeks     Recheck Medication     Delsym dose     Health Maintenance     ACT, Migraine Action Plan, Lipid       Initial BP 98/60 (BP Location: Right arm, Cuff Size: Adult Regular)  Pulse 72  Temp 96.9  F (36.1  C) (Oral)  Resp 22  Wt 137 lb 12.8 oz (62.5 kg)  SpO2 97%  Breastfeeding? No  BMI 19.77 kg/m2 Estimated body mass index is 19.77 kg/(m^2) as calculated from the following:    Height as of 1/2/18: 5' 10\" (1.778 m).    Weight as of this encounter: 137 lb 12.8 oz (62.5 kg).  Medication Reconciliation: complete       Sharmila Tabor, CMA    "

## 2018-01-29 NOTE — LETTER
[facility]  [address]  [place] [address]  [phone]          January 29, 2018    RE:  Coretta Tovar                                                                                                                                                       [address] [place] [address]            To whom it may concern:    Coretta Tovar is under my professional care.  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  This makes may cause her to have prolonged recovery from respiratory illness and may make her more susceptible to acute complications.  I would recommend that she be seen as soon as possible with any new illnesses to prevent complications.  Today she is being diagnosed with bronchitis.  She may need additional rest to recover from illness.  Please allow her to do this.      Sincerely,        Sarah Montemayor RN, CNP

## 2018-01-29 NOTE — MR AVS SNAPSHOT
After Visit Summary   1/29/2018    Coretta Tovar    MRN: [id number]           Patient Information     Date Of Birth          1970        Visit Information        Provider Department      1/29/2018 10:40 AM Sarah Montemayor APRN [facility]        Today's Diagnoses     Acute bronchitis with symptoms > 10 days    -  1    URI with cough and congestion        Moderate episode of recurrent major depressive disorder (H)        Generalized anxiety disorder        Bilateral carpal tunnel syndrome          Care Instructions    [facility]    If you have any questions regarding to your visit please contact your care team:     Team Pink:   Clinic Hours Telephone Number   Internal Medicine:  Dr. Kristie Montemayor, NP       7am-7pm  Monday - Thursday   7am-5pm  Fridays  [phone]  (Appointment scheduling available 24/7)    Questions about your visit?  Team Line  [phone]   [facility] [place] [facility] 11am-9pm Monday-Friday Saturday-Sunday- 9am-5pm   [place] - 5pm-9pm Monday-Friday Saturday-Sunday- 9am-5pm  [phone] - [place]   [phone] - [place]       What options do I have for visits at the clinic other than the traditional office visit?  To expand how we care for you, many of our providers are utilizing electronic visits (e-visits) and telephone visits, when medically appropriate, for interactions with their patients rather than a visit in the clinic.   We also offer nurse visits for many medical concerns. Just like any other service, we will bill your insurance company for this type of visit based on time spent on the phone with your provider. Not all insurance companies cover these visits. Please check with your medical insurance if this type of visit is covered. You will be responsible for any charges that are not paid by your insurance.      E-visits via [employer]:  generally incur a  "$35.00 fee.  Telephone visits:  Time spent on the phone: *charged based on time that is spent on the phone in increments of 10 minutes. Estimated cost:   5-10 mins $30.00   11-20 mins. $59.00   21-30 mins. $85.00   Use Utriphart (secure email communication and access to your chart) to send your primary care provider a message or make an appointment. Ask someone on your Team how to sign up for [employer].    For a Price Quote for your services, please call our [employer] Price Line at [phone].    As always, Thank you for trusting us with your health care needs!    Sharmila Tabor CMA          Follow-ups after your visit        Your next 10 appointments already scheduled     Feb 05, 2018  2:20 PM CST   Return Visit with Jessy Brenner MD   [facility] ([facility])    [address] [place] [address]  [address]  [facility] [address] [phone]              Who to contact     If you have questions or need follow up information about today's clinic visit or your schedule please contact [facility] directly at [phone].  Normal or non-critical lab and imaging results will be communicated to you by MyChart, letter or phone within 4 business days after the clinic has received the results. If you do not hear from us within 7 days, please contact the clinic through MyChart or phone. If you have a critical or abnormal lab result, we will notify you by phone as soon as possible.  Submit refill requests through [employer] or call your pharmacy and they will forward the refill request to us. Please allow 3 business days for your refill to be completed.          Additional Information About Your Visit        Utriphart Information     [employer] lets you send messages to your doctor, view your test results, renew your prescriptions, schedule appointments and more. To sign up, go to [email or web address] . Click on \"Log in\" on the left side of the screen, which will " "take you to the Welcome page. Then click on \"Sign up Now\" on the right side of the page.     You will be asked to enter the access code listed below, as well as some personal information. Please follow the directions to create your username and password.     Your access code is: [email or web address]  Expires: 3/15/2018  9:50 AM     Your access code will  in 90 days. If you need help or a new code, please call your [place] [facility] clinic or [phone].        Care EveryWhere ID     This is your Care EveryWhere ID. This could be used by other organizations to access your [facility] medical records  [id number]        Your Vitals Were     Pulse Temperature Respirations Pulse Oximetry Breastfeeding? BMI (Body Mass Index)    72 96.9  F (36.1  C) (Oral) 22 97% No 19.77 kg/m2       Blood Pressure from Last 3 Encounters:   18 98/60   18 102/68   18 96/58    Weight from Last 3 Encounters:   18 137 lb 12.8 oz (62.5 kg)   18 139 lb (63 kg)   18 136 lb (61.7 kg)              We Performed the Following     Influenza A/B antigen          Today's Medication Changes          These changes are accurate as of 18 12:19 PM.  If you have any questions, ask your nurse or doctor.               Start taking these medicines.        Dose/Directions    azithromycin 250 MG tablet   Commonly known as:  ZITHROMAX   Used for:  Acute bronchitis with symptoms > 10 days   Started by:  Sarah Montemayor APRN CNP        Two tablets first day, then one tablet daily for four days.   Quantity:  6 tablet   Refills:  0       ipratropium - albuterol 0.5 mg/2.5 mg/3 mL 0.5-2.5 (3) MG/3ML neb solution   Commonly known as:  DUONEB   Used for:  Acute bronchitis with symptoms > 10 days   Started by:  Sarah Montemayor APRN CNP        Dose:  1 vial   Take 1 vial (3 mLs) by nebulization every 6 hours as needed for shortness of breath / dyspnea or wheezing   Quantity:  90 vial   Refills:  3       predniSONE 20 MG " tablet   Commonly known as:  DELTASONE   Used for:  Acute bronchitis with symptoms > 10 days   Started by:  Sarah Montemayor APRN CNP        Dose:  40 mg   Take 2 tablets (40 mg) by mouth daily for 5 days   Quantity:  10 tablet   Refills:  0         These medicines have changed or have updated prescriptions.        Dose/Directions    dextromethorphan 30 MG/5ML liquid   Commonly known as:  DELSYM   This may have changed:  how much to take   Used for:  URI with cough and congestion   Changed by:  Sarah Montemayor APRN CNP        Dose:  60 mg   Take 10 mLs (60 mg) by mouth 2 times daily as needed for cough   Quantity:  148 mL   Refills:  3       * order for DME   This may have changed:  Another medication with the same name was added. Make sure you understand how and when to take each.   Used for:  Painful respiration, Upper respiratory tract infection, unspecified type   Changed by:  Sarah Montemayor APRN CNP        Equipment being ordered: cool humidifier   Quantity:  1 each   Refills:  0       * order for DME   This may have changed:  You were already taking a medication with the same name, and this prescription was added. Make sure you understand how and when to take each.   Used for:  Bilateral carpal tunnel syndrome   Changed by:  Sarah Montemayor APRN CNP        Equipment being ordered: bilateral wrist splints   Quantity:  2 each   Refills:  0       * order for DME   This may have changed:  You were already taking a medication with the same name, and this prescription was added. Make sure you understand how and when to take each.   Used for:  Acute bronchitis with symptoms > 10 days   Changed by:  Sarah Montemayor APRN CNP        Equipment being ordered: Nebulizer, tubing, and mask   Quantity:  1 each   Refills:  0       * Notice:  This list has 3 medication(s) that are the same as other medications prescribed for you. Read the directions carefully, and ask your  doctor or other care provider to review them with you.         Where to get your medicines      These medications were sent to [facility] - [address] [address]     Phone:  [phone]     azithromycin 250 MG tablet    dextromethorphan 30 MG/5ML liquid    FLUoxetine 20 MG capsule    ipratropium - albuterol 0.5 mg/2.5 mg/3 mL 0.5-2.5 (3) MG/3ML neb solution    predniSONE 20 MG tablet         Some of these will need a paper prescription and others can be bought over the counter.  Ask your nurse if you have questions.     Bring a paper prescription for each of these medications     order for DME    order for DME                Primary Care Provider Office Phone # Fax #    Sarah NINOSKA Ruth [phone]       [address] [facility] [address]        Equal Access to Services     [facility]: Hadii aad ku hadasho Soomaali, waaxda luqadaha, qaybta kaalmada adeegyada, waxay antonio haymary alegre . So [facility] [phone].    ATENCIÓN: Si habla español, tiene a roberts disposición servicios gratuitos de asistencia lingüística. [place] al [phone].    We comply with applicable federal civil rights laws and [place] laws. We do not discriminate on the basis of race, color, national origin, age, disability, sex, sexual orientation, or gender identity.            Thank you!     Thank you for choosing [facility]  for your care. Our goal is always to provide you with excellent care. Hearing back from our patients is one way we can continue to improve our services. Please take a few minutes to complete the written survey that you may receive in the mail after your visit with us. Thank you!             Your Updated Medication List - Protect others around you: Learn how to safely use, store and throw away your medicines at www.disposemymeds.org.          This list is accurate as of 1/29/18 12:19 PM.  Always use your most  recent med list.                   Brand Name Dispense Instructions for use Diagnosis    ACCUTANE PO           acyclovir 5 % ointment    ZOVIRAX    30 g    Apply 1 g topically as needed    Recurrent cold sores       albuterol 108 (90 BASE) MCG/ACT Inhaler    PROAIR HFA/PROVENTIL HFA/VENTOLIN HFA    18 g    Inhale 1-2 puffs into the lungs every 4 hours as needed for shortness of breath / dyspnea or wheezing    Mild intermittent reactive airway disease without complication       azithromycin 250 MG tablet    ZITHROMAX    6 tablet    Two tablets first day, then one tablet daily for four days.    Acute bronchitis with symptoms > 10 days       betamethasone valerate 0.1 % ointment    VALISONE     Apply once a day for 2 weeks as directed        buprenorphine-naloxone 8-2 MG Subl sublingual tablet    SUBOXONE    60 each    Place 1 tablet under the tongue 2 times daily    Opioid use disorder, severe, dependence (H)       BusPIRone HCl 30 MG Tabs     60 tablet    Take 30 mg by mouth 2 times daily    Generalized anxiety disorder       clobetasol 0.05 % ointment    TEMOVATE     APPLY TOPICALLY BID        cloNIDine 0.1 MG tablet    CATAPRES    60 tablet    Take 1 tablet (0.1 mg) by mouth 2 times daily Prn late withdrawal symptoms.    Opioid use disorder, severe, dependence (H)       [employer] TRIPLE ANTIBIOTIC Oint     60 g    Externally apply topically 4 times daily    Allergic contact dermatitis due to adhesives       dextromethorphan 30 MG/5ML liquid    DELSYM    148 mL    Take 10 mLs (60 mg) by mouth 2 times daily as needed for cough    URI with cough and congestion       docusate sodium 100 MG tablet    COLACE    60 tablet    Take 100 mg by mouth 2 times daily as needed for constipation    Gastroesophageal reflux disease, esophagitis presence not specified       * FLUoxetine 40 MG capsule    PROzac    30 capsule    TAKE 1 CAPSULE(40 MG) BY MOUTH DAILY. Take with 20mg Fluoxetine by mouth daily to equal total daily dose of 60mg  per day.    Moderate episode of recurrent major depressive disorder (H), Generalized anxiety disorder       * FLUoxetine 20 MG capsule    PROzac    30 capsule    Take 1 capsule (20 mg) by mouth daily Take with 40mg of fluoxetine daily to equal total daily dose of 60 mg per day.    Moderate episode of recurrent major depressive disorder (H), Generalized anxiety disorder       fluticasone 50 MCG/ACT spray    FLONASE    1 Bottle    Spray 1-2 sprays into both nostrils daily    Seasonal allergic rhinitis, unspecified allergic rhinitis trigger       hydrocortisone 2.5 % cream           hydrOXYzine 25 MG tablet    ATARAX    60 tablet    Take 2 tablets (50 mg) by mouth nightly as needed for anxiety    Generalized anxiety disorder       ipratropium - albuterol 0.5 mg/2.5 mg/3 mL 0.5-2.5 (3) MG/3ML neb solution    DUONEB    90 vial    Take 1 vial (3 mLs) by nebulization every 6 hours as needed for shortness of breath / dyspnea or wheezing    Acute bronchitis with symptoms > 10 days       ketoconazole 2 % cream    NIZORAL          loratadine 10 MG tablet    CLARITIN    30 tablet    Take 1 tablet (10 mg) by mouth daily as needed for allergies    Chronic nonseasonal allergic rhinitis due to pollen       mirtazapine 15 MG tablet    REMERON    30 tablet    Take 1 tablet (15 mg) by mouth At Bedtime Please contact clinic if this makes you too sedated during the day.    Primary insomnia       olopatadine HCl 0.2 % Soln    PATADAY    2.5 mL    Place 1 drop into both eyes daily    Seasonal allergic rhinitis, unspecified allergic rhinitis trigger       omeprazole 40 MG capsule    priLOSEC    90 capsule    TAKE 1 CAPSULE(40 MG) BY MOUTH DAILY 30 TO 60 MINUTES BEFORE A MEAL    Gastroesophageal reflux disease, esophagitis presence not specified       ondansetron 4 MG ODT tab    ZOFRAN-ODT    30 tablet    Take 1-2 tablets (4-8 mg) by mouth every 8 hours as needed for nausea    Migraine without status migrainosus, not intractable,  unspecified migraine type       * order for DME     1 each    Equipment being ordered: cool humidifier    Painful respiration, Upper respiratory tract infection, unspecified type       * order for DME     2 each    Equipment being ordered: bilateral wrist splints    Bilateral carpal tunnel syndrome       * order for DME     1 each    Equipment being ordered: Nebulizer, tubing, and mask    Acute bronchitis with symptoms > 10 days       polyethylene glycol powder    MIRALAX    510 g    Take 17 g (1 capful) by mouth daily    Drug-induced constipation       predniSONE 20 MG tablet    DELTASONE    10 tablet    Take 2 tablets (40 mg) by mouth daily for 5 days    Acute bronchitis with symptoms > 10 days       traZODone 100 MG tablet    DESYREL    90 tablet    Take 1 tablet (100 mg) by mouth At Bedtime    Primary insomnia       triamcinolone 0.1 % ointment    KENALOG    80 g    Apply sparingly to affected area on back two times daily til clear.    Rash       TYLENOL PO      Take 1 tablet by mouth as needed    Chondritis of both external ears       valACYclovir 1000 mg tablet    VALTREX    8 tablet    Take 1 tablet (1,000 mg) by mouth 2 times daily At the onset of skin lesions as needed    Recurrent cold sores       ZOLMitriptan 5 MG tablet    ZOMIG    12 tablet    TAKE 1 TABLET BY MOUTH AT ONSET OF HEADACHE FOR MIGRAINE. MAY REPEAT DOSE IN 2 HOURS. DO NOT EXCEED 10MG IN 24 HOURS    Migraine without aura and without status migrainosus, not intractable       * Notice:  This list has 5 medication(s) that are the same as other medications prescribed for you. Read the directions carefully, and ask your doctor or other care provider to review them with you.

## 2018-01-29 NOTE — PATIENT INSTRUCTIONS
[facility]    If you have any questions regarding to your visit please contact your care team:     Team Pink:   Clinic Hours Telephone Number   Internal Medicine:  Dr. Kristie Montemayor NP       7am-7pm  Monday - Thursday   7am-5pm  Fridays  [phone]  (Appointment scheduling available 24/7)    Questions about your visit?  Team Line  [phone]   [facility] - [place] [facility] - 11am-9pm Monday-Friday Saturday-Sunday- 9am-5pm   [place] - 5pm-9pm Monday-Friday Saturday-Sunday- 9am-5pm  [phone] - [place]   [phone] - [place]       What options do I have for visits at the clinic other than the traditional office visit?  To expand how we care for you, many of our providers are utilizing electronic visits (e-visits) and telephone visits, when medically appropriate, for interactions with their patients rather than a visit in the clinic.   We also offer nurse visits for many medical concerns. Just like any other service, we will bill your insurance company for this type of visit based on time spent on the phone with your provider. Not all insurance companies cover these visits. Please check with your medical insurance if this type of visit is covered. You will be responsible for any charges that are not paid by your insurance.      E-visits via [employer]:  generally incur a $35.00 fee.  Telephone visits:  Time spent on the phone: *charged based on time that is spent on the phone in increments of 10 minutes. Estimated cost:   5-10 mins $30.00   11-20 mins. $59.00   21-30 mins. $85.00   Use [employer] (secure email communication and access to your chart) to send your primary care provider a message or make an appointment. Ask someone on your Team how to sign up for [employer].    For a Price Quote for your services, please call our Consumer Price Line at [phone].    As always, Thank you for trusting us with your health care  needs!    Sharmila Tabor, CMA

## 2018-01-30 ASSESSMENT — ASTHMA QUESTIONNAIRES: ACT_TOTALSCORE: 7

## 2018-02-05 ENCOUNTER — OFFICE VISIT (OUTPATIENT)
Dept: ADDICTION MEDICINE | Facility: CLINIC | Age: 48
End: 2018-02-05
Payer: COMMERCIAL

## 2018-02-05 VITALS
SYSTOLIC BLOOD PRESSURE: 122 MMHG | WEIGHT: 139 LBS | HEART RATE: 85 BPM | RESPIRATION RATE: 14 BRPM | DIASTOLIC BLOOD PRESSURE: 68 MMHG | OXYGEN SATURATION: 95 % | BODY MASS INDEX: 19.94 KG/M2

## 2018-02-05 DIAGNOSIS — F33.1 MODERATE EPISODE OF RECURRENT MAJOR DEPRESSIVE DISORDER (H): ICD-10-CM

## 2018-02-05 DIAGNOSIS — F11.20 OPIOID USE DISORDER, SEVERE, DEPENDENCE (H): Primary | ICD-10-CM

## 2018-02-05 DIAGNOSIS — K21.9 GASTROESOPHAGEAL REFLUX DISEASE, ESOPHAGITIS PRESENCE NOT SPECIFIED: ICD-10-CM

## 2018-02-05 DIAGNOSIS — F41.1 GENERALIZED ANXIETY DISORDER: ICD-10-CM

## 2018-02-05 DIAGNOSIS — F17.200 NICOTINE USE DISORDER: ICD-10-CM

## 2018-02-05 DIAGNOSIS — F51.01 PRIMARY INSOMNIA: ICD-10-CM

## 2018-02-05 PROCEDURE — 99215 OFFICE O/P EST HI 40 MIN: CPT | Performed by: PEDIATRICS

## 2018-02-05 PROCEDURE — 80306 DRUG TEST PRSMV INSTRMNT: CPT | Performed by: PEDIATRICS

## 2018-02-05 RX ORDER — BENZONATATE 200 MG/1
200 CAPSULE ORAL 3 TIMES DAILY PRN
Qty: 42 CAPSULE | Refills: 3 | Status: SHIPPED | OUTPATIENT
Start: 2018-02-05 | End: 2018-03-19

## 2018-02-05 RX ORDER — BUPRENORPHINE HYDROCHLORIDE AND NALOXONE HYDROCHLORIDE DIHYDRATE 8; 2 MG/1; MG/1
1 TABLET SUBLINGUAL 2 TIMES DAILY
Qty: 60 EACH | Refills: 0 | Status: SHIPPED | OUTPATIENT
Start: 2018-02-05 | End: 2018-03-19

## 2018-02-05 RX ORDER — FLUTICASONE PROPIONATE 50 MCG
2 SPRAY, SUSPENSION (ML) NASAL DAILY
Qty: 1 BOTTLE | Refills: 11 | Status: SHIPPED | OUTPATIENT
Start: 2018-02-05 | End: 2019-01-01

## 2018-02-05 NOTE — PROGRESS NOTES
SUBJECTIVE:                                                    OPIOID USE DISORDER - BUPRENORPHINE FOLLOW UP:    Coretta Tovar is a 47 year old female who presents to clinic today for follow up of  MAT for opioid use disorder.     Date of last visit:  1/16/2018    [place] Board of Pharmacy Data Base Reviewed:    YES;     Suboxone 8mg           HPI:  Sober housing waiting [facility] (was at [facility] until last night left due to conflicts with peers, struggles with getting health care).  Will be living with dad in [place] for now.  Will go to meetings in local area and sponsor knows about change.   Still taking Suboxone 8mg bid.  Feels this is working well.  No constipation.   Has had cough for several weeks.  No fever.  tx with Z pack and DM cough medicine.  Still coughing.  Smokes.  Out of Flonase for allergies.  Uses neb/inhaler occasionally.        Status since last visit: Since last visit patient has been:stable    Intensity:     There has been: mild intermittent craving    Suboxone Dose: adequate    Progression of Symptoms/Precipitating Factors:     Cues to use and relapse triggers: stress in treatment.      Trigger have been:  non-existent    Accompanying Signs & Symptoms:    Side Effects: none    Sobriety:     Status: No substance use     Drug Screen: obtained    Alleviating factors/Other Therapies Tried:    Contact with sponsor has been: helpful    Family and support system has been: helpful    Patient has been going to recovery meetings: regularly    Recovery program has been : active    Patient has been participating in professional counseling /therapy: YES        Social History     Social History Narrative    Lives with dad and brother in [place].      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      Daughter with recent DWI (she has child).        Patient Active Problem List    Diagnosis Date Noted     History of cocaine dependence (H) 09/07/2017     Priority: Medium     Moderate  episode of recurrent major depressive disorder (H) 03/22/2017     Priority: Medium     Generalized anxiety disorder 03/22/2017     Priority: Medium     Protein-calorie malnutrition (H) 03/22/2017     Priority: Medium     Primary insomnia 03/22/2017     Priority: Medium     Vertigo 03/14/2017     Priority: Medium     Cervical pain 03/08/2017     Priority: Medium     Nutritional disorder 11/21/2016     Priority: Medium     Disorder of stomach 11/20/2016     Priority: Medium     Overview:   Per Biopsy obtained on EGD 11/22/2016. Reactive gastropathy was quoted to be frequently associated with ongoing non-inflammatory type mucosal injury due to a chemical type of injury; this may be due to ingestion of non-steroidal anti-inflammatory drugs, aspirin, excess alcohol, or corticosteroids.        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     Priority: Medium     Chemical dependency (H) 02/24/2015     Priority: Medium     Insomnia 02/24/2015     Priority: Medium     S/P splenectomy 01/13/2015     Priority: Medium     Migraines      Priority: Medium     Idiopathic thrombocytopenic purpura (HCC)      Priority: Medium     s/p splenectomy       Tobacco abuse 05/30/2014     Priority: Medium     Immune thrombocytopenic purpura (H) 01/14/2008     Priority: Medium     Personality disorder 01/14/2008     Priority: Medium     Overview:   cluster B traits  borderline, antisocial       Nondependent cannabis abuse, episodic 01/14/2008     Priority: Medium     Mixed, or nondependent drug abuse 01/26/2007     Priority: Medium     Overview:   recurrent issues with opioids.  History of crack cocaine abuse.       Gastroesophageal reflux disease 12/20/2006     Priority: Medium     Herpetic gingivostomatitis 12/20/2006     Priority: Medium     Muscle pain 12/20/2006     Priority: Medium     Atopic rhinitis 12/20/2006     Priority: Medium     Abnormal  weight loss 12/20/2006     Priority: Medium       Problem list and histories reviewed & adjusted, as indicated.  Additional history: as documented        Current Outpatient Prescriptions on File Prior to Visit:  FLUoxetine (PROZAC) 20 MG capsule Take 1 capsule (20 mg) by mouth daily Take with 40mg of fluoxetine daily to equal total daily dose of 60 mg per day.   dextromethorphan (DELSYM) 30 MG/5ML liquid Take 10 mLs (60 mg) by mouth 2 times daily as needed for cough   order for DME Equipment being ordered: bilateral wrist splints   order for DME Equipment being ordered: Nebulizer, tubing, and mask   ipratropium - albuterol 0.5 mg/2.5 mg/3 mL (DUONEB) 0.5-2.5 (3) MG/3ML neb solution Take 1 vial (3 mLs) by nebulization every 6 hours as needed for shortness of breath / dyspnea or wheezing   azithromycin (ZITHROMAX) 250 MG tablet Two tablets first day, then one tablet daily for four days.   cloNIDine (CATAPRES) 0.1 MG tablet Take 1 tablet (0.1 mg) by mouth 2 times daily Prn late withdrawal symptoms.   buprenorphine-naloxone (SUBOXONE) 8-2 MG SUBL sublingual tablet Place 1 tablet under the tongue 2 times daily   FLUoxetine (PROZAC) 40 MG capsule TAKE 1 CAPSULE(40 MG) BY MOUTH DAILY. Take with 20mg Fluoxetine by mouth daily to equal total daily dose of 60mg per day.   hydrOXYzine (ATARAX) 25 MG tablet Take 2 tablets (50 mg) by mouth nightly as needed for anxiety   mirtazapine (REMERON) 15 MG tablet Take 1 tablet (15 mg) by mouth At Bedtime Please contact clinic if this makes you too sedated during the day.   omeprazole (PRILOSEC) 40 MG capsule TAKE 1 CAPSULE(40 MG) BY MOUTH DAILY 30 TO 60 MINUTES BEFORE A MEAL   docusate sodium (COLACE) 100 MG tablet Take 100 mg by mouth 2 times daily as needed for constipation   ondansetron (ZOFRAN-ODT) 4 MG ODT tab Take 1-2 tablets (4-8 mg) by mouth every 8 hours as needed for nausea   BusPIRone HCl 30 MG TABS Take 30 mg by mouth 2 times daily   traZODone (DESYREL) 100 MG tablet Take 1  tablet (100 mg) by mouth At Bedtime   polyethylene glycol (MIRALAX) powder Take 17 g (1 capful) by mouth daily   ZOLMitriptan (ZOMIG) 5 MG tablet TAKE 1 TABLET BY MOUTH AT ONSET OF HEADACHE FOR MIGRAINE. MAY REPEAT DOSE IN 2 HOURS. DO NOT EXCEED 10MG IN 24 HOURS   loratadine (CLARITIN) 10 MG tablet Take 1 tablet (10 mg) by mouth daily as needed for allergies   valACYclovir (VALTREX) 1000 mg tablet Take 1 tablet (1,000 mg) by mouth 2 times daily At the onset of skin lesions as needed   acyclovir (ZOVIRAX) 5 % ointment Apply 1 g topically as needed   albuterol (PROAIR HFA/PROVENTIL HFA/VENTOLIN HFA) 108 (90 BASE) MCG/ACT Inhaler Inhale 1-2 puffs into the lungs every 4 hours as needed for shortness of breath / dyspnea or wheezing   ISOtretinoin (ACCUTANE PO)    Neomycin-Bacitracin-Polymyxin (CVS TRIPLE ANTIBIOTIC) OINT Externally apply topically 4 times daily   triamcinolone (KENALOG) 0.1 % ointment Apply sparingly to affected area on back two times daily til clear.   olopatadine HCl (PATADAY) 0.2 % SOLN Place 1 drop into both eyes daily   fluticasone (FLONASE) 50 MCG/ACT spray Spray 1-2 sprays into both nostrils daily   clobetasol (TEMOVATE) 0.05 % ointment APPLY TOPICALLY BID   betamethasone valerate (VALISONE) 0.1 % ointment Apply once a day for 2 weeks as directed   hydrocortisone 2.5 % cream    ketoconazole (NIZORAL) 2 % cream    order for DME Equipment being ordered: cool humidifier   Acetaminophen (TYLENOL PO) Take 1 tablet by mouth as needed     Current Facility-Administered Medications on File Prior to Visit:  Lidocaine 1 % injection 9 mL   sodium bicarbonate 8.4 % injection 1 mEq          Allergies   Allergen Reactions     Acetaminophen-Codeine Hives     Amoxicillin-Pot Clavulanate Nausea and Vomiting     Augmentin Nausea and Vomiting and Hives     Barbiturates      Bentyl [Dicyclomine] Other (See Comments)     dizziness     Compazine      Lock-jaw     Liquid Adhesive      Other reaction(s): Other, see  comments  blisters     No Clinical Screening - See Comments      PN: LW Reaction: blisters  PN: LW FI1: nka  PN: LW CM1: CONTRAST- nka Reaction :     Nsaids      D/t ITP     Prochlorperazine      Other reaction(s): Edema     Propoxyphene N-Apap Hives     Sulfa Drugs      Tramadol Hcl Hives            Adhesive Tape Rash         REVIEW OF SYSTEMS:  General: No acute withdrawal symptoms.  No recent infections or fever  Resp: No coughing, wheezing or shortness of breath  CV: No chest pains or palpitations  GI: No nausea, vomiting, abdominal pain, diarrhea, constipation  : No urinary frequency or dysuria,     Musculoskeletal: No significant muscle or joint pains, No edema  Neurologic: No numbness, tingling, weakness, problems with balance or coordination  Psychiatric: No acute concerns  Skin: No rashes    OBJECTIVE:    PHYSICAL EXAM:  /68  Pulse 85  Resp 14  Wt 139 lb (63 kg)  SpO2 95%  BMI 19.94 kg/m2    GENERAL APPEARANCE:  alert, comfortable appearing  EYES:Eyes grossly normal to inspection  NEURO:  Gait normal.  No tremor. Coordination intact.   MENTAL STATUS EXAM:  Appearance/Behavior: No appearant distress  Speech: Normal  Mood/Affect: normal affect  Insight: Adequate      Results for orders placed or performed in visit on 02/05/18   Urine Drugs of Abuse Screen Panel 13   Result Value Ref Range    Cannabinoids (26-dot-9-carboxy-9-THC) Not Detected NDET^Not Detected ng/mL    Phencyclidine (Phencyclidine) Not Detected NDET^Not Detected ng/mL    Cocaine (Benzoylecgonine) Not Detected NDET^Not Detected ng/mL    Methamphetamine (d-Methamphetamine) Not Detected NDET^Not Detected ng/mL    Opiates (Morphine) Not Detected NDET^Not Detected ng/mL    Amphetamine (d-Amphetamine) Not Detected NDET^Not Detected ng/mL    Benzodiazepines (Nordiazepam) Not Detected NDET^Not Detected ng/mL    Tricyclic Antidepressants (Desipramine) Not Detected NDET^Not Detected ng/mL    Methadone (Methadone) Not Detected NDET^Not  Detected ng/mL    Barbiturates (Butalbital) Not Detected NDET^Not Detected ng/mL    Oxycodone (Oxycodone) Not Detected NDET^Not Detected ng/mL    Propoxyphene (Norpropoxyphene) Not Detected NDET^Not Detected ng/mL    Buprenorphine (Buprenorphine) Detected, Abnormal Result (A) NDET^Not Detected ng/mL           ASSESSMENT/PLAN:       (F11.20) Opioid use disorder, severe, dependence (H)  (primary encounter diagnosis)   Suboxone  8mg bid.  Rx #60    Follow up one month.   Strongly encouraged sober living/outpatient treatment with recent departure from [facility].   Continue current treatment.   Encourage meeting attendance and sponsorship         Opioid warning reviewed.  Risk of overdose following a period of abstinence due to decrease tolerance was discussed including risk of death.   Risk of overdose if using Suboxone with other substances particuarly benzodiazepines/alcohol was reviewed.           (F14.21) History of cocaine dependence (H)  Plan: no recent use.  Encourage ongoing sobriety      (F33.1) Moderate episode of recurrent major depressive disorder (H)  (F41.1) Generalized anxiety disorder  Plan: Recommendations as above.     Follow with PCP/psychiatry as planned.          (D69.3) Idiopathic thrombocytopenic purpura (HCC)  Comment: No recent episodes  Plan: follow with PCP.  Avoid NSAIDS      (F17.200) Nicotine use disorder  Comment: not ready to quit  Plan: Encouraged Abstinence.  Increase risk of relapse with other substances with return to nicotine use discussed.  Risk of Ecig/Vaping also reviewed.       Cough  D/c Robitussin DM.  Tessaon pearls rx.  Flonase refilled.  Follow up with PCP with ongoing sx.     Constipation-no current sx.             ENCOUNTER FOR LONG TERM USE OF HIGH RISK MEDICATION   High Risk Drug Monitoring?  YES   Drug being monitored: Buprenorphine   Reason for drug: Opioid Use Disorder   What is being monitored?: Dosage, Cravings, Trigger, side effects, and continued  abstinence.      Opioid warning reviewed.  Risk of overdose following a period of abstinence due to decrease tolerance was discussed including risk of death.   Risk of overdose if using Suboxone with other substances particuarly benzodiazepines/alcohol was reviewed.          Jessy Brenner MD  [facility]  [phone]

## 2018-02-05 NOTE — MR AVS SNAPSHOT
"              After Visit Summary   2/5/2018    Coretta Tovar    MRN: [id number]           Patient Information     Date Of Birth          1970        Visit Information        Provider Department      2/5/2018 2:20 PM Jessy Brenner MD [facility]        Today's Diagnoses     Opioid use disorder, severe, dependence (H)    -  1    Moderate episode of recurrent major depressive disorder (H)        Generalized anxiety disorder        Primary insomnia        Gastroesophageal reflux disease, esophagitis presence not specified        Nicotine use disorder           Follow-ups after your visit        Who to contact     If you have questions or need follow up information about today's clinic visit or your schedule please contact [facility] directly at [phone].  Normal or non-critical lab and imaging results will be communicated to you by MyChart, letter or phone within 4 business days after the clinic has received the results. If you do not hear from us within 7 days, please contact the clinic through [employer] or phone. If you have a critical or abnormal lab result, we will notify you by phone as soon as possible.  Submit refill requests through [employer] or call your pharmacy and they will forward the refill request to us. Please allow 3 business days for your refill to be completed.          Additional Information About Your Visit        MyChart Information     [employer] lets you send messages to your doctor, view your test results, renew your prescriptions, schedule appointments and more. To sign up, go to [email or web address] . Click on \"Log in\" on the left side of the screen, which will take you to the Welcome page. Then click on \"Sign up Now\" on the right side of the page.     You will be asked to enter the access code listed below, as well as some personal information. Please follow the directions to create your username and password.   "   Your access code is: [email or web address]  Expires: 3/15/2018  9:50 AM     Your access code will  in 90 days. If you need help or a new code, please call your [facility] clinic or [phone].        Care EveryWhere ID     This is your Care EveryWhere ID. This could be used by other organizations to access your [facility] medical records  [id number]        Your Vitals Were     Pulse Respirations Pulse Oximetry BMI (Body Mass Index)          85 14 95% 19.94 kg/m2         Blood Pressure from Last 3 Encounters:   18 122/68   18 98/60   18 102/68    Weight from Last 3 Encounters:   18 139 lb (63 kg)   18 137 lb 12.8 oz (62.5 kg)   18 139 lb (63 kg)              We Performed the Following     Urine Drugs of Abuse Screen Panel 13          Today's Medication Changes          These changes are accurate as of 18 11:59 PM.  If you have any questions, ask your nurse or doctor.               Start taking these medicines.        Dose/Directions    benzonatate 200 MG capsule   Commonly known as:  TESSALON   Used for:  Opioid use disorder, severe, dependence (H)   Started by:  Jessy Brenner MD        Dose:  200 mg   Take 1 capsule (200 mg) by mouth 3 times daily as needed for cough   Quantity:  42 capsule   Refills:  3       Cool Mist Humidifier Misc   Used for:  Opioid use disorder, severe, dependence (H)   Started by:  Jessy Brenner MD        Dose:  1 Device   1 Device as needed   Quantity:  1 each   Refills:  0         These medicines have changed or have updated prescriptions.        Dose/Directions    fluticasone 50 MCG/ACT spray   Commonly known as:  FLONASE   This may have changed:  how much to take   Used for:  Opioid use disorder, severe, dependence (H)   Changed by:  Jessy Brenner MD        Dose:  2 spray   Spray 2 sprays into both nostrils daily   Quantity:  1 Bottle   Refills:  11         Stop taking these medicines if you haven't already. Please contact  your care team if you have questions.     dextromethorphan 30 MG/5ML liquid   Commonly known as:  DELSYM   Stopped by:  Jessy Brenner MD                Where to get your medicines      [id number] [place] - [address] AT [facility] [address] [facility]  [address] [address]     Phone:  [phone]     benzonatate 200 MG capsule    Cool Mist Humidifier Misc    fluticasone 50 MCG/ACT spray         Some of these will need a paper prescription and others can be bought over the counter.  Ask your nurse if you have questions.     Bring a paper prescription for each of these medications     buprenorphine-naloxone 8-2 MG Subl sublingual tablet                Primary Care Provider Office Phone # Fax #    Sarah Wilkerson Rohit Roberts, NINOSKA [facility] [phone] [address] [address]        Equal Access to Services     ARELY JASON AH: Hadii aad ku hadasho Soomaali, waaxda luqadaha, qaybta kaalmada adeegyada, waxay idiin hayaan hitesh kharash sis . So [facility] [phone].    ATENCIÓN: Si habla español, tiene a roberts disposición servicios gratuitos de asistencia lingüística. Llame al [phone].    We comply with applicable federal civil rights laws and [place] laws. We do not discriminate on the basis of race, color, national origin, age, disability, sex, sexual orientation, or gender identity.            Thank you!     Thank you for choosing [facility]  for your care. Our goal is always to provide you with excellent care. Hearing back from our patients is one way we can continue to improve our services. Please take a few minutes to complete the written survey that you may receive in the mail after your visit with us. Thank you!             Your Updated Medication List - Protect others around you: Learn how to safely use, store and throw away your medicines at www.disposemymeds.org.          This  list is accurate as of 2/5/18 11:59 PM.  Always use your most recent med list.                   Brand Name Dispense Instructions for use Diagnosis    ACCUTANE PO           acyclovir 5 % ointment    ZOVIRAX    30 g    Apply 1 g topically as needed    Recurrent cold sores       albuterol 108 (90 BASE) MCG/ACT Inhaler    PROAIR HFA/PROVENTIL HFA/VENTOLIN HFA    18 g    Inhale 1-2 puffs into the lungs every 4 hours as needed for shortness of breath / dyspnea or wheezing    Mild intermittent reactive airway disease without complication       azithromycin 250 MG tablet    ZITHROMAX    6 tablet    Two tablets first day, then one tablet daily for four days.    Acute bronchitis with symptoms > 10 days       benzonatate 200 MG capsule    TESSALON    42 capsule    Take 1 capsule (200 mg) by mouth 3 times daily as needed for cough    Opioid use disorder, severe, dependence (H)       betamethasone valerate 0.1 % ointment    VALISONE     Apply once a day for 2 weeks as directed        buprenorphine-naloxone 8-2 MG Subl sublingual tablet    SUBOXONE    60 each    Place 1 tablet under the tongue 2 times daily    Opioid use disorder, severe, dependence (H)       BusPIRone HCl 30 MG Tabs     60 tablet    Take 30 mg by mouth 2 times daily    Generalized anxiety disorder       clobetasol 0.05 % ointment    TEMOVATE     APPLY TOPICALLY BID        cloNIDine 0.1 MG tablet    CATAPRES    60 tablet    Take 1 tablet (0.1 mg) by mouth 2 times daily Prn late withdrawal symptoms.    Opioid use disorder, severe, dependence (H)       Cool Mist Humidifier Misc     1 each    1 Device as needed    Opioid use disorder, severe, dependence (H)       CVS TRIPLE ANTIBIOTIC Oint     60 g    Externally apply topically 4 times daily    Allergic contact dermatitis due to adhesives       docusate sodium 100 MG tablet    COLACE    60 tablet    Take 100 mg by mouth 2 times daily as needed for constipation    Gastroesophageal reflux disease, esophagitis  presence not specified       * FLUoxetine 40 MG capsule    PROzac    30 capsule    TAKE 1 CAPSULE(40 MG) BY MOUTH DAILY. Take with 20mg Fluoxetine by mouth daily to equal total daily dose of 60mg per day.    Moderate episode of recurrent major depressive disorder (H), Generalized anxiety disorder       * FLUoxetine 20 MG capsule    PROzac    30 capsule    Take 1 capsule (20 mg) by mouth daily Take with 40mg of fluoxetine daily to equal total daily dose of 60 mg per day.    Moderate episode of recurrent major depressive disorder (H), Generalized anxiety disorder       fluticasone 50 MCG/ACT spray    FLONASE    1 Bottle    Spray 2 sprays into both nostrils daily    Opioid use disorder, severe, dependence (H)       hydrocortisone 2.5 % cream           hydrOXYzine 25 MG tablet    ATARAX    60 tablet    Take 2 tablets (50 mg) by mouth nightly as needed for anxiety    Generalized anxiety disorder       ipratropium - albuterol 0.5 mg/2.5 mg/3 mL 0.5-2.5 (3) MG/3ML neb solution    DUONEB    90 vial    Take 1 vial (3 mLs) by nebulization every 6 hours as needed for shortness of breath / dyspnea or wheezing    Acute bronchitis with symptoms > 10 days       ketoconazole 2 % cream    NIZORAL          loratadine 10 MG tablet    CLARITIN    30 tablet    Take 1 tablet (10 mg) by mouth daily as needed for allergies    Chronic nonseasonal allergic rhinitis due to pollen       mirtazapine 15 MG tablet    REMERON    30 tablet    Take 1 tablet (15 mg) by mouth At Bedtime Please contact clinic if this makes you too sedated during the day.    Primary insomnia       olopatadine HCl 0.2 % Soln    PATADAY    2.5 mL    Place 1 drop into both eyes daily    Seasonal allergic rhinitis, unspecified allergic rhinitis trigger       omeprazole 40 MG capsule    priLOSEC    90 capsule    TAKE 1 CAPSULE(40 MG) BY MOUTH DAILY 30 TO 60 MINUTES BEFORE A MEAL    Gastroesophageal reflux disease, esophagitis presence not specified       ondansetron 4 MG ODT  tab    ZOFRAN-ODT    30 tablet    Take 1-2 tablets (4-8 mg) by mouth every 8 hours as needed for nausea    Migraine without status migrainosus, not intractable, unspecified migraine type       * order for DME     1 each    Equipment being ordered: cool humidifier    Painful respiration, Upper respiratory tract infection, unspecified type       * order for DME     2 each    Equipment being ordered: bilateral wrist splints    Bilateral carpal tunnel syndrome       * order for DME     1 each    Equipment being ordered: Nebulizer, tubing, and mask    Acute bronchitis with symptoms > 10 days       polyethylene glycol powder    MIRALAX    510 g    Take 17 g (1 capful) by mouth daily    Drug-induced constipation       traZODone 100 MG tablet    DESYREL    90 tablet    Take 1 tablet (100 mg) by mouth At Bedtime    Primary insomnia       triamcinolone 0.1 % ointment    KENALOG    80 g    Apply sparingly to affected area on back two times daily til clear.    Rash       TYLENOL PO      Take 1 tablet by mouth as needed    Chondritis of both external ears       valACYclovir 1000 mg tablet    VALTREX    8 tablet    Take 1 tablet (1,000 mg) by mouth 2 times daily At the onset of skin lesions as needed    Recurrent cold sores       ZOLMitriptan 5 MG tablet    ZOMIG    12 tablet    TAKE 1 TABLET BY MOUTH AT ONSET OF HEADACHE FOR MIGRAINE. MAY REPEAT DOSE IN 2 HOURS. DO NOT EXCEED 10MG IN 24 HOURS    Migraine without aura and without status migrainosus, not intractable       * Notice:  This list has 5 medication(s) that are the same as other medications prescribed for you. Read the directions carefully, and ask your doctor or other care provider to review them with you.

## 2018-02-07 ENCOUNTER — TELEPHONE (OUTPATIENT)
Dept: INTERNAL MEDICINE | Facility: CLINIC | Age: 48
End: 2018-02-07

## 2018-02-07 DIAGNOSIS — J20.9 ACUTE BRONCHITIS, UNSPECIFIED ORGANISM: Primary | ICD-10-CM

## 2018-02-07 NOTE — TELEPHONE ENCOUNTER
Patient was seen a week ago and if her symptoms are still severe, she should be seen.    Sarah Montemayor, CNP

## 2018-02-07 NOTE — TELEPHONE ENCOUNTER
Spoke with patient.   Providers note read as written.    Patient scheduled with Stefani Smith for 2/8 @10:20am  PCP please write referral for patient.    Thank you  Jacinta Kirkpatrick RN

## 2018-02-07 NOTE — TELEPHONE ENCOUNTER
Spoke with patient.     Patient asked to be seen tomorrow- RN informed patient that Sarah will be out of office until Thursday 2/15.     Patient explained that she can only see Sarah otherwise her insurance will not cover the visit and patient states she can not wait until Sarah comes back next week.     Please advise.  Jacinta Kirkpatrick RN

## 2018-02-07 NOTE — TELEPHONE ENCOUNTER
Is patient referring to her chores and classes at her treatment facility?    Sarah Montemayor, CNP

## 2018-02-07 NOTE — TELEPHONE ENCOUNTER
I can always write a referral after if patient needs to see another provider.  I have been able to do that for other restricted patients.  She could see another provider here at [place].    Sarah Montemayor, CNP

## 2018-02-07 NOTE — TELEPHONE ENCOUNTER
Spoke to patient.  She stated she has left the treatment facility.  She said she is starting to look for a job, but can't breath, so is asking for a note stating she is unable to work for a few weeks until she recovers.  She said she can't catch her breath.  I told her she may need to come in and been seen, but patient states she was just here and she discussed this with Sarah.  Routing to Sarah to review and advise. Olamide Baltazar,

## 2018-02-07 NOTE — TELEPHONE ENCOUNTER
Patient called RN hotline asking for note from provider to be written stating that she is unable to go to work due to her current illness and treatment.     Patient states she has been dealing with this for 1 month and that her PCP will know what she is talking about.       Please advise   Jacinta Kirkpatrick RN

## 2018-02-08 ENCOUNTER — OFFICE VISIT (OUTPATIENT)
Dept: FAMILY MEDICINE | Facility: CLINIC | Age: 48
End: 2018-02-08
Payer: COMMERCIAL

## 2018-02-08 VITALS
WEIGHT: 140 LBS | BODY MASS INDEX: 20.04 KG/M2 | SYSTOLIC BLOOD PRESSURE: 106 MMHG | HEART RATE: 74 BPM | RESPIRATION RATE: 20 BRPM | TEMPERATURE: 97.8 F | OXYGEN SATURATION: 98 % | DIASTOLIC BLOOD PRESSURE: 70 MMHG | HEIGHT: 70 IN

## 2018-02-08 DIAGNOSIS — J20.9 ACUTE BRONCHITIS WITH SYMPTOMS > 10 DAYS: Primary | ICD-10-CM

## 2018-02-08 DIAGNOSIS — F17.200 TOBACCO USE DISORDER: ICD-10-CM

## 2018-02-08 PROCEDURE — 99213 OFFICE O/P EST LOW 20 MIN: CPT | Performed by: NURSE PRACTITIONER

## 2018-02-08 RX ORDER — IPRATROPIUM BROMIDE AND ALBUTEROL SULFATE 2.5; .5 MG/3ML; MG/3ML
1 SOLUTION RESPIRATORY (INHALATION) EVERY 6 HOURS PRN
Qty: 90 VIAL | Refills: 3 | Status: SHIPPED | OUTPATIENT
Start: 2018-02-08

## 2018-02-08 RX ORDER — PREDNISONE 20 MG/1
TABLET ORAL
Qty: 20 TABLET | Refills: 0 | Status: SHIPPED | OUTPATIENT
Start: 2018-02-08 | End: 2018-03-19

## 2018-02-08 ASSESSMENT — PAIN SCALES - GENERAL: PAINLEVEL: NO PAIN (0)

## 2018-02-08 NOTE — PATIENT INSTRUCTIONS
[facility]    If you have any questions regarding to your visit please contact your care team:       Team Red:   Clinic Hours Telephone Number   Dr. Jeanine Smith, NP   7am-7pm  Monday - Thursday   7am-5pm  Fridays  [phone]  (Appointment scheduling available 24/7)    Questions about your visit?   Team Line  [phone]   [facility] [place] [facility]am-9pm Monday-Friday Saturday-Sunday- 9am-5pm   [place] - 5pm-9pm Monday-Friday Saturday-Sunday- 9am-5pm  [phone] - [place]   [phone] - [place]       What options do I have for visits at the clinic other than the traditional office visit?  To expand how we care for you, many of our providers are utilizing electronic visits (e-visits) and telephone visits, when medically appropriate, for interactions with their patients rather than a visit in the clinic.   We also offer nurse visits for many medical concerns. Just like any other service, we will bill your insurance company for this type of visit based on time spent on the phone with your provider. Not all insurance companies cover these visits. Please check with your medical insurance if this type of visit is covered. You will be responsible for any charges that are not paid by your insurance.      E-visits via [employer]:  generally incur a $35.00 fee.  Telephone visits:  Time spent on the phone: *charged based on time that is spent on the phone in increments of 10 minutes. Estimated cost:   5-10 mins $30.00   11-20 mins. $59.00   21-30 mins. $85.00     Use The 360 [employer] (secure email communication and access to your chart) to send your primary care provider a message or make an appointment. Ask someone on your Team how to sign up for [employer].  For a Price Quote for your services, please call our Consumer Price Line at [phone].      As always, Thank you for trusting us with your health care needs!  Discharged  by Abby Michel MA.

## 2018-02-08 NOTE — PROGRESS NOTES
SUBJECTIVE:   Coretta Tovar is a 47 year old female who presents to clinic today for the following health issues:      Chief Complaint   Patient presents with     RECHECK     Bronchitis, saw Sarah Davis for this 1/29 and symptoms haven't gotten any better     Patient Request for Note/Letter       Patient with history of ITP, s/p splenectomy, and chemical dependency here for follow up of URI symptoms. Has been ill for 5 weeks with shortness of breath. Having panic attacks due to SOB. Using neb three times per day and Albuterol 3-4 times per day. Has relief for 30 minutes at a time. Afebrile but has body aches. Notes that she used her inhaler an hour ago. Also notes it typically takes several rounds of antibiotics for her to clear an infection due to prior splenectomy. She was treated 10 day ago with Prednisone and Azithromycin.    Problem list and histories reviewed & adjusted, as indicated.  Additional history: as documented    Patient Active Problem List   Diagnosis     Tobacco abuse     S/P splenectomy     Migraines     Idiopathic thrombocytopenic purpura (HCC)     Chemical dependency (H)     Insomnia     Mary-mary disease     Vomiting     Abdominal pain, acute     Disorder of stomach     Cervical pain     Vertigo     Gastroesophageal reflux disease     Herpetic gingivostomatitis     Immune thrombocytopenic purpura (H)     Muscle pain     Personality disorder     Nondependent cannabis abuse, episodic     Atopic rhinitis     Nutritional disorder     Abnormal weight loss     Mixed, or nondependent drug abuse     Moderate episode of recurrent major depressive disorder (H)     Generalized anxiety disorder     Protein-calorie malnutrition (H)     Primary insomnia     History of cocaine dependence (H)     Past Surgical History:   Procedure Laterality Date     ANKLE SURGERY      Numerous right ankle surgeries     HERNIA REPAIR Left 1975     HYSTERECTOMY, PAP NO LONGER INDICATED      benign, endometriosis      "NECK SURGERY  2000's    Cervical spine     SPLENECTOMY  1995     TONSILLECTOMY         Social History   Substance Use Topics     Smoking status: Current Every Day Smoker     Packs/day: 0.20     Years: 15.00     Types: Cigarettes     Last attempt to quit: 11/6/2015     Smokeless tobacco: Never Used     Alcohol use Yes      Comment: Drinks once in 4 - 5 months     Family History   Problem Relation Age of Onset     Neurologic Disorder Maternal Grandmother      migraines     Breast Cancer Maternal Grandmother      Neurologic Disorder Maternal Grandfather      migraines     Neurologic Disorder Brother      migraines     Breast Cancer Maternal Aunt      x 2     Breast Cancer Paternal Grandmother      Depression Daughter      Anxiety Disorder Daughter            Reviewed and updated as needed this visit by clinical staff  Tobacco  Allergies  Meds  Med Hx  Surg Hx  Fam Hx  Soc Hx      Reviewed and updated as needed this visit by Provider         ROS:  Constitutional, HEENT, cardiovascular, pulmonary, gi systems are negative, except as otherwise noted.    OBJECTIVE:     /70 (BP Location: Left arm, Patient Position: Chair, Cuff Size: Adult Regular)  Pulse 74  Temp 97.8  F (36.6  C) (Oral)  Resp 20  Ht 5' 10\" (1.778 m)  Wt 140 lb (63.5 kg)  SpO2 98%  BMI 20.09 kg/m2  Body mass index is 20.09 kg/(m^2).  GENERAL: healthy, alert and no distress  EYES: Eyes grossly normal to inspection, PERRL and conjunctivae and sclerae normal  HENT: ear canals and TM's normal, nose and mouth without ulcers or lesions  NECK: no adenopathy, no asymmetry, masses, or scars and thyroid normal to palpation  RESP: coughing, lungs clear to auscultation - no rales, rhonchi or wheezes  CV: regular rate and rhythm, normal S1 S2, no S3 or S4, no murmur, click or rub, no peripheral edema and peripheral pulses strong  ABDOMEN: soft, nontender, no hepatosplenomegaly, no masses and bowel sounds normal    Diagnostic Test Results:  none "     ASSESSMENT/PLAN:       1. Acute bronchitis with symptoms > 10 days  Symptoms primarily related to SOB- recommend higher dose of Prednisone with longer taper.   Patient requests forms for [facility] assistance to be filled out, which I did but not to her satisfaction- she would like it to state symptoms will last 30 days or greater which I think is unlikely.     - predniSONE (DELTASONE) 20 MG tablet; Take 3 tabs (60 mg) by mouth daily x 3 days, 2 tabs (40 mg) daily x 3 days, 1 tab (20 mg) daily x 3 days, then 1/2 tab (10 mg) x 3 days.  Dispense: 20 tablet; Refill: 0  - ipratropium - albuterol 0.5 mg/2.5 mg/3 mL (DUONEB) 0.5-2.5 (3) MG/3ML neb solution; Take 1 vial (3 mLs) by nebulization every 6 hours as needed for shortness of breath / dyspnea or wheezing  Dispense: 90 vial; Refill: 3    2. Tobacco use disorder  Not ready to quit- just completed chem dep treatment      Follow up as needed    NINOSKA Rosales [facility]

## 2018-02-08 NOTE — TELEPHONE ENCOUNTER
"See referral placed for \"family referral\" from yesterday.  If this doesn't work, let us know and we'll have a new one put in.  Olamide Baltazar,     "

## 2018-02-08 NOTE — TELEPHONE ENCOUNTER
Sorry it has to be an actual referral/order in the referral tab. The last one in their was from 01/29/18. That is how I get all of my information to complete the insurance referral paperwork.     Thank you,   Tenisha Farias   Referral Department  [phone]

## 2018-02-08 NOTE — TELEPHONE ENCOUNTER
When Sarah is back in office can you please have her put in a referral. Then I can process the insurance portion. We can back date for restricted.     Thank you,   Tenisha Farias   Referral Department  [phone]

## 2018-02-08 NOTE — MR AVS SNAPSHOT
After Visit Summary   2/8/2018    Coretta Tovar    MRN: [id number]           Patient Information     Date Of Birth          1970        Visit Information        Provider Department      2/8/2018 10:20 AM Stefani Smith APRN [facility]        Today's Diagnoses     Acute bronchitis with symptoms > 10 days    -  1    Tobacco use disorder          Care Instructions    [facility]    If you have any questions regarding to your visit please contact your care team:       Team Red:   Clinic Hours Telephone Number   Dr. Jeanine Smith, NP   7am-7pm  Monday - Thursday   7am-5pm  Fridays  [phone]  (Appointment scheduling available 24/7)    Questions about your visit?   Team Line  [phone]   [facility] [place] [facility]am-9pm Monday-Friday Saturday-Sunday- 9am-5pm   [place] - 5pm-9pm Monday-Friday Saturday-Sunday- 9am-5pm  [phone] - [facility]  [phone] - [place]       What options do I have for visits at the clinic other than the traditional office visit?  To expand how we care for you, many of our providers are utilizing electronic visits (e-visits) and telephone visits, when medically appropriate, for interactions with their patients rather than a visit in the clinic.   We also offer nurse visits for many medical concerns. Just like any other service, we will bill your insurance company for this type of visit based on time spent on the phone with your provider. Not all insurance companies cover these visits. Please check with your medical insurance if this type of visit is covered. You will be responsible for any charges that are not paid by your insurance.      E-visits via [employer]:  generally incur a $35.00 fee.  Telephone visits:  Time spent on the phone: *charged based on time that is spent on the phone in increments of 10 minutes. Estimated cost:   5-10 mins  "$30.00   11-20 mins. $59.00   21-30 mins. $85.00     Use Contraqerhart (secure email communication and access to your chart) to send your primary care provider a message or make an appointment. Ask someone on your Team how to sign up for AlephDt.  For a Price Quote for your services, please call our [employer] Price Line at [phone].      As always, Thank you for trusting us with your health care needs!  Discharged by Abby Michel MA.            Follow-ups after your visit        Future tests that were ordered for you today     Open Future Orders        Priority Expected Expires Ordered    Family Referral Routine  2/7/2019 2/7/2018            Who to contact     If you have questions or need follow up information about today's clinic visit or your schedule please contact [facility] directly at [phone].  Normal or non-critical lab and imaging results will be communicated to you by MyChart, letter or phone within 4 business days after the clinic has received the results. If you do not hear from us within 7 days, please contact the clinic through Contraqerhart or phone. If you have a critical or abnormal lab result, we will notify you by phone as soon as possible.  Submit refill requests through [employer] or call your pharmacy and they will forward the refill request to us. Please allow 3 business days for your refill to be completed.          Additional Information About Your Visit        Contraqerhart Information     [employer] lets you send messages to your doctor, view your test results, renew your prescriptions, schedule appointments and more. To sign up, go to [email or web address] . Click on \"Log in\" on the left side of the screen, which will take you to the Welcome page. Then click on \"Sign up Now\" on the right side of the page.     You will be asked to enter the access code listed below, as well as some personal information. Please follow the directions to create your username and password.     Your access " "code is: [email or web address]  Expires: 3/15/2018  9:50 AM     Your access code will  in 90 days. If you need help or a new code, please call your [facility] clinic or [phone].        Care EveryWhere ID     This is your Care EveryWhere ID. This could be used by other organizations to access your [facility] medical records  [id number]        Your Vitals Were     Pulse Temperature Respirations Height Pulse Oximetry BMI (Body Mass Index)    74 97.8  F (36.6  C) (Oral) 20 5' 10\" (1.778 m) 98% 20.09 kg/m2       Blood Pressure from Last 3 Encounters:   18 106/70   18 122/68   18 98/60    Weight from Last 3 Encounters:   18 140 lb (63.5 kg)   18 139 lb (63 kg)   18 137 lb 12.8 oz (62.5 kg)              Today, you had the following     No orders found for display         Today's Medication Changes          These changes are accurate as of 18 10:48 AM.  If you have any questions, ask your nurse or doctor.               Start taking these medicines.        Dose/Directions    predniSONE 20 MG tablet   Commonly known as:  DELTASONE   Used for:  Acute bronchitis with symptoms > 10 days   Started by:  Stefani Smith APRN CNP        Take 3 tabs (60 mg) by mouth daily x 3 days, 2 tabs (40 mg) daily x 3 days, 1 tab (20 mg) daily x 3 days, then 1/2 tab (10 mg) x 3 days.   Quantity:  20 tablet   Refills:  0            Where to get your medicines      [facility] [address] [place], [place] - [address] AT [address] [place] [address] [facility] [address]     Phone:  [phone]     ipratropium - albuterol 0.5 mg/2.5 mg/3 mL 0.5-2.5 (3) MG/3ML neb solution    predniSONE 20 MG tablet                Primary Care Provider Office Phone # Fax #    NINOSKA Mitchell [phone] [address]  [place] [address]        Equal Access to Services     ARELY ROBLES: juan Llanos " arabellaed lozano waxay idiin hayaan adeeg kharash la'aan ah. So [facility] [phone].    ATENCIÓN: Si rhett edwards, tiene a roberts disposición servicios gratuitos de asistencia lingüística. Sravanthi al [phone].    We comply with applicable federal civil rights laws and [place] laws. We do not discriminate on the basis of race, color, national origin, age, disability, sex, sexual orientation, or gender identity.            Thank you!     Thank you for choosing [facility]  for your care. Our goal is always to provide you with excellent care. Hearing back from our patients is one way we can continue to improve our services. Please take a few minutes to complete the written survey that you may receive in the mail after your visit with us. Thank you!             Your Updated Medication List - Protect others around you: Learn how to safely use, store and throw away your medicines at www.disposemymeds.org.          This list is accurate as of 2/8/18 10:48 AM.  Always use your most recent med list.                   Brand Name Dispense Instructions for use Diagnosis    ACCUTANE PO           acyclovir 5 % ointment    ZOVIRAX    30 g    Apply 1 g topically as needed    Recurrent cold sores       albuterol 108 (90 BASE) MCG/ACT Inhaler    PROAIR HFA/PROVENTIL HFA/VENTOLIN HFA    18 g    Inhale 1-2 puffs into the lungs every 4 hours as needed for shortness of breath / dyspnea or wheezing    Mild intermittent reactive airway disease without complication       azithromycin 250 MG tablet    ZITHROMAX    6 tablet    Two tablets first day, then one tablet daily for four days.    Acute bronchitis with symptoms > 10 days       benzonatate 200 MG capsule    TESSALON    42 capsule    Take 1 capsule (200 mg) by mouth 3 times daily as needed for cough    Opioid use disorder, severe, dependence (H)       betamethasone valerate 0.1 % ointment    VALISONE     Apply once a day for 2 weeks as directed         buprenorphine-naloxone 8-2 MG Subl sublingual tablet    SUBOXONE    60 each    Place 1 tablet under the tongue 2 times daily    Opioid use disorder, severe, dependence (H)       BusPIRone HCl 30 MG Tabs     60 tablet    Take 30 mg by mouth 2 times daily    Generalized anxiety disorder       clobetasol 0.05 % ointment    TEMOVATE     APPLY TOPICALLY BID        cloNIDine 0.1 MG tablet    CATAPRES    60 tablet    Take 1 tablet (0.1 mg) by mouth 2 times daily Prn late withdrawal symptoms.    Opioid use disorder, severe, dependence (H)       Cool Mist Humidifier Misc     1 each    1 Device as needed    Opioid use disorder, severe, dependence (H)       [employer] TRIPLE ANTIBIOTIC Oint     60 g    Externally apply topically 4 times daily    Allergic contact dermatitis due to adhesives       docusate sodium 100 MG tablet    COLACE    60 tablet    Take 100 mg by mouth 2 times daily as needed for constipation    Gastroesophageal reflux disease, esophagitis presence not specified       * FLUoxetine 40 MG capsule    PROzac    30 capsule    TAKE 1 CAPSULE(40 MG) BY MOUTH DAILY. Take with 20mg Fluoxetine by mouth daily to equal total daily dose of 60mg per day.    Moderate episode of recurrent major depressive disorder (H), Generalized anxiety disorder       * FLUoxetine 20 MG capsule    PROzac    30 capsule    Take 1 capsule (20 mg) by mouth daily Take with 40mg of fluoxetine daily to equal total daily dose of 60 mg per day.    Moderate episode of recurrent major depressive disorder (H), Generalized anxiety disorder       fluticasone 50 MCG/ACT spray    FLONASE    1 Bottle    Spray 2 sprays into both nostrils daily    Opioid use disorder, severe, dependence (H)       hydrocortisone 2.5 % cream           hydrOXYzine 25 MG tablet    ATARAX    60 tablet    Take 2 tablets (50 mg) by mouth nightly as needed for anxiety    Generalized anxiety disorder       ipratropium - albuterol 0.5 mg/2.5 mg/3 mL 0.5-2.5 (3) MG/3ML neb solution    DUONEB     90 vial    Take 1 vial (3 mLs) by nebulization every 6 hours as needed for shortness of breath / dyspnea or wheezing    Acute bronchitis with symptoms > 10 days       ketoconazole 2 % cream    NIZORAL          loratadine 10 MG tablet    CLARITIN    30 tablet    Take 1 tablet (10 mg) by mouth daily as needed for allergies    Chronic nonseasonal allergic rhinitis due to pollen       mirtazapine 15 MG tablet    REMERON    30 tablet    Take 1 tablet (15 mg) by mouth At Bedtime Please contact clinic if this makes you too sedated during the day.    Primary insomnia       olopatadine HCl 0.2 % Soln    PATADAY    2.5 mL    Place 1 drop into both eyes daily    Seasonal allergic rhinitis, unspecified allergic rhinitis trigger       omeprazole 40 MG capsule    priLOSEC    90 capsule    TAKE 1 CAPSULE(40 MG) BY MOUTH DAILY 30 TO 60 MINUTES BEFORE A MEAL    Gastroesophageal reflux disease, esophagitis presence not specified       ondansetron 4 MG ODT tab    ZOFRAN-ODT    30 tablet    Take 1-2 tablets (4-8 mg) by mouth every 8 hours as needed for nausea    Migraine without status migrainosus, not intractable, unspecified migraine type       * order for DME     1 each    Equipment being ordered: cool humidifier    Painful respiration, Upper respiratory tract infection, unspecified type       * order for DME     2 each    Equipment being ordered: bilateral wrist splints    Bilateral carpal tunnel syndrome       * order for DME     1 each    Equipment being ordered: Nebulizer, tubing, and mask    Acute bronchitis with symptoms > 10 days       polyethylene glycol powder    MIRALAX    510 g    Take 17 g (1 capful) by mouth daily    Drug-induced constipation       predniSONE 20 MG tablet    DELTASONE    20 tablet    Take 3 tabs (60 mg) by mouth daily x 3 days, 2 tabs (40 mg) daily x 3 days, 1 tab (20 mg) daily x 3 days, then 1/2 tab (10 mg) x 3 days.    Acute bronchitis with symptoms > 10 days       traZODone 100 MG tablet     DESYREL    90 tablet    Take 1 tablet (100 mg) by mouth At Bedtime    Primary insomnia       triamcinolone 0.1 % ointment    KENALOG    80 g    Apply sparingly to affected area on back two times daily til clear.    Rash       TYLENOL PO      Take 1 tablet by mouth as needed    Chondritis of both external ears       valACYclovir 1000 mg tablet    VALTREX    8 tablet    Take 1 tablet (1,000 mg) by mouth 2 times daily At the onset of skin lesions as needed    Recurrent cold sores       ZOLMitriptan 5 MG tablet    ZOMIG    12 tablet    TAKE 1 TABLET BY MOUTH AT ONSET OF HEADACHE FOR MIGRAINE. MAY REPEAT DOSE IN 2 HOURS. DO NOT EXCEED 10MG IN 24 HOURS    Migraine without aura and without status migrainosus, not intractable       * Notice:  This list has 5 medication(s) that are the same as other medications prescribed for you. Read the directions carefully, and ask your doctor or other care provider to review them with you.

## 2018-02-12 ENCOUNTER — TELEPHONE (OUTPATIENT)
Dept: FAMILY MEDICINE | Facility: CLINIC | Age: 48
End: 2018-02-12

## 2018-02-12 NOTE — TELEPHONE ENCOUNTER
Spoke with pt. States she is lightheaded and has cold and hot sweats. Started yesterday. Get's a couple of bites inside her then get's nauseated and can't eat anymore. Low grade temp, but not right now. Has bronchitis so has been coughing. Has been taking the anti nausea med and it is helping somewhat. Right now is taking her nausea medication. Today got sick to her stomach. 5-10 min after she eats. Had to pull over was light headed and dizzy and felt like she needed to vomit. Has been drinking fluids and staying hydrated. She was not able to fill the prescriptions from 2/8. Called the pharmacy and also spoke with referrals and pt needs to speak with her  about this. Pt states she already spoke with the  and they can't help with this and we cannot pre approve visits. State she still has some neb solution. Offered pt a follow up visit with Sarah and she said she would be seen only if she wasn't improving. She asked that I send this message to Sarah as FYTASHI.    Cyndi Watson RN  [facility]

## 2018-02-12 NOTE — TELEPHONE ENCOUNTER
Pt called and left a message on the RN hotline. States she is having problems eating. Is nauseated when she starts eating and has been vomiting at times.

## 2018-02-14 ENCOUNTER — TELEPHONE (OUTPATIENT)
Dept: INTERNAL MEDICINE | Facility: CLINIC | Age: 48
End: 2018-02-14

## 2018-02-14 NOTE — TELEPHONE ENCOUNTER
Patient left message on RN Hotline requesting a return call.  She stated this is in regards to being a restricted patient and that insurance will need clinic to call them?    Please call her back to get more info or send to referrals team if appropriate    Val Teran RN

## 2018-02-16 NOTE — TELEPHONE ENCOUNTER
I called pt. She had called previously about getting medication for an illness about 2 weeks ago. Hadn't gotten a call back and was using other things. Is getting better now and doesn't need it. Hung up on me.

## 2018-02-21 DIAGNOSIS — F11.20 OPIOID USE DISORDER, SEVERE, DEPENDENCE (H): ICD-10-CM

## 2018-02-21 RX ORDER — CLONIDINE HYDROCHLORIDE 0.1 MG/1
0.1 TABLET ORAL 2 TIMES DAILY
Qty: 60 TABLET | Refills: 1 | Status: SHIPPED | OUTPATIENT
Start: 2018-02-21 | End: 2018-04-17

## 2018-02-21 NOTE — TELEPHONE ENCOUNTER
Will forward refill request to Dr. Brenner.  Unsure if this is an ongoing medication.    Uzair Flores RN

## 2018-02-21 NOTE — TELEPHONE ENCOUNTER
"Last Written Prescription Date:  1/5/18  Last Fill Quantity: 60,  # refills: 1   Last office visit: 2/5/2018 with prescribing provider:     Future Office Visit:      Requested Prescriptions   Pending Prescriptions Disp Refills     cloNIDine (CATAPRES) 0.1 MG tablet 60 tablet 1     Sig: Take 1 tablet (0.1 mg) by mouth 2 times daily Prn late withdrawal symptoms.    Central Acting Antiadrenergic Agents Passed    2/21/2018  3:53 PM       Passed - Blood pressure under 140/90 in past 12 months    BP Readings from Last 3 Encounters:   02/08/18 106/70   02/05/18 122/68   01/29/18 98/60                Passed - Patient is 6 years of age or older       Passed - Past or future appointment on file.    Patient had office visit in the last year or has a visit in the next 30 days with authorizing provider.  See \"Patient Info\" tab in inbasket, or \"Choose Columns\" in Meds & Orders section of the refill encounter.            Passed - Patient not pregnant       Passed - Normal Serum Creatinine on file within past 12 months    Recent Labs   Lab Test  09/07/17   1139   CR  0.67            Passed - No positive pregnancy test on file in past 12 months          "

## 2018-02-28 ENCOUNTER — TELEPHONE (OUTPATIENT)
Dept: FAMILY MEDICINE | Facility: CLINIC | Age: 48
End: 2018-02-28

## 2018-02-28 DIAGNOSIS — G43.009 MIGRAINE WITHOUT AURA AND WITHOUT STATUS MIGRAINOSUS, NOT INTRACTABLE: Primary | ICD-10-CM

## 2018-02-28 NOTE — TELEPHONE ENCOUNTER
Prior Authorization Retail Medication Request  Medication/Dose:  ZOLMitriptan (ZOMIG) 5 MG tablet  Diagnosis and ICD code:   Migraine without aura and without status migrainosus, not intractable [G43.009]                New/Renewal/Insurance Change PA: New  Previously Tried and Failed Therapies:     Insurance ID (if provided): [id number]  Insurance Phone (if provided): [phone]    Any additional info from fax request:     If you received a fax notification from an outside Pharmacy:  Pharmacy Name:[facility]  Pharmacy #:[phone]  Pharmacy Fax:[phone]

## 2018-03-03 NOTE — TELEPHONE ENCOUNTER
Central Prior Authorization Team   Phone: [phone]    PA Initiation    Medication: ZOLMitriptan (ZOMIG) 5 MG tablet  Insurance Company: [employer] - Phone [phone] Fax [phone]  Pharmacy Filling the Rx: [facility] [address] [place] [address] - [facility] [address] AT [facility]  Filling Pharmacy Phone: [phone]  Filling Pharmacy Fax: [phone]  Start Date: 3/3/2018

## 2018-03-05 ENCOUNTER — TELEPHONE (OUTPATIENT)
Dept: FAMILY MEDICINE | Facility: CLINIC | Age: 48
End: 2018-03-05

## 2018-03-05 NOTE — LETTER
March 5, 2018          Coretta Tovar,  [address] [facility] [address]        Dear Coretta Tovar      Monitoring and managing your preventative and chronic health conditions are very important to us. Our records indicate that you have not scheduled for Appointment with your provider for an asthma follow up which was recommended by Sarah Bazzi.      If you have received your health care elsewhere, please call the clinic so the information can be documented in your chart.    Please call [phone] or message us through your [employer] account to schedule an appointment or provide information for your chart.     Feel free to contact us if you have any questions or concerns!    I look forward to seeing you and working with you on your health care needs.     Sincerely,         Sarah Bazzi / Giovanna HOOD CMA ([facility])

## 2018-03-05 NOTE — TELEPHONE ENCOUNTER
PRIOR AUTHORIZATION DENIED    Medication: ZOLMitriptan (ZOMIG) 5 MG-DENIED    Denial Date: 3/4/2018    Denial Rational: PATIENT NEEDS TO TRY/FAIL RIZATRIPTAN.        Appeal Information:   IF PATIENT IS UNABLE TO TRY/FAIL ALTERNATIVE(S) PLEASE SUPPLY PA TEAM WITH A LETTER OF MEDICAL NECESSITY WITH CLINICAL REASON.

## 2018-03-05 NOTE — TELEPHONE ENCOUNTER
Panel Management Review      Patient has the following on her problem list:     Asthma review     ACT Total Scores 1/29/2018   ACT TOTAL SCORE (Goal Greater than or Equal to 20) 7   In the past 12 months, how many times did you visit the emergency room for your asthma without being admitted to the hospital? 0   In the past 12 months, how many times were you hospitalized overnight because of your asthma? 0      1. Is Asthma diagnosis on the Problem List? No   2. Is Asthma listed on Health Maintenance? Yes    3. Patient is due for:  none      Composite cancer screening  Chart review shows that this patient is due/due soon for the following None  Summary:    Patient is due/failing the following:   none    Action needed:   Patient needs office visit for asthma follow up.    Type of outreach:    Sent letter.    Questions for provider review:    None                                                                                                                                    Giovanna HOOD CMA ([facility])       Chart routed to  .

## 2018-03-06 NOTE — TELEPHONE ENCOUNTER
"Spoke with patient and informed of providers message below and PA.   Patient stated she will not change medication and that \"we needed to figure it out\" because she will not be switching and needs this medication.   RN advised that new medication would need to be tried and failed for another PA to be done.     Routing to central PA team to advise patient on details of PA and requirements.    Jacinta Kirkpatrick RN    "

## 2018-03-06 NOTE — TELEPHONE ENCOUNTER
Please notify patient.  Script for rizatriptan pending.  Please verify pharmacy.    Sarah Montemayor, CNP

## 2018-03-08 NOTE — TELEPHONE ENCOUNTER
"Explained to patient the PA process.  She stated she tried rizatriptan.  I informed her that it is not listed in her medication list and was not prescribed by a [facility] provider so I could not list it on the original PA request.  She stated it was from another provider, but could not give me dates.  She then stated she will try/fail the rizatriptan.  I also told her to call her insurance company to let them know what she has told us - \"what she has gone through to finally find something that works and now it is not covered\"  .  She stated she will call to speak to them as well.  For now she would like a script for the rizatriptan sent to her pharmacy.  PA team number was given to call if this medication does not work for her we will resubmit PA.  "

## 2018-03-09 RX ORDER — RIZATRIPTAN BENZOATE 5 MG/1
5-10 TABLET ORAL
Qty: 18 TABLET | Refills: 3 | Status: SHIPPED | OUTPATIENT
Start: 2018-03-09 | End: 2018-03-19

## 2018-03-14 ENCOUNTER — TELEPHONE (OUTPATIENT)
Dept: INTERNAL MEDICINE | Facility: CLINIC | Age: 48
End: 2018-03-14

## 2018-03-14 NOTE — TELEPHONE ENCOUNTER
Please start prior auth for zolmitriptan  DX migraine  Previous tried maxalt without relief.  Has been on zolmitriptan with improvement in the past.    Sarah Montemayor, CNP

## 2018-03-14 NOTE — TELEPHONE ENCOUNTER
Patient left VM on RN hotline stating that she has had a migraine for 3 days and rizatriptan (MAXALT) 5 MG tablet is not working- requesting old medication that PA needed to be sent for.   See telephone encounter on 2/28/2018.    Jacinta Kirkpatrick RN

## 2018-03-16 NOTE — TELEPHONE ENCOUNTER
PA Initiation    Medication: zomig - INITIATED  Insurance Company:    Pharmacy Filling the Rx: [facility] [id number] - [place], [place] - [address]  AT [facility]  Filling Pharmacy Phone: [phone]  Filling Pharmacy Fax:    Start Date: 3/16/2018

## 2018-03-16 NOTE — TELEPHONE ENCOUNTER
Prior Authorization Approval    Authorization Effective Date: 3/6/2018  Authorization Expiration Date: 3/6/2019  Medication: zomig - approved  Approved Dose/Quantity: 12 per 30 days  Reference #: [id number]   Insurance Company: [employer] [place] - Phone [phone] Fax [phone]  Expected CoPay: $0.00     CoPay Card Available:      Foundation Assistance Needed:    Which Pharmacy is filling the prescription (Not needed for infusion/clinic administered): [facility] [address] [place] [address] [address]  AT [facility]  Pharmacy Notified: Yes  Patient Notified: Yes

## 2018-03-16 NOTE — TELEPHONE ENCOUNTER
Central Prior Authorization Team   Phone: [phone]    Received call from patient following up on PA. Patient will call insurance to try to expedite process.

## 2018-03-16 NOTE — TELEPHONE ENCOUNTER
Pt called RN hotline. States she has had a migraine for 4 days. She is taking the Rizatriptan (Maxalt) with no relief. She wants her previous medication as it works on the first day and helps. Pt states she can't wait any longer and wants PA done ASAP. Advised pt to call the central PA team at [phone] and ask that they do the PA and see if it can be expedited.     Cyndi Watson RN  [facility]

## 2018-03-19 ENCOUNTER — OFFICE VISIT (OUTPATIENT)
Dept: ADDICTION MEDICINE | Facility: CLINIC | Age: 48
End: 2018-03-19
Payer: COMMERCIAL

## 2018-03-19 VITALS — BODY MASS INDEX: 19.59 KG/M2 | WEIGHT: 136.5 LBS | RESPIRATION RATE: 18 BRPM

## 2018-03-19 DIAGNOSIS — F14.21 HISTORY OF COCAINE DEPENDENCE (H): ICD-10-CM

## 2018-03-19 DIAGNOSIS — F33.1 MODERATE EPISODE OF RECURRENT MAJOR DEPRESSIVE DISORDER (H): ICD-10-CM

## 2018-03-19 DIAGNOSIS — D69.3 IDIOPATHIC THROMBOCYTOPENIC PURPURA (H): ICD-10-CM

## 2018-03-19 DIAGNOSIS — K21.9 GASTROESOPHAGEAL REFLUX DISEASE, ESOPHAGITIS PRESENCE NOT SPECIFIED: ICD-10-CM

## 2018-03-19 DIAGNOSIS — F51.01 PRIMARY INSOMNIA: ICD-10-CM

## 2018-03-19 DIAGNOSIS — F41.1 GENERALIZED ANXIETY DISORDER: ICD-10-CM

## 2018-03-19 DIAGNOSIS — F17.200 NICOTINE USE DISORDER: ICD-10-CM

## 2018-03-19 DIAGNOSIS — F11.20 OPIOID USE DISORDER, SEVERE, DEPENDENCE (H): Primary | ICD-10-CM

## 2018-03-19 LAB
AMPHETAMINES UR QL: NOT DETECTED NG/ML
BARBITURATES UR QL SCN: NOT DETECTED NG/ML
BENZODIAZ UR QL SCN: ABNORMAL NG/ML
BUPRENORPHINE UR QL: ABNORMAL NG/ML
CANNABINOIDS UR QL: NOT DETECTED NG/ML
COCAINE UR QL SCN: NOT DETECTED NG/ML
D-METHAMPHET UR QL: NOT DETECTED NG/ML
METHADONE UR QL SCN: NOT DETECTED NG/ML
OPIATES UR QL SCN: NOT DETECTED NG/ML
OXYCODONE UR QL SCN: NOT DETECTED NG/ML
PCP UR QL SCN: NOT DETECTED NG/ML
PROPOXYPH UR QL: NOT DETECTED NG/ML
TRICYCLICS UR QL SCN: NOT DETECTED NG/ML

## 2018-03-19 PROCEDURE — 99000 SPECIMEN HANDLING OFFICE-LAB: CPT | Performed by: PEDIATRICS

## 2018-03-19 PROCEDURE — 99215 OFFICE O/P EST HI 40 MIN: CPT | Performed by: PEDIATRICS

## 2018-03-19 PROCEDURE — 80306 DRUG TEST PRSMV INSTRMNT: CPT | Performed by: PEDIATRICS

## 2018-03-19 RX ORDER — BUPRENORPHINE HYDROCHLORIDE AND NALOXONE HYDROCHLORIDE DIHYDRATE 8; 2 MG/1; MG/1
1 TABLET SUBLINGUAL 2 TIMES DAILY
Qty: 60 EACH | Refills: 0 | Status: SHIPPED | OUTPATIENT
Start: 2018-03-19 | End: 2018-04-17

## 2018-03-19 NOTE — MR AVS SNAPSHOT
After Visit Summary   3/19/2018    Coretta Tovar    MRN: [id number]           Patient Information     Date Of Birth          1970        Visit Information        Provider Department      3/19/2018 1:20 PM Jessy Brenner MD [facility]        Today's Diagnoses     Opioid use disorder, severe, dependence (H)    -  1    History of cocaine dependence (H)        Moderate episode of recurrent major depressive disorder (H)        Generalized anxiety disorder        Primary insomnia        Nicotine use disorder        Idiopathic thrombocytopenic purpura (HCC)        Gastroesophageal reflux disease, esophagitis presence not specified           Follow-ups after your visit        Your next 10 appointments already scheduled     Apr 11, 2018  9:20 AM CDT   Return Visit with Jessy Brenner MD   [facility] ([facility]    [address] [place] [address]  [address]  [facility] [address] [phone]              Who to contact     If you have questions or need follow up information about today's clinic visit or your schedule please contact [facility] directly at [phone].  Normal or non-critical lab and imaging results will be communicated to you by [employer], letter or phone within 4 business days after the clinic has received the results. If you do not hear from us within 7 days, please contact the clinic through [employer] or phone. If you have a critical or abnormal lab result, we will notify you by phone as soon as possible.  Submit refill requests through [employer] or call your pharmacy and they will forward the refill request to us. Please allow 3 business days for your refill to be completed.          Additional Information About Your Visit        MyChart Information     [employer] lets you send messages to your doctor, view your test results, renew your prescriptions, schedule  "appointments and more. To sign up, go to [email or web address] . Click on \"Log in\" on the left side of the screen, which will take you to the Welcome page. Then click on \"Sign up Now\" on the right side of the page.     You will be asked to enter the access code listed below, as well as some personal information. Please follow the directions to create your username and password.     Your access code is: [email or web address]  Expires: 2018  6:40 PM     Your access code will  in 90 days. If you need help or a new code, please call your [facility] clinic or [phone].        Care EveryWhere ID     This is your Care EveryWhere ID. This could be used by other organizations to access your [facility] medical records  [id number]        Your Vitals Were     Respirations BMI (Body Mass Index)                18 19.59 kg/m2           Blood Pressure from Last 3 Encounters:   18 106/70   18 122/68   18 98/60    Weight from Last 3 Encounters:   18 136 lb 8 oz (61.9 kg)   18 140 lb (63.5 kg)   18 139 lb (63 kg)              We Performed the Following     Benzodiazepine Confirmatory Urine Qual     Urine Drugs of Abuse Screen Panel 13          Today's Medication Changes          These changes are accurate as of 3/19/18  6:40 PM.  If you have any questions, ask your nurse or doctor.               Stop taking these medicines if you haven't already. Please contact your care team if you have questions.     benzonatate 200 MG capsule   Commonly known as:  TESSALON   Stopped by:  Jessy Brenner MD           predniSONE 20 MG tablet   Commonly known as:  DELTASONE   Stopped by:  Jessy Brenner MD           rizatriptan 5 MG tablet   Commonly known as:  MAXALT   Stopped by:  Jessy Brenner MD                Where to get your medicines      Some of these will need a paper prescription and others can be bought over the counter.  Ask your nurse if you have questions.     Bring a paper " prescription for each of these medications     buprenorphine-naloxone 8-2 MG Subl sublingual tablet                Primary Care Provider Office Phone # Fax #    Sarah Montemayor, NINOSKA [facility] [phone]       [address] [facility] [address]        Equal Access to Services     [facility] [place] EREN : Hadii aad ku hadasho Soomaali, waaxda luqadaha, qaybta kaalmada adeegyada, waxay idiin haysapnan hitesh munir latiki . So [facility] [place] [phone].    ATENCIÓN: Si habla español, tiene a roberts disposición servicios gratuitos de asistencia lingüística. Sravanthi al [phone].    We comply with applicable federal civil rights laws and [place] laws. We do not discriminate on the basis of race, color, national origin, age, disability, sex, sexual orientation, or gender identity.            Thank you!     Thank you for choosing [facility]  for your care. Our goal is always to provide you with excellent care. Hearing back from our patients is one way we can continue to improve our services. Please take a few minutes to complete the written survey that you may receive in the mail after your visit with us. Thank you!             Your Updated Medication List - Protect others around you: Learn how to safely use, store and throw away your medicines at www.disposemymeds.org.          This list is accurate as of 3/19/18  6:40 PM.  Always use your most recent med list.                   Brand Name Dispense Instructions for use Diagnosis    ACCUTANE PO           acyclovir 5 % ointment    ZOVIRAX    30 g    Apply 1 g topically as needed    Recurrent cold sores       albuterol 108 (90 BASE) MCG/ACT Inhaler    PROAIR HFA/PROVENTIL HFA/VENTOLIN HFA    18 g    Inhale 1-2 puffs into the lungs every 4 hours as needed for shortness of breath / dyspnea or wheezing    Mild intermittent reactive airway disease without complication       betamethasone valerate 0.1 % ointment    VALISONE     Apply once a day  for 2 weeks as directed        buprenorphine-naloxone 8-2 MG Subl sublingual tablet    SUBOXONE    60 each    Place 1 tablet under the tongue 2 times daily    Opioid use disorder, severe, dependence (H)       BusPIRone HCl 30 MG Tabs     60 tablet    Take 30 mg by mouth 2 times daily    Generalized anxiety disorder       clobetasol 0.05 % ointment    TEMOVATE     APPLY TOPICALLY BID        cloNIDine 0.1 MG tablet    CATAPRES    60 tablet    Take 1 tablet (0.1 mg) by mouth 2 times daily Prn late withdrawal symptoms.    Opioid use disorder, severe, dependence (H)       Cool Mist Humidifier Misc     1 each    1 Device as needed    Opioid use disorder, severe, dependence (H)       [employer] TRIPLE ANTIBIOTIC Oint     60 g    Externally apply topically 4 times daily    Allergic contact dermatitis due to adhesives        MG capsule   Generic drug:  docusate sodium     60 capsule    TAKE 1 CAPSULE BY MOUTH TWICE DAILY AS NEEDED FOR CONSTIPATION    Gastroesophageal reflux disease, esophagitis presence not specified       * FLUoxetine 40 MG capsule    PROzac    30 capsule    TAKE 1 CAPSULE(40 MG) BY MOUTH DAILY. Take with 20mg Fluoxetine by mouth daily to equal total daily dose of 60mg per day.    Moderate episode of recurrent major depressive disorder (H), Generalized anxiety disorder       * FLUoxetine 20 MG capsule    PROzac    30 capsule    Take 1 capsule (20 mg) by mouth daily Take with 40mg of fluoxetine daily to equal total daily dose of 60 mg per day.    Moderate episode of recurrent major depressive disorder (H), Generalized anxiety disorder       fluticasone 50 MCG/ACT spray    FLONASE    1 Bottle    Spray 2 sprays into both nostrils daily    Opioid use disorder, severe, dependence (H)       hydrocortisone 2.5 % cream           hydrOXYzine 25 MG tablet    ATARAX    60 tablet    Take 2 tablets (50 mg) by mouth nightly as needed for anxiety    Generalized anxiety disorder       ipratropium - albuterol 0.5 mg/2.5  mg/3 mL 0.5-2.5 (3) MG/3ML neb solution    DUONEB    90 vial    Take 1 vial (3 mLs) by nebulization every 6 hours as needed for shortness of breath / dyspnea or wheezing    Acute bronchitis with symptoms > 10 days       ketoconazole 2 % cream    NIZORAL          loratadine 10 MG tablet    CLARITIN    30 tablet    Take 1 tablet (10 mg) by mouth daily as needed for allergies    Chronic nonseasonal allergic rhinitis due to pollen       mirtazapine 15 MG tablet    REMERON    30 tablet    Take 1 tablet (15 mg) by mouth At Bedtime Please contact clinic if this makes you too sedated during the day.    Primary insomnia       olopatadine HCl 0.2 % Soln    PATADAY    2.5 mL    Place 1 drop into both eyes daily    Seasonal allergic rhinitis, unspecified allergic rhinitis trigger       omeprazole 40 MG capsule    priLOSEC    90 capsule    TAKE 1 CAPSULE(40 MG) BY MOUTH DAILY 30 TO 60 MINUTES BEFORE A MEAL    Gastroesophageal reflux disease, esophagitis presence not specified       ondansetron 4 MG ODT tab    ZOFRAN-ODT    30 tablet    Take 1-2 tablets (4-8 mg) by mouth every 8 hours as needed for nausea    Migraine without status migrainosus, not intractable, unspecified migraine type       * order for DME     1 each    Equipment being ordered: cool humidifier    Painful respiration, Upper respiratory tract infection, unspecified type       * order for DME     2 each    Equipment being ordered: bilateral wrist splints    Bilateral carpal tunnel syndrome       * order for DME     1 each    Equipment being ordered: Nebulizer, tubing, and mask    Acute bronchitis with symptoms > 10 days       polyethylene glycol powder    MIRALAX    510 g    Take 17 g (1 capful) by mouth daily    Drug-induced constipation       traZODone 100 MG tablet    DESYREL    90 tablet    Take 1 tablet (100 mg) by mouth At Bedtime    Primary insomnia       triamcinolone 0.1 % ointment    KENALOG    80 g    Apply sparingly to affected area on back two times  daily til clear.    Rash       TYLENOL PO      Take 1 tablet by mouth as needed    Chondritis of both external ears       valACYclovir 1000 mg tablet    VALTREX    8 tablet    Take 1 tablet (1,000 mg) by mouth 2 times daily At the onset of skin lesions as needed    Recurrent cold sores       ZOLMitriptan 5 MG tablet    ZOMIG    12 tablet    TAKE 1 TABLET BY MOUTH AT ONSET OF HEADACHE FOR MIGRAINE. MAY REPEAT DOSE IN 2 HOURS. DO NOT EXCEED 10MG IN 24 HOURS    Migraine without aura and without status migrainosus, not intractable       * Notice:  This list has 5 medication(s) that are the same as other medications prescribed for you. Read the directions carefully, and ask your doctor or other care provider to review them with you.

## 2018-03-19 NOTE — PROGRESS NOTES
SUBJECTIVE:                                                    OPIOID USE DISORDER - BUPRENORPHINE FOLLOW UP:    Coretta Tovar is a 47 year old female who presents to clinic today for follow up of  MAT for opioid use disorder.         Date of last visit:  2/5/2018    [place] Board of Pharmacy Data Base Reviewed:    YES;     Suboxone 8mg tab bid.     Dose at last visit:   8mg bid.         HPI:  Here today with daughter with whom she used to use Heroin.  She reports she is currently  120+ days sober.  In outpatient at [facility] (four days a week decreasing soon) but will be starting to work more.  Will be starting [employer] business.   Has some anxiety about this. Plans to still attend home meetings and sponsor still meeting with her.    Current Suboxone dose working well.   Suboxone 8mg bid.  Struggling with anxiety.  Denies any benzodiazepine use.  Other medications unchanged.  utox prelim pos for BZ.  Still smoking.          Status since last visit: Since last visit patient has been:stable    Intensity:     There has been: mild intermittent craving    Suboxone Dose: adequate    Progression of Symptoms/Precipitating Factors:     Cues to use and relapse triggers: Anxiety and Outside stressors    Trigger have been:  mild    Accompanying Signs & Symptoms:    Side Effects: none    Sobriety:     Status: No substance use     Drug Screen: obtained    Alleviating factors/Other Therapies Tried:    Contact with sponsor has been: regular    Family and support system has been: helpful    Patient has been going to recovery meetings: regularly    Recovery program has been : active    Patient has been participating in professional counseling /therapy: NO        Social History     Social History Narrative    Lives with dad and brother in [place].      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      Daughter with recent DWI (she has child).        Patient Active Problem List    Diagnosis Date Noted     History of cocaine dependence (H)  09/07/2017     Priority: Medium     Moderate episode of recurrent major depressive disorder (H) 03/22/2017     Priority: Medium     Generalized anxiety disorder 03/22/2017     Priority: Medium     Protein-calorie malnutrition (H) 03/22/2017     Priority: Medium     Primary insomnia 03/22/2017     Priority: Medium     Vertigo 03/14/2017     Priority: Medium     Cervical pain 03/08/2017     Priority: Medium     Nutritional disorder 11/21/2016     Priority: Medium     Disorder of stomach 11/20/2016     Priority: Medium     Overview:   Per Biopsy obtained on EGD 11/22/2016. Reactive gastropathy was quoted to be frequently associated with ongoing non-inflammatory type mucosal injury due to a chemical type of injury; this may be due to ingestion of non-steroidal anti-inflammatory drugs, aspirin, excess alcohol, or corticosteroids.        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     Priority: Medium     Chemical dependency (H) 02/24/2015     Priority: Medium     Insomnia 02/24/2015     Priority: Medium     S/P splenectomy 01/13/2015     Priority: Medium     Migraines      Priority: Medium     Idiopathic thrombocytopenic purpura (HCC)      Priority: Medium     s/p splenectomy       Tobacco abuse 05/30/2014     Priority: Medium     Immune thrombocytopenic purpura (H) 01/14/2008     Priority: Medium     Personality disorder 01/14/2008     Priority: Medium     Overview:   cluster B traits  borderline, antisocial       Nondependent cannabis abuse, episodic 01/14/2008     Priority: Medium     Mixed, or nondependent drug abuse 01/26/2007     Priority: Medium     Overview:   recurrent issues with opioids.  History of crack cocaine abuse.       Gastroesophageal reflux disease 12/20/2006     Priority: Medium     Herpetic gingivostomatitis 12/20/2006     Priority: Medium     Muscle pain 12/20/2006     Priority: Medium     Atopic rhinitis  12/20/2006     Priority: Medium     Abnormal weight loss 12/20/2006     Priority: Medium       Problem list and histories reviewed & adjusted, as indicated.  Additional history: as documented        Current Outpatient Prescriptions on File Prior to Visit:  rizatriptan (MAXALT) 5 MG tablet Take 1-2 tablets (5-10 mg) by mouth at onset of headache for migraine May repeat in 2 hours. Max 6 tablets/24 hours.    MG capsule TAKE 1 CAPSULE BY MOUTH TWICE DAILY AS NEEDED FOR CONSTIPATION   cloNIDine (CATAPRES) 0.1 MG tablet Take 1 tablet (0.1 mg) by mouth 2 times daily Prn late withdrawal symptoms.   predniSONE (DELTASONE) 20 MG tablet Take 3 tabs (60 mg) by mouth daily x 3 days, 2 tabs (40 mg) daily x 3 days, 1 tab (20 mg) daily x 3 days, then 1/2 tab (10 mg) x 3 days.   ipratropium - albuterol 0.5 mg/2.5 mg/3 mL (DUONEB) 0.5-2.5 (3) MG/3ML neb solution Take 1 vial (3 mLs) by nebulization every 6 hours as needed for shortness of breath / dyspnea or wheezing   buprenorphine-naloxone (SUBOXONE) 8-2 MG SUBL sublingual tablet Place 1 tablet under the tongue 2 times daily   benzonatate (TESSALON) 200 MG capsule Take 1 capsule (200 mg) by mouth 3 times daily as needed for cough   Humidifiers (COOL MIST HUMIDIFIER) MISC 1 Device as needed   fluticasone (FLONASE) 50 MCG/ACT spray Spray 2 sprays into both nostrils daily   FLUoxetine (PROZAC) 20 MG capsule Take 1 capsule (20 mg) by mouth daily Take with 40mg of fluoxetine daily to equal total daily dose of 60 mg per day.   order for DME Equipment being ordered: bilateral wrist splints   order for DME Equipment being ordered: Nebulizer, tubing, and mask   FLUoxetine (PROZAC) 40 MG capsule TAKE 1 CAPSULE(40 MG) BY MOUTH DAILY. Take with 20mg Fluoxetine by mouth daily to equal total daily dose of 60mg per day.   hydrOXYzine (ATARAX) 25 MG tablet Take 2 tablets (50 mg) by mouth nightly as needed for anxiety   mirtazapine (REMERON) 15 MG tablet Take 1 tablet (15 mg) by mouth At  Bedtime Please contact clinic if this makes you too sedated during the day.   omeprazole (PRILOSEC) 40 MG capsule TAKE 1 CAPSULE(40 MG) BY MOUTH DAILY 30 TO 60 MINUTES BEFORE A MEAL   ondansetron (ZOFRAN-ODT) 4 MG ODT tab Take 1-2 tablets (4-8 mg) by mouth every 8 hours as needed for nausea   BusPIRone HCl 30 MG TABS Take 30 mg by mouth 2 times daily   traZODone (DESYREL) 100 MG tablet Take 1 tablet (100 mg) by mouth At Bedtime   polyethylene glycol (MIRALAX) powder Take 17 g (1 capful) by mouth daily   ZOLMitriptan (ZOMIG) 5 MG tablet TAKE 1 TABLET BY MOUTH AT ONSET OF HEADACHE FOR MIGRAINE. MAY REPEAT DOSE IN 2 HOURS. DO NOT EXCEED 10MG IN 24 HOURS   loratadine (CLARITIN) 10 MG tablet Take 1 tablet (10 mg) by mouth daily as needed for allergies   valACYclovir (VALTREX) 1000 mg tablet Take 1 tablet (1,000 mg) by mouth 2 times daily At the onset of skin lesions as needed   acyclovir (ZOVIRAX) 5 % ointment Apply 1 g topically as needed   albuterol (PROAIR HFA/PROVENTIL HFA/VENTOLIN HFA) 108 (90 BASE) MCG/ACT Inhaler Inhale 1-2 puffs into the lungs every 4 hours as needed for shortness of breath / dyspnea or wheezing   ISOtretinoin (ACCUTANE PO)    Neomycin-Bacitracin-Polymyxin ([employer] TRIPLE ANTIBIOTIC) OINT Externally apply topically 4 times daily   triamcinolone (KENALOG) 0.1 % ointment Apply sparingly to affected area on back two times daily til clear.   olopatadine HCl (PATADAY) 0.2 % SOLN Place 1 drop into both eyes daily   clobetasol (TEMOVATE) 0.05 % ointment APPLY TOPICALLY BID   betamethasone valerate (VALISONE) 0.1 % ointment Apply once a day for 2 weeks as directed   hydrocortisone 2.5 % cream    ketoconazole (NIZORAL) 2 % cream    order for DME Equipment being ordered: cool humidifier   Acetaminophen (TYLENOL PO) Take 1 tablet by mouth as needed     Current Facility-Administered Medications on File Prior to Visit:  Lidocaine 1 % injection 9 mL   sodium bicarbonate 8.4 % injection 1 mEq          Allergies    Allergen Reactions     Acetaminophen-Codeine Hives     Amoxicillin-Pot Clavulanate Nausea and Vomiting     Augmentin Nausea and Vomiting and Hives     Barbiturates      Bentyl [Dicyclomine] Other (See Comments)     dizziness     Compazine      Lock-jaw     Liquid Adhesive      Other reaction(s): Other, see comments  blisters     No Clinical Screening - See Comments      PN: LW Reaction: blisters  PN: LW FI1: nka  PN: LW CM1: CONTRAST- nka Reaction :     Nsaids      D/t ITP     Prochlorperazine      Other reaction(s): Edema     Propoxyphene N-Apap Hives     Sulfa Drugs      Tramadol Hcl Hives            Adhesive Tape Rash         REVIEW OF SYSTEMS:  General: No acute withdrawal symptoms.  No recent infections or fever  Resp: No coughing, wheezing or shortness of breath  CV: No chest pains or palpitations  GI: No nausea, vomiting, abdominal pain, diarrhea, constipation  : No urinary frequency or dysuria,     Musculoskeletal: No significant muscle or joint pains, No edema  Neurologic: No numbness, tingling, weakness, problems with balance or coordination  Psychiatric: No acute concerns  Skin: No rashes    OBJECTIVE:    PHYSICAL EXAM:  Resp 18  Wt 136 lb 8 oz (61.9 kg)  BMI 19.59 kg/m2    GENERAL APPEARANCE:  alert, comfortable appearing  EYES:Eyes grossly normal to inspection  NEURO:  Gait normal.  No tremor. Coordination intact.   MENTAL STATUS EXAM:  Appearance/Behavior: No appearant distress  Speech: Normal  Mood/Affect: normal affect  Insight: Adequate      Results for orders placed or performed in visit on 03/19/18   Urine Drugs of Abuse Screen Panel 13   Result Value Ref Range    Cannabinoids (68-xer-7-carboxy-9-THC) Not Detected NDET^Not Detected ng/mL    Phencyclidine (Phencyclidine) Not Detected NDET^Not Detected ng/mL    Cocaine (Benzoylecgonine) Not Detected NDET^Not Detected ng/mL    Methamphetamine (d-Methamphetamine) Not Detected NDET^Not Detected ng/mL    Opiates (Morphine) Not Detected NDET^Not  Detected ng/mL    Amphetamine (d-Amphetamine) Not Detected NDET^Not Detected ng/mL    Benzodiazepines (Nordiazepam) Detected, Abnormal Result (A) NDET^Not Detected ng/mL    Tricyclic Antidepressants (Desipramine) Not Detected NDET^Not Detected ng/mL    Methadone (Methadone) Not Detected NDET^Not Detected ng/mL    Barbiturates (Butalbital) Not Detected NDET^Not Detected ng/mL    Oxycodone (Oxycodone) Not Detected NDET^Not Detected ng/mL    Propoxyphene (Norpropoxyphene) Not Detected NDET^Not Detected ng/mL    Buprenorphine (Buprenorphine) Detected, Abnormal Result (A) NDET^Not Detected ng/mL           ASSESSMENT/PLAN:      (F11.20) Opioid use disorder, severe, dependence (H)  (primary encounter diagnosis)   Suboxone  8mg bid.  Rx #60    Follow up one month.   Encourage meeting attendance and sponsorship          Opioid warning reviewed.  Risk of overdose following a period of abstinence due to decrease tolerance was discussed including risk of death.   Risk of overdose if using Suboxone with other substances particuarly benzodiazepines/alcohol was reviewed.           (F14.21) History of cocaine dependence (H)  Plan: no recent use.  Encourage ongoing sobriety      (F33.1) Moderate episode of recurrent major depressive disorder (H)  (F41.1) Generalized anxiety disorder  Plan: Recommendations as above.     Follow with PCP/psychiatry as planned.  Encouraged mental health counseling.     Utox confirmation pending               (F17.200) Nicotine use disorder  Comment: not ready to quit  Plan: Encouraged Abstinence.  Increase risk of relapse with other substances with return to nicotine use discussed.  Risk of Ecig/Vaping also reviewed.           Discussed referral process with daughter and answered questions about Suboxone         ENCOUNTER FOR LONG TERM USE OF HIGH RISK MEDICATION   High Risk Drug Monitoring?  YES   Drug being monitored: Buprenorphine   Reason for drug: Opioid Use Disorder   What is being monitored?:  Dosage, Cravings, Trigger, side effects, and continued abstinence.      Opioid warning reviewed.  Risk of overdose following a period of abstinence due to decrease tolerance was discussed including risk of death.   Risk of overdose if using Suboxone with other substances particuarly benzodiazepines/alcohol was reviewed.          Jessy Brenner MD  [facility]  [phone]

## 2018-03-27 ENCOUNTER — TELEPHONE (OUTPATIENT)
Dept: INTERNAL MEDICINE | Facility: CLINIC | Age: 48
End: 2018-03-27

## 2018-03-27 DIAGNOSIS — G43.009 MIGRAINE WITHOUT AURA AND WITHOUT STATUS MIGRAINOSUS, NOT INTRACTABLE: Primary | ICD-10-CM

## 2018-03-27 RX ORDER — ZOLMITRIPTAN 5 MG/1
5 TABLET, FILM COATED ORAL
Qty: 18 TABLET | Refills: 3 | Status: SHIPPED | OUTPATIENT
Start: 2018-03-27 | End: 2018-01-01

## 2018-03-27 NOTE — TELEPHONE ENCOUNTER
Patient left message on RN hotline stating she tried the new migraine medication (Rizatriptan) did not help.  She would like the Zomig sent to pharmacy as she currently has a migraine.  see 2/28/18 TE- patient was required by insurance to try Rizatriptan.   Please advise- order pending.   Tita Brown RN

## 2018-03-27 NOTE — TELEPHONE ENCOUNTER
Pt notified of rx. Encounter dated 3/14/18 states PA was approved. Pt informed of this.    Cyndi Watson RN  [facility]

## 2018-03-29 ENCOUNTER — TELEPHONE (OUTPATIENT)
Dept: ADDICTION MEDICINE | Facility: CLINIC | Age: 48
End: 2018-03-29

## 2018-03-29 ENCOUNTER — TELEPHONE (OUTPATIENT)
Dept: INTERNAL MEDICINE | Facility: CLINIC | Age: 48
End: 2018-03-29

## 2018-03-29 DIAGNOSIS — G56.03 BILATERAL CARPAL TUNNEL SYNDROME: Primary | ICD-10-CM

## 2018-03-29 RX ORDER — LIDOCAINE 5% 5 G/100G
1 CREAM TOPICAL 4 TIMES DAILY PRN
Qty: 45 G | Refills: 3 | Status: SHIPPED | OUTPATIENT
Start: 2018-03-29 | End: 2019-01-01

## 2018-03-29 NOTE — TELEPHONE ENCOUNTER
Reason for call:  Patient reporting a symptom    Symptom or request: pain and numbness in hands    Duration (how long have symptoms been present): a couple weeks    Have you been treated for this before? No; not sure if it is a side effect of medication    Additional comments: Patient stated that she cannot handle the pain anymore, she needs something to be done about it.  Is curious about whether it is a side effect of medications prescribed by Dr. Brenner    Phone Number patient can be reached at:  Home number on file [phone] (home)    Best Time:  anytime    Can we leave a detailed message on this number:  YES    Call taken on 3/29/2018 at 11:36 AM by Antoinette Bell

## 2018-03-29 NOTE — TELEPHONE ENCOUNTER
Chart review shows pt has discussed with PCP and was given braces for carpal tunnely, pt states that she is wearing braces, using lidocaine and using APAP and she states she is still having numbness and pain in her hands. Writer recommended she contact her PCP to see if PT or imaging might be indicated as pt has continued pain and numbness with braces. Advised hand pain and numbness is not a side effect of subx.   Forwarding to Dr Brenner as ISAIAH.  Lynn Mobley RN

## 2018-03-29 NOTE — TELEPHONE ENCOUNTER
Patient notified of Provider's message as written.  Patient verbalized understanding.  She is using the wrist splints.    Prescription for lidocaine cream sent    Val Teran RN

## 2018-03-29 NOTE — TELEPHONE ENCOUNTER
Is she wearing her wrist splints?  She could also try topical lidocaine 5% cream, apply 4 times daily as needed for pain.    If pain is not improving, she should be seen.    Sarah Montemayor, CNP

## 2018-03-29 NOTE — TELEPHONE ENCOUNTER
"Patient left message on RN hotline stating she has pain in her hands from carpal tunnel.  Patient reports this has been going on for over a month but now pain is \"excrutiating\" and she \"cant stand the pain\".  Over last few days she has had increased pain in her hands and some numbness in her fingers and it is hard to move her fingers.  Patient tried taking Tylenol for this but it has not helped.  Patient wondering what to do or if she needs to come in for a visit?  Please advise  Tita Brown RN        "

## 2018-04-02 ENCOUNTER — OFFICE VISIT (OUTPATIENT)
Dept: URGENT CARE | Facility: URGENT CARE | Age: 48
End: 2018-04-02
Payer: COMMERCIAL

## 2018-04-02 VITALS
BODY MASS INDEX: 20.81 KG/M2 | WEIGHT: 145 LBS | OXYGEN SATURATION: 98 % | TEMPERATURE: 97.4 F | HEART RATE: 72 BPM | SYSTOLIC BLOOD PRESSURE: 115 MMHG | DIASTOLIC BLOOD PRESSURE: 76 MMHG

## 2018-04-02 DIAGNOSIS — M79.642 PAIN IN BOTH HANDS: Primary | ICD-10-CM

## 2018-04-02 DIAGNOSIS — M79.641 PAIN IN BOTH HANDS: Primary | ICD-10-CM

## 2018-04-02 PROCEDURE — 99213 OFFICE O/P EST LOW 20 MIN: CPT | Performed by: PHYSICIAN ASSISTANT

## 2018-04-02 RX ORDER — PREDNISONE 20 MG/1
TABLET ORAL
Qty: 15 TABLET | Refills: 0 | Status: SHIPPED | OUTPATIENT
Start: 2018-04-02 | End: 2018-04-04

## 2018-04-02 ASSESSMENT — ENCOUNTER SYMPTOMS
SHORTNESS OF BREATH: 0
MYALGIAS: 0
EYE REDNESS: 0
PALPITATIONS: 0
EYE DISCHARGE: 0
BLURRED VISION: 0
WHEEZING: 0
CHILLS: 0
FEVER: 0
ABDOMINAL PAIN: 0
NAUSEA: 0
VOMITING: 0
SORE THROAT: 0
DIARRHEA: 0
HEADACHES: 0
COUGH: 0

## 2018-04-02 NOTE — PROGRESS NOTES
"  SUBJECTIVE:   Coretta Tovar is a 47 year old female who presents to clinic today for the following health issues:      Pt coming in for severe pain in hands, describes it as throbbing and sharp pain. Pt states that the pain radiates into their arms and up into their shoulder. States that their hands feel like they are \"going to pop\". Has been going on for a couple weeks but in the last week it has got worse. 8/10 pain scale. Has tried tylenol, hot packs, ice, and epsom salt soak. No relief. Pt states they do have braces that they have been wearing to bed and sometimes during the day as well.     Patient reports she was diagnosed with carpal tunnel several months ago. She has been wearing splints at night. Pain has worsened and is a throbbing pain with intermittent numbness/tingling in all fingers. She is requesting medication for discomfort. She is being prescribed Suboxone for opioid use disorder.     Problem list and histories reviewed & adjusted, as indicated.  Additional history: as documented    Patient Active Problem List   Diagnosis     Tobacco abuse     S/P splenectomy     Migraines     Idiopathic thrombocytopenic purpura (HCC)     Chemical dependency (H)     Insomnia     Mary-mary disease     Vomiting     Abdominal pain, acute     Disorder of stomach     Cervical pain     Vertigo     Gastroesophageal reflux disease     Herpetic gingivostomatitis     Immune thrombocytopenic purpura (H)     Muscle pain     Personality disorder     Nondependent cannabis abuse, episodic     Atopic rhinitis     Nutritional disorder     Abnormal weight loss     Mixed, or nondependent drug abuse     Moderate episode of recurrent major depressive disorder (H)     Generalized anxiety disorder     Protein-calorie malnutrition (H)     Primary insomnia     History of cocaine dependence (H)     Past Surgical History:   Procedure Laterality Date     ANKLE SURGERY      Numerous right ankle surgeries     HERNIA REPAIR Left 1975     " HYSTERECTOMY, PAP NO LONGER INDICATED      benign, endometriosis     NECK SURGERY  2000's    Cervical spine     SPLENECTOMY  1995     TONSILLECTOMY         Social History   Substance Use Topics     Smoking status: Current Every Day Smoker     Packs/day: 0.20     Years: 15.00     Types: Cigarettes     Last attempt to quit: 11/6/2015     Smokeless tobacco: Never Used     Alcohol use Yes      Comment: Drinks once in 4 - 5 months     Family History   Problem Relation Age of Onset     Neurologic Disorder Maternal Grandmother      migraines     Breast Cancer Maternal Grandmother      Neurologic Disorder Maternal Grandfather      migraines     Neurologic Disorder Brother      migraines     Breast Cancer Maternal Aunt      x 2     Breast Cancer Paternal Grandmother      Depression Daughter      Anxiety Disorder Daughter          Current Outpatient Prescriptions   Medication Sig Dispense Refill     Lidocaine 5 % CREA Externally apply 1 Film topically 4 times daily as needed 45 g 3     ZOLMitriptan (ZOMIG) 5 MG tablet Take 1 tablet (5 mg) by mouth at onset of headache for migraine May repeat in 2 hours. Max 2 tablets/24 hours. 18 tablet 3     buprenorphine-naloxone (SUBOXONE) 8-2 MG SUBL sublingual tablet Place 1 tablet under the tongue 2 times daily 60 each 0      MG capsule TAKE 1 CAPSULE BY MOUTH TWICE DAILY AS NEEDED FOR CONSTIPATION 60 capsule 5     cloNIDine (CATAPRES) 0.1 MG tablet Take 1 tablet (0.1 mg) by mouth 2 times daily Prn late withdrawal symptoms. 60 tablet 1     ipratropium - albuterol 0.5 mg/2.5 mg/3 mL (DUONEB) 0.5-2.5 (3) MG/3ML neb solution Take 1 vial (3 mLs) by nebulization every 6 hours as needed for shortness of breath / dyspnea or wheezing 90 vial 3     Humidifiers (COOL MIST HUMIDIFIER) MISC 1 Device as needed 1 each 0     fluticasone (FLONASE) 50 MCG/ACT spray Spray 2 sprays into both nostrils daily 1 Bottle 11     FLUoxetine (PROZAC) 20 MG capsule Take 1 capsule (20 mg) by mouth daily Take  with 40mg of fluoxetine daily to equal total daily dose of 60 mg per day. 30 capsule 1     order for DME Equipment being ordered: bilateral wrist splints 2 each 0     order for DME Equipment being ordered: Nebulizer, tubing, and mask 1 each 0     FLUoxetine (PROZAC) 40 MG capsule TAKE 1 CAPSULE(40 MG) BY MOUTH DAILY. Take with 20mg Fluoxetine by mouth daily to equal total daily dose of 60mg per day. 30 capsule 3     hydrOXYzine (ATARAX) 25 MG tablet Take 2 tablets (50 mg) by mouth nightly as needed for anxiety 60 tablet 3     mirtazapine (REMERON) 15 MG tablet Take 1 tablet (15 mg) by mouth At Bedtime Please contact clinic if this makes you too sedated during the day. 30 tablet 3     omeprazole (PRILOSEC) 40 MG capsule TAKE 1 CAPSULE(40 MG) BY MOUTH DAILY 30 TO 60 MINUTES BEFORE A MEAL 90 capsule 3     ondansetron (ZOFRAN-ODT) 4 MG ODT tab Take 1-2 tablets (4-8 mg) by mouth every 8 hours as needed for nausea 30 tablet 1     BusPIRone HCl 30 MG TABS Take 30 mg by mouth 2 times daily 60 tablet 3     traZODone (DESYREL) 100 MG tablet Take 1 tablet (100 mg) by mouth At Bedtime 90 tablet 1     polyethylene glycol (MIRALAX) powder Take 17 g (1 capful) by mouth daily 510 g 3     ZOLMitriptan (ZOMIG) 5 MG tablet TAKE 1 TABLET BY MOUTH AT ONSET OF HEADACHE FOR MIGRAINE. MAY REPEAT DOSE IN 2 HOURS. DO NOT EXCEED 10MG IN 24 HOURS 12 tablet 4     loratadine (CLARITIN) 10 MG tablet Take 1 tablet (10 mg) by mouth daily as needed for allergies 30 tablet 11     valACYclovir (VALTREX) 1000 mg tablet Take 1 tablet (1,000 mg) by mouth 2 times daily At the onset of skin lesions as needed 8 tablet 11     acyclovir (ZOVIRAX) 5 % ointment Apply 1 g topically as needed 30 g 11     albuterol (PROAIR HFA/PROVENTIL HFA/VENTOLIN HFA) 108 (90 BASE) MCG/ACT Inhaler Inhale 1-2 puffs into the lungs every 4 hours as needed for shortness of breath / dyspnea or wheezing 18 g 3     ISOtretinoin (ACCUTANE PO)        Neomycin-Bacitracin-Polymyxin ([employer]  TRIPLE ANTIBIOTIC) OINT Externally apply topically 4 times daily 60 g 3     triamcinolone (KENALOG) 0.1 % ointment Apply sparingly to affected area on back two times daily til clear. 80 g 0     olopatadine HCl (PATADAY) 0.2 % SOLN Place 1 drop into both eyes daily 2.5 mL 3     clobetasol (TEMOVATE) 0.05 % ointment APPLY TOPICALLY BID  1     betamethasone valerate (VALISONE) 0.1 % ointment Apply once a day for 2 weeks as directed       hydrocortisone 2.5 % cream        ketoconazole (NIZORAL) 2 % cream        order for DME Equipment being ordered: cool humidifier 1 each 0     Acetaminophen (TYLENOL PO) Take 1 tablet by mouth as needed       predniSONE (DELTASONE) 20 MG tablet Take 2 tablets by mouth once daily x 5 days then 1 tablet by mouth once daily x 5 days 15 tablet 0     Allergies   Allergen Reactions     Acetaminophen-Codeine Hives     Amoxicillin-Pot Clavulanate Nausea and Vomiting     Augmentin Nausea and Vomiting and Hives     Barbiturates      Bentyl [Dicyclomine] Other (See Comments)     dizziness     Compazine      Lock-jaw     Liquid Adhesive      Other reaction(s): Other, see comments  blisters     No Clinical Screening - See Comments      PN: LW Reaction: blisters  PN: LW FI1: nka  PN: LW CM1: CONTRAST- nka Reaction :     Nsaids      D/t ITP     Prochlorperazine      Other reaction(s): Edema     Propoxyphene N-Apap Hives     Sulfa Drugs      Tramadol Hcl Hives            Adhesive Tape Rash     Labs reviewed in EPIC    Reviewed and updated as needed this visit by clinical staff  Tobacco  Allergies  Meds  Problems  Med Hx  Surg Hx  Fam Hx  Soc Hx        Reviewed and updated as needed this visit by Provider  Allergies  Meds  Problems         ROS:  Review of Systems   Constitutional: Negative for chills, fever and malaise/fatigue.   HENT: Negative for congestion, ear pain and sore throat.    Eyes: Negative for blurred vision, discharge and redness.   Respiratory: Negative for cough, shortness of  breath and wheezing.    Cardiovascular: Negative for chest pain and palpitations.   Gastrointestinal: Negative for abdominal pain, diarrhea, nausea and vomiting.   Musculoskeletal: Negative for joint pain and myalgias.        Bilateral hand pain with numbness/tingling    Skin: Negative for rash.   Neurological: Negative for headaches.         OBJECTIVE:     /76  Pulse 72  Temp 97.4  F (36.3  C) (Oral)  Wt 145 lb (65.8 kg)  SpO2 98%  BMI 20.81 kg/m2  Body mass index is 20.81 kg/(m^2).    Physical Exam   Constitutional: She is well-developed, well-nourished, and in no distress.   Musculoskeletal:   No obvious deformity, erythema, edema, or ecchymosis of bilateral hands. There is positive Tinel's and Phalen's test. Good capillary refill    Psychiatric:   Anxious, but cooperative        Diagnostic Test Results:  none     ASSESSMENT/PLAN:     1. Pain in both hands  Likely carpal tunnel. Will refer to ortho for further evaluation. She requests something for pain. She is taking Suboxone so offered trial of prednisone which she would like to try. Will treat with prednisone 40mg once daily x 5 days then 20mg once daily x 5 days. Continue wearing wrist splints. Can try alternating heat/ice in 20 minute intervals. Avoid aggravating factors, but encouraged gentle ROM and stretching.     - ORTHOPEDICS ADULT REFERRAL     Nicole Quiñones PA-C  [facility]

## 2018-04-02 NOTE — MR AVS SNAPSHOT
After Visit Summary   4/2/2018    Coretta Tovar    MRN: [id number]           Patient Information     Date Of Birth          1970        Visit Information        Provider Department      4/2/2018 5:25 PM Nicole Quiñones PA-C [facility]        Today's Diagnoses     Pain in both hands    -  1       Follow-ups after your visit        Additional Services     ORTHOPEDICS ADULT REFERRAL       Your provider has referred you to: G: [facility] [phone]    [email or web address]    Please be aware that coverage of these services is subject to the terms and limitations of your health insurance plan.  Call member services at your health plan with any benefit or coverage questions.      Please bring the following to your appointment:    >>   Any x-rays, CTs or MRIs which have been performed.  Contact the facility where they were done to arrange for  prior to your scheduled appointment.    >>   List of current medications   >>   This referral request   >>   Any documents/labs given to you for this referral                  Follow-up notes from your care team     Return if symptoms worsen or fail to improve.      Your next 10 appointments already scheduled     Apr 06, 2018 11:00 AM CDT   [facility] Lance [facility] MD   [facility]    [address] [address]   [phone]            Apr 11, 2018  9:20 AM CDT   Return Visit with Jessy Brenner MD   [facility] ([facility])    [address] [place] [address]  [address]  [facility] [address] [phone]              Future tests that were ordered for you today     Open Future Orders        Priority Expected Expires Ordered    NEUROLOGY ADULT REFERRAL Routine  4/4/2019 4/4/2018            Who to contact     If you have questions or need follow up information about  "today's clinic visit or your schedule please contact [facility] directly at [phone].  Normal or non-critical lab and imaging results will be communicated to you by MyChart, letter or phone within 4 business days after the clinic has received the results. If you do not hear from us within 7 days, please contact the clinic through MyChart or phone. If you have a critical or abnormal lab result, we will notify you by phone as soon as possible.  Submit refill requests through [employer] or call your pharmacy and they will forward the refill request to us. Please allow 3 business days for your refill to be completed.          Additional Information About Your Visit        MyChart Information     [employer] lets you send messages to your doctor, view your test results, renew your prescriptions, schedule appointments and more. To sign up, go to [email or web address] . Click on \"Log in\" on the left side of the screen, which will take you to the Welcome page. Then click on \"Sign up Now\" on the right side of the page.     You will be asked to enter the access code listed below, as well as some personal information. Please follow the directions to create your username and password.     Your access code is: [email or web address]  Expires: 2018  6:40 PM     Your access code will  in 90 days. If you need help or a new code, please call your [facility] clinic or [phone].        Care EveryWhere ID     This is your Care EveryWhere ID. This could be used by other organizations to access your [facility] medical records  [id number]        Your Vitals Were     Pulse Temperature Pulse Oximetry BMI (Body Mass Index)          72 97.4  F (36.3  C) (Oral) 98% 20.81 kg/m2         Blood Pressure from Last 3 Encounters:   18 95/62   18 115/76   18 106/70    Weight from Last 3 Encounters:   18 146 lb 9.6 oz (66.5 kg)   18 145 lb (65.8 kg)   18 136 lb 8 oz (61.9 kg)              We Performed " the Following     ORTHOPEDICS ADULT REFERRAL          Today's Medication Changes          These changes are accurate as of 4/2/18 11:59 PM.  If you have any questions, ask your nurse or doctor.               Start taking these medicines.        Dose/Directions    predniSONE 20 MG tablet   Commonly known as:  DELTASONE   Used for:  Pain in both hands   Started by:  Nicole Quiñones PA-C        Take 2 tablets by mouth once daily x 5 days then 1 tablet by mouth once daily x 5 days   Quantity:  15 tablet   Refills:  0            Where to get your medicines      These medications were sent to [facility] [address] [place], [place] - [address] AT [facility]  [address] [place] [address]     Phone:  [phone]     predniSONE 20 MG tablet                Primary Care Provider Office Phone # Fax #    Sarah Montemayor, NINOSKA [facility] [phone]       [address] [address]        Equal Access to Services     [facility] AH: Hadii aad ku hadasho Soomaali, waaxda luqadaha, qaybta kaalmada adeegyada, waxay idiin hayaan swatieg rejiarasanti alegre . So [facility] [phone].    ATENCIÓN: Si habla español, tiene a roberts disposición servicios gratuitos de asistencia lingüística. [facility] [phone].    We comply with applicable federal civil rights laws and [place] laws. We do not discriminate on the basis of race, color, national origin, age, disability, sex, sexual orientation, or gender identity.            Thank you!     Thank you for choosing [facility]  for your care. Our goal is always to provide you with excellent care. Hearing back from our patients is one way we can continue to improve our services. Please take a few minutes to complete the written survey that you may receive in the mail after your visit with us. Thank you!             Your Updated Medication List - Protect others around you: Learn how to safely use, store and throw away your medicines  at www.disposemymeds.org.          This list is accurate as of 4/2/18 11:59 PM.  Always use your most recent med list.                   Brand Name Dispense Instructions for use Diagnosis    ACCUTANE PO           acyclovir 5 % ointment    ZOVIRAX    30 g    Apply 1 g topically as needed    Recurrent cold sores       albuterol 108 (90 BASE) MCG/ACT Inhaler    PROAIR HFA/PROVENTIL HFA/VENTOLIN HFA    18 g    Inhale 1-2 puffs into the lungs every 4 hours as needed for shortness of breath / dyspnea or wheezing    Mild intermittent reactive airway disease without complication       betamethasone valerate 0.1 % ointment    VALISONE     Apply once a day for 2 weeks as directed        buprenorphine-naloxone 8-2 MG Subl sublingual tablet    SUBOXONE    60 each    Place 1 tablet under the tongue 2 times daily    Opioid use disorder, severe, dependence (H)       BusPIRone HCl 30 MG Tabs     60 tablet    Take 30 mg by mouth 2 times daily    Generalized anxiety disorder       clobetasol 0.05 % ointment    TEMOVATE     APPLY TOPICALLY BID        cloNIDine 0.1 MG tablet    CATAPRES    60 tablet    Take 1 tablet (0.1 mg) by mouth 2 times daily Prn late withdrawal symptoms.    Opioid use disorder, severe, dependence (H)       Cool Mist Humidifier Misc     1 each    1 Device as needed    Opioid use disorder, severe, dependence (H)       CVS TRIPLE ANTIBIOTIC Oint     60 g    Externally apply topically 4 times daily    Allergic contact dermatitis due to adhesives        MG capsule   Generic drug:  docusate sodium     60 capsule    TAKE 1 CAPSULE BY MOUTH TWICE DAILY AS NEEDED FOR CONSTIPATION    Gastroesophageal reflux disease, esophagitis presence not specified       * FLUoxetine 40 MG capsule    PROzac    30 capsule    TAKE 1 CAPSULE(40 MG) BY MOUTH DAILY. Take with 20mg Fluoxetine by mouth daily to equal total daily dose of 60mg per day.    Moderate episode of recurrent major depressive disorder (H), Generalized anxiety  disorder       * FLUoxetine 20 MG capsule    PROzac    30 capsule    Take 1 capsule (20 mg) by mouth daily Take with 40mg of fluoxetine daily to equal total daily dose of 60 mg per day.    Moderate episode of recurrent major depressive disorder (H), Generalized anxiety disorder       fluticasone 50 MCG/ACT spray    FLONASE    1 Bottle    Spray 2 sprays into both nostrils daily    Opioid use disorder, severe, dependence (H)       hydrocortisone 2.5 % cream           hydrOXYzine 25 MG tablet    ATARAX    60 tablet    Take 2 tablets (50 mg) by mouth nightly as needed for anxiety    Generalized anxiety disorder       ipratropium - albuterol 0.5 mg/2.5 mg/3 mL 0.5-2.5 (3) MG/3ML neb solution    DUONEB    90 vial    Take 1 vial (3 mLs) by nebulization every 6 hours as needed for shortness of breath / dyspnea or wheezing    Acute bronchitis with symptoms > 10 days       ketoconazole 2 % cream    NIZORAL          Lidocaine 5 % Crea     45 g    Externally apply 1 Film topically 4 times daily as needed    Bilateral carpal tunnel syndrome       loratadine 10 MG tablet    CLARITIN    30 tablet    Take 1 tablet (10 mg) by mouth daily as needed for allergies    Chronic nonseasonal allergic rhinitis due to pollen       mirtazapine 15 MG tablet    REMERON    30 tablet    Take 1 tablet (15 mg) by mouth At Bedtime Please contact clinic if this makes you too sedated during the day.    Primary insomnia       olopatadine HCl 0.2 % Soln    PATADAY    2.5 mL    Place 1 drop into both eyes daily    Seasonal allergic rhinitis, unspecified allergic rhinitis trigger       omeprazole 40 MG capsule    priLOSEC    90 capsule    TAKE 1 CAPSULE(40 MG) BY MOUTH DAILY 30 TO 60 MINUTES BEFORE A MEAL    Gastroesophageal reflux disease, esophagitis presence not specified       ondansetron 4 MG ODT tab    ZOFRAN-ODT    30 tablet    Take 1-2 tablets (4-8 mg) by mouth every 8 hours as needed for nausea    Migraine without status migrainosus, not  intractable, unspecified migraine type       * order for DME     1 each    Equipment being ordered: cool humidifier    Painful respiration, Upper respiratory tract infection, unspecified type       * order for DME     2 each    Equipment being ordered: bilateral wrist splints    Bilateral carpal tunnel syndrome       * order for DME     1 each    Equipment being ordered: Nebulizer, tubing, and mask    Acute bronchitis with symptoms > 10 days       polyethylene glycol powder    MIRALAX    510 g    Take 17 g (1 capful) by mouth daily    Drug-induced constipation       predniSONE 20 MG tablet    DELTASONE    15 tablet    Take 2 tablets by mouth once daily x 5 days then 1 tablet by mouth once daily x 5 days    Pain in both hands       traZODone 100 MG tablet    DESYREL    90 tablet    Take 1 tablet (100 mg) by mouth At Bedtime    Primary insomnia       triamcinolone 0.1 % ointment    KENALOG    80 g    Apply sparingly to affected area on back two times daily til clear.    Rash       TYLENOL PO      Take 1 tablet by mouth as needed    Chondritis of both external ears       valACYclovir 1000 mg tablet    VALTREX    8 tablet    Take 1 tablet (1,000 mg) by mouth 2 times daily At the onset of skin lesions as needed    Recurrent cold sores       * ZOLMitriptan 5 MG tablet    ZOMIG    12 tablet    TAKE 1 TABLET BY MOUTH AT ONSET OF HEADACHE FOR MIGRAINE. MAY REPEAT DOSE IN 2 HOURS. DO NOT EXCEED 10MG IN 24 HOURS    Migraine without aura and without status migrainosus, not intractable       * ZOLMitriptan 5 MG tablet    ZOMIG    18 tablet    Take 1 tablet (5 mg) by mouth at onset of headache for migraine May repeat in 2 hours. Max 2 tablets/24 hours.    Migraine without aura and without status migrainosus, not intractable       * Notice:  This list has 7 medication(s) that are the same as other medications prescribed for you. Read the directions carefully, and ask your doctor or other care provider to review them with you.

## 2018-04-02 NOTE — NURSING NOTE
"Chief Complaint   Patient presents with     Musculoskeletal Problem       Initial /76  Pulse 72  Temp 97.4  F (36.3  C) (Oral)  Wt 145 lb (65.8 kg)  SpO2 98%  BMI 20.81 kg/m2 Estimated body mass index is 20.81 kg/(m^2) as calculated from the following:    Height as of 2/8/18: 5' 10\" (1.778 m).    Weight as of this encounter: 145 lb (65.8 kg).  Medication Reconciliation: complete    Brittany Gibson MA    "

## 2018-04-04 ENCOUNTER — TELEPHONE (OUTPATIENT)
Dept: FAMILY MEDICINE | Facility: CLINIC | Age: 48
End: 2018-04-04

## 2018-04-04 ENCOUNTER — TELEPHONE (OUTPATIENT)
Dept: ORTHOPEDICS | Facility: CLINIC | Age: 48
End: 2018-04-04

## 2018-04-04 ENCOUNTER — OFFICE VISIT (OUTPATIENT)
Dept: ORTHOPEDICS | Facility: CLINIC | Age: 48
End: 2018-04-04
Payer: COMMERCIAL

## 2018-04-04 VITALS
HEART RATE: 68 BPM | WEIGHT: 146.6 LBS | BODY MASS INDEX: 20.99 KG/M2 | DIASTOLIC BLOOD PRESSURE: 62 MMHG | SYSTOLIC BLOOD PRESSURE: 95 MMHG | HEIGHT: 70 IN

## 2018-04-04 DIAGNOSIS — R20.0 NUMBNESS IN BOTH HANDS: ICD-10-CM

## 2018-04-04 DIAGNOSIS — R20.0 NUMBNESS IN BOTH HANDS: Primary | ICD-10-CM

## 2018-04-04 DIAGNOSIS — M79.641 PAIN IN BOTH HANDS: ICD-10-CM

## 2018-04-04 DIAGNOSIS — M79.641 PAIN IN BOTH HANDS: Primary | ICD-10-CM

## 2018-04-04 DIAGNOSIS — M79.642 PAIN IN BOTH HANDS: ICD-10-CM

## 2018-04-04 DIAGNOSIS — M79.642 PAIN IN BOTH HANDS: Primary | ICD-10-CM

## 2018-04-04 PROCEDURE — 99214 OFFICE O/P EST MOD 30 MIN: CPT | Performed by: ORTHOPAEDIC SURGERY

## 2018-04-04 RX ORDER — PREDNISONE 20 MG/1
TABLET ORAL
Qty: 15 TABLET | Refills: 0 | Status: SHIPPED | OUTPATIENT
Start: 2018-04-04 | End: 2018-06-15

## 2018-04-04 ASSESSMENT — PAIN SCALES - GENERAL: PAINLEVEL: EXTREME PAIN (8)

## 2018-04-04 NOTE — TELEPHONE ENCOUNTER
Reason for Call:  Other Restrictive     Detailed comments: Patient saw  in Ortho and she needs a  Referral placed for that otherwise insurance will not pay. Dr. Jefferson referred her to Neurology but patients PCP needs to place that referral also otherwise insurance will not pay. Patient states she has a medication at [facility] for Prednisone from an urgent care doctor and she needs that re called in by patients PCP due to the restriction placed by insurance company.     Phone Number Patient can be reached at: Home number on file [phone] (home)    Best Time: any    Can we leave a detailed message on this number? Not Applicable    Call taken on 4/4/2018 at 11:20 AM by DELIO LOPEZ

## 2018-04-04 NOTE — TELEPHONE ENCOUNTER
Initiate prior auth:    DX bilateral hand pain  Patient has a history of opioid dependence and cannot take opioids.  She is unable to take NSAIDS due to history of ITP  She is taking tylenol without improvement.    Sarah Montemayor, CNP

## 2018-04-04 NOTE — LETTER
4/4/2018         RE: Coretta Tovar  [address] [facility] [address]        Dear Colleague,    Thank you for referring your patient, Coretta Tovar, to the [facility]. Please see a copy of my visit note below.    CHIEF COMPLAINT:   Chief Complaint   Patient presents with     Hand Pain     Bilateral hand pain. Onset: about 3 months. NKI. Pain just came on and has gotten worse. Both hands are about the same. Pain is over the entire hand and fingers and goes up into her arms and shoulders. She has N/T in bilateral hands and fingers. No treatments. She has braces that she uses when sleeping but no relief.      Coretta Tovar is seen today in the [facility] for evaluation of bilateral hand pain, numbness and tingling at the request of Nicole Quiñones PA-C.     HISTORY:  Coretta Tovar is a 47 year old female, right-hand dominant, with bilateral hand pain (L>R). Pain has been present for 3 months with no known injury. She reports pain came on suddenly and has gotten worse. Pain is extreme today, rated an 8/10. She reports pain is over the entire hand and fingers. Pain will radiate into the arms and shoulders. Also reports symptoms of numbness and tingling in bilateral forearms, hands and fingers. She has not had any treatments other than night time braces. These braces provide no relief. She denies any change or increase in activity.     Reports chronic neck issues, history of cervical fusion.    Suspected cause: Due to unknown factors.    Pain severity: 8/10  Pain quality: aching and sharp  Frequency of symptoms: frequently.  Aggravating Factors: with wrist flexion.  Relieving Factors: none.  Previous modalities tried: night time splinting  Prior wrist injury/trauma: none    Usual level of recreational activity: sedentary  Usual level of work activity: unemployed    Orthopedic PMH: bilateral knee osteoarthritis     Other PMH:  has a past medical history of Endometriosis;  Mary-mary disease; ITP (idiopathic thrombocytopaenic purpura) (1994); and Migraines.  Patient Active Problem List    Diagnosis Date Noted     History of cocaine dependence (H) 09/07/2017     Priority: Medium     Moderate episode of recurrent major depressive disorder (H) 03/22/2017     Priority: Medium     Generalized anxiety disorder 03/22/2017     Priority: Medium     Protein-calorie malnutrition (H) 03/22/2017     Priority: Medium     Primary insomnia 03/22/2017     Priority: Medium     Vertigo 03/14/2017     Priority: Medium     Cervical pain 03/08/2017     Priority: Medium     Nutritional disorder 11/21/2016     Priority: Medium     Disorder of stomach 11/20/2016     Priority: Medium     Overview:   Per Biopsy obtained on EGD 11/22/2016. Reactive gastropathy was quoted to be frequently associated with ongoing non-inflammatory type mucosal injury due to a chemical type of injury; this may be due to ingestion of non-steroidal anti-inflammatory drugs, aspirin, excess alcohol, or corticosteroids.        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     Priority: Medium     Chemical dependency (H) 02/24/2015     Priority: Medium     Insomnia 02/24/2015     Priority: Medium     S/P splenectomy 01/13/2015     Priority: Medium     Migraines      Priority: Medium     Idiopathic thrombocytopenic purpura (HCC)      Priority: Medium     s/p splenectomy       Tobacco abuse 05/30/2014     Priority: Medium     Immune thrombocytopenic purpura (H) 01/14/2008     Priority: Medium     Personality disorder 01/14/2008     Priority: Medium     Overview:   cluster B traits  borderline, antisocial       Nondependent cannabis abuse, episodic 01/14/2008     Priority: Medium     Mixed, or nondependent drug abuse 01/26/2007     Priority: Medium     Overview:   recurrent issues with opioids.  History of crack cocaine abuse.       Gastroesophageal  reflux disease 12/20/2006     Priority: Medium     Herpetic gingivostomatitis 12/20/2006     Priority: Medium     Muscle pain 12/20/2006     Priority: Medium     Atopic rhinitis 12/20/2006     Priority: Medium     Abnormal weight loss 12/20/2006     Priority: Medium       Surgical Hx:  has a past surgical history that includes splenectomy (1995); Ankle surgery; hernia repair (Left, 1975); Neck surgery (2000's); hysterectomy, pap no longer indicated; and tonsillectomy.    Medications:   Current Outpatient Prescriptions:      predniSONE (DELTASONE) 20 MG tablet, Take 2 tablets by mouth once daily x 5 days then 1 tablet by mouth once daily x 5 days, Disp: 15 tablet, Rfl: 0     Lidocaine 5 % CREA, Externally apply 1 Film topically 4 times daily as needed, Disp: 45 g, Rfl: 3     ZOLMitriptan (ZOMIG) 5 MG tablet, Take 1 tablet (5 mg) by mouth at onset of headache for migraine May repeat in 2 hours. Max 2 tablets/24 hours., Disp: 18 tablet, Rfl: 3     buprenorphine-naloxone (SUBOXONE) 8-2 MG SUBL sublingual tablet, Place 1 tablet under the tongue 2 times daily, Disp: 60 each, Rfl: 0      MG capsule, TAKE 1 CAPSULE BY MOUTH TWICE DAILY AS NEEDED FOR CONSTIPATION, Disp: 60 capsule, Rfl: 5     cloNIDine (CATAPRES) 0.1 MG tablet, Take 1 tablet (0.1 mg) by mouth 2 times daily Prn late withdrawal symptoms., Disp: 60 tablet, Rfl: 1     ipratropium - albuterol 0.5 mg/2.5 mg/3 mL (DUONEB) 0.5-2.5 (3) MG/3ML neb solution, Take 1 vial (3 mLs) by nebulization every 6 hours as needed for shortness of breath / dyspnea or wheezing, Disp: 90 vial, Rfl: 3     Humidifiers (COOL MIST HUMIDIFIER) MISC, 1 Device as needed, Disp: 1 each, Rfl: 0     fluticasone (FLONASE) 50 MCG/ACT spray, Spray 2 sprays into both nostrils daily, Disp: 1 Bottle, Rfl: 11     FLUoxetine (PROZAC) 20 MG capsule, Take 1 capsule (20 mg) by mouth daily Take with 40mg of fluoxetine daily to equal total daily dose of 60 mg per day., Disp: 30 capsule, Rfl: 1      order for DME, Equipment being ordered: bilateral wrist splints, Disp: 2 each, Rfl: 0     order for DME, Equipment being ordered: Nebulizer, tubing, and mask, Disp: 1 each, Rfl: 0     FLUoxetine (PROZAC) 40 MG capsule, TAKE 1 CAPSULE(40 MG) BY MOUTH DAILY. Take with 20mg Fluoxetine by mouth daily to equal total daily dose of 60mg per day., Disp: 30 capsule, Rfl: 3     hydrOXYzine (ATARAX) 25 MG tablet, Take 2 tablets (50 mg) by mouth nightly as needed for anxiety, Disp: 60 tablet, Rfl: 3     mirtazapine (REMERON) 15 MG tablet, Take 1 tablet (15 mg) by mouth At Bedtime Please contact clinic if this makes you too sedated during the day., Disp: 30 tablet, Rfl: 3     omeprazole (PRILOSEC) 40 MG capsule, TAKE 1 CAPSULE(40 MG) BY MOUTH DAILY 30 TO 60 MINUTES BEFORE A MEAL, Disp: 90 capsule, Rfl: 3     ondansetron (ZOFRAN-ODT) 4 MG ODT tab, Take 1-2 tablets (4-8 mg) by mouth every 8 hours as needed for nausea, Disp: 30 tablet, Rfl: 1     BusPIRone HCl 30 MG TABS, Take 30 mg by mouth 2 times daily, Disp: 60 tablet, Rfl: 3     traZODone (DESYREL) 100 MG tablet, Take 1 tablet (100 mg) by mouth At Bedtime, Disp: 90 tablet, Rfl: 1     polyethylene glycol (MIRALAX) powder, Take 17 g (1 capful) by mouth daily, Disp: 510 g, Rfl: 3     ZOLMitriptan (ZOMIG) 5 MG tablet, TAKE 1 TABLET BY MOUTH AT ONSET OF HEADACHE FOR MIGRAINE. MAY REPEAT DOSE IN 2 HOURS. DO NOT EXCEED 10MG IN 24 HOURS, Disp: 12 tablet, Rfl: 4     loratadine (CLARITIN) 10 MG tablet, Take 1 tablet (10 mg) by mouth daily as needed for allergies, Disp: 30 tablet, Rfl: 11     valACYclovir (VALTREX) 1000 mg tablet, Take 1 tablet (1,000 mg) by mouth 2 times daily At the onset of skin lesions as needed, Disp: 8 tablet, Rfl: 11     acyclovir (ZOVIRAX) 5 % ointment, Apply 1 g topically as needed, Disp: 30 g, Rfl: 11     albuterol (PROAIR HFA/PROVENTIL HFA/VENTOLIN HFA) 108 (90 BASE) MCG/ACT Inhaler, Inhale 1-2 puffs into the lungs every 4 hours as needed for shortness of  breath / dyspnea or wheezing, Disp: 18 g, Rfl: 3     ISOtretinoin (ACCUTANE PO), , Disp: , Rfl:      Neomycin-Bacitracin-Polymyxin ([employer] TRIPLE ANTIBIOTIC) OINT, Externally apply topically 4 times daily, Disp: 60 g, Rfl: 3     triamcinolone (KENALOG) 0.1 % ointment, Apply sparingly to affected area on back two times daily til clear., Disp: 80 g, Rfl: 0     olopatadine HCl (PATADAY) 0.2 % SOLN, Place 1 drop into both eyes daily, Disp: 2.5 mL, Rfl: 3     clobetasol (TEMOVATE) 0.05 % ointment, APPLY TOPICALLY BID, Disp: , Rfl: 1     betamethasone valerate (VALISONE) 0.1 % ointment, Apply once a day for 2 weeks as directed, Disp: , Rfl:      hydrocortisone 2.5 % cream, , Disp: , Rfl:      ketoconazole (NIZORAL) 2 % cream, , Disp: , Rfl:      order for DME, Equipment being ordered: cool humidifier, Disp: 1 each, Rfl: 0     Acetaminophen (TYLENOL PO), Take 1 tablet by mouth as needed, Disp: , Rfl:   No current facility-administered medications for this visit.     Facility-Administered Medications Ordered in Other Visits:      Lidocaine 1 % injection 9 mL, 9 mL, Intradermal, Once, Susanna Li RN     sodium bicarbonate 8.4 % injection 1 mEq, 1 mEq, Intradermal, Once, Susanna Li RN    Allergies:   Allergies   Allergen Reactions     Acetaminophen-Codeine Hives     Amoxicillin-Pot Clavulanate Nausea and Vomiting     Augmentin Nausea and Vomiting and Hives     Barbiturates      Bentyl [Dicyclomine] Other (See Comments)     dizziness     Compazine      Lock-jaw     Liquid Adhesive      Other reaction(s): Other, see comments  blisters     No Clinical Screening - See Comments      PN: LW Reaction: blisters  PN: LW FI1: nka  PN: LW CM1: CONTRAST- nka Reaction :     Nsaids      D/t ITP     Prochlorperazine      Other reaction(s): Edema     Propoxyphene N-Apap Hives     Sulfa Drugs      Tramadol Hcl Hives            Adhesive Tape Rash       Social Hx:  reports that she has been smoking Cigarettes.  She has a 3.00  "pack-year smoking history. She has never used smokeless tobacco. She reports that she drinks alcohol. She reports that she does not use illicit drugs.    Family Hx: family history includes Anxiety Disorder in her daughter; Breast Cancer in her maternal aunt, maternal grandmother, and paternal grandmother; Depression in her daughter; Neurologic Disorder in her brother, maternal grandfather, and maternal grandmother.. Negative for bleeding/clotting disorders. Negative for adverse anesthesia reactions.    REVIEW OF SYSTEMS: 10 point ROS neg other than the symptoms noted above in the HPI and PMH. Notables include  CONSTITUTIONAL:NEGATIVE for fever, chills, change in weight  INTEGUMENTARY/SKIN: NEGATIVE for worrisome rashes, moles or lesions  MUSCULOSKELETAL:See HPI above  Neurology: see HPI above.    This document serves as a record of the services and decisions personally performed and made by Lance Jefferson MD. It was created on his behalf by Justyna Fonseca, a trained medical scribe. The creation of this document is based the provider's statements to the medical scribe.    Scribe Justyna Fonseca 11:25 AM 4/4/2018     EXAM:  BP 95/62  Pulse 68  Ht 1.778 m (5' 10\")  Wt 66.5 kg (146 lb 9.6 oz)  BMI 21.03 kg/m2  GENERAL APPEARANCE: healthy, alert and no distress   GAIT: NORMAL  SKIN: no suspicious lesions or rashes  RESPIRATORY: No increased work of breathing.  NEURO:     strength: decreased bilateral.   thenar fasiculations: negative    Thenar atrophy: normal.    Sensation intact in  All fingers,    reflexes normal in upper extremities.   PSYCH:  mentation appears normal and affect normal, not anxious.    MUSCULOSKELETAL:    RIGHT HAND/FINGERS:    Skin intact. No abnormal skin discoloration, erythema or ecchymosis.   Tender to palpation throughout the hand and IP joints.  No nail pitting or clubbing.  Normal wear pattern, color and tone.  No observable or palpable masses of the fingers or palm or wrist.  No palpable " triggering of fingers.   No observable or palpable cords or nodules of the fingers or palm.    There is no swelling in the wrist, hand, forearm.  There is moderate tenderness in the wrist.  There is no ecchymosis.  There is no erythema of the surrounding skin.  There is no maceration of the skin.  There is no deformity in the area.  Intact extensors. No extensor lag.    Special tests wrist:    Tinel's positive,    Phalen's positive    Flexion/compression test positive.     Intact sensation light touch median, radial, ulnar nerves of the hand  Intact sensation to the radial and ulnar digital nerves of the fingers, as well as the finger tips.  Intact epl fpl fdp edc wrist flexion/extension biceps/triceps deltoid  Brisk capillary refill to all fingers.   Palpable radial pulse, 2+.      LEFT HAND/FINGERS:    Skin intact. No abnormal skin discoloration, erythema or ecchymosis.   Tender to palpation throughout the hand and IP joints.  No nail pitting or clubbing.  Normal wear pattern, color and tone.  No observable or palpable masses of the fingers or palm or wrist.  No palpable triggering of fingers.   No observable or palpable cords or nodules of the fingers or palm.    There is no swelling in the wrist, hand, forearm.  There is no tenderness in the wrist.  There is no ecchymosis.  There is no erythema of the surrounding skin.  There is no maceration of the skin.  There is no deformity in the area.  Intact extensors. No extensor lag.    Special tests wrist:    Tinel's positive,    Phalen's positive    Flexion/compression test positive .     Intact sensation light touch median, radial, ulnar nerves of the hand  Intact sensation to the radial and ulnar digital nerves of the fingers, as well as the finger tips.  Intact epl fpl fdp edc wrist flexion/extension biceps/triceps deltoid  Brisk capillary refill to all fingers.   Palpable radial pulse, 2+.      XRAYS: none taken.      ASSESSMENT: 47 year old female with bilateral  hand pain, numbness and tingling, possible carpal tunnel syndrome.     PLAN:   * discussed with patient signs and symptoms consistent with carpal tunnel syndrome. Carpal tunnel syndrome is compression or pinching of the median nerve at the wrist, as it enters the hand. There are many different causes, and in most cases, multifactorial.    * An indepth discussion was had with her about the options for treatment, which included activity modification to avoid aggravating activities, taking breaks during activities that cause symptoms, stretching, NSAIDS to help decrease inflammation and swelling within the carpal tunnel, night splinting, corticosteroid injections, and carpal tunnel release.   * depending upon severity and duration of symptoms, nonoperative treatment is usually initiated, starting with least invasive modalities such as activity modification and a trial of night splints and NSAIDs.  * Cortisone injections are considered to decrease swelling and inflammation within the carpal tunnel and compression of the nerve.   * Lastly, carpal tunnel release should symptoms persist despite trial of nonoperative treatment, or in cases of severe carpal tunnel syndrome.  * risks of surgery, including, but not limited to: bleeding, infection, pain, scar, damage to adjacent structures (nerves, vessels, tendons), temporary versus permanent nerve injury, failure to relieve symptoms, recurrence of symptoms, incomplete release, stiffness, scar sensitivity and tenderness, need for further surgery, risks of anesthesia were discussed.  * in some cases, with severe, prolonged symptoms, or in situations of underlying peripheral neuropathy, there may be permanent nerve changes not amenable to surgery, that even with surgery, may not resolve.    * Reviewed imaging with patient. Also, clinical exam findings.    * Discussed with patient that based on physical exam findings, it is not possible to completely rule out other neurological  features other than carpal tunnel syndrome , in particular her neck given sudden onset of symptoms bilateral.    * An EMG of bilateral upper extremities was ordered for further evaluation.     * Rest  * Activity modification - avoid activities that aggravate symptoms.  * NSAIDS - regular use for inflammation, with food, as long as no contra-indications. Please discuss with pcp if needed.  * Ice twice daily to three times daily.  * Elevation of extremity to reduce swelling  * Tylenol as needed for pain    * After having the EMG, I would like the patient to call me so that we can discuss the results as well as how to proceed.     The information in this document, created by a scribe for me, accurately reflects the services I personally performed and the decisions made by me. I have reviewed and approved this document for accuracy.        Lance Jefferson M.D., M.S.  Dept. of Orthopaedic Surgery  [facility]      Again, thank you for allowing me to participate in the care of your patient.        Sincerely,        Lance Jefferson MD

## 2018-04-04 NOTE — MR AVS SNAPSHOT
After Visit Summary   4/4/2018    Coretta Tovar    MRN: [id number]           Patient Information     Date Of Birth          1970        Visit Information        Provider Department      4/4/2018 10:45 AM Lance Jefferson MD [facility]        Today's Diagnoses     Pain in both hands    -  1    Numbness in both hands          Care Instructions    SMOKING CESSATION  What's in cigarette smoke? - Cigarette smoke contains over 4,000 chemicals. Nicotine is one of the main ingredients which is an insecticide/herbicide. It is poisonous to our nervous system, increases blood clotting risk, and decreases the body's defenses to fight off infection. Another chemical is Carbon Monoxide is an asphyxiating gas that permanently binds to blood cells and blocks the transport of oxygen. This leads to tissue death and decreases your metabolism. Tar is a chemical that coats your lungs and trachea which impairs new oxygen coming in and carbon dioxide getting out of your body.   How does smoking impact surgery? - Smoking is particularly hazardous with regards to surgery. Surgery puts stress on the body and a smoker's body is already under strain from these chemicals. Putting the two together, especially for an elective surgery, could be a recipe for disaster. Smoking before and after surgery increases your risk of heart problems, slow wound healing, delayed bone healing, blood clots, wound infection and anesthesia complications.   What are the benefits of quitting? - In 20 minutes your blood pressure will drop back down to normal. In 8 hours the carbon monoxide (a toxic gas) levels in your blood stream will drop by half, and oxygen levels will return to normal. In 48 hours your chance of having a heart attack will have decreased. All nicotine will have left your body. Your sense of taste and smell will return to a normal level. In 72 hours your bronchial tubes will relax, and your energy levels will  increase. In 2 weeks your circulation will increase, and it will continue to improve for the next 10 weeks.    Recommendations for elective surgery - Ideally, patients should quit smoking 8 weeks before and at least 2 weeks after elective surgery in order to avoid complications. Simply cutting back on the amount of cigarettes smoked per day does not offer any benefit or decrease the risk of poor wound healing, heart problems, and infection. Smokers should also start taking Vitamin C and B for two weeks before surgery and two weeks after surgery.    Ways to Stop Smokin. Nicotine patches, lozenges, or gum  2. Support Groups  3. Medications (see below)    List of Medications:  1. Varenicline Tartrate (CHANTIX)   2. Bupropion HCL (WELLBUTRIN, ZYBAN) - note: make sure Wellbutrin is for smoking cessation and not other issues   3. Nicotine Patch (NICODERM)   4. Nicotine Inhaler (NICOTROL)   5. Nicotine Gum Nicotine Polacrilex   6. Nicotine Lozenge: Nicotine Polacrilex (COMMIT)   * [facility] offers a smoking support group as well!  Please visit: [email or web address]  If you are interested in these, ask about getting a prescription or talk to your primary care doctor about what may be the best way for you to quit.                 Follow-ups after your visit        Additional Services     NEUROLOGY ADULT REFERRAL       Your provider has referred you for the following: - EMG of bilateral upper extremities  EMG at [facility]: [facility] - Rafa Conner [phone]   [email or web address]    Please be aware that coverage of these services is subject to the terms and limitations of your health insurance plan.  Call member services at your health plan with any benefit or coverage questions.      Please bring the following with you to your appointment:    (1) Any X-Rays, CTs or MRIs which have been performed.  Contact the facility where they were done to arrange for  " prior to your scheduled appointment.    (2) List of current medications  (3) This referral request   (4) Any documents/labs given to you for this referral                  Follow-up notes from your care team     Return if symptoms worsen or fail to improve.      Your next 10 appointments already scheduled     Apr 06, 2018 11:00 AM CDT   Return Visit with Lance Jefferson MD   [facility] ([facility])    [address] [facility] [address] [phone]            Apr 11, 2018  9:20 AM CDT   Return Visit with Jessy Brenner MD   [facility] ([facility])    [address] [place] [address]  [address]  [facility] [address] [phone]              Future tests that were ordered for you today     Open Future Orders        Priority Expected Expires Ordered    NEUROLOGY ADULT REFERRAL Routine  4/4/2019 4/4/2018            Who to contact     If you have questions or need follow up information about today's clinic visit or your schedule please contact [facility] directly at [phone].  Normal or non-critical lab and imaging results will be communicated to you by MyChart, letter or phone within 4 business days after the clinic has received the results. If you do not hear from us within 7 days, please contact the clinic through flexReceiptshart or phone. If you have a critical or abnormal lab result, we will notify you by phone as soon as possible.  Submit refill requests through [employer] or call your pharmacy and they will forward the refill request to us. Please allow 3 business days for your refill to be completed.          Additional Information About Your Visit        flexReceiptshart Information     [employer] lets you send messages to your doctor, view your test results, renew your prescriptions, schedule appointments and more. To sign up, go to [email or web address] . Click on \"Log in\" on the left side of the screen, " "which will take you to the Welcome page. Then click on \"Sign up Now\" on the right side of the page.     You will be asked to enter the access code listed below, as well as some personal information. Please follow the directions to create your username and password.     Your access code is: [email or web address]  Expires: 2018  6:40 PM     Your access code will  in 90 days. If you need help or a new code, please call your [facility] clinic or [phone].        Care EveryWhere ID     This is your Care EveryWhere ID. This could be used by other organizations to access your [facility] medical records  [id number]        Your Vitals Were     Pulse Height BMI (Body Mass Index)             68 5' 10\" (1.778 m) 21.03 kg/m2          Blood Pressure from Last 3 Encounters:   18 95/62   18 115/76   18 106/70    Weight from Last 3 Encounters:   18 146 lb 9.6 oz (66.5 kg)   18 145 lb (65.8 kg)   18 136 lb 8 oz (61.9 kg)               Primary Care Provider Office Phone # Fax #    Sarah NINOSKA [facility] [phone]       [address] [facility] [address]        Equal Access to Services     ARELY JASON AH: Hadii aad ku hadasho Soomaali, waaxda luqadaha, qaybta kaalmada adeegyada, pelon alegre . So [facility] [phone].    ATENCIÓN: Si habla español, tiene a roberts disposición servicios gratuitos de asistencia lingüística. Sravanthi al [phone].    We comply with applicable federal civil rights laws and [place] laws. We do not discriminate on the basis of race, color, national origin, age, disability, sex, sexual orientation, or gender identity.            Thank you!     Thank you for choosing [facility]  for your care. Our goal is always to provide you with excellent care. Hearing back from our patients is one way we can continue to improve our services. Please take a few minutes to complete the written survey that you may receive " in the mail after your visit with us. Thank you!             Your Updated Medication List - Protect others around you: Learn how to safely use, store and throw away your medicines at www.disposemymeds.org.          This list is accurate as of 4/4/18 12:34 PM.  Always use your most recent med list.                   Brand Name Dispense Instructions for use Diagnosis    ACCUTANE PO           acyclovir 5 % ointment    ZOVIRAX    30 g    Apply 1 g topically as needed    Recurrent cold sores       albuterol 108 (90 BASE) MCG/ACT Inhaler    PROAIR HFA/PROVENTIL HFA/VENTOLIN HFA    18 g    Inhale 1-2 puffs into the lungs every 4 hours as needed for shortness of breath / dyspnea or wheezing    Mild intermittent reactive airway disease without complication       betamethasone valerate 0.1 % ointment    VALISONE     Apply once a day for 2 weeks as directed        buprenorphine-naloxone 8-2 MG Subl sublingual tablet    SUBOXONE    60 each    Place 1 tablet under the tongue 2 times daily    Opioid use disorder, severe, dependence (H)       BusPIRone HCl 30 MG Tabs     60 tablet    Take 30 mg by mouth 2 times daily    Generalized anxiety disorder       clobetasol 0.05 % ointment    TEMOVATE     APPLY TOPICALLY BID        cloNIDine 0.1 MG tablet    CATAPRES    60 tablet    Take 1 tablet (0.1 mg) by mouth 2 times daily Prn late withdrawal symptoms.    Opioid use disorder, severe, dependence (H)       Cool Mist Humidifier Misc     1 each    1 Device as needed    Opioid use disorder, severe, dependence (H)       [employer] TRIPLE ANTIBIOTIC Oint     60 g    Externally apply topically 4 times daily    Allergic contact dermatitis due to adhesives        MG capsule   Generic drug:  docusate sodium     60 capsule    TAKE 1 CAPSULE BY MOUTH TWICE DAILY AS NEEDED FOR CONSTIPATION    Gastroesophageal reflux disease, esophagitis presence not specified       * FLUoxetine 40 MG capsule    PROzac    30 capsule    TAKE 1 CAPSULE(40 MG) BY MOUTH  DAILY. Take with 20mg Fluoxetine by mouth daily to equal total daily dose of 60mg per day.    Moderate episode of recurrent major depressive disorder (H), Generalized anxiety disorder       * FLUoxetine 20 MG capsule    PROzac    30 capsule    Take 1 capsule (20 mg) by mouth daily Take with 40mg of fluoxetine daily to equal total daily dose of 60 mg per day.    Moderate episode of recurrent major depressive disorder (H), Generalized anxiety disorder       fluticasone 50 MCG/ACT spray    FLONASE    1 Bottle    Spray 2 sprays into both nostrils daily    Opioid use disorder, severe, dependence (H)       hydrocortisone 2.5 % cream           hydrOXYzine 25 MG tablet    ATARAX    60 tablet    Take 2 tablets (50 mg) by mouth nightly as needed for anxiety    Generalized anxiety disorder       ipratropium - albuterol 0.5 mg/2.5 mg/3 mL 0.5-2.5 (3) MG/3ML neb solution    DUONEB    90 vial    Take 1 vial (3 mLs) by nebulization every 6 hours as needed for shortness of breath / dyspnea or wheezing    Acute bronchitis with symptoms > 10 days       ketoconazole 2 % cream    NIZORAL          Lidocaine 5 % Crea     45 g    Externally apply 1 Film topically 4 times daily as needed    Bilateral carpal tunnel syndrome       loratadine 10 MG tablet    CLARITIN    30 tablet    Take 1 tablet (10 mg) by mouth daily as needed for allergies    Chronic nonseasonal allergic rhinitis due to pollen       mirtazapine 15 MG tablet    REMERON    30 tablet    Take 1 tablet (15 mg) by mouth At Bedtime Please contact clinic if this makes you too sedated during the day.    Primary insomnia       olopatadine HCl 0.2 % Soln    PATADAY    2.5 mL    Place 1 drop into both eyes daily    Seasonal allergic rhinitis, unspecified allergic rhinitis trigger       omeprazole 40 MG capsule    priLOSEC    90 capsule    TAKE 1 CAPSULE(40 MG) BY MOUTH DAILY 30 TO 60 MINUTES BEFORE A MEAL    Gastroesophageal reflux disease, esophagitis presence not specified        ondansetron 4 MG ODT tab    ZOFRAN-ODT    30 tablet    Take 1-2 tablets (4-8 mg) by mouth every 8 hours as needed for nausea    Migraine without status migrainosus, not intractable, unspecified migraine type       * order for DME     1 each    Equipment being ordered: cool humidifier    Painful respiration, Upper respiratory tract infection, unspecified type       * order for DME     2 each    Equipment being ordered: bilateral wrist splints    Bilateral carpal tunnel syndrome       * order for DME     1 each    Equipment being ordered: Nebulizer, tubing, and mask    Acute bronchitis with symptoms > 10 days       polyethylene glycol powder    MIRALAX    510 g    Take 17 g (1 capful) by mouth daily    Drug-induced constipation       predniSONE 20 MG tablet    DELTASONE    15 tablet    Take 2 tablets by mouth once daily x 5 days then 1 tablet by mouth once daily x 5 days    Pain in both hands       traZODone 100 MG tablet    DESYREL    90 tablet    Take 1 tablet (100 mg) by mouth At Bedtime    Primary insomnia       triamcinolone 0.1 % ointment    KENALOG    80 g    Apply sparingly to affected area on back two times daily til clear.    Rash       TYLENOL PO      Take 1 tablet by mouth as needed    Chondritis of both external ears       valACYclovir 1000 mg tablet    VALTREX    8 tablet    Take 1 tablet (1,000 mg) by mouth 2 times daily At the onset of skin lesions as needed    Recurrent cold sores       * ZOLMitriptan 5 MG tablet    ZOMIG    12 tablet    TAKE 1 TABLET BY MOUTH AT ONSET OF HEADACHE FOR MIGRAINE. MAY REPEAT DOSE IN 2 HOURS. DO NOT EXCEED 10MG IN 24 HOURS    Migraine without aura and without status migrainosus, not intractable       * ZOLMitriptan 5 MG tablet    ZOMIG    18 tablet    Take 1 tablet (5 mg) by mouth at onset of headache for migraine May repeat in 2 hours. Max 2 tablets/24 hours.    Migraine without aura and without status migrainosus, not intractable       * Notice:  This list has 7  medication(s) that are the same as other medications prescribed for you. Read the directions carefully, and ask your doctor or other care provider to review them with you.

## 2018-04-04 NOTE — TELEPHONE ENCOUNTER
Faxed EMG orders to [facility] at [phone]. Fax confirmed.  Altagracia Eastman Certified Medical Assistant

## 2018-04-04 NOTE — TELEPHONE ENCOUNTER
Lidocaine 5% topical is not covered by plan. PA or medication change needed. Chula Lara MA     Prior Authorization Retail Medication Request    Medication/Dose: Lidocaine  ICD code (if different than what is on RX):    Previously Tried and Failed:    Rationale:      Insurance Name:  [phone]  Insurance ID:  [id number]      Pharmacy Information (if different than what is on RX)  Name:    Phone:

## 2018-04-04 NOTE — PROGRESS NOTES
CHIEF COMPLAINT:   Chief Complaint   Patient presents with     Hand Pain     Bilateral hand pain. Onset: about 3 months. NKI. Pain just came on and has gotten worse. Both hands are about the same. Pain is over the entire hand and fingers and goes up into her arms and shoulders. She has N/T in bilateral hands and fingers. No treatments. She has braces that she uses when sleeping but no relief.      Coretta Tovar is seen today in the [facility] for evaluation of bilateral hand pain, numbness and tingling at the request of Nicole Quiñones PA-C.     HISTORY:  Coretta Tovar is a 47 year old female, right-hand dominant, with bilateral hand pain (L>R). Pain has been present for 3 months with no known injury. She reports pain came on suddenly and has gotten worse. Pain is extreme today, rated an 8/10. She reports pain is over the entire hand and fingers. Pain will radiate into the arms and shoulders. Also reports symptoms of numbness and tingling in bilateral forearms, hands and fingers. She has not had any treatments other than night time braces. These braces provide no relief. She denies any change or increase in activity.     Reports chronic neck issues, history of cervical fusion.    Suspected cause: Due to unknown factors.    Pain severity: 8/10  Pain quality: aching and sharp  Frequency of symptoms: frequently.  Aggravating Factors: with wrist flexion.  Relieving Factors: none.  Previous modalities tried: night time splinting  Prior wrist injury/trauma: none    Usual level of recreational activity: sedentary  Usual level of work activity: unemployed    Orthopedic PMH: bilateral knee osteoarthritis     Other PMH:  has a past medical history of Endometriosis; Nela-nela disease; ITP (idiopathic thrombocytopaenic purpura) (1994); and Migraines.  Patient Active Problem List    Diagnosis Date Noted     History of cocaine dependence (H) 09/07/2017     Priority: Medium     Moderate episode of  recurrent major depressive disorder (H) 03/22/2017     Priority: Medium     Generalized anxiety disorder 03/22/2017     Priority: Medium     Protein-calorie malnutrition (H) 03/22/2017     Priority: Medium     Primary insomnia 03/22/2017     Priority: Medium     Vertigo 03/14/2017     Priority: Medium     Cervical pain 03/08/2017     Priority: Medium     Nutritional disorder 11/21/2016     Priority: Medium     Disorder of stomach 11/20/2016     Priority: Medium     Overview:   Per Biopsy obtained on EGD 11/22/2016. Reactive gastropathy was quoted to be frequently associated with ongoing non-inflammatory type mucosal injury due to a chemical type of injury; this may be due to ingestion of non-steroidal anti-inflammatory drugs, aspirin, excess alcohol, or corticosteroids.        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     Priority: Medium     Chemical dependency (H) 02/24/2015     Priority: Medium     Insomnia 02/24/2015     Priority: Medium     S/P splenectomy 01/13/2015     Priority: Medium     Migraines      Priority: Medium     Idiopathic thrombocytopenic purpura (HCC)      Priority: Medium     s/p splenectomy       Tobacco abuse 05/30/2014     Priority: Medium     Immune thrombocytopenic purpura (H) 01/14/2008     Priority: Medium     Personality disorder 01/14/2008     Priority: Medium     Overview:   cluster B traits  borderline, antisocial       Nondependent cannabis abuse, episodic 01/14/2008     Priority: Medium     Mixed, or nondependent drug abuse 01/26/2007     Priority: Medium     Overview:   recurrent issues with opioids.  History of crack cocaine abuse.       Gastroesophageal reflux disease 12/20/2006     Priority: Medium     Herpetic gingivostomatitis 12/20/2006     Priority: Medium     Muscle pain 12/20/2006     Priority: Medium     Atopic rhinitis 12/20/2006     Priority: Medium     Abnormal weight loss  12/20/2006     Priority: Medium       Surgical Hx:  has a past surgical history that includes splenectomy (1995); Ankle surgery; hernia repair (Left, 1975); Neck surgery (2000's); hysterectomy, pap no longer indicated; and tonsillectomy.    Medications:   Current Outpatient Prescriptions:      predniSONE (DELTASONE) 20 MG tablet, Take 2 tablets by mouth once daily x 5 days then 1 tablet by mouth once daily x 5 days, Disp: 15 tablet, Rfl: 0     Lidocaine 5 % CREA, Externally apply 1 Film topically 4 times daily as needed, Disp: 45 g, Rfl: 3     ZOLMitriptan (ZOMIG) 5 MG tablet, Take 1 tablet (5 mg) by mouth at onset of headache for migraine May repeat in 2 hours. Max 2 tablets/24 hours., Disp: 18 tablet, Rfl: 3     buprenorphine-naloxone (SUBOXONE) 8-2 MG SUBL sublingual tablet, Place 1 tablet under the tongue 2 times daily, Disp: 60 each, Rfl: 0      MG capsule, TAKE 1 CAPSULE BY MOUTH TWICE DAILY AS NEEDED FOR CONSTIPATION, Disp: 60 capsule, Rfl: 5     cloNIDine (CATAPRES) 0.1 MG tablet, Take 1 tablet (0.1 mg) by mouth 2 times daily Prn late withdrawal symptoms., Disp: 60 tablet, Rfl: 1     ipratropium - albuterol 0.5 mg/2.5 mg/3 mL (DUONEB) 0.5-2.5 (3) MG/3ML neb solution, Take 1 vial (3 mLs) by nebulization every 6 hours as needed for shortness of breath / dyspnea or wheezing, Disp: 90 vial, Rfl: 3     Humidifiers (COOL MIST HUMIDIFIER) MISC, 1 Device as needed, Disp: 1 each, Rfl: 0     fluticasone (FLONASE) 50 MCG/ACT spray, Spray 2 sprays into both nostrils daily, Disp: 1 Bottle, Rfl: 11     FLUoxetine (PROZAC) 20 MG capsule, Take 1 capsule (20 mg) by mouth daily Take with 40mg of fluoxetine daily to equal total daily dose of 60 mg per day., Disp: 30 capsule, Rfl: 1     order for DME, Equipment being ordered: bilateral wrist splints, Disp: 2 each, Rfl: 0     order for DME, Equipment being ordered: Nebulizer, tubing, and mask, Disp: 1 each, Rfl: 0     FLUoxetine (PROZAC) 40 MG capsule, TAKE 1 CAPSULE(40  MG) BY MOUTH DAILY. Take with 20mg Fluoxetine by mouth daily to equal total daily dose of 60mg per day., Disp: 30 capsule, Rfl: 3     hydrOXYzine (ATARAX) 25 MG tablet, Take 2 tablets (50 mg) by mouth nightly as needed for anxiety, Disp: 60 tablet, Rfl: 3     mirtazapine (REMERON) 15 MG tablet, Take 1 tablet (15 mg) by mouth At Bedtime Please contact clinic if this makes you too sedated during the day., Disp: 30 tablet, Rfl: 3     omeprazole (PRILOSEC) 40 MG capsule, TAKE 1 CAPSULE(40 MG) BY MOUTH DAILY 30 TO 60 MINUTES BEFORE A MEAL, Disp: 90 capsule, Rfl: 3     ondansetron (ZOFRAN-ODT) 4 MG ODT tab, Take 1-2 tablets (4-8 mg) by mouth every 8 hours as needed for nausea, Disp: 30 tablet, Rfl: 1     BusPIRone HCl 30 MG TABS, Take 30 mg by mouth 2 times daily, Disp: 60 tablet, Rfl: 3     traZODone (DESYREL) 100 MG tablet, Take 1 tablet (100 mg) by mouth At Bedtime, Disp: 90 tablet, Rfl: 1     polyethylene glycol (MIRALAX) powder, Take 17 g (1 capful) by mouth daily, Disp: 510 g, Rfl: 3     ZOLMitriptan (ZOMIG) 5 MG tablet, TAKE 1 TABLET BY MOUTH AT ONSET OF HEADACHE FOR MIGRAINE. MAY REPEAT DOSE IN 2 HOURS. DO NOT EXCEED 10MG IN 24 HOURS, Disp: 12 tablet, Rfl: 4     loratadine (CLARITIN) 10 MG tablet, Take 1 tablet (10 mg) by mouth daily as needed for allergies, Disp: 30 tablet, Rfl: 11     valACYclovir (VALTREX) 1000 mg tablet, Take 1 tablet (1,000 mg) by mouth 2 times daily At the onset of skin lesions as needed, Disp: 8 tablet, Rfl: 11     acyclovir (ZOVIRAX) 5 % ointment, Apply 1 g topically as needed, Disp: 30 g, Rfl: 11     albuterol (PROAIR HFA/PROVENTIL HFA/VENTOLIN HFA) 108 (90 BASE) MCG/ACT Inhaler, Inhale 1-2 puffs into the lungs every 4 hours as needed for shortness of breath / dyspnea or wheezing, Disp: 18 g, Rfl: 3     ISOtretinoin (ACCUTANE PO), , Disp: , Rfl:      Neomycin-Bacitracin-Polymyxin ([employer] TRIPLE ANTIBIOTIC) OINT, Externally apply topically 4 times daily, Disp: 60 g, Rfl: 3     triamcinolone  (KENALOG) 0.1 % ointment, Apply sparingly to affected area on back two times daily til clear., Disp: 80 g, Rfl: 0     olopatadine HCl (PATADAY) 0.2 % SOLN, Place 1 drop into both eyes daily, Disp: 2.5 mL, Rfl: 3     clobetasol (TEMOVATE) 0.05 % ointment, APPLY TOPICALLY BID, Disp: , Rfl: 1     betamethasone valerate (VALISONE) 0.1 % ointment, Apply once a day for 2 weeks as directed, Disp: , Rfl:      hydrocortisone 2.5 % cream, , Disp: , Rfl:      ketoconazole (NIZORAL) 2 % cream, , Disp: , Rfl:      order for DME, Equipment being ordered: cool humidifier, Disp: 1 each, Rfl: 0     Acetaminophen (TYLENOL PO), Take 1 tablet by mouth as needed, Disp: , Rfl:   No current facility-administered medications for this visit.     Facility-Administered Medications Ordered in Other Visits:      Lidocaine 1 % injection 9 mL, 9 mL, Intradermal, Once, Susanna Li RN     sodium bicarbonate 8.4 % injection 1 mEq, 1 mEq, Intradermal, Once, Susanna Li RN    Allergies:   Allergies   Allergen Reactions     Acetaminophen-Codeine Hives     Amoxicillin-Pot Clavulanate Nausea and Vomiting     Augmentin Nausea and Vomiting and Hives     Barbiturates      Bentyl [Dicyclomine] Other (See Comments)     dizziness     Compazine      Lock-jaw     Liquid Adhesive      Other reaction(s): Other, see comments  blisters     No Clinical Screening - See Comments      PN: LW Reaction: blisters  PN: LW FI1: nka  PN: LW CM1: CONTRAST- nka Reaction :     Nsaids      D/t ITP     Prochlorperazine      Other reaction(s): Edema     Propoxyphene N-Apap Hives     Sulfa Drugs      Tramadol Hcl Hives            Adhesive Tape Rash       Social Hx:  reports that she has been smoking Cigarettes.  She has a 3.00 pack-year smoking history. She has never used smokeless tobacco. She reports that she drinks alcohol. She reports that she does not use illicit drugs.    Family Hx: family history includes Anxiety Disorder in her daughter; Breast Cancer in her  "maternal aunt, maternal grandmother, and paternal grandmother; Depression in her daughter; Neurologic Disorder in her brother, maternal grandfather, and maternal grandmother.. Negative for bleeding/clotting disorders. Negative for adverse anesthesia reactions.    REVIEW OF SYSTEMS: 10 point ROS neg other than the symptoms noted above in the HPI and PMH. Notables include  CONSTITUTIONAL:NEGATIVE for fever, chills, change in weight  INTEGUMENTARY/SKIN: NEGATIVE for worrisome rashes, moles or lesions  MUSCULOSKELETAL:See HPI above  Neurology: see HPI above.    This document serves as a record of the services and decisions personally performed and made by Lance Jefferson MD. It was created on his behalf by Justyna Fonseca, a trained medical scribe. The creation of this document is based the provider's statements to the medical scribe.    Timbo Fonseca 11:25 AM 4/4/2018     EXAM:  BP 95/62  Pulse 68  Ht 1.778 m (5' 10\")  Wt 66.5 kg (146 lb 9.6 oz)  BMI 21.03 kg/m2  GENERAL APPEARANCE: healthy, alert and no distress   GAIT: NORMAL  SKIN: no suspicious lesions or rashes  RESPIRATORY: No increased work of breathing.  NEURO:     strength: decreased bilateral.   thenar fasiculations: negative    Thenar atrophy: normal.    Sensation intact in  All fingers,    reflexes normal in upper extremities.   PSYCH:  mentation appears normal and affect normal, not anxious.    MUSCULOSKELETAL:    RIGHT HAND/FINGERS:    Skin intact. No abnormal skin discoloration, erythema or ecchymosis.   Tender to palpation throughout the hand and IP joints.  No nail pitting or clubbing.  Normal wear pattern, color and tone.  No observable or palpable masses of the fingers or palm or wrist.  No palpable triggering of fingers.   No observable or palpable cords or nodules of the fingers or palm.    There is no swelling in the wrist, hand, forearm.  There is moderate tenderness in the wrist.  There is no ecchymosis.  There is no erythema of the " surrounding skin.  There is no maceration of the skin.  There is no deformity in the area.  Intact extensors. No extensor lag.    Special tests wrist:    Tinel's positive,    Phalen's positive    Flexion/compression test positive.     Intact sensation light touch median, radial, ulnar nerves of the hand  Intact sensation to the radial and ulnar digital nerves of the fingers, as well as the finger tips.  Intact epl fpl fdp edc wrist flexion/extension biceps/triceps deltoid  Brisk capillary refill to all fingers.   Palpable radial pulse, 2+.      LEFT HAND/FINGERS:    Skin intact. No abnormal skin discoloration, erythema or ecchymosis.   Tender to palpation throughout the hand and IP joints.  No nail pitting or clubbing.  Normal wear pattern, color and tone.  No observable or palpable masses of the fingers or palm or wrist.  No palpable triggering of fingers.   No observable or palpable cords or nodules of the fingers or palm.    There is no swelling in the wrist, hand, forearm.  There is no tenderness in the wrist.  There is no ecchymosis.  There is no erythema of the surrounding skin.  There is no maceration of the skin.  There is no deformity in the area.  Intact extensors. No extensor lag.    Special tests wrist:    Tinel's positive,    Phalen's positive    Flexion/compression test positive .     Intact sensation light touch median, radial, ulnar nerves of the hand  Intact sensation to the radial and ulnar digital nerves of the fingers, as well as the finger tips.  Intact epl fpl fdp edc wrist flexion/extension biceps/triceps deltoid  Brisk capillary refill to all fingers.   Palpable radial pulse, 2+.      XRAYS: none taken.      ASSESSMENT: 47 year old female with bilateral hand pain, numbness and tingling, possible carpal tunnel syndrome.     PLAN:   * discussed with patient signs and symptoms consistent with carpal tunnel syndrome. Carpal tunnel syndrome is compression or pinching of the median nerve at the  wrist, as it enters the hand. There are many different causes, and in most cases, multifactorial.    * An indepth discussion was had with her about the options for treatment, which included activity modification to avoid aggravating activities, taking breaks during activities that cause symptoms, stretching, NSAIDS to help decrease inflammation and swelling within the carpal tunnel, night splinting, corticosteroid injections, and carpal tunnel release.   * depending upon severity and duration of symptoms, nonoperative treatment is usually initiated, starting with least invasive modalities such as activity modification and a trial of night splints and NSAIDs.  * Cortisone injections are considered to decrease swelling and inflammation within the carpal tunnel and compression of the nerve.   * Lastly, carpal tunnel release should symptoms persist despite trial of nonoperative treatment, or in cases of severe carpal tunnel syndrome.  * risks of surgery, including, but not limited to: bleeding, infection, pain, scar, damage to adjacent structures (nerves, vessels, tendons), temporary versus permanent nerve injury, failure to relieve symptoms, recurrence of symptoms, incomplete release, stiffness, scar sensitivity and tenderness, need for further surgery, risks of anesthesia were discussed.  * in some cases, with severe, prolonged symptoms, or in situations of underlying peripheral neuropathy, there may be permanent nerve changes not amenable to surgery, that even with surgery, may not resolve.    * Reviewed imaging with patient. Also, clinical exam findings.    * Discussed with patient that based on physical exam findings, it is not possible to completely rule out other neurological features other than carpal tunnel syndrome , in particular her neck given sudden onset of symptoms bilateral.    * An EMG of bilateral upper extremities was ordered for further evaluation.     * Rest  * Activity modification - avoid  activities that aggravate symptoms.  * NSAIDS - regular use for inflammation, with food, as long as no contra-indications. Please discuss with pcp if needed.  * Ice twice daily to three times daily.  * Elevation of extremity to reduce swelling  * Tylenol as needed for pain    * After having the EMG, I would like the patient to call me so that we can discuss the results as well as how to proceed.     The information in this document, created by a scribe for me, accurately reflects the services I personally performed and the decisions made by me. I have reviewed and approved this document for accuracy.        Lance Jefferson M.D., M.S.  Dept. of Orthopaedic Surgery  [facility]

## 2018-04-04 NOTE — PATIENT INSTRUCTIONS
SMOKING CESSATION  What's in cigarette smoke? - Cigarette smoke contains over 4,000 chemicals. Nicotine is one of the main ingredients which is an insecticide/herbicide. It is poisonous to our nervous system, increases blood clotting risk, and decreases the body's defenses to fight off infection. Another chemical is Carbon Monoxide is an asphyxiating gas that permanently binds to blood cells and blocks the transport of oxygen. This leads to tissue death and decreases your metabolism. Tar is a chemical that coats your lungs and trachea which impairs new oxygen coming in and carbon dioxide getting out of your body.   How does smoking impact surgery? - Smoking is particularly hazardous with regards to surgery. Surgery puts stress on the body and a smoker's body is already under strain from these chemicals. Putting the two together, especially for an elective surgery, could be a recipe for disaster. Smoking before and after surgery increases your risk of heart problems, slow wound healing, delayed bone healing, blood clots, wound infection and anesthesia complications.   What are the benefits of quitting? - In 20 minutes your blood pressure will drop back down to normal. In 8 hours the carbon monoxide (a toxic gas) levels in your blood stream will drop by half, and oxygen levels will return to normal. In 48 hours your chance of having a heart attack will have decreased. All nicotine will have left your body. Your sense of taste and smell will return to a normal level. In 72 hours your bronchial tubes will relax, and your energy levels will increase. In 2 weeks your circulation will increase, and it will continue to improve for the next 10 weeks.    Recommendations for elective surgery - Ideally, patients should quit smoking 8 weeks before and at least 2 weeks after elective surgery in order to avoid complications. Simply cutting back on the amount of cigarettes smoked per day does not offer any benefit or decrease the  risk of poor wound healing, heart problems, and infection. Smokers should also start taking Vitamin C and B for two weeks before surgery and two weeks after surgery.    Ways to Stop Smokin. Nicotine patches, lozenges, or gum  2. Support Groups  3. Medications (see below)    List of Medications:  1. Varenicline Tartrate (CHANTIX)   2. Bupropion HCL (WELLBUTRIN, ZYBAN) - note: make sure Wellbutrin is for smoking cessation and not other issues   3. Nicotine Patch (NICODERM)   4. Nicotine Inhaler (NICOTROL)   5. Nicotine Gum Nicotine Polacrilex   6. Nicotine Lozenge: Nicotine Polacrilex (COMMIT)   * [place] offers a smoking support group as well!  Please visit: [email or web address]  If you are interested in these, ask about getting a prescription or talk to your primary care doctor about what may be the best way for you to quit.

## 2018-04-06 ENCOUNTER — OFFICE VISIT (OUTPATIENT)
Dept: ORTHOPEDICS | Facility: CLINIC | Age: 48
End: 2018-04-06
Payer: COMMERCIAL

## 2018-04-06 VITALS
BODY MASS INDEX: 21.1 KG/M2 | HEIGHT: 70 IN | WEIGHT: 147.4 LBS | HEART RATE: 67 BPM | SYSTOLIC BLOOD PRESSURE: 102 MMHG | DIASTOLIC BLOOD PRESSURE: 65 MMHG

## 2018-04-06 DIAGNOSIS — M22.40 CHONDROMALACIA OF PATELLA, UNSPECIFIED LATERALITY: Primary | ICD-10-CM

## 2018-04-06 PROCEDURE — 20610 DRAIN/INJ JOINT/BURSA W/O US: CPT | Mod: 50 | Performed by: ORTHOPAEDIC SURGERY

## 2018-04-06 RX ORDER — METHYLPREDNISOLONE ACETATE 80 MG/ML
80 INJECTION, SUSPENSION INTRA-ARTICULAR; INTRALESIONAL; INTRAMUSCULAR; SOFT TISSUE ONCE
Qty: 2 ML | Refills: 0 | OUTPATIENT
Start: 2018-04-06 | End: 2018-04-06

## 2018-04-06 ASSESSMENT — PAIN SCALES - GENERAL: PAINLEVEL: SEVERE PAIN (6)

## 2018-04-06 NOTE — LETTER
4/6/2018         RE: Coretta Tovar  [address] [facility] [address]        Dear Colleague,    Thank you for referring your patient, Coretta Tovar, to the [facility]. Please see a copy of my visit note below.    chief complaint:   Chief Complaint   Patient presents with     RECHECK     Bilateral knee pain. Last cortisone injection: 9/8/17. Injections work good. Her knees just started bothering her again within the last few weeks. She would like more injections today.        HISTORY OF PRESENT ILLNESS    Coretta Tovar is a 47 year old female seen for evaluation of a bilateral anterior knee pain (L>R) that started years ago. She presents today with severe pain, rated a 7/10. Pain is located over the anterior knee, under the kneecaps, and posterior knees. Aggravated with stairs and prolonged walking. She has had injections in the past, last injection being on 9/8/2017. She notes that injections work well for her, lasting ~7 months. Her knee pains started to return a few weeks ago. She has severe pain today, rated a 6/10.  Pain is located diffusely over the anterior knees. Presents today for repeat injections. She is accompanied by her grandson.    Previously followed by Dr. López rosario in [place] to receive injections.      Present symptoms: severe pain.  Anterior, diffuse. No locking, catching or giving way.  The frequency of symptoms frequently.  Pain severity: 6/10  Pain quality: aching and sharp  Exacerbating Factors: weight bearing, stairs, prolonged walking  Relieving Factors: at rest   Night Pain: No  Pain while at rest: No   Patient has tried:     NSAIDS: No      Physical Therapy: No      Activity modification: Yes      Bracing: No      Injections: Yes, 3/23/2017     Ice: No      Other: none      Usual level of recreational activity: sedentary  Usual level of work activity: unemployed    Orthopedic PMH: known bilateral knee osteoarthritis     Other PMH:  has a past medical history of  Endometriosis; Mary-mary disease; ITP (idiopathic thrombocytopaenic purpura) (1994); and Migraines.  Patient Active Problem List    Diagnosis Date Noted     History of cocaine dependence (H) 09/07/2017     Priority: Medium     Moderate episode of recurrent major depressive disorder (H) 03/22/2017     Priority: Medium     Generalized anxiety disorder 03/22/2017     Priority: Medium     Protein-calorie malnutrition (H) 03/22/2017     Priority: Medium     Primary insomnia 03/22/2017     Priority: Medium     Vertigo 03/14/2017     Priority: Medium     Cervical pain 03/08/2017     Priority: Medium     Nutritional disorder 11/21/2016     Priority: Medium     Disorder of stomach 11/20/2016     Priority: Medium     Overview:   Per Biopsy obtained on EGD 11/22/2016. Reactive gastropathy was quoted to be frequently associated with ongoing non-inflammatory type mucosal injury due to a chemical type of injury; this may be due to ingestion of non-steroidal anti-inflammatory drugs, aspirin, excess alcohol, or corticosteroids.        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     Priority: Medium     Chemical dependency (H) 02/24/2015     Priority: Medium     Insomnia 02/24/2015     Priority: Medium     S/P splenectomy 01/13/2015     Priority: Medium     Migraines      Priority: Medium     Idiopathic thrombocytopenic purpura (HCC)      Priority: Medium     s/p splenectomy       Tobacco abuse 05/30/2014     Priority: Medium     Immune thrombocytopenic purpura (H) 01/14/2008     Priority: Medium     Personality disorder 01/14/2008     Priority: Medium     Overview:   cluster B traits  borderline, antisocial       Nondependent cannabis abuse, episodic 01/14/2008     Priority: Medium     Mixed, or nondependent drug abuse 01/26/2007     Priority: Medium     Overview:   recurrent issues with opioids.  History of crack cocaine abuse.        Gastroesophageal reflux disease 12/20/2006     Priority: Medium     Herpetic gingivostomatitis 12/20/2006     Priority: Medium     Muscle pain 12/20/2006     Priority: Medium     Atopic rhinitis 12/20/2006     Priority: Medium     Abnormal weight loss 12/20/2006     Priority: Medium       Surgical Hx:  has a past surgical history that includes splenectomy (1995); Ankle surgery; hernia repair (Left, 1975); Neck surgery (2000's); hysterectomy, pap no longer indicated; and tonsillectomy.    Medications:   Current Outpatient Prescriptions:      predniSONE (DELTASONE) 20 MG tablet, Take 2 tablets by mouth once daily x 5 days then 1 tablet by mouth once daily x 5 days, Disp: 15 tablet, Rfl: 0     Lidocaine 5 % CREA, Externally apply 1 Film topically 4 times daily as needed, Disp: 45 g, Rfl: 3     ZOLMitriptan (ZOMIG) 5 MG tablet, Take 1 tablet (5 mg) by mouth at onset of headache for migraine May repeat in 2 hours. Max 2 tablets/24 hours., Disp: 18 tablet, Rfl: 3     buprenorphine-naloxone (SUBOXONE) 8-2 MG SUBL sublingual tablet, Place 1 tablet under the tongue 2 times daily, Disp: 60 each, Rfl: 0      MG capsule, TAKE 1 CAPSULE BY MOUTH TWICE DAILY AS NEEDED FOR CONSTIPATION, Disp: 60 capsule, Rfl: 5     cloNIDine (CATAPRES) 0.1 MG tablet, Take 1 tablet (0.1 mg) by mouth 2 times daily Prn late withdrawal symptoms., Disp: 60 tablet, Rfl: 1     ipratropium - albuterol 0.5 mg/2.5 mg/3 mL (DUONEB) 0.5-2.5 (3) MG/3ML neb solution, Take 1 vial (3 mLs) by nebulization every 6 hours as needed for shortness of breath / dyspnea or wheezing, Disp: 90 vial, Rfl: 3     Humidifiers (COOL MIST HUMIDIFIER) MISC, 1 Device as needed, Disp: 1 each, Rfl: 0     fluticasone (FLONASE) 50 MCG/ACT spray, Spray 2 sprays into both nostrils daily, Disp: 1 Bottle, Rfl: 11     FLUoxetine (PROZAC) 20 MG capsule, Take 1 capsule (20 mg) by mouth daily Take with 40mg of fluoxetine daily to equal total daily dose of 60 mg per day., Disp: 30  capsule, Rfl: 1     order for DME, Equipment being ordered: bilateral wrist splints, Disp: 2 each, Rfl: 0     order for DME, Equipment being ordered: Nebulizer, tubing, and mask, Disp: 1 each, Rfl: 0     FLUoxetine (PROZAC) 40 MG capsule, TAKE 1 CAPSULE(40 MG) BY MOUTH DAILY. Take with 20mg Fluoxetine by mouth daily to equal total daily dose of 60mg per day., Disp: 30 capsule, Rfl: 3     hydrOXYzine (ATARAX) 25 MG tablet, Take 2 tablets (50 mg) by mouth nightly as needed for anxiety, Disp: 60 tablet, Rfl: 3     mirtazapine (REMERON) 15 MG tablet, Take 1 tablet (15 mg) by mouth At Bedtime Please contact clinic if this makes you too sedated during the day., Disp: 30 tablet, Rfl: 3     omeprazole (PRILOSEC) 40 MG capsule, TAKE 1 CAPSULE(40 MG) BY MOUTH DAILY 30 TO 60 MINUTES BEFORE A MEAL, Disp: 90 capsule, Rfl: 3     ondansetron (ZOFRAN-ODT) 4 MG ODT tab, Take 1-2 tablets (4-8 mg) by mouth every 8 hours as needed for nausea, Disp: 30 tablet, Rfl: 1     BusPIRone HCl 30 MG TABS, Take 30 mg by mouth 2 times daily, Disp: 60 tablet, Rfl: 3     traZODone (DESYREL) 100 MG tablet, Take 1 tablet (100 mg) by mouth At Bedtime, Disp: 90 tablet, Rfl: 1     polyethylene glycol (MIRALAX) powder, Take 17 g (1 capful) by mouth daily, Disp: 510 g, Rfl: 3     ZOLMitriptan (ZOMIG) 5 MG tablet, TAKE 1 TABLET BY MOUTH AT ONSET OF HEADACHE FOR MIGRAINE. MAY REPEAT DOSE IN 2 HOURS. DO NOT EXCEED 10MG IN 24 HOURS, Disp: 12 tablet, Rfl: 4     loratadine (CLARITIN) 10 MG tablet, Take 1 tablet (10 mg) by mouth daily as needed for allergies, Disp: 30 tablet, Rfl: 11     valACYclovir (VALTREX) 1000 mg tablet, Take 1 tablet (1,000 mg) by mouth 2 times daily At the onset of skin lesions as needed, Disp: 8 tablet, Rfl: 11     acyclovir (ZOVIRAX) 5 % ointment, Apply 1 g topically as needed, Disp: 30 g, Rfl: 11     albuterol (PROAIR HFA/PROVENTIL HFA/VENTOLIN HFA) 108 (90 BASE) MCG/ACT Inhaler, Inhale 1-2 puffs into the lungs every 4 hours as needed  for shortness of breath / dyspnea or wheezing, Disp: 18 g, Rfl: 3     ISOtretinoin (ACCUTANE PO), , Disp: , Rfl:      Neomycin-Bacitracin-Polymyxin ([employer] TRIPLE ANTIBIOTIC) OINT, Externally apply topically 4 times daily, Disp: 60 g, Rfl: 3     triamcinolone (KENALOG) 0.1 % ointment, Apply sparingly to affected area on back two times daily til clear., Disp: 80 g, Rfl: 0     olopatadine HCl (PATADAY) 0.2 % SOLN, Place 1 drop into both eyes daily, Disp: 2.5 mL, Rfl: 3     clobetasol (TEMOVATE) 0.05 % ointment, APPLY TOPICALLY BID, Disp: , Rfl: 1     betamethasone valerate (VALISONE) 0.1 % ointment, Apply once a day for 2 weeks as directed, Disp: , Rfl:      hydrocortisone 2.5 % cream, , Disp: , Rfl:      ketoconazole (NIZORAL) 2 % cream, , Disp: , Rfl:      order for DME, Equipment being ordered: cool humidifier, Disp: 1 each, Rfl: 0     Acetaminophen (TYLENOL PO), Take 1 tablet by mouth as needed, Disp: , Rfl:   No current facility-administered medications for this visit.     Facility-Administered Medications Ordered in Other Visits:      Lidocaine 1 % injection 9 mL, 9 mL, Intradermal, Once, Susanna Li RN     sodium bicarbonate 8.4 % injection 1 mEq, 1 mEq, Intradermal, Once, Susanna Li RN    Allergies:   Allergies   Allergen Reactions     Acetaminophen-Codeine Hives     Amoxicillin-Pot Clavulanate Nausea and Vomiting     Augmentin Nausea and Vomiting and Hives     Barbiturates      Bentyl [Dicyclomine] Other (See Comments)     dizziness     Compazine      Lock-jaw     Liquid Adhesive      Other reaction(s): Other, see comments  blisters     No Clinical Screening - See Comments      PN: LW Reaction: blisters  PN: LW FI1: nka  PN: LW CM1: CONTRAST- nka Reaction :     Nsaids      D/t ITP     Prochlorperazine      Other reaction(s): Edema     Propoxyphene N-Apap Hives     Sulfa Drugs      Tramadol Hcl Hives            Adhesive Tape Rash       Social Hx: unemployed.  reports that she has been smoking  "Cigarettes.  She has a 3.00 pack-year smoking history. She has never used smokeless tobacco. She reports that she drinks alcohol. She reports that she does not use illicit drugs.    Family Hx: family history includes Anxiety Disorder in her daughter; Breast Cancer in her maternal aunt, maternal grandmother, and paternal grandmother; Depression in her daughter; Neurologic Disorder in her brother, maternal grandfather, and maternal grandmother.    REVIEW OF SYSTEMS:   CONSTITUTIONAL:NEGATIVE for fever, chills, change in weight  INTEGUMENTARY/SKIN: NEGATIVE for worrisome rashes, moles or lesions  MUSCULOSKELETAL:See HPI above  NEURO: NEGATIVE for weakness, dizziness or paresthesias    This document serves as a record of the services and decisions personally performed and made by Lance Jefferson MD. It was created on his behalf by Justyna Fonseca, a trained medical scribe. The creation of this document is based the provider's statements to the medical scribe.    Scribe Justyna Fonseca 11:08 AM 4/6/2018      PHYSICAL EXAM:  /65  Pulse 67  Ht 1.778 m (5' 10\")  Wt 66.9 kg (147 lb 6.4 oz)  BMI 21.15 kg/m2   GENERAL APPEARANCE: healthy, alert, no distress; accompanied by grandson  SKIN: no suspicious lesions or rashes  NEURO: Normal strength and tone, mentation intact and speech normal  PSYCH:  mentation appears normal and affect normal, not anxious  RESPIRATORY: No increased work of breathing.    BILATERAL LOWER EXTREMITIES:  Gait: normal  Alignment: varus.  No gross deformities or masses.  Bilateral Quad atrophy, strength normal.  Intact sensation deep peroneal nerve, superficial peroneal nerve, med/lat tibial nerve, sural nerve, saphenous nerve  Intact EHL, EDL, TA, FHL, GS, quadriceps hamstrings and hip flexors  Toes warm and well perfused, brisk capillary refill. Palpable 2+ dp pulses.  Bilateral calf soft and nttp or squeeze.  Edema: none    LEFT KNEE EXAM:    Skin: intact, no ecchymosis or erythema  Squat: 25 %, with " anterior knee pain     ROM: 0 extension to 145 flexion  Tight hamstrings on straight leg raise.  Effusion: none  Tender: hypersensitivity to light touch along med/lat joint line, anterior knee diffuse tenderness.  McMurrays: negative    Valgus stress/MCL: stable, and non-painful at both 0 and 30 degrees knee flexion  Varus stress: stable, and non-painful at both 0 and 30 degrees knee flexion  Lachmans: neg, firm endpoint  Posterior Drawer stable  Patellofemoral joint:                Extensor Lag: none              Q Angle: normal              Patellar Mobility: normal              Apprehension: negative              Crepitations: minimal   Grind: positive    RIGHT KNEE EXAM:    Skin: intact, no ecchymosis or erythema  Squat: 25 %, with anterior knee pain    ROM: 0 extension to 145 flexion  Tight hamstrings on straight leg raise.  Effusion: trace  Tender: hypersensitivity to light touch along med/lat joint line, anterior knee, diffuse tenderness.  McMurrays: negative    Valgus stress/MCL: stable, and non-painful at both 0 and 30 degrees knee flexion  Varus stress: stable, and non-painful at both 0 and 30 degrees knee flexion  Lachmans: neg, firm endpoint  Posterior Drawer stable  Patellofemoral joint:                Extensor Lag: none              Q Angle: normal              Patellar Mobility: normal              Apprehension: negative              Crepitations: minimal   Grind: positive    X-RAY:  no new images today.    3 views bilateral knee from 9/8/2017 were reviewed in clinic today. On my review, no obvious fractures or dislocations. Mild lateral patello-femoral joint space narrowing bilateral, right more than the left.      Impression:  47 year old female with chronic bilateral anterior knee pain, patello-femoral chondromalacia.    Plan:    * reviewed imaging studies with patient  * exam, history consistent with patello-femoral chondromalacia, anterior knee pain.      * rest, sitting  * Activity modification  "- avoid impact activities or activities that aggravate symptoms.  * NSAIDS (non-steroidal anti-inflammatory medications; e.g. Aleve, advil, motrin, ibuprofen) - regular use for inflammation ( twice daily or three times daily), with food, as long as no contra-indications Please discuss with primary care doctor if needed  * ice, 15-20 minutes at a time several times a day or as needed.  * Strengthening of quadriceps muscles  * Physical Therapy for strengthening, stretching and range of motion exercises of legs  * Tylenol as needed for pain, consider Tylenol arthritis or similar  * Weight loss: Weight loss:  Body mass index is 21.15 kg/(m^2).. weight loss benefits, not only for the current pain symptoms, but also overall health. Recommend a good diet plan that works for the patient, with the assistance of a dietician or primary care doctor as needed. Also, a good, low-impact exercise program for at least 20 minutes per day, 3 times per week, such as exercise bike, elliptical , or pool.  * Exercise: low impact such as stationary bike, elliptical, pool.  * Injections: cortisone versus viscosupplementation (hyaluronic acid, \"rooster comb\", \"gel shots\"); risks and perceived benefits discussed today. Patient elects to proceed.  * Bracing: bracing the knee may offer some relief of symptoms when worn and provide some stability.  * over the counter supplements such as glucosamine and chondroitin sulfate may help with joint pain.  * topical ointments may help as well    * return to clinic as needed.    PROCEDURE NOTE:  The risks, perceived benefits and potential complications (including but not limited to: bleeding, infection, pain, scar, damage to adjacent structures, atrophy or necrosis of soft tissue, skin blanching, failure to relieve symptoms, worsening of symptoms, allergic reaction) of injection were discussed with the patient. Questions were addressed and answered.The patient elected to proceed. Written informed " consent was obtained. The correct procedural site was identified and confirmed. A RIGHT knee intraarticular injection was performed using 1mL Depo Medrol 80mg per mL and 7mL (4mL 1% lidocaine, 3mL 0.25% marcaine)  of local anesthetic after sterile prep, to the correct procedural site. Sterile bandaid applied. This was tolerated well by the patient. No apparent complications. Did also discuss that if diabetic, recommend close monitoring of blood sugars over the next week as cortisone injections can temporarily elevate blood sugars.    The risks, perceived benefits and potential complications (including but not limited to: bleeding, infection, pain, scar, damage to adjacent structures, atrophy or necrosis of soft tissue, skin blanching, failure to relieve symptoms, worsening of symptoms, allergic reaction) of injection were discussed with the patient. Questions were addressed and answered.The patient elected to proceed. Written informed consent was obtained. The correct procedural site was identified and confirmed. A LEFT knee intraarticular injection was performed using 1mL Depo Medrol 80mg per mL and 7mL (4mL 1% lidocaine, 3mL 0.25% marcaine)  of local anesthetic after sterile prep, to the correct procedural site. Sterile bandaid applied. This was tolerated well by the patient. No apparent complications. Did also discuss that if diabetic, recommend close monitoring of blood sugars over the next week as cortisone injections can temporarily elevate blood sugars.       The information in this document, created by a scribe for me, accurately reflects the services I personally performed and the decisions made by me. I have reviewed and approved this document for accuracy.      Lance Jefferson M.D., M.S.  Dept. of Orthopaedic Surgery  [facility]      The patient's Bilateral knees were prepped with betadine solution after verification of allergies. Area approximately 10 cm x 10 cm prepped in a sterile  fashion. After injection, betadine removed with soap and water and band-aids applied.    4cc Lidocaine 1% NDC 7298-0131-89, LOT [id number],  2019  3cc Bupivacaine 0.25% NDC 0990-0797-24, LOT [id number],  2018  1cc Depo Medrol 80 mg/ml NDC 8073-3163-78, LOT [id number],  2020  injected into patient's Bilateral Knees by:  Ollie Brown PA-C, CAQ (Ortho)  Supervising Physician: Lance Jefferson M.D., M.S.  Dept. of Orthopaedic Surgery  [facility]              Again, thank you for allowing me to participate in the care of your patient.        Sincerely,        Lance Jefferson MD

## 2018-04-06 NOTE — MR AVS SNAPSHOT
"              After Visit Summary   4/6/2018    Coretta Tovar    MRN: [id number]           Patient Information     Date Of Birth          1970        Visit Information        Provider Department      4/6/2018 11:00 AM Lance Jefferson MD [facility]        Today's Diagnoses     Chondromalacia of patella, unspecified laterality    -  1       Follow-ups after your visit        Follow-up notes from your care team     Return if symptoms worsen or fail to improve.      Your next 10 appointments already scheduled     Apr 11, 2018  9:20 AM CDT   Return Visit with Jessy Brenner MD   [facility] ([facility] [place])    [address]  [address]  [facility] [address] [phone]              Who to contact     If you have questions or need follow up information about today's clinic visit or your schedule please contact [facility] directly at [phone].  Normal or non-critical lab and imaging results will be communicated to you by MyChart, letter or phone within 4 business days after the clinic has received the results. If you do not hear from us within 7 days, please contact the clinic through [employer] or phone. If you have a critical or abnormal lab result, we will notify you by phone as soon as possible.  Submit refill requests through [employer] or call your pharmacy and they will forward the refill request to us. Please allow 3 business days for your refill to be completed.          Additional Information About Your Visit        [employer] Information     [employer] lets you send messages to your doctor, view your test results, renew your prescriptions, schedule appointments and more. To sign up, go to [email or web address] . Click on \"Log in\" on the left side of the screen, which will take you to the Welcome page. Then click on \"Sign up Now\" on the right side of the page.     You will be asked to enter the access code " "listed below, as well as some personal information. Please follow the directions to create your username and password.     Your access code is: [email or web address]  Expires: 2018  6:40 PM     Your access code will  in 90 days. If you need help or a new code, please call your [facility] or [phone].        Care EveryWhere ID     This is your Care EveryWhere ID. This could be used by other organizations to access your [place] medical records  [id number]        Your Vitals Were     Pulse Height BMI (Body Mass Index)             67 5' 10\" (1.778 m) 21.15 kg/m2          Blood Pressure from Last 3 Encounters:   18 102/65   18 95/62   18 115/76    Weight from Last 3 Encounters:   18 147 lb 6.4 oz (66.9 kg)   18 146 lb 9.6 oz (66.5 kg)   18 145 lb (65.8 kg)              We Performed the Following     DRAIN/INJECT LARGE JOINT/BURSA     METHYLPREDNISOLONE 80 MG INJ          Today's Medication Changes          These changes are accurate as of 18 11:30 AM.  If you have any questions, ask your nurse or doctor.               Start taking these medicines.        Dose/Directions    methylPREDNISolone acetate 80 MG/ML injection   Commonly known as:  DEPO-MEDROL   Used for:  Chondromalacia of patella, unspecified laterality   Started by:  Lance Jefferson MD        Dose:  80 mg   1 mL (80 mg) by INTRA-ARTICULAR route once for 1 dose   Quantity:  2 mL   Refills:  0            Where to get your medicines      Some of these will need a paper prescription and others can be bought over the counter.  Ask your nurse if you have questions.     You don't need a prescription for these medications     methylPREDNISolone acetate 80 MG/ML injection                Primary Care Provider Office Phone # Fax #    NINOSKA Mitchell [facility] [phone] [address]  [place] [address]        Equal Access to Services     ARELY JASON AH: Andrea coleman " Jeniferadama, tawandada luqadaha, qaybta kaalsera stevenson, pelon tinajeropaul gold. So [facility] [phone].    ATENCIÓN: Si rhett edwards, tiene a roberts disposición servicios gratuitos de asistencia lingüística. Sravanthi al [phone].    We comply with applicable federal civil rights laws and [place] laws. We do not discriminate on the basis of race, color, national origin, age, disability, sex, sexual orientation, or gender identity.            Thank you!     Thank you for choosing [facility]  for your care. Our goal is always to provide you with excellent care. Hearing back from our patients is one way we can continue to improve our services. Please take a few minutes to complete the written survey that you may receive in the mail after your visit with us. Thank you!             Your Updated Medication List - Protect others around you: Learn how to safely use, store and throw away your medicines at www.disposemymeds.org.          This list is accurate as of 4/6/18 11:30 AM.  Always use your most recent med list.                   Brand Name Dispense Instructions for use Diagnosis    ACCUTANE PO           acyclovir 5 % ointment    ZOVIRAX    30 g    Apply 1 g topically as needed    Recurrent cold sores       albuterol 108 (90 BASE) MCG/ACT Inhaler    PROAIR HFA/PROVENTIL HFA/VENTOLIN HFA    18 g    Inhale 1-2 puffs into the lungs every 4 hours as needed for shortness of breath / dyspnea or wheezing    Mild intermittent reactive airway disease without complication       betamethasone valerate 0.1 % ointment    VALISONE     Apply once a day for 2 weeks as directed        buprenorphine-naloxone 8-2 MG Subl sublingual tablet    SUBOXONE    60 each    Place 1 tablet under the tongue 2 times daily    Opioid use disorder, severe, dependence (H)       BusPIRone HCl 30 MG Tabs     60 tablet    Take 30 mg by mouth 2 times daily    Generalized anxiety disorder       clobetasol 0.05 % ointment     TEMOVATE     APPLY TOPICALLY BID        cloNIDine 0.1 MG tablet    CATAPRES    60 tablet    Take 1 tablet (0.1 mg) by mouth 2 times daily Prn late withdrawal symptoms.    Opioid use disorder, severe, dependence (H)       Cool Mist Humidifier Misc     1 each    1 Device as needed    Opioid use disorder, severe, dependence (H)       [employer] TRIPLE ANTIBIOTIC Oint     60 g    Externally apply topically 4 times daily    Allergic contact dermatitis due to adhesives        MG capsule   Generic drug:  docusate sodium     60 capsule    TAKE 1 CAPSULE BY MOUTH TWICE DAILY AS NEEDED FOR CONSTIPATION    Gastroesophageal reflux disease, esophagitis presence not specified       * FLUoxetine 40 MG capsule    PROzac    30 capsule    TAKE 1 CAPSULE(40 MG) BY MOUTH DAILY. Take with 20mg Fluoxetine by mouth daily to equal total daily dose of 60mg per day.    Moderate episode of recurrent major depressive disorder (H), Generalized anxiety disorder       * FLUoxetine 20 MG capsule    PROzac    30 capsule    Take 1 capsule (20 mg) by mouth daily Take with 40mg of fluoxetine daily to equal total daily dose of 60 mg per day.    Moderate episode of recurrent major depressive disorder (H), Generalized anxiety disorder       fluticasone 50 MCG/ACT spray    FLONASE    1 Bottle    Spray 2 sprays into both nostrils daily    Opioid use disorder, severe, dependence (H)       hydrocortisone 2.5 % cream           hydrOXYzine 25 MG tablet    ATARAX    60 tablet    Take 2 tablets (50 mg) by mouth nightly as needed for anxiety    Generalized anxiety disorder       ipratropium - albuterol 0.5 mg/2.5 mg/3 mL 0.5-2.5 (3) MG/3ML neb solution    DUONEB    90 vial    Take 1 vial (3 mLs) by nebulization every 6 hours as needed for shortness of breath / dyspnea or wheezing    Acute bronchitis with symptoms > 10 days       ketoconazole 2 % cream    NIZORAL          Lidocaine 5 % Crea     45 g    Externally apply 1 Film topically 4 times daily as needed     Bilateral carpal tunnel syndrome       loratadine 10 MG tablet    CLARITIN    30 tablet    Take 1 tablet (10 mg) by mouth daily as needed for allergies    Chronic nonseasonal allergic rhinitis due to pollen       methylPREDNISolone acetate 80 MG/ML injection    DEPO-MEDROL    2 mL    1 mL (80 mg) by INTRA-ARTICULAR route once for 1 dose    Chondromalacia of patella, unspecified laterality       mirtazapine 15 MG tablet    REMERON    30 tablet    Take 1 tablet (15 mg) by mouth At Bedtime Please contact clinic if this makes you too sedated during the day.    Primary insomnia       olopatadine HCl 0.2 % Soln    PATADAY    2.5 mL    Place 1 drop into both eyes daily    Seasonal allergic rhinitis, unspecified allergic rhinitis trigger       omeprazole 40 MG capsule    priLOSEC    90 capsule    TAKE 1 CAPSULE(40 MG) BY MOUTH DAILY 30 TO 60 MINUTES BEFORE A MEAL    Gastroesophageal reflux disease, esophagitis presence not specified       ondansetron 4 MG ODT tab    ZOFRAN-ODT    30 tablet    Take 1-2 tablets (4-8 mg) by mouth every 8 hours as needed for nausea    Migraine without status migrainosus, not intractable, unspecified migraine type       * order for DME     1 each    Equipment being ordered: cool humidifier    Painful respiration, Upper respiratory tract infection, unspecified type       * order for DME     2 each    Equipment being ordered: bilateral wrist splints    Bilateral carpal tunnel syndrome       * order for DME     1 each    Equipment being ordered: Nebulizer, tubing, and mask    Acute bronchitis with symptoms > 10 days       polyethylene glycol powder    MIRALAX    510 g    Take 17 g (1 capful) by mouth daily    Drug-induced constipation       predniSONE 20 MG tablet    DELTASONE    15 tablet    Take 2 tablets by mouth once daily x 5 days then 1 tablet by mouth once daily x 5 days    Pain in both hands       traZODone 100 MG tablet    DESYREL    90 tablet    Take 1 tablet (100 mg) by mouth At Bedtime     Primary insomnia       triamcinolone 0.1 % ointment    KENALOG    80 g    Apply sparingly to affected area on back two times daily til clear.    Rash       TYLENOL PO      Take 1 tablet by mouth as needed    Chondritis of both external ears       valACYclovir 1000 mg tablet    VALTREX    8 tablet    Take 1 tablet (1,000 mg) by mouth 2 times daily At the onset of skin lesions as needed    Recurrent cold sores       * ZOLMitriptan 5 MG tablet    ZOMIG    12 tablet    TAKE 1 TABLET BY MOUTH AT ONSET OF HEADACHE FOR MIGRAINE. MAY REPEAT DOSE IN 2 HOURS. DO NOT EXCEED 10MG IN 24 HOURS    Migraine without aura and without status migrainosus, not intractable       * ZOLMitriptan 5 MG tablet    ZOMIG    18 tablet    Take 1 tablet (5 mg) by mouth at onset of headache for migraine May repeat in 2 hours. Max 2 tablets/24 hours.    Migraine without aura and without status migrainosus, not intractable       * Notice:  This list has 7 medication(s) that are the same as other medications prescribed for you. Read the directions carefully, and ask your doctor or other care provider to review them with you.

## 2018-04-06 NOTE — PROGRESS NOTES
The patient's Bilateral knees were prepped with betadine solution after verification of allergies. Area approximately 10 cm x 10 cm prepped in a sterile fashion. After injection, betadine removed with soap and water and band-aids applied.    4cc Lidocaine 1% NDC 1348-4983-25, LOT [id number],  2019  3cc Bupivacaine 0.25% NDC 2248-0061-89, LOT [id number],  2018  1cc Depo Medrol 80 mg/ml NDC 5212-4005-50, LOT [id number],  2020  injected into patient's Bilateral Knees by:  Ollie Brown PA-C, CAQ (Ortho)  Supervising Physician: Lance Jefferson M.D., M.S.  Dept. of Orthopaedic Surgery  [facility]

## 2018-04-06 NOTE — PROGRESS NOTES
chief complaint:   Chief Complaint   Patient presents with     RECHECK     Bilateral knee pain. Last cortisone injection: 9/8/17. Injections work good. Her knees just started bothering her again within the last few weeks. She would like more injections today.        HISTORY OF PRESENT ILLNESS    Coretta Tovar is a 47 year old female seen for evaluation of a bilateral anterior knee pain (L>R) that started years ago. She presents today with severe pain, rated a 7/10. Pain is located over the anterior knee, under the kneecaps, and posterior knees. Aggravated with stairs and prolonged walking. She has had injections in the past, last injection being on 9/8/2017. She notes that injections work well for her, lasting ~7 months. Her knee pains started to return a few weeks ago. She has severe pain today, rated a 6/10.  Pain is located diffusely over the anterior knees. Presents today for repeat injections. She is accompanied by her grandson.    Previously followed by Dr. López rosario in [place] to receive injections.      Present symptoms: severe pain.  Anterior, diffuse. No locking, catching or giving way.  The frequency of symptoms frequently.  Pain severity: 6/10  Pain quality: aching and sharp  Exacerbating Factors: weight bearing, stairs, prolonged walking  Relieving Factors: at rest   Night Pain: No  Pain while at rest: No   Patient has tried:     NSAIDS: No      Physical Therapy: No      Activity modification: Yes      Bracing: No      Injections: Yes, 3/23/2017     Ice: No      Other: none      Usual level of recreational activity: sedentary  Usual level of work activity: unemployed    Orthopedic PMH: known bilateral knee osteoarthritis     Other PMH:  has a past medical history of Endometriosis; Nela-nela disease; ITP (idiopathic thrombocytopaenic purpura) (1994); and Migraines.  Patient Active Problem List    Diagnosis Date Noted     History of cocaine dependence (H) 09/07/2017     Priority: Medium      Moderate episode of recurrent major depressive disorder (H) 03/22/2017     Priority: Medium     Generalized anxiety disorder 03/22/2017     Priority: Medium     Protein-calorie malnutrition (H) 03/22/2017     Priority: Medium     Primary insomnia 03/22/2017     Priority: Medium     Vertigo 03/14/2017     Priority: Medium     Cervical pain 03/08/2017     Priority: Medium     Nutritional disorder 11/21/2016     Priority: Medium     Disorder of stomach 11/20/2016     Priority: Medium     Overview:   Per Biopsy obtained on EGD 11/22/2016. Reactive gastropathy was quoted to be frequently associated with ongoing non-inflammatory type mucosal injury due to a chemical type of injury; this may be due to ingestion of non-steroidal anti-inflammatory drugs, aspirin, excess alcohol, or corticosteroids.        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     Priority: Medium     Chemical dependency (H) 02/24/2015     Priority: Medium     Insomnia 02/24/2015     Priority: Medium     S/P splenectomy 01/13/2015     Priority: Medium     Migraines      Priority: Medium     Idiopathic thrombocytopenic purpura (HCC)      Priority: Medium     s/p splenectomy       Tobacco abuse 05/30/2014     Priority: Medium     Immune thrombocytopenic purpura (H) 01/14/2008     Priority: Medium     Personality disorder 01/14/2008     Priority: Medium     Overview:   cluster B traits  borderline, antisocial       Nondependent cannabis abuse, episodic 01/14/2008     Priority: Medium     Mixed, or nondependent drug abuse 01/26/2007     Priority: Medium     Overview:   recurrent issues with opioids.  History of crack cocaine abuse.       Gastroesophageal reflux disease 12/20/2006     Priority: Medium     Herpetic gingivostomatitis 12/20/2006     Priority: Medium     Muscle pain 12/20/2006     Priority: Medium     Atopic rhinitis 12/20/2006     Priority: Medium      Abnormal weight loss 12/20/2006     Priority: Medium       Surgical Hx:  has a past surgical history that includes splenectomy (1995); Ankle surgery; hernia repair (Left, 1975); Neck surgery (2000's); hysterectomy, pap no longer indicated; and tonsillectomy.    Medications:   Current Outpatient Prescriptions:      predniSONE (DELTASONE) 20 MG tablet, Take 2 tablets by mouth once daily x 5 days then 1 tablet by mouth once daily x 5 days, Disp: 15 tablet, Rfl: 0     Lidocaine 5 % CREA, Externally apply 1 Film topically 4 times daily as needed, Disp: 45 g, Rfl: 3     ZOLMitriptan (ZOMIG) 5 MG tablet, Take 1 tablet (5 mg) by mouth at onset of headache for migraine May repeat in 2 hours. Max 2 tablets/24 hours., Disp: 18 tablet, Rfl: 3     buprenorphine-naloxone (SUBOXONE) 8-2 MG SUBL sublingual tablet, Place 1 tablet under the tongue 2 times daily, Disp: 60 each, Rfl: 0      MG capsule, TAKE 1 CAPSULE BY MOUTH TWICE DAILY AS NEEDED FOR CONSTIPATION, Disp: 60 capsule, Rfl: 5     cloNIDine (CATAPRES) 0.1 MG tablet, Take 1 tablet (0.1 mg) by mouth 2 times daily Prn late withdrawal symptoms., Disp: 60 tablet, Rfl: 1     ipratropium - albuterol 0.5 mg/2.5 mg/3 mL (DUONEB) 0.5-2.5 (3) MG/3ML neb solution, Take 1 vial (3 mLs) by nebulization every 6 hours as needed for shortness of breath / dyspnea or wheezing, Disp: 90 vial, Rfl: 3     Humidifiers (COOL MIST HUMIDIFIER) MISC, 1 Device as needed, Disp: 1 each, Rfl: 0     fluticasone (FLONASE) 50 MCG/ACT spray, Spray 2 sprays into both nostrils daily, Disp: 1 Bottle, Rfl: 11     FLUoxetine (PROZAC) 20 MG capsule, Take 1 capsule (20 mg) by mouth daily Take with 40mg of fluoxetine daily to equal total daily dose of 60 mg per day., Disp: 30 capsule, Rfl: 1     order for DME, Equipment being ordered: bilateral wrist splints, Disp: 2 each, Rfl: 0     order for DME, Equipment being ordered: Nebulizer, tubing, and mask, Disp: 1 each, Rfl: 0     FLUoxetine (PROZAC) 40 MG  capsule, TAKE 1 CAPSULE(40 MG) BY MOUTH DAILY. Take with 20mg Fluoxetine by mouth daily to equal total daily dose of 60mg per day., Disp: 30 capsule, Rfl: 3     hydrOXYzine (ATARAX) 25 MG tablet, Take 2 tablets (50 mg) by mouth nightly as needed for anxiety, Disp: 60 tablet, Rfl: 3     mirtazapine (REMERON) 15 MG tablet, Take 1 tablet (15 mg) by mouth At Bedtime Please contact clinic if this makes you too sedated during the day., Disp: 30 tablet, Rfl: 3     omeprazole (PRILOSEC) 40 MG capsule, TAKE 1 CAPSULE(40 MG) BY MOUTH DAILY 30 TO 60 MINUTES BEFORE A MEAL, Disp: 90 capsule, Rfl: 3     ondansetron (ZOFRAN-ODT) 4 MG ODT tab, Take 1-2 tablets (4-8 mg) by mouth every 8 hours as needed for nausea, Disp: 30 tablet, Rfl: 1     BusPIRone HCl 30 MG TABS, Take 30 mg by mouth 2 times daily, Disp: 60 tablet, Rfl: 3     traZODone (DESYREL) 100 MG tablet, Take 1 tablet (100 mg) by mouth At Bedtime, Disp: 90 tablet, Rfl: 1     polyethylene glycol (MIRALAX) powder, Take 17 g (1 capful) by mouth daily, Disp: 510 g, Rfl: 3     ZOLMitriptan (ZOMIG) 5 MG tablet, TAKE 1 TABLET BY MOUTH AT ONSET OF HEADACHE FOR MIGRAINE. MAY REPEAT DOSE IN 2 HOURS. DO NOT EXCEED 10MG IN 24 HOURS, Disp: 12 tablet, Rfl: 4     loratadine (CLARITIN) 10 MG tablet, Take 1 tablet (10 mg) by mouth daily as needed for allergies, Disp: 30 tablet, Rfl: 11     valACYclovir (VALTREX) 1000 mg tablet, Take 1 tablet (1,000 mg) by mouth 2 times daily At the onset of skin lesions as needed, Disp: 8 tablet, Rfl: 11     acyclovir (ZOVIRAX) 5 % ointment, Apply 1 g topically as needed, Disp: 30 g, Rfl: 11     albuterol (PROAIR HFA/PROVENTIL HFA/VENTOLIN HFA) 108 (90 BASE) MCG/ACT Inhaler, Inhale 1-2 puffs into the lungs every 4 hours as needed for shortness of breath / dyspnea or wheezing, Disp: 18 g, Rfl: 3     ISOtretinoin (ACCUTANE PO), , Disp: , Rfl:      Neomycin-Bacitracin-Polymyxin ([employer] TRIPLE ANTIBIOTIC) OINT, Externally apply topically 4 times daily, Disp: 60 g,  Rfl: 3     triamcinolone (KENALOG) 0.1 % ointment, Apply sparingly to affected area on back two times daily til clear., Disp: 80 g, Rfl: 0     olopatadine HCl (PATADAY) 0.2 % SOLN, Place 1 drop into both eyes daily, Disp: 2.5 mL, Rfl: 3     clobetasol (TEMOVATE) 0.05 % ointment, APPLY TOPICALLY BID, Disp: , Rfl: 1     betamethasone valerate (VALISONE) 0.1 % ointment, Apply once a day for 2 weeks as directed, Disp: , Rfl:      hydrocortisone 2.5 % cream, , Disp: , Rfl:      ketoconazole (NIZORAL) 2 % cream, , Disp: , Rfl:      order for DME, Equipment being ordered: cool humidifier, Disp: 1 each, Rfl: 0     Acetaminophen (TYLENOL PO), Take 1 tablet by mouth as needed, Disp: , Rfl:   No current facility-administered medications for this visit.     Facility-Administered Medications Ordered in Other Visits:      Lidocaine 1 % injection 9 mL, 9 mL, Intradermal, Once, Susanna Li RN     sodium bicarbonate 8.4 % injection 1 mEq, 1 mEq, Intradermal, Once, Susanna Li RN    Allergies:   Allergies   Allergen Reactions     Acetaminophen-Codeine Hives     Amoxicillin-Pot Clavulanate Nausea and Vomiting     Augmentin Nausea and Vomiting and Hives     Barbiturates      Bentyl [Dicyclomine] Other (See Comments)     dizziness     Compazine      Lock-jaw     Liquid Adhesive      Other reaction(s): Other, see comments  blisters     No Clinical Screening - See Comments      PN: LW Reaction: blisters  PN: LW FI1: nka  PN: LW CM1: CONTRAST- nka Reaction :     Nsaids      D/t ITP     Prochlorperazine      Other reaction(s): Edema     Propoxyphene N-Apap Hives     Sulfa Drugs      Tramadol Hcl Hives            Adhesive Tape Rash       Social Hx: unemployed.  reports that she has been smoking Cigarettes.  She has a 3.00 pack-year smoking history. She has never used smokeless tobacco. She reports that she drinks alcohol. She reports that she does not use illicit drugs.    Family Hx: family history includes Anxiety Disorder  "in her daughter; Breast Cancer in her maternal aunt, maternal grandmother, and paternal grandmother; Depression in her daughter; Neurologic Disorder in her brother, maternal grandfather, and maternal grandmother.    REVIEW OF SYSTEMS:   CONSTITUTIONAL:NEGATIVE for fever, chills, change in weight  INTEGUMENTARY/SKIN: NEGATIVE for worrisome rashes, moles or lesions  MUSCULOSKELETAL:See HPI above  NEURO: NEGATIVE for weakness, dizziness or paresthesias    This document serves as a record of the services and decisions personally performed and made by Lance Jefferson MD. It was created on his behalf by Justyna Fonseca, a trained medical scribe. The creation of this document is based the provider's statements to the medical scribe.    Scribe Justyna Fonseca 11:08 AM 4/6/2018      PHYSICAL EXAM:  /65  Pulse 67  Ht 1.778 m (5' 10\")  Wt 66.9 kg (147 lb 6.4 oz)  BMI 21.15 kg/m2   GENERAL APPEARANCE: healthy, alert, no distress; accompanied by grandson  SKIN: no suspicious lesions or rashes  NEURO: Normal strength and tone, mentation intact and speech normal  PSYCH:  mentation appears normal and affect normal, not anxious  RESPIRATORY: No increased work of breathing.    BILATERAL LOWER EXTREMITIES:  Gait: normal  Alignment: varus.  No gross deformities or masses.  Bilateral Quad atrophy, strength normal.  Intact sensation deep peroneal nerve, superficial peroneal nerve, med/lat tibial nerve, sural nerve, saphenous nerve  Intact EHL, EDL, TA, FHL, GS, quadriceps hamstrings and hip flexors  Toes warm and well perfused, brisk capillary refill. Palpable 2+ dp pulses.  Bilateral calf soft and nttp or squeeze.  Edema: none    LEFT KNEE EXAM:    Skin: intact, no ecchymosis or erythema  Squat: 25 %, with anterior knee pain     ROM: 0 extension to 145 flexion  Tight hamstrings on straight leg raise.  Effusion: none  Tender: hypersensitivity to light touch along med/lat joint line, anterior knee diffuse tenderness.  McMurrays: " negative    Valgus stress/MCL: stable, and non-painful at both 0 and 30 degrees knee flexion  Varus stress: stable, and non-painful at both 0 and 30 degrees knee flexion  Lachmans: neg, firm endpoint  Posterior Drawer stable  Patellofemoral joint:                Extensor Lag: none              Q Angle: normal              Patellar Mobility: normal              Apprehension: negative              Crepitations: minimal   Grind: positive    RIGHT KNEE EXAM:    Skin: intact, no ecchymosis or erythema  Squat: 25 %, with anterior knee pain    ROM: 0 extension to 145 flexion  Tight hamstrings on straight leg raise.  Effusion: trace  Tender: hypersensitivity to light touch along med/lat joint line, anterior knee, diffuse tenderness.  McMurrays: negative    Valgus stress/MCL: stable, and non-painful at both 0 and 30 degrees knee flexion  Varus stress: stable, and non-painful at both 0 and 30 degrees knee flexion  Lachmans: neg, firm endpoint  Posterior Drawer stable  Patellofemoral joint:                Extensor Lag: none              Q Angle: normal              Patellar Mobility: normal              Apprehension: negative              Crepitations: minimal   Grind: positive    X-RAY:  no new images today.    3 views bilateral knee from 9/8/2017 were reviewed in clinic today. On my review, no obvious fractures or dislocations. Mild lateral patello-femoral joint space narrowing bilateral, right more than the left.      Impression:  47 year old female with chronic bilateral anterior knee pain, patello-femoral chondromalacia.    Plan:    * reviewed imaging studies with patient  * exam, history consistent with patello-femoral chondromalacia, anterior knee pain.      * rest, sitting  * Activity modification - avoid impact activities or activities that aggravate symptoms.  * NSAIDS (non-steroidal anti-inflammatory medications; e.g. Aleve, advil, motrin, ibuprofen) - regular use for inflammation ( twice daily or three times  "daily), with food, as long as no contra-indications Please discuss with primary care doctor if needed  * ice, 15-20 minutes at a time several times a day or as needed.  * Strengthening of quadriceps muscles  * Physical Therapy for strengthening, stretching and range of motion exercises of legs  * Tylenol as needed for pain, consider Tylenol arthritis or similar  * Weight loss: Weight loss:  Body mass index is 21.15 kg/(m^2).. weight loss benefits, not only for the current pain symptoms, but also overall health. Recommend a good diet plan that works for the patient, with the assistance of a dietician or primary care doctor as needed. Also, a good, low-impact exercise program for at least 20 minutes per day, 3 times per week, such as exercise bike, elliptical , or pool.  * Exercise: low impact such as stationary bike, elliptical, pool.  * Injections: cortisone versus viscosupplementation (hyaluronic acid, \"rooster comb\", \"gel shots\"); risks and perceived benefits discussed today. Patient elects to proceed.  * Bracing: bracing the knee may offer some relief of symptoms when worn and provide some stability.  * over the counter supplements such as glucosamine and chondroitin sulfate may help with joint pain.  * topical ointments may help as well    * return to clinic as needed.    PROCEDURE NOTE:  The risks, perceived benefits and potential complications (including but not limited to: bleeding, infection, pain, scar, damage to adjacent structures, atrophy or necrosis of soft tissue, skin blanching, failure to relieve symptoms, worsening of symptoms, allergic reaction) of injection were discussed with the patient. Questions were addressed and answered.The patient elected to proceed. Written informed consent was obtained. The correct procedural site was identified and confirmed. A RIGHT knee intraarticular injection was performed using 1mL Depo Medrol 80mg per mL and 7mL (4mL 1% lidocaine, 3mL 0.25% marcaine)  of " local anesthetic after sterile prep, to the correct procedural site. Sterile bandaid applied. This was tolerated well by the patient. No apparent complications. Did also discuss that if diabetic, recommend close monitoring of blood sugars over the next week as cortisone injections can temporarily elevate blood sugars.    The risks, perceived benefits and potential complications (including but not limited to: bleeding, infection, pain, scar, damage to adjacent structures, atrophy or necrosis of soft tissue, skin blanching, failure to relieve symptoms, worsening of symptoms, allergic reaction) of injection were discussed with the patient. Questions were addressed and answered.The patient elected to proceed. Written informed consent was obtained. The correct procedural site was identified and confirmed. A LEFT knee intraarticular injection was performed using 1mL Depo Medrol 80mg per mL and 7mL (4mL 1% lidocaine, 3mL 0.25% marcaine)  of local anesthetic after sterile prep, to the correct procedural site. Sterile bandaid applied. This was tolerated well by the patient. No apparent complications. Did also discuss that if diabetic, recommend close monitoring of blood sugars over the next week as cortisone injections can temporarily elevate blood sugars.       The information in this document, created by a scribe for me, accurately reflects the services I personally performed and the decisions made by me. I have reviewed and approved this document for accuracy.      Lance Jefferson M.D., M.S.  Dept. of Orthopaedic Surgery  [facility]

## 2018-04-09 ENCOUNTER — TELEPHONE (OUTPATIENT)
Dept: FAMILY MEDICINE | Facility: CLINIC | Age: 48
End: 2018-04-09

## 2018-04-09 NOTE — TELEPHONE ENCOUNTER
Reason for Call:  Other appointment    Detailed comments: Patient has an appointment for an EMG at [facility] of neurology on 04/11/18.  Dr. Jefferson referred patient but she has restricted medical assistance so they need a referral from her primary which is Sarah Montemayor. Please fax referral from Sarah to [phone]. Please call Zuleyma with any questions    Phone Number Patient can be reached at: Other phone number: [phone]     Best Time: ASAP    Can we leave a detailed message on this number? YES    Call taken on 4/9/2018 at 1:51 PM by Renetta Awad

## 2018-04-09 NOTE — TELEPHONE ENCOUNTER
PRIOR AUTHORIZATION DENIED    Medication: Lidocaine - Denied     Denial Date: 4/9/2018    Denial Rational:  Drug is available over the counter.     Appeal Information:

## 2018-04-09 NOTE — TELEPHONE ENCOUNTER
Central Prior Authorization Team   Phone: [phone]      PA Initiation    Medication: Lidocaine  [facility] Phone [phone] Fax [phone]  Pharmacy Filling the Rx: [id number] [place], [place] - [address] [place] [address] & [facility]  Filling Pharmacy Phone: [phone]  Filling Pharmacy Fax: [phone]  Start Date: 4/9/2018

## 2018-04-11 ENCOUNTER — TRANSFERRED RECORDS (OUTPATIENT)
Dept: HEALTH INFORMATION MANAGEMENT | Facility: CLINIC | Age: 48
End: 2018-04-11

## 2018-04-17 ENCOUNTER — OFFICE VISIT (OUTPATIENT)
Dept: ADDICTION MEDICINE | Facility: CLINIC | Age: 48
End: 2018-04-17
Payer: COMMERCIAL

## 2018-04-17 VITALS
RESPIRATION RATE: 16 BRPM | HEIGHT: 70 IN | DIASTOLIC BLOOD PRESSURE: 82 MMHG | BODY MASS INDEX: 20.79 KG/M2 | OXYGEN SATURATION: 97 % | HEART RATE: 78 BPM | SYSTOLIC BLOOD PRESSURE: 126 MMHG | WEIGHT: 145.2 LBS | TEMPERATURE: 98.7 F

## 2018-04-17 DIAGNOSIS — F14.21 HISTORY OF COCAINE DEPENDENCE (H): ICD-10-CM

## 2018-04-17 DIAGNOSIS — F41.1 GENERALIZED ANXIETY DISORDER: ICD-10-CM

## 2018-04-17 DIAGNOSIS — F11.20 OPIOID USE DISORDER, SEVERE, DEPENDENCE (H): ICD-10-CM

## 2018-04-17 DIAGNOSIS — F33.1 MODERATE EPISODE OF RECURRENT MAJOR DEPRESSIVE DISORDER (H): ICD-10-CM

## 2018-04-17 DIAGNOSIS — T78.40XD ALLERGIC STATE, SUBSEQUENT ENCOUNTER: Primary | ICD-10-CM

## 2018-04-17 LAB
AMPHETAMINES UR QL: NOT DETECTED NG/ML
BARBITURATES UR QL SCN: NOT DETECTED NG/ML
BENZODIAZ UR QL SCN: ABNORMAL NG/ML
BUPRENORPHINE UR QL: ABNORMAL NG/ML
CANNABINOIDS UR QL: ABNORMAL NG/ML
COCAINE UR QL SCN: NOT DETECTED NG/ML
D-METHAMPHET UR QL: NOT DETECTED NG/ML
METHADONE UR QL SCN: NOT DETECTED NG/ML
OPIATES UR QL SCN: NOT DETECTED NG/ML
OXYCODONE UR QL SCN: NOT DETECTED NG/ML
PCP UR QL SCN: NOT DETECTED NG/ML
PROPOXYPH UR QL: NOT DETECTED NG/ML
TRICYCLICS UR QL SCN: NOT DETECTED NG/ML

## 2018-04-17 PROCEDURE — 80306 DRUG TEST PRSMV INSTRMNT: CPT | Performed by: PEDIATRICS

## 2018-04-17 PROCEDURE — 99214 OFFICE O/P EST MOD 30 MIN: CPT | Performed by: PEDIATRICS

## 2018-04-17 RX ORDER — CLONIDINE HYDROCHLORIDE 0.1 MG/1
0.1 TABLET ORAL 2 TIMES DAILY
Qty: 60 TABLET | Refills: 3 | Status: SHIPPED | OUTPATIENT
Start: 2018-04-17 | End: 2018-01-01

## 2018-04-17 RX ORDER — BUPRENORPHINE HYDROCHLORIDE AND NALOXONE HYDROCHLORIDE DIHYDRATE 8; 2 MG/1; MG/1
1 TABLET SUBLINGUAL 2 TIMES DAILY
Qty: 60 EACH | Refills: 0 | Status: SHIPPED | OUTPATIENT
Start: 2018-04-17 | End: 2018-05-29

## 2018-04-17 RX ORDER — CETIRIZINE HYDROCHLORIDE 10 MG/1
10 TABLET ORAL EVERY EVENING
Qty: 90 TABLET | Refills: 1 | Status: SHIPPED | OUTPATIENT
Start: 2018-04-17 | End: 2018-01-01

## 2018-04-17 NOTE — PROGRESS NOTES
SUBJECTIVE:                                                    OPIOID USE DISORDER - BUPRENORPHINE FOLLOW UP:    Coretta Tovar is a 47 year old female who presents to clinic today for follow up of  MAT for opioid use disorder.         Date of last visit:  Visit date not found    [place] [facility] Data Base Reviewed:    YES;     3/24   Suboxone 8mg tab #60    Dose at last visit:   Suboxone 8mg tab bid.     Brief History:  Initial visit 11/15/17    DRUG OF CHOICE - Opioids.        LAST USE:  11/14/17  Hx of multiple surgeries (>15).   Since age 14  9 surgeries on right foot in past 15months for cpmplex fx ankle and foot.  C spine fusion many years ago.   Hx migraine headache.  S/p hysterectomy, tonsillectomy, splenectomy, ear surgeries.  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      HPI:    Previous utox false postive for benzodiazepine.  No hx of benzodiazepine use.  Positive again today.   Planning a [employer] business but still has not fully pursued.  Doing some of that work currently.  Carpal tunnel slightly better.  Has been having flare of migraine lately.  Had one lasting four days recently.  Did take oxycodone 2 doses about four days ago and did use THC two nights ago.  Allergies and stress seem to be triggers for headaches.  Taking claritin but not helping. Asks about something different.  Is starting Flonase again.  Family stress with daughter being ill recently.   Still attending meetings and has sponsor.   Still smoking about the same.        Status since last visit: Since last visit patient has been:stable    Intensity:     There has been: took oxycodone for migraine.     Suboxone Dose: was adequate when taking regularly    Progression of Symptoms/Precipitating Factors:     Cues to use and relapse triggers: Pain and Outside stressors    Trigger have been:  moderate    Accompanying Signs & Symptoms:    Side Effects: none    Sobriety:     Status:  patient has had opioid use     Drug Screen:  obtained    Alleviating factors/Other Therapies Tried:    Contact with sponsor has been: regular    Family and support system has been: helpful    Patient has been going to recovery meetings: regularly    Recovery program has been : active    Patient has been participating in professional counseling /therapy: NO        Social History     Social History Narrative    Lives with dad and brother in [place].      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      Daughter with recent DWI (she has child).        Patient Active Problem List    Diagnosis Date Noted     History of cocaine dependence (H) 09/07/2017     Priority: Medium     Moderate episode of recurrent major depressive disorder (H) 03/22/2017     Priority: Medium     Generalized anxiety disorder 03/22/2017     Priority: Medium     Protein-calorie malnutrition (H) 03/22/2017     Priority: Medium     Primary insomnia 03/22/2017     Priority: Medium     Vertigo 03/14/2017     Priority: Medium     Cervical pain 03/08/2017     Priority: Medium     Nutritional disorder 11/21/2016     Priority: Medium     Disorder of stomach 11/20/2016     Priority: Medium     Overview:   Per Biopsy obtained on EGD 11/22/2016. Reactive gastropathy was quoted to be frequently associated with ongoing non-inflammatory type mucosal injury due to a chemical type of injury; this may be due to ingestion of non-steroidal anti-inflammatory drugs, aspirin, excess alcohol, or corticosteroids.        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     Priority: Medium     Chemical dependency (H) 02/24/2015     Priority: Medium     Insomnia 02/24/2015     Priority: Medium     S/P splenectomy 01/13/2015     Priority: Medium     Migraines      Priority: Medium     Idiopathic thrombocytopenic purpura (HCC)      Priority: Medium     s/p splenectomy       Tobacco abuse 05/30/2014     Priority: Medium     Immune thrombocytopenic  purpura (H) 01/14/2008     Priority: Medium     Personality disorder 01/14/2008     Priority: Medium     Overview:   cluster B traits  borderline, antisocial       Nondependent cannabis abuse, episodic 01/14/2008     Priority: Medium     Mixed, or nondependent drug abuse 01/26/2007     Priority: Medium     Overview:   recurrent issues with opioids.  History of crack cocaine abuse.       Gastroesophageal reflux disease 12/20/2006     Priority: Medium     Herpetic gingivostomatitis 12/20/2006     Priority: Medium     Muscle pain 12/20/2006     Priority: Medium     Atopic rhinitis 12/20/2006     Priority: Medium     Abnormal weight loss 12/20/2006     Priority: Medium       Problem list and histories reviewed & adjusted, as indicated.  Additional history: as documented        Current Outpatient Prescriptions on File Prior to Visit:  FLUoxetine (PROZAC) 40 MG capsule TAKE 1 CAPSULE BY MOUTH DAILY WITH A 20MG CAPSULE TO EQUAL TOTAL DAILY DOSE OF 60MG   mirtazapine (REMERON) 15 MG tablet TAKE 1 TABLET BY MOUTH AT BEDTIME. CALL CLINIC IF TOO SEDATED DURING THE DAY    MG capsule TAKE 1 CAPSULE BY MOUTH TWICE DAILY AS NEEDED FOR CONSTIPATION   predniSONE (DELTASONE) 20 MG tablet Take 2 tablets by mouth once daily x 5 days then 1 tablet by mouth once daily x 5 days   Lidocaine 5 % CREA Externally apply 1 Film topically 4 times daily as needed   ZOLMitriptan (ZOMIG) 5 MG tablet Take 1 tablet (5 mg) by mouth at onset of headache for migraine May repeat in 2 hours. Max 2 tablets/24 hours.   buprenorphine-naloxone (SUBOXONE) 8-2 MG SUBL sublingual tablet Place 1 tablet under the tongue 2 times daily   cloNIDine (CATAPRES) 0.1 MG tablet Take 1 tablet (0.1 mg) by mouth 2 times daily Prn late withdrawal symptoms.   ipratropium - albuterol 0.5 mg/2.5 mg/3 mL (DUONEB) 0.5-2.5 (3) MG/3ML neb solution Take 1 vial (3 mLs) by nebulization every 6 hours as needed for shortness of breath / dyspnea or wheezing   Humidifiers (COOL MIST  HUMIDIFIER) MISC 1 Device as needed   fluticasone (FLONASE) 50 MCG/ACT spray Spray 2 sprays into both nostrils daily   FLUoxetine (PROZAC) 20 MG capsule Take 1 capsule (20 mg) by mouth daily Take with 40mg of fluoxetine daily to equal total daily dose of 60 mg per day.   order for DME Equipment being ordered: bilateral wrist splints   order for DME Equipment being ordered: Nebulizer, tubing, and mask   hydrOXYzine (ATARAX) 25 MG tablet Take 2 tablets (50 mg) by mouth nightly as needed for anxiety   omeprazole (PRILOSEC) 40 MG capsule TAKE 1 CAPSULE(40 MG) BY MOUTH DAILY 30 TO 60 MINUTES BEFORE A MEAL   ondansetron (ZOFRAN-ODT) 4 MG ODT tab Take 1-2 tablets (4-8 mg) by mouth every 8 hours as needed for nausea   BusPIRone HCl 30 MG TABS Take 30 mg by mouth 2 times daily   traZODone (DESYREL) 100 MG tablet Take 1 tablet (100 mg) by mouth At Bedtime   polyethylene glycol (MIRALAX) powder Take 17 g (1 capful) by mouth daily   ZOLMitriptan (ZOMIG) 5 MG tablet TAKE 1 TABLET BY MOUTH AT ONSET OF HEADACHE FOR MIGRAINE. MAY REPEAT DOSE IN 2 HOURS. DO NOT EXCEED 10MG IN 24 HOURS   loratadine (CLARITIN) 10 MG tablet Take 1 tablet (10 mg) by mouth daily as needed for allergies   valACYclovir (VALTREX) 1000 mg tablet Take 1 tablet (1,000 mg) by mouth 2 times daily At the onset of skin lesions as needed   acyclovir (ZOVIRAX) 5 % ointment Apply 1 g topically as needed   albuterol (PROAIR HFA/PROVENTIL HFA/VENTOLIN HFA) 108 (90 BASE) MCG/ACT Inhaler Inhale 1-2 puffs into the lungs every 4 hours as needed for shortness of breath / dyspnea or wheezing   ISOtretinoin (ACCUTANE PO)    Neomycin-Bacitracin-Polymyxin (CVS TRIPLE ANTIBIOTIC) OINT Externally apply topically 4 times daily   triamcinolone (KENALOG) 0.1 % ointment Apply sparingly to affected area on back two times daily til clear.   olopatadine HCl (PATADAY) 0.2 % SOLN Place 1 drop into both eyes daily   clobetasol (TEMOVATE) 0.05 % ointment APPLY TOPICALLY BID   betamethasone  "valerate (VALISONE) 0.1 % ointment Apply once a day for 2 weeks as directed   hydrocortisone 2.5 % cream    ketoconazole (NIZORAL) 2 % cream    order for DME Equipment being ordered: cool humidifier   Acetaminophen (TYLENOL PO) Take 1 tablet by mouth as needed     Current Facility-Administered Medications on File Prior to Visit:  Lidocaine 1 % injection 9 mL   sodium bicarbonate 8.4 % injection 1 mEq          Allergies   Allergen Reactions     Acetaminophen-Codeine Hives     Amoxicillin-Pot Clavulanate Nausea and Vomiting     Augmentin Nausea and Vomiting and Hives     Barbiturates      Bentyl [Dicyclomine] Other (See Comments)     dizziness     Compazine      Lock-jaw     Liquid Adhesive      Other reaction(s): Other, see comments  blisters     No Clinical Screening - See Comments      PN: LW Reaction: blisters  PN: LW FI1: nka  PN: LW CM1: CONTRAST- nka Reaction :     Nsaids      D/t ITP     Prochlorperazine      Other reaction(s): Edema     Propoxyphene N-Apap Hives     Sulfa Drugs      Tramadol Hcl Hives            Adhesive Tape Rash         REVIEW OF SYSTEMS:  General: No acute withdrawal symptoms.  No recent infections or fever  Resp: No coughing, wheezing or shortness of breath  CV: No chest pains or palpitations  GI: No nausea, vomiting, abdominal pain, diarrhea, constipation  : No urinary frequency or dysuria,     Musculoskeletal: No significant muscle or joint pains, No edema  Neurologic: No numbness, tingling, weakness, problems with balance or coordination  Psychiatric: No acute concerns  Skin: No rashes    OBJECTIVE:    PHYSICAL EXAM:  /82 (BP Location: Left arm, Patient Position: Chair, Cuff Size: Adult Regular)  Pulse 78  Temp 98.7  F (37.1  C) (Oral)  Resp 16  Ht 5' 10\" (1.778 m)  Wt 145 lb 3.2 oz (65.9 kg)  SpO2 97%  BMI 20.83 kg/m2    GENERAL APPEARANCE:  alert, comfortable appearing  EYES:Eyes grossly normal to inspection  NEURO:  Gait normal.  No tremor. Coordination intact. "   MENTAL STATUS EXAM:  Appearance/Behavior: No appearant distress  Speech: Normal  Mood/Affect: normal affect  Insight: Adequate      Results for orders placed or performed in visit on 04/17/18   Urine Drugs of Abuse Screen Panel 13   Result Value Ref Range    Cannabinoids (20-acc-0-carboxy-9-THC) Detected, Abnormal Result (A) NDET^Not Detected ng/mL    Phencyclidine (Phencyclidine) Not Detected NDET^Not Detected ng/mL    Cocaine (Benzoylecgonine) Not Detected NDET^Not Detected ng/mL    Methamphetamine (d-Methamphetamine) Not Detected NDET^Not Detected ng/mL    Opiates (Morphine) Not Detected NDET^Not Detected ng/mL    Amphetamine (d-Amphetamine) Not Detected NDET^Not Detected ng/mL    Benzodiazepines (Nordiazepam) Detected, Abnormal Result (A) NDET^Not Detected ng/mL    Tricyclic Antidepressants (Desipramine) Not Detected NDET^Not Detected ng/mL    Methadone (Methadone) Not Detected NDET^Not Detected ng/mL    Barbiturates (Butalbital) Not Detected NDET^Not Detected ng/mL    Oxycodone (Oxycodone) Not Detected NDET^Not Detected ng/mL    Propoxyphene (Norpropoxyphene) Not Detected NDET^Not Detected ng/mL    Buprenorphine (Buprenorphine) Detected, Abnormal Result (A) NDET^Not Detected ng/mL     Previous multiple false positive for BZ.        ASSESSMENT/PLAN:    (F11.20) Opioid use disorder, severe, dependence (H)  (primary encounter diagnosis)   Suboxone  8mg bid.  Rx #60    Follow up one month.   Encourage meeting attendance and sponsorship  Risk of other substance use such and cannabis and recent opioid use for migraine on overall recovery discussed at lengths.       Opioid warning reviewed.  Risk of overdose following a period of abstinence due to decrease tolerance was discussed including risk of death.   Risk of overdose if using Suboxone with other substances particuarly benzodiazepines/alcohol was reviewed.           (F14.21) History of cocaine dependence (H)  Plan: no recent use.  Encourage ongoing  sobriety      (F33.1) Moderate episode of recurrent major depressive disorder (H)  (F41.1) Generalized anxiety disorder  Plan: Recommendations as above.   Follow with PCP/psychiatry as planned.  Encouraged mental health counseling.            (F17.200) Nicotine use disorder  Comment: not ready to quit  Plan: Encouraged Abstinence.  Increase risk of relapse with other substances with return to nicotine use discussed.  Risk of Ecig/Vaping also reviewed.         Migraine headache.   Risk of other substance use as treatment for migraine reviewed.  Other abortive therapy discussed.  Prevention reviewed.  Follow up with PCP.  Zyrtec rx for allergy pending PCP follow up. Encouraged Flonase.             ENCOUNTER FOR LONG TERM USE OF HIGH RISK MEDICATION   High Risk Drug Monitoring?  YES   Drug being monitored: Buprenorphine   Reason for drug: Opioid Use Disorder   What is being monitored?: Dosage, Cravings, Trigger, side effects, and continued abstinence.      Opioid warning reviewed.  Risk of overdose following a period of abstinence due to decrease tolerance was discussed including risk of death.   Risk of overdose if using Suboxone with other substances particuarly benzodiazepines/alcohol was reviewed.          Jessy Brenner MD  [facility]  [phone]

## 2018-04-17 NOTE — MR AVS SNAPSHOT
"              After Visit Summary   4/17/2018    Coretta Tovar    MRN: [id number]           Patient Information     Date Of Birth          1970        Visit Information        Provider Department      4/17/2018 10:40 AM Jessy Brenner MD [facility]        Today's Diagnoses     Allergic state, subsequent encounter    -  1    Opioid use disorder, severe, dependence (H)        History of cocaine dependence (H)        Moderate episode of recurrent major depressive disorder (H)        Generalized anxiety disorder           Follow-ups after your visit        Who to contact     If you have questions or need follow up information about today's clinic visit or your schedule please contact [facility] directly at [phone].  Normal or non-critical lab and imaging results will be communicated to you by MyChart, letter or phone within 4 business days after the clinic has received the results. If you do not hear from us within 7 days, please contact the clinic through [employer] or phone. If you have a critical or abnormal lab result, we will notify you by phone as soon as possible.  Submit refill requests through [employer] or call your pharmacy and they will forward the refill request to us. Please allow 3 business days for your refill to be completed.          Additional Information About Your Visit        MyChart Information     [employer] lets you send messages to your doctor, view your test results, renew your prescriptions, schedule appointments and more. To sign up, go to [email or web address] . Click on \"Log in\" on the left side of the screen, which will take you to the Welcome page. Then click on \"Sign up Now\" on the right side of the page.     You will be asked to enter the access code listed below, as well as some personal information. Please follow the directions to create your username and password.     Your access code is: [email or web address]  Expires: 6/17/2018  6:40 PM     Your access " "code will  in 90 days. If you need help or a new code, please call your [facility] clinic or [phone].        Care EveryWhere ID     This is your Care EveryWhere ID. This could be used by other organizations to access your [facility] medical records  [id number]        Your Vitals Were     Pulse Temperature Respirations Height Pulse Oximetry BMI (Body Mass Index)    78 98.7  F (37.1  C) (Oral) 16 5' 10\" (1.778 m) 97% 20.83 kg/m2       Blood Pressure from Last 3 Encounters:   18 126/82   18 102/65   18 95/62    Weight from Last 3 Encounters:   18 145 lb 3.2 oz (65.9 kg)   18 147 lb 6.4 oz (66.9 kg)   18 146 lb 9.6 oz (66.5 kg)              We Performed the Following     Urine Drugs of Abuse Screen Panel 13          Today's Medication Changes          These changes are accurate as of 18  1:32 PM.  If you have any questions, ask your nurse or doctor.               Start taking these medicines.        Dose/Directions    cetirizine 10 MG tablet   Commonly known as:  zyrTEC   Used for:  Allergic state, subsequent encounter   Started by:  Jessy Brenner MD        Dose:  10 mg   Take 1 tablet (10 mg) by mouth every evening   Quantity:  90 tablet   Refills:  1            Where to get your medicines      These medications were sent to [facility] [address] [place] [id number]     Phone:  [phone]     cetirizine 10 MG tablet    cloNIDine 0.1 MG tablet         Some of these will need a paper prescription and others can be bought over the counter.  Ask your nurse if you have questions.     Bring a paper prescription for each of these medications     buprenorphine-naloxone 8-2 MG Subl sublingual tablet                Primary Care Provider Office Phone # Fax #    NINOSKA Mitchell [phone]       [address] [facility] [place] [address]        Equal Access to " Services     [facility]: Hadii teodoro ferrer moiseuma Angeloali, waaxda luqadaha, qaybta kaalmada graham, pelon alegre . So [facility] [phone].    ATENCIÓN: Si habla jerry, tiene a roberts disposición servicios gratuitos de asistencia lingüística. Llame al [phone].    We comply with applicable federal civil rights laws and [place] laws. We do not discriminate on the basis of race, color, national origin, age, disability, sex, sexual orientation, or gender identity.            Thank you!     Thank you for choosing [facility]  for your care. Our goal is always to provide you with excellent care. Hearing back from our patients is one way we can continue to improve our services. Please take a few minutes to complete the written survey that you may receive in the mail after your visit with us. Thank you!             Your Updated Medication List - Protect others around you: Learn how to safely use, store and throw away your medicines at www.disposemymeds.org.          This list is accurate as of 4/17/18  1:32 PM.  Always use your most recent med list.                   Brand Name Dispense Instructions for use Diagnosis    ACCUTANE PO           acyclovir 5 % ointment    ZOVIRAX    30 g    Apply 1 g topically as needed    Recurrent cold sores       albuterol 108 (90 Base) MCG/ACT Inhaler    PROAIR HFA/PROVENTIL HFA/VENTOLIN HFA    18 g    Inhale 1-2 puffs into the lungs every 4 hours as needed for shortness of breath / dyspnea or wheezing    Mild intermittent reactive airway disease without complication       betamethasone valerate 0.1 % ointment    VALISONE     Apply once a day for 2 weeks as directed        buprenorphine-naloxone 8-2 MG Subl sublingual tablet    SUBOXONE    60 each    Place 1 tablet under the tongue 2 times daily    Opioid use disorder, severe, dependence (H)       BusPIRone HCl 30 MG Tabs     60 tablet    Take 30 mg by mouth 2 times daily    Generalized anxiety  disorder       cetirizine 10 MG tablet    zyrTEC    90 tablet    Take 1 tablet (10 mg) by mouth every evening    Allergic state, subsequent encounter       clobetasol 0.05 % ointment    TEMOVATE     APPLY TOPICALLY BID        cloNIDine 0.1 MG tablet    CATAPRES    60 tablet    Take 1 tablet (0.1 mg) by mouth 2 times daily Prn late withdrawal symptoms.    Opioid use disorder, severe, dependence (H)       Cool Mist Humidifier Misc     1 each    1 Device as needed    Opioid use disorder, severe, dependence (H)       [employer]S TRIPLE ANTIBIOTIC Oint     60 g    Externally apply topically 4 times daily    Allergic contact dermatitis due to adhesives        MG capsule   Generic drug:  docusate sodium     60 capsule    TAKE 1 CAPSULE BY MOUTH TWICE DAILY AS NEEDED FOR CONSTIPATION    Gastroesophageal reflux disease, esophagitis presence not specified       * FLUoxetine 20 MG capsule    PROzac    30 capsule    Take 1 capsule (20 mg) by mouth daily Take with 40mg of fluoxetine daily to equal total daily dose of 60 mg per day.    Moderate episode of recurrent major depressive disorder (H), Generalized anxiety disorder       * FLUoxetine 40 MG capsule    PROzac    30 capsule    TAKE 1 CAPSULE BY MOUTH DAILY WITH A 20MG CAPSULE TO EQUAL TOTAL DAILY DOSE OF 60MG    Moderate episode of recurrent major depressive disorder (H), Generalized anxiety disorder       fluticasone 50 MCG/ACT spray    FLONASE    1 Bottle    Spray 2 sprays into both nostrils daily    Opioid use disorder, severe, dependence (H)       hydrocortisone 2.5 % cream           hydrOXYzine 25 MG tablet    ATARAX    60 tablet    Take 2 tablets (50 mg) by mouth nightly as needed for anxiety    Generalized anxiety disorder       ipratropium - albuterol 0.5 mg/2.5 mg/3 mL 0.5-2.5 (3) MG/3ML neb solution    DUONEB    90 vial    Take 1 vial (3 mLs) by nebulization every 6 hours as needed for shortness of breath / dyspnea or wheezing    Acute bronchitis with symptoms > 10  days       ketoconazole 2 % cream    NIZORAL          Lidocaine 5 % Crea     45 g    Externally apply 1 Film topically 4 times daily as needed    Bilateral carpal tunnel syndrome       loratadine 10 MG tablet    CLARITIN    30 tablet    Take 1 tablet (10 mg) by mouth daily as needed for allergies    Chronic nonseasonal allergic rhinitis due to pollen       mirtazapine 15 MG tablet    REMERON    30 tablet    TAKE 1 TABLET BY MOUTH AT BEDTIME. CALL CLINIC IF TOO SEDATED DURING THE DAY    Primary insomnia       olopatadine HCl 0.2 % Soln    PATADAY    2.5 mL    Place 1 drop into both eyes daily    Seasonal allergic rhinitis, unspecified allergic rhinitis trigger       omeprazole 40 MG capsule    priLOSEC    90 capsule    TAKE 1 CAPSULE(40 MG) BY MOUTH DAILY 30 TO 60 MINUTES BEFORE A MEAL    Gastroesophageal reflux disease, esophagitis presence not specified       ondansetron 4 MG ODT tab    ZOFRAN-ODT    30 tablet    Take 1-2 tablets (4-8 mg) by mouth every 8 hours as needed for nausea    Migraine without status migrainosus, not intractable, unspecified migraine type       * order for DME     1 each    Equipment being ordered: cool humidifier    Painful respiration, Upper respiratory tract infection, unspecified type       * order for DME     2 each    Equipment being ordered: bilateral wrist splints    Bilateral carpal tunnel syndrome       * order for DME     1 each    Equipment being ordered: Nebulizer, tubing, and mask    Acute bronchitis with symptoms > 10 days       polyethylene glycol powder    MIRALAX    510 g    Take 17 g (1 capful) by mouth daily    Drug-induced constipation       predniSONE 20 MG tablet    DELTASONE    15 tablet    Take 2 tablets by mouth once daily x 5 days then 1 tablet by mouth once daily x 5 days    Pain in both hands       traZODone 100 MG tablet    DESYREL    90 tablet    Take 1 tablet (100 mg) by mouth At Bedtime    Primary insomnia       triamcinolone 0.1 % ointment    KENALOG    80  g    Apply sparingly to affected area on back two times daily til clear.    Rash       TYLENOL PO      Take 1 tablet by mouth as needed    Chondritis of both external ears       valACYclovir 1000 mg tablet    VALTREX    8 tablet    Take 1 tablet (1,000 mg) by mouth 2 times daily At the onset of skin lesions as needed    Recurrent cold sores       * ZOLMitriptan 5 MG tablet    ZOMIG    12 tablet    TAKE 1 TABLET BY MOUTH AT ONSET OF HEADACHE FOR MIGRAINE. MAY REPEAT DOSE IN 2 HOURS. DO NOT EXCEED 10MG IN 24 HOURS    Migraine without aura and without status migrainosus, not intractable       * ZOLMitriptan 5 MG tablet    ZOMIG    18 tablet    Take 1 tablet (5 mg) by mouth at onset of headache for migraine May repeat in 2 hours. Max 2 tablets/24 hours.    Migraine without aura and without status migrainosus, not intractable       * Notice:  This list has 7 medication(s) that are the same as other medications prescribed for you. Read the directions carefully, and ask your doctor or other care provider to review them with you.

## 2018-04-20 ENCOUNTER — TELEPHONE (OUTPATIENT)
Dept: FAMILY MEDICINE | Facility: CLINIC | Age: 48
End: 2018-04-20

## 2018-04-20 NOTE — TELEPHONE ENCOUNTER
Prior Authorization Retail Medication Request    Medication/Dose: omeprazole (PRILOSEC) 40 MG capsule  ICD code (if different than what is on RX): Gastroesophageal reflux disease, esophagitis presence not specified [K21.9]    Previously Tried and Failed:    Rationale:      Insurance Name:    Insurance ID:  [id number]      Pharmacy Information (if different than what is on RX)  Name:  [facility]   Phone:  [phone]  Fax: [phone]

## 2018-04-24 ENCOUNTER — TELEPHONE (OUTPATIENT)
Dept: FAMILY MEDICINE | Facility: CLINIC | Age: 48
End: 2018-04-24

## 2018-04-24 NOTE — TELEPHONE ENCOUNTER
Pt called RN hotline and left a message today at 12:39. Her phone was cutting out so did not know what she called about. Please call pt back. [phone].    Cyndi Watson RN  [facility]

## 2018-04-24 NOTE — TELEPHONE ENCOUNTER
Spoke with pt. States her throat feels scratchy, coughing and doesn't feel good. Thought it was her allergies. Today woke up in a pool of sweat. No difficulty breathing or wheezing. Has not tried anything otc. States she had her spleen removed, so needs to be seen right away. PCP does not have any openings today or tomorrow. Offered an appt with a different provider tomorrow and pt declined stating she will just go to .    Cyndi Watson RN  [facility], [place]

## 2018-04-25 ENCOUNTER — TELEPHONE (OUTPATIENT)
Dept: ADDICTION MEDICINE | Facility: CLINIC | Age: 48
End: 2018-04-25

## 2018-04-25 NOTE — TELEPHONE ENCOUNTER
Writer called pt back.      Pt stated that she has had a Headache since last Tuesday.      Pain rated at a 6-7/10.  Constant, Pt reports that it gets worse with stress.    Pt stated that her daughter heart issues and this isn't a time for her to have headaches.      Pt has Zomig, pt has been taking 2 per day (Max dose).      Light sensitive, sound sensitive, Nausea but Zofran helps with this.    Pt reports that she was to call back if symptoms didn't resolve.      Will forward to Dr. Brenner.    Uzair Flores RN

## 2018-04-25 NOTE — TELEPHONE ENCOUNTER
Please call patient and advise that she should be seen by PCP or one of partners or ED for ongoing sx .   May require something like Toradol to break sx.  Avoid overuse of triptan medication (maxalt) as this can lead to rebound headache.     Note phone visit to primary yesterday with other sx that suggest need for further eval for possible other process.

## 2018-04-25 NOTE — TELEPHONE ENCOUNTER
Reason for call:  Patient reporting a symptom to Dr. Brenner    Symptom or request: Migraine    Duration (how long have symptoms been present): Almost constantly since last Tuesday morning    Have you been treated for this before? Yes; currently takes Zomig (also on Suboxone)    Additional comments: Patient talked with Dr. Brenner at her appt on 4/17/2018 about this and was advised to call back if symptom did not resolve    Phone Number patient can be reached at:  Home number on file [phone] (home)    Best Time:  anytime    Can we leave a detailed message on this number:  YES    Call taken on 4/25/2018 at 1:01 PM by Antoinette Bell

## 2018-04-26 NOTE — TELEPHONE ENCOUNTER
Writer called pt and informed of message from Dr. Brenner.  Pt verbalized understanding.    Uzair Flores RN

## 2018-05-29 ENCOUNTER — OFFICE VISIT (OUTPATIENT)
Dept: ADDICTION MEDICINE | Facility: CLINIC | Age: 48
End: 2018-05-29
Payer: COMMERCIAL

## 2018-05-29 VITALS
OXYGEN SATURATION: 98 % | TEMPERATURE: 99.1 F | HEART RATE: 88 BPM | BODY MASS INDEX: 21.01 KG/M2 | SYSTOLIC BLOOD PRESSURE: 108 MMHG | RESPIRATION RATE: 16 BRPM | DIASTOLIC BLOOD PRESSURE: 70 MMHG | WEIGHT: 146.4 LBS

## 2018-05-29 DIAGNOSIS — F14.21 HISTORY OF COCAINE DEPENDENCE (H): ICD-10-CM

## 2018-05-29 DIAGNOSIS — F11.20 OPIOID USE DISORDER, SEVERE, DEPENDENCE (H): Primary | ICD-10-CM

## 2018-05-29 DIAGNOSIS — F33.1 MODERATE EPISODE OF RECURRENT MAJOR DEPRESSIVE DISORDER (H): ICD-10-CM

## 2018-05-29 DIAGNOSIS — J30.1 ACUTE SEASONAL ALLERGIC RHINITIS DUE TO POLLEN: ICD-10-CM

## 2018-05-29 DIAGNOSIS — F41.1 GENERALIZED ANXIETY DISORDER: ICD-10-CM

## 2018-05-29 LAB
AMPHETAMINES UR QL: NOT DETECTED NG/ML
BARBITURATES UR QL SCN: NOT DETECTED NG/ML
BENZODIAZ UR QL SCN: NOT DETECTED NG/ML
BUPRENORPHINE UR QL: ABNORMAL NG/ML
CANNABINOIDS UR QL: ABNORMAL NG/ML
COCAINE UR QL SCN: NOT DETECTED NG/ML
D-METHAMPHET UR QL: NOT DETECTED NG/ML
METHADONE UR QL SCN: NOT DETECTED NG/ML
OPIATES UR QL SCN: NOT DETECTED NG/ML
OXYCODONE UR QL SCN: NOT DETECTED NG/ML
PCP UR QL SCN: NOT DETECTED NG/ML
PROPOXYPH UR QL: NOT DETECTED NG/ML
TRICYCLICS UR QL SCN: NOT DETECTED NG/ML

## 2018-05-29 PROCEDURE — 80306 DRUG TEST PRSMV INSTRMNT: CPT | Performed by: PEDIATRICS

## 2018-05-29 PROCEDURE — 99214 OFFICE O/P EST MOD 30 MIN: CPT | Performed by: PEDIATRICS

## 2018-05-29 RX ORDER — OXYCODONE AND ACETAMINOPHEN 5; 325 MG/1; MG/1
1 TABLET ORAL EVERY 6 HOURS PRN
Qty: 12 TABLET | Refills: 0 | Status: SHIPPED | OUTPATIENT
Start: 2018-05-29 | End: 2018-06-19

## 2018-05-29 RX ORDER — OLOPATADINE HYDROCHLORIDE 2 MG/ML
1 SOLUTION/ DROPS OPHTHALMIC DAILY
Qty: 2.5 ML | Refills: 3 | Status: SHIPPED | OUTPATIENT
Start: 2018-05-29 | End: 2018-01-01

## 2018-05-29 RX ORDER — BUPRENORPHINE HYDROCHLORIDE AND NALOXONE HYDROCHLORIDE DIHYDRATE 8; 2 MG/1; MG/1
1 TABLET SUBLINGUAL 2 TIMES DAILY
Qty: 60 EACH | Refills: 0 | Status: SHIPPED | OUTPATIENT
Start: 2018-05-29 | End: 2018-06-19

## 2018-05-29 RX ORDER — LEVOCETIRIZINE DIHYDROCHLORIDE 5 MG/1
5 TABLET, FILM COATED ORAL EVERY EVENING
Qty: 30 TABLET | Refills: 11 | Status: SHIPPED | OUTPATIENT
Start: 2018-05-29

## 2018-05-29 ASSESSMENT — PAIN SCALES - GENERAL: PAINLEVEL: SEVERE PAIN (7)

## 2018-05-29 NOTE — PROGRESS NOTES
SUBJECTIVE:                                                    OPIOID USE DISORDER - BUPRENORPHINE FOLLOW UP:    Coretta Tovar is a 47 year old female who presents to clinic today for follow up of  MAT for opioid use disorder.       Date of last visit:  5/15/2018  NS  4/17/18    [place] Board of Pharmacy Data Base Reviewed:    YES;     4/20/18 Suboxone 8mg tab #60    Brief History:  Hx of multiple surgeries (>15).   Since age 14  9 surgeries on right foot in past 15months for cpmplex fx ankle and foot.  C spine fusion many years ago.   Hx migraine headache.  S/p hysterectomy, tonsillectomy, splenectomy, ear surgeries.  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           HPI:  Migraines have been severe past week.  Last flare more than a month ago.  Did take percocet 1-2 tab 2 x this weekend that she got from a friend.  Daughter who accompanies her today was aware of this use.  Has also used THC a few times.   Was essentially in bed most of weekend.  Primary trigger seems to be allergies.  Has had allergy shots in past but stopped a while ago.  Would need to re-establish with new allergist as previous retired.  Has not seen neurology in quite some time.  Discussed possible prevention treatments for migraine vs abortive therapy.  Does have Zomig to use sparingly but it doesn't seem to break cluster.    Is taking zyrtec and pataday eyedrops as well as flonase.  Still attending meeting/working with sponsor.  Smoking about same.            Status since last visit: Since last visit patient has been:struggling    Intensity:     There has been: no craving    Suboxone Dose: was adequate when taking regularly    Progression of Symptoms/Precipitating Factors:     Cues to use and relapse triggers:Pain    Trigger have been:  moderate    Accompanying Signs & Symptoms:    Side Effects: none    Sobriety:     Status: patient has had opioid use and use of THC     Drug Screen: obtained    Alleviating factors/Other Therapies  Tried:    Contact with sponsor has been: regular    Family and support system has been: helpful    Patient has been going to recovery meetings: regularly    Recovery program has been : active    Patient has been participating in professional counseling /therapy: NO    Patient is following with psychiatry: NO    Patient has established PCP: YES        Social History     Social History Narrative    Lives with dad and brother in [place].      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      Daughter with recent DWI (she has child).        Patient Active Problem List    Diagnosis Date Noted     History of cocaine dependence (H) 09/07/2017     Priority: Medium     Moderate episode of recurrent major depressive disorder (H) 03/22/2017     Priority: Medium     Generalized anxiety disorder 03/22/2017     Priority: Medium     Protein-calorie malnutrition (H) 03/22/2017     Priority: Medium     Primary insomnia 03/22/2017     Priority: Medium     Vertigo 03/14/2017     Priority: Medium     Cervical pain 03/08/2017     Priority: Medium     Nutritional disorder 11/21/2016     Priority: Medium     Disorder of stomach 11/20/2016     Priority: Medium     Overview:   Per Biopsy obtained on EGD 11/22/2016. Reactive gastropathy was quoted to be frequently associated with ongoing non-inflammatory type mucosal injury due to a chemical type of injury; this may be due to ingestion of non-steroidal anti-inflammatory drugs, aspirin, excess alcohol, or corticosteroids.        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     Priority: Medium     Chemical dependency (H) 02/24/2015     Priority: Medium     Insomnia 02/24/2015     Priority: Medium     S/P splenectomy 01/13/2015     Priority: Medium     Migraines      Priority: Medium     Idiopathic thrombocytopenic purpura (HCC)      Priority: Medium     s/p splenectomy       Tobacco abuse 05/30/2014     Priority: Medium      Immune thrombocytopenic purpura (H) 01/14/2008     Priority: Medium     Personality disorder 01/14/2008     Priority: Medium     Overview:   cluster B traits  borderline, antisocial       Nondependent cannabis abuse, episodic 01/14/2008     Priority: Medium     Mixed, or nondependent drug abuse 01/26/2007     Priority: Medium     Overview:   recurrent issues with opioids.  History of crack cocaine abuse.       Gastroesophageal reflux disease 12/20/2006     Priority: Medium     Herpetic gingivostomatitis 12/20/2006     Priority: Medium     Muscle pain 12/20/2006     Priority: Medium     Atopic rhinitis 12/20/2006     Priority: Medium     Abnormal weight loss 12/20/2006     Priority: Medium       Problem list and histories reviewed & adjusted, as indicated.  Additional history: as documented        Current Outpatient Prescriptions on File Prior to Visit:  Acetaminophen (TYLENOL PO) Take 1 tablet by mouth as needed   acyclovir (ZOVIRAX) 5 % ointment Apply 1 g topically as needed   albuterol (PROAIR HFA/PROVENTIL HFA/VENTOLIN HFA) 108 (90 BASE) MCG/ACT Inhaler Inhale 1-2 puffs into the lungs every 4 hours as needed for shortness of breath / dyspnea or wheezing   betamethasone valerate (VALISONE) 0.1 % ointment Apply once a day for 2 weeks as directed   buprenorphine-naloxone (SUBOXONE) 8-2 MG SUBL sublingual tablet Place 1 tablet under the tongue 2 times daily   BusPIRone HCl 30 MG TABS Take 30 mg by mouth 2 times daily   cetirizine (ZYRTEC) 10 MG tablet Take 1 tablet (10 mg) by mouth every evening   clobetasol (TEMOVATE) 0.05 % ointment APPLY TOPICALLY BID   cloNIDine (CATAPRES) 0.1 MG tablet Take 1 tablet (0.1 mg) by mouth 2 times daily Prn late withdrawal symptoms.    MG capsule TAKE 1 CAPSULE BY MOUTH TWICE DAILY AS NEEDED FOR CONSTIPATION   FLUoxetine (PROZAC) 20 MG capsule TAKE 1 CAPSULE BY MOUTH DAILY WITH FLUOXETINE 40MG. TOTAL DAILY DOSE OF 60MG.   FLUoxetine (PROZAC) 40 MG capsule TAKE 1 CAPSULE BY  MOUTH DAILY WITH A 20MG CAPSULE TO EQUAL TOTAL DAILY DOSE OF 60MG   fluticasone (FLONASE) 50 MCG/ACT spray Spray 2 sprays into both nostrils daily   Humidifiers (COOL MIST HUMIDIFIER) MISC 1 Device as needed   hydrocortisone 2.5 % cream    hydrOXYzine (ATARAX) 25 MG tablet Take 2 tablets (50 mg) by mouth nightly as needed for anxiety   ipratropium - albuterol 0.5 mg/2.5 mg/3 mL (DUONEB) 0.5-2.5 (3) MG/3ML neb solution Take 1 vial (3 mLs) by nebulization every 6 hours as needed for shortness of breath / dyspnea or wheezing   ISOtretinoin (ACCUTANE PO)    ketoconazole (NIZORAL) 2 % cream    Lidocaine 5 % CREA Externally apply 1 Film topically 4 times daily as needed   loratadine (CLARITIN) 10 MG tablet Take 1 tablet (10 mg) by mouth daily as needed for allergies   mirtazapine (REMERON) 15 MG tablet TAKE 1 TABLET BY MOUTH AT BEDTIME. CALL CLINIC IF TOO SEDATED DURING THE DAY   Neomycin-Bacitracin-Polymyxin ([employer] TRIPLE ANTIBIOTIC) OINT Externally apply topically 4 times daily   olopatadine HCl (PATADAY) 0.2 % SOLN Place 1 drop into both eyes daily   omeprazole (PRILOSEC) 40 MG capsule TAKE 1 CAPSULE(40 MG) BY MOUTH DAILY 30 TO 60 MINUTES BEFORE A MEAL   ondansetron (ZOFRAN-ODT) 4 MG ODT tab Take 1-2 tablets (4-8 mg) by mouth every 8 hours as needed for nausea   order for DME Equipment being ordered: bilateral wrist splints   order for DME Equipment being ordered: Nebulizer, tubing, and mask   order for DME Equipment being ordered: cool humidifier   polyethylene glycol (MIRALAX) powder Take 17 g (1 capful) by mouth daily   predniSONE (DELTASONE) 20 MG tablet Take 2 tablets by mouth once daily x 5 days then 1 tablet by mouth once daily x 5 days   traZODone (DESYREL) 100 MG tablet Take 1 tablet (100 mg) by mouth At Bedtime   traZODone (DESYREL) 50 MG tablet TAKE 1 TABLET(50 MG) BY MOUTH EVERY NIGHT AS NEEDED FOR SLEEP   triamcinolone (KENALOG) 0.1 % ointment Apply sparingly to affected area on back two times daily til  clear.   valACYclovir (VALTREX) 1000 mg tablet Take 1 tablet (1,000 mg) by mouth 2 times daily At the onset of skin lesions as needed   ZOLMitriptan (ZOMIG) 5 MG tablet Take 1 tablet (5 mg) by mouth at onset of headache for migraine May repeat in 2 hours. Max 2 tablets/24 hours.   ZOLMitriptan (ZOMIG) 5 MG tablet TAKE 1 TABLET BY MOUTH AT ONSET OF HEADACHE FOR MIGRAINE. MAY REPEAT DOSE IN 2 HOURS. DO NOT EXCEED 10MG IN 24 HOURS     Current Facility-Administered Medications on File Prior to Visit:  Lidocaine 1 % injection 9 mL   sodium bicarbonate 8.4 % injection 1 mEq          Allergies   Allergen Reactions     Acetaminophen-Codeine Hives     Amoxicillin-Pot Clavulanate Nausea and Vomiting     Augmentin Nausea and Vomiting and Hives     Barbiturates      Bentyl [Dicyclomine] Other (See Comments)     dizziness     Compazine      Lock-jaw     Liquid Adhesive      Other reaction(s): Other, see comments  blisters     No Clinical Screening - See Comments      PN: LW Reaction: blisters  PN: LW FI1: nka  PN: LW CM1: CONTRAST- nka Reaction :     Nsaids      D/t ITP     Prochlorperazine      Other reaction(s): Edema     Propoxyphene N-Apap Hives     Sulfa Drugs      Tramadol Hcl Hives            Adhesive Tape Rash         REVIEW OF SYSTEMS:  General: No acute withdrawal symptoms.  No recent infections or fever  Resp: No coughing, wheezing or shortness of breath  CV: No chest pains or palpitations  GI: No nausea, vomiting, abdominal pain, diarrhea, constipation  : No urinary frequency or dysuria,     Musculoskeletal: No significant muscle or joint pains, No edema  Neurologic: No numbness, tingling, weakness, problems with balance or coordination  Psychiatric: No acute concerns  Skin: No rashes    OBJECTIVE:    PHYSICAL EXAM:  /70 (BP Location: Left arm, Patient Position: Sitting, Cuff Size: Adult Regular)  Pulse 88  Temp 99.1  F (37.3  C) (Oral)  Resp 16  Wt 146 lb 6.4 oz (66.4 kg)  SpO2 98%  BMI 21.01  kg/m2    GENERAL APPEARANCE:  Uncomfortable appearing, photophobic.   EYES:Eyes grossly normal to inspection  NEURO:  Gait normal.  No tremor. Coordination intact.   MENTAL STATUS EXAM:  Appearance/Behavior: fatigued  Speech: Normal  Mood/Affect: depressed affect  Insight: Fair      Results for orders placed or performed in visit on 05/29/18   Urine Drugs of Abuse Screen Panel 13   Result Value Ref Range    Cannabinoids (51-gzu-2-carboxy-9-THC) Detected, Abnormal Result (A) NDET^Not Detected ng/mL    Phencyclidine (Phencyclidine) Not Detected NDET^Not Detected ng/mL    Cocaine (Benzoylecgonine) Not Detected NDET^Not Detected ng/mL    Methamphetamine (d-Methamphetamine) Not Detected NDET^Not Detected ng/mL    Opiates (Morphine) Not Detected NDET^Not Detected ng/mL    Amphetamine (d-Amphetamine) Not Detected NDET^Not Detected ng/mL    Benzodiazepines (Nordiazepam) Not Detected NDET^Not Detected ng/mL    Tricyclic Antidepressants (Desipramine) Not Detected NDET^Not Detected ng/mL    Methadone (Methadone) Not Detected NDET^Not Detected ng/mL    Barbiturates (Butalbital) Not Detected NDET^Not Detected ng/mL    Oxycodone (Oxycodone) Not Detected NDET^Not Detected ng/mL    Propoxyphene (Norpropoxyphene) Not Detected NDET^Not Detected ng/mL    Buprenorphine (Buprenorphine) Detected, Abnormal Result (A) NDET^Not Detected ng/mL           ASSESSMENT/PLAN:    (F11.20) Opioid use disorder, severe, dependence (H)  (primary encounter diagnosis)   Suboxone  8mg bid.  Rx #60  Try split dosing for better pain control.    Follow up one month.   Encourage meeting attendance and sponsorship    Risk of other substance use such and cannabis and recent opioid use for migraine on overall recovery discussed at lengths.    One time limited rx for Percocet given #12   To be used very sparingly.  Father and adult daughter will be keeping medication and dispensing.   Counseled the patient on the importance of having a recovery program in  addition to Medication assisted treatment.  Components include having some type of sober network, avoiding isolating, having willingness  to change, avoiding triggers and managing cravings.  Strongly recommended abstaining from alcohol, benzodiazepines, THC, opioids and other drugs of abuse.        Opioid warning reviewed.  Risk of overdose following a period of abstinence due to decrease tolerance was discussed including risk of death.   Risk of overdose if using Suboxone with other substances particuarly benzodiazepines/alcohol was reviewed.           (F14.21) History of cocaine dependence (H)  Plan: no recent use.  Encourage ongoing sobriety      (F33.1) Moderate episode of recurrent major depressive disorder (H)  (F41.1) Generalized anxiety disorder  Plan: Recommendations as above.   Follow with PCP/psychiatry as planned.  Encouraged mental health counseling.            (F17.200) Nicotine use disorder  Comment: not ready to quit  Plan: Encouraged Abstinence.  Increase risk of relapse with other substances with return to nicotine use discussed.  Risk of Ecig/Vaping also reviewed.         Migraine headache.   Risk of other substance use as treatment for migraine reviewed.  Other abortive therapy discussed.  Prevention reviewed.  Follow up with PCP asap for possible neurology and allergy follow up.  Xyzal rx sent.  Continue flonase.              ENCOUNTER FOR LONG TERM USE OF HIGH RISK MEDICATION   High Risk Drug Monitoring?  YES   Drug being monitored: Buprenorphine   Reason for drug: Opioid Use Disorder   What is being monitored?: Dosage, Cravings, Trigger, side effects, and continued abstinence.      Opioid warning reviewed.  Risk of overdose following a period of abstinence due to decrease tolerance was discussed including risk of death.   Risk of overdose if using Suboxone with other substances particuarly benzodiazepines/alcohol was reviewed.          Jessy Brenner MD  [facility]  Medicine  [phone]

## 2018-05-29 NOTE — MR AVS SNAPSHOT
"              After Visit Summary   5/29/2018    Coretta Tovar    MRN: [id number]           Patient Information     Date Of Birth          1970        Visit Information        Provider Department      5/29/2018 11:00 AM Jessy Brenner MD [facility]        Today's Diagnoses     Opioid use disorder, severe, dependence (H)    -  1    History of cocaine dependence (H)        Moderate episode of recurrent major depressive disorder (H)        Generalized anxiety disorder        Acute seasonal allergic rhinitis due to pollen           Follow-ups after your visit        Who to contact     If you have questions or need follow up information about today's clinic visit or your schedule please [facility] at [phone].  Normal or non-critical lab and imaging results will be communicated to you by MyChart, letter or phone within 4 business days after the clinic has received the results. If you do not hear from us within 7 days, please contact the clinic through [employer] or phone. If you have a critical or abnormal lab result, we will notify you by phone as soon as possible.  Submit refill requests through [employer] or call your pharmacy and they will forward the refill request to us. Please allow 3 business days for your refill to be completed.          Additional Information About Your Visit        MyChart Information     [employer] lets you send messages to your doctor, view your test results, renew your prescriptions, schedule appointments and more. To sign up, go to [email or web address] . Click on \"Log in\" on the left side of the screen, which will take you to the Welcome page. Then click on \"Sign up Now\" on the right side of the page.     You will be asked to enter the access code listed below, as well as some personal information. Please follow the directions to create your username and password.     Your access code is: [email or web address]  Expires: 6/17/2018  6:40 PM   "   Your access code will  in 90 days. If you need help or a new code, please call your [place] clinic or [phone].        Care EveryWhere ID     This is your Care EveryWhere ID. This could be used by other organizations to access your [place] medical records  [id number]        Your Vitals Were     Pulse Temperature Respirations Pulse Oximetry BMI (Body Mass Index)       88 99.1  F (37.3  C) (Oral) 16 98% 21.01 kg/m2        Blood Pressure from Last 3 Encounters:   18 108/70   18 126/82   18 102/65    Weight from Last 3 Encounters:   18 146 lb 6.4 oz (66.4 kg)   18 145 lb 3.2 oz (65.9 kg)   18 147 lb 6.4 oz (66.9 kg)              We Performed the Following     Urine Drugs of Abuse Screen Panel 13          Today's Medication Changes          These changes are accurate as of 18 11:34 AM.  If you have any questions, ask your nurse or doctor.               Start taking these medicines.        Dose/Directions    levocetirizine 5 MG tablet   Commonly known as:  XYZAL   Used for:  Acute seasonal allergic rhinitis due to pollen   Started by:  Jessy Brenner MD        Dose:  5 mg   Take 1 tablet (5 mg) by mouth every evening   Quantity:  30 tablet   Refills:  11       oxyCODONE-acetaminophen 5-325 MG per tablet   Commonly known as:  PERCOCET   Used for:  Opioid use disorder, severe, dependence (H)   Started by:  Jessy Brenner MD        Dose:  1 tablet   Take 1 tablet by mouth every 6 hours as needed for pain   Quantity:  12 tablet   Refills:  0            Where to get your medicines      These medications were sent to [facility] [id number] [place] [address] [address] [facility] AT [facility]  [address] [address]     Phone:  [phone]     levocetirizine 5 MG tablet    olopatadine HCl 0.2 % Soln         Some of these will need a paper prescription and others can be bought over the counter.  Ask your nurse if you have questions.      Bring a paper prescription for each of these medications     buprenorphine-naloxone 8-2 MG Subl sublingual tablet    oxyCODONE-acetaminophen 5-325 MG per tablet               Information about OPIOIDS     PRESCRIPTION OPIOIDS: WHAT YOU NEED TO KNOW   You have a prescription for an opioid (narcotic) pain medicine. Opioids can cause addiction. If you have a history of chemical dependency of any type, you are at a higher risk of becoming addicted to opioids. Only take this medicine after all other options have been tried. Take it for as short a time and as few doses as possible.     Do not:    Drive. If you drive while taking these medicines, you could be arrested for driving under the influence (DUI).    Operate heavy machinery    Do any other dangerous activities while taking these medicines.     Drink any alcohol while taking these medicines.      Take with any other medicines that contain acetaminophen. Read all labels carefully. Look for the word  acetaminophen  or  Tylenol.  Ask your pharmacist if you have questions or are unsure.    Store your pills in a secure place, locked if possible. We will not replace any lost or stolen medicine. If you don t finish your medicine, please throw away (dispose) as directed by your pharmacist. The [place] Pollution Control Agency has more information about safe disposal: https://www.pca.[facility].[place].us/living-green/managing-unwanted-medications    All opioids tend to cause constipation. Drink plenty of water and eat foods that have a lot of fiber, such as fruits, vegetables, prune juice, apple juice and high-fiber cereal. Take a laxative (Miralax, milk of magnesia, Colace, Senna) if you don t move your bowels at least every other day.          Primary Care Provider Office Phone # Fax #    NINOSKA [facility] [phone] [address] [facility] [address]        Equal Access to Services     ARELY JASON AH: Andrea Weeks  tawandada arabellablake, qaybta kawashington stevenson, pelon villegasaapaul ah. So [facility] [phone].    ATENCIÓN: Si rhett edwards, tiene a roberts disposición servicios gratuitos de asistencia lingüística. Sravanthi al [phone].    We comply with applicable federal civil rights laws and [place] laws. We do not discriminate on the basis of race, color, national origin, age, disability, sex, sexual orientation, or gender identity.            Thank you!     Thank you for choosing [facility]  for your care. Our goal is always to provide you with excellent care. Hearing back from our patients is one way we can continue to improve our services. Please take a few minutes to complete the written survey that you may receive in the mail after your visit with us. Thank you!             Your Updated Medication List - Protect others around you: Learn how to safely use, store and throw away your medicines at www.disposemymeds.org.          This list is accurate as of 5/29/18 11:34 AM.  Always use your most recent med list.                   Brand Name Dispense Instructions for use Diagnosis    ACCUTANE PO           acyclovir 5 % ointment    ZOVIRAX    30 g    Apply 1 g topically as needed    Recurrent cold sores       albuterol 108 (90 Base) MCG/ACT Inhaler    PROAIR HFA/PROVENTIL HFA/VENTOLIN HFA    18 g    Inhale 1-2 puffs into the lungs every 4 hours as needed for shortness of breath / dyspnea or wheezing    Mild intermittent reactive airway disease without complication       betamethasone valerate 0.1 % ointment    VALISONE     Apply once a day for 2 weeks as directed        buprenorphine-naloxone 8-2 MG Subl sublingual tablet    SUBOXONE    60 each    Place 1 tablet under the tongue 2 times daily    Opioid use disorder, severe, dependence (H)       BusPIRone HCl 30 MG Tabs     60 tablet    Take 30 mg by mouth 2 times daily    Generalized anxiety disorder       cetirizine 10 MG tablet    zyrTEC    90 tablet     Take 1 tablet (10 mg) by mouth every evening    Allergic state, subsequent encounter       clobetasol 0.05 % ointment    TEMOVATE     APPLY TOPICALLY BID        cloNIDine 0.1 MG tablet    CATAPRES    60 tablet    Take 1 tablet (0.1 mg) by mouth 2 times daily Prn late withdrawal symptoms.    Opioid use disorder, severe, dependence (H)       Cool Mist Humidifier Misc     1 each    1 Device as needed    Opioid use disorder, severe, dependence (H)       [employer] TRIPLE ANTIBIOTIC Oint     60 g    Externally apply topically 4 times daily    Allergic contact dermatitis due to adhesives        MG capsule   Generic drug:  docusate sodium     60 capsule    TAKE 1 CAPSULE BY MOUTH TWICE DAILY AS NEEDED FOR CONSTIPATION    Gastroesophageal reflux disease, esophagitis presence not specified       * FLUoxetine 40 MG capsule    PROzac    30 capsule    TAKE 1 CAPSULE BY MOUTH DAILY WITH A 20MG CAPSULE TO EQUAL TOTAL DAILY DOSE OF 60MG    Moderate episode of recurrent major depressive disorder (H), Generalized anxiety disorder       * FLUoxetine 20 MG capsule    PROzac    30 capsule    TAKE 1 CAPSULE BY MOUTH DAILY WITH FLUOXETINE 40MG. TOTAL DAILY DOSE OF 60MG.    Moderate episode of recurrent major depressive disorder (H), Generalized anxiety disorder       fluticasone 50 MCG/ACT spray    FLONASE    1 Bottle    Spray 2 sprays into both nostrils daily    Opioid use disorder, severe, dependence (H)       hydrocortisone 2.5 % cream           hydrOXYzine 25 MG tablet    ATARAX    60 tablet    Take 2 tablets (50 mg) by mouth nightly as needed for anxiety    Generalized anxiety disorder       ipratropium - albuterol 0.5 mg/2.5 mg/3 mL 0.5-2.5 (3) MG/3ML neb solution    DUONEB    90 vial    Take 1 vial (3 mLs) by nebulization every 6 hours as needed for shortness of breath / dyspnea or wheezing    Acute bronchitis with symptoms > 10 days       ketoconazole 2 % cream    NIZORAL          levocetirizine 5 MG tablet    XYZAL    30  tablet    Take 1 tablet (5 mg) by mouth every evening    Acute seasonal allergic rhinitis due to pollen       Lidocaine 5 % Crea     45 g    Externally apply 1 Film topically 4 times daily as needed    Bilateral carpal tunnel syndrome       loratadine 10 MG tablet    CLARITIN    30 tablet    Take 1 tablet (10 mg) by mouth daily as needed for allergies    Chronic nonseasonal allergic rhinitis due to pollen       mirtazapine 15 MG tablet    REMERON    30 tablet    TAKE 1 TABLET BY MOUTH AT BEDTIME. CALL CLINIC IF TOO SEDATED DURING THE DAY    Primary insomnia       olopatadine HCl 0.2 % Soln    PATADAY    2.5 mL    Place 1 drop into both eyes daily    Acute seasonal allergic rhinitis due to pollen       omeprazole 40 MG capsule    priLOSEC    90 capsule    TAKE 1 CAPSULE(40 MG) BY MOUTH DAILY 30 TO 60 MINUTES BEFORE A MEAL    Gastroesophageal reflux disease, esophagitis presence not specified       ondansetron 4 MG ODT tab    ZOFRAN-ODT    30 tablet    Take 1-2 tablets (4-8 mg) by mouth every 8 hours as needed for nausea    Migraine without status migrainosus, not intractable, unspecified migraine type       * order for DME     1 each    Equipment being ordered: cool humidifier    Painful respiration, Upper respiratory tract infection, unspecified type       * order for DME     2 each    Equipment being ordered: bilateral wrist splints    Bilateral carpal tunnel syndrome       * order for DME     1 each    Equipment being ordered: Nebulizer, tubing, and mask    Acute bronchitis with symptoms > 10 days       oxyCODONE-acetaminophen 5-325 MG per tablet    PERCOCET    12 tablet    Take 1 tablet by mouth every 6 hours as needed for pain    Opioid use disorder, severe, dependence (H)       polyethylene glycol powder    MIRALAX    510 g    Take 17 g (1 capful) by mouth daily    Drug-induced constipation       predniSONE 20 MG tablet    DELTASONE    15 tablet    Take 2 tablets by mouth once daily x 5 days then 1 tablet by  mouth once daily x 5 days    Pain in both hands       * traZODone 100 MG tablet    DESYREL    90 tablet    Take 1 tablet (100 mg) by mouth At Bedtime    Primary insomnia       * traZODone 50 MG tablet    DESYREL    30 tablet    TAKE 1 TABLET(50 MG) BY MOUTH EVERY NIGHT AS NEEDED FOR SLEEP    Primary insomnia       triamcinolone 0.1 % ointment    KENALOG    80 g    Apply sparingly to affected area on back two times daily til clear.    Rash       TYLENOL PO      Take 1 tablet by mouth as needed    Chondritis of both external ears       valACYclovir 1000 mg tablet    VALTREX    8 tablet    Take 1 tablet (1,000 mg) by mouth 2 times daily At the onset of skin lesions as needed    Recurrent cold sores       * ZOLMitriptan 5 MG tablet    ZOMIG    12 tablet    TAKE 1 TABLET BY MOUTH AT ONSET OF HEADACHE FOR MIGRAINE. MAY REPEAT DOSE IN 2 HOURS. DO NOT EXCEED 10MG IN 24 HOURS    Migraine without aura and without status migrainosus, not intractable       * ZOLMitriptan 5 MG tablet    ZOMIG    18 tablet    Take 1 tablet (5 mg) by mouth at onset of headache for migraine May repeat in 2 hours. Max 2 tablets/24 hours.    Migraine without aura and without status migrainosus, not intractable       * Notice:  This list has 9 medication(s) that are the same as other medications prescribed for you. Read the directions carefully, and ask your doctor or other care provider to review them with you.

## 2018-05-29 NOTE — PROGRESS NOTES
Results for orders placed or performed in visit on 05/29/18   Urine Drugs of Abuse Screen Panel 13   Result Value Ref Range    Cannabinoids (15-vla-7-carboxy-9-THC) Detected, Abnormal Result (A) NDET^Not Detected ng/mL    Phencyclidine (Phencyclidine) Not Detected NDET^Not Detected ng/mL    Cocaine (Benzoylecgonine) Not Detected NDET^Not Detected ng/mL    Methamphetamine (d-Methamphetamine) Not Detected NDET^Not Detected ng/mL    Opiates (Morphine) Not Detected NDET^Not Detected ng/mL    Amphetamine (d-Amphetamine) Not Detected NDET^Not Detected ng/mL    Benzodiazepines (Nordiazepam) Not Detected NDET^Not Detected ng/mL    Tricyclic Antidepressants (Desipramine) Not Detected NDET^Not Detected ng/mL    Methadone (Methadone) Not Detected NDET^Not Detected ng/mL    Barbiturates (Butalbital) Not Detected NDET^Not Detected ng/mL    Oxycodone (Oxycodone) Not Detected NDET^Not Detected ng/mL    Propoxyphene (Norpropoxyphene) Not Detected NDET^Not Detected ng/mL    Buprenorphine (Buprenorphine) Detected, Abnormal Result (A) NDET^Not Detected ng/mL

## 2018-05-31 ENCOUNTER — TELEPHONE (OUTPATIENT)
Dept: INTERNAL MEDICINE | Facility: CLINIC | Age: 48
End: 2018-05-31

## 2018-05-31 DIAGNOSIS — J30.2 CHRONIC SEASONAL ALLERGIC RHINITIS, UNSPECIFIED TRIGGER: ICD-10-CM

## 2018-05-31 DIAGNOSIS — G43.009 MIGRAINE WITHOUT AURA AND WITHOUT STATUS MIGRAINOSUS, NOT INTRACTABLE: Primary | ICD-10-CM

## 2018-05-31 NOTE — TELEPHONE ENCOUNTER
----- Message from Jessy Brenner MD sent at 5/29/2018  1:11 PM CDT -----  Regarding: referrals  Hi.  I continue to see Coretta and she is still struggling with migraine.  We discussed (if you feel appropriate) having her seen Neurology for more of a prophylactic focus and allergy (has done allergy shots in past but allergist retired) as this seems to be her main trigger.  I told her I would message you.     Jessy Brenner -Addiction med

## 2018-05-31 NOTE — TELEPHONE ENCOUNTER
Contacted patient informed her of message below patient states she does not have anything to write the numbers down right now. Patient states she will call back when she has time.   Please give her the numbers for the referrals.   Thank you   Kaycee

## 2018-05-31 NOTE — TELEPHONE ENCOUNTER
Left message for patient in regards to message below. Left her the phone numbers to schedule both referrals as well as our number if she has any questions.      Sharmila Tabor, CMA

## 2018-06-04 ENCOUNTER — TELEPHONE (OUTPATIENT)
Dept: ADDICTION MEDICINE | Facility: CLINIC | Age: 48
End: 2018-06-04

## 2018-06-04 DIAGNOSIS — H10.13 ALLERGIC CONJUNCTIVITIS, BILATERAL: Primary | ICD-10-CM

## 2018-06-04 NOTE — TELEPHONE ENCOUNTER
Olopatadine HCl (PATADAY) 0.2 % SOLN is not covered by insurance, however pharmacy said if a Rx for 1% is sent, most likely it will be covered.

## 2018-06-05 RX ORDER — OLOPATADINE HYDROCHLORIDE 1 MG/ML
1 SOLUTION/ DROPS OPHTHALMIC 2 TIMES DAILY
Qty: 5 ML | Refills: 3 | Status: SHIPPED | OUTPATIENT
Start: 2018-06-05

## 2018-06-07 DIAGNOSIS — F51.01 PRIMARY INSOMNIA: ICD-10-CM

## 2018-06-07 RX ORDER — TRAZODONE HYDROCHLORIDE 50 MG/1
TABLET, FILM COATED ORAL
Qty: 30 TABLET | Refills: 3 | OUTPATIENT
Start: 2018-06-07

## 2018-06-07 NOTE — TELEPHONE ENCOUNTER
Requested Prescriptions   Pending Prescriptions Disp Refills     traZODone (DESYREL) 50 MG tablet 30 tablet 3    There is no refill protocol information for this order      Last Written Prescription Date:  5-15-18  Last Fill Quantity: 30,  # refills: 3   Last office visit: 5/29/2018 with prescribing provider:  5-29-18   Future Office Visit:

## 2018-06-11 ENCOUNTER — TELEPHONE (OUTPATIENT)
Dept: ADDICTION MEDICINE | Facility: CLINIC | Age: 48
End: 2018-06-11

## 2018-06-11 NOTE — TELEPHONE ENCOUNTER
Prior Authorization Retail Medication Request    Medication/Dose: olopatadine (PATANOL) 0.1 % ophthalmic solution  ICD code (if different than what is on RX):  Allergic conjunctivitis, bilateral [H10.13]  Previously Tried and Failed:    Rationale:      Insurance Name:  [phone]  Insurance ID:  [id number]      Pharmacy Information (if different than what is on RX)  Name:  [facility]  Phone:  [phone]

## 2018-06-12 NOTE — TELEPHONE ENCOUNTER
Central Prior Authorization Team   Phone: [phone]    PA Initiation    Medication: olopatadine (PATANOL) 0.1 % ophthalmic solution  Insurance Company: [facility] - Phone [phone] Fax [phone]  Pharmacy Filling the Rx: [facility] [address] [place] [address] - [address] [place] [address] AT [facility]  Filling Pharmacy Phone: [phone]  Filling Pharmacy Fax: [phone]  Start Date: 6/12/2018

## 2018-06-13 ENCOUNTER — TELEPHONE (OUTPATIENT)
Dept: INTERNAL MEDICINE | Facility: CLINIC | Age: 48
End: 2018-06-13

## 2018-06-13 DIAGNOSIS — F51.01 PRIMARY INSOMNIA: ICD-10-CM

## 2018-06-13 DIAGNOSIS — F41.1 GENERALIZED ANXIETY DISORDER: ICD-10-CM

## 2018-06-13 RX ORDER — HYDROXYZINE HYDROCHLORIDE 25 MG/1
TABLET, FILM COATED ORAL
Qty: 60 TABLET | Refills: 3 | Status: SHIPPED | OUTPATIENT
Start: 2018-06-13 | End: 2019-01-01

## 2018-06-13 RX ORDER — TRAZODONE HYDROCHLORIDE 100 MG/1
100 TABLET ORAL AT BEDTIME
Qty: 90 TABLET | Refills: 1 | Status: SHIPPED | OUTPATIENT
Start: 2018-06-13 | End: 2019-01-01

## 2018-06-13 NOTE — TELEPHONE ENCOUNTER
Patient called RN hotline asking for a refill on her Trazodone 100mg    Prescription sent  Val Teran RN

## 2018-06-13 NOTE — TELEPHONE ENCOUNTER
PRIOR AUTHORIZATION DENIED    Medication: olopatadine (PATANOL) 0.1 % ophthalmic solution-DENIED    Denial Date: 6/12/2018    Denial Rational: PATIENT NEEDS TO TRY/FAIL CROMOLYN OPHTHALMIC SOLUTION 4%.        Appeal Information:  IF PATIENT IS UNABLE TO TRY/FAIL ALTERNATIVE(S) PLEASE SUPPLY PA TEAM WITH A LETTER OF MEDICAL NECESSITY WITH CLINICAL REASON.

## 2018-06-14 ENCOUNTER — TELEPHONE (OUTPATIENT)
Dept: FAMILY MEDICINE | Facility: CLINIC | Age: 48
End: 2018-06-14

## 2018-06-14 DIAGNOSIS — F51.01 PRIMARY INSOMNIA: ICD-10-CM

## 2018-06-14 DIAGNOSIS — F41.1 GENERALIZED ANXIETY DISORDER: ICD-10-CM

## 2018-06-14 DIAGNOSIS — F33.1 MODERATE EPISODE OF RECURRENT MAJOR DEPRESSIVE DISORDER (H): ICD-10-CM

## 2018-06-15 RX ORDER — FLUOXETINE 40 MG/1
CAPSULE ORAL
Qty: 30 CAPSULE | Refills: 0 | Status: SHIPPED | OUTPATIENT
Start: 2018-06-15 | End: 2018-01-01

## 2018-06-15 RX ORDER — TRAZODONE HYDROCHLORIDE 100 MG/1
100-300 TABLET ORAL
Qty: 90 TABLET | Refills: 0 | Status: SHIPPED | OUTPATIENT
Start: 2018-06-15 | End: 2018-01-01

## 2018-06-15 RX ORDER — MIRTAZAPINE 15 MG/1
TABLET, FILM COATED ORAL
Qty: 30 TABLET | Refills: 0 | Status: SHIPPED | OUTPATIENT
Start: 2018-06-15 | End: 2018-01-01

## 2018-06-15 RX ORDER — TRAZODONE HYDROCHLORIDE 50 MG/1
TABLET, FILM COATED ORAL
Qty: 30 TABLET | Refills: 0 | Status: CANCELLED | OUTPATIENT
Start: 2018-06-15

## 2018-06-15 NOTE — TELEPHONE ENCOUNTER
Called patient and left VM to call clinic to complete PHQ 9. Pink team number left.  Cyndi NGUYỄN CMA ([facility])

## 2018-06-15 NOTE — TELEPHONE ENCOUNTER
Left message on voicemail for patient to return call to RN hotline at # [phone].  Val Teran RN

## 2018-06-15 NOTE — TELEPHONE ENCOUNTER
Patient left message on RN hotline returning call.  Voice mail left for patient to call RN hotline at [phone].    Tita Link ANKIT

## 2018-06-15 NOTE — TELEPHONE ENCOUNTER
Please patient to clarify dose of trazodone - most recent prescription was for 100 mg. Schedule follow-up appointment and provide #1 month only   Jeanine Sims MD

## 2018-06-15 NOTE — TELEPHONE ENCOUNTER
Called patient and filled out phq9, and patient stated she has really bad allergies and would like to have eye drops for these asap, stated her symptoms are very bad.    Giovanna HOOD CMA ([facility])

## 2018-06-16 ASSESSMENT — PATIENT HEALTH QUESTIONNAIRE - PHQ9: SUM OF ALL RESPONSES TO PHQ QUESTIONS 1-9: 13

## 2018-06-19 ENCOUNTER — TELEPHONE (OUTPATIENT)
Dept: ADDICTION MEDICINE | Facility: CLINIC | Age: 48
End: 2018-06-19

## 2018-06-19 ENCOUNTER — OFFICE VISIT (OUTPATIENT)
Dept: ADDICTION MEDICINE | Facility: CLINIC | Age: 48
End: 2018-06-19
Payer: COMMERCIAL

## 2018-06-19 VITALS
DIASTOLIC BLOOD PRESSURE: 85 MMHG | TEMPERATURE: 98.2 F | WEIGHT: 152 LBS | BODY MASS INDEX: 21.81 KG/M2 | HEART RATE: 85 BPM | OXYGEN SATURATION: 98 % | RESPIRATION RATE: 20 BRPM | SYSTOLIC BLOOD PRESSURE: 135 MMHG

## 2018-06-19 DIAGNOSIS — F33.1 MODERATE EPISODE OF RECURRENT MAJOR DEPRESSIVE DISORDER (H): ICD-10-CM

## 2018-06-19 DIAGNOSIS — F11.20 OPIOID USE DISORDER, SEVERE, DEPENDENCE (H): Primary | ICD-10-CM

## 2018-06-19 DIAGNOSIS — J30.1 ACUTE SEASONAL ALLERGIC RHINITIS DUE TO POLLEN: ICD-10-CM

## 2018-06-19 DIAGNOSIS — G43.009 MIGRAINE WITHOUT AURA AND WITHOUT STATUS MIGRAINOSUS, NOT INTRACTABLE: ICD-10-CM

## 2018-06-19 DIAGNOSIS — F41.1 GENERALIZED ANXIETY DISORDER: ICD-10-CM

## 2018-06-19 LAB
AMPHETAMINES UR QL: NOT DETECTED NG/ML
BARBITURATES UR QL SCN: NOT DETECTED NG/ML
BENZODIAZ UR QL SCN: NOT DETECTED NG/ML
BUPRENORPHINE UR QL: ABNORMAL NG/ML
CANNABINOIDS UR QL: ABNORMAL NG/ML
COCAINE UR QL SCN: NOT DETECTED NG/ML
D-METHAMPHET UR QL: NOT DETECTED NG/ML
METHADONE UR QL SCN: NOT DETECTED NG/ML
OPIATES UR QL SCN: ABNORMAL NG/ML
OXYCODONE UR QL SCN: NOT DETECTED NG/ML
PCP UR QL SCN: NOT DETECTED NG/ML
PROPOXYPH UR QL: NOT DETECTED NG/ML
TRICYCLICS UR QL SCN: NOT DETECTED NG/ML

## 2018-06-19 PROCEDURE — 80306 DRUG TEST PRSMV INSTRMNT: CPT | Performed by: PEDIATRICS

## 2018-06-19 PROCEDURE — 99214 OFFICE O/P EST MOD 30 MIN: CPT | Performed by: PEDIATRICS

## 2018-06-19 RX ORDER — OXYCODONE AND ACETAMINOPHEN 5; 325 MG/1; MG/1
1 TABLET ORAL EVERY 6 HOURS PRN
Qty: 10 TABLET | Refills: 0 | Status: SHIPPED | OUTPATIENT
Start: 2018-06-19 | End: 2018-01-01

## 2018-06-19 RX ORDER — BUPRENORPHINE HYDROCHLORIDE AND NALOXONE HYDROCHLORIDE DIHYDRATE 8; 2 MG/1; MG/1
1 TABLET SUBLINGUAL 2 TIMES DAILY
Qty: 60 EACH | Refills: 0 | Status: SHIPPED | OUTPATIENT
Start: 2018-06-19 | End: 2018-01-01

## 2018-06-19 ASSESSMENT — PAIN SCALES - GENERAL: PAINLEVEL: EXTREME PAIN (8)

## 2018-06-19 NOTE — TELEPHONE ENCOUNTER
Central Prior Authorization Team   Phone: [phone]      [place] Initiation    Medication: oxyCODONE-acetaminophen (PERCOCET) 5-325 MG per tablet  Insurance Company: [employer] - Phone [phone] Fax [phone]  Pharmacy Filling the Rx: [facility] [address] [place], [place] - [address] [place] [address] AT [facility]  Filling Pharmacy Phone: [phone]  Filling Pharmacy Fax:    Start Date: 6/19/2018

## 2018-06-19 NOTE — TELEPHONE ENCOUNTER
Prior Authorization Retail Medication Request    Medication/Dose: oxyCODONE-acetaminophen (PERCOCET) 5-325 MG per tablet  ICD code (if different than what is on RX):  Opioid use disorder, severe, dependence (H) [F11.20]    Previously Tried and Failed:    Rationale:     Insurance Name:  [employer]  Insurance ID:  [id number]      Pharmacy Information (if different than what is on RX)  Name:  [facility]   Phone:  [phone]

## 2018-06-19 NOTE — MR AVS SNAPSHOT
After Visit Summary   6/19/2018    Coretta Tovar    MRN: [id number]           Patient Information     Date Of Birth          1970        Visit Information        Provider Department      6/19/2018 10:00 AM Jessy Brenner MD [facility]        Today's Diagnoses     Opioid use disorder, severe, dependence (H)    -  1    Moderate episode of recurrent major depressive disorder (H)        Generalized anxiety disorder        Acute seasonal allergic rhinitis due to pollen        Migraine without aura and without status migrainosus, not intractable           Follow-ups after your visit        Who to contact     If you have questions or need follow up information about today's clinic visit or your schedule please contact [facility] directly at [phone].  Normal or non-critical lab and imaging results will be communicated to you by MyChart, letter or phone within 4 business days after the clinic has received the results. If you do not hear from us within 7 days, please contact the clinic through MyChart or phone. If you have a critical or abnormal lab result, we will notify you by phone as soon as possible.  Submit refill requests through [employer] or call your pharmacy and they will forward the refill request to us. Please allow 3 business days for your refill to be completed.          Additional Information About Your Visit        Care EveryWhere ID     This is your Care EveryWhere ID. This could be used by other organizations to access your [facility] medical records  [id number]        Your Vitals Were     Pulse Temperature Respirations Pulse Oximetry BMI (Body Mass Index)       85 98.2  F (36.8  C) (Oral) 20 98% 21.81 kg/m2        Blood Pressure from Last 3 Encounters:   06/19/18 135/85   05/29/18 108/70   04/17/18 126/82    Weight from Last 3 Encounters:   06/19/18 152 lb (68.9 kg)   05/29/18 146 lb 6.4 oz (66.4 kg)   04/17/18 145 lb 3.2 oz (65.9 kg)              We  Performed the Following     Urine Drugs of Abuse Screen Panel 13          Where to get your medicines      Some of these will need a paper prescription and others can be bought over the counter.  Ask your nurse if you have questions.     Bring a paper prescription for each of these medications     buprenorphine-naloxone 8-2 MG Subl sublingual tablet    oxyCODONE-acetaminophen 5-325 MG per tablet         Information about OPIOIDS     PRESCRIPTION OPIOIDS: WHAT YOU NEED TO KNOW   We gave you an opioid (narcotic) pain medicine. It is important to manage your pain, but opioids are not always the best choice. You should first try all the other options your care team gave you. Take this medicine for as short a time (and as few doses) as possible.     These medicines have risks:    DO NOT drive when on new or higher doses of pain medicine. These medicines can affect your alertness and reaction times, and you could be arrested for driving under the influence (DUI). If you need to use opioids long-term, talk to your care team about driving.    DO NOT operate heave machinery    DO NOT do any other dangerous activities while taking these medicines.     DO NOT drink any alcohol while taking these medicines.      If the opioid prescribed includes acetaminophen, DO NOT take with any other medicines that contain acetaminophen. Read all labels carefully. Look for the word  acetaminophen  or  Tylenol.  Ask your pharmacist if you have questions or are unsure.    You can get addicted to pain medicines, especially if you have a history of addiction (chemical, alcohol or substance dependence). Talk to your care team about ways to reduce this risk.    Store your pills in a secure place, locked if possible. We will not replace any lost or stolen medicine. If you don t finish your medicine, please throw away (dispose) as directed by your pharmacist. The [place] [employer]ution [employer] Agency has more information about safe disposal:  https://www.pca.[facility].[place].us/living-green/managing-unwanted-medications.     All opioids tend to cause constipation. Drink plenty of water and eat foods that have a lot of fiber, such as fruits, vegetables, prune juice, apple juice and high-fiber cereal. Take a laxative (Miralax, milk of magnesia, Colace, Senna) if you don t move your bowels at least every other day.          Primary Care Provider Office Phone # Fax #    Sarah ParkinsonNINOSKA Betancourt [facility] [phone] [address]  [facility] [address]        Equal Access to Services     [facility] : Hadii aad carissa hadasho Sozebali, waaxda luqadaha, qaybta kaalmada adechaloyada, pelon alegre . So [facility] [phone].    ATENCIÓN: Si habla español, tiene a roberts disposición servicios gratuitos de asistencia lingüística. Llame al [phone].    We comply with applicable federal civil rights laws and [place] laws. We do not discriminate on the basis of race, color, national origin, age, disability, sex, sexual orientation, or gender identity.            Thank you!     Thank you for choosing [facility]  for your care. Our goal is always to provide you with excellent care. Hearing back from our patients is one way we can continue to improve our services. Please take a few minutes to complete the written survey that you may receive in the mail after your visit with us. Thank you!             Your Updated Medication List - Protect others around you: Learn how to safely use, store and throw away your medicines at www.disposemymeds.org.          This list is accurate as of 6/19/18 12:35 PM.  Always use your most recent med list.                   Brand Name Dispense Instructions for use Diagnosis    ACCUTANE PO           acyclovir 5 % ointment    ZOVIRAX    30 g    Apply 1 g topically as needed    Recurrent cold sores       albuterol 108 (90 Base) MCG/ACT Inhaler    PROAIR HFA/PROVENTIL HFA/VENTOLIN HFA    18 g     Inhale 1-2 puffs into the lungs every 4 hours as needed for shortness of breath / dyspnea or wheezing    Mild intermittent reactive airway disease without complication       betamethasone valerate 0.1 % ointment    VALISONE     Apply once a day for 2 weeks as directed        buprenorphine-naloxone 8-2 MG Subl sublingual tablet    SUBOXONE    60 each    Place 1 tablet under the tongue 2 times daily    Opioid use disorder, severe, dependence (H)       BusPIRone HCl 30 MG Tabs     60 tablet    TAKE 1 TABLET BY MOUTH TWICE DAILY    Generalized anxiety disorder       cetirizine 10 MG tablet    zyrTEC    90 tablet    Take 1 tablet (10 mg) by mouth every evening    Allergic state, subsequent encounter       clobetasol 0.05 % ointment    TEMOVATE     APPLY TOPICALLY BID        cloNIDine 0.1 MG tablet    CATAPRES    60 tablet    Take 1 tablet (0.1 mg) by mouth 2 times daily Prn late withdrawal symptoms.    Opioid use disorder, severe, dependence (H)       Cool Mist Humidifier Misc     1 each    1 Device as needed    Opioid use disorder, severe, dependence (H)       [employer] TRIPLE ANTIBIOTIC Oint     60 g    Externally apply topically 4 times daily    Allergic contact dermatitis due to adhesives        MG capsule   Generic drug:  docusate sodium     60 capsule    TAKE 1 CAPSULE BY MOUTH TWICE DAILY AS NEEDED FOR CONSTIPATION    Gastroesophageal reflux disease, esophagitis presence not specified       * FLUoxetine 20 MG capsule    PROzac    30 capsule    TAKE 1 CAPSULE BY MOUTH DAILY WITH FLUOXETINE 40MG. TOTAL DAILY DOSE OF 60MG.    Moderate episode of recurrent major depressive disorder (H), Generalized anxiety disorder       * FLUoxetine 40 MG capsule    PROzac    30 capsule    TAKE 1 CAPSULE BY MOUTH DAILY WITH A 20MG CAPSULE TO EQUAL TOTAL DAILY DOSE OF 60MG    Moderate episode of recurrent major depressive disorder (H), Generalized anxiety disorder       fluticasone 50 MCG/ACT spray    FLONASE    1 Bottle    Spray 2  sprays into both nostrils daily    Opioid use disorder, severe, dependence (H)       hydrocortisone 2.5 % cream           hydrOXYzine 25 MG tablet    ATARAX    60 tablet    TAKE 2 TABLETS BY MOUTH NIGHTLY AS NEEDED FOR ANXIETY    Generalized anxiety disorder       ipratropium - albuterol 0.5 mg/2.5 mg/3 mL 0.5-2.5 (3) MG/3ML neb solution    DUONEB    90 vial    Take 1 vial (3 mLs) by nebulization every 6 hours as needed for shortness of breath / dyspnea or wheezing    Acute bronchitis with symptoms > 10 days       ketoconazole 2 % cream    NIZORAL          levocetirizine 5 MG tablet    XYZAL    30 tablet    Take 1 tablet (5 mg) by mouth every evening    Acute seasonal allergic rhinitis due to pollen       Lidocaine 5 % Crea     45 g    Externally apply 1 Film topically 4 times daily as needed    Bilateral carpal tunnel syndrome       loratadine 10 MG tablet    CLARITIN    30 tablet    Take 1 tablet (10 mg) by mouth daily as needed for allergies    Chronic nonseasonal allergic rhinitis due to pollen       mirtazapine 15 MG tablet    REMERON    30 tablet    TAKE 1 TABLET BY MOUTH AT BEDTIME. CALL CLINIC IF TOO SEDATED DURING THE DAY    Primary insomnia       * olopatadine HCl 0.2 % Soln    PATADAY    2.5 mL    Place 1 drop into both eyes daily    Acute seasonal allergic rhinitis due to pollen       * olopatadine 0.1 % ophthalmic solution    PATANOL    5 mL    Place 1 drop into both eyes 2 times daily    Allergic conjunctivitis, bilateral       omeprazole 40 MG capsule    priLOSEC    90 capsule    TAKE 1 CAPSULE(40 MG) BY MOUTH DAILY 30 TO 60 MINUTES BEFORE A MEAL    Gastroesophageal reflux disease, esophagitis presence not specified       ondansetron 4 MG ODT tab    ZOFRAN-ODT    30 tablet    Take 1-2 tablets (4-8 mg) by mouth every 8 hours as needed for nausea    Migraine without status migrainosus, not intractable, unspecified migraine type       oxyCODONE-acetaminophen 5-325 MG per tablet    PERCOCET    10 tablet     Take 1 tablet by mouth every 6 hours as needed for pain    Opioid use disorder, severe, dependence (H)       polyethylene glycol powder    MIRALAX    510 g    Take 17 g (1 capful) by mouth daily    Drug-induced constipation       * traZODone 100 MG tablet    DESYREL    90 tablet    Take 1 tablet (100 mg) by mouth At Bedtime    Primary insomnia       * traZODone 100 MG tablet    DESYREL    90 tablet    Take 1-3 tablets (100-300 mg) by mouth nightly as needed for sleep    Primary insomnia       triamcinolone 0.1 % ointment    KENALOG    80 g    Apply sparingly to affected area on back two times daily til clear.    Rash       TYLENOL PO      Take 1 tablet by mouth as needed    Chondritis of both external ears       valACYclovir 1000 mg tablet    VALTREX    8 tablet    Take 1 tablet (1,000 mg) by mouth 2 times daily At the onset of skin lesions as needed    Recurrent cold sores       * ZOLMitriptan 5 MG tablet    ZOMIG    12 tablet    TAKE 1 TABLET BY MOUTH AT ONSET OF HEADACHE FOR MIGRAINE. MAY REPEAT DOSE IN 2 HOURS. DO NOT EXCEED 10MG IN 24 HOURS    Migraine without aura and without status migrainosus, not intractable       * ZOLMitriptan 5 MG tablet    ZOMIG    18 tablet    Take 1 tablet (5 mg) by mouth at onset of headache for migraine May repeat in 2 hours. Max 2 tablets/24 hours.    Migraine without aura and without status migrainosus, not intractable       * Notice:  This list has 8 medication(s) that are the same as other medications prescribed for you. Read the directions carefully, and ask your doctor or other care provider to review them with you.

## 2018-06-19 NOTE — PROGRESS NOTES
SUBJECTIVE:                                                    OPIOID USE DISORDER - BUPRENORPHINE FOLLOW UP:    Coretta Tovar is a 47 year old female who presents to clinic today for follow up of  MAT for opioid use disorder.       Date of last visit:  6/11/2018    [place] Board of Pharmacy Data Base Reviewed:    YES;     5/29 Suboxone 8mg tab #60  Oxycodone 5/325mg #12    Brief History:  Hx of multiple surgeries (>15).   Since age 14  9 surgeries on right foot in past 15months for cpmplex fx ankle and foot.  C spine fusion many years ago.   Hx migraine headache.  S/p hysterectomy, tonsillectomy, splenectomy, ear surgeries.  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        HPI:    Has appt upcoming with allergy next week.  Did try taking 4mg qid.  Lost or misplaced medication at work.  Is currently out unless she finds some.  Was keeping it split in two locations for home and work so may have some hidden away.    Has been working 10 hr days.  Feels work has been helpul in that there is air conditioning but is tired from long hours.   No air conditioning at current location with recent heat.  Still struggling with allergy sx and ongoing migraine headaches.  At last visit was given cautiously #12 Percocet 5/325. These lasted for 3 wk. Did take Vicodin from a friend 2 days ago.  Otherwise no use for past week.  Was unaware of neurology refer.  Stressed importance of this.   Has not been attending meetings.        Status since last visit: Since last visit patient has been:struggling    Intensity:     There has been: no craving    Suboxone Dose: adequate but ongoing headache    Progression of Symptoms/Precipitating Factors:     Cues to use and relapse triggers:Pain, Anxiety and Depression    Trigger have been:  moderate    Accompanying Signs & Symptoms:    Side Effects: none    Sobriety:     Status:  patient has had opioid use     Drug Screen: obtained    Alleviating factors/Other Therapies Tried:    Contact with sponsor has  been: no sponsor    Family and support system has been: helpful    Patient has been going to recovery meetings: sporadically    Recovery program has been : sporadic    Patient has been participating in professional counseling /therapy: NO    Patient is following with psychiatry: NO    Patient has established PCP: YES        Social History     Social History Narrative    Lives with dad and brother in [place].      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      Daughter with recent DWI (she has child).        Patient Active Problem List    Diagnosis Date Noted     History of cocaine dependence (H) 09/07/2017     Priority: Medium     Moderate episode of recurrent major depressive disorder (H) 03/22/2017     Priority: Medium     Generalized anxiety disorder 03/22/2017     Priority: Medium     Protein-calorie malnutrition (H) 03/22/2017     Priority: Medium     Primary insomnia 03/22/2017     Priority: Medium     Vertigo 03/14/2017     Priority: Medium     Cervical pain 03/08/2017     Priority: Medium     Nutritional disorder 11/21/2016     Priority: Medium     Disorder of stomach 11/20/2016     Priority: Medium     Overview:   Per Biopsy obtained on EGD 11/22/2016. Reactive gastropathy was quoted to be frequently associated with ongoing non-inflammatory type mucosal injury due to a chemical type of injury; this may be due to ingestion of non-steroidal anti-inflammatory drugs, aspirin, excess alcohol, or corticosteroids.        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     Priority: Medium     Chemical dependency (H) 02/24/2015     Priority: Medium     Insomnia 02/24/2015     Priority: Medium     S/P splenectomy 01/13/2015     Priority: Medium     Migraines      Priority: Medium     Idiopathic thrombocytopenic purpura (HCC)      Priority: Medium     s/p splenectomy       Tobacco abuse 05/30/2014     Priority: Medium     Immune  thrombocytopenic purpura (H) 01/14/2008     Priority: Medium     Personality disorder 01/14/2008     Priority: Medium     Overview:   cluster B traits  borderline, antisocial       Nondependent cannabis abuse, episodic 01/14/2008     Priority: Medium     Mixed, or nondependent drug abuse 01/26/2007     Priority: Medium     Overview:   recurrent issues with opioids.  History of crack cocaine abuse.       Gastroesophageal reflux disease 12/20/2006     Priority: Medium     Herpetic gingivostomatitis 12/20/2006     Priority: Medium     Muscle pain 12/20/2006     Priority: Medium     Atopic rhinitis 12/20/2006     Priority: Medium     Abnormal weight loss 12/20/2006     Priority: Medium       Problem list and histories reviewed & adjusted, as indicated.  Additional history: as documented        Current Outpatient Prescriptions on File Prior to Visit:  Acetaminophen (TYLENOL PO) Take 1 tablet by mouth as needed   acyclovir (ZOVIRAX) 5 % ointment Apply 1 g topically as needed   albuterol (PROAIR HFA/PROVENTIL HFA/VENTOLIN HFA) 108 (90 BASE) MCG/ACT Inhaler Inhale 1-2 puffs into the lungs every 4 hours as needed for shortness of breath / dyspnea or wheezing   betamethasone valerate (VALISONE) 0.1 % ointment Apply once a day for 2 weeks as directed   buprenorphine-naloxone (SUBOXONE) 8-2 MG SUBL sublingual tablet Place 1 tablet under the tongue 2 times daily   BusPIRone HCl 30 MG TABS TAKE 1 TABLET BY MOUTH TWICE DAILY   cetirizine (ZYRTEC) 10 MG tablet Take 1 tablet (10 mg) by mouth every evening   clobetasol (TEMOVATE) 0.05 % ointment APPLY TOPICALLY BID   cloNIDine (CATAPRES) 0.1 MG tablet Take 1 tablet (0.1 mg) by mouth 2 times daily Prn late withdrawal symptoms.    MG capsule TAKE 1 CAPSULE BY MOUTH TWICE DAILY AS NEEDED FOR CONSTIPATION   FLUoxetine (PROZAC) 20 MG capsule TAKE 1 CAPSULE BY MOUTH DAILY WITH FLUOXETINE 40MG. TOTAL DAILY DOSE OF 60MG.   FLUoxetine (PROZAC) 40 MG capsule TAKE 1 CAPSULE BY MOUTH  DAILY WITH A 20MG CAPSULE TO EQUAL TOTAL DAILY DOSE OF 60MG   fluticasone (FLONASE) 50 MCG/ACT spray Spray 2 sprays into both nostrils daily   Humidifiers (COOL MIST HUMIDIFIER) MISC 1 Device as needed   hydrocortisone 2.5 % cream    hydrOXYzine (ATARAX) 25 MG tablet TAKE 2 TABLETS BY MOUTH NIGHTLY AS NEEDED FOR ANXIETY   ipratropium - albuterol 0.5 mg/2.5 mg/3 mL (DUONEB) 0.5-2.5 (3) MG/3ML neb solution Take 1 vial (3 mLs) by nebulization every 6 hours as needed for shortness of breath / dyspnea or wheezing   ISOtretinoin (ACCUTANE PO)    ketoconazole (NIZORAL) 2 % cream    levocetirizine (XYZAL) 5 MG tablet Take 1 tablet (5 mg) by mouth every evening   Lidocaine 5 % CREA Externally apply 1 Film topically 4 times daily as needed   loratadine (CLARITIN) 10 MG tablet Take 1 tablet (10 mg) by mouth daily as needed for allergies   mirtazapine (REMERON) 15 MG tablet TAKE 1 TABLET BY MOUTH AT BEDTIME. CALL CLINIC IF TOO SEDATED DURING THE DAY   Neomycin-Bacitracin-Polymyxin ([employer] TRIPLE ANTIBIOTIC) OINT Externally apply topically 4 times daily   olopatadine (PATANOL) 0.1 % ophthalmic solution Place 1 drop into both eyes 2 times daily   olopatadine HCl (PATADAY) 0.2 % SOLN Place 1 drop into both eyes daily   omeprazole (PRILOSEC) 40 MG capsule TAKE 1 CAPSULE(40 MG) BY MOUTH DAILY 30 TO 60 MINUTES BEFORE A MEAL   ondansetron (ZOFRAN-ODT) 4 MG ODT tab Take 1-2 tablets (4-8 mg) by mouth every 8 hours as needed for nausea   oxyCODONE-acetaminophen (PERCOCET) 5-325 MG per tablet Take 1 tablet by mouth every 6 hours as needed for pain   polyethylene glycol (MIRALAX) powder Take 17 g (1 capful) by mouth daily   traZODone (DESYREL) 100 MG tablet Take 1-3 tablets (100-300 mg) by mouth nightly as needed for sleep   traZODone (DESYREL) 100 MG tablet Take 1 tablet (100 mg) by mouth At Bedtime   triamcinolone (KENALOG) 0.1 % ointment Apply sparingly to affected area on back two times daily til clear.   valACYclovir (VALTREX) 1000 mg  tablet Take 1 tablet (1,000 mg) by mouth 2 times daily At the onset of skin lesions as needed   ZOLMitriptan (ZOMIG) 5 MG tablet Take 1 tablet (5 mg) by mouth at onset of headache for migraine May repeat in 2 hours. Max 2 tablets/24 hours.   ZOLMitriptan (ZOMIG) 5 MG tablet TAKE 1 TABLET BY MOUTH AT ONSET OF HEADACHE FOR MIGRAINE. MAY REPEAT DOSE IN 2 HOURS. DO NOT EXCEED 10MG IN 24 HOURS     Current Facility-Administered Medications on File Prior to Visit:  Lidocaine 1 % injection 9 mL   sodium bicarbonate 8.4 % injection 1 mEq          Allergies   Allergen Reactions     Acetaminophen-Codeine Hives     Amoxicillin-Pot Clavulanate Nausea and Vomiting     Augmentin Nausea and Vomiting and Hives     Barbiturates      Bentyl [Dicyclomine] Other (See Comments)     dizziness     Compazine      Lock-jaw     Liquid Adhesive      Other reaction(s): Other, see comments  blisters     No Clinical Screening - See Comments      PN: LW Reaction: blisters  PN: LW FI1: nka  PN: LW CM1: CONTRAST- nka Reaction :     Nsaids      D/t ITP     Prochlorperazine      Other reaction(s): Edema     Propoxyphene N-Apap Hives     Sulfa Drugs      Tramadol Hcl Hives            Adhesive Tape Rash         REVIEW OF SYSTEMS:  General: No acute withdrawal symptoms.  No recent infections or fever  Resp: No coughing, wheezing or shortness of breath  CV: No chest pains or palpitations  GI: No nausea, vomiting, abdominal pain, diarrhea, constipation  : No urinary frequency or dysuria,     Musculoskeletal: No significant muscle or joint pains, No edema  Neurologic: No numbness, tingling, weakness, problems with balance or coordination  Psychiatric: No acute concerns  Skin: No rashes    OBJECTIVE:    PHYSICAL EXAM:  There were no vitals taken for this visit.    GENERAL APPEARANCE:  alert, comfortable appearing  EYES:Eyes grossly normal to inspection  NEURO:  Gait normal.  No tremor. Coordination intact.   MENTAL STATUS EXAM:  Appearance/Behavior: No  appearant distress  Speech: Normal  Mood/Affect: normal affect  Insight: Adequate      Results for orders placed or performed in visit on 06/19/18   Urine Drugs of Abuse Screen Panel 13   Result Value Ref Range    Cannabinoids (28-tkw-6-carboxy-9-THC) Detected, Abnormal Result (A) NDET^Not Detected ng/mL    Phencyclidine (Phencyclidine) Not Detected NDET^Not Detected ng/mL    Cocaine (Benzoylecgonine) Not Detected NDET^Not Detected ng/mL    Methamphetamine (d-Methamphetamine) Not Detected NDET^Not Detected ng/mL    Opiates (Morphine) Detected, Abnormal Result (A) NDET^Not Detected ng/mL    Amphetamine (d-Amphetamine) Not Detected NDET^Not Detected ng/mL    Benzodiazepines (Nordiazepam) Not Detected NDET^Not Detected ng/mL    Tricyclic Antidepressants (Desipramine) Not Detected NDET^Not Detected ng/mL    Methadone (Methadone) Not Detected NDET^Not Detected ng/mL    Barbiturates (Butalbital) Not Detected NDET^Not Detected ng/mL    Oxycodone (Oxycodone) Not Detected NDET^Not Detected ng/mL    Propoxyphene (Norpropoxyphene) Not Detected NDET^Not Detected ng/mL    Buprenorphine (Buprenorphine) Detected, Abnormal Result (A) NDET^Not Detected ng/mL           ASSESSMENT/PLAN:  (F11.20) Opioid use disorder, severe, dependence (H)  (primary encounter diagnosis)   Suboxone  8mg bid.  Rx #60  Try split dosing for better pain control  Safe storage reviewed.  Discussed insurance will likely not fill until closer to fill date.  Patient will be going home to search for other medication and will call with follow up.  Possible need for PA discussed. .    Follow up one month or sooner with ongoing opioid use.          Risk of other substance use such and cannabis and recent opioid use for migraine on overall recovery discussed at lengths.    One time limited rx for Percocet given #10   To be used very sparingly.  Father and adult daughter will be keeping medication and dispensing.   No further RX planned.    Counseled the patient on  the importance of having a recovery program in addition to Medication assisted treatment.  Components include having some type of sober network, avoiding isolating, having willingness  to change, avoiding triggers and managing cravings.  Strongly recommended abstaining from alcohol, benzodiazepines, THC, opioids and other drugs of abuse.         Opioid warning reviewed.  Risk of overdose following a period of abstinence due to decrease tolerance was discussed including risk of death.   Risk of overdose if using Suboxone with other substances particuarly benzodiazepines/alcohol was reviewed.           (F14.21) History of cocaine dependence (H)  Plan: no recent use.  Encourage ongoing sobriety      (F33.1) Moderate episode of recurrent major depressive disorder (H)  (F41.1) Generalized anxiety disorder  Plan: Recommendations as above.   Follow with PCP/psychiatry as planned.  Encouraged mental health counseling.             (F17.200) Nicotine use disorder  Comment: not ready to quit  Plan: Encouraged Abstinence.  Increase risk of relapse with other substances with return to nicotine use discussed.  Risk of Ecig/Vaping also reviewed.         Migraine headache.   Risk of other substance use as treatment for migraine reviewed.  Other abortive therapy discussed.  Prevention reviewed.  Follow up with Neurology.  Refer info given.  Headache diary for appt.  Limit use of OTC analgesics to avoid rebound headaches.  Allergy follow up as scheduled.   Xyzal rx sent.  Continue flonase.                ENCOUNTER FOR LONG TERM USE OF HIGH RISK MEDICATION                        High Risk Drug Monitoring?  YES                        Drug being monitored: Buprenorphine                        Reason for drug: Opioid Use Disorder                        What is being monitored?: Dosage, Cravings, Trigger, side effects, and continued abstinence.        Opioid warning reviewed.  Risk of overdose following a period of abstinence due to  decrease tolerance was discussed including risk of death.   Risk of overdose if using Suboxone with other substances particuarly benzodiazepines/alcohol was reviewed.       ENCOUNTER FOR LONG TERM USE OF HIGH RISK MEDICATION   High Risk Drug Monitoring?  YES   Drug being monitored: Buprenorphine   Reason for drug: Opioid Use Disorder   What is being monitored?: Dosage, Cravings, Trigger, side effects, and continued abstinence.      Opioid warning reviewed.  Risk of overdose following a period of abstinence due to decrease tolerance was discussed including risk of death.   Risk of overdose if using Suboxone with other substances particuarly benzodiazepines/alcohol was reviewed.          Jessy Brenner MD  [facility]  [phone]

## 2018-06-20 NOTE — TELEPHONE ENCOUNTER
Medication Appeal Initiation    We have initiated an appeal for the requested medication:  Medication: oxyCODONE-acetaminophen (PERCOCET) 5-325 MG per tablet  Appeal Start Date:  6/20/2018  Insurance Company: [employer] [place] - Phone [phone] Fax [phone]  Comments:

## 2018-06-20 NOTE — TELEPHONE ENCOUNTER
PRIOR AUTHORIZATION DENIED    Medication: oxyCODONE-acetaminophen (PERCOCET) 5-325 MG per tablet - Denied    Denial Date: 6/20/2018    Denial Rational:      Appeal Information:    If you would like to appeal, please supply P/A team with a letter of medical necessity with clinical reason.

## 2018-06-20 NOTE — TELEPHONE ENCOUNTER
Patient called, she would like us to ask for a rush appeal from the insurance company. In order to start the appeal we need a letter of medical necessity. Once that is in the patient's chart please route the encounter back to us so that we can get the appeal started with the patient's insurance.   Thank You.

## 2018-06-20 NOTE — TELEPHONE ENCOUNTER
Patient called and stated - she spoke with her INS and they need clarification from MD that the patient is being weened off suboxone and replacing this with the percocet for control of pain

## 2018-06-23 ENCOUNTER — TRANSFERRED RECORDS (OUTPATIENT)
Dept: HEALTH INFORMATION MANAGEMENT | Facility: CLINIC | Age: 48
End: 2018-06-23

## 2018-07-02 NOTE — TELEPHONE ENCOUNTER
"Chart reviewed. Clonidine is not a new medication for patient.   RN called patient. She stated she's always had the dizziness but it wasn't as bad. She stated she stopped taking Clonidine for a few days, and when she restarted them she got \"really dizzy.\" She stated she wasn't bothered by the side effects before because she was taking it right before bed.      Hot flashes began to become bothersome during the day last week. She started taking Clonidine in the am, but this caused episodes of extreme dizziness and \"black outs.\"  Patient reported she was unable to continue driving during one episode.     Patient stopped taking it Saturday after a \"really bad black out.\"    Patient has found Clonidine very helpful for hot flashes but she can't tolerate the dizziness. Patient wants to know if she should restart Clonidine and what time of day she could take it to be helpful for daytime hot flashes but not cause her extreme dizziness.   "

## 2018-07-02 NOTE — TELEPHONE ENCOUNTER
Patient should be seen by PCP asap.  Please advise.  Would recommend UTOX at that visit as well.  Would stop Clonidine at this time.

## 2018-07-02 NOTE — TELEPHONE ENCOUNTER
New referral printed and faxed to Dr. Lance Best ([facility]) at [phone].  Olamide Baltazar,

## 2018-07-02 NOTE — TELEPHONE ENCOUNTER
Dr. Aury Melo and Dr. Lance Best are from the same group ([facility]), however, Dr. Melo has retired, so I think new referral for Dr. Best needs to be faxed to them ([phone]).  Olamide Baltazar,

## 2018-07-02 NOTE — TELEPHONE ENCOUNTER
I had previously put in a referral for Dr. Garcia for ENT for patient.  Is Dr. Best on Dr. Garcia's team?  If so, Okay for referral.    Sarah Montemayor, CNP

## 2018-07-02 NOTE — TELEPHONE ENCOUNTER
Patient is restricted and has an ENT appointment today at 10am and would like a referral to see Dr. Best in [place].  She is being seen for her allergies.   Referral pending, please advise   Tita Brown RN

## 2018-07-02 NOTE — TELEPHONE ENCOUNTER
"RN spoke to patient. She will call for PCP appointment today, but if she can't get in she doesn't know when she can go.    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    Patient thinks the blackouts were \"heat strokes.\" She is not currently concerned about them, \"I haven't had any problems since Saturday.\"     Patient agreed to f/u with her PCP and to submit a urine sample when she goes. She stated she hasn't smoke weed in >3 weeks and has not used anything other substances other than what she talked about in her last clinic.  "

## 2018-07-02 NOTE — TELEPHONE ENCOUNTER
Pt called about side effects to med (Clonidine 0.1 mg), pt states that this started a few days ago but got really bad on Saturday (6/30) she was dizzy and she block out a few times, and her speech was slurred.   Pt contact info: [phone]      Samm Alcala

## 2018-07-03 NOTE — TELEPHONE ENCOUNTER
Pt called requesting a call back from a nurse.   Pt contact info: [phone]      Samm Alcala

## 2018-07-03 NOTE — TELEPHONE ENCOUNTER
Patient called to report she was unable to get a PCP appointment till Thursday. She was unable to take this due to her new job. She stated she can't go to the  because she is a restricted patient.     She reports feeling well physically and has not had further black out episodes. She is convinced she had heat stroke but does not wish to restart Propanolol due to overall dizziness with it.     She will f/u in clinic 7/17/18.

## 2018-07-09 NOTE — TELEPHONE ENCOUNTER
Prior Authorization Retail Medication Request    Medication/Dose: Acyclovir 5% ointment 30gm  ICD code (if different than what is on RX):  B00.1  Previously Tried and Failed:  unknown      Insurance Name:  [employer] [place]  Insurance ID:  [id number]       Pharmacy Information (if different than what is on RX)  Name:  [facility]  Phone:  [phone]  Fax:[phone]

## 2018-07-09 NOTE — TELEPHONE ENCOUNTER
Central Prior Authorization Team   Phone: [phone]      PA Initiation    Medication: Acyclovir 5%  Insurance Company: [employer] [place] - Phone [phone] Fax [phone]  Pharmacy Filling the Rx: [facility] [address] [place] [address] - [address] [place] [address] & [facility]  Filling Pharmacy Phone: [phone]  Filling Pharmacy Fax:    Start Date: 7/9/2018

## 2018-07-09 NOTE — LETTER
2018    INSURER: Payor: [employer] / Plan: [employer] [place] / Product Type: HMO /   ATTN:   Re: Prior Authorization Request  Patient: Coretta Tovar  Policy ID#:  [id number]  : 1970      To Whom it May Concern:    I am writing to formally request a prior authorization of coverage for my patient,  Coretta Tovar, for treatment using acyclovir gel.  I am requesting authorization for applicable provider professional and facility services associated with this therapy.    The therapy involves valacyclovir and acyclovir gel.  Patient has aspenia and has frequent herpes out breaks.  It generally takes both valacyclovir and acyclovir gel to control outbreaks.      The benefits of the therapy include earlier resolution of herpes outbreak.    I have included medical records pertaining to the patient s medical history, current condition and treatment plan.  In addition, the following billing codes will be used for therapy and follow-up: B00.2.    I firmly believe that this therapy is clinically appropriate and that Coretta Tovar would benefit from improved [clinical outcomes, quality of life] if allowed the opportunity to receive this treatment.  Please contact me at Dept: [phone] if you require additional information to ensure the prompt approval for coverage.    Please send your written decision to me at this address:  [address]  [place] [id number] [phone]  Dept: [phone]        Sincerely,      Sarah Montemayor, NARCISA Serranoosures

## 2018-07-11 NOTE — TELEPHONE ENCOUNTER
PRIOR AUTHORIZATION DENIED    Medication: Acyclovir 5%    Denial Date: 7/10/2018    Denial Rational:      Appeal Information:    If you would like to appeal, please supply P/A team with a letter of medical necessity with clinical reason.

## 2018-07-11 NOTE — TELEPHONE ENCOUNTER
Please gather more information.  I know that patient is already on valacyclovir prn for herpes outbreaks.  Are symptoms severe and does additional acyclovir gel help reduce symptoms?    Sarah Montemayor, CNP

## 2018-07-11 NOTE — TELEPHONE ENCOUNTER
Called patient and left VM to call clinic in regards to below message from provider. Pink team number left.  Cyndi NGUYỄN CMA ([facility])

## 2018-07-11 NOTE — TELEPHONE ENCOUNTER
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  She stated that symptoms do get severe and Acyclovir helps    Val Teran RN

## 2018-07-13 NOTE — TELEPHONE ENCOUNTER
Medication Appeal Initiation    We have initiated an appeal for the requested medication:  Medication: Acyclovir 5%  Appeal Start Date:  7/13/2018  Insurance Company: [employer] [place] - Phone [phone] Fax [phone]  Comments:

## 2018-07-17 NOTE — TELEPHONE ENCOUNTER
MEDICATION APPEAL DENIED    Medication: oxyCODONE-acetaminophen (PERCOCET) 5-325 MG per tablet - Denied    Denial Date: 7/17/2018    Denial Rational:      Second Level Appeal Information:      Second level appeals will be managed by the clinic staff and provider. Please contact the [employer] Prior Authorization Team if additional information about the denial is needed.

## 2018-07-17 NOTE — PROGRESS NOTES
SUBJECTIVE:                                                    OPIOID USE DISORDER - BUPRENORPHINE FOLLOW UP:    Coretta Tovar is a 48 year old female who presents to clinic today for follow up of  MAT for opioid use disorder.       Date of last visit:  6/19/2018    [place] [facility] Data Base Reviewed:    YES;     6/24/18  Suboxone 8mg #60     Brief History: Brief History:  Hx of multiple surgeries (>15).   Since age 14  9 surgeries on right foot in past 15months for cpmplex fx ankle and foot.  C spine fusion many years ago.   Hx migraine headache.  S/p hysterectomy, tonsillectomy, splenectomy, ear surgeries.  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          HPI:   6/19/18    Has appt upcoming with allergy next week.  Did try taking 4mg qid.  Lost or misplaced medication at work.  Is currently out unless she finds some.  Was keeping it split in two locations for home and work so may have some hidden away.    Has been working 10 hr days.  Feels work has been helpul in that there is air conditioning but is tired from long hours.   No air conditioning at current location with recent heat.  Still struggling with allergy sx and ongoing migraine headaches.  At last visit was given cautiously #12 Percocet 5/325. These lasted for 3 wk. Did take Vicodin from a friend 2 days ago.  Otherwise no use for past week.  Was unaware of neurology refer.  Stressed importance of this.   Has not been attending meetings.               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  Did go to allergist.  Would have to restart testing and injections.  Would not have time to do this at current time.  Using Xyzal but doesn't seem to help.  Also taking Flonase but does have trouble remembering this.  Allergies not well controlled and this has been trigger for migraine in past.      Percocet use last Friday.  Has rx for few tablets to use for severe migraine with expectation that follow up will occur for possible other treatment  options.   Will be going to get a back support brace to use for lifting at work.   Not attending meetings at this time.  Still taking Suboxone 8mg bid.  Has been using clonidine at hs only due to dizziness after taking during day.      Status since last visit: Since last visit patient has been:stable    Intensity:     There has been: mild intermittent craving    Suboxone Dose: adequate    Progression of Symptoms/Precipitating Factors:     Cues to use and relapse triggers:Anxiety, Depression and Physical illness    Trigger have been:  moderate    Accompanying Signs & Symptoms:    Side Effects: none    Sobriety:     Status: No substance use     Drug Screen: obtained    Alleviating factors/Other Therapies Tried:    Contact with sponsor has been: no sponsor    Family and support system has been: neutral    Patient has been going to recovery meetings: regularly    Recovery program has been : active    Patient has been participating in professional counseling /therapy: no    Patient is following with psychiatry: NO    Patient has established PCP: YES        Social History     Social History Narrative    Lives with dad and brother in [place].      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      Daughter with recent DWI (she has child).        Patient Active Problem List    Diagnosis Date Noted     History of cocaine dependence (H) 09/07/2017     Priority: Medium     Moderate episode of recurrent major depressive disorder (H) 03/22/2017     Priority: Medium     Generalized anxiety disorder 03/22/2017     Priority: Medium     Protein-calorie malnutrition (H) 03/22/2017     Priority: Medium     Primary insomnia 03/22/2017     Priority: Medium     Vertigo 03/14/2017     Priority: Medium     Cervical pain 03/08/2017     Priority: Medium     Nutritional disorder 11/21/2016     Priority: Medium     Disorder of stomach 11/20/2016     Priority: Medium     Overview:   Per Biopsy obtained on EGD 11/22/2016. Reactive gastropathy was quoted to be  frequently associated with ongoing non-inflammatory type mucosal injury due to a chemical type of injury; this may be due to ingestion of non-steroidal anti-inflammatory drugs, aspirin, excess alcohol, or corticosteroids.        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     Priority: Medium     Chemical dependency (H) 02/24/2015     Priority: Medium     Insomnia 02/24/2015     Priority: Medium     S/P splenectomy 01/13/2015     Priority: Medium     Migraines      Priority: Medium     Idiopathic thrombocytopenic purpura (HCC)      Priority: Medium     s/p splenectomy       Tobacco abuse 05/30/2014     Priority: Medium     Immune thrombocytopenic purpura (H) 01/14/2008     Priority: Medium     Personality disorder 01/14/2008     Priority: Medium     Overview:   cluster B traits  borderline, antisocial       Nondependent cannabis abuse, episodic 01/14/2008     Priority: Medium     Mixed, or nondependent drug abuse 01/26/2007     Priority: Medium     Overview:   recurrent issues with opioids.  History of crack cocaine abuse.       Gastroesophageal reflux disease 12/20/2006     Priority: Medium     Herpetic gingivostomatitis 12/20/2006     Priority: Medium     Muscle pain 12/20/2006     Priority: Medium     Atopic rhinitis 12/20/2006     Priority: Medium     Abnormal weight loss 12/20/2006     Priority: Medium       Problem list and histories reviewed & adjusted, as indicated.  Additional history: as documented        Current Outpatient Prescriptions on File Prior to Visit:  Acetaminophen (TYLENOL PO) Take 1 tablet by mouth as needed   acyclovir (ZOVIRAX) 5 % ointment Apply 1 g topically as needed   albuterol (PROAIR HFA/PROVENTIL HFA/VENTOLIN HFA) 108 (90 BASE) MCG/ACT Inhaler Inhale 1-2 puffs into the lungs every 4 hours as needed for shortness of breath / dyspnea or wheezing   betamethasone valerate (VALISONE) 0.1 % ointment  Apply once a day for 2 weeks as directed   buprenorphine-naloxone (SUBOXONE) 8-2 MG SUBL sublingual tablet Place 1 tablet under the tongue 2 times daily   BusPIRone HCl 30 MG TABS TAKE 1 TABLET BY MOUTH TWICE DAILY   cetirizine (ZYRTEC) 10 MG tablet Take 1 tablet (10 mg) by mouth every evening   clobetasol (TEMOVATE) 0.05 % ointment APPLY TOPICALLY BID   cloNIDine (CATAPRES) 0.1 MG tablet Take 1 tablet (0.1 mg) by mouth 2 times daily Prn late withdrawal symptoms.    MG capsule TAKE 1 CAPSULE BY MOUTH TWICE DAILY AS NEEDED FOR CONSTIPATION   FLUoxetine (PROZAC) 20 MG capsule TAKE 1 CAPSULE BY MOUTH DAILY WITH FLUOXETINE 40MG. TOTAL DAILY DOSE OF 60MG.   FLUoxetine (PROZAC) 40 MG capsule TAKE 1 CAPSULE BY MOUTH DAILY WITH A 20MG CAPSULE TO EQUAL TOTAL DAILY DOSE OF 60MG   fluticasone (FLONASE) 50 MCG/ACT spray Spray 2 sprays into both nostrils daily   Humidifiers (COOL MIST HUMIDIFIER) MISC 1 Device as needed   hydrocortisone 2.5 % cream    hydrOXYzine (ATARAX) 25 MG tablet TAKE 2 TABLETS BY MOUTH NIGHTLY AS NEEDED FOR ANXIETY   ipratropium - albuterol 0.5 mg/2.5 mg/3 mL (DUONEB) 0.5-2.5 (3) MG/3ML neb solution Take 1 vial (3 mLs) by nebulization every 6 hours as needed for shortness of breath / dyspnea or wheezing   ISOtretinoin (ACCUTANE PO)    ketoconazole (NIZORAL) 2 % cream    levocetirizine (XYZAL) 5 MG tablet Take 1 tablet (5 mg) by mouth every evening   Lidocaine 5 % CREA Externally apply 1 Film topically 4 times daily as needed   loratadine (CLARITIN) 10 MG tablet Take 1 tablet (10 mg) by mouth daily as needed for allergies   mirtazapine (REMERON) 15 MG tablet TAKE 1 TABLET BY MOUTH AT BEDTIME. CALL CLINIC IF TOO SEDATED DURING THE DAY   Neomycin-Bacitracin-Polymyxin ([employer] TRIPLE ANTIBIOTIC) OINT Externally apply topically 4 times daily   olopatadine (PATANOL) 0.1 % ophthalmic solution Place 1 drop into both eyes 2 times daily   olopatadine HCl (PATADAY) 0.2 % SOLN Place 1 drop into both eyes daily    omeprazole (PRILOSEC) 40 MG capsule TAKE 1 CAPSULE(40 MG) BY MOUTH DAILY 30 TO 60 MINUTES BEFORE A MEAL   ondansetron (ZOFRAN-ODT) 4 MG ODT tab Take 1-2 tablets (4-8 mg) by mouth every 8 hours as needed for nausea   oxyCODONE-acetaminophen (PERCOCET) 5-325 MG per tablet Take 1 tablet by mouth every 6 hours as needed for pain   polyethylene glycol (MIRALAX) powder Take 17 g (1 capful) by mouth daily   traZODone (DESYREL) 100 MG tablet Take 1-3 tablets (100-300 mg) by mouth nightly as needed for sleep   traZODone (DESYREL) 100 MG tablet Take 1 tablet (100 mg) by mouth At Bedtime   triamcinolone (KENALOG) 0.1 % ointment Apply sparingly to affected area on back two times daily til clear.   valACYclovir (VALTREX) 1000 mg tablet Take 1 tablet (1,000 mg) by mouth 2 times daily At the onset of skin lesions as needed   ZOLMitriptan (ZOMIG) 5 MG tablet Take 1 tablet (5 mg) by mouth at onset of headache for migraine May repeat in 2 hours. Max 2 tablets/24 hours.   ZOLMitriptan (ZOMIG) 5 MG tablet TAKE 1 TABLET BY MOUTH AT ONSET OF HEADACHE FOR MIGRAINE. MAY REPEAT DOSE IN 2 HOURS. DO NOT EXCEED 10MG IN 24 HOURS   [DISCONTINUED] FLUoxetine (PROZAC) 20 MG capsule TAKE 1 CAPSULE BY MOUTH DAILY WITH FLUOXETINE 40MG. TOTAL DAILY DOSE OF 60MG.     Current Facility-Administered Medications on File Prior to Visit:  Lidocaine 1 % injection 9 mL   sodium bicarbonate 8.4 % injection 1 mEq          Allergies   Allergen Reactions     Acetaminophen-Codeine Hives     Amoxicillin-Pot Clavulanate Nausea and Vomiting     Augmentin Nausea and Vomiting and Hives     Barbiturates      Bentyl [Dicyclomine] Other (See Comments)     dizziness     Compazine      Lock-jaw     Liquid Adhesive      Other reaction(s): Other, see comments  blisters     No Clinical Screening - See Comments      PN: LW Reaction: blisters  PN: LW FI1: nka  PN: LW CM1: CONTRAST- nka Reaction :     Nsaids      D/t ITP     Prochlorperazine      Other reaction(s): Edema      Propoxyphene N-Apap Hives     Sulfa Drugs      Tramadol Hcl Hives            Adhesive Tape Rash         REVIEW OF SYSTEMS:  General: No acute withdrawal symptoms.  No recent infections or fever  Resp: No coughing, wheezing or shortness of breath  CV: No chest pains or palpitations  GI: No nausea, vomiting, abdominal pain, diarrhea, constipation  : No urinary frequency or dysuria,     Musculoskeletal: No significant muscle or joint pains, No edema  Neurologic: No numbness, tingling, weakness, problems with balance or coordination  Psychiatric: No acute concerns  Skin: No rashes    OBJECTIVE:    PHYSICAL EXAM:  /68 (BP Location: Left arm, Patient Position: Sitting, Cuff Size: Adult Regular)  Pulse 109  Temp 97.7  F (36.5  C) (Oral)  Resp 20  Wt 147 lb (66.7 kg)  SpO2 98%  BMI 21.09 kg/m2    GENERAL APPEARANCE:  alert, comfortable appearing  EYES:Eyes grossly normal to inspection  NEURO:  Gait normal.  No tremor. Coordination intact.   MENTAL STATUS EXAM:  Appearance/Behavior: No appearant distress  Speech: Normal  Mood/Affect: normal affect  Insight: Adequate      Results for orders placed or performed in visit on 07/17/18   Urine Drugs of Abuse Screen Panel 13   Result Value Ref Range    Cannabinoids (53-xhp-3-carboxy-9-THC) Not Detected NDET^Not Detected ng/mL    Phencyclidine (Phencyclidine) Not Detected NDET^Not Detected ng/mL    Cocaine (Benzoylecgonine) Not Detected NDET^Not Detected ng/mL    Methamphetamine (d-Methamphetamine) Not Detected NDET^Not Detected ng/mL    Opiates (Morphine) Not Detected NDET^Not Detected ng/mL    Amphetamine (d-Amphetamine) Not Detected NDET^Not Detected ng/mL    Benzodiazepines (Nordiazepam) Not Detected NDET^Not Detected ng/mL    Tricyclic Antidepressants (Desipramine) Not Detected NDET^Not Detected ng/mL    Methadone (Methadone) Not Detected NDET^Not Detected ng/mL    Barbiturates (Butalbital) Not Detected NDET^Not Detected ng/mL    Oxycodone (Oxycodone) Not Detected  NDET^Not Detected ng/mL    Propoxyphene (Norpropoxyphene) Not Detected NDET^Not Detected ng/mL    Buprenorphine (Buprenorphine) Detected, Abnormal Result (A) NDET^Not Detected ng/mL           ASSESSMENT/PLAN:  (F11.20) Opioid use disorder, severe, dependence (H)  (primary encounter diagnosis)   Suboxone  8mg bid.  Rx #60  Try split dosing for better pain control  Safe storage reviewed.       Risk of other substance use such and cannabis and recent opioid use for migraine on overall recovery discussed at lengths.  Limited rx for Percocet given #10 at previous visit.   To be used very sparingly.  Father and adult daughter will be keeping medication and dispensing.   No further RX planned.      Counseled the patient on the importance of having a recovery program in addition to Medication assisted treatment.  Components include having some type of sober network, avoiding isolating, having willingness  to change, avoiding triggers and managing cravings.  Strongly recommended abstaining from alcohol, benzodiazepines, THC, opioids and other drugs of abuse.           Opioid warning reviewed.  Risk of overdose following a period of abstinence due to decrease tolerance was discussed including risk of death.   Risk of overdose if using Suboxone with other substances particuarly benzodiazepines/alcohol was reviewed.           (F14.21) History of cocaine dependence (H)  Plan: no recent use.  Encourage ongoing sobriety      (F33.1) Moderate episode of recurrent major depressive disorder (H)  (F41.1) Generalized anxiety disorder  Plan: Recommendations as above.   Follow with PCP/psychiatry needed.  Encouraged to schedule.   Encouraged mental health counseling.             (F17.200) Nicotine use disorder  Comment: not ready to quit  Plan: Encouraged Abstinence.  Increase risk of relapse with other substances with return to nicotine use discussed.  Risk of Ecig/Vaping also reviewed.         Migraine headache.   Risk of other  substance use as treatment for migraine reviewed.  Other abortive therapy discussed.  Prevention reviewed.  Follow up with Neurology.  Refer info given. Limit use of OTC analgesics to avoid rebound headaches.  Allergy follow up still recommended to maximize treatment other than allergy shots if patient is not able to commit to this at this time.    Trial change to zyrtec -rx sent.  Continue flonase and compliance strategies discussed.            ENCOUNTER FOR LONG TERM USE OF HIGH RISK MEDICATION   High Risk Drug Monitoring?  YES   Drug being monitored: Buprenorphine   Reason for drug: Opioid Use Disorder   What is being monitored?: Dosage, Cravings, Trigger, side effects, and continued abstinence.      Opioid warning reviewed.  Risk of overdose following a period of abstinence due to decrease tolerance was discussed including risk of death.   Risk of overdose if using Suboxone with other substances particuarly benzodiazepines/alcohol was reviewed.          Jessy Brenner MD  [facility]  [phone]

## 2018-07-17 NOTE — MR AVS SNAPSHOT
After Visit Summary   7/17/2018    Coretta Tovar    MRN: [id number]           Patient Information     Date Of Birth          1970        Visit Information        Provider Department      7/17/2018 11:00 AM Jessy Brenner MD [facility] [place]        Today's Diagnoses     Opioid use disorder, severe, dependence (H)    -  1    Moderate episode of recurrent major depressive disorder (H)        Generalized anxiety disorder        Acute seasonal allergic rhinitis due to pollen        Migraine without aura and without status migrainosus, not intractable        Primary insomnia        History of cocaine dependence (H)        Mild intermittent reactive airway disease without complication        Allergic state, subsequent encounter        Migraine without status migrainosus, not intractable, unspecified migraine type           Follow-ups after your visit        Your next 10 appointments already scheduled     Aug 14, 2018  8:00 AM CDT   Return Visit with Jessy Brenner MD   [facility] [place] ([facility] [place])    [address]  [place] [id number] [phone]              Who to contact     If you have questions or need follow up information about today's clinic visit or your schedule please contact [facility] [place] directly at [phone].  Normal or non-critical lab and imaging results will be communicated to you by MyChart, letter or phone within 4 business days after the clinic has received the results. If you do not hear from us within 7 days, please contact the clinic through MyChart or phone. If you have a critical or abnormal lab result, we will notify you by phone as soon as possible.  Submit refill requests through [employer] or call your pharmacy and they will forward the refill request to us. Please allow 3 business days for your refill to be completed.          Additional Information About Your Visit        Care EveryWhere ID     This is  your Care EveryWhere ID. This could be used by other organizations to access your [place] medical records  [id number]        Your Vitals Were     Pulse Temperature Respirations Pulse Oximetry BMI (Body Mass Index)       109 97.7  F (36.5  C) (Oral) 20 98% 21.09 kg/m2        Blood Pressure from Last 3 Encounters:   07/17/18 104/68   06/19/18 135/85   05/29/18 108/70    Weight from Last 3 Encounters:   07/17/18 147 lb (66.7 kg)   06/19/18 152 lb (68.9 kg)   05/29/18 146 lb 6.4 oz (66.4 kg)              We Performed the Following     Urine Drugs of Abuse Screen Panel 13          Today's Medication Changes          These changes are accurate as of 7/17/18  1:42 PM.  If you have any questions, ask your nurse or doctor.               These medicines have changed or have updated prescriptions.        Dose/Directions    cloNIDine 0.1 MG tablet   Commonly known as:  CATAPRES   This may have changed:  when to take this   Used for:  Opioid use disorder, severe, dependence (H)   Changed by:  Jessy Brenner MD        Dose:  0.1 mg   Take 1 tablet (0.1 mg) by mouth At Bedtime Prn late withdrawal symptoms.   Quantity:  60 tablet   Refills:  3       olopatadine 0.1 % ophthalmic solution   Commonly known as:  PATANOL   This may have changed:  Another medication with the same name was removed. Continue taking this medication, and follow the directions you see here.   Used for:  Allergic conjunctivitis, bilateral   Changed by:  Jessy Brenner MD        Dose:  1 drop   Place 1 drop into both eyes 2 times daily   Quantity:  5 mL   Refills:  3         Stop taking these medicines if you haven't already. Please contact your care team if you have questions.     Cool Mist Humidifier Misc   Stopped by:  Jessy Brenner MD           TYLENOL PO   Stopped by:  Jessy Brenner MD                Where to get your medicines      [facility] [address] [place], [place] - [address]  [facility] & [place] [address] [place] [address]     Phone:  [phone]     albuterol 108 (90 Base) MCG/ACT Inhaler    cetirizine 10 MG tablet    cloNIDine 0.1 MG tablet    ondansetron 4 MG ODT tab         Some of these will need a paper prescription and others can be bought over the counter.  Ask your nurse if you have questions.     Bring a paper prescription for each of these medications     buprenorphine-naloxone 8-2 MG Subl sublingual tablet                Primary Care Provider Office Phone # Fax #    Sarah Wilkerson Rohit Roberts, APRN [facility] [phone]       [address]  [facility] [address]        Equal Access to Services     ARELY JASON : Hadii teodoro phippso Soadama, waaxda luqadaha, qaybta kaalmada adeegyada, pelon alegre . So [facility] [phone].    ATENCIÓN: Si habla español, tiene a roberts disposición servicios gratuitos de asistencia lingüística. Llame al [phone].    We comply with applicable federal civil rights laws and [place] laws. We do not discriminate on the basis of race, color, national origin, age, disability, sex, sexual orientation, or gender identity.            Thank you!     Thank you for choosing [facility]  for your care. Our goal is always to provide you with excellent care. Hearing back from our patients is one way we can continue to improve our services. Please take a few minutes to complete the written survey that you may receive in the mail after your visit with us. Thank you!             Your Updated Medication List - Protect others around you: Learn how to safely use, store and throw away your medicines at www.disposemymeds.org.          This list is accurate as of 7/17/18  1:42 PM.  Always use your most recent med list.                   Brand Name Dispense Instructions for use Diagnosis    ACCUTANE PO           acyclovir 5 % ointment    ZOVIRAX    30 g    Apply 1 g topically as needed    Recurrent cold  sores       albuterol 108 (90 Base) MCG/ACT Inhaler    PROAIR HFA/PROVENTIL HFA/VENTOLIN HFA    18 g    Inhale 1-2 puffs into the lungs every 4 hours as needed for shortness of breath / dyspnea or wheezing    Mild intermittent reactive airway disease without complication       betamethasone valerate 0.1 % ointment    VALISONE     Apply once a day for 2 weeks as directed        buprenorphine-naloxone 8-2 MG Subl sublingual tablet    SUBOXONE    60 each    Place 1 tablet under the tongue 2 times daily    Opioid use disorder, severe, dependence (H)       BusPIRone HCl 30 MG Tabs     60 tablet    TAKE 1 TABLET BY MOUTH TWICE DAILY    Generalized anxiety disorder       cetirizine 10 MG tablet    zyrTEC    90 tablet    Take 1 tablet (10 mg) by mouth every evening    Allergic state, subsequent encounter       clobetasol 0.05 % ointment    TEMOVATE     APPLY TOPICALLY BID        cloNIDine 0.1 MG tablet    CATAPRES    60 tablet    Take 1 tablet (0.1 mg) by mouth At Bedtime Prn late withdrawal symptoms.    Opioid use disorder, severe, dependence (H)       [employer] TRIPLE ANTIBIOTIC Oint     60 g    Externally apply topically 4 times daily    Allergic contact dermatitis due to adhesives        MG capsule   Generic drug:  docusate sodium     60 capsule    TAKE 1 CAPSULE BY MOUTH TWICE DAILY AS NEEDED FOR CONSTIPATION    Gastroesophageal reflux disease, esophagitis presence not specified       * FLUoxetine 40 MG capsule    PROzac    30 capsule    TAKE 1 CAPSULE BY MOUTH DAILY WITH A 20MG CAPSULE TO EQUAL TOTAL DAILY DOSE OF 60MG    Moderate episode of recurrent major depressive disorder (H), Generalized anxiety disorder       * FLUoxetine 20 MG capsule    PROzac    30 capsule    TAKE 1 CAPSULE BY MOUTH DAILY WITH FLUOXETINE 40MG. TOTAL DAILY DOSE OF 60MG.    Moderate episode of recurrent major depressive disorder (H), Generalized anxiety disorder       fluticasone 50 MCG/ACT spray    FLONASE    1 Bottle    Spray 2 sprays into  both nostrils daily    Opioid use disorder, severe, dependence (H)       hydrocortisone 2.5 % cream           hydrOXYzine 25 MG tablet    ATARAX    60 tablet    TAKE 2 TABLETS BY MOUTH NIGHTLY AS NEEDED FOR ANXIETY    Generalized anxiety disorder       ipratropium - albuterol 0.5 mg/2.5 mg/3 mL 0.5-2.5 (3) MG/3ML neb solution    DUONEB    90 vial    Take 1 vial (3 mLs) by nebulization every 6 hours as needed for shortness of breath / dyspnea or wheezing    Acute bronchitis with symptoms > 10 days       ketoconazole 2 % cream    NIZORAL          levocetirizine 5 MG tablet    XYZAL    30 tablet    Take 1 tablet (5 mg) by mouth every evening    Acute seasonal allergic rhinitis due to pollen       Lidocaine 5 % Crea     45 g    Externally apply 1 Film topically 4 times daily as needed    Bilateral carpal tunnel syndrome       loratadine 10 MG tablet    CLARITIN    30 tablet    Take 1 tablet (10 mg) by mouth daily as needed for allergies    Chronic nonseasonal allergic rhinitis due to pollen       mirtazapine 15 MG tablet    REMERON    30 tablet    TAKE 1 TABLET BY MOUTH AT BEDTIME. CALL CLINIC IF TOO SEDATED DURING THE DAY    Primary insomnia       olopatadine 0.1 % ophthalmic solution    PATANOL    5 mL    Place 1 drop into both eyes 2 times daily    Allergic conjunctivitis, bilateral       omeprazole 40 MG capsule    priLOSEC    90 capsule    TAKE 1 CAPSULE(40 MG) BY MOUTH DAILY 30 TO 60 MINUTES BEFORE A MEAL    Gastroesophageal reflux disease, esophagitis presence not specified       ondansetron 4 MG ODT tab    ZOFRAN-ODT    30 tablet    Take 1-2 tablets (4-8 mg) by mouth every 8 hours as needed for nausea    Migraine without status migrainosus, not intractable, unspecified migraine type       oxyCODONE-acetaminophen 5-325 MG per tablet    PERCOCET    10 tablet    Take 1 tablet by mouth every 6 hours as needed for pain    Opioid use disorder, severe, dependence (H)       polyethylene glycol powder    MIRALAX    510 g     Take 17 g (1 capful) by mouth daily    Drug-induced constipation       * traZODone 100 MG tablet    DESYREL    90 tablet    Take 1 tablet (100 mg) by mouth At Bedtime    Primary insomnia       * traZODone 100 MG tablet    DESYREL    90 tablet    Take 1-3 tablets (100-300 mg) by mouth nightly as needed for sleep    Primary insomnia       triamcinolone 0.1 % ointment    KENALOG    80 g    Apply sparingly to affected area on back two times daily til clear.    Rash       valACYclovir 1000 mg tablet    VALTREX    8 tablet    Take 1 tablet (1,000 mg) by mouth 2 times daily At the onset of skin lesions as needed    Recurrent cold sores       * ZOLMitriptan 5 MG tablet    ZOMIG    12 tablet    TAKE 1 TABLET BY MOUTH AT ONSET OF HEADACHE FOR MIGRAINE. MAY REPEAT DOSE IN 2 HOURS. DO NOT EXCEED 10MG IN 24 HOURS    Migraine without aura and without status migrainosus, not intractable       * ZOLMitriptan 5 MG tablet    ZOMIG    18 tablet    Take 1 tablet (5 mg) by mouth at onset of headache for migraine May repeat in 2 hours. Max 2 tablets/24 hours.    Migraine without aura and without status migrainosus, not intractable       * Notice:  This list has 6 medication(s) that are the same as other medications prescribed for you. Read the directions carefully, and ask your doctor or other care provider to review them with you.

## 2018-07-18 NOTE — TELEPHONE ENCOUNTER
----- Message from Olamide Baltazar sent at 7/18/2018 12:12 PM CDT -----  Regarding: FW: Restriced MA referral request  Sarah - see Tenisha Farias's reply to the message.  Needs referral placed I guess.  Thank you.  Megan  ----- Message -----     From: Tenisha Farias     Sent: 7/18/2018   9:48 AM       To: Olamide Baltazar  Subject: RE: Restriced MA referral request                If Sarah could please put in a referral in Epic for this, I can then process it. Please let me know when this has been done.    Thank you,   Tenisha Farias   Referral Department  [phone]    ----- Message -----     From: Olamide Baltazar     Sent: 7/18/2018   8:52 AM       To: Tenisha Farias  Subject: FW: Restriced MA referral request                Tenisha galindo.  Can you help with this or let us know what Sarah needs to do?  Thank you.  Megan  ----- Message -----     From: Sarah Montemayor APRN CNP     Sent: 7/12/2018  12:19 PM       To: Olamide Baltazar  Subject: RE: Restriced MA referral request                Megan,    Do you know who this is?   Can you forward this request on?    Sarah Montemayor CNP    ----- Message -----     From: Staci Padron     Sent: 7/12/2018  11:58 AM       To: NINOSKA Mitchell CNP  Subject: Restriced MA referral request                    Hello -   This patient has a restricted MA plan and a referral is needed to be completed through MA in order for this patient to be able to utilize outpatient medication management with Dr. Radha Winston on December 1, 2017.  This referral needs to be provider specific. Please contact the referral nurse at your location to obtain this necessary authorization.      Thank you,  Staci Padron    [facility]

## 2018-07-25 NOTE — TELEPHONE ENCOUNTER
MEDICATION APPEAL DENIED    Medication: Acyclovir 5%    Denial Date: 7/25/2018    Denial Rational:      Second Level Appeal Information:      Second level appeals will be managed by the clinic staff and provider. Please contact the [employer] Prior Authorization Team if additional information about the denial is needed.

## 2018-07-26 NOTE — TELEPHONE ENCOUNTER
Called patient back and informed of message below and patient was upset but will ask her dermatologist about this and try to get something from them.     Giovanna HOOD CMA ([facility])

## 2018-07-26 NOTE — TELEPHONE ENCOUNTER
Called patient. VM is full. Please try at later time.  Cyndi NGUYỄN CMA ([facility])

## 2018-08-03 NOTE — TELEPHONE ENCOUNTER
Patient was referred to [facility] on 11/17.  I'm not sure why she can't be seen there.  Please check with referrals.  If she is having severe symptoms, please have her schedule to be seen at dermatology or here in clinic.    Sarah Montemayor, CNP

## 2018-08-03 NOTE — MR AVS SNAPSHOT
After Visit Summary   8/3/2018    Coretta Tovar    MRN: [id number]           Patient Information     Date Of Birth          1970        Visit Information        Provider Department      8/3/2018 7:50 PM Julio Mensah MD [facility]        Today's Diagnoses     Pedal edema    -  1    Tobacco abuse        Chemical dependency (H)        History of cocaine dependence (H)          Care Instructions      Leg Swelling in Both Legs    Swelling of the feet, ankles, and legs is called edema. It is caused by excess fluid that has collected in the tissues. Extra fluid in the body settles in the lowest part because of gravity. This is why the legs and feet are most affected.  Some of the causes for edema include:    Disease of the heart like congestive heart failure    Standing or sitting for long periods of time    Infection of the feet or legs    Blood pooling in the veins of your legs (venous insufficiency)    Dilated veins in your lower leg (varicose veins)    Garters or other clothing that is tight on your legs. This will cause blood to pool in your legs because the clothing limits blood flow.    Some medicines such as hormones like birth control pills, some blood pressure medicines like calcium channel blockers (amlodipine) and steroids, some antidepressants like MAO inhibitors and tricyclics    Menstrual periods that cause you to retain fluids    Many types of renal disease    Liver failure or cirrhosis    Pregnancy, some swelling is normal, but a sudden increase in leg swelling or weight gain can be a sign of a dangerous complication of pregnancy    Poor nutrition    Thyroid disease  Medical treatment will depend on what is causing the swelling in your legs. Your healthcare provider may prescribe water pills (diuretics) to get rid of the extra fluid.  Home care  Follow these guidelines when caring for yourself at home:    Don't wear clothing like garters that is tight on your  legs.    Keep your legs up while lying or sitting.    If infection, injury, or recent surgery is causing the swelling, stay off your legs as much as possible until symptoms get better.    If your healthcare provider says that your leg swelling is caused by venous insufficiency or varicose veins, don't sit or  one place for long periods of time. Take breaks and walk about every few hours. Brisk walking is a good exercise. It helps circulate the blood that has collected in your leg. Talk with your provider about using support stockings to stop daytime leg swelling.    If your provider says that heart disease is causing your leg swelling, follow a low-salt diet to stop extra fluid from staying in your body. You may also need medicine.  Follow-up care  Follow up with your healthcare provider, or as advised.  When to seek medical advice  Call your healthcare provider right away if any of these occur:    New shortness of breath or chest pain    Shortness of breath or chest pain that gets worse    Swelling in both legs or ankles that gets worse    Swelling of the abdomen    Redness, warmth, or swelling in one leg    Fever of 100.4 F (38 C) or higher, or as directed by your healthcare provider    Yellow color to your skin or eyes    Rapid, unexplained weight gain    Having to sleep upright or use an increased number of pillows  Date Last Reviewed: 3/31/2016    3541-2211 [employer]. [address] [place] [address]. All rights reserved. This information is not intended as a substitute for professional medical care. Always follow your healthcare professional's instructions.                Follow-ups after your visit        Your next 10 appointments already scheduled     Aug 14, 2018  8:40 AM CDT   Return Visit with Jessy Brenner MD   [facility] ([facility])    [address] [place] [id number] [phone]              Who to contact     If you  have questions or need follow up information about today's clinic visit or your schedule please contact [facility] directly at [phone].  Normal or non-critical lab and imaging results will be communicated to you by MyChart, letter or phone within 4 business days after the clinic has received the results. If you do not hear from us within 7 days, please contact the clinic through MyChart or phone. If you have a critical or abnormal lab result, we will notify you by phone as soon as possible.  Submit refill requests through [employer] or call your pharmacy and they will forward the refill request to us. Please allow 3 business days for your refill to be completed.          Additional Information About Your Visit        Care EveryWhere ID     This is your Care EveryWhere ID. This could be used by other organizations to access your [place] medical records  [id number]        Your Vitals Were     Pulse Temperature Pulse Oximetry Breastfeeding? BMI (Body Mass Index)       73 98.5  F (36.9  C) (Oral) 96% No 22.81 kg/m2        Blood Pressure from Last 3 Encounters:   08/03/18 107/64   07/17/18 104/68   06/19/18 135/85    Weight from Last 3 Encounters:   08/03/18 159 lb (72.1 kg)   07/17/18 147 lb (66.7 kg)   06/19/18 152 lb (68.9 kg)              We Performed the Following     BNP-N terminal pro     CBC with platelets     Comprehensive metabolic panel (BMP + Alb, Alk Phos, ALT, AST, Total. Bili, TP)     D dimer, quantitative     EKG 12-lead complete w/read - Clinics     Troponin I        Primary Care Provider Office Phone # Fax #    Sarah Rhea Montemayor, NINOSKA [facility] [phone]       [address] [place] [address]  [place] [address]        Equal Access to Services     [facility]: Hadii teodoro coleman Soadama, waaxda luqadaha, qaybta kaalmada adeegyada, pelon malcolm. So [facility] [phone].    ATENCIÓN: Si habla español, tiene a roberts disposición servicios gratuitos de  yanciarpit Fishman al [phone].    We comply with applicable federal civil rights laws and [place] laws. We do not discriminate on the basis of race, color, national origin, age, disability, sex, sexual orientation, or gender identity.            Thank you!     [facility]  [facility]. Our goal is always to provide you with excellent care. Hearing back from our patients is one way we can continue to improve our services. Please take a few minutes to complete the written survey that you may receive in the mail after your visit with us. Thank you!             Your Updated Medication List - Protect others around you: Learn how to safely use, store and throw away your medicines at www.disposemymeds.org.          This list is accurate as of 8/3/18  8:27 PM.  Always use your most recent med list.                   Brand Name Dispense Instructions for use Diagnosis    ACCUTANE PO           acyclovir 5 % ointment    ZOVIRAX    30 g    Apply 1 g topically as needed    Recurrent cold sores       albuterol 108 (90 Base) MCG/ACT Inhaler    PROAIR HFA/PROVENTIL HFA/VENTOLIN HFA    18 g    Inhale 1-2 puffs into the lungs every 4 hours as needed for shortness of breath / dyspnea or wheezing    Mild intermittent reactive airway disease without complication       betamethasone valerate 0.1 % ointment    VALISONE     Apply once a day for 2 weeks as directed        buprenorphine-naloxone 8-2 MG Subl sublingual tablet    SUBOXONE    60 each    Place 1 tablet under the tongue 2 times daily    Opioid use disorder, severe, dependence (H)       BusPIRone HCl 30 MG Tabs     60 tablet    TAKE 1 TABLET BY MOUTH TWICE DAILY    Generalized anxiety disorder       cetirizine 10 MG tablet    zyrTEC    90 tablet    Take 1 tablet (10 mg) by mouth every evening    Allergic state, subsequent encounter       clobetasol 0.05 % ointment    TEMOVATE     APPLY TOPICALLY BID        cloNIDine 0.1 MG  tablet    CATAPRES    60 tablet    Take 1 tablet (0.1 mg) by mouth At Bedtime Prn late withdrawal symptoms.    Opioid use disorder, severe, dependence (H)       [employer] TRIPLE ANTIBIOTIC Oint     60 g    Externally apply topically 4 times daily    Allergic contact dermatitis due to adhesives        MG capsule   Generic drug:  docusate sodium     60 capsule    TAKE 1 CAPSULE BY MOUTH TWICE DAILY AS NEEDED FOR CONSTIPATION    Gastroesophageal reflux disease, esophagitis presence not specified       * FLUoxetine 40 MG capsule    PROzac    30 capsule    TAKE 1 CAPSULE BY MOUTH DAILY WITH A 20MG CAPSULE TO EQUAL TOTAL DAILY DOSE OF 60MG    Moderate episode of recurrent major depressive disorder (H), Generalized anxiety disorder       * FLUoxetine 20 MG capsule    PROzac    30 capsule    TAKE 1 CAPSULE BY MOUTH DAILY WITH FLUOXETINE 40MG. TOTAL DAILY DOSE OF 60MG.    Moderate episode of recurrent major depressive disorder (H), Generalized anxiety disorder       fluticasone 50 MCG/ACT spray    FLONASE    1 Bottle    Spray 2 sprays into both nostrils daily    Opioid use disorder, severe, dependence (H)       hydrocortisone 2.5 % cream           hydrOXYzine 25 MG tablet    ATARAX    60 tablet    TAKE 2 TABLETS BY MOUTH NIGHTLY AS NEEDED FOR ANXIETY    Generalized anxiety disorder       ipratropium - albuterol 0.5 mg/2.5 mg/3 mL 0.5-2.5 (3) MG/3ML neb solution    DUONEB    90 vial    Take 1 vial (3 mLs) by nebulization every 6 hours as needed for shortness of breath / dyspnea or wheezing    Acute bronchitis with symptoms > 10 days       ketoconazole 2 % cream    NIZORAL          levocetirizine 5 MG tablet    XYZAL    30 tablet    Take 1 tablet (5 mg) by mouth every evening    Acute seasonal allergic rhinitis due to pollen       Lidocaine 5 % Crea     45 g    Externally apply 1 Film topically 4 times daily as needed    Bilateral carpal tunnel syndrome       loratadine 10 MG tablet    CLARITIN    30 tablet    Take 1 tablet  (10 mg) by mouth daily as needed for allergies    Chronic nonseasonal allergic rhinitis due to pollen       mirtazapine 15 MG tablet    REMERON    30 tablet    TAKE 1 TABLET BY MOUTH AT BEDTIME. CALL CLINIC IF TOO SEDATED DURING THE DAY    Primary insomnia       olopatadine 0.1 % ophthalmic solution    PATANOL    5 mL    Place 1 drop into both eyes 2 times daily    Allergic conjunctivitis, bilateral       omeprazole 40 MG capsule    priLOSEC    90 capsule    TAKE 1 CAPSULE(40 MG) BY MOUTH DAILY 30 TO 60 MINUTES BEFORE A MEAL    Gastroesophageal reflux disease, esophagitis presence not specified       ondansetron 4 MG ODT tab    ZOFRAN-ODT    30 tablet    Take 1-2 tablets (4-8 mg) by mouth every 8 hours as needed for nausea    Migraine without status migrainosus, not intractable, unspecified migraine type       oxyCODONE-acetaminophen 5-325 MG per tablet    PERCOCET    10 tablet    Take 1 tablet by mouth every 6 hours as needed for pain    Opioid use disorder, severe, dependence (H)       polyethylene glycol powder    MIRALAX    510 g    Take 17 g (1 capful) by mouth daily    Drug-induced constipation       * traZODone 100 MG tablet    DESYREL    90 tablet    Take 1 tablet (100 mg) by mouth At Bedtime    Primary insomnia       * traZODone 100 MG tablet    DESYREL    90 tablet    Take 1-3 tablets (100-300 mg) by mouth nightly as needed for sleep    Primary insomnia       triamcinolone 0.1 % ointment    KENALOG    80 g    Apply sparingly to affected area on back two times daily til clear.    Rash       valACYclovir 1000 mg tablet    VALTREX    8 tablet    Take 1 tablet (1,000 mg) by mouth 2 times daily At the onset of skin lesions as needed    Recurrent cold sores       * ZOLMitriptan 5 MG tablet    ZOMIG    12 tablet    TAKE 1 TABLET BY MOUTH AT ONSET OF HEADACHE FOR MIGRAINE. MAY REPEAT DOSE IN 2 HOURS. DO NOT EXCEED 10MG IN 24 HOURS    Migraine without aura and without status migrainosus, not intractable       *  ZOLMitriptan 5 MG tablet    ZOMIG    18 tablet    Take 1 tablet (5 mg) by mouth at onset of headache for migraine May repeat in 2 hours. Max 2 tablets/24 hours.    Migraine without aura and without status migrainosus, not intractable       * Notice:  This list has 6 medication(s) that are the same as other medications prescribed for you. Read the directions carefully, and ask your doctor or other care provider to review them with you.

## 2018-08-03 NOTE — TELEPHONE ENCOUNTER
"Patient called RN hotline asking to speak with Sarah Montemayor.  She states she is not able to go to [facility] because she PCP does not have the providers on her \"list\" and she is restricted.  Patient mentioned a past due bill, and said she needs to pay it or have referral.  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  Patient repeatedly stated \"I need Sarah to call me\"  Advised patient a message would be sent to now.  Please advise   Tita Brown RN    "

## 2018-08-03 NOTE — TELEPHONE ENCOUNTER
Attempted to call patient but no answer.  VM box is full and unable to accept any messages at this time.  Will need to try calling patient again next week    Routing to referrals to please advise.  Can patient be seen by [facility]? Is there a reason why she cannot?    Val Teran RN

## 2018-08-04 NOTE — PROGRESS NOTES
"  SUBJECTIVE:   Coretta Tovar is a 48 year old female who presents to clinic today for the following health issues:    Chief Complaint   Patient presents with     Musculoskeletal Problem     hand pain, throbbing legs, retaining water -Sx started 08- \"all day\"       Duration: 1 days     Description (location/character/radiation): legs/hands swelling     Intensity:  moderate    Accompanying signs and symptoms: no fever, chills, sob, or other relevant systemic symptoms     History (similar episodes/previous evaluation): None    Precipitating or alleviating factors: works at \"[employer]\", did 55 hours this week     Therapies tried and outcome: None    Smokes about half a pack of cigarettes per day, denies using any occasional drugs       Problem list and histories reviewed & adjusted, as indicated.  Additional history: as documented    Patient Active Problem List   Diagnosis     Tobacco abuse     S/P splenectomy     Migraines     Idiopathic thrombocytopenic purpura (HCC)     Chemical dependency (H)     Insomnia     Mary-mary disease     Vomiting     Abdominal pain, acute     Disorder of stomach     Cervical pain     Vertigo     Gastroesophageal reflux disease     Herpetic gingivostomatitis     Immune thrombocytopenic purpura (H)     Muscle pain     Personality disorder     Nondependent cannabis abuse, episodic     Atopic rhinitis     Nutritional disorder     Abnormal weight loss     Mixed, or nondependent drug abuse     Moderate episode of recurrent major depressive disorder (H)     Generalized anxiety disorder     Protein-calorie malnutrition (H)     Primary insomnia     History of cocaine dependence (H)     Past Surgical History:   Procedure Laterality Date     ANKLE SURGERY      Numerous right ankle surgeries     HERNIA REPAIR Left 1975     HYSTERECTOMY, PAP NO LONGER INDICATED      benign, endometriosis     NECK SURGERY  2000's    Cervical spine     SPLENECTOMY  1995     TONSILLECTOMY         Social " History   Substance Use Topics     Smoking status: Current Every Day Smoker     Packs/day: 0.50     Years: 15.00     Types: Cigarettes     Last attempt to quit: 11/6/2015     Smokeless tobacco: Never Used      Comment: 4/17/18     Alcohol use Yes      Comment: Drinks once in 4 - 5 months     Family History   Problem Relation Age of Onset     Neurologic Disorder Maternal Grandmother      migraines     Breast Cancer Maternal Grandmother      Neurologic Disorder Maternal Grandfather      migraines     Neurologic Disorder Brother      migraines     Breast Cancer Maternal Aunt      x 2     Breast Cancer Paternal Grandmother      Depression Daughter      Anxiety Disorder Daughter          Current Outpatient Prescriptions   Medication Sig Dispense Refill     acyclovir (ZOVIRAX) 5 % ointment Apply 1 g topically as needed 30 g 11     albuterol (PROAIR HFA/PROVENTIL HFA/VENTOLIN HFA) 108 (90 Base) MCG/ACT Inhaler Inhale 1-2 puffs into the lungs every 4 hours as needed for shortness of breath / dyspnea or wheezing 18 g 3     betamethasone valerate (VALISONE) 0.1 % ointment Apply once a day for 2 weeks as directed       buprenorphine-naloxone (SUBOXONE) 8-2 MG SUBL sublingual tablet Place 1 tablet under the tongue 2 times daily 60 each 0     BusPIRone HCl 30 MG TABS TAKE 1 TABLET BY MOUTH TWICE DAILY 60 tablet 5     cetirizine (ZYRTEC) 10 MG tablet Take 1 tablet (10 mg) by mouth every evening 90 tablet 1     clobetasol (TEMOVATE) 0.05 % ointment APPLY TOPICALLY BID  1     cloNIDine (CATAPRES) 0.1 MG tablet Take 1 tablet (0.1 mg) by mouth At Bedtime Prn late withdrawal symptoms. 60 tablet 3      MG capsule TAKE 1 CAPSULE BY MOUTH TWICE DAILY AS NEEDED FOR CONSTIPATION 60 capsule 0     FLUoxetine (PROZAC) 20 MG capsule TAKE 1 CAPSULE BY MOUTH DAILY WITH FLUOXETINE 40MG. TOTAL DAILY DOSE OF 60MG. 30 capsule 0     FLUoxetine (PROZAC) 40 MG capsule TAKE 1 CAPSULE BY MOUTH DAILY WITH A 20MG CAPSULE TO EQUAL TOTAL DAILY DOSE OF  60MG 30 capsule 0     fluticasone (FLONASE) 50 MCG/ACT spray Spray 2 sprays into both nostrils daily 1 Bottle 11     hydrocortisone 2.5 % cream        hydrOXYzine (ATARAX) 25 MG tablet TAKE 2 TABLETS BY MOUTH NIGHTLY AS NEEDED FOR ANXIETY 60 tablet 3     ipratropium - albuterol 0.5 mg/2.5 mg/3 mL (DUONEB) 0.5-2.5 (3) MG/3ML neb solution Take 1 vial (3 mLs) by nebulization every 6 hours as needed for shortness of breath / dyspnea or wheezing 90 vial 3     ISOtretinoin (ACCUTANE PO)        ketoconazole (NIZORAL) 2 % cream        levocetirizine (XYZAL) 5 MG tablet Take 1 tablet (5 mg) by mouth every evening 30 tablet 11     Lidocaine 5 % CREA Externally apply 1 Film topically 4 times daily as needed 45 g 3     loratadine (CLARITIN) 10 MG tablet Take 1 tablet (10 mg) by mouth daily as needed for allergies 30 tablet 11     mirtazapine (REMERON) 15 MG tablet TAKE 1 TABLET BY MOUTH AT BEDTIME. CALL CLINIC IF TOO SEDATED DURING THE DAY 30 tablet 0     Neomycin-Bacitracin-Polymyxin ([employer] TRIPLE ANTIBIOTIC) OINT Externally apply topically 4 times daily 60 g 3     olopatadine (PATANOL) 0.1 % ophthalmic solution Place 1 drop into both eyes 2 times daily 5 mL 3     omeprazole (PRILOSEC) 40 MG capsule TAKE 1 CAPSULE(40 MG) BY MOUTH DAILY 30 TO 60 MINUTES BEFORE A MEAL 90 capsule 3     ondansetron (ZOFRAN-ODT) 4 MG ODT tab Take 1-2 tablets (4-8 mg) by mouth every 8 hours as needed for nausea 30 tablet 1     oxyCODONE-acetaminophen (PERCOCET) 5-325 MG per tablet Take 1 tablet by mouth every 6 hours as needed for pain 10 tablet 0     polyethylene glycol (MIRALAX) powder Take 17 g (1 capful) by mouth daily 510 g 3     traZODone (DESYREL) 100 MG tablet Take 1-3 tablets (100-300 mg) by mouth nightly as needed for sleep 90 tablet 0     traZODone (DESYREL) 100 MG tablet Take 1 tablet (100 mg) by mouth At Bedtime 90 tablet 1     triamcinolone (KENALOG) 0.1 % ointment Apply sparingly to affected area on back two times daily til clear. 80 g  0     valACYclovir (VALTREX) 1000 mg tablet Take 1 tablet (1,000 mg) by mouth 2 times daily At the onset of skin lesions as needed 8 tablet 11     ZOLMitriptan (ZOMIG) 5 MG tablet Take 1 tablet (5 mg) by mouth at onset of headache for migraine May repeat in 2 hours. Max 2 tablets/24 hours. 18 tablet 3     ZOLMitriptan (ZOMIG) 5 MG tablet TAKE 1 TABLET BY MOUTH AT ONSET OF HEADACHE FOR MIGRAINE. MAY REPEAT DOSE IN 2 HOURS. DO NOT EXCEED 10MG IN 24 HOURS 12 tablet 4     Allergies   Allergen Reactions     Acetaminophen-Codeine Hives     Amoxicillin-Pot Clavulanate Nausea and Vomiting     Augmentin Nausea and Vomiting and Hives     Barbiturates      Bentyl [Dicyclomine] Other (See Comments)     dizziness     Compazine      Lock-jaw     Liquid Adhesive      Other reaction(s): Other, see comments  blisters     No Clinical Screening - See Comments      PN: LW Reaction: blisters  PN: LW FI1: nka  PN: LW CM1: CONTRAST- nka Reaction :     Nsaids      D/t ITP     Prochlorperazine      Other reaction(s): Edema     Propoxyphene N-Apap Hives     Sulfa Drugs      Tramadol Hcl Hives            Adhesive Tape Rash     Recent Labs   Lab Test  09/07/17   1139 07/17/17 11/21/16   0540   ALT  24  15  46   CR  0.67   --   0.73   GFRESTIMATED  >90   --   86   GFRESTBLACK  >90   --   >90   GFR Calc     POTASSIUM  4.0   --   4.1      BP Readings from Last 3 Encounters:   08/03/18 107/64   07/17/18 104/68   06/19/18 135/85    Wt Readings from Last 3 Encounters:   08/03/18 159 lb (72.1 kg)   07/17/18 147 lb (66.7 kg)   06/19/18 152 lb (68.9 kg)                  Labs reviewed in EPIC    Reviewed and updated as needed this visit by clinical staff  Tobacco  Allergies  Meds  Med Hx  Surg Hx  Fam Hx  Soc Hx      Reviewed and updated as needed this visit by Provider         ROS:  Constitutional, HEENT, cardiovascular, pulmonary, GI, , musculoskeletal, neuro, skin, endocrine and psych systems are negative, except as otherwise  noted.    OBJECTIVE:     /64 (Cuff Size: Adult Regular)  Pulse 73  Temp 98.5  F (36.9  C) (Oral)  Wt 159 lb (72.1 kg)  SpO2 96%  Breastfeeding? No  BMI 22.81 kg/m2  Body mass index is 22.81 kg/(m^2).  GENERAL: alert and no distress  EYES: Eyes grossly normal to inspection, PERRL and conjunctivae and sclerae normal  NECK: no adenopathy, no asymmetry, masses, or scars and thyroid normal to palpation  RESP: lungs clear to auscultation - no rales, rhonchi or wheezes  CV: regular rates and rhythm, normal S1 S2, no S3 or S4 and no murmur, click or rub, 1+ pedal edema bilaterally  ABDOMEN: soft, nontender, no hepatosplenomegaly, no masses and bowel sounds normal  SKIN: no suspicious lesions or rashes  NEURO: Normal strength and tone, mentation intact and speech normal  PSYCH: mentation appears normal, affect normal/bright    Results for orders placed or performed in visit on 08/03/18   CBC with platelets   Result Value Ref Range    WBC 9.7 4.0 - 11.0 10e9/L    RBC Count 3.36 (L) 3.8 - 5.2 10e12/L    Hemoglobin 10.3 (L) 11.7 - 15.7 g/dL    Hematocrit 31.2 (L) 35.0 - 47.0 %    MCV 93 78 - 100 fl    MCH 30.7 26.5 - 33.0 pg    MCHC 33.0 31.5 - 36.5 g/dL    RDW 15.3 (H) 10.0 - 15.0 %    Platelet Count 344 150 - 450 10e9/L       ECG: Sinus bradycardia, inverted T-wave in V1 and V2, no QT prolongation    CHEST TWO VIEWS   8/3/2018 8:20 PM      HISTORY: Fluid retention. Pedal edema.     COMPARISON: 1/29/2018 chest x-ray.         IMPRESSION: Heart size is normal. Pulmonary vasculature is normal.  Lungs are clear. No pleural fluid. No acute disease. No significant  Change.      ASSESSMENT/PLAN:     - Pedal edema  - Tobacco abuse  - Chemical dependency (H)  - History of cocaine dependence (H)  - Anemia, unspecified type    48 year old female presents with bilateral pedal edema and hands swelling. Physical examination remarkable for 1+pedal edema.  Vitals are stable and systemic exam normal otherwise.  Patient denies any  fever, chills, chest pain, shortness of breath, cough, bowel/bladder or other relevant systemic symptoms.  Hemoglobin 10.3, was 12.5 in 07/2018.  Differentials are broad including cardiovascular, metabolic versus GI etiology.  Needs further evaluation to delineate a specific diagnosis.  Suggested to go ER for further review and management.  Patient understood and in agreement with above plan.  All questions answered.      Patient Instructions     Leg Swelling in Both Legs    Swelling of the feet, ankles, and legs is called edema. It is caused by excess fluid that has collected in the tissues. Extra fluid in the body settles in the lowest part because of gravity. This is why the legs and feet are most affected.  Some of the causes for edema include:    Disease of the heart like congestive heart failure    Standing or sitting for long periods of time    Infection of the feet or legs    Blood pooling in the veins of your legs (venous insufficiency)    Dilated veins in your lower leg (varicose veins)    Garters or other clothing that is tight on your legs. This will cause blood to pool in your legs because the clothing limits blood flow.    Some medicines such as hormones like birth control pills, some blood pressure medicines like calcium channel blockers (amlodipine) and steroids, some antidepressants like MAO inhibitors and tricyclics    Menstrual periods that cause you to retain fluids    Many types of renal disease    Liver failure or cirrhosis    Pregnancy, some swelling is normal, but a sudden increase in leg swelling or weight gain can be a sign of a dangerous complication of pregnancy    Poor nutrition    Thyroid disease  Medical treatment will depend on what is causing the swelling in your legs. Your healthcare provider may prescribe water pills (diuretics) to get rid of the extra fluid.  Home care  Follow these guidelines when caring for yourself at home:    Don't wear clothing like garters that is tight on  your legs.    Keep your legs up while lying or sitting.    If infection, injury, or recent surgery is causing the swelling, stay off your legs as much as possible until symptoms get better.    If your healthcare provider says that your leg swelling is caused by venous insufficiency or varicose veins, don't sit or  one place for long periods of time. Take breaks and walk about every few hours. Brisk walking is a good exercise. It helps circulate the blood that has collected in your leg. Talk with your provider about using support stockings to stop daytime leg swelling.    If your provider says that heart disease is causing your leg swelling, follow a low-salt diet to stop extra fluid from staying in your body. You may also need medicine.  Follow-up care  Follow up with your healthcare provider, or as advised.  When to seek medical advice  Call your healthcare provider right away if any of these occur:    New shortness of breath or chest pain    Shortness of breath or chest pain that gets worse    Swelling in both legs or ankles that gets worse    Swelling of the abdomen    Redness, warmth, or swelling in one leg    Fever of 100.4 F (38 C) or higher, or as directed by your healthcare provider    Yellow color to your skin or eyes    Rapid, unexplained weight gain    Having to sleep upright or use an increased number of pillows  Date Last Reviewed: 3/31/2016    6264-7816 [employer]. [address] [place] [address], [place] [address]. All rights reserved. This information is not intended as a substitute for professional medical care. Always follow your healthcare professional's instructions.            Julio Mensah MD  [facility]

## 2018-08-04 NOTE — PATIENT INSTRUCTIONS
Leg Swelling in Both Legs    Swelling of the feet, ankles, and legs is called edema. It is caused by excess fluid that has collected in the tissues. Extra fluid in the body settles in the lowest part because of gravity. This is why the legs and feet are most affected.  Some of the causes for edema include:    Disease of the heart like congestive heart failure    Standing or sitting for long periods of time    Infection of the feet or legs    Blood pooling in the veins of your legs (venous insufficiency)    Dilated veins in your lower leg (varicose veins)    Garters or other clothing that is tight on your legs. This will cause blood to pool in your legs because the clothing limits blood flow.    Some medicines such as hormones like birth control pills, some blood pressure medicines like calcium channel blockers (amlodipine) and steroids, some antidepressants like MAO inhibitors and tricyclics    Menstrual periods that cause you to retain fluids    Many types of renal disease    Liver failure or cirrhosis    Pregnancy, some swelling is normal, but a sudden increase in leg swelling or weight gain can be a sign of a dangerous complication of pregnancy    Poor nutrition    Thyroid disease  Medical treatment will depend on what is causing the swelling in your legs. Your healthcare provider may prescribe water pills (diuretics) to get rid of the extra fluid.  Home care  Follow these guidelines when caring for yourself at home:    Don't wear clothing like garters that is tight on your legs.    Keep your legs up while lying or sitting.    If infection, injury, or recent surgery is causing the swelling, stay off your legs as much as possible until symptoms get better.    If your healthcare provider says that your leg swelling is caused by venous insufficiency or varicose veins, don't sit or  one place for long periods of time. Take breaks and walk about every few hours. Brisk walking is a good exercise. It helps  circulate the blood that has collected in your leg. Talk with your provider about using support stockings to stop daytime leg swelling.    If your provider says that heart disease is causing your leg swelling, follow a low-salt diet to stop extra fluid from staying in your body. You may also need medicine.  Follow-up care  Follow up with your healthcare provider, or as advised.  When to seek medical advice  Call your healthcare provider right away if any of these occur:    New shortness of breath or chest pain    Shortness of breath or chest pain that gets worse    Swelling in both legs or ankles that gets worse    Swelling of the abdomen    Redness, warmth, or swelling in one leg    Fever of 100.4 F (38 C) or higher, or as directed by your healthcare provider    Yellow color to your skin or eyes    Rapid, unexplained weight gain    Having to sleep upright or use an increased number of pillows  Date Last Reviewed: 3/31/2016    5008-4372 [employer]. [address] [place] [address], [place] [address]. All rights reserved. This information is not intended as a substitute for professional medical care. Always follow your healthcare professional's instructions.

## 2018-08-04 NOTE — NURSING NOTE
"Chief Complaint   Patient presents with     Musculoskeletal Problem     hand pain, throbbing legs, retaining water -Sx started 08- \"all day\"       Initial /64 (Cuff Size: Adult Regular)  Pulse 73  Temp 98.5  F (36.9  C) (Oral)  Wt 159 lb (72.1 kg)  SpO2 96%  Breastfeeding? No  BMI 22.81 kg/m2 Estimated body mass index is 22.81 kg/(m^2) as calculated from the following:    Height as of 4/17/18: 5' 10\" (1.778 m).    Weight as of this encounter: 159 lb (72.1 kg).      Cathy Vee LPN    "

## 2018-08-06 NOTE — TELEPHONE ENCOUNTER
Patient notified of Provider's message as written.  Patient verbalized understanding.    She stated that she will schedule an appointment on her own when she is back in town    Val Teran RN

## 2018-08-06 NOTE — TELEPHONE ENCOUNTER
Attempted to call patient but no answer.  VM box still full and unable to accept any further messages    Please try calling her again another time if she does not return call  See Stefani Smith CNP's message below    Val Teran RN

## 2018-08-06 NOTE — TELEPHONE ENCOUNTER
Patient will need OV. She can either wait until she returns or go to Urgent Care.   Stefani Smith, CNP

## 2018-08-06 NOTE — TELEPHONE ENCOUNTER
Patient is restricted. Referral needs to be put in so that referral department can complete the work. An ICD code is required. Wasn't restricted on 11 of 17 so if she's asking for that, no referral is need. Can put it through for 08/01/2018.

## 2018-08-06 NOTE — TELEPHONE ENCOUNTER
"Spoke with patient and gave message below, she states something must be wrong because Dermatology is saying she did not have coverage.  Patient then started talking about swelling in legs stating \"Im going to buy an OTC water pill for my leg swelling\".  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  Patient reports also having blistery rash under her arm and groin and she would like antibiotics.  Explained to patient that she would need a visit but she is out of town until the end of the week so she is not able to be seen.  She reports that if she goes to  they will not know what to do about it and she is restricted.  Patient was seen in ED on 8/3/18 for lower leg edema and low hgb.  She was tested for blood clot and this was negative.   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    Please advise   Tita Brown RN  "

## 2018-08-13 NOTE — TELEPHONE ENCOUNTER
"Routing refill request to provider for review/approval because:  Failed [facility] refill protocol, see below:        PHQ-9 SCORE 9/14/2017 1/2/2018 6/15/2018   Total Score - - -   Total Score MyChart - - -   Total Score 6 16 13     Requested Prescriptions   Pending Prescriptions Disp Refills     FLUoxetine (PROZAC) 20 MG capsule [Pharmacy Med Name: FLUOXETINE 20MG CAPSULES]  Last Written Prescription Date:  7/16/18  Last Fill Quantity: 30,  # refills: 0   Last office visit: 2/8/2018 with prescribing provider:     Future Office Visit:   Next 5 appointments (look out 90 days)     Aug 14, 2018  8:40 AM CDT   [facility]    [address] [facility] [id number]   [phone]                  30 capsule 0     Sig: TAKE 1 CAPSULE BY MOUTH DAILY WITH FLUOXETINE 40MG FOR A TOTAL OF 60MG DAILY.    SSRIs Protocol Failed    8/13/2018  8:49 AM       Failed - PHQ-9 score less than 5 in past 6 months    Please review last PHQ-9 score.          Failed - Recent (6 mo) or future (30 days) visit within the authorizing provider's specialty    Patient had office visit in the last 6 months or has a visit in the next 30 days with authorizing provider or within the authorizing provider's specialty.  See \"Patient Info\" tab in inbasket, or \"Choose Columns\" in Meds & Orders section of the refill encounter.           Passed - Patient is age 18 or older       Passed - No active pregnancy on record       Passed - No positive pregnancy test in last 12 months        Sherrill Torres RN - BC      "

## 2018-08-14 NOTE — MR AVS SNAPSHOT
After Visit Summary   8/14/2018    Coretta Tovar    MRN: [id number]           Patient Information     Date Of Birth          1970        Visit Information        Provider Department      8/14/2018 8:40 AM Jessy Brenner MD [facility]        Today's Diagnoses     Opioid use disorder, severe, dependence (H)           Follow-ups after your visit        Your next 10 appointments already scheduled     Oct 02, 2018  8:00 AM CDT   Return Visit with Jessy Brenner MD   [facility] ([facility] [place])    [address]  [place] [id number] [phone]              Who to contact     If you have questions or need follow up information about today's clinic visit or your schedule please contact [facility] directly at [phone].  Normal or non-critical lab and imaging results will be communicated to you by MyChart, letter or phone within 4 business days after the clinic has received the results. If you do not hear from us within 7 days, please contact the clinic through MyChart or phone. If you have a critical or abnormal lab result, we will notify you by phone as soon as possible.  Submit refill requests through [employer] or call your pharmacy and they will forward the refill request to us. Please allow 3 business days for your refill to be completed.          Additional Information About Your Visit        Care EveryWhere ID     This is your Care EveryWhere ID. This could be used by other organizations to access your [facility] medical records  [id number]         Blood Pressure from Last 3 Encounters:   08/03/18 107/64   07/17/18 104/68   06/19/18 135/85    Weight from Last 3 Encounters:   08/03/18 159 lb (72.1 kg)   07/17/18 147 lb (66.7 kg)   06/19/18 152 lb (68.9 kg)              We Performed the Following     Benzodiazepine Confirmatory Urine Qual     Urine Drugs of Abuse Screen Panel 13          Today's Medication Changes           These changes are accurate as of 8/14/18 10:08 AM.  If you have any questions, ask your nurse or doctor.               Start taking these medicines.        Dose/Directions    buprenorphine-naloxone 8-2 MG Subl sublingual tablet   Commonly known as:  SUBOXONE   Used for:  Opioid use disorder, severe, dependence (H)        Dose:  1 tablet   Place 1 tablet under the tongue 2 times daily   Quantity:  60 each   Refills:  1            Where to get your medicines      Some of these will need a paper prescription and others can be bought over the counter.  Ask your nurse if you have questions.     Bring a paper prescription for each of these medications     buprenorphine-naloxone 8-2 MG Subl sublingual tablet                Primary Care Provider Office Phone # Fax #    Sarah Wilkerson Rohit Roberts, [facility] [phone] [address] [address]        Equal Access to Services     [facility] : Hadii teodoro ferrer hadasho Soomaali, waaxda luqadaha, qaybta kaalmada adeegyada, pelon alegre . So [facility] [phone].    ATENCIÓN: Si habla español, tiene a roberts disposición servicios gratuitos de asistencia lingüística. Sravanthi al [phone].    We comply with applicable federal civil rights laws and [place] laws. We do not discriminate on the basis of race, color, national origin, age, disability, sex, sexual orientation, or gender identity.            Thank you!     Thank you for choosing [facility]  for your care. Our goal is always to provide you with excellent care. Hearing back from our patients is one way we can continue to improve our services. Please take a few minutes to complete the written survey that you may receive in the mail after your visit with us. Thank you!             Your Updated Medication List - Protect others around you: Learn how to safely use, store and throw away your medicines at www.disposemymeds.org.          This list is accurate as of  8/14/18 10:08 AM.  Always use your most recent med list.                   Brand Name Dispense Instructions for use Diagnosis    ACCUTANE PO           acyclovir 5 % ointment    ZOVIRAX    30 g    Apply 1 g topically as needed    Recurrent cold sores       albuterol 108 (90 Base) MCG/ACT inhaler    PROAIR HFA/PROVENTIL HFA/VENTOLIN HFA    18 g    Inhale 1-2 puffs into the lungs every 4 hours as needed for shortness of breath / dyspnea or wheezing    Mild intermittent reactive airway disease without complication       betamethasone valerate 0.1 % ointment    VALISONE     Apply once a day for 2 weeks as directed        buprenorphine-naloxone 8-2 MG Subl sublingual tablet    SUBOXONE    60 each    Place 1 tablet under the tongue 2 times daily    Opioid use disorder, severe, dependence (H)       BusPIRone HCl 30 MG Tabs     60 tablet    TAKE 1 TABLET BY MOUTH TWICE DAILY    Generalized anxiety disorder       cetirizine 10 MG tablet    zyrTEC    90 tablet    Take 1 tablet (10 mg) by mouth every evening    Allergic state, subsequent encounter       clobetasol 0.05 % ointment    TEMOVATE     APPLY TOPICALLY BID        cloNIDine 0.1 MG tablet    CATAPRES    60 tablet    Take 1 tablet (0.1 mg) by mouth At Bedtime Prn late withdrawal symptoms.    Opioid use disorder, severe, dependence (H)       [employer] TRIPLE ANTIBIOTIC Oint     60 g    Externally apply topically 4 times daily    Allergic contact dermatitis due to adhesives        MG capsule   Generic drug:  docusate sodium     60 capsule    TAKE 1 CAPSULE BY MOUTH TWICE DAILY AS NEEDED FOR CONSTIPATION    Gastroesophageal reflux disease, esophagitis presence not specified       * FLUoxetine 40 MG capsule    PROzac    30 capsule    TAKE 1 CAPSULE BY MOUTH DAILY WITH A 20MG CAPSULE TO EQUAL TOTAL DAILY DOSE OF 60MG    Moderate episode of recurrent major depressive disorder (H), Generalized anxiety disorder       * FLUoxetine 20 MG capsule    PROzac    30 capsule    TAKE 1  CAPSULE BY MOUTH DAILY WITH FLUOXETINE 40MG FOR A TOTAL OF 60MG DAILY.  NEED APPOINTMENT FOR FURTHER REFILLS.    Moderate episode of recurrent major depressive disorder (H), Generalized anxiety disorder       fluticasone 50 MCG/ACT spray    FLONASE    1 Bottle    Spray 2 sprays into both nostrils daily    Opioid use disorder, severe, dependence (H)       hydrocortisone 2.5 % cream           hydrOXYzine 25 MG tablet    ATARAX    60 tablet    TAKE 2 TABLETS BY MOUTH NIGHTLY AS NEEDED FOR ANXIETY    Generalized anxiety disorder       ipratropium - albuterol 0.5 mg/2.5 mg/3 mL 0.5-2.5 (3) MG/3ML neb solution    DUONEB    90 vial    Take 1 vial (3 mLs) by nebulization every 6 hours as needed for shortness of breath / dyspnea or wheezing    Acute bronchitis with symptoms > 10 days       ketoconazole 2 % cream    NIZORAL          levocetirizine 5 MG tablet    XYZAL    30 tablet    Take 1 tablet (5 mg) by mouth every evening    Acute seasonal allergic rhinitis due to pollen       Lidocaine 5 % Crea     45 g    Externally apply 1 Film topically 4 times daily as needed    Bilateral carpal tunnel syndrome       loratadine 10 MG tablet    CLARITIN    30 tablet    Take 1 tablet (10 mg) by mouth daily as needed for allergies    Chronic nonseasonal allergic rhinitis due to pollen       mirtazapine 15 MG tablet    REMERON    30 tablet    TAKE 1 TABLET BY MOUTH AT BEDTIME. CALL CLINIC IF TOO SEDATED DURING THE DAY    Primary insomnia       olopatadine 0.1 % ophthalmic solution    PATANOL    5 mL    Place 1 drop into both eyes 2 times daily    Allergic conjunctivitis, bilateral       omeprazole 40 MG capsule    priLOSEC    90 capsule    TAKE 1 CAPSULE(40 MG) BY MOUTH DAILY 30 TO 60 MINUTES BEFORE A MEAL    Gastroesophageal reflux disease, esophagitis presence not specified       ondansetron 4 MG ODT tab    ZOFRAN-ODT    30 tablet    Take 1-2 tablets (4-8 mg) by mouth every 8 hours as needed for nausea    Migraine without status  migrainosus, not intractable, unspecified migraine type       oxyCODONE-acetaminophen 5-325 MG per tablet    PERCOCET    10 tablet    Take 1 tablet by mouth every 6 hours as needed for pain    Opioid use disorder, severe, dependence (H)       polyethylene glycol powder    MIRALAX    510 g    Take 17 g (1 capful) by mouth daily    Drug-induced constipation       * traZODone 100 MG tablet    DESYREL    90 tablet    Take 1 tablet (100 mg) by mouth At Bedtime    Primary insomnia       * traZODone 100 MG tablet    DESYREL    90 tablet    Take 1-3 tablets (100-300 mg) by mouth nightly as needed for sleep    Primary insomnia       triamcinolone 0.1 % ointment    KENALOG    80 g    Apply sparingly to affected area on back two times daily til clear.    Rash       valACYclovir 1000 mg tablet    VALTREX    8 tablet    Take 1 tablet (1,000 mg) by mouth 2 times daily At the onset of skin lesions as needed    Recurrent cold sores       * ZOLMitriptan 5 MG tablet    ZOMIG    12 tablet    TAKE 1 TABLET BY MOUTH AT ONSET OF HEADACHE FOR MIGRAINE. MAY REPEAT DOSE IN 2 HOURS. DO NOT EXCEED 10MG IN 24 HOURS    Migraine without aura and without status migrainosus, not intractable       * ZOLMitriptan 5 MG tablet    ZOMIG    18 tablet    Take 1 tablet (5 mg) by mouth at onset of headache for migraine May repeat in 2 hours. Max 2 tablets/24 hours.    Migraine without aura and without status migrainosus, not intractable       * Notice:  This list has 6 medication(s) that are the same as other medications prescribed for you. Read the directions carefully, and ask your doctor or other care provider to review them with you.

## 2018-08-14 NOTE — PROGRESS NOTES
SUBJECTIVE:                                                    BUPRENORPHINE FOLLOW UP:    Coretta Tovar is a 48 year old female who presents to clinic today for follow up of Buprenorphine.      Date of last visit:  7/17/2018    [place] Board of Pharmacy Data Base Reviewed:    YES;     No Concerns Noted   8/14/2018      Brief History:    Hx of multiple surgeries (>15).   Since age 14  9 surgeries on right foot in past 15months for cpmplex fx ankle and foot.  C spine fusion many years ago.   Hx migraine headache.  S/p hysterectomy, tonsillectomy, splenectomy, ear surgeries.  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        HPI:    Allergies have been slowing down. Is alternating zyrtec and Xyzal.  Not always using flonase.  Still having some migraine headaches.  Suboxone 8mg bid.  No craving or use.   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  Has not been attending meetings.  Planning trip out of state to see boyfriend for weekend.   Has noted some weight gain and is wondering if medications may be playing a role.  Is taking remeron for sleep but feels she doesn't need it.  Also taking Prozac (dose stable).  No mental health concerns.  Was seen recently for edema.  Had been working on feet a lot and discussed medications can play role.  Cardiac etc work up was neg.  No current sx.          Status since last visit: Since last visit patient has been:stable    Intensity:     There has been: mild intermittent craving    Suboxone Dose: adequate    Progression of Symptoms/Precipitating Factors:     Cues to use and relapse triggers:Anxiety, Depression and Physical illness    Trigger have been:  mild    Accompanying Signs & Symptoms:    Side Effects: none    Sobriety:     Status: No substance use     Drug Screen: obtained    Alleviating factors/Other Therapies Tried:    Contact with sponsor has been: no sponsor    Family and support system has been: helpful    Patient has been going to recovery meetings:  sporadically    Recovery program has been : minimal    Patient has been participating in professional counseling /therapy: NO    Patient is following with psychiatry: NO    Patient has established PCP: NO        Social History     Social History Narrative    Lives with dad and brother in [place].      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      Daughter with recent DWI (she has child).        Patient Active Problem List    Diagnosis Date Noted     History of cocaine dependence (H) 09/07/2017     Priority: Medium     Moderate episode of recurrent major depressive disorder (H) 03/22/2017     Priority: Medium     Generalized anxiety disorder 03/22/2017     Priority: Medium     Protein-calorie malnutrition (H) 03/22/2017     Priority: Medium     Primary insomnia 03/22/2017     Priority: Medium     Vertigo 03/14/2017     Priority: Medium     Cervical pain 03/08/2017     Priority: Medium     Nutritional disorder 11/21/2016     Priority: Medium     Disorder of stomach 11/20/2016     Priority: Medium     Overview:   Per Biopsy obtained on EGD 11/22/2016. Reactive gastropathy was quoted to be frequently associated with ongoing non-inflammatory type mucosal injury due to a chemical type of injury; this may be due to ingestion of non-steroidal anti-inflammatory drugs, aspirin, excess alcohol, or corticosteroids.        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     Priority: Medium     Chemical dependency (H) 02/24/2015     Priority: Medium     Insomnia 02/24/2015     Priority: Medium     S/P splenectomy 01/13/2015     Priority: Medium     Migraines      Priority: Medium     Idiopathic thrombocytopenic purpura (HCC)      Priority: Medium     s/p splenectomy       Tobacco abuse 05/30/2014     Priority: Medium     Immune thrombocytopenic purpura (H) 01/14/2008     Priority: Medium     Personality disorder 01/14/2008     Priority: Medium      Overview:   cluster B traits  borderline, antisocial       Nondependent cannabis abuse, episodic 01/14/2008     Priority: Medium     Mixed, or nondependent drug abuse 01/26/2007     Priority: Medium     Overview:   recurrent issues with opioids.  History of crack cocaine abuse.       Gastroesophageal reflux disease 12/20/2006     Priority: Medium     Herpetic gingivostomatitis 12/20/2006     Priority: Medium     Muscle pain 12/20/2006     Priority: Medium     Atopic rhinitis 12/20/2006     Priority: Medium     Abnormal weight loss 12/20/2006     Priority: Medium       Problem list and histories reviewed & adjusted, as indicated.  Additional history: as documented        Current Outpatient Prescriptions on File Prior to Visit:  acyclovir (ZOVIRAX) 5 % ointment Apply 1 g topically as needed   albuterol (PROAIR HFA/PROVENTIL HFA/VENTOLIN HFA) 108 (90 Base) MCG/ACT Inhaler Inhale 1-2 puffs into the lungs every 4 hours as needed for shortness of breath / dyspnea or wheezing   betamethasone valerate (VALISONE) 0.1 % ointment Apply once a day for 2 weeks as directed   buprenorphine-naloxone (SUBOXONE) 8-2 MG SUBL sublingual tablet Place 1 tablet under the tongue 2 times daily   BusPIRone HCl 30 MG TABS TAKE 1 TABLET BY MOUTH TWICE DAILY   cetirizine (ZYRTEC) 10 MG tablet Take 1 tablet (10 mg) by mouth every evening   clobetasol (TEMOVATE) 0.05 % ointment APPLY TOPICALLY BID   cloNIDine (CATAPRES) 0.1 MG tablet Take 1 tablet (0.1 mg) by mouth At Bedtime Prn late withdrawal symptoms.    MG capsule TAKE 1 CAPSULE BY MOUTH TWICE DAILY AS NEEDED FOR CONSTIPATION   FLUoxetine (PROZAC) 20 MG capsule TAKE 1 CAPSULE BY MOUTH DAILY WITH FLUOXETINE 40MG FOR A TOTAL OF 60MG DAILY.  NEED APPOINTMENT FOR FURTHER REFILLS.   FLUoxetine (PROZAC) 40 MG capsule TAKE 1 CAPSULE BY MOUTH DAILY WITH A 20MG CAPSULE TO EQUAL TOTAL DAILY DOSE OF 60MG   fluticasone (FLONASE) 50 MCG/ACT spray Spray 2 sprays into both nostrils daily    hydrocortisone 2.5 % cream    hydrOXYzine (ATARAX) 25 MG tablet TAKE 2 TABLETS BY MOUTH NIGHTLY AS NEEDED FOR ANXIETY   ipratropium - albuterol 0.5 mg/2.5 mg/3 mL (DUONEB) 0.5-2.5 (3) MG/3ML neb solution Take 1 vial (3 mLs) by nebulization every 6 hours as needed for shortness of breath / dyspnea or wheezing   ISOtretinoin (ACCUTANE PO)    ketoconazole (NIZORAL) 2 % cream    levocetirizine (XYZAL) 5 MG tablet Take 1 tablet (5 mg) by mouth every evening   Lidocaine 5 % CREA Externally apply 1 Film topically 4 times daily as needed   loratadine (CLARITIN) 10 MG tablet Take 1 tablet (10 mg) by mouth daily as needed for allergies   mirtazapine (REMERON) 15 MG tablet TAKE 1 TABLET BY MOUTH AT BEDTIME. CALL CLINIC IF TOO SEDATED DURING THE DAY   Neomycin-Bacitracin-Polymyxin ([employer] TRIPLE ANTIBIOTIC) OINT Externally apply topically 4 times daily   olopatadine (PATANOL) 0.1 % ophthalmic solution Place 1 drop into both eyes 2 times daily   omeprazole (PRILOSEC) 40 MG capsule TAKE 1 CAPSULE(40 MG) BY MOUTH DAILY 30 TO 60 MINUTES BEFORE A MEAL   ondansetron (ZOFRAN-ODT) 4 MG ODT tab Take 1-2 tablets (4-8 mg) by mouth every 8 hours as needed for nausea   oxyCODONE-acetaminophen (PERCOCET) 5-325 MG per tablet Take 1 tablet by mouth every 6 hours as needed for pain   polyethylene glycol (MIRALAX) powder Take 17 g (1 capful) by mouth daily   traZODone (DESYREL) 100 MG tablet Take 1-3 tablets (100-300 mg) by mouth nightly as needed for sleep   traZODone (DESYREL) 100 MG tablet Take 1 tablet (100 mg) by mouth At Bedtime   triamcinolone (KENALOG) 0.1 % ointment Apply sparingly to affected area on back two times daily til clear.   valACYclovir (VALTREX) 1000 mg tablet Take 1 tablet (1,000 mg) by mouth 2 times daily At the onset of skin lesions as needed   ZOLMitriptan (ZOMIG) 5 MG tablet Take 1 tablet (5 mg) by mouth at onset of headache for migraine May repeat in 2 hours. Max 2 tablets/24 hours.   ZOLMitriptan (ZOMIG) 5 MG tablet  TAKE 1 TABLET BY MOUTH AT ONSET OF HEADACHE FOR MIGRAINE. MAY REPEAT DOSE IN 2 HOURS. DO NOT EXCEED 10MG IN 24 HOURS     Current Facility-Administered Medications on File Prior to Visit:  Lidocaine 1 % injection 9 mL   sodium bicarbonate 8.4 % injection 1 mEq          Allergies   Allergen Reactions     Acetaminophen-Codeine Hives     Amoxicillin-Pot Clavulanate Nausea and Vomiting     Augmentin Nausea and Vomiting and Hives     Barbiturates      Bentyl [Dicyclomine] Other (See Comments)     dizziness     Compazine      Lock-jaw     Liquid Adhesive      Other reaction(s): Other, see comments  blisters     No Clinical Screening - See Comments      PN: LW Reaction: blisters  PN: LW FI1: nka  PN: LW CM1: CONTRAST- nka Reaction :     Nsaids      D/t ITP     Prochlorperazine      Other reaction(s): Edema     Propoxyphene N-Apap Hives     Sulfa Drugs      Tramadol Hcl Hives            Adhesive Tape Rash         REVIEW OF SYSTEMS:  General: No acute withdrawal symptoms.  No recent infections or fever  Resp: No coughing, wheezing or shortness of breath  CV: No chest pains or palpitations  GI: No nausea, vomiting, abdominal pain, diarrhea, constipation  : No urinary frequency or dysuria,     Musculoskeletal: No significant muscle or joint pains, No edema  Neurologic: No numbness, tingling, weakness, problems with balance or coordination  Psychiatric: No acute concerns  Skin: No rashes    OBJECTIVE:    PHYSICAL EXAM:  There were no vitals taken for this visit.    GENERAL APPEARANCE:  alert, comfortable appearing  EYES:Eyes grossly normal to inspection  NEURO:  Gait normal.  No tremor. Coordination intact.   MENTAL STATUS EXAM:  Appearance/Behavior: No appearant distress  Speech: Normal  Mood/Affect: normal affect  Insight: Adequate      Results for orders placed or performed in visit on 08/14/18   Urine Drugs of Abuse Screen Panel 13   Result Value Ref Range    Cannabinoids (17-ncx-9-carboxy-9-THC) Not Detected NDET^Not  Detected ng/mL    Phencyclidine (Phencyclidine) Not Detected NDET^Not Detected ng/mL    Cocaine (Benzoylecgonine) Not Detected NDET^Not Detected ng/mL    Methamphetamine (d-Methamphetamine) Not Detected NDET^Not Detected ng/mL    Opiates (Morphine) Not Detected NDET^Not Detected ng/mL    Amphetamine (d-Amphetamine) Not Detected NDET^Not Detected ng/mL    Benzodiazepines (Nordiazepam) Detected, Abnormal Result (A) NDET^Not Detected ng/mL    Tricyclic Antidepressants (Desipramine) Not Detected NDET^Not Detected ng/mL    Methadone (Methadone) Not Detected NDET^Not Detected ng/mL    Barbiturates (Butalbital) Not Detected NDET^Not Detected ng/mL    Oxycodone (Oxycodone) Not Detected NDET^Not Detected ng/mL    Propoxyphene (Norpropoxyphene) Not Detected NDET^Not Detected ng/mL    Buprenorphine (Buprenorphine) Detected, Abnormal Result (A) NDET^Not Detected ng/mL     Utox positive for BZ.  Confirmation sent.  Will need further discussion if  Positive. Has had false pos in past.       ASSESSMENT/PLAN:    (F11.20) Opioid use disorder, severe, dependence (H)  (primary encounter diagnosis)   Suboxone  8mg bid.  Rx #60  1 refill   Try split dosing for better pain control  Safe storage reviewed.   Follow up 7 wk due to schedule conflict or sooner as needed.     Counseled the patient on the importance of having a recovery program in addition to Medication assisted treatment.  Components include having some type of sober network, avoiding isolating, having willingness  to change, avoiding triggers and managing cravings.  Strongly recommended abstaining from alcohol, benzodiazepines, THC, opioids and other drugs of abuse.       Opioid warning reviewed.  Risk of overdose following a period of abstinence due to decrease tolerance was discussed including risk of death.   Risk of overdose if using Suboxone with other substances particuarly benzodiazepines/alcohol was reviewed.           (F14.21) History of cocaine dependence (H)  Plan:  no recent use.  Encourage ongoing sobriety      (F33.1) Moderate episode of recurrent major depressive disorder (H)  (F41.1) Generalized anxiety disorder  Plan: Recommendations as above.   Follow with PCP/psychiatry needed.  Encouraged to schedule.   Encouraged mental health counseling.     Consider taper Remeron to 7.5mg q hs.       (F17.200) Nicotine use disorder  Comment: not ready to quit  Plan: Encouraged Abstinence.  Increase risk of relapse with other substances with return to nicotine use discussed.  Risk of Ecig/Vaping also reviewed.         Migraine headache.   Risk of other opioid use as treatment for migraine reviewed and not encouraged. .  Other abortive therapy discussed.  Prevention reviewed.  Follow up with Neurology.  Refer info given. Limit use of OTC analgesics to avoid rebound headaches.  \Continue zyrtec/Xyzal   Continue flonase and compliance strategies discussed.            ENCOUNTER FOR LONG TERM USE OF HIGH RISK MEDICATION   High Risk Drug Monitoring?  YES   Drug being monitored: Buprenorphine   Reason for drug: Opioid Use Disorder   What is being monitored?: Dosage, Cravings, Trigger, side effects, and continued abstinence.      Opioid warning reviewed.  Risk of overdose following a period of abstinence due to decrease tolerance was discussed including risk of death.   Risk of overdose if using Suboxone with other substances particuarly benzodiazepines/alcohol was reviewed.          Jessy Brenner MD  [facility]  [phone]

## 2018-09-10 NOTE — MR AVS SNAPSHOT
After Visit Summary   9/10/2018    Coretta Tovar    MRN: [id number]           Patient Information     Date Of Birth          1970        Visit Information        Provider Department      9/10/2018 8:40 AM Sarah Montemayor APRN [facility]        Today's Diagnoses     Bilateral hand pain    -  1    Extensor tenosynovitis of left wrist        Special screening for malignant neoplasms, colon        Constipation, unspecified constipation type          Care Instructions    [facility]    If you have any questions regarding to your visit please contact your care team:     Team Pink:   Clinic Hours Telephone Number   Internal Medicine:  Dr. Kristie Montemayor, NP 7am-7pm  Monday - Thursday   7am-5pm  Fridays  [phone]  (Appointment scheduling available 24/7)   Urgent Care - [place] and [facility] - 11am-9pm Monday-Friday Saturday-Sunday- 9am-5pm   [place] - 5pm-9pm Monday-Friday Saturday-Sunday- 9am-5pm  [phone] - [place]  [phone] - [place]       What options do I have for a visit other than an office visit? We offer electronic visits (e-visits) and telephone visits, when medically appropriate.  Please check with your medical insurance to see if these types of visits are covered, as you will be responsible for any charges that are not paid by your insurance.      You can use [employer] (secure electronic communication) to access to your chart, send your primary care provider a message, or make an appointment. Ask a team member how to get started.     For a price quote for your services, please call our Consumer Price Line at [phone] or our Imaging Cost estimation line at [phone] (for imaging tests).  Rhiannon Leon CMA             Follow-ups after your visit        Additional Services     GASTROENTEROLOGY ADULT REF PROCEDURE ONLY Other; [facility]       Last Lab Result:  Creatinine (mg/dL)       Date                     Value                 08/03/2018                                 Canceled, Test credited  ----------  Body mass index is 21.69 kg/(m^2).      Patient will be contacted to schedule procedure.     Please be aware that coverage of these services is subject to the terms and limitations of your health insurance plan.  Call member services at your health plan with any benefit or coverage questions.  Any procedures must be performed at a [facility] facility OR coordinated by your clinic's referral office.    Please bring the following with you to your appointment:    (1) Any X-Rays, CTs or MRIs which have been performed.  Contact the facility where they were done to arrange for  prior to your scheduled appointment.    (2) List of current medications   (3) This referral request   (4) Any documents/labs given to you for this referral                  Your next 10 appointments already scheduled     Oct 02, 2018  8:00 AM CDT   Return Visit with Jessy Brenner MD   [facility] ([facility]    [address] [address] [phone]              Future tests that were ordered for you today     Open Future Orders        Priority Expected Expires Ordered    XR Hand Bilateral G/E 3 Views Routine 9/10/2018 9/10/2019 9/10/2018            Who to contact     If you have questions or need follow up information about today's clinic visit or your schedule please contact [facility] directly at [phone].  Normal or non-critical lab and imaging results will be communicated to you by MyChart, letter or phone within 4 business days after the clinic has received the results. If you do not hear from us within 7 days, please contact the clinic through MyChart or phone. If you have a critical or abnormal lab result, we will notify you by phone as soon as possible.  Submit refill requests through [employer] or call your  "pharmacy and they will forward the refill request to us. Please allow 3 business days for your refill to be completed.          Additional Information About Your Visit        Care EveryWhere ID     This is your Care EveryWhere ID. This could be used by other organizations to access your [facility] medical records  [id number]        Your Vitals Were     Pulse Temperature Respirations Height Pulse Oximetry BMI (Body Mass Index)    80 97.2  F (36.2  C) (Oral) 20 5' 10\" (1.778 m) 97% 21.69 kg/m2       Blood Pressure from Last 3 Encounters:   09/10/18 100/50   08/03/18 107/64   07/17/18 104/68    Weight from Last 3 Encounters:   09/10/18 151 lb 3.2 oz (68.6 kg)   08/03/18 159 lb (72.1 kg)   07/17/18 147 lb (66.7 kg)              We Performed the Following     Anti Nuclear Lorie IgG by IFA with Reflex     CBC with platelets and differential     Complement C3     Complement C4     Comprehensive metabolic panel     CRP inflammation     Cyclic Citrullinated Peptide Antibody IgG     DNA double stranded antibodies     Erythrocyte sedimentation rate auto     GASTROENTEROLOGY ADULT REF PROCEDURE ONLY Other; [facility]     Rheumatoid factor          Today's Medication Changes          These changes are accurate as of 9/10/18  9:17 AM.  If you have any questions, ask your nurse or doctor.               Start taking these medicines.        Dose/Directions    lidocaine 5 % ointment   Commonly known as:  XYLOCAINE   Used for:  Bilateral hand pain   Started by:  Sarah Montemayor APRN CNP        Apply topically 4 times daily as needed for moderate pain   Quantity:  50 g   Refills:  1       order for DME   Used for:  Extensor tenosynovitis of left wrist   Started by:  Sarah Montemayor APRN CNP        Equipment being ordered: Left thumb brace   Quantity:  1 each   Refills:  0            Where to get your medicines      These medications were sent to [facility] - [facility] [place] - [address]  AT " [facility] [address] [place] [address] [place] [address]     Phone:  [phone]     docusate sodium 100 MG capsule    lidocaine 5 % ointment         Some of these will need a paper prescription and others can be bought over the counter.  Ask your nurse if you have questions.     Bring a paper prescription for each of these medications     order for DME                Primary Care Provider Office Phone # Fax #    Sarah Wilkerson NINOSKA Montemayor [facility] [phone] [address] [place] [address]        Equal Access to Services     [facility]: Hadii aad ku hadasho Soomaali, waaxda luqadaha, qaybta kaalmada adeegyada, waxay ashvinin haymary alegre . So [facility] [phone].    ATENCIÓN: Si habla español, tiene a roberts disposición servicios gratuitos de asistencia lingüística. [facility] [phone].    We comply with applicable federal civil rights laws and [place] laws. We do not discriminate on the basis of race, color, national origin, age, disability, sex, sexual orientation, or gender identity.            Thank you!     Thank you for choosing [facility]  for your care. Our goal is always to provide you with excellent care. Hearing back from our patients is one way we can continue to improve our services. Please take a few minutes to complete the written survey that you may receive in the mail after your visit with us. Thank you!             Your Updated Medication List - Protect others around you: Learn how to safely use, store and throw away your medicines at www.disposemymeds.org.          This list is accurate as of 9/10/18  9:17 AM.  Always use your most recent med list.                   Brand Name Dispense Instructions for use Diagnosis    ACCUTANE PO           acyclovir 5 % ointment    ZOVIRAX    30 g    Apply 1 g topically as needed    Recurrent cold sores       albuterol 108 (90 Base) MCG/ACT inhaler    PROAIR HFA/PROVENTIL HFA/VENTOLIN HFA    18 g     Inhale 1-2 puffs into the lungs every 4 hours as needed for shortness of breath / dyspnea or wheezing    Mild intermittent reactive airway disease without complication       betamethasone valerate 0.1 % ointment    VALISONE     Apply once a day for 2 weeks as directed        buprenorphine-naloxone 8-2 MG Subl sublingual tablet    SUBOXONE    60 each    Place 1 tablet under the tongue 2 times daily    Opioid use disorder, severe, dependence (H)       BusPIRone HCl 30 MG Tabs     60 tablet    TAKE 1 TABLET BY MOUTH TWICE DAILY    Generalized anxiety disorder       cetirizine 10 MG tablet    zyrTEC    90 tablet    Take 1 tablet (10 mg) by mouth every evening    Allergic state, subsequent encounter       clobetasol 0.05 % ointment    TEMOVATE     APPLY TOPICALLY BID        cloNIDine 0.1 MG tablet    CATAPRES    60 tablet    Take 1 tablet (0.1 mg) by mouth At Bedtime Prn late withdrawal symptoms.    Opioid use disorder, severe, dependence (H)       [employer] TRIPLE ANTIBIOTIC Oint     60 g    Externally apply topically 4 times daily    Allergic contact dermatitis due to adhesives       docusate sodium 100 MG capsule    DOK    60 capsule    TAKE 1 CAPSULE BY MOUTH TWICE DAILY AS NEEDED FOR CONSTIPATION    Constipation, unspecified constipation type       * FLUoxetine 40 MG capsule    PROzac    30 capsule    TAKE 1 CAPSULE BY MOUTH DAILY WITH A 20MG CAPSULE TO EQUAL TOTAL DAILY DOSE OF 60MG    Moderate episode of recurrent major depressive disorder (H), Generalized anxiety disorder       * FLUoxetine 20 MG capsule    PROzac    30 capsule    TAKE 1 CAPSULE BY MOUTH DAILY WITH FLUOXETINE 40MG FOR A TOTAL OF 60MG DAILY.  NEED APPOINTMENT FOR FURTHER REFILLS.    Moderate episode of recurrent major depressive disorder (H), Generalized anxiety disorder       fluticasone 50 MCG/ACT spray    FLONASE    1 Bottle    Spray 2 sprays into both nostrils daily    Opioid use disorder, severe, dependence (H)       hydrocortisone 2.5 % cream            hydrOXYzine 25 MG tablet    ATARAX    60 tablet    TAKE 2 TABLETS BY MOUTH NIGHTLY AS NEEDED FOR ANXIETY    Generalized anxiety disorder       ipratropium - albuterol 0.5 mg/2.5 mg/3 mL 0.5-2.5 (3) MG/3ML neb solution    DUONEB    90 vial    Take 1 vial (3 mLs) by nebulization every 6 hours as needed for shortness of breath / dyspnea or wheezing    Acute bronchitis with symptoms > 10 days       ketoconazole 2 % cream    NIZORAL          levocetirizine 5 MG tablet    XYZAL    30 tablet    Take 1 tablet (5 mg) by mouth every evening    Acute seasonal allergic rhinitis due to pollen       Lidocaine 5 % Crea     45 g    Externally apply 1 Film topically 4 times daily as needed    Bilateral carpal tunnel syndrome       lidocaine 5 % ointment    XYLOCAINE    50 g    Apply topically 4 times daily as needed for moderate pain    Bilateral hand pain       loratadine 10 MG tablet    CLARITIN    30 tablet    Take 1 tablet (10 mg) by mouth daily as needed for allergies    Chronic nonseasonal allergic rhinitis due to pollen       mirtazapine 15 MG tablet    REMERON    30 tablet    TAKE 1 TABLET BY MOUTH AT BEDTIME. CALL CLINIC IF TOO SEDATED DURING THE DAY    Primary insomnia       olopatadine 0.1 % ophthalmic solution    PATANOL    5 mL    Place 1 drop into both eyes 2 times daily    Allergic conjunctivitis, bilateral       omeprazole 40 MG capsule    priLOSEC    90 capsule    TAKE 1 CAPSULE(40 MG) BY MOUTH DAILY 30 TO 60 MINUTES BEFORE A MEAL    Gastroesophageal reflux disease, esophagitis presence not specified       ondansetron 4 MG ODT tab    ZOFRAN-ODT    30 tablet    Take 1-2 tablets (4-8 mg) by mouth every 8 hours as needed for nausea    Migraine without status migrainosus, not intractable, unspecified migraine type       order for DME     1 each    Equipment being ordered: Left thumb brace    Extensor tenosynovitis of left wrist       oxyCODONE-acetaminophen 5-325 MG per tablet    PERCOCET    10 tablet    Take  1 tablet by mouth every 6 hours as needed for pain    Opioid use disorder, severe, dependence (H)       polyethylene glycol powder    MIRALAX    510 g    Take 17 g (1 capful) by mouth daily    Drug-induced constipation       * traZODone 100 MG tablet    DESYREL    90 tablet    Take 1 tablet (100 mg) by mouth At Bedtime    Primary insomnia       * traZODone 100 MG tablet    DESYREL    90 tablet    Take 1-3 tablets (100-300 mg) by mouth nightly as needed for sleep    Primary insomnia       triamcinolone 0.1 % ointment    KENALOG    80 g    Apply sparingly to affected area on back two times daily til clear.    Rash       valACYclovir 1000 mg tablet    VALTREX    8 tablet    Take 1 tablet (1,000 mg) by mouth 2 times daily At the onset of skin lesions as needed    Recurrent cold sores       ZOLMitriptan 5 MG tablet    ZOMIG    12 tablet    TAKE 1 TABLET BY MOUTH AT ONSET OF HEADACHE FOR MIGRAINE. MAY REPEAT DOSE IN 2 HOURS. DO NOT EXCEED 10MG IN 24 HOURS    Migraine without aura and without status migrainosus, not intractable       * Notice:  This list has 4 medication(s) that are the same as other medications prescribed for you. Read the directions carefully, and ask your doctor or other care provider to review them with you.

## 2018-09-10 NOTE — TELEPHONE ENCOUNTER
Patient calling to update aSrah Montemayor NP that lidocaine oint is not covered.    Please advise    Val Teran RN

## 2018-09-10 NOTE — PATIENT INSTRUCTIONS
[facility]    If you have any questions regarding to your visit please contact your care team:     Team Pink:   Clinic Hours Telephone Number   Internal Medicine:  Dr. Kristie Montemayor NP 7am-7pm  Monday - Thursday   7am-5pm  Fridays  [phone]  (Appointment scheduling available 24/7)   Urgent Care - [place] and [facility] - 11am-9pm Monday-Friday Saturday-Sunday- 9am-5pm   [place] - 5pm-9pm Monday-Friday Saturday-Sunday- 9am-5pm  [phone] - [place]  [phone] - [place]       What options do I have for a visit other than an office visit? We offer electronic visits (e-visits) and telephone visits, when medically appropriate.  Please check with your medical insurance to see if these types of visits are covered, as you will be responsible for any charges that are not paid by your insurance.      You can use [facility] (secure electronic communication) to access to your chart, send your primary care provider a message, or make an appointment. Ask a team member how to get started.     For a price quote for your services, please call our Consumer Price Line at [phone] or our Imaging Cost estimation line at [phone] (for imaging tests).  Rhiannon Leon CMA

## 2018-09-10 NOTE — TELEPHONE ENCOUNTER
Called pharmacy but they do not know what else is covered.  They asked that patient's insurance be called.    Please ask patient to contact her insurance to see if they will cover any formulations of lidocaine.  Ointment was not covered. Would they cover cream? Or other lidocaine formulation?    Val Teran RN

## 2018-09-10 NOTE — PROGRESS NOTES
Dear Coretta,    Your recent test results are attached.      No acute findings on hand x-ray.    If you have any questions please feel free to contact [phone] or myself via [employer].    Sincerely,  Sarah Montemayor, CNP

## 2018-09-10 NOTE — LETTER
[facility]  [address] [place] [address]    September 11, 2018    Coretta Tovar  [address] [address]          Dear Coretta,  No acute findings on hand x-ray.   Enclosed is a copy of your results.     Results for orders placed or performed in visit on 09/10/18   XR Hand Bilateral G/E 3 Views    Narrative    XR HAND BILATERAL G/E 3 VW 9/10/2018 10:00 AM    COMPARISON: None.    HISTORY: Bilateral hand pain.    FINDINGS:  RIGHT hand: No fractures are seen. Joints are preserved and in normal  alignment. No erosive changes identified.    LEFT hand: No fractures are seen. Joints are preserved and in normal  alignment. No erosive changes identified.      Impression    IMPRESSION: No acute bone abnormality or significant arthropathy  identified in either hand.    TARSHA LE MD     If you have any questions or concerns, please call myself or my nurse at [phone].  Sincerely,  Sarah Montemayor, CNP/pb

## 2018-09-10 NOTE — TELEPHONE ENCOUNTER
Patient called back stating she will call insurance tomorrow 09/11/2018 to verify which medication they will be able to cover, pt will call clinic back with results.     RICHARD Martin

## 2018-09-10 NOTE — PROGRESS NOTES
SUBJECTIVE:   Coretta Tovar is a 48 year old female who presents to clinic today for the following health issues:        Joint Pain    Onset: 6 month ago    Description:   Location: left wrist is worst and right wrist  Character: Sharp, Stabbing and shooting pain when moving thumb    Intensity: severe    Progression of Symptoms: worse    Accompanying Signs & Symptoms:  Other symptoms: numbness, weakness of both hands and swelling    History:   Previous similar pain: no       Precipitating factors:   Trauma or overuse: don't knoe    Alleviating factors:  Improved by: nothing    Therapies Tried and outcome: tylenol not helping    Patient had EMG in 4/18 which showed mild left median neuropathy.  Patient does not tolerate brace and notes it increases pain.  She notes pain improved for about a month and then worsened again 4 months.  Patient denies injury, new activities.  She feels the most pain at the base of her left thumb.  Patient notes swelling to all joints on both hands.  Patient denies family history of inflammatory arthritis.    Problem list and histories reviewed & adjusted, as indicated.  Additional history: as documented    Patient Active Problem List   Diagnosis     Tobacco abuse     S/P splenectomy     Migraines     Idiopathic thrombocytopenic purpura (HCC)     Chemical dependency (H)     Insomnia     Mary-mary disease     Vomiting     Abdominal pain, acute     Disorder of stomach     Cervical pain     Vertigo     Gastroesophageal reflux disease     Herpetic gingivostomatitis     Immune thrombocytopenic purpura (H)     Muscle pain     Personality disorder     Nondependent cannabis abuse, episodic     Atopic rhinitis     Nutritional disorder     Abnormal weight loss     Mixed, or nondependent drug abuse     Moderate episode of recurrent major depressive disorder (H)     Generalized anxiety disorder     Protein-calorie malnutrition (H)     Primary insomnia     History of cocaine dependence (H)      Past Surgical History:   Procedure Laterality Date     ANKLE SURGERY      Numerous right ankle surgeries     HERNIA REPAIR Left 1975     HYSTERECTOMY, PAP NO LONGER INDICATED      benign, endometriosis     NECK SURGERY  2000's    Cervical spine     SPLENECTOMY  1995     TONSILLECTOMY         Social History   Substance Use Topics     Smoking status: Current Every Day Smoker     Packs/day: 0.50     Years: 15.00     Types: Cigarettes     Last attempt to quit: 11/6/2015     Smokeless tobacco: Never Used      Comment: 4/17/18     Alcohol use Yes      Comment: Drinks once in 4 - 5 months     Family History   Problem Relation Age of Onset     Neurologic Disorder Maternal Grandmother      migraines     Breast Cancer Maternal Grandmother      Neurologic Disorder Maternal Grandfather      migraines     Neurologic Disorder Brother      migraines     Breast Cancer Maternal Aunt      x 2     Breast Cancer Paternal Grandmother      Depression Daughter      Anxiety Disorder Daughter          Current Outpatient Prescriptions   Medication Sig Dispense Refill     acyclovir (ZOVIRAX) 5 % ointment Apply 1 g topically as needed 30 g 11     albuterol (PROAIR HFA/PROVENTIL HFA/VENTOLIN HFA) 108 (90 Base) MCG/ACT Inhaler Inhale 1-2 puffs into the lungs every 4 hours as needed for shortness of breath / dyspnea or wheezing 18 g 3     betamethasone valerate (VALISONE) 0.1 % ointment Apply once a day for 2 weeks as directed       buprenorphine-naloxone (SUBOXONE) 8-2 MG SUBL sublingual tablet Place 1 tablet under the tongue 2 times daily 60 each 1     BusPIRone HCl 30 MG TABS TAKE 1 TABLET BY MOUTH TWICE DAILY 60 tablet 5     cetirizine (ZYRTEC) 10 MG tablet Take 1 tablet (10 mg) by mouth every evening 90 tablet 1     clobetasol (TEMOVATE) 0.05 % ointment APPLY TOPICALLY BID  1     cloNIDine (CATAPRES) 0.1 MG tablet Take 1 tablet (0.1 mg) by mouth At Bedtime Prn late withdrawal symptoms. 60 tablet 3     docusate sodium (DOK) 100 MG capsule  TAKE 1 CAPSULE BY MOUTH TWICE DAILY AS NEEDED FOR CONSTIPATION 60 capsule 3     FLUoxetine (PROZAC) 20 MG capsule TAKE 1 CAPSULE BY MOUTH DAILY WITH FLUOXETINE 40MG FOR A TOTAL OF 60MG DAILY.  NEED APPOINTMENT FOR FURTHER REFILLS. 30 capsule 1     FLUoxetine (PROZAC) 40 MG capsule TAKE 1 CAPSULE BY MOUTH DAILY WITH A 20MG CAPSULE TO EQUAL TOTAL DAILY DOSE OF 60MG 30 capsule 0     fluticasone (FLONASE) 50 MCG/ACT spray Spray 2 sprays into both nostrils daily 1 Bottle 11     hydrocortisone 2.5 % cream        hydrOXYzine (ATARAX) 25 MG tablet TAKE 2 TABLETS BY MOUTH NIGHTLY AS NEEDED FOR ANXIETY 60 tablet 3     ipratropium - albuterol 0.5 mg/2.5 mg/3 mL (DUONEB) 0.5-2.5 (3) MG/3ML neb solution Take 1 vial (3 mLs) by nebulization every 6 hours as needed for shortness of breath / dyspnea or wheezing 90 vial 3     ISOtretinoin (ACCUTANE PO)        ketoconazole (NIZORAL) 2 % cream        levocetirizine (XYZAL) 5 MG tablet Take 1 tablet (5 mg) by mouth every evening 30 tablet 11     lidocaine (XYLOCAINE) 5 % ointment Apply topically 4 times daily as needed for moderate pain 50 g 1     Lidocaine 5 % CREA Externally apply 1 Film topically 4 times daily as needed 45 g 3     loratadine (CLARITIN) 10 MG tablet Take 1 tablet (10 mg) by mouth daily as needed for allergies 30 tablet 11     mirtazapine (REMERON) 15 MG tablet TAKE 1 TABLET BY MOUTH AT BEDTIME. CALL CLINIC IF TOO SEDATED DURING THE DAY 30 tablet 0     Neomycin-Bacitracin-Polymyxin ([employer] TRIPLE ANTIBIOTIC) OINT Externally apply topically 4 times daily 60 g 3     olopatadine (PATANOL) 0.1 % ophthalmic solution Place 1 drop into both eyes 2 times daily 5 mL 3     omeprazole (PRILOSEC) 40 MG capsule TAKE 1 CAPSULE(40 MG) BY MOUTH DAILY 30 TO 60 MINUTES BEFORE A MEAL 90 capsule 3     ondansetron (ZOFRAN-ODT) 4 MG ODT tab Take 1-2 tablets (4-8 mg) by mouth every 8 hours as needed for nausea 30 tablet 1     order for DME Equipment being ordered: Left thumb brace 1 each 0      oxyCODONE-acetaminophen (PERCOCET) 5-325 MG per tablet Take 1 tablet by mouth every 6 hours as needed for pain 10 tablet 0     polyethylene glycol (MIRALAX) powder Take 17 g (1 capful) by mouth daily 510 g 3     traZODone (DESYREL) 100 MG tablet Take 1-3 tablets (100-300 mg) by mouth nightly as needed for sleep 90 tablet 0     traZODone (DESYREL) 100 MG tablet Take 1 tablet (100 mg) by mouth At Bedtime 90 tablet 1     triamcinolone (KENALOG) 0.1 % ointment Apply sparingly to affected area on back two times daily til clear. 80 g 0     valACYclovir (VALTREX) 1000 mg tablet Take 1 tablet (1,000 mg) by mouth 2 times daily At the onset of skin lesions as needed 8 tablet 11     ZOLMitriptan (ZOMIG) 5 MG tablet TAKE 1 TABLET BY MOUTH AT ONSET OF HEADACHE FOR MIGRAINE. MAY REPEAT DOSE IN 2 HOURS. DO NOT EXCEED 10MG IN 24 HOURS 12 tablet 4     [DISCONTINUED] ZOLMitriptan (ZOMIG) 5 MG tablet Take 1 tablet (5 mg) by mouth at onset of headache for migraine May repeat in 2 hours. Max 2 tablets/24 hours. (Patient not taking: Reported on 9/10/2018) 18 tablet 3     Allergies   Allergen Reactions     Acetaminophen-Codeine Hives     Amoxicillin-Pot Clavulanate Nausea and Vomiting     Augmentin Nausea and Vomiting and Hives     Barbiturates      Bentyl [Dicyclomine] Other (See Comments)     dizziness     Compazine      Lock-jaw     Liquid Adhesive      Other reaction(s): Other, see comments  blisters     No Clinical Screening - See Comments      PN: LW Reaction: blisters  PN: LW FI1: nka  PN: LW CM1: CONTRAST- nka Reaction :     Nsaids      D/t ITP     Prochlorperazine      Other reaction(s): Edema     Propoxyphene N-Apap Hives     Sulfa Drugs      Tramadol Hcl Hives            Adhesive Tape Rash     BP Readings from Last 3 Encounters:   09/10/18 100/50   08/03/18 107/64   07/17/18 104/68    Wt Readings from Last 3 Encounters:   09/10/18 151 lb 3.2 oz (68.6 kg)   08/03/18 159 lb (72.1 kg)   07/17/18 147 lb (66.7 kg)                 "  Labs reviewed in EPIC    Reviewed and updated as needed this visit by clinical staff       Reviewed and updated as needed this visit by Provider         ROS:  Constitutional, HEENT, cardiovascular, pulmonary, gi and gu systems are negative, except as otherwise noted.    OBJECTIVE:     /50  Pulse 80  Temp 97.2  F (36.2  C) (Oral)  Resp 20  Ht 5' 10\" (1.778 m)  Wt 151 lb 3.2 oz (68.6 kg)  SpO2 97%  BMI 21.69 kg/m2  Body mass index is 21.69 kg/(m^2).  GENERAL: alert, no distress and fatigued  RESP: lungs clear to auscultation - no rales, rhonchi or wheezes  CV: regular rate and rhythm, normal S1 S2, no S3 or S4, no murmur, click or rub, no peripheral edema and peripheral pulses strong  MS: mild swelling noted to all joints bilateral hands, tenderness to radial aspect of left wrist, decreased range of motion of left thumb, pain with range of motion.    Diagnostic Test Results:  pending    ASSESSMENT/PLAN:     1. Bilateral hand pain  Will evaluate for signs of inflammatory arthritis with labs and x-ray.  Consider hand therapy if normal.  - lidocaine (XYLOCAINE) 5 % ointment; Apply topically 4 times daily as needed for moderate pain  Dispense: 50 g; Refill: 1  - XR Hand Bilateral G/E 3 Views; Future  - Anti Nuclear Lorie IgG by IFA with Reflex  - Complement C3  - Complement C4  - Cyclic Citrullinated Peptide Antibody IgG  - DNA double stranded antibodies  - Rheumatoid factor  - CRP inflammation  - Erythrocyte sedimentation rate auto  - CBC with platelets and differential  - Comprehensive metabolic panel    2. Extensor tenosynovitis of left wrist    - order for DME; Equipment being ordered: Left thumb brace  Dispense: 1 each; Refill: 0    3. Special screening for malignant neoplasms, colon    - GASTROENTEROLOGY ADULT REF PROCEDURE ONLY Other; [facility]    4. Constipation, unspecified constipation type    - docusate sodium (DOK) 100 MG capsule; TAKE 1 CAPSULE BY MOUTH TWICE DAILY AS NEEDED FOR " CONSTIPATION  Dispense: 60 capsule; Refill: 3    FUTURE APPOINTMENTS:       - Follow-up for annual visit or as needed    NINOSKA Smith CNP  [facility]

## 2018-09-11 NOTE — TELEPHONE ENCOUNTER
Central Prior Authorization Team   Phone: [phone]      PA Initiation    Medication: Lidocaine oint  Insurance Company: [employer] [place] - Phone [phone] Fax [phone]  [facility] the Rx: [facility] [address] [place] [address] - [address] [place] [address] & [facility]  Filling Pharmacy Phone: [phone]  Filling Pharmacy Fax:    Start Date: 9/11/2018

## 2018-09-11 NOTE — TELEPHONE ENCOUNTER
PRIOR AUTHORIZATION DENIED    Medication: Lidocaine oint    Denial Date: 9/11/2018    Denial Rational:      Appeal Information:   If you would like to appeal, please supply P/A team with a letter of medical necessity with clinical reason.

## 2018-09-11 NOTE — TELEPHONE ENCOUNTER
Patient returned call, she called her insurance company and the only why they will consider covering the medication is with a Prior Auth.  Chula Lara MA    Prior Authorization Specialty Medication Request    Medication/Dose: Lidocaine oint  ICD code (if different than what is on RX):    Previously Tried and Failed:      Important Lab Values:   Rationale:     Insurance Name:   Insurance ID: [id number]  Insurance Phone Number: [phone]    Pharmacy Information (if different than what is on RX)  Name:  [facility]  Phone:  [phone]

## 2018-09-12 NOTE — TELEPHONE ENCOUNTER
"Routing refill request to provider for review/approval because:  Rhea given x1 and patient did not follow up, please advise    PHQ-9 SCORE 9/14/2017 1/2/2018 6/15/2018   Total Score - - -   Total Score MyChart - - -   Total Score 6 16 13       Requested Prescriptions   Pending Prescriptions Disp Refills     FLUoxetine (PROZAC) 20 MG capsule [Pharmacy Med Name: FLUOXETINE 20MG CAPSULES]  Last Written Prescription Date:  8/13/18  Last Fill Quantity: 30,  # refills: 1   Last office visit: 9/10/2018 with prescribing provider:     Future Office Visit:   Next 5 appointments (look out 90 days)     Oct 02, 2018  8:00 AM CDT   Return Visit with Jessy Brenner MD   [facility] ([facility]    [address] [address]   [phone]                  30 capsule 0     Sig: TAKE 1 CAPSULE BY MOUTH DAILY WITH FLUOXETINE 40MG FOR A TOTAL OF 60MG DAILY.    SSRIs Protocol Failed    9/11/2018  3:14 PM       Failed - PHQ-9 score less than 5 in past 6 months    Please review last PHQ-9 score.          Passed - Patient is age 18 or older       Passed - No active pregnancy on record       Passed - No positive pregnancy test in last 12 months       Passed - Recent (6 mo) or future (30 days) visit within the authorizing provider's specialty    Patient had office visit in the last 6 months or has a visit in the next 30 days with authorizing provider or within the authorizing provider's specialty.  See \"Patient Info\" tab in inbasket, or \"Choose Columns\" in Meds & Orders section of the refill encounter.            mirtazapine (REMERON) 15 MG tablet [Pharmacy Med Name: MIRTAZAPINE 15MG TABLETS]  Last Written Prescription Date:  6/15/18  Last Fill Quantity: 30,  # refills: 0   Last office visit: 9/10/2018 with prescribing provider:     Future Office Visit:   Next 5 appointments (look out 90 days)     Oct 02, 2018  8:00 AM CDT   Return Visit with Jessy Brenner MD   [facility] " "[place] ([facility])    [address]  [place] [address]   [phone]                  30 tablet 0     Sig: TAKE 1 TABLET BY MOUTH AT BEDTIME. CALL CLINIC IF TOO SEDATED DURING THE DAY.    Atypical Antidepressants Protocol Failed    9/11/2018  3:14 PM       Failed - Patient has PHQ-9 score less than 5 in past 6 months.    Please review last PHQ-9 score.          Passed - Patient is age 18 or older       Passed - No active pregnancy on record       Passed - No positive pregnancy test in past 12 mos       Passed - Recent (6 mo) or future (30 days) visit within the authorizing provider's specialty    Patient had office visit in the last 6 months or has a visit in the next 30 days with authorizing provider or within the authorizing provider's specialty.  See \"Patient Info\" tab in inbasket, or \"Choose Columns\" in Meds & Orders section of the refill encounter.            FLUoxetine (PROZAC) 40 MG capsule [Pharmacy Med Name: FLUOXETINE 40MG CAPSULES]  Last Written Prescription Date:  6/15/18  Last Fill Quantity: 30,  # refills: 0   Last office visit: 9/10/2018 with prescribing provider:     Future Office Visit:   Next 5 appointments (look out 90 days)     Oct 02, 2018  8:00 AM CDT   Return Visit with Jessy Brenner MD   [facility] [place] ([facility])    [address]  [place] [address]   [phone]                  30 capsule 0     Sig: TAKE 1 CAPSULE BY MOUTH DAILY WITH 20 MG CAPSULE OF FLUOXETINE TO EQUAL TOTAL DAILY DOSE OF 60MG    SSRIs Protocol Failed    9/11/2018  3:14 PM       Failed - PHQ-9 score less than 5 in past 6 months    Please review last PHQ-9 score.          Passed - Patient is age 18 or older       Passed - No active pregnancy on record       Passed - No positive pregnancy test in last 12 months       Passed - Recent (6 mo) or future (30 days) visit within the authorizing provider's specialty    Patient had office visit in the last " "6 months or has a visit in the next 30 days with authorizing provider or within the authorizing provider's specialty.  See \"Patient Info\" tab in inbasket, or \"Choose Columns\" in Meds & Orders section of the refill encounter.            Sherrill Torres RN - BC      "

## 2018-09-13 NOTE — TELEPHONE ENCOUNTER
Reason for call:  Other   Patient called regarding (reason for call): appointment  Additional comments: Patient was referred by Dr. Montemayor to see Dr. Chavis for possible Rheumatoid Arthritis. She states she is having a lot of pain in her right hand and would like to be seen soon, if possible can Dr. hCavis fit patient in his schedule preferably at the [place] location or refer her somewhere else. Please call patient. Thanks    Phone number to reach patient:  Home number on file [phone] (home)    Best Time:  anytime    Can we leave a detailed message on this number?  YES

## 2018-09-14 NOTE — TELEPHONE ENCOUNTER
Left message for patient to call back to clinic. [phone].    Right now we have no new patient opening until January-February. Patient could try calling and see if she could get in sooner with:    Dr. Stevenson who is in [place] [phone] and [place] [phone]                                               Or    [facility] [phone].    Tiffani Rivers CMA Rheumatology  9/14/2018 10:36 AM

## 2018-09-17 NOTE — TELEPHONE ENCOUNTER
Pt called and left a message on the RN hotline requesting a phone call. Not sure if this is in regards to her referral or call regarding rheumatology appointment. See previous encounters.    Left message on answering machine for patient to call back to the RN hotline at [phone].    Cyndi Watson RN  [facility]

## 2018-09-17 NOTE — TELEPHONE ENCOUNTER
Spoke with pt. States she received the message regarding rheumatology. She called and has an appt scheduled with [facility] on 10/29.    Pt is coming in for a lab only on 9/19. She is requesting order for lipids and she will be fasting. Please advise.    Cyndi Watson RN  [facility]

## 2018-09-17 NOTE — TELEPHONE ENCOUNTER
Left vm providing patient with details of other rheumatologists she may see if she would like to be seen sooner. Also provided her with call back number if she would like to discuss symptoms.    Hebert Townsend RN....9/17/2018 3:10 PM

## 2018-09-18 NOTE — TELEPHONE ENCOUNTER
"Requested Prescriptions   Pending Prescriptions Disp Refills     CHANTIX 1 MG tablet [Pharmacy Med Name: CHANTIX 1MG TABLETS] 56 tablet 0     Sig: TAKE 1 TABLET BY MOUTH TWICE DAILY    Partial Cholinergic Nicotinic Agonist Agents Passed    9/17/2018 11:08 AM       Passed - Blood pressure under 140/90 in past 12 months    BP Readings from Last 3 Encounters:   09/10/18 100/50   08/03/18 107/64   07/17/18 104/68          Passed - Recent (12 mo) or future (30 days) visit within the authorizing provider's specialty    Patient had office visit in the last 12 months or has a visit in the next 30 days with authorizing provider or within the authorizing provider's specialty.  See \"Patient Info\" tab in inbasket, or \"Choose Columns\" in Meds & Orders section of the refill encounter.           Passed - Patient is 18 years of age or older       Passed - Patient is not pregnant       Passed - No positive pregnancy test on file in past 12 months        CHANTIX 1 MG tablet (Discontinued)  Routing refill request to provider for review/approval because:  Drug not active on patient's medication list, completed 09/07/2017    Kasie Randall, RN, BSN           "

## 2018-09-19 NOTE — TELEPHONE ENCOUNTER
Patient called to get results    Patient notified of Provider's message as written.  Patient verbalized understanding.    Result Notes   Notes Recorded by Glenda Pineda on 9/19/2018 at 4:01 PM  Result letter was sent. Raysa Pineda,   ------    Notes Recorded by Ade Arango MD on 9/19/2018 at 3:36 PM  LDL cholesterol is Borderline-follow Low cholesterol diet-Please mail  Liver test are normal   Ade Teran, RN

## 2018-09-25 NOTE — TELEPHONE ENCOUNTER
Pt asking if there is any medication to take until appointment with Rheumatologist on 9/28/18.  Lidoderm oint did nothing. Tylenol does not help. Pt is having severe pain in hands, elbows and shoulders.  Martha [place] RN

## 2018-09-25 NOTE — TELEPHONE ENCOUNTER
Pt was given provider's message as written.    Cyndi Watson RN  [facility]

## 2018-10-02 NOTE — MR AVS SNAPSHOT
After Visit Summary   10/2/2018    Coretta Tovar    MRN: [id number]           Patient Information     Date Of Birth          1970        Visit Information        Provider Department      10/2/2018 8:00 AM Jessy Brenner MD [facility]        Today's Diagnoses     Opioid use disorder, severe, dependence (H)           Follow-ups after your visit        Your next 10 appointments already scheduled     Oct 29, 2018  9:00 AM CDT   [facility] Visit with Nickie Diaz MD   [facility]    [address] [place] [id number] [phone]            Nov 06, 2018  8:00 AM CST   Return Visit with Jessy Brenner MD   [facility] ([facility]    [address] [facility] [id number] [phone]              Who to contact     If you have questions or need follow up information about today's clinic visit or your schedule please contact [facility] directly at [phone].  Normal or non-critical lab and imaging results will be communicated to you by MyChart, letter or phone within 4 business days after the clinic has received the results. If you do not hear from us within 7 days, please contact the clinic through MyChart or phone. If you have a critical or abnormal lab result, we will notify you by phone as soon as possible.  Submit refill requests through [employer] or call your pharmacy and they will forward the refill request to us. Please allow 3 business days for your refill to be completed.          Additional Information About Your Visit        Care EveryWhere ID     This is your Care EveryWhere ID. This could be used by other organizations to access your [place] medical records  [id number]        Your Vitals Were     Pulse Temperature Respirations Pulse Oximetry BMI (Body Mass Index)       68 98.7  F (37.1  C) (Oral) 16 97% 20.95 kg/m2        Blood Pressure from Last 3  Encounters:   10/02/18 115/58   09/10/18 100/50   08/03/18 107/64    Weight from Last 3 Encounters:   10/02/18 146 lb (66.2 kg)   09/10/18 151 lb 3.2 oz (68.6 kg)   08/03/18 159 lb (72.1 kg)              We Performed the Following     Benzodiazepine Confirmatory Urine Qual     Urine Drugs of Abuse Screen Panel 13          Today's Medication Changes          These changes are accurate as of 10/2/18 11:17 AM.  If you have any questions, ask your nurse or doctor.               Start taking these medicines.        Dose/Directions    gabapentin 300 MG capsule   Commonly known as:  NEURONTIN   Used for:  Opioid use disorder, severe, dependence (H)   Started by:  Jessy Brenner MD        Dose:  300 mg   Take 1 capsule (300 mg) by mouth 3 times daily   Quantity:  90 capsule   Refills:  1            Where to get your medicines      Some of these will need a paper prescription and others can be bought over the counter.  Ask your nurse if you have questions.     Bring a paper prescription for each of these medications     buprenorphine-naloxone 8-2 MG Subl sublingual tablet    gabapentin 300 MG capsule                Primary Care Provider Office Phone # Fax #    Sarah Wilkerson Rohit Roberts, APRN [facility] [phone]       [address] [place] [id number]        Equal Access to Services     ARELY JASON AH: Hadii teodoro ferrer hadasho Soomaali, waaxda luqadaha, qaybta kaalmada adeegyada, pelon malcolm. So [facility] [phone].    ATENCIÓN: Si habla español, tiene a roberts disposición servicios gratuitos de asistencia lingüística. Llame al [phone].    We comply with applicable federal civil rights laws and [place] laws. We do not discriminate on the basis of race, color, national origin, age, disability, sex, sexual orientation, or gender identity.            Thank you!     Thank you for choosing [facility]  for your care. Our goal is always to provide you with excellent care.  Hearing back from our patients is one way we can continue to improve our services. Please take a few minutes to complete the written survey that you may receive in the mail after your visit with us. Thank you!             Your Updated Medication List - Protect others around you: Learn how to safely use, store and throw away your medicines at www.disposemymeds.org.          This list is accurate as of 10/2/18 11:17 AM.  Always use your most recent med list.                   Brand Name Dispense Instructions for use Diagnosis    ACCUTANE PO           acyclovir 5 % ointment    ZOVIRAX    30 g    Apply 1 g topically as needed    Recurrent cold sores       albuterol 108 (90 Base) MCG/ACT inhaler    PROAIR HFA/PROVENTIL HFA/VENTOLIN HFA    18 g    Inhale 1-2 puffs into the lungs every 4 hours as needed for shortness of breath / dyspnea or wheezing    Mild intermittent reactive airway disease without complication       betamethasone valerate 0.1 % ointment    VALISONE     Apply once a day for 2 weeks as directed        buprenorphine-naloxone 8-2 MG Subl sublingual tablet    SUBOXONE    60 each    Place 1 tablet under the tongue 2 times daily    Opioid use disorder, severe, dependence (H)       BusPIRone HCl 30 MG Tabs     60 tablet    TAKE 1 TABLET BY MOUTH TWICE DAILY    Generalized anxiety disorder       cetirizine 10 MG tablet    zyrTEC    90 tablet    Take 1 tablet (10 mg) by mouth every evening    Allergic state, subsequent encounter       clobetasol 0.05 % ointment    TEMOVATE     APPLY TOPICALLY BID        cloNIDine 0.1 MG tablet    CATAPRES    60 tablet    Take 1 tablet (0.1 mg) by mouth At Bedtime Prn late withdrawal symptoms.    Opioid use disorder, severe, dependence (H)       [employer] TRIPLE ANTIBIOTIC Oint     60 g    Externally apply topically 4 times daily    Allergic contact dermatitis due to adhesives       docusate sodium 100 MG capsule    DOK    60 capsule    TAKE 1 CAPSULE BY MOUTH TWICE DAILY AS NEEDED FOR  CONSTIPATION    Constipation, unspecified constipation type       * FLUoxetine 20 MG capsule    PROzac    30 capsule    TAKE 1 CAPSULE BY MOUTH DAILY WITH FLUOXETINE 40MG FOR A TOTAL OF 60MG DAILY.  NEED APPOINTMENT FOR FURTHER REFILLS.    Moderate episode of recurrent major depressive disorder (H), Generalized anxiety disorder       * FLUoxetine 20 MG capsule    PROzac    30 capsule    TAKE 1 CAPSULE BY MOUTH DAILY WITH FLUOXETINE 40MG FOR A TOTAL OF 60MG DAILY.    Moderate episode of recurrent major depressive disorder (H), Generalized anxiety disorder       * FLUoxetine 40 MG capsule    PROzac    30 capsule    TAKE 1 CAPSULE BY MOUTH DAILY WITH 20 MG CAPSULE OF FLUOXETINE TO EQUAL TOTAL DAILY DOSE OF 60MG    Moderate episode of recurrent major depressive disorder (H), Generalized anxiety disorder       fluticasone 50 MCG/ACT spray    FLONASE    1 Bottle    Spray 2 sprays into both nostrils daily    Opioid use disorder, severe, dependence (H)       gabapentin 300 MG capsule    NEURONTIN    90 capsule    Take 1 capsule (300 mg) by mouth 3 times daily    Opioid use disorder, severe, dependence (H)       hydrocortisone 2.5 % cream           hydrOXYzine 25 MG tablet    ATARAX    60 tablet    TAKE 2 TABLETS BY MOUTH NIGHTLY AS NEEDED FOR ANXIETY    Generalized anxiety disorder       ipratropium - albuterol 0.5 mg/2.5 mg/3 mL 0.5-2.5 (3) MG/3ML neb solution    DUONEB    90 vial    Take 1 vial (3 mLs) by nebulization every 6 hours as needed for shortness of breath / dyspnea or wheezing    Acute bronchitis with symptoms > 10 days       ketoconazole 2 % cream    NIZORAL          levocetirizine 5 MG tablet    XYZAL    30 tablet    Take 1 tablet (5 mg) by mouth every evening    Acute seasonal allergic rhinitis due to pollen       Lidocaine 5 % Crea     45 g    Externally apply 1 Film topically 4 times daily as needed    Bilateral carpal tunnel syndrome       lidocaine 5 % ointment    XYLOCAINE    50 g    Apply topically 4  times daily as needed for moderate pain    Bilateral hand pain       loratadine 10 MG tablet    CLARITIN    30 tablet    Take 1 tablet (10 mg) by mouth daily as needed for allergies    Chronic nonseasonal allergic rhinitis due to pollen       mirtazapine 15 MG tablet    REMERON    30 tablet    TAKE 1 TABLET BY MOUTH AT BEDTIME. CALL CLINIC IF TOO SEDATED DURING THE DAY.    Primary insomnia       olopatadine 0.1 % ophthalmic solution    PATANOL    5 mL    Place 1 drop into both eyes 2 times daily    Allergic conjunctivitis, bilateral       omeprazole 40 MG capsule    priLOSEC    90 capsule    TAKE 1 CAPSULE(40 MG) BY MOUTH DAILY 30 TO 60 MINUTES BEFORE A MEAL    Gastroesophageal reflux disease, esophagitis presence not specified       ondansetron 4 MG ODT tab    ZOFRAN-ODT    30 tablet    Take 1-2 tablets (4-8 mg) by mouth every 8 hours as needed for nausea    Migraine without status migrainosus, not intractable, unspecified migraine type       order for DME     1 each    Equipment being ordered: Left thumb brace    Extensor tenosynovitis of left wrist       oxyCODONE-acetaminophen 5-325 MG per tablet    PERCOCET    10 tablet    Take 1 tablet by mouth every 6 hours as needed for pain    Opioid use disorder, severe, dependence (H)       polyethylene glycol powder    MIRALAX    510 g    Take 17 g (1 capful) by mouth daily    Drug-induced constipation       * traZODone 100 MG tablet    DESYREL    90 tablet    Take 1 tablet (100 mg) by mouth At Bedtime    Primary insomnia       * traZODone 100 MG tablet    DESYREL    90 tablet    Take 1-3 tablets (100-300 mg) by mouth nightly as needed for sleep    Primary insomnia       triamcinolone 0.1 % ointment    KENALOG    80 g    Apply sparingly to affected area on back two times daily til clear.    Rash       valACYclovir 1000 mg tablet    VALTREX    8 tablet    Take 1 tablet (1,000 mg) by mouth 2 times daily At the onset of skin lesions as needed    Recurrent cold sores        ZOLMitriptan 5 MG tablet    ZOMIG    12 tablet    TAKE 1 TABLET BY MOUTH AT ONSET OF HEADACHE FOR MIGRAINE. MAY REPEAT DOSE IN 2 HOURS. DO NOT EXCEED 10MG IN 24 HOURS    Migraine without aura and without status migrainosus, not intractable       * Notice:  This list has 5 medication(s) that are the same as other medications prescribed for you. Read the directions carefully, and ask your doctor or other care provider to review them with you.

## 2018-10-02 NOTE — PROGRESS NOTES
SUBJECTIVE:                                                    BUPRENORPHINE FOLLOW UP:    Coretta Tovar is a 48 year old female who presents to clinic today for follow up of Buprenorphine.      Date of last visit:  8/14/2018    [place] [facility] Data Base Reviewed:    YES;     9/15/18 Suboxone 8mg tab #60    Brief History:   Hx of multiple surgeries (>15).   Since age 14  9 surgeries on right foot in past 15months for cpmplex fx ankle and foot.  C spine fusion many years ago.   Hx migraine headache.  S/p hysterectomy, tonsillectomy, splenectomy, ear surgeries.  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           HPI:     Has been having a lot of hand pain.  Wondering about gabapentin or Lyrica.   Did use THC this weekend.  Still struggling with migraine headaches.   Did not follow through with previous recommnedation of for Neurology.  Refer printed again today.    Rheumatology follow up scheduled for 10/29/18.     Going to meetings once/wk.   Working fire restoration.     Denies craving.      Status since last visit: Since last visit patient has been:stable    Intensity:     There has been: no craving    Suboxone Dose: adequate    Progression of Symptoms/Precipitating Factors:     Cues to use and relapse triggers:Pain and Anxiety    Trigger have been:  moderate    Accompanying Signs & Symptoms:    Side Effects: none    Sobriety:     Status: patient has not had opioid use but has had use of  THC     Drug Screen: obtained    Alleviating factors/Other Therapies Tried:    Contact with sponsor has been: no sponsor    Family and support system has been: neutral    Patient has been going to recovery meetings: sporadically    Recovery program has been : sporadic    Patient has been participating in professional counseling /therapy: NO    Patient is following with psychiatry: NO    Patient has established PCP: YES        Social History     Social History Narrative    Lives with dad and brother in [place].      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      Daughter with recent DWI (she has child).        Patient Active Problem List    Diagnosis Date Noted     History of cocaine dependence 09/07/2017     Priority: Medium     Moderate episode of recurrent major depressive disorder (H) 03/22/2017     Priority: Medium     Generalized anxiety disorder 03/22/2017     Priority: Medium     Protein-calorie malnutrition (H) 03/22/2017     Priority: Medium     Primary insomnia 03/22/2017     Priority: Medium     Vertigo 03/14/2017     Priority: Medium     Cervical pain 03/08/2017     Priority: Medium     Nutritional disorder 11/21/2016     Priority: Medium     Disorder of stomach 11/20/2016     Priority: Medium     Overview:   Per Biopsy obtained on EGD 11/22/2016. Reactive gastropathy was quoted to be frequently associated with ongoing non-inflammatory type mucosal injury due to a chemical type of injury; this may be due to ingestion of non-steroidal anti-inflammatory drugs, aspirin, excess alcohol, or corticosteroids.        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     Priority: Medium     Chemical dependency (H) 02/24/2015     Priority: Medium     Insomnia 02/24/2015     Priority: Medium     S/P splenectomy 01/13/2015     Priority: Medium     Migraines      Priority: Medium     Idiopathic thrombocytopenic purpura (HCC)      Priority: Medium     s/p splenectomy       Tobacco abuse 05/30/2014     Priority: Medium     Immune thrombocytopenic purpura (H) 01/14/2008     Priority: Medium     Personality disorder (H) 01/14/2008     Priority: Medium     Overview:   cluster B traits  borderline, antisocial       Nondependent cannabis abuse, episodic 01/14/2008     Priority: Medium     Mixed, or nondependent drug abuse 01/26/2007     Priority: Medium     Overview:   recurrent issues with opioids.  History of crack cocaine abuse.       Gastroesophageal reflux disease  12/20/2006     Priority: Medium     Herpetic gingivostomatitis 12/20/2006     Priority: Medium     Muscle pain 12/20/2006     Priority: Medium     Atopic rhinitis 12/20/2006     Priority: Medium     Abnormal weight loss 12/20/2006     Priority: Medium       Problem list and histories reviewed & adjusted, as indicated.  Additional history: as documented        Current Outpatient Prescriptions on File Prior to Visit:  acyclovir (ZOVIRAX) 5 % ointment Apply 1 g topically as needed   albuterol (PROAIR HFA/PROVENTIL HFA/VENTOLIN HFA) 108 (90 Base) MCG/ACT Inhaler Inhale 1-2 puffs into the lungs every 4 hours as needed for shortness of breath / dyspnea or wheezing   betamethasone valerate (VALISONE) 0.1 % ointment Apply once a day for 2 weeks as directed   buprenorphine-naloxone (SUBOXONE) 8-2 MG SUBL sublingual tablet Place 1 tablet under the tongue 2 times daily   BusPIRone HCl 30 MG TABS TAKE 1 TABLET BY MOUTH TWICE DAILY   cetirizine (ZYRTEC) 10 MG tablet Take 1 tablet (10 mg) by mouth every evening   clobetasol (TEMOVATE) 0.05 % ointment APPLY TOPICALLY BID   cloNIDine (CATAPRES) 0.1 MG tablet Take 1 tablet (0.1 mg) by mouth At Bedtime Prn late withdrawal symptoms.   docusate sodium (DOK) 100 MG capsule TAKE 1 CAPSULE BY MOUTH TWICE DAILY AS NEEDED FOR CONSTIPATION   FLUoxetine (PROZAC) 20 MG capsule TAKE 1 CAPSULE BY MOUTH DAILY WITH FLUOXETINE 40MG FOR A TOTAL OF 60MG DAILY.   FLUoxetine (PROZAC) 20 MG capsule TAKE 1 CAPSULE BY MOUTH DAILY WITH FLUOXETINE 40MG FOR A TOTAL OF 60MG DAILY.  NEED APPOINTMENT FOR FURTHER REFILLS.   FLUoxetine (PROZAC) 40 MG capsule TAKE 1 CAPSULE BY MOUTH DAILY WITH 20 MG CAPSULE OF FLUOXETINE TO EQUAL TOTAL DAILY DOSE OF 60MG   fluticasone (FLONASE) 50 MCG/ACT spray Spray 2 sprays into both nostrils daily   hydrocortisone 2.5 % cream    hydrOXYzine (ATARAX) 25 MG tablet TAKE 2 TABLETS BY MOUTH NIGHTLY AS NEEDED FOR ANXIETY   ipratropium - albuterol 0.5 mg/2.5 mg/3 mL (DUONEB) 0.5-2.5  (3) MG/3ML neb solution Take 1 vial (3 mLs) by nebulization every 6 hours as needed for shortness of breath / dyspnea or wheezing   ISOtretinoin (ACCUTANE PO)    ketoconazole (NIZORAL) 2 % cream    levocetirizine (XYZAL) 5 MG tablet Take 1 tablet (5 mg) by mouth every evening   lidocaine (XYLOCAINE) 5 % ointment Apply topically 4 times daily as needed for moderate pain   Lidocaine 5 % CREA Externally apply 1 Film topically 4 times daily as needed   loratadine (CLARITIN) 10 MG tablet Take 1 tablet (10 mg) by mouth daily as needed for allergies   mirtazapine (REMERON) 15 MG tablet TAKE 1 TABLET BY MOUTH AT BEDTIME. CALL CLINIC IF TOO SEDATED DURING THE DAY.   Neomycin-Bacitracin-Polymyxin ([employer] TRIPLE ANTIBIOTIC) OINT Externally apply topically 4 times daily   olopatadine (PATANOL) 0.1 % ophthalmic solution Place 1 drop into both eyes 2 times daily   omeprazole (PRILOSEC) 40 MG capsule TAKE 1 CAPSULE(40 MG) BY MOUTH DAILY 30 TO 60 MINUTES BEFORE A MEAL   ondansetron (ZOFRAN-ODT) 4 MG ODT tab Take 1-2 tablets (4-8 mg) by mouth every 8 hours as needed for nausea   order for DME Equipment being ordered: Left thumb brace   oxyCODONE-acetaminophen (PERCOCET) 5-325 MG per tablet Take 1 tablet by mouth every 6 hours as needed for pain   polyethylene glycol (MIRALAX) powder Take 17 g (1 capful) by mouth daily   traZODone (DESYREL) 100 MG tablet Take 1-3 tablets (100-300 mg) by mouth nightly as needed for sleep   traZODone (DESYREL) 100 MG tablet Take 1 tablet (100 mg) by mouth At Bedtime   triamcinolone (KENALOG) 0.1 % ointment Apply sparingly to affected area on back two times daily til clear.   valACYclovir (VALTREX) 1000 mg tablet Take 1 tablet (1,000 mg) by mouth 2 times daily At the onset of skin lesions as needed   ZOLMitriptan (ZOMIG) 5 MG tablet TAKE 1 TABLET BY MOUTH AT ONSET OF HEADACHE FOR MIGRAINE. MAY REPEAT DOSE IN 2 HOURS. DO NOT EXCEED 10MG IN 24 HOURS     Current Facility-Administered Medications on File Prior  to Visit:  Lidocaine 1 % injection 9 mL   sodium bicarbonate 8.4 % injection 1 mEq       Allergies   Allergen Reactions     Acetaminophen-Codeine Hives     Amoxicillin-Pot Clavulanate Nausea and Vomiting     Augmentin Nausea and Vomiting and Hives     Barbiturates      Bentyl [Dicyclomine] Other (See Comments)     dizziness     Compazine      Lock-jaw     Liquid Adhesive      Other reaction(s): Other, see comments  blisters     No Clinical Screening - See Comments      PN: LW Reaction: blisters  PN: LW FI1: nka  PN: LW CM1: CONTRAST- nka Reaction :     Nsaids      D/t ITP     Prochlorperazine      Other reaction(s): Edema     Propoxyphene N-Apap Hives     Sulfa Drugs      Tramadol Hcl Hives            Adhesive Tape Rash         REVIEW OF SYSTEMS:  General: No acute withdrawal symptoms.  No recent infections or fever  Resp: No coughing, wheezing or shortness of breath  CV: No chest pains or palpitations  GI: No nausea, vomiting, abdominal pain, diarrhea, constipation  : No urinary frequency or dysuria,     Musculoskeletal: No significant muscle or joint pains, No edema  Neurologic: No numbness, tingling, weakness, problems with balance or coordination  Psychiatric: No acute concerns  Skin: No rashes    OBJECTIVE:    PHYSICAL EXAM:  /58 (BP Location: Left arm, Patient Position: Sitting, Cuff Size: Adult Regular)  Pulse 68  Temp 98.7  F (37.1  C) (Oral)  Resp 16  Wt 146 lb (66.2 kg)  SpO2 97%  BMI 20.95 kg/m2    GENERAL APPEARANCE:  alert, comfortable appearing  EYES:Eyes grossly normal to inspection  NEURO:  Gait normal.  No tremor. Coordination intact.   MENTAL STATUS EXAM:  Appearance/Behavior: No appearant distress  Speech: Normal  Mood/Affect: normal affect  Insight: Adequate      Results for orders placed or performed in visit on 10/02/18   Urine Drugs of Abuse Screen Panel 13   Result Value Ref Range    Cannabinoids (21-cde-2-carboxy-9-THC) Not Detected NDET^Not Detected ng/mL    Phencyclidine  (Phencyclidine) Not Detected NDET^Not Detected ng/mL    Cocaine (Benzoylecgonine) Not Detected NDET^Not Detected ng/mL    Methamphetamine (d-Methamphetamine) Not Detected NDET^Not Detected ng/mL    Opiates (Morphine) Not Detected NDET^Not Detected ng/mL    Amphetamine (d-Amphetamine) Not Detected NDET^Not Detected ng/mL    Benzodiazepines (Nordiazepam) Detected, Abnormal Result (A) NDET^Not Detected ng/mL    Tricyclic Antidepressants (Desipramine) Not Detected NDET^Not Detected ng/mL    Methadone (Methadone) Not Detected NDET^Not Detected ng/mL    Barbiturates (Butalbital) Not Detected NDET^Not Detected ng/mL    Oxycodone (Oxycodone) Not Detected NDET^Not Detected ng/mL    Propoxyphene (Norpropoxyphene) Not Detected NDET^Not Detected ng/mL    Buprenorphine (Buprenorphine) Detected, Abnormal Result (A) NDET^Not Detected ng/mL     Denies benzodiazepine use.  Confirmatory testing pending        ASSESSMENT/PLAN:    (F11.20) Opioid use disorder, severe, dependence (H)  (primary encounter diagnosis)   Suboxone  8mg bid.  Rx #60     Try split dosing for better pain control  Safe storage reviewed.   Follow up 4 wk.         Counseled the patient on the importance of having a recovery program in addition to Medication assisted treatment.  Components include having some type of sober network, avoiding isolating, having willingness  to change, avoiding triggers and managing cravings.  Strongly recommended abstaining from alcohol, benzodiazepines, THC, opioids and other drugs of abuse.       Opioid warning reviewed.  Risk of overdose following a period of abstinence due to decrease tolerance was discussed including risk of death.   Risk of overdose if using Suboxone with other substances particuarly benzodiazepines/alcohol was reviewed.           (F14.21) History of cocaine dependence (H)  Plan: no recent use.  Encourage ongoing sobriety      (F33.1) Moderate episode of recurrent major depressive disorder (H)  (F41.1)  Generalized anxiety disorder  Plan: Recommendations as above.   Follow with PCP/psychiatry needed.  Encouraged to schedule.   Encouraged mental health counseling.     Not taking Remoron at hs.        (F17.200) Nicotine use disorder  Comment: not ready to quit  Plan: Encouraged Abstinence.  Increase risk of relapse with other substances with return to nicotine use discussed.  Risk of Ecig/Vaping also reviewed.         Migraine headache.   Risk of other opioid use as treatment for migraine reviewed and not encouraged. .  Other abortive therapy discussed.  Prevention reviewed.  Follow up with Neurology again recommended.  Refer info given. Limit use of OTC analgesics to avoid rebound headaches.  \Continue zyrtec/Xyzal   Continue flonase and compliance strategies discussed.         Hand pain.  Follow up with Rheumatology as scheduled.   Gabapentin 300mg tidprn.  No self adjusting of dose discussed.  Risks, benefits, side effects and intended purposes discussed.             ENCOUNTER FOR LONG TERM USE OF HIGH RISK MEDICATION   High Risk Drug Monitoring?  YES   Drug being monitored: Buprenorphine   Reason for drug: Opioid Use Disorder   What is being monitored?: Dosage, Cravings, Trigger, side effects, and continued abstinence.      Opioid warning reviewed.  Risk of overdose following a period of abstinence due to decrease tolerance was discussed including risk of death.   Risk of overdose if using Suboxone with other substances particuarly benzodiazepines/alcohol was reviewed.          Jessy Brenner MD  [facility]  [phone]

## 2018-10-05 NOTE — TELEPHONE ENCOUNTER
Patient left message on RN hotline requesting a call back on Friday.  Patient hard to understand on voice mail message, is requesting Chantix? Patient asked for a call back from the nurse.   Tita Brown RN

## 2018-10-05 NOTE — TELEPHONE ENCOUNTER
Patient returned call to RN hotline and states she would like a refill on her Chantix, she is down to 5 cigarettes a day and states this is working well for her. Medication not on med list see refill info below.  Patient also requested order for colonoscopy (due for 1 year f/u) and neurology referral for her migraines.    Upon chart review patient was given GI referral on 9/10/18 (call [phone] to schedule) RN can give this information.  Neurology referral from 5/31/18- can patient still use this referral?     Chantix       Last Written Prescription Date:  9/18/18  Last Fill Quantity: 56,   # refills: 0  Last Office Visit: 9/10/18  Future Office visit:    Next 5 appointments (look out 90 days)     Nov 06, 2018  8:00 AM CST   Return Visit with Jessy Brenner MD   [facility] ([facility])    [address] [facility] [address]   [phone]               Routing refill request to provider for review/approval because:  Drug not active on patient's medication list    Tita Brown RN

## 2018-10-05 NOTE — TELEPHONE ENCOUNTER
Voice mail left for patient to call RN hotline at [phone].  What refill was patient requesting?  Tita Brown RN

## 2018-10-10 NOTE — TELEPHONE ENCOUNTER
"Routing refill request to provider for review/approval because:  Failed [facility] refill protocol, see below:      PHQ-9 SCORE 9/14/2017 1/2/2018 6/15/2018   Total Score - - -   Total Score MyChart - - -   Total Score 6 16 13     Requested Prescriptions   Pending Prescriptions Disp Refills     FLUoxetine (PROZAC) 40 MG capsule [Pharmacy Med Name: FLUOXETINE 40MG CAPSULES]  Last Written Prescription Date:  9/12/18  Last Fill Quantity: 30,  # refills: 0   Last office visit: 9/10/2018 with prescribing provider:     Future Office Visit:   Next 5 appointments (look out 90 days)     Nov 06, 2018  8:00 AM CST   Return Visit with Jessy Brenner MD   [facility] ([facility])    [address] [facility] [address]   [phone]                  30 capsule 0     Sig: TAKE 1 CAPSULE BY MOUTH DAILY WITH 20 MG CAPSULE OF FLUOXETINE TO EQUAL TOTAL DAILY DOSE OF 60MG    SSRIs Protocol Failed    10/9/2018  7:42 AM       Failed - PHQ-9 score less than 5 in past 6 months    Please review last PHQ-9 score.          Passed - Patient is age 18 or older       Passed - No active pregnancy on record       Passed - No positive pregnancy test in last 12 months       Passed - Recent (6 mo) or future (30 days) visit within the authorizing provider's specialty    Patient had office visit in the last 6 months or has a visit in the next 30 days with authorizing provider or within the authorizing provider's specialty.  See \"Patient Info\" tab in inbasket, or \"Choose Columns\" in Meds & Orders section of the refill encounter.            Sherrill Torres, ANKIT - BC      "

## 2018-10-12 NOTE — TELEPHONE ENCOUNTER
"Routing refill request to provider for review/approval because:  Rhea given x1 and patient did not follow up, please advise      PHQ-9 SCORE 9/14/2017 1/2/2018 6/15/2018   Total Score - - -   Total Score MyChart - - -   Total Score 6 16 13     Requested Prescriptions   Pending Prescriptions Disp Refills     FLUoxetine (PROZAC) 20 MG capsule [Pharmacy Med Name: FLUOXETINE 20MG CAPSULES]  Last Written Prescription Date:  9/12/18  Last Fill Quantity: 30,  # refills: 0   Last office visit: 9/10/2018 with prescribing provider:     Future Office Visit:   Next 5 appointments (look out 90 days)     Nov 06, 2018  8:00 AM CST   Return Visit with Jessy Brenner MD   [facility] ([facility])    [address] [facility] [address]   [phone]                  30 capsule 0     Sig: TAKE 1 CAPSULE BY MOUTH DAILY WITH FLUOXETINE 40MG FOR A TOTAL OF 60MG DAILY    SSRIs Protocol Failed    10/12/2018  8:40 AM       Failed - PHQ-9 score less than 5 in past 6 months    Please review last PHQ-9 score.          Passed - Patient is age 18 or older       Passed - No active pregnancy on record       Passed - No positive pregnancy test in last 12 months       Passed - Recent (6 mo) or future (30 days) visit within the authorizing provider's specialty    Patient had office visit in the last 6 months or has a visit in the next 30 days with authorizing provider or within the authorizing provider's specialty.  See \"Patient Info\" tab in inbasket, or \"Choose Columns\" in Meds & Orders section of the refill encounter.            Sherrill Torres, ANKIT - BC      "

## 2018-10-29 NOTE — TELEPHONE ENCOUNTER
Please resend prednisone under my name.  Can we check with referral specialists to ensure this was done correctly with her insurance so that Dr. Diaz can prescribe in the future.  Mammogram order placed.    Sarah Montemayor, CNP

## 2018-10-29 NOTE — TELEPHONE ENCOUNTER
Prednisone resent under Sarah Montemayor NP's name  Please update patient on prescription and mammo order.  Check with referrals specialist regarding the below    Val Teran RN

## 2018-10-29 NOTE — MR AVS SNAPSHOT
After Visit Summary   10/29/2018    Coretta Tovar    MRN: [id number]           Patient Information     Date Of Birth          1970        Visit Information        Provider Department      10/29/2018 9:00 AM Nickie Diaz MD [facility]        Today's Diagnoses     Arthralgia, unspecified joint    -  1    Positive DARON (antinuclear antibody)          Care Instructions    -- Will get blood tests today     -- You have inflammatory arthritis a    -- Prednisone taper for joints.     -- RTC in 2 months   Patient Education    Prednisone Gastro-resistant tablet    Prednisone Oral solution    Prednisone Oral tablet  Prednisone Oral tablet  What is this medicine?  PREDNISONE (PRED ni sone) is a corticosteroid. It is commonly used to treat inflammation of the skin, joints, lungs, and other organs. Common conditions treated include asthma, allergies, and arthritis. It is also used for other conditions, such as blood disorders and diseases of the adrenal glands.  This medicine may be used for other purposes; ask your health care provider or pharmacist if you have questions.  What should I tell my health care provider before I take this medicine?  They need to know if you have any of these conditions:    Cushing's syndrome    diabetes    glaucoma    heart disease    high blood pressure    infection (especially a virus infection such as chickenpox, cold sores, or herpes)    kidney disease    liver disease    mental illness    myasthenia gravis    osteoporosis    seizures    stomach or intestine problems    thyroid disease    an unusual or allergic reaction to lactose, prednisone, other medicines, foods, dyes, or preservatives    pregnant or trying to get pregnant    breast-feeding  How should I use this medicine?  Take this medicine by mouth with a glass of water. Follow the directions on the prescription label. Take this medicine with food. If you are taking this medicine once a day, take  it in the morning. Do not take more medicine than you are told to take. Do not suddenly stop taking your medicine because you may develop a severe reaction. Your doctor will tell you how much medicine to take. If your doctor wants you to stop the medicine, the dose may be slowly lowered over time to avoid any side effects.  Talk to your pediatrician regarding the use of this medicine in children. Special care may be needed.  Overdosage: If you think you have taken too much of this medicine contact a poison control center or emergency room at once.  NOTE: This medicine is only for you. Do not share this medicine with others.  What if I miss a dose?  If you miss a dose, take it as soon as you can. If it is almost time for your next dose, talk to your doctor or health care professional. You may need to miss a dose or take an extra dose. Do not take double or extra doses without advice.  What may interact with this medicine?  Do not take this medicine with any of the following medications:    metyrapone    mifepristone  This medicine may also interact with the following medications:    aminoglutethimide    amphotericin B    aspirin and aspirin-like medicines    barbiturates    certain medicines for diabetes, like glipizide or glyburide    cholestyramine    cholinesterase inhibitors    cyclosporine    digoxin    diuretics    ephedrine    female hormones, like estrogens and birth control pills    isoniazid    ketoconazole    NSAIDS, medicines for pain and inflammation, like ibuprofen or naproxen    phenytoin    rifampin    toxoids    vaccines    warfarin  This list may not describe all possible interactions. Give your health care provider a list of all the medicines, herbs, non-prescription drugs, or dietary supplements you use. Also tell them if you smoke, drink alcohol, or use illegal drugs. Some items may interact with your medicine.  What should I watch for while using this medicine?  Visit your doctor or health care  professional for regular checks on your progress. If you are taking this medicine over a prolonged period, carry an identification card with your name and address, the type and dose of your medicine, and your doctor's name and address.  This medicine may increase your risk of getting an infection. Tell your doctor or health care professional if you are around anyone with measles or chickenpox, or if you develop sores or blisters that do not heal properly.  If you are going to have surgery, tell your doctor or health care professional that you have taken this medicine within the last twelve months.  Ask your doctor or health care professional about your diet. You may need to lower the amount of salt you eat.  This medicine may affect blood sugar levels. If you have diabetes, check with your doctor or health care professional before you change your diet or the dose of your diabetic medicine.  What side effects may I notice from receiving this medicine?  Side effects that you should report to your doctor or health care professional as soon as possible:    allergic reactions like skin rash, itching or hives, swelling of the face, lips, or tongue    changes in emotions or moods    changes in vision    depressed mood    eye pain    fever or chills, cough, sore throat, pain or difficulty passing urine    increased thirst    swelling of ankles, feet  Side effects that usually do not require medical attention (report to your doctor or health care professional if they continue or are bothersome):    confusion, excitement, restlessness    headache    nausea, vomiting    skin problems, acne, thin and shiny skin    trouble sleeping    weight gain  This list may not describe all possible side effects. Call your doctor for medical advice about side effects. You may report side effects to FDA at 1-655-IXV-3862.  Where should I keep my medicine?  Keep out of the reach of children.  Store at room temperature between 15 and 30 degrees  "C (59 and 86 degrees F). Protect from light. Keep container tightly closed. Throw away any unused medicine after the expiration date.  NOTE:This sheet is a summary. It may not cover all possible information. If you have questions about this medicine, talk to your doctor, pharmacist, or health care provider. Copyright  2016 [employer] Standard                Follow-ups after your visit        Your next 10 appointments already scheduled     Nov 06, 2018  8:00 AM CST   Return Visit with Jessy Brenner MD   [facility] ([facility]    [address] [facility] [id number] [phone]            Dec 27, 2018  9:30 AM CST   Return Visit with Nickie Diaz MD   [facility]    [id number] [place] [address] [phone]              Who to contact     If you have questions or need follow up information about today's clinic visit or your schedule please contact [facility] directly at [phone].  Normal or non-critical lab and imaging results will be communicated to you by MyChart, letter or phone within 4 business days after the clinic has received the results. If you do not hear from us within 7 days, please contact the clinic through MyChart or phone. If you have a critical or abnormal lab result, we will notify you by phone as soon as possible.  Submit refill requests through [employer] or call your pharmacy and they will forward the refill request to us. Please allow 3 business days for your refill to be completed.          Additional Information About Your Visit        Care EveryWhere ID     This is your Care EveryWhere ID. This could be used by other organizations to access your [place] medical records  [id number]        Your Vitals Were     Pulse Height Pulse Oximetry BMI (Body Mass Index)          56 1.778 m (5' 10\") 97% 20.81 kg/m2         Blood Pressure from Last 3 Encounters:   10/29/18 " 103/61   10/02/18 115/58   09/10/18 100/50    Weight from Last 3 Encounters:   10/29/18 65.8 kg (145 lb)   10/02/18 66.2 kg (146 lb)   09/10/18 68.6 kg (151 lb 3.2 oz)              We Performed the Following     CBC with platelets     Complement C3     Complement C4     Comprehensive metabolic panel     CRP inflammation     DANNIE antibody panel     ESR     Hepatitis B core antibody     Hepatitis B surface antigen     Hepatitis C antibody     Quantiferon TB Gold Plus          Today's Medication Changes          These changes are accurate as of 10/29/18  9:53 AM.  If you have any questions, ask your nurse or doctor.               Start taking these medicines.        Dose/Directions    predniSONE 5 MG tablet   Commonly known as:  DELTASONE   Used for:  Positive DARON (antinuclear antibody), Arthralgia, unspecified joint   Started by:  Nickie Diaz MD        4tab=20 mg qd x 7 days, 3tab=15 mg qd x 7 days, 2tab=10 mg qd x 7 days, 1 tab=5 mg qd   Quantity:  90 tablet   Refills:  2            Where to get your medicines      These medications were sent to [facility] [address] [place] [address] [address] [facility] & [address] [address]     Phone:  [phone]     predniSONE 5 MG tablet                Primary Care Provider Office Phone # Fax #    Sarah Montemayor, APRN [facility] [phone]       [address] [place] [address]        Equal Access to Services     ARELY JASON AH: Hadii aad ku hadasho Soomaali, waaxda luqadaha, qaybta kaalmada adeegyada, waxay antonio alegre . So [facility] [phone].    ATENCIÓN: Si habla jerry, tiene a roberts disposición servicios gratuitos de asistencia lingüística. Llame al [phone].    We comply with applicable federal civil rights laws and [place] laws. We do not discriminate on the basis of race, color, national origin, age, disability, sex, sexual orientation, or gender identity.            Thank  you!     Thank you for choosing [facility]  for your care. Our goal is always to provide you with excellent care. Hearing back from our patients is one way we can continue to improve our services. Please take a few minutes to complete the written survey that you may receive in the mail after your visit with us. Thank you!             Your Updated Medication List - Protect others around you: Learn how to safely use, store and throw away your medicines at www.disposemymeds.org.          This list is accurate as of 10/29/18  9:53 AM.  Always use your most recent med list.                   Brand Name Dispense Instructions for use Diagnosis    ACCUTANE PO           acyclovir 5 % ointment    ZOVIRAX    30 g    Apply 1 g topically as needed    Recurrent cold sores       albuterol 108 (90 Base) MCG/ACT inhaler    PROAIR HFA/PROVENTIL HFA/VENTOLIN HFA    18 g    Inhale 1-2 puffs into the lungs every 4 hours as needed for shortness of breath / dyspnea or wheezing    Mild intermittent reactive airway disease without complication       betamethasone valerate 0.1 % ointment    VALISONE     Apply once a day for 2 weeks as directed        buprenorphine-naloxone 8-2 MG Subl sublingual tablet    SUBOXONE    60 each    Place 1 tablet under the tongue 2 times daily    Opioid use disorder, severe, dependence (H)       BusPIRone HCl 30 MG Tabs     60 tablet    TAKE 1 TABLET BY MOUTH TWICE DAILY    Generalized anxiety disorder       cetirizine 10 MG tablet    zyrTEC    90 tablet    Take 1 tablet (10 mg) by mouth every evening    Allergic state, subsequent encounter       clobetasol 0.05 % ointment    TEMOVATE     APPLY TOPICALLY BID        cloNIDine 0.1 MG tablet    CATAPRES    60 tablet    Take 1 tablet (0.1 mg) by mouth At Bedtime Prn late withdrawal symptoms.    Opioid use disorder, severe, dependence (H)       [employer] TRIPLE ANTIBIOTIC Oint     60 g    Externally apply topically 4 times daily    Allergic contact  dermatitis due to adhesives       docusate sodium 100 MG capsule    DOK    60 capsule    TAKE 1 CAPSULE BY MOUTH TWICE DAILY AS NEEDED FOR CONSTIPATION    Constipation, unspecified constipation type       * FLUoxetine 20 MG capsule    PROzac    30 capsule    TAKE 1 CAPSULE BY MOUTH DAILY WITH FLUOXETINE 40MG FOR A TOTAL OF 60MG DAILY.    Moderate episode of recurrent major depressive disorder (H), Generalized anxiety disorder       * FLUoxetine 40 MG capsule    PROzac    30 capsule    TAKE 1 CAPSULE BY MOUTH DAILY WITH 20 MG CAPSULE OF FLUOXETINE TO EQUAL TOTAL DAILY DOSE OF 60MG    Moderate episode of recurrent major depressive disorder (H), Generalized anxiety disorder       * FLUoxetine 20 MG capsule    PROzac    30 capsule    TAKE 1 CAPSULE BY MOUTH DAILY WITH FLUOXETINE 40MG FOR A TOTAL OF 60MG DAILY    Moderate episode of recurrent major depressive disorder (H), Generalized anxiety disorder       fluticasone 50 MCG/ACT spray    FLONASE    1 Bottle    Spray 2 sprays into both nostrils daily    Opioid use disorder, severe, dependence (H)       gabapentin 300 MG capsule    NEURONTIN    90 capsule    Take 1 capsule (300 mg) by mouth 3 times daily    Opioid use disorder, severe, dependence (H)       hydrocortisone 2.5 % cream           hydrOXYzine 25 MG tablet    ATARAX    60 tablet    TAKE 2 TABLETS BY MOUTH NIGHTLY AS NEEDED FOR ANXIETY    Generalized anxiety disorder       ipratropium - albuterol 0.5 mg/2.5 mg/3 mL 0.5-2.5 (3) MG/3ML neb solution    DUONEB    90 vial    Take 1 vial (3 mLs) by nebulization every 6 hours as needed for shortness of breath / dyspnea or wheezing    Acute bronchitis with symptoms > 10 days       ketoconazole 2 % cream    NIZORAL          levocetirizine 5 MG tablet    XYZAL    30 tablet    Take 1 tablet (5 mg) by mouth every evening    Acute seasonal allergic rhinitis due to pollen       Lidocaine 5 % Crea     45 g    Externally apply 1 Film topically 4 times daily as needed     Bilateral carpal tunnel syndrome       lidocaine 5 % ointment    XYLOCAINE    50 g    Apply topically 4 times daily as needed for moderate pain    Bilateral hand pain       loratadine 10 MG tablet    CLARITIN    30 tablet    Take 1 tablet (10 mg) by mouth daily as needed for allergies    Chronic nonseasonal allergic rhinitis due to pollen       mirtazapine 15 MG tablet    REMERON    30 tablet    TAKE 1 TABLET BY MOUTH AT BEDTIME. CALL CLINIC IF TOO SEDATED DURING THE DAY.    Primary insomnia       olopatadine 0.1 % ophthalmic solution    PATANOL    5 mL    Place 1 drop into both eyes 2 times daily    Allergic conjunctivitis, bilateral       omeprazole 40 MG capsule    priLOSEC    90 capsule    TAKE 1 CAPSULE(40 MG) BY MOUTH DAILY 30 TO 60 MINUTES BEFORE A MEAL    Gastroesophageal reflux disease, esophagitis presence not specified       ondansetron 4 MG ODT tab    ZOFRAN-ODT    30 tablet    Take 1-2 tablets (4-8 mg) by mouth every 8 hours as needed for nausea    Migraine without status migrainosus, not intractable, unspecified migraine type       order for DME     1 each    Equipment being ordered: Left thumb brace    Extensor tenosynovitis of left wrist       oxyCODONE-acetaminophen 5-325 MG per tablet    PERCOCET    10 tablet    Take 1 tablet by mouth every 6 hours as needed for pain    Opioid use disorder, severe, dependence (H)       polyethylene glycol powder    MIRALAX    510 g    Take 17 g (1 capful) by mouth daily    Drug-induced constipation       predniSONE 5 MG tablet    DELTASONE    90 tablet    4tab=20 mg qd x 7 days, 3tab=15 mg qd x 7 days, 2tab=10 mg qd x 7 days, 1 tab=5 mg qd    Positive DARON (antinuclear antibody), Arthralgia, unspecified joint       * traZODone 100 MG tablet    DESYREL    90 tablet    Take 1 tablet (100 mg) by mouth At Bedtime    Primary insomnia       * traZODone 100 MG tablet    DESYREL    90 tablet    Take 1-3 tablets (100-300 mg) by mouth nightly as needed for sleep    Primary  insomnia       triamcinolone 0.1 % ointment    KENALOG    80 g    Apply sparingly to affected area on back two times daily til clear.    Rash       valACYclovir 1000 mg tablet    VALTREX    8 tablet    Take 1 tablet (1,000 mg) by mouth 2 times daily At the onset of skin lesions as needed    Recurrent cold sores       varenicline 1 MG tablet    CHANTIX    56 tablet    Take 1 tablet (1 mg) by mouth 2 times daily    Tobacco abuse, Encounter for smoking cessation counseling       ZOLMitriptan 5 MG tablet    ZOMIG    12 tablet    TAKE 1 TABLET BY MOUTH AT ONSET OF HEADACHE FOR MIGRAINE. MAY REPEAT DOSE IN 2 HOURS. DO NOT EXCEED 10MG IN 24 HOURS    Migraine without aura and without status migrainosus, not intractable       * Notice:  This list has 5 medication(s) that are the same as other medications prescribed for you. Read the directions carefully, and ask your doctor or other care provider to review them with you.

## 2018-10-29 NOTE — PROGRESS NOTES
Rheumatology Clinic Visit     Coretta Tovar MRN# [id number]   YOB: 1970 Age: 48 year old     Date of Visit: Oct 29, 2018  Primary care provider: Sarah Montemayor          Assessment and Plan:   Assessment     -- Inflammatory arthritis   -- Raynaud's  -- ITP history s/p splenectomy  -- Generalized anxiety disorder   -- Hx of drug abuse     Ms Tovar is 49 yo female seen in clinic for evaluation of joint pains.    Polyarticular symmetric inflammatory arthritis: She has pain in her shoulders, elbows, hands, wrists, knees, ankles and toes for the last 6 months.  Has stiffness in the morning which last couple hours.  Uses Tylenol which does not help.  Cannot take NSAIDs due to history of ITP.    On physical exam she has tenderness over MCP, PIP, DIP joints, bilateral wrists, elbows, ankles and MTP joint.  She has had right ankle fusion surgery many years ago and has reduced range of motion.  Does not have any knee effusions.  Range of motion of bilateral shoulders is normal but painful.  She has normal muscle strength.  No skin rash present on exam.    Based on her clinical history and physical exam she has polyarticular symmetric inflammatory arthritis.  Her recent rheumatoid serologies are normal.  Her sed rate is mildly elevated.  She has low titer positive DARON.  Reports having Raynaud's. Her symptoms can be due to either seronegative rheumatoid arthritis or DARON associated autoimmune connective tissue disease.    We will give her prednisone taper starting with 20 mg and taper down by 5 mg.  She can stay at a lowest dose which is effective.     Based on blood work and response to prednisone will start her on DMARD medication.    Plan    1.  Blood tests: DANNIE panel, complement levels, ESR, CRP, hepatitis B/C/TB, CBC, CMP. double-stranded DNA checked a month ago was normal.    2.  Prednisone taper.4tab=20 mg qd x 7 days, 3tab=15 mg qd x 7 days, 2tab=10 mg qd x 7 days, 1 tab=5 mg qd     3. Return  to clinic in 2 months            Active Problem List:     Patient Active Problem List    Diagnosis Date Noted     History of cocaine dependence 09/07/2017     Priority: Medium     Moderate episode of recurrent major depressive disorder (H) 03/22/2017     Priority: Medium     Generalized anxiety disorder 03/22/2017     Priority: Medium     Protein-calorie malnutrition (H) 03/22/2017     Priority: Medium     Primary insomnia 03/22/2017     Priority: Medium     Vertigo 03/14/2017     Priority: Medium     Cervical pain 03/08/2017     Priority: Medium     Nutritional disorder 11/21/2016     Priority: Medium     Disorder of stomach 11/20/2016     Priority: Medium     Overview:   Per Biopsy obtained on EGD 11/22/2016. Reactive gastropathy was quoted to be frequently associated with ongoing non-inflammatory type mucosal injury due to a chemical type of injury; this may be due to ingestion of non-steroidal anti-inflammatory drugs, aspirin, excess alcohol, or corticosteroids.        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     Priority: Medium     Chemical dependency (H) 02/24/2015     Priority: Medium     Insomnia 02/24/2015     Priority: Medium     S/P splenectomy 01/13/2015     Priority: Medium     Migraines      Priority: Medium     Idiopathic thrombocytopenic purpura (HCC)      Priority: Medium     s/p splenectomy       Tobacco abuse 05/30/2014     Priority: Medium     Immune thrombocytopenic purpura (H) 01/14/2008     Priority: Medium     Personality disorder (H) 01/14/2008     Priority: Medium     Overview:   cluster B traits  borderline, antisocial       Nondependent cannabis abuse, episodic 01/14/2008     Priority: Medium     Mixed, or nondependent drug abuse 01/26/2007     Priority: Medium     Overview:   recurrent issues with opioids.  History of crack cocaine abuse.       Gastroesophageal reflux disease 12/20/2006      Priority: Medium     Herpetic gingivostomatitis 12/20/2006     Priority: Medium     Muscle pain 12/20/2006     Priority: Medium     Atopic rhinitis 12/20/2006     Priority: Medium     Abnormal weight loss 12/20/2006     Priority: Medium            History of Present Illness:   Coretta Tovar is a 48 year old female with PMH of ITP status post splenectomy, cocaine abuse, cannabis and opioid abuse, depression seen in the clinic in consultation at request of Sarah Montemayor APRN CNP for evaluation of joint pains.      She reports that she has pain in multiple joints including shoulders, elbows, wrists, hands, knees, ankles for the last 6 months. Has stiffness in the morning which lasts for few hours.  If she moves her thumb she feels as if her wrist is getting ripped. She takes tyenol but it does not help. Can not take Ibuprofen due to ITP hx. When she wakes up in the morning her hands are numb and painful.    She was diagnosed with ITP in 1994 and in 1997 underwent splenectomy.   After that her platelets have been normal.  She also was diagnosed with Nela-Nela disease where her skin blisters like herpes zoster.     She has history of drug abuse and has smoked cocaine, quit 8 years ago. Follows at pain clinic and is on Suboxone. She started Gabapentin a month ago due to hand numbness but has not noticed much improvement. She also has history of depression and generalized anxiety disorder.    Denies history of psoriasis, ulcerative colitis or chron's disease. No h/o iritis, enthesitis, finger or toe swelling like dactylitis, plantar fascitis or heel pain. Denies any malar rash, photosensitivity, pleuritic chest pains, recurrent sinusitis/rhinitis, swallowing difficulty, hearing or visual changes recently. No h/o arterial/venous thrombosis in the past. Denies any tick bite, recent GI/ infection.     Complains of raynaud's in her fingers and toes.  Has dry mouth and occasional sores in her mouth which she  attributes to her denture.          Review of Systems:   Review Of Systems  Constitutional: denies fever, chills, night sweats and weight loss.  Skin: Has history of Mary-Mary disease  Eyes: No dryness or irritation in eyes. No episode of eye inflammation or redness.   Ears/Nose/Throat: no recurrent sinus infections.  Respiratory: No shortness of breath, dyspnea on exertion, cough, or hemoptysis  Cardiovascular: no chest pain or palpitations.  Gastrointestinal: no nausea, vomiting, abdominal pain.  Normal bowel movements.  Genitourinary: no dysuria, frequency  or hematuria.  Musculoskeletal: as in HPI  Neurologic: + numbness, tingling.  Psychiatric: Has depression and anxiety  Hematologic/Lymphatic/Immunologic: no history of easy bruising, petechia or purpura.  No abnormal bleeding.   Endocrine: no h/o thyroid disease or Diabetes.                  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[employer]     [employer]     Social History Main Topics     Smoking status: Current Every Day Smoker     Packs/day: 0.50     Years: 15.00     Types: Cigarettes     Last attempt to quit: 11/6/2015     Smokeless tobacco: Never Used      Comment: 4/17/18     Alcohol use Yes      Comment: Drinks once in 4 - 5 months     Drug use: No      Comment: Last exposure to cocaine 2010     Sexual activity: No            Family History:     Family History   Problem Relation Age of  Onset     Neurologic Disorder Maternal Grandmother      migraines     Breast Cancer Maternal Grandmother      Neurologic Disorder Maternal Grandfather      migraines     Neurologic Disorder Brother      migraines     Breast Cancer Maternal Aunt      x 2     Breast Cancer Paternal Grandmother      Depression Daughter      Anxiety Disorder Daughter             Allergies:     Allergies   Allergen Reactions     Acetaminophen-Codeine Hives     Amoxicillin-Pot Clavulanate Nausea and Vomiting     Augmentin Nausea and Vomiting and Hives     Barbiturates      Bentyl [Dicyclomine] Other (See Comments)     dizziness     Compazine      Lock-jaw     Liquid Adhesive      Other reaction(s): Other, see comments  blisters     No Clinical Screening - See Comments      PN: LW Reaction: blisters  PN: LW FI1: nka  PN: LW CM1: CONTRAST- nka Reaction :     Nsaids      D/t ITP     Prochlorperazine      Other reaction(s): Edema     Propoxyphene N-Apap Hives     Sulfa Drugs      Tramadol Hcl Hives            Adhesive Tape Rash            Medications:     Current Outpatient Prescriptions   Medication Sig Dispense Refill     acyclovir (ZOVIRAX) 5 % ointment Apply 1 g topically as needed 30 g 11     albuterol (PROAIR HFA/PROVENTIL HFA/VENTOLIN HFA) 108 (90 Base) MCG/ACT Inhaler Inhale 1-2 puffs into the lungs every 4 hours as needed for shortness of breath / dyspnea or wheezing 18 g 3     betamethasone valerate (VALISONE) 0.1 % ointment Apply once a day for 2 weeks as directed       buprenorphine-naloxone (SUBOXONE) 8-2 MG SUBL sublingual tablet Place 1 tablet under the tongue 2 times daily 60 each 0     BusPIRone HCl 30 MG TABS TAKE 1 TABLET BY MOUTH TWICE DAILY 60 tablet 5     cetirizine (ZYRTEC) 10 MG tablet Take 1 tablet (10 mg) by mouth every evening 90 tablet 1     clobetasol (TEMOVATE) 0.05 % ointment APPLY TOPICALLY BID  1     cloNIDine (CATAPRES) 0.1 MG tablet Take 1 tablet (0.1 mg) by mouth At Bedtime Prn late withdrawal symptoms. 60  tablet 3     docusate sodium (DOK) 100 MG capsule TAKE 1 CAPSULE BY MOUTH TWICE DAILY AS NEEDED FOR CONSTIPATION 60 capsule 3     FLUoxetine (PROZAC) 20 MG capsule TAKE 1 CAPSULE BY MOUTH DAILY WITH FLUOXETINE 40MG FOR A TOTAL OF 60MG DAILY 30 capsule 0     FLUoxetine (PROZAC) 20 MG capsule TAKE 1 CAPSULE BY MOUTH DAILY WITH FLUOXETINE 40MG FOR A TOTAL OF 60MG DAILY. 30 capsule 0     FLUoxetine (PROZAC) 40 MG capsule TAKE 1 CAPSULE BY MOUTH DAILY WITH 20 MG CAPSULE OF FLUOXETINE TO EQUAL TOTAL DAILY DOSE OF 60MG 30 capsule 1     fluticasone (FLONASE) 50 MCG/ACT spray Spray 2 sprays into both nostrils daily 1 Bottle 11     gabapentin (NEURONTIN) 300 MG capsule Take 1 capsule (300 mg) by mouth 3 times daily 90 capsule 1     hydrocortisone 2.5 % cream        hydrOXYzine (ATARAX) 25 MG tablet TAKE 2 TABLETS BY MOUTH NIGHTLY AS NEEDED FOR ANXIETY 60 tablet 3     ipratropium - albuterol 0.5 mg/2.5 mg/3 mL (DUONEB) 0.5-2.5 (3) MG/3ML neb solution Take 1 vial (3 mLs) by nebulization every 6 hours as needed for shortness of breath / dyspnea or wheezing 90 vial 3     ISOtretinoin (ACCUTANE PO)        ketoconazole (NIZORAL) 2 % cream        levocetirizine (XYZAL) 5 MG tablet Take 1 tablet (5 mg) by mouth every evening 30 tablet 11     lidocaine (XYLOCAINE) 5 % ointment Apply topically 4 times daily as needed for moderate pain 50 g 1     Lidocaine 5 % CREA Externally apply 1 Film topically 4 times daily as needed 45 g 3     loratadine (CLARITIN) 10 MG tablet Take 1 tablet (10 mg) by mouth daily as needed for allergies 30 tablet 11     mirtazapine (REMERON) 15 MG tablet TAKE 1 TABLET BY MOUTH AT BEDTIME. CALL CLINIC IF TOO SEDATED DURING THE DAY. 30 tablet 0     Neomycin-Bacitracin-Polymyxin ([employer] TRIPLE ANTIBIOTIC) OINT Externally apply topically 4 times daily 60 g 3     olopatadine (PATANOL) 0.1 % ophthalmic solution Place 1 drop into both eyes 2 times daily 5 mL 3     omeprazole (PRILOSEC) 40 MG capsule TAKE 1 CAPSULE(40 MG)  "BY MOUTH DAILY 30 TO 60 MINUTES BEFORE A MEAL 90 capsule 3     ondansetron (ZOFRAN-ODT) 4 MG ODT tab Take 1-2 tablets (4-8 mg) by mouth every 8 hours as needed for nausea 30 tablet 1     order for DME Equipment being ordered: Left thumb brace 1 each 0     polyethylene glycol (MIRALAX) powder Take 17 g (1 capful) by mouth daily 510 g 3     traZODone (DESYREL) 100 MG tablet Take 1-3 tablets (100-300 mg) by mouth nightly as needed for sleep 90 tablet 0     traZODone (DESYREL) 100 MG tablet Take 1 tablet (100 mg) by mouth At Bedtime 90 tablet 1     triamcinolone (KENALOG) 0.1 % ointment Apply sparingly to affected area on back two times daily til clear. 80 g 0     valACYclovir (VALTREX) 1000 mg tablet Take 1 tablet (1,000 mg) by mouth 2 times daily At the onset of skin lesions as needed 8 tablet 11     varenicline (CHANTIX) 1 MG tablet Take 1 tablet (1 mg) by mouth 2 times daily 56 tablet 1     ZOLMitriptan (ZOMIG) 5 MG tablet TAKE 1 TABLET BY MOUTH AT ONSET OF HEADACHE FOR MIGRAINE. MAY REPEAT DOSE IN 2 HOURS. DO NOT EXCEED 10MG IN 24 HOURS 12 tablet 4     oxyCODONE-acetaminophen (PERCOCET) 5-325 MG per tablet Take 1 tablet by mouth every 6 hours as needed for pain (Patient not taking: Reported on 10/29/2018) 10 tablet 0            Physical Exam:   Blood pressure 103/61, pulse 56, height 1.778 m (5' 10\"), weight 65.8 kg (145 lb), SpO2 97 %, not currently breastfeeding.  Wt Readings from Last 4 Encounters:   10/29/18 65.8 kg (145 lb)   10/02/18 66.2 kg (146 lb)   09/10/18 68.6 kg (151 lb 3.2 oz)   08/03/18 72.1 kg (159 lb)       Constitutional: well-developed, appearing stated age; cooperative  Eyes: nl EOM, PERRLA, vision, conjunctiva, sclera  ENT: nl external ears, nose, hearing, lips, teeth, gums, throat  No mucous membrane lesions, normal saliva pool  Neck: no mass or thyroid enlargement  Resp: lungs clear to auscultation, nl to palpation  CV: RRR, no murmurs, rubs or gallops, no edema  GI: no ABD mass or " tenderness, no HSM  : not tested  Lymph: no cervical, supraclavicular, inguinal or epitrochlear nodes    MS: All TMJ, neck, shoulder, elbow, wrist, MCP/PIP/DIP, spine, hip, knee, ankle, and foot MTP/IP joints were examined.     -- She has tenderness over MCP, PIP, DIP joints, bilateral wrists, elbows, ankles and MTP joints.  She had right ankle fusion and has reduced range of motion.  Does not have any knee effusions.  Range of motion of bilateral shoulders is normal but painful.  She has normal muscle strength.  No skin rash present on exam.    -- No dactylitis,  tenosynovitis, enthesopathy.    Skin: no nail pitting, alopecia, rash, nodules or lesions  Neuro: nl cranial nerves, strength, sensation, DTRs.   Psych: nl judgement, orientation, memory, affect.         Data:     Results for orders placed or performed in visit on 10/02/18   Urine Drugs of Abuse Screen Panel 13   Result Value Ref Range    Cannabinoids (34-fix-5-carboxy-9-THC) Not Detected NDET^Not Detected ng/mL    Phencyclidine (Phencyclidine) Not Detected NDET^Not Detected ng/mL    Cocaine (Benzoylecgonine) Not Detected NDET^Not Detected ng/mL    Methamphetamine (d-Methamphetamine) Not Detected NDET^Not Detected ng/mL    Opiates (Morphine) Not Detected NDET^Not Detected ng/mL    Amphetamine (d-Amphetamine) Not Detected NDET^Not Detected ng/mL    Benzodiazepines (Nordiazepam) Detected, Abnormal Result (A) NDET^Not Detected ng/mL    Tricyclic Antidepressants (Desipramine) Not Detected NDET^Not Detected ng/mL    Methadone (Methadone) Not Detected NDET^Not Detected ng/mL    Barbiturates (Butalbital) Not Detected NDET^Not Detected ng/mL    Oxycodone (Oxycodone) Not Detected NDET^Not Detected ng/mL    Propoxyphene (Norpropoxyphene) Not Detected NDET^Not Detected ng/mL    Buprenorphine (Buprenorphine) Detected, Abnormal Result (A) NDET^Not Detected ng/mL   Benzodiazepine Confirmatory Urine Qual   Result Value Ref Range    Desmethyldiazepam NEGATIVE      Oxazepam Qual NEGATIVE     Temazepam NEGATIVE     Alprazolam NEGATIVE     Alpha-OH-alprazolam NEGATIVE     Lorazepam Qual NEGATIVE        Recent Labs   Lab Test  09/19/18   0854  09/10/18   0930  08/03/18 2008 09/07/17   1139   08/23/15   1358   04/01/14   1544   WBC   --   7.5  9.7  7.8   < >  12.1*   < >   --    RBC   --   3.76*  3.36*  4.32   < >  4.66   < >   --    HGB   --   11.3*  10.3*  13.5   < >  14.2   < >   --    HCT   --   33.8*  31.2*  40.3   < >  42.1   < >   --    MCV   --   90  93  93   < >  90   < >   --    RDW   --   16.1*  15.3*  16.4*   < >  15.5*   < >   --    PLT   --   387  344  462*   < >  272   < >   --    ALBUMIN  3.6  3.4  Canceled, Test credited  4.1   < >   --    --    --    CRP   --   3.4   --    --    --   <2.9   --   <5.0   BUN   --   7  Canceled, Test credited  7   < >   --    --    --     < > = values in this interval not displayed.      No results for input(s): TSH, T4 in the last 87005 hours.  Hemoglobin   Date Value Ref Range Status   09/10/2018 11.3 (L) 11.7 - 15.7 g/dL Final   08/03/2018 10.3 (L) 11.7 - 15.7 g/dL Final   09/07/2017 13.5 11.7 - 15.7 g/dL Final     Urea Nitrogen   Date Value Ref Range Status   09/10/2018 7 7 - 30 mg/dL Final   08/03/2018 Canceled, Test credited 7 - 30 mg/dL Final     Comment:     Test canceled by physician   09/07/2017 7 7 - 30 mg/dL Final     Sed Rate   Date Value Ref Range Status   09/10/2018 23 (H) 0 - 20 mm/h Final   04/11/2016 16 0 - 20 mm/h Final   04/01/2014 16 0 - 20 mm/h Final     CRP Inflammation   Date Value Ref Range Status   09/10/2018 3.4 0.0 - 8.0 mg/L Final   08/23/2015 <2.9 0.0 - 8.0 mg/L Final   04/01/2014 <5.0 0.0 - 8.0 mg/L Final     AST   Date Value Ref Range Status   09/19/2018 23 0 - 45 U/L Final   09/10/2018 176 (H) 0 - 45 U/L Final   08/03/2018 Canceled, Test credited 0 - 45 U/L Final     Comment:     Test canceled by physician     Albumin   Date Value Ref Range Status   09/19/2018 3.6 3.4 - 5.0 g/dL Final   09/10/2018  3.4 3.4 - 5.0 g/dL Final   08/03/2018 Canceled, Test credited 3.4 - 5.0 g/dL Final     Comment:     Test canceled by physician     Alkaline Phosphatase   Date Value Ref Range Status   09/19/2018 88 40 - 150 U/L Final   09/10/2018 95 40 - 150 U/L Final   08/03/2018 Canceled, Test credited 40 - 150 U/L Final     Comment:     Test canceled by physician     ALT   Date Value Ref Range Status   09/19/2018 34 0 - 50 U/L Final   09/10/2018 213 (H) 0 - 50 U/L Final   08/03/2018 Canceled, Test credited 0 - 50 U/L Final     Comment:     Test canceled by physician     Rheumatoid Factor   Date Value Ref Range Status   09/10/2018 <20 <20 IU/mL Final   04/01/2014 <20 <20 IU/mL Final   02/05/2014 <20 <20 IU/mL Final     Recent Labs   Lab Test  09/19/18   0854  09/10/18   0930  08/03/18 2008 09/07/17   1139   WBC   --   7.5  9.7  7.8   HGB   --   11.3*  10.3*  13.5   HCT   --   33.8*  31.2*  40.3   MCV   --   90  93  93   PLT   --   387  344  462*   BUN   --   7  Canceled, Test credited  7   AST  23  176*  Canceled, Test credited  16   ALT  34  213*  Canceled, Test credited  24   ALKPHOS  88  95  Canceled, Test credited  71       Reviewed Rheumatology lab flowsheet    Nickie Diaz MD  [facility]  Department of Rheumatology & Autoimmune Disorders  [facility]: [phone]   Pager - [phone]

## 2018-10-29 NOTE — PATIENT INSTRUCTIONS
-- Will get blood tests today     -- You have inflammatory arthritis a    -- Prednisone taper for joints.     -- RTC in 2 months   Patient Education    Prednisone Gastro-resistant tablet    Prednisone Oral solution    Prednisone Oral tablet  Prednisone Oral tablet  What is this medicine?  PREDNISONE (PRED ni sone) is a corticosteroid. It is commonly used to treat inflammation of the skin, joints, lungs, and other organs. Common conditions treated include asthma, allergies, and arthritis. It is also used for other conditions, such as blood disorders and diseases of the adrenal glands.  This medicine may be used for other purposes; ask your health care provider or pharmacist if you have questions.  What should I tell my health care provider before I take this medicine?  They need to know if you have any of these conditions:    Cushing's syndrome    diabetes    glaucoma    heart disease    high blood pressure    infection (especially a virus infection such as chickenpox, cold sores, or herpes)    kidney disease    liver disease    mental illness    myasthenia gravis    osteoporosis    seizures    stomach or intestine problems    thyroid disease    an unusual or allergic reaction to lactose, prednisone, other medicines, foods, dyes, or preservatives    pregnant or trying to get pregnant    breast-feeding  How should I use this medicine?  Take this medicine by mouth with a glass of water. Follow the directions on the prescription label. Take this medicine with food. If you are taking this medicine once a day, take it in the morning. Do not take more medicine than you are told to take. Do not suddenly stop taking your medicine because you may develop a severe reaction. Your doctor will tell you how much medicine to take. If your doctor wants you to stop the medicine, the dose may be slowly lowered over time to avoid any side effects.  Talk to your pediatrician regarding the use of this medicine in children. Special care  may be needed.  Overdosage: If you think you have taken too much of this medicine contact a poison control center or emergency room at once.  NOTE: This medicine is only for you. Do not share this medicine with others.  What if I miss a dose?  If you miss a dose, take it as soon as you can. If it is almost time for your next dose, talk to your doctor or health care professional. You may need to miss a dose or take an extra dose. Do not take double or extra doses without advice.  What may interact with this medicine?  Do not take this medicine with any of the following medications:    metyrapone    mifepristone  This medicine may also interact with the following medications:    aminoglutethimide    amphotericin B    aspirin and aspirin-like medicines    barbiturates    certain medicines for diabetes, like glipizide or glyburide    cholestyramine    cholinesterase inhibitors    cyclosporine    digoxin    diuretics    ephedrine    female hormones, like estrogens and birth control pills    isoniazid    ketoconazole    NSAIDS, medicines for pain and inflammation, like ibuprofen or naproxen    phenytoin    rifampin    toxoids    vaccines    warfarin  This list may not describe all possible interactions. Give your health care provider a list of all the medicines, herbs, non-prescription drugs, or dietary supplements you use. Also tell them if you smoke, drink alcohol, or use illegal drugs. Some items may interact with your medicine.  What should I watch for while using this medicine?  Visit your doctor or health care professional for regular checks on your progress. If you are taking this medicine over a prolonged period, carry an identification card with your name and address, the type and dose of your medicine, and your doctor's name and address.  This medicine may increase your risk of getting an infection. Tell your doctor or health care professional if you are around anyone with measles or chickenpox, or if you  develop sores or blisters that do not heal properly.  If you are going to have surgery, tell your doctor or health care professional that you have taken this medicine within the last twelve months.  Ask your doctor or health care professional about your diet. You may need to lower the amount of salt you eat.  This medicine may affect blood sugar levels. If you have diabetes, check with your doctor or health care professional before you change your diet or the dose of your diabetic medicine.  What side effects may I notice from receiving this medicine?  Side effects that you should report to your doctor or health care professional as soon as possible:    allergic reactions like skin rash, itching or hives, swelling of the face, lips, or tongue    changes in emotions or moods    changes in vision    depressed mood    eye pain    fever or chills, cough, sore throat, pain or difficulty passing urine    increased thirst    swelling of ankles, feet  Side effects that usually do not require medical attention (report to your doctor or health care professional if they continue or are bothersome):    confusion, excitement, restlessness    headache    nausea, vomiting    skin problems, acne, thin and shiny skin    trouble sleeping    weight gain  This list may not describe all possible side effects. Call your doctor for medical advice about side effects. You may report side effects to FDA at 0-621-FDA-7944.  Where should I keep my medicine?  Keep out of the reach of children.  Store at room temperature between 15 and 30 degrees C (59 and 86 degrees F). Protect from light. Keep container tightly closed. Throw away any unused medicine after the expiration date.  NOTE:This sheet is a summary. It may not cover all possible information. If you have questions about this medicine, talk to your doctor, pharmacist, or health care provider. Copyright  2016 [employer]

## 2018-10-29 NOTE — TELEPHONE ENCOUNTER
"Patient called RN hotline stating she was seen by Dr. Diaz, she would like to make sure this provider is \"on her list\".  Noted that patient does have referral done on 9/10/18 for Rheumatology.  Patient states she is not able to get her Prednisone that was prescribed by Dr. Diaz because she is restricted to [facility].  Patient would like PCP to send prescription for prednisone.   Patient also asking about mammogram, was last done on 3/21/17 with recommendation to have annual mammogram.  Patient normally has this done with the [facility] and would like to have referral for same location.   Order pending   Please advise   Tita Brown RN    "

## 2018-10-29 NOTE — NURSING NOTE
Coretta Tovar's goals for this visit include:   She requests these members of her care team be copied on today's visit information:     PCP: [facility]    Referring Provider:  [facility], [facility]  [address]  [place] [id number]

## 2018-10-29 NOTE — LETTER
10/29/2018      RE: Coretta Tovar  [address] [facility] [address]     Dear Colleague,    Thank you for referring your patient, Coretta Tovar, to the [facility]. Please see a copy of my visit note below.    Rheumatology Clinic Visit     Coretta Tovar MRN# [id number]   YOB: 1970 Age: 48 year old     Date of Visit: Oct 29, 2018  Primary care provider: Sarah Montemayor          Assessment and Plan:   Assessment     -- Inflammatory arthritis   -- Raynaud's  -- ITP history s/p splenectomy  -- Generalized anxiety disorder   -- Hx of drug abuse     Ms Tovar is 49 yo female seen in clinic for evaluation of joint pains.    Polyarticular symmetric inflammatory arthritis: She has pain in her shoulders, elbows, hands, wrists, knees, ankles and toes for the last 6 months.  Has stiffness in the morning which last couple hours.  Uses Tylenol which does not help.  Cannot take NSAIDs due to history of ITP.    On physical exam she has tenderness over MCP, PIP, DIP joints, bilateral wrists, elbows, ankles and MTP joint.  She has had right ankle fusion surgery many years ago and has reduced range of motion.  Does not have any knee effusions.  Range of motion of bilateral shoulders is normal but painful.  She has normal muscle strength.  No skin rash present on exam.    Based on her clinical history and physical exam she has polyarticular symmetric inflammatory arthritis.  Her recent rheumatoid serologies are normal.  Her sed rate is mildly elevated.  She has low titer positive DARON.  Reports having Raynaud's. Her symptoms can be due to either seronegative rheumatoid arthritis or DARON associated autoimmune connective tissue disease.    We will give her prednisone taper starting with 20 mg and taper down by 5 mg.  She can stay at a lowest dose which is effective.     Based on blood work and response to prednisone will start her on DMARD medication.    Plan    1.  Blood tests: DANNIE  panel, complement levels, ESR, CRP, hepatitis B/C/TB, CBC, CMP. double-stranded DNA checked a month ago was normal.    2.  Prednisone taper.4tab=20 mg qd x 7 days, 3tab=15 mg qd x 7 days, 2tab=10 mg qd x 7 days, 1 tab=5 mg qd     3. Return to clinic in 2 months            Active Problem List:     Patient Active Problem List    Diagnosis Date Noted     History of cocaine dependence 09/07/2017     Priority: Medium     Moderate episode of recurrent major depressive disorder (H) 03/22/2017     Priority: Medium     Generalized anxiety disorder 03/22/2017     Priority: Medium     Protein-calorie malnutrition (H) 03/22/2017     Priority: Medium     Primary insomnia 03/22/2017     Priority: Medium     Vertigo 03/14/2017     Priority: Medium     Cervical pain 03/08/2017     Priority: Medium     Nutritional disorder 11/21/2016     Priority: Medium     Disorder of stomach 11/20/2016     Priority: Medium     Overview:   Per Biopsy obtained on EGD 11/22/2016. Reactive gastropathy was quoted to be frequently associated with ongoing non-inflammatory type mucosal injury due to a chemical type of injury; this may be due to ingestion of non-steroidal anti-inflammatory drugs, aspirin, excess alcohol, or corticosteroids.        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     Priority: Medium     Chemical dependency (H) 02/24/2015     Priority: Medium     Insomnia 02/24/2015     Priority: Medium     S/P splenectomy 01/13/2015     Priority: Medium     Migraines      Priority: Medium     Idiopathic thrombocytopenic purpura (HCC)      Priority: Medium     s/p splenectomy       Tobacco abuse 05/30/2014     Priority: Medium     Immune thrombocytopenic purpura (H) 01/14/2008     Priority: Medium     Personality disorder (H) 01/14/2008     Priority: Medium     Overview:   cluster B traits  borderline, antisocial       Nondependent cannabis abuse,  episodic 01/14/2008     Priority: Medium     Mixed, or nondependent drug abuse 01/26/2007     Priority: Medium     Overview:   recurrent issues with opioids.  History of crack cocaine abuse.       Gastroesophageal reflux disease 12/20/2006     Priority: Medium     Herpetic gingivostomatitis 12/20/2006     Priority: Medium     Muscle pain 12/20/2006     Priority: Medium     Atopic rhinitis 12/20/2006     Priority: Medium     Abnormal weight loss 12/20/2006     Priority: Medium            History of Present Illness:   Coretta Tovar is a 48 year old female with PMH of ITP status post splenectomy, cocaine abuse, cannabis and opioid abuse, depression seen in the clinic in consultation at request of Sarah Montemayor APRN CNP for evaluation of joint pains.      She reports that she has pain in multiple joints including shoulders, elbows, wrists, hands, knees, ankles for the last 6 months. Has stiffness in the morning which lasts for few hours.  If she moves her thumb she feels as if her wrist is getting ripped. She takes tyenol but it does not help. Can not take Ibuprofen due to ITP hx. When she wakes up in the morning her hands are numb and painful.    She was diagnosed with ITP in 1994 and in 1997 underwent splenectomy.   After that her platelets have been normal.  She also was diagnosed with Nela-Nela disease where her skin blisters like herpes zoster.     She has history of drug abuse and has smoked cocaine, quit 8 years ago. Follows at pain clinic and is on Suboxone. She started Gabapentin a month ago due to hand numbness but has not noticed much improvement. She also has history of depression and generalized anxiety disorder.    Denies history of psoriasis, ulcerative colitis or chron's disease. No h/o iritis, enthesitis, finger or toe swelling like dactylitis, plantar fascitis or heel pain. Denies any malar rash, photosensitivity, pleuritic chest pains, recurrent sinusitis/rhinitis, swallowing  difficulty, hearing or visual changes recently. No h/o arterial/venous thrombosis in the past. Denies any tick bite, recent GI/ infection.     Complains of raynaud's in her fingers and toes.  Has dry mouth and occasional sores in her mouth which she attributes to her denture.          Review of Systems:   Review Of Systems  Constitutional: denies fever, chills, night sweats and weight loss.  Skin: Has history of Mary-Mary disease  Eyes: No dryness or irritation in eyes. No episode of eye inflammation or redness.   Ears/Nose/Throat: no recurrent sinus infections.  Respiratory: No shortness of breath, dyspnea on exertion, cough, or hemoptysis  Cardiovascular: no chest pain or palpitations.  Gastrointestinal: no nausea, vomiting, abdominal pain.  Normal bowel movements.  Genitourinary: no dysuria, frequency  or hematuria.  Musculoskeletal: as in HPI  Neurologic: + numbness, tingling.  Psychiatric: Has depression and anxiety  Hematologic/Lymphatic/Immunologic: no history of easy bruising, petechia or purpura.  No abnormal bleeding.   Endocrine: no h/o thyroid disease or Diabetes.                  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[employer]     [employer]     Social History Main Topics     Smoking status: Current Every Day Smoker     Packs/day: 0.50     Years: 15.00     Types: Cigarettes     Last attempt to quit: 11/6/2015     Smokeless tobacco:  Never Used      Comment: 4/17/18     Alcohol use Yes      Comment: Drinks once in 4 - 5 months     Drug use: No      Comment: Last exposure to cocaine 2010     Sexual activity: No            Family History:     Family History   Problem Relation Age of Onset     Neurologic Disorder Maternal Grandmother      migraines     Breast Cancer Maternal Grandmother      Neurologic Disorder Maternal Grandfather      migraines     Neurologic Disorder Brother      migraines     Breast Cancer Maternal Aunt      x 2     Breast Cancer Paternal Grandmother      Depression Daughter      Anxiety Disorder Daughter             Allergies:     Allergies   Allergen Reactions     Acetaminophen-Codeine Hives     Amoxicillin-Pot Clavulanate Nausea and Vomiting     Augmentin Nausea and Vomiting and Hives     Barbiturates      Bentyl [Dicyclomine] Other (See Comments)     dizziness     Compazine      Lock-jaw     Liquid Adhesive      Other reaction(s): Other, see comments  blisters     No Clinical Screening - See Comments      PN: LW Reaction: blisters  PN: LW FI1: nka  PN: LW CM1: CONTRAST- nka Reaction :     Nsaids      D/t ITP     Prochlorperazine      Other reaction(s): Edema     Propoxyphene N-Apap Hives     Sulfa Drugs      Tramadol Hcl Hives            Adhesive Tape Rash            Medications:     Current Outpatient Prescriptions   Medication Sig Dispense Refill     acyclovir (ZOVIRAX) 5 % ointment Apply 1 g topically as needed 30 g 11     albuterol (PROAIR HFA/PROVENTIL HFA/VENTOLIN HFA) 108 (90 Base) MCG/ACT Inhaler Inhale 1-2 puffs into the lungs every 4 hours as needed for shortness of breath / dyspnea or wheezing 18 g 3     betamethasone valerate (VALISONE) 0.1 % ointment Apply once a day for 2 weeks as directed       buprenorphine-naloxone (SUBOXONE) 8-2 MG SUBL sublingual tablet Place 1 tablet under the tongue 2 times daily 60 each 0     BusPIRone HCl 30 MG TABS TAKE 1 TABLET BY MOUTH TWICE DAILY 60 tablet 5     cetirizine  (ZYRTEC) 10 MG tablet Take 1 tablet (10 mg) by mouth every evening 90 tablet 1     clobetasol (TEMOVATE) 0.05 % ointment APPLY TOPICALLY BID  1     cloNIDine (CATAPRES) 0.1 MG tablet Take 1 tablet (0.1 mg) by mouth At Bedtime Prn late withdrawal symptoms. 60 tablet 3     docusate sodium (DOK) 100 MG capsule TAKE 1 CAPSULE BY MOUTH TWICE DAILY AS NEEDED FOR CONSTIPATION 60 capsule 3     FLUoxetine (PROZAC) 20 MG capsule TAKE 1 CAPSULE BY MOUTH DAILY WITH FLUOXETINE 40MG FOR A TOTAL OF 60MG DAILY 30 capsule 0     FLUoxetine (PROZAC) 20 MG capsule TAKE 1 CAPSULE BY MOUTH DAILY WITH FLUOXETINE 40MG FOR A TOTAL OF 60MG DAILY. 30 capsule 0     FLUoxetine (PROZAC) 40 MG capsule TAKE 1 CAPSULE BY MOUTH DAILY WITH 20 MG CAPSULE OF FLUOXETINE TO EQUAL TOTAL DAILY DOSE OF 60MG 30 capsule 1     fluticasone (FLONASE) 50 MCG/ACT spray Spray 2 sprays into both nostrils daily 1 Bottle 11     gabapentin (NEURONTIN) 300 MG capsule Take 1 capsule (300 mg) by mouth 3 times daily 90 capsule 1     hydrocortisone 2.5 % cream        hydrOXYzine (ATARAX) 25 MG tablet TAKE 2 TABLETS BY MOUTH NIGHTLY AS NEEDED FOR ANXIETY 60 tablet 3     ipratropium - albuterol 0.5 mg/2.5 mg/3 mL (DUONEB) 0.5-2.5 (3) MG/3ML neb solution Take 1 vial (3 mLs) by nebulization every 6 hours as needed for shortness of breath / dyspnea or wheezing 90 vial 3     ISOtretinoin (ACCUTANE PO)        ketoconazole (NIZORAL) 2 % cream        levocetirizine (XYZAL) 5 MG tablet Take 1 tablet (5 mg) by mouth every evening 30 tablet 11     lidocaine (XYLOCAINE) 5 % ointment Apply topically 4 times daily as needed for moderate pain 50 g 1     Lidocaine 5 % CREA Externally apply 1 Film topically 4 times daily as needed 45 g 3     loratadine (CLARITIN) 10 MG tablet Take 1 tablet (10 mg) by mouth daily as needed for allergies 30 tablet 11     mirtazapine (REMERON) 15 MG tablet TAKE 1 TABLET BY MOUTH AT BEDTIME. CALL CLINIC IF TOO SEDATED DURING THE DAY. 30 tablet 0      "Neomycin-Bacitracin-Polymyxin ([employer] TRIPLE ANTIBIOTIC) OINT Externally apply topically 4 times daily 60 g 3     olopatadine (PATANOL) 0.1 % ophthalmic solution Place 1 drop into both eyes 2 times daily 5 mL 3     omeprazole (PRILOSEC) 40 MG capsule TAKE 1 CAPSULE(40 MG) BY MOUTH DAILY 30 TO 60 MINUTES BEFORE A MEAL 90 capsule 3     ondansetron (ZOFRAN-ODT) 4 MG ODT tab Take 1-2 tablets (4-8 mg) by mouth every 8 hours as needed for nausea 30 tablet 1     order for DME Equipment being ordered: Left thumb brace 1 each 0     polyethylene glycol (MIRALAX) powder Take 17 g (1 capful) by mouth daily 510 g 3     traZODone (DESYREL) 100 MG tablet Take 1-3 tablets (100-300 mg) by mouth nightly as needed for sleep 90 tablet 0     traZODone (DESYREL) 100 MG tablet Take 1 tablet (100 mg) by mouth At Bedtime 90 tablet 1     triamcinolone (KENALOG) 0.1 % ointment Apply sparingly to affected area on back two times daily til clear. 80 g 0     valACYclovir (VALTREX) 1000 mg tablet Take 1 tablet (1,000 mg) by mouth 2 times daily At the onset of skin lesions as needed 8 tablet 11     varenicline (CHANTIX) 1 MG tablet Take 1 tablet (1 mg) by mouth 2 times daily 56 tablet 1     ZOLMitriptan (ZOMIG) 5 MG tablet TAKE 1 TABLET BY MOUTH AT ONSET OF HEADACHE FOR MIGRAINE. MAY REPEAT DOSE IN 2 HOURS. DO NOT EXCEED 10MG IN 24 HOURS 12 tablet 4     oxyCODONE-acetaminophen (PERCOCET) 5-325 MG per tablet Take 1 tablet by mouth every 6 hours as needed for pain (Patient not taking: Reported on 10/29/2018) 10 tablet 0            Physical Exam:   Blood pressure 103/61, pulse 56, height 1.778 m (5' 10\"), weight 65.8 kg (145 lb), SpO2 97 %, not currently breastfeeding.  Wt Readings from Last 4 Encounters:   10/29/18 65.8 kg (145 lb)   10/02/18 66.2 kg (146 lb)   09/10/18 68.6 kg (151 lb 3.2 oz)   08/03/18 72.1 kg (159 lb)       Constitutional: well-developed, appearing stated age; cooperative  Eyes: nl EOM, PERRLA, vision, conjunctiva, sclera  ENT: nl " external ears, nose, hearing, lips, teeth, gums, throat  No mucous membrane lesions, normal saliva pool  Neck: no mass or thyroid enlargement  Resp: lungs clear to auscultation, nl to palpation  CV: RRR, no murmurs, rubs or gallops, no edema  GI: no ABD mass or tenderness, no HSM  : not tested  Lymph: no cervical, supraclavicular, inguinal or epitrochlear nodes    MS: All TMJ, neck, shoulder, elbow, wrist, MCP/PIP/DIP, spine, hip, knee, ankle, and foot MTP/IP joints were examined.     -- She has tenderness over MCP, PIP, DIP joints, bilateral wrists, elbows, ankles and MTP joints.  She had right ankle fusion and has reduced range of motion.  Does not have any knee effusions.  Range of motion of bilateral shoulders is normal but painful.  She has normal muscle strength.  No skin rash present on exam.    -- No dactylitis,  tenosynovitis, enthesopathy.    Skin: no nail pitting, alopecia, rash, nodules or lesions  Neuro: nl cranial nerves, strength, sensation, DTRs.   Psych: nl judgement, orientation, memory, affect.         Data:     Results for orders placed or performed in visit on 10/02/18   Urine Drugs of Abuse Screen Panel 13   Result Value Ref Range    Cannabinoids (94-cfs-6-carboxy-9-THC) Not Detected NDET^Not Detected ng/mL    Phencyclidine (Phencyclidine) Not Detected NDET^Not Detected ng/mL    Cocaine (Benzoylecgonine) Not Detected NDET^Not Detected ng/mL    Methamphetamine (d-Methamphetamine) Not Detected NDET^Not Detected ng/mL    Opiates (Morphine) Not Detected NDET^Not Detected ng/mL    Amphetamine (d-Amphetamine) Not Detected NDET^Not Detected ng/mL    Benzodiazepines (Nordiazepam) Detected, Abnormal Result (A) NDET^Not Detected ng/mL    Tricyclic Antidepressants (Desipramine) Not Detected NDET^Not Detected ng/mL    Methadone (Methadone) Not Detected NDET^Not Detected ng/mL    Barbiturates (Butalbital) Not Detected NDET^Not Detected ng/mL    Oxycodone (Oxycodone) Not Detected NDET^Not Detected ng/mL     Propoxyphene (Norpropoxyphene) Not Detected NDET^Not Detected ng/mL    Buprenorphine (Buprenorphine) Detected, Abnormal Result (A) NDET^Not Detected ng/mL   Benzodiazepine Confirmatory Urine Qual   Result Value Ref Range    Desmethyldiazepam NEGATIVE     Oxazepam Qual NEGATIVE     Temazepam NEGATIVE     Alprazolam NEGATIVE     Alpha-OH-alprazolam NEGATIVE     Lorazepam Qual NEGATIVE        Recent Labs   Lab Test  09/19/18   0854  09/10/18   0930  08/03/18 2008 09/07/17   1139   08/23/15   1358   04/01/14   1544   WBC   --   7.5  9.7  7.8   < >  12.1*   < >   --    RBC   --   3.76*  3.36*  4.32   < >  4.66   < >   --    HGB   --   11.3*  10.3*  13.5   < >  14.2   < >   --    HCT   --   33.8*  31.2*  40.3   < >  42.1   < >   --    MCV   --   90  93  93   < >  90   < >   --    RDW   --   16.1*  15.3*  16.4*   < >  15.5*   < >   --    PLT   --   387  344  462*   < >  272   < >   --    ALBUMIN  3.6  3.4  Canceled, Test credited  4.1   < >   --    --    --    CRP   --   3.4   --    --    --   <2.9   --   <5.0   BUN   --   7  Canceled, Test credited  7   < >   --    --    --     < > = values in this interval not displayed.      No results for input(s): TSH, T4 in the last 68077 hours.  Hemoglobin   Date Value Ref Range Status   09/10/2018 11.3 (L) 11.7 - 15.7 g/dL Final   08/03/2018 10.3 (L) 11.7 - 15.7 g/dL Final   09/07/2017 13.5 11.7 - 15.7 g/dL Final     Urea Nitrogen   Date Value Ref Range Status   09/10/2018 7 7 - 30 mg/dL Final   08/03/2018 Canceled, Test credited 7 - 30 mg/dL Final     Comment:     Test canceled by physician   09/07/2017 7 7 - 30 mg/dL Final     Sed Rate   Date Value Ref Range Status   09/10/2018 23 (H) 0 - 20 mm/h Final   04/11/2016 16 0 - 20 mm/h Final   04/01/2014 16 0 - 20 mm/h Final     CRP Inflammation   Date Value Ref Range Status   09/10/2018 3.4 0.0 - 8.0 mg/L Final   08/23/2015 <2.9 0.0 - 8.0 mg/L Final   04/01/2014 <5.0 0.0 - 8.0 mg/L Final     AST   Date Value Ref Range Status    09/19/2018 23 0 - 45 U/L Final   09/10/2018 176 (H) 0 - 45 U/L Final   08/03/2018 Canceled, Test credited 0 - 45 U/L Final     Comment:     Test canceled by physician     Albumin   Date Value Ref Range Status   09/19/2018 3.6 3.4 - 5.0 g/dL Final   09/10/2018 3.4 3.4 - 5.0 g/dL Final   08/03/2018 Canceled, Test credited 3.4 - 5.0 g/dL Final     Comment:     Test canceled by physician     Alkaline Phosphatase   Date Value Ref Range Status   09/19/2018 88 40 - 150 U/L Final   09/10/2018 95 40 - 150 U/L Final   08/03/2018 Canceled, Test credited 40 - 150 U/L Final     Comment:     Test canceled by physician     ALT   Date Value Ref Range Status   09/19/2018 34 0 - 50 U/L Final   09/10/2018 213 (H) 0 - 50 U/L Final   08/03/2018 Canceled, Test credited 0 - 50 U/L Final     Comment:     Test canceled by physician     Rheumatoid Factor   Date Value Ref Range Status   09/10/2018 <20 <20 IU/mL Final   04/01/2014 <20 <20 IU/mL Final   02/05/2014 <20 <20 IU/mL Final     Recent Labs   Lab Test  09/19/18   0854  09/10/18   0930  08/03/18 2008 09/07/17   1139   WBC   --   7.5  9.7  7.8   HGB   --   11.3*  10.3*  13.5   HCT   --   33.8*  31.2*  40.3   MCV   --   90  93  93   PLT   --   387  344  462*   BUN   --   7  Canceled, Test credited  7   AST  23  176*  Canceled, Test credited  16   ALT  34  213*  Canceled, Test credited  24   ALKPHOS  88  95  Canceled, Test credited  71       Reviewed Rheumatology lab flowsheet    Nickie Diaz MD  [facility]  Department of Rheumatology & Autoimmune Disorders  [place]: [phone]   Pager - [phone]        Again, thank you for allowing me to participate in the care of your patient.      Sincerely,    Nickie Diaz MD

## 2018-10-30 NOTE — TELEPHONE ENCOUNTER
Restricted referral faxed to [facility]    Thank you,   Tenisha Farias   Referral Department  [phone]

## 2018-10-30 NOTE — TELEPHONE ENCOUNTER
LM for patient to return call.    Spoke with referrals and they will put a referral in for patient to see Dr. Diaz for the next whole year.  Olamide Baltazar,

## 2018-10-30 NOTE — TELEPHONE ENCOUNTER
Patient called  hotline and is requesting order for colonoscopy.    Patient stated she had colonoscopy done last year with [facility] and had over 30 polyps.  They only removed 20 or so and she was told she needed to return in one year for her next procedure.  Olamide Baltazar,

## 2018-10-30 NOTE — TELEPHONE ENCOUNTER
Left message for patient to call RN hotline [phone].   GI referral placed on 9/10/2018 to have colonoscopy done at [facility] (#[phone])    Deedee Mott RN

## 2018-10-30 NOTE — TELEPHONE ENCOUNTER
Patient returned call and informed referral placed, mammogram order placed, and prednisone prescription was sent in. Olamide Baltazar,

## 2018-10-31 NOTE — TELEPHONE ENCOUNTER
Referral faxed to [facility] at [phone].  LM for patient that this was done.  Olamide Baltazar,

## 2018-11-06 NOTE — MR AVS SNAPSHOT
After Visit Summary   11/6/2018    Coretta Tovar    MRN: [id number]           Patient Information     Date Of Birth          1970        Visit Information        Provider Department      11/6/2018 8:00 AM Jessy Brenner MD [facility]        Today's Diagnoses     Opioid use disorder, severe, dependence (H)    -  1    History of cocaine dependence        Moderate episode of recurrent major depressive disorder (H)        Generalized anxiety disorder        Migraine without aura and without status migrainosus, not intractable        Primary insomnia           Follow-ups after your visit        Your next 10 appointments already scheduled     Dec 04, 2018  9:00 AM CST   Return Visit with Jessy Brenner MD   [facility] ([facility])    [address] [facility] [id number] [phone]            Dec 27, 2018  9:30 AM CST   Return Visit with Nickie Diaz MD   [facility] ([facility]    [address] [place] [id number] [phone]              Future tests that were ordered for you today     Open Standing Orders        Priority Remaining Interval Expires Ordered    Urine Drugs of Abuse Screen Panel 13 Routine 99/100  11/6/2019 11/6/2018            Who to contact     If you have questions or need follow up information about today's clinic visit or your schedule please contact [facility] directly at [phone].  Normal or non-critical lab and imaging results will be communicated to you by MyChart, letter or phone within 4 business days after the clinic has received the results. If you do not hear from us within 7 days, please contact the clinic through MyChart or phone. If you have a critical or abnormal lab result, we will notify you by phone as soon as possible.  Submit refill requests through [employer] or call your pharmacy and they will forward the refill request to  us. Please allow 3 business days for your refill to be completed.          Additional Information About Your Visit        Care EveryWhere ID     This is your Care EveryWhere ID. This could be used by other organizations to access your [place] medical records  [id number]        Your Vitals Were     BMI (Body Mass Index)                   21.52 kg/m2            Blood Pressure from Last 3 Encounters:   10/29/18 103/61   10/02/18 115/58   09/10/18 100/50    Weight from Last 3 Encounters:   11/06/18 150 lb (68 kg)   10/29/18 145 lb (65.8 kg)   10/02/18 146 lb (66.2 kg)              We Performed the Following     Amphetamine confirm urine     Urine Drugs of Abuse Screen Panel 13          Today's Medication Changes          These changes are accurate as of 11/6/18 10:56 AM.  If you have any questions, ask your nurse or doctor.               Stop taking these medicines if you haven't already. Please contact your care team if you have questions.     mirtazapine 15 MG tablet   Commonly known as:  REMERON           oxyCODONE-acetaminophen 5-325 MG per tablet   Commonly known as:  PERCOCET                Where to get your medicines      Some of these will need a paper prescription and others can be bought over the counter.  Ask your nurse if you have questions.     Bring a paper prescription for each of these medications     buprenorphine-naloxone 8-2 MG Subl sublingual tablet                Primary Care Provider Office Phone # Fax #    Sarah Wilkerson Rohit Roberts, APRN [facility] [phone]       [address] [facility] [address]        Equal Access to Services     [facility] EREN : Hadii teodoro ferrer hadasho Soomaali, waaxda luqadaha, qaybta kaalmada adeegyasamantha, waxay antonio alegre . So [facility] [phone].    ATENCIÓN: Si habla español, tiene a roberts disposición servicios gratuitos de asistencia lingüística. Llame al [phone].    We comply with applicable federal civil rights laws and [place] laws.  We do not discriminate on the basis of race, color, national origin, age, disability, sex, sexual orientation, or gender identity.            Thank you!     Thank you for choosing [facility]  for your care. Our goal is always to provide you with excellent care. Hearing back from our patients is one way we can continue to improve our services. Please take a few minutes to complete the written survey that you may receive in the mail after your visit with us. Thank you!             Your Updated Medication List - Protect others around you: Learn how to safely use, store and throw away your medicines at www.disposemymeds.org.          This list is accurate as of 11/6/18 10:56 AM.  Always use your most recent med list.                   Brand Name Dispense Instructions for use Diagnosis    ACCUTANE PO           acyclovir 5 % ointment    ZOVIRAX    30 g    Apply 1 g topically as needed    Recurrent cold sores       albuterol 108 (90 Base) MCG/ACT inhaler    PROAIR HFA/PROVENTIL HFA/VENTOLIN HFA    18 g    Inhale 1-2 puffs into the lungs every 4 hours as needed for shortness of breath / dyspnea or wheezing    Mild intermittent reactive airway disease without complication       betamethasone valerate 0.1 % ointment    VALISONE     Apply once a day for 2 weeks as directed        buprenorphine-naloxone 8-2 MG Subl sublingual tablet    SUBOXONE    60 each    Place 1 tablet under the tongue 2 times daily    Opioid use disorder, severe, dependence (H)       BusPIRone HCl 30 MG Tabs     60 tablet    TAKE 1 TABLET BY MOUTH TWICE DAILY    Generalized anxiety disorder       cetirizine 10 MG tablet    zyrTEC    90 tablet    Take 1 tablet (10 mg) by mouth every evening    Allergic state, subsequent encounter       clobetasol 0.05 % ointment    TEMOVATE     APPLY TOPICALLY BID        cloNIDine 0.1 MG tablet    CATAPRES    60 tablet    Take 1 tablet (0.1 mg) by mouth At Bedtime Prn late withdrawal symptoms.    Opioid use  disorder, severe, dependence (H)       [employer] TRIPLE ANTIBIOTIC Oint     60 g    Externally apply topically 4 times daily    Allergic contact dermatitis due to adhesives       docusate sodium 100 MG capsule    DOK    60 capsule    TAKE 1 CAPSULE BY MOUTH TWICE DAILY AS NEEDED FOR CONSTIPATION    Constipation, unspecified constipation type       * FLUoxetine 20 MG capsule    PROzac    30 capsule    TAKE 1 CAPSULE BY MOUTH DAILY WITH FLUOXETINE 40MG FOR A TOTAL OF 60MG DAILY.    Moderate episode of recurrent major depressive disorder (H), Generalized anxiety disorder       * FLUoxetine 40 MG capsule    PROzac    30 capsule    TAKE 1 CAPSULE BY MOUTH DAILY WITH 20 MG CAPSULE OF FLUOXETINE TO EQUAL TOTAL DAILY DOSE OF 60MG    Moderate episode of recurrent major depressive disorder (H), Generalized anxiety disorder       * FLUoxetine 20 MG capsule    PROzac    30 capsule    TAKE 1 CAPSULE BY MOUTH DAILY WITH FLUOXETINE 40MG FOR A TOTAL OF 60MG DAILY    Moderate episode of recurrent major depressive disorder (H), Generalized anxiety disorder       fluticasone 50 MCG/ACT spray    FLONASE    1 Bottle    Spray 2 sprays into both nostrils daily    Opioid use disorder, severe, dependence (H)       gabapentin 300 MG capsule    NEURONTIN    90 capsule    Take 1 capsule (300 mg) by mouth 3 times daily    Opioid use disorder, severe, dependence (H)       hydrocortisone 2.5 % cream           hydrOXYzine 25 MG tablet    ATARAX    60 tablet    TAKE 2 TABLETS BY MOUTH NIGHTLY AS NEEDED FOR ANXIETY    Generalized anxiety disorder       ipratropium - albuterol 0.5 mg/2.5 mg/3 mL 0.5-2.5 (3) MG/3ML neb solution    DUONEB    90 vial    Take 1 vial (3 mLs) by nebulization every 6 hours as needed for shortness of breath / dyspnea or wheezing    Acute bronchitis with symptoms > 10 days       ketoconazole 2 % cream    NIZORAL          levocetirizine 5 MG tablet    XYZAL    30 tablet    Take 1 tablet (5 mg) by mouth every evening    Acute  seasonal allergic rhinitis due to pollen       Lidocaine 5 % Crea     45 g    Externally apply 1 Film topically 4 times daily as needed    Bilateral carpal tunnel syndrome       lidocaine 5 % ointment    XYLOCAINE    50 g    Apply topically 4 times daily as needed for moderate pain    Bilateral hand pain       loratadine 10 MG tablet    CLARITIN    30 tablet    Take 1 tablet (10 mg) by mouth daily as needed for allergies    Chronic nonseasonal allergic rhinitis due to pollen       olopatadine 0.1 % ophthalmic solution    PATANOL    5 mL    Place 1 drop into both eyes 2 times daily    Allergic conjunctivitis, bilateral       omeprazole 40 MG capsule    priLOSEC    90 capsule    TAKE 1 CAPSULE(40 MG) BY MOUTH DAILY 30 TO 60 MINUTES BEFORE A MEAL    Gastroesophageal reflux disease, esophagitis presence not specified       ondansetron 4 MG ODT tab    ZOFRAN-ODT    30 tablet    Take 1-2 tablets (4-8 mg) by mouth every 8 hours as needed for nausea    Migraine without status migrainosus, not intractable, unspecified migraine type       order for DME     1 each    Equipment being ordered: Left thumb brace    Extensor tenosynovitis of left wrist       polyethylene glycol powder    MIRALAX    510 g    Take 17 g (1 capful) by mouth daily    Drug-induced constipation       predniSONE 5 MG tablet    DELTASONE    90 tablet    4tab=20 mg daily x 7 days, 3tab=15 mg daily x 7 days, 2tab=10 mg daily x 7 days, 1 tab=5 mg daily    Positive DARON (antinuclear antibody), Arthralgia, unspecified joint       * traZODone 100 MG tablet    DESYREL    90 tablet    Take 1 tablet (100 mg) by mouth At Bedtime    Primary insomnia       * traZODone 100 MG tablet    DESYREL    90 tablet    Take 1-3 tablets (100-300 mg) by mouth nightly as needed for sleep    Primary insomnia       triamcinolone 0.1 % ointment    KENALOG    80 g    Apply sparingly to affected area on back two times daily til clear.    Rash       valACYclovir 1000 mg tablet    VALTREX     8 tablet    Take 1 tablet (1,000 mg) by mouth 2 times daily At the onset of skin lesions as needed    Recurrent cold sores       varenicline 1 MG tablet    CHANTIX    56 tablet    Take 1 tablet (1 mg) by mouth 2 times daily    Tobacco abuse, Encounter for smoking cessation counseling       ZOLMitriptan 5 MG tablet    ZOMIG    12 tablet    TAKE 1 TABLET BY MOUTH AT ONSET OF HEADACHE FOR MIGRAINE. MAY REPEAT DOSE IN 2 HOURS. DO NOT EXCEED 10MG IN 24 HOURS    Migraine without aura and without status migrainosus, not intractable       * Notice:  This list has 5 medication(s) that are the same as other medications prescribed for you. Read the directions carefully, and ask your doctor or other care provider to review them with you.

## 2018-11-06 NOTE — PROGRESS NOTES
SUBJECTIVE:                                                    BUPRENORPHINE FOLLOW UP:    Coretta Tovar is a 48 year old female who presents to clinic today for follow up of Buprenorphine.      Date of last visit:  10/2/2018    [place] [facility] Data Base Reviewed:    YES;     10/12/18 Suboxone 8mg tab #60   10/30/18 Gabapentin  300mg #90    Brief History:   Hx of multiple surgeries (>15).   Since age 14  9 surgeries on right foot in past 15months for cpmplex fx ankle and foot.  C spine fusion many years ago.   Hx migraine headache.  S/p hysterectomy, tonsillectomy, splenectomy, ear surgeries.  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  Feels this is a better fit for her.  Working second shift.   Seen recently by rheumatology.  Thought to have either seronegative rheumatoid arthritis or DARON associated autoimmune connective tissue disease.  Prednisone taper recommended.  Has not started for unclear reason.  Struggling with extremity pain and multiple joint pain.    Smoking about PPD.  Trying to quit.  Is taking Chantix.         Status since last visit: Since last visit patient has been:stable    Intensity:     There has been: mild intermittent craving    Suboxone Dose: adequate    Progression of Symptoms/Precipitating Factors:     Cues to use and relapse triggers:Pain, Anxiety and Depression    Trigger have been:  moderate    Accompanying Signs & Symptoms:    Side Effects: none    Sobriety:     Status: No substance use     Drug Screen: obtained    Alleviating factors/Other Therapies Tried:    Contact with sponsor has been: no sponsor    Family and support system has been: neutral    Patient has been going to recovery meetings: not at all    Recovery program has been : minimal    Patient has been participating in professional counseling /therapy: NO    Patient is following with psychiatry: NO    Patient has established  PCP: YES        Social History     Social History Narrative    Lives with dad and brother in [place].      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      Daughter with recent DWI (she has child).        Patient Active Problem List    Diagnosis Date Noted     History of cocaine dependence 09/07/2017     Priority: Medium     Moderate episode of recurrent major depressive disorder (H) 03/22/2017     Priority: Medium     Generalized anxiety disorder 03/22/2017     Priority: Medium     Protein-calorie malnutrition (H) 03/22/2017     Priority: Medium     Primary insomnia 03/22/2017     Priority: Medium     Vertigo 03/14/2017     Priority: Medium     Cervical pain 03/08/2017     Priority: Medium     Nutritional disorder 11/21/2016     Priority: Medium     Disorder of stomach 11/20/2016     Priority: Medium     Overview:   Per Biopsy obtained on EGD 11/22/2016. Reactive gastropathy was quoted to be frequently associated with ongoing non-inflammatory type mucosal injury due to a chemical type of injury; this may be due to ingestion of non-steroidal anti-inflammatory drugs, aspirin, excess alcohol, or corticosteroids.        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     Priority: Medium     Chemical dependency (H) 02/24/2015     Priority: Medium     Insomnia 02/24/2015     Priority: Medium     S/P splenectomy 01/13/2015     Priority: Medium     Migraines      Priority: Medium     Idiopathic thrombocytopenic purpura (HCC)      Priority: Medium     s/p splenectomy       Tobacco abuse 05/30/2014     Priority: Medium     Immune thrombocytopenic purpura (H) 01/14/2008     Priority: Medium     Personality disorder (H) 01/14/2008     Priority: Medium     Overview:   cluster B traits  borderline, antisocial       Nondependent cannabis abuse, episodic 01/14/2008     Priority: Medium     Mixed, or nondependent drug abuse 01/26/2007     Priority: Medium      Overview:   recurrent issues with opioids.  History of crack cocaine abuse.       Gastroesophageal reflux disease 12/20/2006     Priority: Medium     Herpetic gingivostomatitis 12/20/2006     Priority: Medium     Muscle pain 12/20/2006     Priority: Medium     Atopic rhinitis 12/20/2006     Priority: Medium     Abnormal weight loss 12/20/2006     Priority: Medium       Problem list and histories reviewed & adjusted, as indicated.  Additional history: as documented        Current Outpatient Prescriptions on File Prior to Visit:  acyclovir (ZOVIRAX) 5 % ointment Apply 1 g topically as needed   albuterol (PROAIR HFA/PROVENTIL HFA/VENTOLIN HFA) 108 (90 Base) MCG/ACT Inhaler Inhale 1-2 puffs into the lungs every 4 hours as needed for shortness of breath / dyspnea or wheezing   betamethasone valerate (VALISONE) 0.1 % ointment Apply once a day for 2 weeks as directed   buprenorphine-naloxone (SUBOXONE) 8-2 MG SUBL sublingual tablet Place 1 tablet under the tongue 2 times daily   BusPIRone HCl 30 MG TABS TAKE 1 TABLET BY MOUTH TWICE DAILY   cetirizine (ZYRTEC) 10 MG tablet Take 1 tablet (10 mg) by mouth every evening   clobetasol (TEMOVATE) 0.05 % ointment APPLY TOPICALLY BID   cloNIDine (CATAPRES) 0.1 MG tablet Take 1 tablet (0.1 mg) by mouth At Bedtime Prn late withdrawal symptoms.   docusate sodium (DOK) 100 MG capsule TAKE 1 CAPSULE BY MOUTH TWICE DAILY AS NEEDED FOR CONSTIPATION   FLUoxetine (PROZAC) 20 MG capsule TAKE 1 CAPSULE BY MOUTH DAILY WITH FLUOXETINE 40MG FOR A TOTAL OF 60MG DAILY   FLUoxetine (PROZAC) 20 MG capsule TAKE 1 CAPSULE BY MOUTH DAILY WITH FLUOXETINE 40MG FOR A TOTAL OF 60MG DAILY.   FLUoxetine (PROZAC) 40 MG capsule TAKE 1 CAPSULE BY MOUTH DAILY WITH 20 MG CAPSULE OF FLUOXETINE TO EQUAL TOTAL DAILY DOSE OF 60MG   fluticasone (FLONASE) 50 MCG/ACT spray Spray 2 sprays into both nostrils daily   gabapentin (NEURONTIN) 300 MG capsule Take 1 capsule (300 mg) by mouth 3 times daily   hydrocortisone 2.5 %  cream    hydrOXYzine (ATARAX) 25 MG tablet TAKE 2 TABLETS BY MOUTH NIGHTLY AS NEEDED FOR ANXIETY   ipratropium - albuterol 0.5 mg/2.5 mg/3 mL (DUONEB) 0.5-2.5 (3) MG/3ML neb solution Take 1 vial (3 mLs) by nebulization every 6 hours as needed for shortness of breath / dyspnea or wheezing   ISOtretinoin (ACCUTANE PO)    ketoconazole (NIZORAL) 2 % cream    levocetirizine (XYZAL) 5 MG tablet Take 1 tablet (5 mg) by mouth every evening   lidocaine (XYLOCAINE) 5 % ointment Apply topically 4 times daily as needed for moderate pain   Lidocaine 5 % CREA Externally apply 1 Film topically 4 times daily as needed   loratadine (CLARITIN) 10 MG tablet Take 1 tablet (10 mg) by mouth daily as needed for allergies   mirtazapine (REMERON) 15 MG tablet TAKE 1 TABLET BY MOUTH AT BEDTIME. CALL CLINIC IF TOO SEDATED DURING THE DAY.   Neomycin-Bacitracin-Polymyxin ([employer] TRIPLE ANTIBIOTIC) OINT Externally apply topically 4 times daily   olopatadine (PATANOL) 0.1 % ophthalmic solution Place 1 drop into both eyes 2 times daily   omeprazole (PRILOSEC) 40 MG capsule TAKE 1 CAPSULE(40 MG) BY MOUTH DAILY 30 TO 60 MINUTES BEFORE A MEAL   ondansetron (ZOFRAN-ODT) 4 MG ODT tab Take 1-2 tablets (4-8 mg) by mouth every 8 hours as needed for nausea   order for DME Equipment being ordered: Left thumb brace   oxyCODONE-acetaminophen (PERCOCET) 5-325 MG per tablet Take 1 tablet by mouth every 6 hours as needed for pain (Patient not taking: Reported on 10/29/2018)   polyethylene glycol (MIRALAX) powder Take 17 g (1 capful) by mouth daily   predniSONE (DELTASONE) 5 MG tablet 4tab=20 mg daily x 7 days, 3tab=15 mg daily x 7 days, 2tab=10 mg daily x 7 days, 1 tab=5 mg daily   traZODone (DESYREL) 100 MG tablet Take 1-3 tablets (100-300 mg) by mouth nightly as needed for sleep   traZODone (DESYREL) 100 MG tablet Take 1 tablet (100 mg) by mouth At Bedtime   triamcinolone (KENALOG) 0.1 % ointment Apply sparingly to affected area on back two times daily til clear.    valACYclovir (VALTREX) 1000 mg tablet Take 1 tablet (1,000 mg) by mouth 2 times daily At the onset of skin lesions as needed   varenicline (CHANTIX) 1 MG tablet Take 1 tablet (1 mg) by mouth 2 times daily   ZOLMitriptan (ZOMIG) 5 MG tablet TAKE 1 TABLET BY MOUTH AT ONSET OF HEADACHE FOR MIGRAINE. MAY REPEAT DOSE IN 2 HOURS. DO NOT EXCEED 10MG IN 24 HOURS     Current Facility-Administered Medications on File Prior to Visit:  Lidocaine 1 % injection 9 mL   sodium bicarbonate 8.4 % injection 1 mEq       Allergies   Allergen Reactions     Acetaminophen-Codeine Hives     Amoxicillin-Pot Clavulanate Nausea and Vomiting     Augmentin Nausea and Vomiting and Hives     Barbiturates      Bentyl [Dicyclomine] Other (See Comments)     dizziness     Compazine      Lock-jaw     Liquid Adhesive      Other reaction(s): Other, see comments  blisters     No Clinical Screening - See Comments      PN: LW Reaction: blisters  PN: LW FI1: nka  PN: LW CM1: CONTRAST- nka Reaction :     Nsaids      D/t ITP     Prochlorperazine      Other reaction(s): Edema     Propoxyphene N-Apap Hives     Sulfa Drugs      Tramadol Hcl Hives            Adhesive Tape Rash         REVIEW OF SYSTEMS:  General: No acute withdrawal symptoms.  No recent infections or fever  Resp: No coughing, wheezing or shortness of breath  CV: No chest pains or palpitations  GI: No nausea, vomiting, abdominal pain, diarrhea, constipation  : No urinary frequency or dysuria,     Musculoskeletal: No significant muscle or joint pains, No edema  Neurologic: No numbness, tingling, weakness, problems with balance or coordination  Psychiatric: No acute concerns  Skin: No rashes    OBJECTIVE:    PHYSICAL EXAM:  There were no vitals taken for this visit.    GENERAL APPEARANCE:  alert, comfortable appearing  EYES:Eyes grossly normal to inspection  NEURO:  Gait normal.  No tremor. Coordination intact.   MENTAL STATUS EXAM:  Appearance/Behavior: No appearant distress  Speech:  Normal  Mood/Affect: normal affect  Insight: Adequate      Results for orders placed or performed in visit on 10/29/18   DANNIE antibody panel   Result Value Ref Range    RNP Antibody IgG <0.2 0.0 - 0.9 AI    Cox DANNIE Antibody IgG <0.2 0.0 - 0.9 AI    SSA (Ro) (DANNIE) Antibody, IgG <0.2 0.0 - 0.9 AI    SSB (La) (DANNIE) Antibody, IgG <0.2 0.0 - 0.9 AI    Scleroderma Antibody Scl-70 DANNIE IgG <0.2 0.0 - 0.9 AI   Complement C3   Result Value Ref Range    Complement C3 91 76 - 169 mg/dL   Complement C4   Result Value Ref Range    Complement C4 19 15 - 50 mg/dL   ESR   Result Value Ref Range    Sed Rate 26 (H) 0 - 20 mm/h   CRP inflammation   Result Value Ref Range    CRP Inflammation <2.9 0.0 - 8.0 mg/L   Hepatitis B core antibody   Result Value Ref Range    Hepatitis B Core Lorie Nonreactive NR^Nonreactive   Hepatitis B surface antigen   Result Value Ref Range    Hep B Surface Agn Nonreactive NR^Nonreactive   Hepatitis C antibody   Result Value Ref Range    Hepatitis C Antibody Nonreactive NR^Nonreactive   Comprehensive metabolic panel   Result Value Ref Range    Sodium 143 133 - 144 mmol/L    Potassium 3.4 3.4 - 5.3 mmol/L    Chloride 107 94 - 109 mmol/L    Carbon Dioxide 32 20 - 32 mmol/L    Anion Gap 4 3 - 14 mmol/L    Glucose 70 70 - 99 mg/dL    Urea Nitrogen 7 7 - 30 mg/dL    Creatinine 0.68 0.52 - 1.04 mg/dL    GFR Estimate >90 >60 mL/min/1.7m2    GFR Estimate If Black >90 >60 mL/min/1.7m2    Calcium 8.8 8.5 - 10.1 mg/dL    Bilirubin Total 0.2 0.2 - 1.3 mg/dL    Albumin 3.7 3.4 - 5.0 g/dL    Protein Total 7.5 6.8 - 8.8 g/dL    Alkaline Phosphatase 89 40 - 150 U/L    ALT 22 0 - 50 U/L    AST 21 0 - 45 U/L   CBC with platelets   Result Value Ref Range    WBC 7.1 4.0 - 11.0 10e9/L    RBC Count 4.01 3.8 - 5.2 10e12/L    Hemoglobin 11.7 11.7 - 15.7 g/dL    Hematocrit 35.9 35.0 - 47.0 %    MCV 90 78 - 100 fl    MCH 29.2 26.5 - 33.0 pg    MCHC 32.6 31.5 - 36.5 g/dL    RDW 15.5 (H) 10.0 - 15.0 %    Platelet Count 405 150 - 450  10e9/L   Quantiferon TB Gold Plus   Result Value Ref Range    Quantiferon-TB Gold Plus Result Negative NEG^Negative    TB1 Ag minus Nil Value 0.02 IU/mL    TB2 Ag minus Nil Value 0.00 IU/mL    Mitogen minus Nil Result 7.66 IU/mL    Nil Result 0.05 IU/mL           ASSESSMENT/PLAN:        (F11.20) Opioid use disorder, severe, dependence (H)  (primary encounter diagnosis)   Suboxone  8mg bid.  Rx #60     Try split dosing for better pain control  Safe storage reviewed.   Follow up 4 wk.    12/4/18 0900      Counseled the patient on the importance of having a recovery program in addition to Medication assisted treatment.  Components include having some type of sober network, avoiding isolating, having willingness  to change, avoiding triggers and managing cravings.  Strongly recommended abstaining from alcohol, benzodiazepines, THC, opioids and other drugs of abuse.       Opioid warning reviewed.  Risk of overdose following a period of abstinence due to decrease tolerance was discussed including risk of death.   Risk of overdose if using Suboxone with other substances particuarly benzodiazepines/alcohol was reviewed.           (F14.21) History of cocaine dependence (H)  Plan: no recent use.  Encourage ongoing sobriety      (F33.1) Moderate episode of recurrent major depressive disorder (H)  (F41.1) Generalized anxiety disorder  Plan: Recommendations as above.   Follow with PCP/psychiatry needed.  Encouraged to schedule.   Encouraged to schedule.   Encouraged mental health counseling.       (F17.200) Nicotine use disorder  Comment: not ready to quit  Plan: Encouraged Abstinence.  Increase risk of relapse with other substances with return to nicotine use discussed.  Risk of Ecig/Vaping also reviewed.    Continue Chantix.         Joint  pain.  Follow up with Rheumatology as scheduled.   Encouraged follow up and starting prednisone as recommended           ENCOUNTER FOR LONG TERM USE OF HIGH RISK MEDICATION   High Risk Drug  Monitoring?  YES   Drug being monitored: Buprenorphine   Reason for drug: Opioid Use Disorder   What is being monitored?: Dosage, Cravings, Trigger, side effects, and continued abstinence.      Opioid warning reviewed.  Risk of overdose following a period of abstinence due to decrease tolerance was discussed including risk of death.   Risk of overdose if using Suboxone with other substances particuarly benzodiazepines/alcohol was reviewed.        Jessy Brenner MD  [facility]  [phone]

## 2018-11-13 NOTE — LETTER
[facility]  [address] [place] [address]  [place] [address]  [phone]          November 13, 2018    RE:  Coretta ROB Tovar                                                                                                                                                       [address] [facility] [address]            To whom it may concern:    Coretta Tovar is under my professional care.  Please allow her to have water at her work station due to chronic dry mouth.      Sincerely,        Sarah Montemayor RN, CNP

## 2018-11-13 NOTE — TELEPHONE ENCOUNTER
Patient called RN hotline asking for a work letter/note.  Patient has false teeth and has dry mouth due to this, gum and mints do not help.  Her work will not let her have water bottle  at her work station unless she has a note. Can she have a note for [facility] [employer] stating she can have her water bottle at her work station?  She asked that this be faxed to pharmacy but advised patient they will likely not allow this, she agreed to  letter if it cannot be faxed to pharmacy.   Tita Brown RN

## 2018-11-27 NOTE — TELEPHONE ENCOUNTER
Called patient and gave OK for occupational health referral.   Pt stated that she will need the name of the doctor and location for referral.     Are there any occupational health providers within [place]? or should she find a place and provider and let us know?    Deedee Mott RN

## 2018-11-27 NOTE — TELEPHONE ENCOUNTER
Patient called and left VM on RN Hotline requesting that RN call back.     Called and spoke to patient.  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     Deedee Mott RN

## 2018-11-27 NOTE — TELEPHONE ENCOUNTER
[facility] does not have Occupational Health.  I think [facility] does.  Please have patient find name of provider and location and call us back for referral.    Sarah Montemayor, CNP

## 2018-11-28 NOTE — TELEPHONE ENCOUNTER
Called pt and informed the below note. She said she will us back with more information.  Rhiannon SILVA CMA

## 2018-12-03 NOTE — TELEPHONE ENCOUNTER
gabapentin (NEURONTIN) 300 MG capsule  Requested Prescriptions  Last Written Prescription Date:  10/2/18  Last Fill Quantity: 90,  # refills: 1   Last office visit: 11/6/2018 with prescribing provider:     Future Office Visit:   Next 5 appointments (look out 90 days)     Dec 04, 2018  8:00 AM CST   [facility]    [address] [facility] [address]   [phone]            Dec 27, 2018  9:30 AM CST   [facility] Visit with Nickie Diaz MD   [facility]    [address] [place] [id number] [phone]                    Pending Prescriptions Disp Refills     gabapentin (NEURONTIN) 300 MG capsule 90 capsule 1     Sig: Take 1 capsule (300 mg) by mouth 3 times daily    There is no refill protocol information for this order

## 2018-12-03 NOTE — TELEPHONE ENCOUNTER
"Patient called RN hotline asking for an antibiotic to be sent to pharmacy for a UTI, she stated   \"I know that's what it is\".  She has had urinary frequency for 3 days, she feels like she needs to urinate but when she goes there is little urine or sometimes none.  Denies burning or pain with urination.  Denies fever.  Patient would like to avoid coming in for visit, please advise.  FYI patient was seen in ED for unrelated issue on 12/1/18.    Tita Brown RN    "

## 2018-12-03 NOTE — MR AVS SNAPSHOT
After Visit Summary   12/3/2018    Coretta Tovar    MRN: [id number]           Patient Information     Date Of Birth          1970        Visit Information        Provider Department      12/3/2018 8:50 PM Divine Hector APRN [facility]        Today's Diagnoses     Acute cystitis without hematuria    -  1       Follow-ups after your visit        Your next 10 appointments already scheduled     Dec 04, 2018  8:00 AM CST   Return Visit with Jsesy Brenner MD   [facility] ([facility])    [address] [id number] [phone]            Dec 27, 2018  9:30 AM CST   Return Visit with Nickie Diaz MD   [facility] ([facility])    [address] [place] [id number] [phone]              Who to contact     If you have questions or need follow up information about today's clinic visit or your schedule please contact [facility] [place] directly at [phone].  Normal or non-critical lab and imaging results will be communicated to you by MyChart, letter or phone within 4 business days after the clinic has received the results. If you do not hear from us within 7 days, please contact the clinic through MyChart or phone. If you have a critical or abnormal lab result, we will notify you by phone as soon as possible.  Submit refill requests through [email or web address] or call your pharmacy and they will forward the refill request to us. Please allow 3 business days for your refill to be completed.          Additional Information About Your Visit        Care EveryWhere ID     This is your Care EveryWhere ID. This could be used by other organizations to access your [place] medical records  [id number]        Your Vitals Were     Pulse Temperature Respirations Pulse Oximetry Breastfeeding? BMI (Body Mass Index)    98 99.3  F (37.4  C) (Oral) 15 97% No 20.66 kg/m2       Blood  Pressure from Last 3 Encounters:   12/03/18 111/69   10/29/18 103/61   10/02/18 115/58    Weight from Last 3 Encounters:   12/03/18 144 lb (65.3 kg)   11/06/18 150 lb (68 kg)   10/29/18 145 lb (65.8 kg)              We Performed the Following     *UA reflex to Microscopic and Culture ([place] [facility] (except [place] and [place])     Urine Microscopic          Today's Medication Changes          These changes are accurate as of 12/3/18  9:35 PM.  If you have any questions, ask your nurse or doctor.               Start taking these medicines.        Dose/Directions    nitroFURantoin macrocrystal-monohydrate 100 MG capsule   Commonly known as:  MACROBID   Used for:  Acute cystitis without hematuria   Started by:  Divine Hector APRN CNP        Dose:  100 mg   Take 1 capsule (100 mg) by mouth 2 times daily   Quantity:  14 capsule   Refills:  0            Where to get your medicines      [facility] [address] & [facility]  [address] [address] [facility] [address]    Hours:  24-hours Phone:  [phone]     nitroFURantoin macrocrystal-monohydrate 100 MG capsule                Primary Care Provider Office Phone # Fax #    Sarah Wilkerson NINOSKA Montemayor [facility] [phone] [address] [place] [address]        Equal Access to Services     [facility] AH: Hadii aad ku hadasho Soomaali, waaxda luqadaha, qaybta kaalmada adeegyada, waxay antonio haymary alegre . So [facility] [phone].    ATENCIÓN: Si habla español, tiene a roberts disposición servicios gratuitos de asistencia lingüística. Sravanthi al [phone].    We comply with applicable federal civil rights laws and [place] laws. We do not discriminate on the basis of race, color, national origin, age, disability, sex, sexual orientation, or gender identity.            Thank you!     Thank you for choosing [facility]  for your care. Our goal is always to provide you with excellent care. Hearing back from our patients is one way we can continue to improve our services. Please take a few minutes to complete the written survey that you may receive in the mail after your visit with us. Thank you!             Your Updated Medication List - Protect others around you: Learn how to safely use, store and throw away your medicines at www.disposemymeds.org.          This list is accurate as of 12/3/18  9:35 PM.  Always use your most recent med list.                   Brand Name Dispense Instructions for use Diagnosis    acyclovir 5 % external ointment    ZOVIRAX    30 g    Apply 1 g topically as needed    Recurrent cold sores       albuterol 108 (90 Base) MCG/ACT inhaler    PROAIR HFA/PROVENTIL HFA/VENTOLIN HFA    18 g    Inhale 1-2 puffs into the lungs every 4 hours as needed for shortness of breath / dyspnea or wheezing    Mild intermittent reactive airway disease without complication       betamethasone valerate 0.1 % external ointment    VALISONE     Apply once a day for 2 weeks as directed        buprenorphine-naloxone 8-2 MG Subl sublingual tablet    SUBOXONE    60 each    Place 1 tablet under the tongue 2 times daily    Opioid use disorder, severe, dependence (H)       busPIRone HCl 30 MG tablet    BUSPAR    60 tablet    TAKE 1 TABLET BY MOUTH TWICE DAILY    Generalized anxiety disorder       cetirizine 10 MG tablet    zyrTEC    90 tablet    Take 1 tablet (10 mg) by mouth every evening    Allergic state, subsequent encounter       clobetasol 0.05 % external ointment    TEMOVATE     APPLY TOPICALLY BID        cloNIDine 0.1 MG tablet    CATAPRES    60 tablet    Take 1 tablet (0.1 mg) by mouth At Bedtime Prn late withdrawal symptoms.    Opioid use disorder, severe, dependence (H)       [employer] TRIPLE ANTIBIOTIC Oint     60 g    Externally apply topically 4 times daily    Allergic contact dermatitis due to adhesives        docusate sodium 100 MG capsule    DOK    60 capsule    TAKE 1 CAPSULE BY MOUTH TWICE DAILY AS NEEDED FOR CONSTIPATION    Constipation, unspecified constipation type       * FLUoxetine 40 MG capsule    PROzac    30 capsule    TAKE 1 CAPSULE BY MOUTH DAILY WITH 20 MG CAPSULE OF FLUOXETINE TO EQUAL TOTAL DAILY DOSE OF 60MG    Moderate episode of recurrent major depressive disorder (H), Generalized anxiety disorder       * FLUoxetine 20 MG capsule    PROzac    30 capsule    TAKE 1 CAPSULE BY MOUTH DAILY WITH FLUOXETINE 40MG FOR A TOTAL OF 60MG DAILY    Moderate episode of recurrent major depressive disorder (H), Generalized anxiety disorder       fluticasone 50 MCG/ACT nasal spray    FLONASE    1 Bottle    Spray 2 sprays into both nostrils daily    Opioid use disorder, severe, dependence (H)       gabapentin 300 MG capsule    NEURONTIN    90 capsule    Take 1 capsule (300 mg) by mouth 3 times daily    Opioid use disorder, severe, dependence (H)       hydrocortisone 2.5 % cream           hydrOXYzine 25 MG tablet    ATARAX    60 tablet    TAKE 2 TABLETS BY MOUTH NIGHTLY AS NEEDED FOR ANXIETY    Generalized anxiety disorder       ipratropium - albuterol 0.5 mg/2.5 mg/3 mL 0.5-2.5 (3) MG/3ML neb solution    DUONEB    90 vial    Take 1 vial (3 mLs) by nebulization every 6 hours as needed for shortness of breath / dyspnea or wheezing    Acute bronchitis with symptoms > 10 days       ketoconazole 2 % external cream    NIZORAL          levocetirizine 5 MG tablet    XYZAL    30 tablet    Take 1 tablet (5 mg) by mouth every evening    Acute seasonal allergic rhinitis due to pollen       Lidocaine 5 % Crea     45 g    Externally apply 1 Film topically 4 times daily as needed    Bilateral carpal tunnel syndrome       lidocaine 5 % external ointment    XYLOCAINE    50 g    Apply topically 4 times daily as needed for moderate pain    Bilateral hand pain       loratadine 10 MG tablet    CLARITIN    30 tablet    Take 1 tablet (10 mg)  by mouth daily as needed for allergies    Chronic nonseasonal allergic rhinitis due to pollen       nitroFURantoin macrocrystal-monohydrate 100 MG capsule    MACROBID    14 capsule    Take 1 capsule (100 mg) by mouth 2 times daily    Acute cystitis without hematuria       olopatadine 0.1 % ophthalmic solution    PATANOL    5 mL    Place 1 drop into both eyes 2 times daily    Allergic conjunctivitis, bilateral       omeprazole 40 MG DR capsule    priLOSEC    90 capsule    TAKE 1 CAPSULE(40 MG) BY MOUTH DAILY 30 TO 60 MINUTES BEFORE A MEAL    Gastroesophageal reflux disease, esophagitis presence not specified       ondansetron 4 MG ODT tab    ZOFRAN-ODT    30 tablet    Take 1-2 tablets (4-8 mg) by mouth every 8 hours as needed for nausea    Migraine without status migrainosus, not intractable, unspecified migraine type       order for DME     1 each    Equipment being ordered: Left thumb brace    Extensor tenosynovitis of left wrist       polyethylene glycol powder    MIRALAX    510 g    Take 17 g (1 capful) by mouth daily    Drug-induced constipation       predniSONE 5 MG tablet    DELTASONE    90 tablet    4tab=20 mg daily x 7 days, 3tab=15 mg daily x 7 days, 2tab=10 mg daily x 7 days, 1 tab=5 mg daily    Positive DARON (antinuclear antibody), Arthralgia, unspecified joint       * traZODone 100 MG tablet    DESYREL    90 tablet    Take 1 tablet (100 mg) by mouth At Bedtime    Primary insomnia       * traZODone 100 MG tablet    DESYREL    90 tablet    Take 1-3 tablets (100-300 mg) by mouth nightly as needed for sleep    Primary insomnia       triamcinolone 0.1 % external ointment    KENALOG    80 g    Apply sparingly to affected area on back two times daily til clear.    Rash       valACYclovir 1000 mg tablet    VALTREX    8 tablet    Take 1 tablet (1,000 mg) by mouth 2 times daily At the onset of skin lesions as needed    Recurrent cold sores       varenicline 1 MG tablet    CHANTIX    56 tablet    Take 1 tablet (1  mg) by mouth 2 times daily    Tobacco abuse, Encounter for smoking cessation counseling       ZOLMitriptan 5 MG tablet    ZOMIG    12 tablet    TAKE 1 TABLET BY MOUTH AT ONSET OF HEADACHE FOR MIGRAINE. MAY REPEAT DOSE IN 2 HOURS. DO NOT EXCEED 10MG IN 24 HOURS    Migraine without aura and without status migrainosus, not intractable       * Notice:  This list has 4 medication(s) that are the same as other medications prescribed for you. Read the directions carefully, and ask your doctor or other care provider to review them with you.

## 2018-12-03 NOTE — LETTER
December 5, 2018    Coretta Tovar  [address] [address]        Dear Coretta,    Urine did not grow any specific infection, recommend re-evaluation if you are still is having symptoms .     Makenna Bledsoe M.D.     Results for orders placed or performed in visit on 12/03/18   *UA reflex to Microscopic and Culture ([facility] [facility] (except [place] and [place])   Result Value Ref Range    Color Urine Yellow     Appearance Urine Clear     Glucose Urine Negative NEG^Negative mg/dL    Bilirubin Urine Negative NEG^Negative    Ketones Urine Negative NEG^Negative mg/dL    Specific Gravity Urine <=1.005 1.003 - 1.035    Blood Urine Large (A) NEG^Negative    pH Urine 6.0 5.0 - 7.0 pH    Protein Albumin Urine Negative NEG^Negative mg/dL    Urobilinogen Urine 0.2 0.2 - 1.0 EU/dL    Nitrite Urine Negative NEG^Negative    Leukocyte Esterase Urine Small (A) NEG^Negative    Source Midstream Urine    Urine Microscopic   Result Value Ref Range    WBC Urine 5-10 (A) OTO5^0 - 5 /HPF    RBC Urine O - 2 OTO2^O - 2 /HPF    Squamous Epithelial /LPF Urine Few FEW^Few /LPF    Bacteria Urine Few (A) NEG^Negative /HPF   Urine Culture Aerobic Bacterial   Result Value Ref Range    Specimen Description Midstream Urine     Culture Micro <10,000 colonies/mL  mixed urogenital logan

## 2018-12-03 NOTE — TELEPHONE ENCOUNTER
Patient called and informed.  She does not have a [facility] account.  I gave her the phone number to call to see if she could get set up so that she can request an evisit.  Patient states that she will probably just go to urgent care.  Patient also expressed concern because she said that she is restricted to Sarah Montemayor CNP and she would need to give an okay to get the medication filled?  Patient stated that she will just have to pay cash.  Olamide Baltazar,

## 2018-12-04 NOTE — TELEPHONE ENCOUNTER
Called for another referral in different encounter this telephone encounter was not taken care of and patient says this is very important and needs to be addressed today.     Giovanna HOOD CMA ([facility])

## 2018-12-04 NOTE — PROGRESS NOTES
SUBJECTIVE:   Coretta Tovar is a 48 year old female who  presents today for a possible UTI. Symptoms of back pain, frequency urgency  have been going on for 3day(s).  Hematuria no.  sudden onsetand mild.  There is no history of fever, chills, nausea or vomiting.  No history of vaginal  discharge. This patient does not have a history of urinary tract infections. Patient denies rigors, flank pain, temperature > 101 degrees F. and Vomiting, significant nausea or diarrhea or vaginal discharge, vaginal odor and vaginal itching     Past Medical History:   Diagnosis Date     Endometriosis      Mary-mary disease      ITP (idiopathic thrombocytopaenic purpura) 1994    s/p splenectomy     Migraines      Current Outpatient Prescriptions   Medication Sig Dispense Refill     acyclovir (ZOVIRAX) 5 % ointment Apply 1 g topically as needed 30 g 11     albuterol (PROAIR HFA/PROVENTIL HFA/VENTOLIN HFA) 108 (90 Base) MCG/ACT Inhaler Inhale 1-2 puffs into the lungs every 4 hours as needed for shortness of breath / dyspnea or wheezing 18 g 3     betamethasone valerate (VALISONE) 0.1 % ointment Apply once a day for 2 weeks as directed       buprenorphine-naloxone (SUBOXONE) 8-2 MG SUBL sublingual tablet Place 1 tablet under the tongue 2 times daily 60 each 0     BusPIRone HCl 30 MG TABS TAKE 1 TABLET BY MOUTH TWICE DAILY 60 tablet 5     cetirizine (ZYRTEC) 10 MG tablet Take 1 tablet (10 mg) by mouth every evening 90 tablet 1     clobetasol (TEMOVATE) 0.05 % ointment APPLY TOPICALLY BID  1     cloNIDine (CATAPRES) 0.1 MG tablet Take 1 tablet (0.1 mg) by mouth At Bedtime Prn late withdrawal symptoms. 60 tablet 3     docusate sodium (DOK) 100 MG capsule TAKE 1 CAPSULE BY MOUTH TWICE DAILY AS NEEDED FOR CONSTIPATION 60 capsule 3     FLUoxetine (PROZAC) 20 MG capsule TAKE 1 CAPSULE BY MOUTH DAILY WITH FLUOXETINE 40MG FOR A TOTAL OF 60MG DAILY 30 capsule 0     FLUoxetine (PROZAC) 40 MG capsule TAKE 1 CAPSULE BY MOUTH DAILY WITH 20 MG  CAPSULE OF FLUOXETINE TO EQUAL TOTAL DAILY DOSE OF 60MG 30 capsule 1     fluticasone (FLONASE) 50 MCG/ACT spray Spray 2 sprays into both nostrils daily 1 Bottle 11     gabapentin (NEURONTIN) 300 MG capsule Take 1 capsule (300 mg) by mouth 3 times daily 90 capsule 1     hydrocortisone 2.5 % cream        hydrOXYzine (ATARAX) 25 MG tablet TAKE 2 TABLETS BY MOUTH NIGHTLY AS NEEDED FOR ANXIETY 60 tablet 3     ipratropium - albuterol 0.5 mg/2.5 mg/3 mL (DUONEB) 0.5-2.5 (3) MG/3ML neb solution Take 1 vial (3 mLs) by nebulization every 6 hours as needed for shortness of breath / dyspnea or wheezing 90 vial 3     ISOtretinoin (ACCUTANE PO)        ketoconazole (NIZORAL) 2 % cream        levocetirizine (XYZAL) 5 MG tablet Take 1 tablet (5 mg) by mouth every evening 30 tablet 11     lidocaine (XYLOCAINE) 5 % ointment Apply topically 4 times daily as needed for moderate pain 50 g 1     Lidocaine 5 % CREA Externally apply 1 Film topically 4 times daily as needed 45 g 3     loratadine (CLARITIN) 10 MG tablet Take 1 tablet (10 mg) by mouth daily as needed for allergies 30 tablet 11     Neomycin-Bacitracin-Polymyxin ([employer] TRIPLE ANTIBIOTIC) OINT Externally apply topically 4 times daily 60 g 3     olopatadine (PATANOL) 0.1 % ophthalmic solution Place 1 drop into both eyes 2 times daily 5 mL 3     omeprazole (PRILOSEC) 40 MG capsule TAKE 1 CAPSULE(40 MG) BY MOUTH DAILY 30 TO 60 MINUTES BEFORE A MEAL 90 capsule 3     ondansetron (ZOFRAN-ODT) 4 MG ODT tab Take 1-2 tablets (4-8 mg) by mouth every 8 hours as needed for nausea 30 tablet 1     order for DME Equipment being ordered: Left thumb brace 1 each 0     polyethylene glycol (MIRALAX) powder Take 17 g (1 capful) by mouth daily 510 g 3     predniSONE (DELTASONE) 5 MG tablet 4tab=20 mg daily x 7 days, 3tab=15 mg daily x 7 days, 2tab=10 mg daily x 7 days, 1 tab=5 mg daily 90 tablet 2     traZODone (DESYREL) 100 MG tablet Take 1-3 tablets (100-300 mg) by mouth nightly as needed for sleep  90 tablet 0     traZODone (DESYREL) 100 MG tablet Take 1 tablet (100 mg) by mouth At Bedtime 90 tablet 1     triamcinolone (KENALOG) 0.1 % ointment Apply sparingly to affected area on back two times daily til clear. 80 g 0     valACYclovir (VALTREX) 1000 mg tablet Take 1 tablet (1,000 mg) by mouth 2 times daily At the onset of skin lesions as needed 8 tablet 11     varenicline (CHANTIX) 1 MG tablet Take 1 tablet (1 mg) by mouth 2 times daily 56 tablet 1     ZOLMitriptan (ZOMIG) 5 MG tablet TAKE 1 TABLET BY MOUTH AT ONSET OF HEADACHE FOR MIGRAINE. MAY REPEAT DOSE IN 2 HOURS. DO NOT EXCEED 10MG IN 24 HOURS 12 tablet 4     Social History   Substance Use Topics     Smoking status: Current Every Day Smoker     Packs/day: 0.50     Years: 15.00     Types: Cigarettes     Last attempt to quit: 11/6/2015     Smokeless tobacco: Never Used      Comment: 4/17/18     Alcohol use Yes      Comment: Drinks once in 4 - 5 months       ROS:   CONSTITUTIONAL:NEGATIVE for fever, chills, change in weight  INTEGUMENTARY/SKIN: NEGATIVE for worrisome rashes, moles or lesions  EYES: NEGATIVE for vision changes or irritation  ENT/MOUTH: NEGATIVE for ear, mouth and throat problems  RESP:NEGATIVE for significant cough or SOB  CV: NEGATIVE for chest pain, palpitations or peripheral edema  GI: NEGATIVE for nausea, abdominal pain, heartburn, or change in bowel habits  : frequency, urgency  MUSCULOSKELETAL: POSITIVE low back pain  NEURO: POSITIVE for HX headaches  ENDOCRINE: NEGATIVE for temperature intolerance, skin/hair changes  HEME/ALLERGY/IMMUNE: NEGATIVE for bleeding problems  PSYCHIATRIC: NEGATIVE for changes in mood or affect    OBJECTIVE:  /69  Pulse 98  Temp 99.3  F (37.4  C) (Oral)  Resp 15  Wt 144 lb (65.3 kg)  SpO2 97%  Breastfeeding? No  BMI 20.66 kg/m2  GENERAL APPEARANCE:  alert and no distress  RESP: lungs clear to auscultation - no rales, rhonchi or wheezes  CV: regular rates and rhythm, normal S1 S2, no murmur  noted  ABDOMEN:  soft, nontender, no HSM or masses and bowel sounds normal  BACK: No CVA tenderness  SKIN: no suspicious lesions or rashes    ASSESSMENT:   (N30.00) Acute cystitis without hematuria  (primary encounter diagnosis)      PLAN:  Macrobid BID X 7 days  Drink plenty of fluids.  Prevention and treatment of UTI's discussed.Signs and symptoms of pyelonephritis mentioned.  Follow up with primary care physician if not improving      NINOSKA Faulkner CNP

## 2018-12-04 NOTE — TELEPHONE ENCOUNTER
Okay for referral to Urgent Care provider.  Please check with referrals.  Why do I need to prescribe medication ordered by another provider?    Sarah Montemayor, CNP

## 2018-12-04 NOTE — NURSING NOTE
"Chief Complaint   Patient presents with     Urinary Problem     pain x 3 days       Initial /69  Pulse 98  Temp 99.3  F (37.4  C) (Oral)  Resp 15  Wt 144 lb (65.3 kg)  SpO2 97%  Breastfeeding? No  BMI 20.66 kg/m2 Estimated body mass index is 20.66 kg/(m^2) as calculated from the following:    Height as of 10/29/18: 5' 10\" (1.778 m).    Weight as of this encounter: 144 lb (65.3 kg).  Medication Reconciliation: complete  Megan Paul MA    "
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
## 2018-12-04 NOTE — TELEPHONE ENCOUNTER
Patient called RN hotline stating she was seen at  yesterday evening but she is restricted and will need referral for visit to be covered.  She was also prescribed Macrobid and stated PCP needs to place order.   Please advise   Tita Brown RN

## 2018-12-04 NOTE — TELEPHONE ENCOUNTER
Called patient and patient was at work and will call back.     Giovanna HOOD CMA ([facility])

## 2018-12-04 NOTE — TELEPHONE ENCOUNTER
Patient called RN hotline wondering why she is not able to get prescription still, advised we are waiting for insurance approval.  She would like prescription called in by PCP so she can get this now.    Huddled with Sarah Montemayor- okay to resend Macrobid.   Prescription resent, patient notified.     Tita Brown RN

## 2018-12-05 NOTE — TELEPHONE ENCOUNTER
See phone encounter from yesterday   Routing to referrals dept to please advise patient    Val Teran RN

## 2018-12-05 NOTE — TELEPHONE ENCOUNTER
Patient left message on RN hotline stating she needs new referral for Dr. Brenner.  Medications were prescribed by provider but per pharmacy this provider is not approved.  Noted last referral for addiction medicine was done on 10/31/17.  New referral pending.   Tita Brown RN

## 2018-12-05 NOTE — TELEPHONE ENCOUNTER
Patient called and left  on RN Hotline.  Patient states she just got off phone with restricted medical plan.  States that we need to call insurance to add Dr. Brenner in order to get medications and needs a referral in tomorrow.  Insurance number is [phone].    Deedee Mott RN

## 2018-12-05 NOTE — TELEPHONE ENCOUNTER
Refill for: Gabapentin    Last Appointment: 12/04/18    Next Appointment: 1/8/19    No Shows/Cancellations since last appointment:  none    Last Refill in Epic (date and amount/how many days):   gabapentin (NEURONTIN) 300 MG capsule 90 capsule 1 10/2/2018  --   Sig - Route: Take 1 capsule (300 mg) by mouth 3 times daily - Oral   Class: Local Print   Order: [id number]         Most Recent UDS results: 12/4/18 POS for TCA's and buprenorphine     reviewed and summarized below:     12/04/2018 4  12/04/2018 Oxycodone-Acetaminophen 5-325 12 3 Ei Bur [id number] [facility] ([id number]) 0 30.00 MME Private Pay [place]  11/08/2018 1  11/06/2018 Buprenorphin-Naloxon 8-2 MG SL 60 30 Ei Bur [id number] [facility] ([id number]) 0 16.00 MG Comm Ins [place]  10/30/2018 1  10/02/2018 Gabapentin 300 MG Capsule 90 30 Ei Bur [id number] [facility] (77

## 2018-12-05 NOTE — TELEPHONE ENCOUNTER
I filled out the form for [employer]. No signature is needed. And sent it off yesterday. It was received per right fax. Should be ready to go.

## 2018-12-05 NOTE — TELEPHONE ENCOUNTER
Patient called RN Hotline again.   Pt is requesting that referral be faxed with Dr. Brenner's name on it.   Advised her that this referral was sent yesterday and referral does have Dr. Brenner's name on the referral.   Pt is continuing to argue stating that her insurance does not have that referral.   States that because Dr. Brenner is not on her list she cannot get her medicaitons.  Pt is requesting that referral be faxed again and call Restricted Insurance # at [phone].    Attempted to call patient's Restricted Insurance.   Office was closed.   Left  for a return call to RN Hotline [phone].    Deedee Mott RN

## 2018-12-06 NOTE — TELEPHONE ENCOUNTER
The referral for Dr. Brenner has been sent to [facility] restricted program. It had lapsed. It is an ongoing referral and must be renewed yearly. I tried calling patient to let her know referral was in but cell phone went to busy.     Thank you.

## 2018-12-06 NOTE — TELEPHONE ENCOUNTER
Multiple attempts to contact patient regarding Occupational pharmacy patient was to call back. Closing encounter. Chula Lara MA

## 2018-12-06 NOTE — TELEPHONE ENCOUNTER
Christine Lopez is a 79 year old female   Chief Complaint   Patient presents with   • Cancer     Rectal cancer        Denies Latex allergy or sensitivity.    Medication verified and med list updated.  PCP and Pharmacy verified.    History   Smoking Status   • Never Smoker   Smokeless Tobacco   • Never Used      Patient asked for update today- left detailed voice mail message advising that message was left for insurance however the voicemail stated this could take 2 days.  Voice mail message was the only option when calling insurance so we are unable to give referral information until they call back.  Advised patient to call back RN hotline if any further questions.    Tita Brown RN

## 2018-12-06 NOTE — TELEPHONE ENCOUNTER
Patient called stating that she spoke with the insurance and they told her she should have referral called in instead for faster service.  Patient has suboxone and robaxin but is not able to get these without referral.  Patient is worried she will get sick if she does not take these medications.  She would like a call back today as she is waiting.     Phone number listed below as fax is the insurance Phone number ([phone]).    Called number and waited on hold then call was transferred to voicemail.  Left message asking that referral be processed and to call back RN hotline at [phone].    Tita Brown RN
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
## 2018-12-12 NOTE — TELEPHONE ENCOUNTER
Patient notified that her insurance has approved the referral for her to see Dr. Brenner  Patient verbalized understanding.    Val Teran RN

## 2019-01-01 ENCOUNTER — TELEPHONE (OUTPATIENT)
Dept: INTERNAL MEDICINE | Facility: CLINIC | Age: 49
End: 2019-01-01

## 2019-01-01 ENCOUNTER — OFFICE VISIT (OUTPATIENT)
Dept: OTOLARYNGOLOGY | Facility: CLINIC | Age: 49
End: 2019-01-01
Payer: COMMERCIAL

## 2019-01-01 ENCOUNTER — OFFICE VISIT (OUTPATIENT)
Dept: FAMILY MEDICINE | Facility: CLINIC | Age: 49
End: 2019-01-01
Payer: COMMERCIAL

## 2019-01-01 ENCOUNTER — HOSPITAL ENCOUNTER (OUTPATIENT)
Facility: CLINIC | Age: 49
Discharge: HOME | End: 2019-03-27
Attending: OTOLARYNGOLOGY | Admitting: OTOLARYNGOLOGY
Payer: COMMERCIAL

## 2019-01-01 ENCOUNTER — TELEPHONE (OUTPATIENT)
Dept: GASTROENTEROLOGY | Facility: OUTPATIENT CENTER | Age: 49
End: 2019-01-01

## 2019-01-01 ENCOUNTER — TELEPHONE (OUTPATIENT)
Dept: FAMILY MEDICINE | Facility: CLINIC | Age: 49
End: 2019-01-01

## 2019-01-01 ENCOUNTER — OFFICE VISIT (OUTPATIENT)
Dept: ADDICTION MEDICINE | Facility: CLINIC | Age: 49
End: 2019-01-01

## 2019-01-01 ENCOUNTER — NURSE TRIAGE (OUTPATIENT)
Dept: NURSING | Facility: CLINIC | Age: 49
End: 2019-01-01

## 2019-01-01 ENCOUNTER — PATIENT OUTREACH (OUTPATIENT)
Dept: CARE COORDINATION | Facility: CLINIC | Age: 49
End: 2019-01-01

## 2019-01-01 ENCOUNTER — TELEPHONE (OUTPATIENT)
Dept: ADDICTION MEDICINE | Facility: CLINIC | Age: 49
End: 2019-01-01

## 2019-01-01 ENCOUNTER — OFFICE VISIT (OUTPATIENT)
Dept: ADDICTION MEDICINE | Facility: CLINIC | Age: 49
End: 2019-01-01
Payer: COMMERCIAL

## 2019-01-01 ENCOUNTER — HOSPITAL PATHOLOGY (OUTPATIENT)
Dept: OTHER | Facility: CLINIC | Age: 49
End: 2019-01-01

## 2019-01-01 ENCOUNTER — TELEPHONE (OUTPATIENT)
Dept: OTOLARYNGOLOGY | Facility: CLINIC | Age: 49
End: 2019-01-01

## 2019-01-01 ENCOUNTER — DOCUMENTATION ONLY (OUTPATIENT)
Dept: CARE COORDINATION | Facility: CLINIC | Age: 49
End: 2019-01-01

## 2019-01-01 ENCOUNTER — ANESTHESIA (OUTPATIENT)
Dept: SURGERY | Facility: CLINIC | Age: 49
End: 2019-01-01
Payer: COMMERCIAL

## 2019-01-01 ENCOUNTER — OFFICE VISIT (OUTPATIENT)
Dept: URGENT CARE | Facility: URGENT CARE | Age: 49
End: 2019-01-01
Payer: COMMERCIAL

## 2019-01-01 ENCOUNTER — ANESTHESIA EVENT (OUTPATIENT)
Dept: SURGERY | Facility: CLINIC | Age: 49
End: 2019-01-01
Payer: COMMERCIAL

## 2019-01-01 VITALS
TEMPERATURE: 98.1 F | DIASTOLIC BLOOD PRESSURE: 66 MMHG | HEART RATE: 86 BPM | RESPIRATION RATE: 24 BRPM | BODY MASS INDEX: 22.33 KG/M2 | OXYGEN SATURATION: 99 % | WEIGHT: 156 LBS | HEIGHT: 70 IN | SYSTOLIC BLOOD PRESSURE: 132 MMHG

## 2019-01-01 VITALS
HEART RATE: 89 BPM | TEMPERATURE: 97.6 F | DIASTOLIC BLOOD PRESSURE: 65 MMHG | SYSTOLIC BLOOD PRESSURE: 111 MMHG | OXYGEN SATURATION: 99 %

## 2019-01-01 VITALS
OXYGEN SATURATION: 99 % | SYSTOLIC BLOOD PRESSURE: 106 MMHG | TEMPERATURE: 98.4 F | DIASTOLIC BLOOD PRESSURE: 75 MMHG | WEIGHT: 142 LBS | HEART RATE: 96 BPM | BODY MASS INDEX: 20.37 KG/M2 | RESPIRATION RATE: 16 BRPM

## 2019-01-01 VITALS
SYSTOLIC BLOOD PRESSURE: 104 MMHG | BODY MASS INDEX: 21.76 KG/M2 | HEART RATE: 73 BPM | OXYGEN SATURATION: 98 % | HEIGHT: 70 IN | DIASTOLIC BLOOD PRESSURE: 66 MMHG | WEIGHT: 152 LBS

## 2019-01-01 VITALS
DIASTOLIC BLOOD PRESSURE: 78 MMHG | OXYGEN SATURATION: 98 % | TEMPERATURE: 97.3 F | SYSTOLIC BLOOD PRESSURE: 116 MMHG | HEART RATE: 86 BPM

## 2019-01-01 VITALS
WEIGHT: 156 LBS | OXYGEN SATURATION: 98 % | HEART RATE: 61 BPM | SYSTOLIC BLOOD PRESSURE: 123 MMHG | DIASTOLIC BLOOD PRESSURE: 80 MMHG | HEIGHT: 70 IN | BODY MASS INDEX: 22.33 KG/M2

## 2019-01-01 VITALS
RESPIRATION RATE: 18 BRPM | BODY MASS INDEX: 20.94 KG/M2 | HEART RATE: 99 BPM | WEIGHT: 146.3 LBS | TEMPERATURE: 97.4 F | OXYGEN SATURATION: 94 % | DIASTOLIC BLOOD PRESSURE: 80 MMHG | SYSTOLIC BLOOD PRESSURE: 130 MMHG | HEIGHT: 70 IN

## 2019-01-01 VITALS
WEIGHT: 152 LBS | TEMPERATURE: 97.3 F | RESPIRATION RATE: 14 BRPM | SYSTOLIC BLOOD PRESSURE: 98 MMHG | DIASTOLIC BLOOD PRESSURE: 60 MMHG | HEART RATE: 85 BPM | OXYGEN SATURATION: 99 % | HEIGHT: 70 IN | BODY MASS INDEX: 21.76 KG/M2

## 2019-01-01 VITALS
SYSTOLIC BLOOD PRESSURE: 139 MMHG | OXYGEN SATURATION: 99 % | HEART RATE: 80 BPM | DIASTOLIC BLOOD PRESSURE: 73 MMHG | TEMPERATURE: 99.1 F

## 2019-01-01 VITALS
OXYGEN SATURATION: 96 % | HEART RATE: 82 BPM | TEMPERATURE: 98.5 F | SYSTOLIC BLOOD PRESSURE: 115 MMHG | DIASTOLIC BLOOD PRESSURE: 75 MMHG

## 2019-01-01 VITALS
RESPIRATION RATE: 18 BRPM | SYSTOLIC BLOOD PRESSURE: 130 MMHG | OXYGEN SATURATION: 96 % | HEART RATE: 65 BPM | WEIGHT: 152 LBS | HEIGHT: 70 IN | BODY MASS INDEX: 21.76 KG/M2 | DIASTOLIC BLOOD PRESSURE: 79 MMHG | TEMPERATURE: 98.9 F

## 2019-01-01 VITALS
OXYGEN SATURATION: 98 % | SYSTOLIC BLOOD PRESSURE: 98 MMHG | DIASTOLIC BLOOD PRESSURE: 60 MMHG | HEART RATE: 72 BPM | TEMPERATURE: 97.7 F

## 2019-01-01 DIAGNOSIS — G43.009 MIGRAINE WITHOUT AURA AND WITHOUT STATUS MIGRAINOSUS, NOT INTRACTABLE: ICD-10-CM

## 2019-01-01 DIAGNOSIS — G89.29 CHRONIC INTRACTABLE HEADACHE, UNSPECIFIED HEADACHE TYPE: ICD-10-CM

## 2019-01-01 DIAGNOSIS — M65.932 EXTENSOR TENOSYNOVITIS OF LEFT WRIST: ICD-10-CM

## 2019-01-01 DIAGNOSIS — F17.200 NICOTINE USE DISORDER: ICD-10-CM

## 2019-01-01 DIAGNOSIS — J40 BRONCHITIS: ICD-10-CM

## 2019-01-01 DIAGNOSIS — Q31.9 DYSPLASIA OF LARYNX: Primary | ICD-10-CM

## 2019-01-01 DIAGNOSIS — B00.1 RECURRENT COLD SORES: ICD-10-CM

## 2019-01-01 DIAGNOSIS — Z79.899 HIGH RISK MEDICATION USE: ICD-10-CM

## 2019-01-01 DIAGNOSIS — R11.2 NAUSEA AND VOMITING, INTRACTABILITY OF VOMITING NOT SPECIFIED, UNSPECIFIED VOMITING TYPE: ICD-10-CM

## 2019-01-01 DIAGNOSIS — B00.1 COLD SORE: ICD-10-CM

## 2019-01-01 DIAGNOSIS — Z59.9 FINANCIAL DIFFICULTIES: Primary | ICD-10-CM

## 2019-01-01 DIAGNOSIS — F14.21 HISTORY OF COCAINE DEPENDENCE (H): Primary | ICD-10-CM

## 2019-01-01 DIAGNOSIS — J38.7 LEUKOPLAKIA OF LARYNX: Primary | ICD-10-CM

## 2019-01-01 DIAGNOSIS — F51.01 PRIMARY INSOMNIA: ICD-10-CM

## 2019-01-01 DIAGNOSIS — Z71.6 ENCOUNTER FOR SMOKING CESSATION COUNSELING: ICD-10-CM

## 2019-01-01 DIAGNOSIS — Z72.0 TOBACCO ABUSE: ICD-10-CM

## 2019-01-01 DIAGNOSIS — Z12.11 SPECIAL SCREENING FOR MALIGNANT NEOPLASMS, COLON: Primary | ICD-10-CM

## 2019-01-01 DIAGNOSIS — Q82.8 HAILEY-HAILEY DISEASE: Primary | ICD-10-CM

## 2019-01-01 DIAGNOSIS — T78.40XD ALLERGIC STATE, SUBSEQUENT ENCOUNTER: ICD-10-CM

## 2019-01-01 DIAGNOSIS — F14.21 HISTORY OF COCAINE DEPENDENCE (H): ICD-10-CM

## 2019-01-01 DIAGNOSIS — F11.20 OPIOID USE DISORDER, SEVERE, DEPENDENCE (H): ICD-10-CM

## 2019-01-01 DIAGNOSIS — F11.20 OPIOID USE DISORDER, SEVERE, DEPENDENCE (H): Primary | ICD-10-CM

## 2019-01-01 DIAGNOSIS — J30.89 CHRONIC NONSEASONAL ALLERGIC RHINITIS DUE TO POLLEN: ICD-10-CM

## 2019-01-01 DIAGNOSIS — N30.00 ACUTE CYSTITIS WITHOUT HEMATURIA: ICD-10-CM

## 2019-01-01 DIAGNOSIS — B37.0 THRUSH: Primary | ICD-10-CM

## 2019-01-01 DIAGNOSIS — J02.9 SORE THROAT: ICD-10-CM

## 2019-01-01 DIAGNOSIS — F17.200 TOBACCO USE DISORDER: ICD-10-CM

## 2019-01-01 DIAGNOSIS — F33.1 MODERATE EPISODE OF RECURRENT MAJOR DEPRESSIVE DISORDER (H): ICD-10-CM

## 2019-01-01 DIAGNOSIS — J38.7 LEUKOPLAKIA OF LARYNX: ICD-10-CM

## 2019-01-01 DIAGNOSIS — G44.86 CERVICOGENIC HEADACHE: ICD-10-CM

## 2019-01-01 DIAGNOSIS — Z12.11 SCREEN FOR COLON CANCER: ICD-10-CM

## 2019-01-01 DIAGNOSIS — M26.602 DISORDER OF LEFT TEMPOROMANDIBULAR JOINT: ICD-10-CM

## 2019-01-01 DIAGNOSIS — F41.1 GENERALIZED ANXIETY DISORDER: ICD-10-CM

## 2019-01-01 DIAGNOSIS — R30.0 DYSURIA: Primary | ICD-10-CM

## 2019-01-01 DIAGNOSIS — J02.9 ACUTE PHARYNGITIS, UNSPECIFIED ETIOLOGY: Primary | ICD-10-CM
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
## 2019-01-01 DIAGNOSIS — K59.03 DRUG-INDUCED CONSTIPATION: ICD-10-CM

## 2019-01-01 DIAGNOSIS — Z86.2 HISTORY OF ITP: ICD-10-CM

## 2019-01-01 DIAGNOSIS — R51.9 CHRONIC INTRACTABLE HEADACHE, UNSPECIFIED HEADACHE TYPE: ICD-10-CM

## 2019-01-01 DIAGNOSIS — Z23 NEED FOR PROPHYLACTIC VACCINATION AND INOCULATION AGAINST INFLUENZA: ICD-10-CM

## 2019-01-01 DIAGNOSIS — K21.9 GASTROESOPHAGEAL REFLUX DISEASE, ESOPHAGITIS PRESENCE NOT SPECIFIED: ICD-10-CM

## 2019-01-01 DIAGNOSIS — K21.9 GASTROESOPHAGEAL REFLUX DISEASE WITHOUT ESOPHAGITIS: ICD-10-CM

## 2019-01-01 DIAGNOSIS — R07.9 CHEST PAIN, UNSPECIFIED: ICD-10-CM

## 2019-01-01 DIAGNOSIS — K59.00 CONSTIPATION, UNSPECIFIED CONSTIPATION TYPE: ICD-10-CM

## 2019-01-01 DIAGNOSIS — F19.20 CHEMICAL DEPENDENCY (H): ICD-10-CM

## 2019-01-01 DIAGNOSIS — Z01.818 PREOP GENERAL PHYSICAL EXAM: Primary | ICD-10-CM

## 2019-01-01 DIAGNOSIS — B35.1 ONYCHOMYCOSIS: ICD-10-CM

## 2019-01-01 DIAGNOSIS — F41.1 GENERALIZED ANXIETY DISORDER: Primary | ICD-10-CM

## 2019-01-01 DIAGNOSIS — R10.84 ABDOMINAL PAIN, GENERALIZED: Primary | ICD-10-CM

## 2019-01-01 DIAGNOSIS — J40 BRONCHITIS: Primary | ICD-10-CM

## 2019-01-01 DIAGNOSIS — S06.0X0D CONCUSSION WITHOUT LOSS OF CONSCIOUSNESS, SUBSEQUENT ENCOUNTER: Primary | ICD-10-CM

## 2019-01-01 LAB
AMPHETAMINES UR QL: NOT DETECTED NG/ML
BARBITURATES UR QL SCN: NOT DETECTED NG/ML
BENZODIAZ UR QL SCN: NOT DETECTED NG/ML
BUPRENORPHINE UR QL: ABNORMAL NG/ML
BZE UR QL: POSITIVE NG/ML
CANNABINOIDS UR QL: NOT DETECTED NG/ML
COCAINE UR CFM-MCNC: NEGATIVE NG/ML
COCAINE UR QL SCN: ABNORMAL NG/ML
COCAINE UR QL SCN: NOT DETECTED NG/ML
COPATH REPORT: NORMAL
COPATH REPORT: NORMAL
D-METHAMPHET UR QL: NOT DETECTED NG/ML
METHADONE UR QL SCN: NOT DETECTED NG/ML
OPIATES UR QL SCN: NOT DETECTED NG/ML
OXYCODONE UR QL SCN: ABNORMAL NG/ML
OXYCODONE UR QL SCN: NOT DETECTED NG/ML
PCP UR QL SCN: NOT DETECTED NG/ML
PROPOXYPH UR QL: NOT DETECTED NG/ML
TRICYCLICS UR QL SCN: NOT DETECTED NG/ML

## 2019-01-01 PROCEDURE — 99215 OFFICE O/P EST HI 40 MIN: CPT | Performed by: PEDIATRICS

## 2019-01-01 PROCEDURE — 80353 DRUG SCREENING COCAINE: CPT | Mod: 90 | Performed by: PEDIATRICS

## 2019-01-01 PROCEDURE — 90686 IIV4 VACC NO PRSV 0.5 ML IM: CPT | Performed by: NURSE PRACTITIONER

## 2019-01-01 PROCEDURE — 37000009 ZZH ANESTHESIA TECHNICAL FEE, EACH ADDTL 15 MIN: Performed by: OTOLARYNGOLOGY

## 2019-01-01 PROCEDURE — 25000132 ZZH RX MED GY IP 250 OP 250 PS 637: Performed by: NURSE ANESTHETIST, CERTIFIED REGISTERED

## 2019-01-01 PROCEDURE — 37000008 ZZH ANESTHESIA TECHNICAL FEE, 1ST 30 MIN: Performed by: OTOLARYNGOLOGY

## 2019-01-01 PROCEDURE — 99214 OFFICE O/P EST MOD 30 MIN: CPT | Performed by: PHYSICIAN ASSISTANT

## 2019-01-01 PROCEDURE — 80306 DRUG TEST PRSMV INSTRMNT: CPT | Performed by: PEDIATRICS

## 2019-01-01 PROCEDURE — 99213 OFFICE O/P EST LOW 20 MIN: CPT | Performed by: OTOLARYNGOLOGY

## 2019-01-01 PROCEDURE — 71000027 ZZH RECOVERY PHASE 2 EACH 15 MINS: Performed by: OTOLARYNGOLOGY

## 2019-01-01 PROCEDURE — 31575 DIAGNOSTIC LARYNGOSCOPY: CPT | Performed by: OTOLARYNGOLOGY

## 2019-01-01 PROCEDURE — 99214 OFFICE O/P EST MOD 30 MIN: CPT | Performed by: PEDIATRICS

## 2019-01-01 PROCEDURE — 36000058 ZZH SURGERY LEVEL 3 EA 15 ADDTL MIN: Performed by: OTOLARYNGOLOGY

## 2019-01-01 PROCEDURE — 99244 OFF/OP CNSLTJ NEW/EST MOD 40: CPT | Mod: 25 | Performed by: OTOLARYNGOLOGY

## 2019-01-01 PROCEDURE — 31535 LARYNGOSCOPY W/BIOPSY: CPT | Performed by: OTOLARYNGOLOGY

## 2019-01-01 PROCEDURE — 88305 TISSUE EXAM BY PATHOLOGIST: CPT | Performed by: OTOLARYNGOLOGY

## 2019-01-01 PROCEDURE — 25000125 ZZHC RX 250: Performed by: NURSE ANESTHETIST, CERTIFIED REGISTERED

## 2019-01-01 PROCEDURE — 25800030 ZZH RX IP 258 OP 636: Performed by: NURSE ANESTHETIST, CERTIFIED REGISTERED

## 2019-01-01 PROCEDURE — 27210794 ZZH OR GENERAL SUPPLY STERILE: Performed by: OTOLARYNGOLOGY

## 2019-01-01 PROCEDURE — 99214 OFFICE O/P EST MOD 30 MIN: CPT | Performed by: NURSE PRACTITIONER

## 2019-01-01 PROCEDURE — 90471 IMMUNIZATION ADMIN: CPT | Performed by: NURSE PRACTITIONER

## 2019-01-01 PROCEDURE — 71000014 ZZH RECOVERY PHASE 1 LEVEL 2 FIRST HR: Performed by: OTOLARYNGOLOGY

## 2019-01-01 PROCEDURE — 40000305 ZZH STATISTIC PRE PROC ASSESS I: Performed by: OTOLARYNGOLOGY

## 2019-01-01 PROCEDURE — 25000128 H RX IP 250 OP 636: Performed by: NURSE ANESTHETIST, CERTIFIED REGISTERED

## 2019-01-01 PROCEDURE — 88305 TISSUE EXAM BY PATHOLOGIST: CPT | Mod: 26 | Performed by: OTOLARYNGOLOGY

## 2019-01-01 PROCEDURE — 99214 OFFICE O/P EST MOD 30 MIN: CPT | Mod: 25 | Performed by: NURSE PRACTITIONER

## 2019-01-01 PROCEDURE — 36000056 ZZH SURGERY LEVEL 3 1ST 30 MIN: Performed by: OTOLARYNGOLOGY

## 2019-01-01 PROCEDURE — 99000 SPECIMEN HANDLING OFFICE-LAB: CPT | Performed by: PEDIATRICS

## 2019-01-01 PROCEDURE — 25000128 H RX IP 250 OP 636: Performed by: OTOLARYNGOLOGY

## 2019-01-01 PROCEDURE — 99214 OFFICE O/P EST MOD 30 MIN: CPT | Performed by: FAMILY MEDICINE

## 2019-01-01 RX ORDER — MEPERIDINE HYDROCHLORIDE 25 MG/ML
12.5 INJECTION INTRAMUSCULAR; INTRAVENOUS; SUBCUTANEOUS
Status: DISCONTINUED | OUTPATIENT
Start: 2019-01-01 | End: 2019-01-01 | Stop reason: HOSPADM

## 2019-01-01 RX ORDER — CLONIDINE HYDROCHLORIDE 0.1 MG/1
0.1 TABLET ORAL AT BEDTIME
Qty: 60 TABLET | Refills: 3 | Status: CANCELLED | OUTPATIENT
Start: 2019-01-01

## 2019-01-01 RX ORDER — GABAPENTIN 300 MG/1
CAPSULE ORAL
Qty: 180 CAPSULE | Refills: 0 | COMMUNITY
Start: 2019-01-01 | End: 2019-01-01

## 2019-01-01 RX ORDER — TRAZODONE HYDROCHLORIDE 100 MG/1
100 TABLET ORAL AT BEDTIME
Qty: 90 TABLET | Refills: 1 | Status: SHIPPED | OUTPATIENT
Start: 2019-01-01

## 2019-01-01 RX ORDER — PROPOFOL 10 MG/ML
INJECTION, EMULSION INTRAVENOUS PRN
Status: DISCONTINUED | OUTPATIENT
Start: 2019-01-01 | End: 2019-01-01

## 2019-01-01 RX ORDER — GLYCOPYRROLATE 0.2 MG/ML
INJECTION, SOLUTION INTRAMUSCULAR; INTRAVENOUS PRN
Status: DISCONTINUED | OUTPATIENT
Start: 2019-01-01 | End: 2019-01-01

## 2019-01-01 RX ORDER — HYDROXYZINE HYDROCHLORIDE 50 MG/1
50 TABLET, FILM COATED ORAL
Qty: 30 TABLET | Refills: 3 | Status: SHIPPED | OUTPATIENT
Start: 2019-01-01

## 2019-01-01 RX ORDER — CLONIDINE HYDROCHLORIDE 0.1 MG/1
0.1 TABLET ORAL AT BEDTIME
Qty: 60 TABLET | Refills: 0 | Status: SHIPPED | OUTPATIENT
Start: 2019-01-01 | End: 2019-01-01

## 2019-01-01 RX ORDER — VALACYCLOVIR HYDROCHLORIDE 1 G/1
1000 TABLET, FILM COATED ORAL 2 TIMES DAILY
Qty: 8 TABLET | Refills: 11 | Status: SHIPPED | OUTPATIENT
Start: 2019-01-01

## 2019-01-01 RX ORDER — OMEPRAZOLE 40 MG/1
CAPSULE, DELAYED RELEASE ORAL
Qty: 90 CAPSULE | Refills: 0 | Status: CANCELLED | OUTPATIENT
Start: 2019-01-01

## 2019-01-01 RX ORDER — METHOCARBAMOL 500 MG/1
500 TABLET, FILM COATED ORAL 3 TIMES DAILY
Qty: 90 TABLET | Refills: 3 | Status: SHIPPED | OUTPATIENT
Start: 2019-01-01 | End: 2019-01-01

## 2019-01-01 RX ORDER — METHOCARBAMOL 500 MG/1
500 TABLET, FILM COATED ORAL 3 TIMES DAILY
Qty: 90 TABLET | Refills: 0 | Status: SHIPPED | OUTPATIENT
Start: 2019-01-01 | End: 2019-01-01

## 2019-01-01 RX ORDER — BUPRENORPHINE HYDROCHLORIDE AND NALOXONE HYDROCHLORIDE DIHYDRATE 8; 2 MG/1; MG/1
1 TABLET SUBLINGUAL 3 TIMES DAILY
Qty: 90 EACH | Refills: 0 | Status: SHIPPED | OUTPATIENT
Start: 2019-01-01 | End: 2019-01-01

## 2019-01-01 RX ORDER — NYSTATIN 100000/ML
500000 SUSPENSION, ORAL (FINAL DOSE FORM) ORAL 4 TIMES DAILY
Qty: 140 ML | Refills: 0 | Status: SHIPPED | OUTPATIENT
Start: 2019-01-01

## 2019-01-01 RX ORDER — DEXAMETHASONE SODIUM PHOSPHATE 10 MG/ML
10 INJECTION, SOLUTION INTRAMUSCULAR; INTRAVENOUS ONCE
Status: DISCONTINUED | OUTPATIENT
Start: 2019-01-01 | End: 2019-01-01 | Stop reason: HOSPADM

## 2019-01-01 RX ORDER — NITROFURANTOIN 25; 75 MG/1; MG/1
CAPSULE ORAL
Qty: 14 CAPSULE | Refills: 0 | OUTPATIENT
Start: 2019-01-01

## 2019-01-01 RX ORDER — GABAPENTIN 300 MG/1
CAPSULE ORAL
Qty: 90 CAPSULE | Refills: 0 | Status: SHIPPED | OUTPATIENT
Start: 2019-01-01

## 2019-01-01 RX ORDER — BUPRENORPHINE HYDROCHLORIDE AND NALOXONE HYDROCHLORIDE DIHYDRATE 8; 2 MG/1; MG/1
1 TABLET SUBLINGUAL 3 TIMES DAILY
Qty: 18 EACH | Refills: 0 | Status: SHIPPED | OUTPATIENT
Start: 2019-01-01

## 2019-01-01 RX ORDER — GABAPENTIN 300 MG/1
CAPSULE ORAL
Qty: 90 CAPSULE | Refills: 0 | Status: SHIPPED | OUTPATIENT
Start: 2019-01-01 | End: 2019-01-01

## 2019-01-01 RX ORDER — SODIUM CHLORIDE, SODIUM LACTATE, POTASSIUM CHLORIDE, CALCIUM CHLORIDE 600; 310; 30; 20 MG/100ML; MG/100ML; MG/100ML; MG/100ML
INJECTION, SOLUTION INTRAVENOUS CONTINUOUS
Status: DISCONTINUED | OUTPATIENT
Start: 2019-01-01 | End: 2019-01-01 | Stop reason: HOSPADM

## 2019-01-01 RX ORDER — HYDROXYZINE HYDROCHLORIDE 50 MG/1
TABLET, FILM COATED ORAL
Qty: 30 TABLET | Refills: 3 | Status: SHIPPED | OUTPATIENT
Start: 2019-01-01 | End: 2019-01-01

## 2019-01-01 RX ORDER — ACYCLOVIR 50 MG/G
OINTMENT TOPICAL
Qty: 30 G | Refills: 0
Start: 2019-01-01

## 2019-01-01 RX ORDER — PRENATAL VIT 91/IRON/FOLIC/DHA 28-975-200
COMBINATION PACKAGE (EA) ORAL 2 TIMES DAILY
Qty: 30 G | Refills: 2 | Status: SHIPPED | OUTPATIENT
Start: 2019-01-01 | End: 2020-01-04

## 2019-01-01 RX ORDER — ZOLMITRIPTAN 5 MG/1
TABLET, FILM COATED ORAL
Qty: 12 TABLET | Refills: 4 | Status: SHIPPED | OUTPATIENT
Start: 2019-01-01

## 2019-01-01 RX ORDER — KETAMINE HYDROCHLORIDE 50 MG/ML
INJECTION, SOLUTION INTRAMUSCULAR; INTRAVENOUS PRN
Status: DISCONTINUED | OUTPATIENT
Start: 2019-01-01 | End: 2019-01-01

## 2019-01-01 RX ORDER — HYDROXYZINE HYDROCHLORIDE 25 MG/1
TABLET, FILM COATED ORAL
Qty: 60 TABLET | Refills: 3 | Status: SHIPPED | OUTPATIENT
Start: 2019-01-01

## 2019-01-01 RX ORDER — NITROFURANTOIN 25; 75 MG/1; MG/1
100 CAPSULE ORAL 2 TIMES DAILY
Qty: 14 CAPSULE | Refills: 0 | Status: SHIPPED | OUTPATIENT
Start: 2019-01-01 | End: 2019-01-01

## 2019-01-01 RX ORDER — CETIRIZINE HYDROCHLORIDE 10 MG/1
10 TABLET ORAL EVERY EVENING
Qty: 90 TABLET | Refills: 1 | Status: SHIPPED | OUTPATIENT
Start: 2019-01-01

## 2019-01-01 RX ORDER — NEOSTIGMINE METHYLSULFATE 1 MG/ML
VIAL (ML) INJECTION PRN
Status: DISCONTINUED | OUTPATIENT
Start: 2019-01-01 | End: 2019-01-01

## 2019-01-01 RX ORDER — DEXAMETHASONE SODIUM PHOSPHATE 4 MG/ML
4 INJECTION, SOLUTION INTRA-ARTICULAR; INTRALESIONAL; INTRAMUSCULAR; INTRAVENOUS; SOFT TISSUE EVERY 10 MIN PRN
Status: DISCONTINUED | OUTPATIENT
Start: 2019-01-01 | End: 2019-01-01 | Stop reason: HOSPADM

## 2019-01-01 RX ORDER — FENTANYL CITRATE 50 UG/ML
25-50 INJECTION, SOLUTION INTRAMUSCULAR; INTRAVENOUS
Status: DISCONTINUED | OUTPATIENT
Start: 2019-01-01 | End: 2019-01-01 | Stop reason: HOSPADM

## 2019-01-01 RX ORDER — METHOCARBAMOL 500 MG/1
TABLET, FILM COATED ORAL
Qty: 90 TABLET | Refills: 0 | Status: SHIPPED | OUTPATIENT
Start: 2019-01-01 | End: 2019-01-01

## 2019-01-01 RX ORDER — FLUTICASONE PROPIONATE 50 MCG
2 SPRAY, SUSPENSION (ML) NASAL DAILY
Qty: 1 BOTTLE | Refills: 11 | Status: SHIPPED | OUTPATIENT
Start: 2019-01-01

## 2019-01-01 RX ORDER — ACETAMINOPHEN 325 MG/1
975 TABLET ORAL ONCE
Status: DISCONTINUED | OUTPATIENT
Start: 2019-01-01 | End: 2019-01-01 | Stop reason: HOSPADM

## 2019-01-01 RX ORDER — ACETAMINOPHEN 325 MG/1
650 TABLET ORAL
COMMUNITY
Start: 2011-04-14

## 2019-01-01 RX ORDER — FLUOXETINE 40 MG/1
CAPSULE ORAL
Qty: 30 CAPSULE | Refills: 5 | Status: SHIPPED | OUTPATIENT
Start: 2019-01-01

## 2019-01-01 RX ORDER — HYDROXYZINE HYDROCHLORIDE 25 MG/1
25 TABLET, FILM COATED ORAL EVERY 6 HOURS PRN
Status: DISCONTINUED | OUTPATIENT
Start: 2019-01-01 | End: 2019-01-01 | Stop reason: HOSPADM

## 2019-01-01 RX ORDER — DOCUSATE SODIUM 100 MG/1
CAPSULE, LIQUID FILLED ORAL
Qty: 60 CAPSULE | Refills: 3 | Status: SHIPPED | OUTPATIENT
Start: 2019-01-01

## 2019-01-01 RX ORDER — NALOXONE HYDROCHLORIDE 0.4 MG/ML
.1-.4 INJECTION, SOLUTION INTRAMUSCULAR; INTRAVENOUS; SUBCUTANEOUS
Status: DISCONTINUED | OUTPATIENT
Start: 2019-01-01 | End: 2019-01-01 | Stop reason: HOSPADM

## 2019-01-01 RX ORDER — FENTANYL CITRATE 50 UG/ML
INJECTION, SOLUTION INTRAMUSCULAR; INTRAVENOUS PRN
Status: DISCONTINUED | OUTPATIENT
Start: 2019-01-01 | End: 2019-01-01

## 2019-01-01 RX ORDER — LIDOCAINE HYDROCHLORIDE 10 MG/ML
INJECTION, SOLUTION INFILTRATION; PERINEURAL PRN
Status: DISCONTINUED | OUTPATIENT
Start: 2019-01-01 | End: 2019-01-01

## 2019-01-01 RX ORDER — ONDANSETRON 2 MG/ML
INJECTION INTRAMUSCULAR; INTRAVENOUS PRN
Status: DISCONTINUED | OUTPATIENT
Start: 2019-01-01 | End: 2019-01-01

## 2019-01-01 RX ORDER — VARENICLINE TARTRATE 1 MG/1
TABLET, FILM COATED ORAL
Qty: 56 TABLET | Refills: 2 | Status: SHIPPED | OUTPATIENT
Start: 2019-01-01

## 2019-01-01 RX ORDER — POLYETHYLENE GLYCOL 3350 17 G/17G
POWDER, FOR SOLUTION ORAL
Qty: 527 G | Refills: 3 | Status: SHIPPED | OUTPATIENT
Start: 2019-01-01

## 2019-01-01 RX ORDER — ACETAMINOPHEN 325 MG/1
975 TABLET ORAL ONCE
Status: COMPLETED | OUTPATIENT
Start: 2019-01-01 | End: 2019-01-01

## 2019-01-01 RX ORDER — EPINEPHRINE 1 MG/ML
INJECTION, SOLUTION, CONCENTRATE INTRAVENOUS PRN
Status: DISCONTINUED | OUTPATIENT
Start: 2019-01-01 | End: 2019-01-01 | Stop reason: HOSPADM

## 2019-01-01 RX ORDER — HYDROXYZINE HYDROCHLORIDE 50 MG/1
50 TABLET, FILM COATED ORAL EVERY 6 HOURS PRN
Status: DISCONTINUED | OUTPATIENT
Start: 2019-01-01 | End: 2019-01-01 | Stop reason: HOSPADM

## 2019-01-01 RX ORDER — BUPRENORPHINE HYDROCHLORIDE AND NALOXONE HYDROCHLORIDE DIHYDRATE 8; 2 MG/1; MG/1
1 TABLET SUBLINGUAL 3 TIMES DAILY
Qty: 90 EACH | Refills: 0 | Status: CANCELLED | OUTPATIENT
Start: 2019-01-01

## 2019-01-01 RX ORDER — OMEPRAZOLE 40 MG/1
CAPSULE, DELAYED RELEASE ORAL
Qty: 90 CAPSULE | Refills: 0 | Status: SHIPPED | OUTPATIENT
Start: 2019-01-01

## 2019-01-01 RX ORDER — ONDANSETRON 2 MG/ML
4 INJECTION INTRAMUSCULAR; INTRAVENOUS EVERY 30 MIN PRN
Status: DISCONTINUED | OUTPATIENT
Start: 2019-01-01 | End: 2019-01-01 | Stop reason: HOSPADM

## 2019-01-01 RX ORDER — BUPRENORPHINE HYDROCHLORIDE AND NALOXONE HYDROCHLORIDE DIHYDRATE 8; 2 MG/1; MG/1
1 TABLET SUBLINGUAL 2 TIMES DAILY
Qty: 60 EACH | Refills: 1 | Status: SHIPPED | OUTPATIENT
Start: 2019-01-01 | End: 2019-01-01

## 2019-01-01 RX ORDER — CLOBETASOL PROPIONATE 0.5 MG/G
OINTMENT TOPICAL
Qty: 80 G | Refills: 1 | Status: SHIPPED | OUTPATIENT
Start: 2019-01-01

## 2019-01-01 RX ORDER — METHOCARBAMOL 500 MG/1
500 TABLET, FILM COATED ORAL 3 TIMES DAILY
Qty: 90 TABLET | Refills: 3 | Status: SHIPPED | OUTPATIENT
Start: 2019-01-01

## 2019-01-01 RX ORDER — GABAPENTIN 300 MG/1
CAPSULE ORAL
Qty: 180 CAPSULE | Refills: 0 | Status: SHIPPED | OUTPATIENT
Start: 2019-01-01 | End: 2019-01-01

## 2019-01-01 RX ORDER — GABAPENTIN 300 MG/1
CAPSULE ORAL
Qty: 90 CAPSULE | Refills: 1 | Status: SHIPPED | OUTPATIENT
Start: 2019-01-01 | End: 2019-01-01

## 2019-01-01 RX ORDER — GABAPENTIN 300 MG/1
300 CAPSULE ORAL 3 TIMES DAILY
Qty: 90 CAPSULE | Refills: 0 | Status: SHIPPED | OUTPATIENT
Start: 2019-01-01 | End: 2019-01-01

## 2019-01-01 RX ORDER — VALACYCLOVIR HYDROCHLORIDE 1 G/1
TABLET, FILM COATED ORAL
Qty: 8 TABLET | Refills: 0
Start: 2019-01-01

## 2019-01-01 RX ORDER — GABAPENTIN 300 MG/1
300 CAPSULE ORAL ONCE
Status: COMPLETED | OUTPATIENT
Start: 2019-01-01 | End: 2019-01-01

## 2019-01-01 RX ORDER — CLONIDINE HYDROCHLORIDE 0.1 MG/1
0.1 TABLET ORAL AT BEDTIME
Qty: 30 TABLET | Refills: 0 | Status: SHIPPED | OUTPATIENT
Start: 2019-01-01

## 2019-01-01 RX ORDER — FENTANYL CITRATE 50 UG/ML
25-50 INJECTION, SOLUTION INTRAMUSCULAR; INTRAVENOUS
Status: CANCELLED | OUTPATIENT
Start: 2019-01-01

## 2019-01-01 RX ORDER — BUPRENORPHINE HYDROCHLORIDE AND NALOXONE HYDROCHLORIDE DIHYDRATE 8; 2 MG/1; MG/1
1 TABLET SUBLINGUAL 2 TIMES DAILY
Qty: 44 EACH | Refills: 0 | Status: SHIPPED | OUTPATIENT
Start: 2019-01-01 | End: 2019-01-01

## 2019-01-01 RX ORDER — DEXAMETHASONE SODIUM PHOSPHATE 4 MG/ML
INJECTION, SOLUTION INTRA-ARTICULAR; INTRALESIONAL; INTRAMUSCULAR; INTRAVENOUS; SOFT TISSUE PRN
Status: DISCONTINUED | OUTPATIENT
Start: 2019-01-01 | End: 2019-01-01

## 2019-01-01 RX ORDER — LIDOCAINE 40 MG/G
CREAM TOPICAL
Status: DISCONTINUED | OUTPATIENT
Start: 2019-01-01 | End: 2019-01-01 | Stop reason: HOSPADM

## 2019-01-01 RX ORDER — ACYCLOVIR 50 MG/G
OINTMENT TOPICAL
Qty: 15 G | Refills: 3 | Status: SHIPPED | OUTPATIENT
Start: 2019-01-01

## 2019-01-01 RX ORDER — SUCRALFATE ORAL 1 G/10ML
1 SUSPENSION ORAL 4 TIMES DAILY
Qty: 420 ML | Refills: 3 | Status: SHIPPED | OUTPATIENT
Start: 2019-01-01

## 2019-01-01 RX ORDER — GABAPENTIN 300 MG/1
600 CAPSULE ORAL 3 TIMES DAILY
Qty: 180 CAPSULE | Refills: 0 | Status: SHIPPED | OUTPATIENT
Start: 2019-01-01 | End: 2019-01-01

## 2019-01-01 RX ORDER — VARENICLINE TARTRATE 1 MG/1
TABLET, FILM COATED ORAL
Qty: 56 TABLET | Refills: 0 | Status: SHIPPED | OUTPATIENT
Start: 2019-01-01 | End: 2019-01-01

## 2019-01-01 RX ORDER — ONDANSETRON 4 MG/1
4 TABLET, ORALLY DISINTEGRATING ORAL EVERY 30 MIN PRN
Status: DISCONTINUED | OUTPATIENT
Start: 2019-01-01 | End: 2019-01-01 | Stop reason: HOSPADM

## 2019-01-01 RX ORDER — METHYLPREDNISOLONE 4 MG
TABLET, DOSE PACK ORAL
Qty: 21 TABLET | Refills: 0 | Status: SHIPPED | OUTPATIENT
Start: 2019-01-01 | End: 2019-01-01

## 2019-01-01 RX ORDER — TRAZODONE HYDROCHLORIDE 100 MG/1
100 TABLET ORAL AT BEDTIME
Qty: 90 TABLET | Refills: 1 | Status: SHIPPED | OUTPATIENT
Start: 2019-01-01 | End: 2019-01-01

## 2019-01-01 RX ORDER — LORATADINE 10 MG/1
TABLET ORAL
Qty: 30 TABLET | Refills: 5 | Status: SHIPPED | OUTPATIENT
Start: 2019-01-01

## 2019-01-01 RX ORDER — ZOLMITRIPTAN 5 MG/1
TABLET, FILM COATED ORAL
Qty: 12 TABLET | Refills: 0 | OUTPATIENT
Start: 2019-01-01

## 2019-01-01 RX ORDER — DOCUSATE SODIUM 100 MG/1
CAPSULE, LIQUID FILLED ORAL
Qty: 60 CAPSULE | Refills: 3 | Status: SHIPPED | OUTPATIENT
Start: 2019-01-01 | End: 2019-01-01

## 2019-01-01 RX ORDER — BUSPIRONE HYDROCHLORIDE 30 MG/1
30 TABLET ORAL 2 TIMES DAILY
Qty: 60 TABLET | Refills: 5 | Status: SHIPPED | OUTPATIENT
Start: 2019-01-01

## 2019-01-01 RX ADMIN — LIDOCAINE HYDROCHLORIDE 50 MG: 10 INJECTION, SOLUTION INFILTRATION; PERINEURAL at 10:05

## 2019-01-01 RX ADMIN — FENTANYL CITRATE 50 MCG: 50 INJECTION INTRAMUSCULAR; INTRAVENOUS at 11:00

## 2019-01-01 RX ADMIN — SODIUM CHLORIDE, POTASSIUM CHLORIDE, SODIUM LACTATE AND CALCIUM CHLORIDE: 600; 310; 30; 20 INJECTION, SOLUTION INTRAVENOUS at 08:47

## 2019-01-01 RX ADMIN — FENTANYL CITRATE 50 MCG: 50 INJECTION INTRAMUSCULAR; INTRAVENOUS at 10:56

## 2019-01-01 RX ADMIN — ACETAMINOPHEN 975 MG: 325 TABLET, FILM COATED ORAL at 08:46

## 2019-01-01 RX ADMIN — GABAPENTIN 300 MG: 300 CAPSULE ORAL at 08:45

## 2019-01-01 RX ADMIN — MIDAZOLAM HYDROCHLORIDE 2 MG: 2 INJECTION, SOLUTION INTRAMUSCULAR; INTRAVENOUS at 11:05

## 2019-01-01 RX ADMIN — MIDAZOLAM 2 MG: 1 INJECTION INTRAMUSCULAR; INTRAVENOUS at 09:56

## 2019-01-01 RX ADMIN — ROCURONIUM BROMIDE 35 MG: 10 INJECTION INTRAVENOUS at 10:05

## 2019-01-01 RX ADMIN — KETAMINE HYDROCHLORIDE 50 MG: 50 INJECTION, SOLUTION INTRAMUSCULAR; INTRAVENOUS at 10:15

## 2019-01-01 RX ADMIN — Medication 3.5 MG: at 10:30

## 2019-01-01 RX ADMIN — GLYCOPYRROLATE 0.6 MG: 0.2 INJECTION, SOLUTION INTRAMUSCULAR; INTRAVENOUS at 10:30

## 2019-01-01 RX ADMIN — FENTANYL CITRATE 50 MCG: 50 INJECTION, SOLUTION INTRAMUSCULAR; INTRAVENOUS at 10:18

## 2019-01-01 RX ADMIN — FENTANYL CITRATE 150 MCG: 50 INJECTION, SOLUTION INTRAMUSCULAR; INTRAVENOUS at 10:05

## 2019-01-01 RX ADMIN — LIDOCAINE HYDROCHLORIDE 0.1 ML: 10 INJECTION, SOLUTION EPIDURAL; INFILTRATION; INTRACAUDAL; PERINEURAL at 08:47

## 2019-01-01 RX ADMIN — GLYCOPYRROLATE 0.2 MG: 0.2 INJECTION, SOLUTION INTRAMUSCULAR; INTRAVENOUS at 10:05

## 2019-01-01 RX ADMIN — PROPOFOL 150 MG: 10 INJECTION, EMULSION INTRAVENOUS at 10:05

## 2019-01-01 RX ADMIN — DEXAMETHASONE SODIUM PHOSPHATE 10 MG: 4 INJECTION, SOLUTION INTRA-ARTICULAR; INTRALESIONAL; INTRAMUSCULAR; INTRAVENOUS; SOFT TISSUE at 10:05

## 2019-01-01 RX ADMIN — ONDANSETRON 4 MG: 2 INJECTION INTRAMUSCULAR; INTRAVENOUS at 10:30

## 2019-01-01 SDOH — ECONOMIC STABILITY - INCOME SECURITY: PROBLEM RELATED TO HOUSING AND ECONOMIC CIRCUMSTANCES, UNSPECIFIED: Z59.9

## 2019-01-01 ASSESSMENT — MIFFLIN-ST. JEOR
SCORE: 1412.86
SCORE: 1394.72
SCORE: 1368.86
SCORE: 1394.72
SCORE: 1394.72
SCORE: 1412.86

## 2019-01-01 ASSESSMENT — ENCOUNTER SYMPTOMS
COUGH: 0
CONSTIPATION: 1
PALPITATIONS: 0
CARDIOVASCULAR NEGATIVE: 1
CHILLS: 1
HEMOPTYSIS: 0
ABDOMINAL PAIN: 1
RESPIRATORY NEGATIVE: 1
DIAPHORESIS: 0
DIARRHEA: 0
NAUSEA: 1
WEIGHT LOSS: 0
VOMITING: 1
EYE PAIN: 0
BLOOD IN STOOL: 0
FEVER: 1

## 2019-01-01 ASSESSMENT — LIFESTYLE VARIABLES: TOBACCO_USE: 1

## 2019-01-01 ASSESSMENT — ASTHMA QUESTIONNAIRES: ACT_TOTALSCORE: 23

## 2019-01-01 ASSESSMENT — PAIN SCALES - GENERAL
PAINLEVEL: NO PAIN (0)
PAINLEVEL: NO PAIN (0)
PAINLEVEL: EXTREME PAIN (8)
PAINLEVEL: EXTREME PAIN (8)
PAINLEVEL: MODERATE PAIN (5)

## 2019-01-02 NOTE — TELEPHONE ENCOUNTER
Requested Prescriptions   Pending Prescriptions Disp Refills     gabapentin (NEURONTIN) 300 MG capsule Last Written Prescription Date:  12/5/18  Last Fill Quantity: 90,  # refills: 0   Last office visit: 3/19/2018 with prescribing provider:     Future Office Visit:   Next 5 appointments (look out 90 days)    Jan 08, 2019  8:00 AM CST  Return Visit with Jessy Brenner MD  [facility] [place] ([facility] [place]) [address] [facility]  [place] [address]  [phone]          90 capsule 0     Sig: Take 1 capsule (300 mg) by mouth 3 times daily    There is no refill protocol information for this order

## 2019-01-03 NOTE — TELEPHONE ENCOUNTER
Refill request to MD/primary care provider.  Pt has upcoming appt on 1/8/19 with Dr Elidia Slade RN

## 2019-01-03 NOTE — TELEPHONE ENCOUNTER
Pt called RN hotline to report that she has had a headache for 2.5 weeks. Last night it was so painful she almost went to the ER. Did not go because she does not know what they would do for her. Feels like head is going to explode. Has pressure and pain at the crown of her neck. Advised pt to go to  and she declined. Appt scheduled with PCP for tomorrow.    Cyndi Watson RN  [facility]

## 2019-01-04 NOTE — PROGRESS NOTES

## 2019-01-04 NOTE — PATIENT INSTRUCTIONS
Take medrol dose dominga and then resume normal prednisone.  Patient Education     Reach and Hold Exercise       Do this exercise on your hands and knees. Keep your knees under your hips and your hands under your shoulders. Keep your spine in a neutral position (not arched or sagging). Keep your ears in line with your shoulders. Hold for a few seconds before starting the exercise:  1. Tighten your belly muscles and raise one arm straight in front of you, palm down. Hold for 5 seconds, then lower. Repeat 5 times.  2. Do the exercise again, this time lifting your arm to the side. Repeat 5 times.  3. Do the exercise again, this time lifting your arm backward, palm up. Repeat 5 times.  Switch sides and do each exercise with the other arm.  Date Last Reviewed: 11/1/2017 2000-2018 [employer]. [address] [place] [address]. All rights reserved. This information is not intended as a substitute for professional medical care. Always follow your healthcare professional's instructions.           Patient Education     Shoulder and Upper Back Stretch  To start, stand tall with your ears, shoulders, and hips in line. Your feet should be slightly apart, positioned just under your hips. Focus your eyes directly in front of you.  this position for a few seconds before starting your exercise. This helps increase your awareness of proper posture.          Reach overhead and slightly back with both arms. Keep your shoulders and neck aligned and your elbows behind your shoulders:    With your palms facing the ceiling, turn your fingers inward.    Take a deep breath. Breathe out, and lower your elbows toward your buttocks. Hold for 5 seconds, then return to starting position.    Repeat 3 times.  Date Last Reviewed: 11/1/2017 2000-2018 [employer]. [address] [place] [id number]. All rights reserved. This information is not intended as a substitute for professional medical care.  Always follow your healthcare professional's instructions.           Patient Education     Neck Exercises: Head Lifts    Do this exercise on your hands and knees. Keep your knees under your hips and your hands under your shoulders. Keep your spine in a neutral position (not arched or sagging). Keep your ears in line with your shoulders. Hold for a few seconds before starting the exercise:    Keeping your back straight, slowly drop your chin toward your chest. Tuck in your chin.    Hold for 5 seconds. Then lift your head until your neck is level with your back.    Hold for 5 seconds. Repeat 5 to10 times.  Date Last Reviewed: 3/1/2018    3753-4484 [employer]. [address] [place] [address]. All rights reserved. This information is not intended as a substitute for professional medical care. Always follow your healthcare professional's instructions.           Patient Education     Neck Exercises: Neck Flex  To start, sit in a chair with your feet flat on the floor. Your weight should be slightly forward so that you re balanced evenly on your buttocks. Relax your shoulders and keep your head level. Avoid arching your back or rounding your shoulders. Using a chair with arms may help you keep your balance:    Rest the back of your left hand against your lower back. Place your right palm on the top of your head.    Gently pull your head forward and down until you feel a stretch in the muscles in the back of your neck. Don t force the motion.    Hold for 20 seconds, then return to starting position. Switch arms.    Repeat 5 to 10 times.    Date Last Reviewed: 3/1/2018    7568-2548 [employer]. [address] [place] [address]. All rights reserved. This information is not intended as a substitute for professional medical care. Always follow your healthcare professional's instructions.           Patient Education     Neck Exercises: Passive Neck Rotation    To start, lie on your  back, knees bent and feet flat on the floor. Keep your ears, shoulders, and hips aligned, but don t press your lower back to the floor. Rest your hands on your pelvis. Breathe deeply and relax.  Here are the steps for passive neck rotation:     With your neck relaxed, place the palm of one hand on your forehead. Use your hand to turn your head to one side until you feel a stretch in the neck muscles. Do not push through pain.    Hold for 5 seconds. Then turn to the other side.    Repeat 5 times on each side.   Note: Keep your shoulders on the floor. Don t lift your chin as you turn your head.  Date Last Reviewed: 11/1/2017 2000-2018 [employer]. [address] [place] [address]. All rights reserved. This information is not intended as a substitute for professional medical care. Always follow your healthcare professional's instructions.           Patient Education     Neck Exercises: Neck and Torso Rotation   To start, lie on your back, knees bent and feet flat on the floor. Keep your ears, shoulders, and hips aligned, but don t press your lower back to the floor. Breathe deeply and relax.    From starting position, drop both knees to one side. At the same time, turn your head and look in the other direction.    Keep both feet in contact with the floor and keep your arms at your sides.    Hold for 5 seconds. Then slowly switch sides.    Repeat 5 to 10 times.  Date Last Reviewed: 3/1/2018    [id number] [employer]. [address] [place] [address]. All rights reserved. This information is not intended as a substitute for professional medical care. Always follow your healthcare professional's instructions.
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
## 2019-01-08 NOTE — LETTER
[facility]  [address]  [place] [address]  [phone]        January 8, 2019    Regarding:  Coretta L La  [address] [address]              To Whom It May Concern;    This patient is prescribed Buprenorphine for opioid dependence and has been stable on this medication.  This should not interfere with any driving ability or work requirements.  If you have any questions please feel free to call my office at number above.        Sincerely,        Jessy Brenner MD

## 2019-01-08 NOTE — TELEPHONE ENCOUNTER
Left message for patient to return call. Which medication is she referring to?  Do not see we have been informed that patient needs PA or alternative medication. Chula Lara MA

## 2019-01-08 NOTE — TELEPHONE ENCOUNTER
Patient called and left VM on RN Hotline.   Patient states insurance does not cover her headache medication (did not specify what medication, medrol posepak?)  Patient wondering if Sarah Montemayor CNP wrote an appeal letter or if  PA was done.     Deedee Mott RN
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
## 2019-01-09 NOTE — TELEPHONE ENCOUNTER
"Pt called RN hotline and left a message at [phone] stating she is out of medication and would like a refill sent.     Prescription approved per [facility] Refill Protocol.    Requested Prescriptions   Signed Prescriptions Disp Refills     omeprazole (PRILOSEC) 40 MG DR capsule 90 capsule 0     Sig: TAKE 1 CAPSULE(40 MG) BY MOUTH DAILY 30 TO 60 MINUTES BEFORE A MEAL    PPI Protocol Passed - 1/9/2019  7:13 AM       Passed - Not on Clopidogrel (unless Pantoprazole ordered)       Passed - No diagnosis of osteoporosis on record       Passed - Recent (12 mo) or future (30 days) visit within the authorizing provider's specialty    Patient had office visit in the last 12 months or has a visit in the next 30 days with authorizing provider or within the authorizing provider's specialty.  See \"Patient Info\" tab in inbasket, or \"Choose Columns\" in Meds & Orders section of the refill encounter.             Passed - Medication is active on med list       Passed - Patient is age 18 or older       Passed - No active pregnacy on record       Passed - No positive pregnancy test in past 12 months        Cyndi Watson RN  [facility]    "

## 2019-01-09 NOTE — TELEPHONE ENCOUNTER
Attempt 2, called patient and left VM to call clinic. Okay to speak to anyone on pink team.  Cyndi NGUYỄN CMA ([facility])

## 2019-01-09 NOTE — TELEPHONE ENCOUNTER
Prior Authorization Retail Medication Request    Medication/Dose: ZOLMitriptan (ZOMIG) 5 MG tablet  ICD code (if different than what is on RX):    Previously Tried and Failed:    Rationale:      Insurance Name:  [phone]  Insurance ID:  [id number]      Pharmacy Information (if different than what is on RX)  Name:    Phone:

## 2019-01-10 NOTE — TELEPHONE ENCOUNTER
Called patient and left message to call pink team back.       Giovanna HOOD CMA ([facility])

## 2019-01-23 NOTE — TELEPHONE ENCOUNTER
Patient called and left VM on RN Hotline.   Patient states her Zomig prescription is not covered by her insurance and needs this covered.   Call back # [phone]    Deedee Mott RN

## 2019-01-24 NOTE — TELEPHONE ENCOUNTER
Prior Authorization Retail Medication Request    Medication/Dose: ZOLMitriptan (ZOMIG) 5 MG tablet  ICD code (if different than what is on RX):    Previously Tried and Failed:    Rationale:      Insurance Name:  [phone]  Insurance ID:  [id number]      Pharmacy Information (if different than what is on RX)  Name:    Phone:

## 2019-01-29 NOTE — TELEPHONE ENCOUNTER
Central Prior Authorization Team   Phone: [phone]      PA Initiation    Medication: Zomig  Insurance Company: [facility] - Phone [phone] Fax [phone]  Pharmacy Filling the Rx: [facility] [address] [place] [address] - [address] [place] [address] & [facility]  Filling Pharmacy Phone: [phone]  Filling Pharmacy Fax:    Start Date: 1/29/2019

## 2019-01-29 NOTE — TELEPHONE ENCOUNTER
Prior Authorization Approval    Authorization Effective Date: 1/4/2019  Authorization Expiration Date: 1/4/2020  Medication: Zomig  Approved Dose/Quantity:    Reference #: [id number]   Insurance Company: [employer] [place] - Phone [phone] Fax [phone]  Expected CoPay:       CoPay Card Available:      Foundation Assistance Needed:    Which Pharmacy is filling the prescription (Not needed for infusion/clinic administered): [facility] [address] [place] [address]  Pharmacy Notified: Yes  Patient Notified: Yes

## 2019-01-29 NOTE — TELEPHONE ENCOUNTER
Patient left message on RN hotline stating her insurance does not cover migraine medication she is asking that this be approved.  Call back [phone]. Called patient and left detailed message advising that her medication requires prior authorization and this is being worked on.   Tita Brown RN

## 2019-02-01 NOTE — TELEPHONE ENCOUNTER
Routing refill request to provider for review/approval because:  Drug not on the [facility] [place] protocol       Requested Prescriptions   Pending Prescriptions Disp Refills     methocarbamol (ROBAXIN) 500 MG tablet [Pharmacy Med Name: METHOCARBAMOL 500MG TABLETS] 90 tablet 0     Sig: TAKE 1 TABLET(500 MG) BY MOUTH THREE TIMES DAILY    There is no refill protocol information for this order        Sherrill Torres, ANK[facility] - [facility]

## 2019-02-01 NOTE — TELEPHONE ENCOUNTER
Refill for: gabapentin (NEURONTIN) 300 MG capsule    Last Appointment: 1/8/19    Next Appointment: 2/5/19    No Shows/Cancellations since last appointment:  none    Last Refill in Epic (date and amount/how many days): 1/8/19 #180 with no refills    Most Recent UDS results: + for buprenorphine on 1/8/19     reviewed and summarized below:     Fill Date Written Drug Qty Days Prescriber   01/07/2019 12/04/2018  Buprenorphin-Naloxon 8-2 MG SL  60 30 Ei Bur    01/03/2019 01/03/2019  Gabapentin 300 MG Capsule  90 30 Ei Bur    01/03/2019 01/03/2019  Oxycodone-Acetaminophen 5-325  10 2 Ch Lic    12/06/2018 12/05/2018  Gabapentin 300 MG Capsule  90 30 Ei Nery Callahan RN  02/01/19  10:16 AM

## 2019-02-01 NOTE — TELEPHONE ENCOUNTER
Controlled Substance Refill Request for methocarbamol (ROBAXIN) 500 MG tablet  Problem List Complete:    No     PROVIDER TO CONSIDER COMPLETION OF PROBLEM LIST AND OVERVIEW/CONTROLLED SUBSTANCE AGREEMENT    Last Written Prescription Date:  1/4/2019  Last Fill Quantity: 90,   # refills: 0      Last Office Visit with [facility] primary care provider: 1/8/19    Future Office visit:   Next 5 appointments (look out 90 days)    Feb 05, 2019  8:00 AM CST  Return Visit with Jessy Brenner MD  [facility] [place] [facility] ([facility]) [address] [facility] [address]  [phone]          Controlled substance agreement:   Encounter-Level CSA:    There are no encounter-level csa.     Patient-Level CSA:    There are no patient-level csa.         Last Urine Drug Screen:   Pain Drug SCR UR W RPTD Meds   Date Value Ref Range Status   06/16/2014   Final    FINAL  (Note)  Test                         Result      Flag        UNITS  ====================================================================   Creatinine                 183                     mg/dL    Drugs Present but Not Declared for Prescription Verification    Carboxy-THC               4          UNEXPECTED   ng/mg creat  Carboxy-THC is a metabolite of tetrahydrocannabinol    (THC).  Source of THC is most commonly illicit, but THC    is also present in a scheduled prescription medication.    Buprenorphine             4          UNEXPECTED   ng/mg creat    Norbuprenorphine          20         UNEXPECTED   ng/mg creat  Source of buprenorphine is a scheduled prescription    medication. Norbuprenorphine is an expected metabolite    of buprenorphine.    Diphenhydramine           PRESENT    UNEXPECTED    Drugs Absent but Declared for Prescription Verification    Hydrocodone               Not Detected UNEXPECTED ng/mg creat    Oxycodone                 Not Detected UNEXPECTED ng/mg creat    Acetaminophen             Not Detected UNEXPECTED     Declared  Medications:  The flagging and interpretation on this report are based on  the following declared medications. Unexpected results may  arise from inaccuracies in the declared medications.    Oxycodone  Vicodin (hydrocodone/acetaminophen)              For clinical consultation, please call                    [phone].    Analysis performed by [facility] [place] [address]     , No results found for: COMDAT,   Cannabinoids (80-scb-4-carboxy-9-THC)   Date Value Ref Range Status   01/08/2019 Not Detected NDET^Not Detected ng/mL Final     Comment:     Cutoff for a negative cannabinoid is 50 ng/mL or less.     Phencyclidine (Phencyclidine)   Date Value Ref Range Status   01/08/2019 Not Detected NDET^Not Detected ng/mL Final     Comment:     Cutoff for a negative PCP is 25 ng/mL or less.     Cocaine (Benzoylecgonine)   Date Value Ref Range Status   01/08/2019 Not Detected NDET^Not Detected ng/mL Final     Comment:     Cutoff for a negative cocaine is 150 ng/ml or less.     Methamphetamine (d-Methamphetamine)   Date Value Ref Range Status   01/08/2019 Not Detected NDET^Not Detected ng/mL Final     Comment:     Cutoff for a negative methamphetamine is 500 ng/ml or less.     Opiates (Morphine)   Date Value Ref Range Status   01/08/2019 Not Detected NDET^Not Detected ng/mL Final     Comment:     Cutoff for a negative opiate is 100 ng/ml or less.     Amphetamine (d-Amphetamine)   Date Value Ref Range Status   01/08/2019 Not Detected NDET^Not Detected ng/mL Final     Comment:     Cutoff for a negative amphetamine is 500 ng/mL or less.     Benzodiazepines (Nordiazepam)   Date Value Ref Range Status   01/08/2019 Not Detected NDET^Not Detected ng/mL Final     Comment:     Cutoff for a negative benzodiazepine is 150 ng/ml or less.     Tricyclic Antidepressants (Desipramine)   Date Value Ref Range Status   01/08/2019 Not Detected NDET^Not Detected ng/mL Final     Comment:     Cutoff for a negative tricyclic  antidepressant is 300 ng/ml or less.     Methadone (Methadone)   Date Value Ref Range Status   01/08/2019 Not Detected NDET^Not Detected ng/mL Final     Comment:     Cutoff for a negative methadone is 200 ng/ml or less.     Barbiturates (Butalbital)   Date Value Ref Range Status   01/08/2019 Not Detected NDET^Not Detected ng/mL Final     Comment:     Cutoff for a negative barbituate is 200 ng/ml or less.     Oxycodone (Oxycodone)   Date Value Ref Range Status   01/08/2019 Not Detected NDET^Not Detected ng/mL Final     Comment:     Cutoff for a negative Oxycodone is 100 ng/mL or less.     Propoxyphene (Norpropoxyphene)   Date Value Ref Range Status   01/08/2019 Not Detected NDET^Not Detected ng/mL Final     Comment:     Cutoff for a negative propoxyphene is 300 ng/ml or less     Buprenorphine (Buprenorphine)   Date Value Ref Range Status   01/08/2019 Detected, Abnormal Result (A) NDET^Not Detected ng/mL Final     Comment:     Cutoff for a positive buprenorphine is greater than 10 ng/ml.  This is an unconfirmed screening result to be used for medical purposes only.   Order [id number] for confirmation or individual confirmation tests to [facility].           [email or web address]       checked in past 3 months?  No, route to RN

## 2019-02-01 NOTE — TELEPHONE ENCOUNTER
Requested Prescriptions   Pending Prescriptions Disp Refills     gabapentin (NEURONTIN) 300 MG capsule Last Written Prescription Date:  19  Last Fill Quantity: 180,  # refills: 0   Last office visit: 3/19/2018 with prescribing provider:     Future Office Visit:   Next 5 appointments (look out 90 days)    2019  8:00 AM CST  Return Visit with Jessy Brenner MD  [facility] [place] ([facility] [place]) [address] [facility] [address]  [place] [address]  [phone]          180 capsule 0     Si-600mg am and mid day and 900mg q hs    There is no refill protocol information for this order

## 2019-02-04 NOTE — PROGRESS NOTES
SUBJECTIVE:                                                    BUPRENORPHINE FOLLOW UP:    Coretta Tovar is a 48 year old female who presents to clinic today for follow up of Buprenorphine.      Date of last visit:  1/8/2019    [place] Board of Pharmacy Data Base Reviewed:    YES;   1/7/19   Suboxone 8mg tab 60          Brief History:   Hx of multiple surgeries (>15).   Since age 14  9 surgeries on right foot in past 15months for cpmplex fx ankle and foot.  C spine fusion many years ago.   Hx migraine headache.  S/p hysterectomy, tonsillectomy, splenectomy, ear surgeries.  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             HPI:   2/5/2019  Work is going well.  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    Was being evaluated for possibleseronegative rheumatoid arthritis or DARON associated autoimmune connective tissue disorder.    Went to rheumatology in October and has been on prednisone taper and now off.  No current sx. Missed follow up in December.  Needs contact info to reschedule.   Still has not followed up with Neurology about chronic headaches.  Needs refer reprinted.   Taking Chantix again.  Currently about 1/2 pk.    Robaxin prn.  Need refill.  Gabapentin 300mg am and 600mg q hs.  Trazodone at hs.    Took Oxycodone 2 tab for acute migraine this weekend.   Denies other use.    Not attending any meetings or recovery support.           Social History     Social History Narrative    Lives with dad and brother in [place].      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      Daughter with recent DWI (she has child).        Patient Active Problem List    Diagnosis Date Noted     History of cocaine dependence 09/07/2017     Priority: Medium     Moderate episode of recurrent major depressive disorder (H) 03/22/2017     Priority: Medium     Generalized anxiety disorder 03/22/2017     Priority: Medium     Protein-calorie malnutrition (H) 03/22/2017     Priority: Medium     Primary insomnia 03/22/2017      Priority: Medium     Vertigo 03/14/2017     Priority: Medium     Cervical pain 03/08/2017     Priority: Medium     Disorder of stomach 11/20/2016     Priority: Medium     Overview:   Per Biopsy obtained on EGD 11/22/2016. Reactive gastropathy was quoted to be frequently associated with ongoing non-inflammatory type mucosal injury due to a chemical type of injury; this may be due to ingestion of non-steroidal anti-inflammatory drugs, aspirin, excess alcohol, or corticosteroids.        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     Priority: Medium     Chemical dependency (H) 02/24/2015     Priority: Medium     Insomnia 02/24/2015     Priority: Medium     S/P splenectomy 01/13/2015     Priority: Medium     Migraines      Priority: Medium     Idiopathic thrombocytopenic purpura (HCC)      Priority: Medium     s/p splenectomy       Tobacco abuse 05/30/2014     Priority: Medium     Immune thrombocytopenic purpura (H) 01/14/2008     Priority: Medium     Personality disorder (H) 01/14/2008     Priority: Medium     Overview:   cluster B traits  borderline, antisocial       Nondependent cannabis abuse, episodic 01/14/2008     Priority: Medium     Mixed, or nondependent drug abuse 01/26/2007     Priority: Medium     Overview:   recurrent issues with opioids.  History of crack cocaine abuse.       Gastroesophageal reflux disease 12/20/2006     Priority: Medium     Herpetic gingivostomatitis 12/20/2006     Priority: Medium     Muscle pain 12/20/2006     Priority: Medium     Atopic rhinitis 12/20/2006     Priority: Medium     Abnormal weight loss 12/20/2006     Priority: Medium       Problem list and histories reviewed & adjusted, as indicated.  Additional history: as documented        Current Outpatient Medications on File Prior to Visit:  acyclovir (ZOVIRAX) 5 % ointment Apply 1 g topically as needed   albuterol (PROAIR HFA/PROVENTIL  HFA/VENTOLIN HFA) 108 (90 Base) MCG/ACT Inhaler Inhale 1-2 puffs into the lungs every 4 hours as needed for shortness of breath / dyspnea or wheezing   betamethasone valerate (VALISONE) 0.1 % ointment Apply once a day for 2 weeks as directed   buprenorphine-naloxone (SUBOXONE) 8-2 MG SUBL sublingual tablet Place 1 tablet under the tongue 2 times daily   busPIRone HCl (BUSPAR) 30 MG tablet Take 1 tablet (30 mg) by mouth 2 times daily   cetirizine (ZYRTEC) 10 MG tablet Take 1 tablet (10 mg) by mouth every evening   clobetasol (TEMOVATE) 0.05 % ointment APPLY TOPICALLY BID   cloNIDine (CATAPRES) 0.1 MG tablet Take 1 tablet (0.1 mg) by mouth At Bedtime Prn late withdrawal symptoms.   docusate sodium (DOK) 100 MG capsule TAKE 1 CAPSULE BY MOUTH TWICE DAILY AS NEEDED FOR CONSTIPATION   FLUoxetine (PROZAC) 20 MG capsule TAKE 1 CAPSULE BY MOUTH DAILY WITH FLUOXETINE 40MG FOR A TOTAL OF 60MG DAILY   FLUoxetine (PROZAC) 40 MG capsule TAKE 1 CAPSULE BY MOUTH DAILY WITH 20 MG CAPSULE OF FLUOXETINE TO EQUAL TOTAL DAILY DOSE OF 60MG   fluticasone (FLONASE) 50 MCG/ACT spray Spray 2 sprays into both nostrils daily   gabapentin (NEURONTIN) 300 MG capsule 300-600mg am and mid day and 900mg q hs   hydrocortisone 2.5 % cream    hydrOXYzine (ATARAX) 25 MG tablet TAKE 2 TABLETS BY MOUTH NIGHTLY AS NEEDED FOR ANXIETY   hydrOXYzine (VISTARIL) 25 MG capsule Take 2 capsules (50 mg) by mouth nightly as needed for anxiety (Patient not taking: Reported on 1/8/2019)   ipratropium - albuterol 0.5 mg/2.5 mg/3 mL (DUONEB) 0.5-2.5 (3) MG/3ML neb solution Take 1 vial (3 mLs) by nebulization every 6 hours as needed for shortness of breath / dyspnea or wheezing   ketoconazole (NIZORAL) 2 % cream    levocetirizine (XYZAL) 5 MG tablet Take 1 tablet (5 mg) by mouth every evening   loratadine (CLARITIN) 10 MG tablet Take 1 tablet (10 mg) by mouth daily as needed for allergies   methocarbamol (ROBAXIN) 500 MG tablet TAKE 1 TABLET(500 MG) BY MOUTH THREE  TIMES DAILY   methylPREDNISolone (MEDROL DOSEPAK) 4 MG tablet therapy pack Follow Package Directions   [] methylPREDNISolone acetate (DEPO-MEDROL) 80 MG/ML injection 1 mL (80 mg) by INTRA-ARTICULAR route once for 1 dose   Neomycin-Bacitracin-Polymyxin ([employer] TRIPLE ANTIBIOTIC) OINT Externally apply topically 4 times daily   olopatadine (PATANOL) 0.1 % ophthalmic solution Place 1 drop into both eyes 2 times daily (Patient not taking: Reported on 2019)   omeprazole (PRILOSEC) 40 MG DR capsule TAKE 1 CAPSULE(40 MG) BY MOUTH DAILY 30 TO 60 MINUTES BEFORE A MEAL   ondansetron (ZOFRAN-ODT) 4 MG ODT tab Take 1-2 tablets (4-8 mg) by mouth every 8 hours as needed for nausea   order for DME Equipment being ordered: Left thumb brace   polyethylene glycol (MIRALAX) powder Take 17 g (1 capful) by mouth daily (Patient not taking: Reported on 2019)   [] predniSONE (DELTASONE) 20 MG tablet Take 2 tablets (40 mg) by mouth daily for 5 days   predniSONE (DELTASONE) 5 MG tablet 4tab=20 mg daily x 7 days, 3tab=15 mg daily x 7 days, 2tab=10 mg daily x 7 days, 1 tab=5 mg daily   terbinafine (LAMISIL) 1 % external cream Apply topically 2 times daily   traZODone (DESYREL) 100 MG tablet Take 1 tablet (100 mg) by mouth At Bedtime   triamcinolone (KENALOG) 0.1 % ointment Apply sparingly to affected area on back two times daily til clear.   valACYclovir (VALTREX) 1000 mg tablet Take 1 tablet (1,000 mg) by mouth 2 times daily At the onset of skin lesions as needed   varenicline (CHANTIX) 1 MG tablet Take 1 tablet (1 mg) by mouth 2 times daily (Patient not taking: Reported on 2019)   ZOLMitriptan (ZOMIG) 5 MG tablet TAKE 1 TABLET BY MOUTH AT ONSET OF HEADACHE FOR MIGRAINE. MAY REPEAT DOSE IN 2 HOURS. DO NOT EXCEED 10MG IN 24 HOURS     No current facility-administered medications on file prior to visit.     Allergies   Allergen Reactions     Acetaminophen-Codeine Hives     Amoxicillin-Pot Clavulanate Nausea and Vomiting      Augmentin Nausea and Vomiting and Hives     Barbiturates      Bentyl [Dicyclomine] Other (See Comments)     dizziness     Compazine      Lock-jaw     Liquid Adhesive      Other reaction(s): Other, see comments  blisters     No Clinical Screening - See Comments      PN: LW Reaction: blisters  PN: LW FI1: nka  PN: LW CM1: CONTRAST- nka Reaction :     Nsaids      D/t ITP     Prochlorperazine      Other reaction(s): Edema     Propoxyphene N-Apap Hives     Sulfa Drugs      Tramadol Hcl Hives            Adhesive Tape Rash         REVIEW OF SYSTEMS:  General: No acute withdrawal symptoms.  No recent infections or fever  Resp: No coughing, wheezing or shortness of breath  CV: No chest pains or palpitations  GI: No nausea, vomiting, abdominal pain, diarrhea, constipation  : No urinary frequency or dysuria,     Musculoskeletal: No significant muscle or joint pains, No edema  Neurologic: No numbness, tingling, weakness, problems with balance or coordination  Psychiatric: No acute concerns  Skin: No rashes    OBJECTIVE:    PHYSICAL EXAM:  /65 (BP Location: Left arm, Patient Position: Sitting, Cuff Size: Adult Regular)   Pulse 89   Temp 97.6  F (36.4  C)   SpO2 99%     GENERAL APPEARANCE:  alert, comfortable appearing  EYES:Eyes grossly normal to inspection  NEURO:  Gait normal.  No tremor. Coordination intact.   MENTAL STATUS EXAM:  Appearance/Behavior: No appearant distress  Speech: Normal  Mood/Affect: normal affect  Insight: Adequate      Results for orders placed or performed in visit on 02/05/19   Urine Drugs of Abuse Screen Panel 13   Result Value Ref Range    Cannabinoids (62-qxq-8-carboxy-9-THC) Not Detected NDET^Not Detected ng/mL    Phencyclidine (Phencyclidine) Not Detected NDET^Not Detected ng/mL    Cocaine (Benzoylecgonine) Not Detected NDET^Not Detected ng/mL    Methamphetamine (d-Methamphetamine) Not Detected NDET^Not Detected ng/mL    Opiates (Morphine) Not Detected NDET^Not Detected ng/mL     Amphetamine (d-Amphetamine) Not Detected NDET^Not Detected ng/mL    Benzodiazepines (Nordiazepam) Not Detected NDET^Not Detected ng/mL    Tricyclic Antidepressants (Desipramine) Not Detected NDET^Not Detected ng/mL    Methadone (Methadone) Not Detected NDET^Not Detected ng/mL    Barbiturates (Butalbital) Not Detected NDET^Not Detected ng/mL    Oxycodone (Oxycodone) Detected, Abnormal Result (A) NDET^Not Detected ng/mL    Propoxyphene (Norpropoxyphene) Not Detected NDET^Not Detected ng/mL    Buprenorphine (Buprenorphine) Detected, Abnormal Result (A) NDET^Not Detected ng/mL           ASSESSMENT/PLAN:         (F11.20) Opioid use disorder, severe, dependence (H)  (primary encounter diagnosis)   Suboxone  8mg bid.  Rx #60      Try split dosing for better pain control  Safe storage reviewed.   Follow up 4 wk    Strongly encouraged no further opioid.         Counseled the patient on the importance of having a recovery program in addition to Medication assisted treatment.  Components include having some type of sober network, avoiding isolating, having willingness  to change, avoiding triggers and managing cravings.  Strongly recommended abstaining from alcohol, benzodiazepines, THC, opioids and other drugs of abuse.       Opioid warning reviewed.  Risk of overdose following a period of abstinence due to decrease tolerance was discussed including risk of death.   Risk of overdose if using Suboxone with other substances particuarly benzodiazepines/alcohol was reviewed.           (F14.21) History of cocaine dependence (H)  Plan: no recent use.  Encourage ongoing sobriety      (F33.1) Moderate episode of recurrent major depressive disorder (H)  (F41.1) Generalized anxiety disorder  Plan: Recommendations as above.   Follow with PCP/psychiatry needed.   Encouraged to schedule.   Encouraged mental health counseling.        (F17.200) Nicotine use disorder  Comment: not ready to quit  Plan: Encouraged Abstinence.  Increase risk of  relapse with other substances with return to nicotine use discussed.  Risk of Ecig/Vaping also reviewed.    Continue Chantix.      Joint  pain.  Follow up with Rheumatology -call for appt asap  Encouraged follow up       (G43.009) Hx of chronic headache/  (V89.2XXD) .   Robaxin 500mg q six hour prn.   Encourage stretching, massage, warm moist heat as able.     Refer to [facility] neurology-order printed again. .      (F51.01)  Insomnia  Trazodone as rx previously.   Continue gabapentin 300mg am and 600mg pm.    Risk of polypharmacy discussed.  Other sleep hygeine, caffeine intake, non smoking also reviewed.    Follow up with PCP     (L74.531) High risk medication use        ENCOUNTER FOR LONG TERM USE OF HIGH RISK MEDICATION   High Risk Drug Monitoring?  YES   Drug being monitored: Buprenorphine   Reason for drug: Opioid Use Disorder   What is being monitored?: Dosage, Cravings, Trigger, side effects, and continued abstinence.      Opioid warning reviewed.  Risk of overdose following a period of abstinence due to decrease tolerance was discussed including risk of death.   Risk of overdose if using Suboxone with other substances particuarly benzodiazepines/alcohol was reviewed.        Jessy Brenner MD  [facility]  [phone]

## 2019-02-13 NOTE — TELEPHONE ENCOUNTER
Pharmacy closed. Please call after 9 to confirm duplicate.    valACYclovir (VALTREX) 1000 mg tablet 8 tablet 11 1/4/2019  No   Sig - Route: Take 1 tablet (1,000 mg) by mouth 2 times daily At the onset of skin lesions as needed - Oral   Sent to pharmacy as: valACYclovir (VALTREX) 1000 mg tablet   Class: E-Prescribe   Order: [id number]   E-Prescribing Status: Receipt confirmed by pharmacy (1/4/2019 12:01 PM CST)       Cyndi NGUYỄN CMA ([facility])

## 2019-02-13 NOTE — TELEPHONE ENCOUNTER
1st attempts: Called and left message for patient to return call to [employer].    Digna Camarillo MA

## 2019-02-13 NOTE — TELEPHONE ENCOUNTER
nitroFURantoin macrocrystal-monohydrate (MACROBID) 100 MG capsule (Discontinued)      Last Written Prescription Date:  12/4/2018  Last Fill Quantity: 14 capsules,   # refills: 0  Last Office Visit: 1/4/2019  Future Office visit:    Next 5 appointments (look out 90 days)    Mar 05, 2019  9:00 AM CST  Return Visit with Jessy Brenner MD  [facility] [place] ([facility] [place]) [address]  [place] [address]  [phone]           Routing refill request to provider for review/approval because:  Drug not on the [facility], [facility] or  Health refill protocol or controlled substance  Drug not active on patient's medication list

## 2019-02-13 NOTE — TELEPHONE ENCOUNTER
Called and verified with pharmacy on duplicate prescription for valACYclovir (VALTREX) 1000 mg tablet. Please disregard.

## 2019-02-13 NOTE — TELEPHONE ENCOUNTER
"Requested Prescriptions   Pending Prescriptions Disp Refills     fluticasone (FLONASE) 50 MCG/ACT nasal spray  Last Written Prescription Date:  2/5/18  Last Fill Quantity: 1 bottle,  # refills: 11   Last office visit: 2/5/2019 with prescribing provider:     Future Office Visit:   Next 5 appointments (look out 90 days)    Mar 05, 2019  9:00 AM CST  Return Visit with Jessy Brenner MD  [facility] ([facility]) [address] [facility] [address]  [phone]          1 Bottle 11     Sig: Spray 2 sprays into both nostrils daily    Inhaled Steroids Protocol Passed - 2/13/2019  9:08 AM       Passed - Patient is age 12 or older       Passed - Asthma control assessment score within normal limits in last 6 months    Please review ACT score.          Passed - Medication is active on med list       Passed - Recent (6 mo) or future (30 days) visit within the authorizing provider's specialty    Patient had office visit in the last 6 months or has a visit in the next 30 days with authorizing provider or within the authorizing provider's specialty.  See \"Patient Info\" tab in inbasket, or \"Choose Columns\" in Meds & Orders section of the refill encounter.            ]  "

## 2019-02-13 NOTE — TELEPHONE ENCOUNTER
"Requested Prescriptions   Pending Prescriptions Disp Refills     acyclovir (ZOVIRAX) 5 % external ointment [Pharmacy Med Name: ACYCLOVIR 5% OINTMENT 15GM] 15 g 0     Sig: APPLY EXTERNALLY TO THE AFFECTED AREA 6 TIMES DAILY    There is no refill protocol information for this order        ZOLMitriptan (ZOMIG) 5 MG tablet [Pharmacy Med Name: ZOLMITRIPTAN 5MG TABLETS] 12 tablet 0     Sig: TAKE 1 TABLET BY MOUTH AT ONSET OF HEADACHE FOR MIGRAINE MAY REPEAT DOSE IN 2 HOURS DO NOT EXCEED 10MG IN 24 HOURS    Serotonin Agonists Failed - 2/13/2019  8:36 AM       Failed - Serotonin Agonist request needs review.    Please review patient's record. If patient has had 8 or more treatments in the past month, please forward to provider.         Failed - Recent (12 mo) or future (30 days) visit within the authorizing provider's specialty    Patient had office visit in the last 12 months or has a visit in the next 30 days with authorizing provider or within the authorizing provider's specialty.  See \"Patient Info\" tab in inbasket, or \"Choose Columns\" in Meds & Orders section of the refill encounter.         Passed - Blood pressure under 140/90 in past 12 months    BP Readings from Last 3 Encounters:   02/05/19 111/65   01/08/19 139/73   01/04/19 130/80          Passed - Medication is active on med list       Passed - Patient is age 18 or older       Passed - No active pregnancy on record       Passed - No positive pregnancy test in past 12 months        acyclovir (ZOVIRAX) 5 % ointment      Last Written Prescription Date:  11/30/2017  Last Fill Quantity: 30g,   # refills: 11  Last Office Visit: 01/04/2019  Future Office visit:    Next 5 appointments (look out 90 days)    Mar 05, 2019  9:00 AM CST  [facility] Visit with Jessy Brenner MD  [facility] [place] [facility] [place] [address] [facility]  [place] [address] [phone]       Routing refill request to provider for review/approval because:  Drug not on " the [facility], [facility] or [facility] refill protocol or controlled substance  Break in medication, last prescribe 11/2017    ZOLMitriptan (ZOMIG) 5 MG tablet  Rx was sent 01/04/2019 for 12 tabs and 4 refills.   Pharmacy notified via E-prescribe refusal.  aKsie Randall RN, BSN

## 2019-02-13 NOTE — LETTER
February 18, 2019    Coretta Tovar  [address] [facility] [address]      Dear Coretta,    We have been unsuccessful reaching you by telephone. You will need to be seen for refills on nitroFURantoin macrocrystal-monohydrate (MACROBID) 100 MG capsule. Please call to schedule to be seen if you have are having symptoms of a Urinary Tract Infection. You can schedule this one of a few ways. First using My Chart Scheduling, second is calling our Main number [phone] this line is open for scheduling 24 hours 7 days a week. Please let us know if you are getting care elsewhere.      Sincerely,        Sarah Montemayor CNP/cecelia

## 2019-02-14 NOTE — TELEPHONE ENCOUNTER
Prior Authorization Retail Medication Request    Medication/Dose: omeprazole (PRILOSEC) 40 MG DR capsule  ICD code (if different than what is on RX):  Gastroesophageal reflux disease, esophagitis presence not specified [K21.9]   Previously Tried and Failed:    Rationale:  PA needed. Plan does not cover this medication. Would you like to change medication or start a PA      Insurance Name:  [employer] [place] [employer] [[id number]]  Insurance ID:  [id number]      Pharmacy Information (if different than what is on RX)  Name:  [facility]  Phone:  [phone]

## 2019-02-15 NOTE — TELEPHONE ENCOUNTER
Can we get more information from patient regarding what else she has tried and send to prior auth team?    Thanks,  Sarah Montemayor, CNP

## 2019-02-18 NOTE — TELEPHONE ENCOUNTER
Patient called RN hotline stating that she is trying to call Dr. Hernandez's office to schedule her colonoscopy at the [place] [facility] [place] location on [facility]  She stated that she has a referral but the number she has is not working  Looked up this location and phone number to appointment line give     [facility]  [facility]on and [facility]al [facility]   [address]  ?[address] [place] [address]   Appointments: [phone]     Val Teran RN

## 2019-02-20 NOTE — TELEPHONE ENCOUNTER
Multiples attempts made to contact patient. Routing to PA team.  Cyndi NGUYỄN CMA ([facility])

## 2019-02-21 NOTE — TELEPHONE ENCOUNTER
Pt is going to [facility] on [address] for a colonoscopy, order says special screening for malignant Neoplasm. She is a restricted patient. Patient is being seen on 02/25/2019     Thank you.     Any question [phone] xt 2616. Linda.

## 2019-02-26 NOTE — TELEPHONE ENCOUNTER
Central Prior Authorization Team   Phone: [phone]      PA Initiation    Medication: omeprazole (PRILOSEC) 40 MG DR capsule  Insurance Company: [facility] - Phone [phone] Fax [phone]  Pharmacy Filling the Rx: [facility] [address] [place], [place] [address]  Filling Pharmacy Phone: [phone]  Filling Pharmacy Fax: [phone]  Start Date: 2/26/2019

## 2019-02-26 NOTE — TELEPHONE ENCOUNTER
PRIOR AUTHORIZATION DENIED    Medication: omeprazole (PRILOSEC) 40 MG DR capsule    Denial Date: 2/26/2019    Denial Rational:  Plan limits one tablet per day for a maximum of 120 days within 365.      Appeal Information:    If you would like to appeal, please supply P/A team with a letter of medical necessity with clinical reason.

## 2019-02-28 NOTE — TELEPHONE ENCOUNTER
"Spoke to patient and informed her of message. She is going to check with insurance to see what is covered because she does not want to pay out of pocket for medication. Patient also is wondering if provider placed a \"referal\" for her to see Dr. Del Angel as she is restricted to him Sarah Montemayor for rx's. Please advise.  Cyndi NGUYỄN CMA ([facility])    "

## 2019-03-01 NOTE — TELEPHONE ENCOUNTER
Called and spoke with patient. Informed patient of message and patient verbally understand.  Digna Camarillo MA

## 2019-03-04 NOTE — TELEPHONE ENCOUNTER
3 /26 is patients rescheduled appointment instead of tomorrow , due to a school conflict.  Medication Suboxone will be gone after tomorrow, caller needs to get it filled. Let her know I can't fill it, but I can send a message to your Dr. Caller already did that, I need to know if it's done or when it's planning on being done. Told her it was sent, she hung up.    Saira Sebastian RN/ [facility]      Reason for Disposition    Caller requesting a NON-URGENT new prescription or refill and triager unable to refill per unit policy    Protocols used: MEDICATION QUESTION CALL-ADULT-AH

## 2019-03-04 NOTE — TELEPHONE ENCOUNTER
Refill for: Suboxone    Last Appointment: 2/5/19    Next Appointment: 3/26/2019    No Shows/Cancellations since last appointment:  Cancel 3/5/19    Last Refill in Epic (date and amount/how many days):   buprenorphine-naloxone (SUBOXONE) 8-2 MG SUBL sublingual tablet 60 each 1 2/5/2019  No   Sig - Route: Place 1 tablet under the tongue 2 times daily - Sublingual   Class: Local Print   Order: [id number]         Most Recent UDS results: 2/5/19 POS for Buprenorphine and Oxycodone     reviewed and summarized below:   Fill Date  Written                                Drug                         Qty Days Prescriber   02/05/2019 02/05/2019 Buprenorphin-Naloxon 8-2 Mg Sl 60 30 Ei Bur

## 2019-03-04 NOTE — TELEPHONE ENCOUNTER
Reason for Call:  Medication or medication refill:    Do you use a [facility]?  Name of the pharmacy and phone number for the current request:  [facility] in [place]  Tel: [phone]    Name of the medication requested: Subx bridge     Other request: pt cancelled her appt on 3/5 because she has classes. Pt will run out of med on 3/5 and will need a bridge until her next appt 3/36.     Can we leave a detailed message on this number? YES    Phone number patient can be reached at: Home number on file [phone] (home)    Best Time: anytime     Call taken on 3/4/2019 at 9:20 AM by Samm Alcala

## 2019-03-04 NOTE — TELEPHONE ENCOUNTER
"Patient left message stating she has an appointment on 3/11/19 with Dr. Hernandez for colonoscopy and she is restricted to Sarah Montemayor so she will need a referral.  Patient states \"Dr. Hernandez is not on my list\".  Patient would like this done ASAP.   Routing to referrals due to patient being restricted.   Tita Brown RN    "

## 2019-03-04 NOTE — TELEPHONE ENCOUNTER
Pt called and left a message on RN hotline. States none of her acid reflux medications are covered anymore. Provider needs to do a pre-authorization for omeprazole or whatever is recommended. Please advise if you would like to do PA for Omeprazole.     Cyndi Watson RN  [facility]

## 2019-03-05 NOTE — TELEPHONE ENCOUNTER
Patient needs a referral to this doctor. Referral department put one in for [place] [facility] but this doctor is with [facility]

## 2019-03-05 NOTE — TELEPHONE ENCOUNTER
Order pending for GI referral for Dr. Hernandez due to patients restricted insurance.   Tita Brown RN

## 2019-03-06 NOTE — TELEPHONE ENCOUNTER
Central Prior Authorization Team   Phone: [phone]      PA Initiation    Medication: Omeprazole-Initiated  Insurance Company: [employer] - Phone [phone] Fax [phone]  [facility] the Rx[facility] [address] [place], [place] - [address] [place] [address]  Filling Pharmacy Phone: [phone]  Filling Pharmacy Fax:    Start Date: 3/6/2019

## 2019-03-08 NOTE — TELEPHONE ENCOUNTER
PRIOR AUTHORIZATION DENIED    Medication: Omeprazole-DENIED    Denial Date: 3/8/2019    Denial Rational: Paperwork states this is an appeal denial.  Patient had a denial on file, please see telephone encounter from 2/14/19.  Requested quantity is larger/greater than allowed by insurance.  All PPIs are covered up to 1 per day for 120 days within 365 days.        Appeal Information:     Second level appeals will be handled by the clinic.

## 2019-03-08 NOTE — TELEPHONE ENCOUNTER
Spoke to patient and informed her of message. She is going to check with insurance to see if they will cover anything. She also stated she received an ointment/cream for toe fungus and it is not helping. She is wondering if the pills could be sent in. She did not know which medication is called.  Cyndi NGUYỄN CMA ([facility])

## 2019-03-12 NOTE — TELEPHONE ENCOUNTER
She will need to use the cream for several months to see any potential change.  I would recommend continuing cream at this time.    Sarah Montemayor, CNP

## 2019-03-12 NOTE — TELEPHONE ENCOUNTER
"Patient called and left  on RN Hotline.   Patient is concerned and thinks she needs to be checked for throat cancer.   Reports her throat has been \"messed up\" for the past 2 months, is constantly dry, can't seem to get any moisture to it, is coughing, hurts, and has been losing her voice.   Patient is also a smoker.  Patient denies SOB or trouble breathing.   Scheduled patient for appointment to discuss further.     Also gave information regarding cream.     Pt verbalized understanding & no further questions.     Deedee Mott, RN  "

## 2019-03-13 NOTE — TELEPHONE ENCOUNTER
"Patient called back multiple times and stated that she was not able to  Rx at pharmacy.     Called and spoke with Kvng in referrals again who states referral was faxed at 4:00 PM and is up to insurance now to approve.   Called and gave information to patient. Patient extremely upset and states \"I don't know why Sarah Rohit Roberts CNP just couldn't prescribe these medications for me\".   Advised her to check in the morning to see if her insurance has approved this. Pt stated \"OK\" then hung up the phone.     Deedee Mott RN  "

## 2019-03-13 NOTE — TELEPHONE ENCOUNTER
Patient has upcoming office visit.    RN spoke to pharmacy. Patient is not out of refills yet. She has still been filling this RX, last fill was 2/13/2019.

## 2019-03-13 NOTE — TELEPHONE ENCOUNTER
Patient called RN Hotline.   Stated she went in the ED today because of coughing and throat pain.   Reports they did an x-ray and CT scan and said something was abnormal on the CT scan. However impression states: No evidence for mass, adenopathy or abscess. Mild airway narrowing due to nonspecific mild mucosal and submucosal prominence at the level of the glottic and supraglottic laryngeal level.   Patient was prescribed Prednisone, Doxycycline and Tessalon.   Patient is a restricted patient and wondering how she can get her medications, if a referral needs to be done or if PCP will prescribe same medications that she was discharged with.     Deedee Mott RN

## 2019-03-13 NOTE — TELEPHONE ENCOUNTER
Patient returned call to RN Hotline requesting what is going on with her medications.     Called and spoke with Kvng in referrals department who already sent request to insurance and no further action needed.   Called and advised patient referral was placed so she should be able to  medication from pharmacy. Verbalized understanding.     Deedee Mott RN

## 2019-03-13 NOTE — TELEPHONE ENCOUNTER
"Requested Prescriptions   Pending Prescriptions Disp Refills     cloNIDine (CATAPRES) 0.1 MG tablet Last Written Prescription Date:  7/17/18  Last Fill Quantity: 60,  # refills: 3   Last office visit: 3/19/2018 with prescribing provider:     Future Office Visit:   Next 5 appointments (look out 90 days)    Mar 19, 2019  3:40 PM CDT  Office Visit with Sarah Montemayor, APRN CNP  [facility] [address] [facility]  [place] [address]  [phone]   Apr 02, 2019  8:40 AM CDT  [facility] [address] [facility]  [place] [id number] [phone]          60 tablet 3     Sig: Take 1 tablet (0.1 mg) by mouth At Bedtime Prn late withdrawal symptoms.    Central Acting Antiadrenergic Agents Passed - 3/13/2019  9:56 AM       Passed - Blood pressure under 140/90 in past 12 months    BP Readings from Last 3 Encounters:   02/05/19 111/65   01/08/19 139/73   01/04/19 130/80                Passed - Patient is 6 years of age or older       Passed - Recent (12 mo) or future (30 days) visit within the authorizing provider's specialty    Patient had office visit in the last 12 months or has a visit in the next 30 days with authorizing provider or within the authorizing provider's specialty.  See \"Patient Info\" tab in inbasket, or \"Choose Columns\" in Meds & Orders section of the refill encounter.             Passed - Medication is active on med list       Passed - Patient not pregnant       Passed - Normal serum creatinine on file within past 12 months    Recent Labs   Lab Test 10/29/18  0957   CR 0.68            Passed - No positive pregnancy test on file in past 12 months          "

## 2019-03-14 NOTE — TELEPHONE ENCOUNTER
Patient LM on RN hotline stating that she called [employer] Restricted Recipient line and was told that they have not yet received the referral from provider's office yet.  Patient asked that we call [employer] Restricted [employer]ipient line ([phone]) directly so that they can get this approve quicker and she can get her meds for treatment  Patient stated that they will need provider's NPI info    Routing to referrals dept to please call [employer].  Referrals dept- Please also call patient back to update her  Patient has been calling RN hotline multiple times since yesterday- please document any updates and calls made in this encounter so that the team knows what is going on or where we are at in case patient calls back again    NPI for Sarah Montemayor is [phone] but I am thinking they need the Emergency Room provider's NPI since the prescriptions are coming from them? We do not have the Emergency Room provider's NPI # but maybe the pharmacy would have it since it should be on the prescriptions    Val Teran RN

## 2019-03-14 NOTE — TELEPHONE ENCOUNTER
Patient left message on RN hotline asking for update, patient would like to know when this is approved so she can get her medication.   Tita Bronw RN

## 2019-03-15 NOTE — TELEPHONE ENCOUNTER
Patient LM on RN hotline at 11:46AM stating that she still has not been able to get her meds and that we need to call [employer] Restricted Recipient line  She stated that she is not sure what is going on with the [employer] Restricted recipient line and requested that we address this asap    Val Teran RN

## 2019-03-15 NOTE — TELEPHONE ENCOUNTER
Referral dept called [facility] restricted program and left a message. Let them know I faxed over the information and gave them the information in the message in case they didn't receive fax for some reason.     Called patient. Explained what I did. Told her I called [facility] yesterday and got a new fax number. Sent the information again yesterday and told her a called today and left a message with [facility] with all the information. 03/15/2019.

## 2019-03-15 NOTE — TELEPHONE ENCOUNTER
"Pt called RN hotline asking that nurse call the insurance restricted line to give approval of medications from the ER. Pt states \"I have been working with Deedee on this and don't understand what is taking so long\"? Pt didn't have phone number.  Pt then stated, \"Oh, hold on it's them calling\" and phone disconnected.    "

## 2019-03-18 NOTE — TELEPHONE ENCOUNTER
Referral department got a call from [facility]. It's sounds like they put the referral through. They wanted doctors name. I called it with information. At this point I think it's taken care of. 03/18/2019

## 2019-03-19 NOTE — PROGRESS NOTES
SUBJECTIVE:   Coretta Tovar is a 48 year old female who presents to clinic today for the following health issues:      ED/UC Followup:    Facility:  [facility]  Date of visit: 3/13/19  Reason for visit: Throat problem  Current Status: not feeling better     Impression and Plan:  Coretta Tovar is a 48 y.o. female with history of ITP, polysubstance abuse who presents to the emergency department today for evaluation of sore throat and chest pain. Patient describes she has been ill with URI type symptoms for the past 2 months as noted above. Sometimes it feels as if her chest pain is related to her cough, other times not. She also complains of a sore throat and is concerned about throat cancer. Upon evaluation here, patient is found to be vitally stable and afebrile. Her blood pressures have been soft but patient describes that 90/60 is a normal blood pressure for her. Her exam is largely unremarkable. She is in no respiratory distress. She is not hypoxic. She does have reproducible chest wall tenderness as noted above. There is no stridor, intraoral swelling or angioedema. Workup was obtained as noted above and is largely unremarkable. EKG reveals no evidence for ischemia. Troponin is negative with several days of symptoms and atypical symptoms with reproducible chest wall tenderness on exam, doubt Acute Coronary Syndrome as the cause of her chest pain. Basic labs are unremarkable. There is no leukocytosis. No significant electrolyte abnormalities. Rapid strep test is negative. Initially chest x-ray and x-ray soft tissue neck were obtained. X-ray is without evidence for pneumonia, or other acute abnormalities. This does reveal hyperinflation so patient could have a component of COPD. Soft tissue neck x-ray reveals possible mild airway narrowing in the hypopharyngeal level verses prominence of the tongue base/lingual tonsillar tissue. Given patient's concerns we did obtain a CT scan of her neck which  reveals no obvious mass, adenopathy or abscess. They do comment on the mild airway narrowing which is nonspecific. The patient was given the above interventions. She was repeatedly asking for more pain medication while here. Given patient's history of opiate use disorder and polysubstance abuse I did not feel her presentation today required narcotics. The patient was given decadron here for her sore throat. We did also send her home on doxycycline, prednisone and tessalon for bronchitis given the duration of her symptoms. She already has close follow up scheduled with her PCP which I advised she keep and attend. We discussed signs and symptoms that require prompt return to the emergency department. Patient already has an inhaler at home. All questions were answered.     Diagnosis:  ICD-10-CM   1. Bronchitis J40 doxycycline (VIBRAMYCIN) 100 mg capsule   predniSONE (DELTASONE) 20 mg tablet   benzonatate (TESSALON) 100 mg capsule   2. Sore throat J02.9   3. Chest pain, unspecified type R07.9      Patient notes continued sore throat.  She feels throat is dry.  Patient notes reflux is not well controlled.  She complains of hoarseness.  She is concerned about throat cancer.  She has not been taking omeprazole.  Patient notes that medications did not help.  She notes the bronchitis symptoms have resolved.          Problem list and histories reviewed & adjusted, as indicated.  Additional history: as documented    Patient Active Problem List   Diagnosis     Tobacco abuse     S/P splenectomy     Migraines     Idiopathic thrombocytopenic purpura (HCC)     Chemical dependency (H)     Insomnia     Mary-mary disease     Vomiting     Abdominal pain, acute     Disorder of stomach     Cervical pain     Vertigo     Gastroesophageal reflux disease     Herpetic gingivostomatitis     Immune thrombocytopenic purpura (H)     Muscle pain     Personality disorder (H)     Nondependent cannabis abuse, episodic     Atopic rhinitis      Abnormal weight loss     Mixed, or nondependent drug abuse     Moderate episode of recurrent major depressive disorder (H)     Generalized anxiety disorder     Protein-calorie malnutrition (H)     Primary insomnia     History of cocaine dependence     Past Surgical History:   Procedure Laterality Date     ANKLE SURGERY      Numerous right ankle surgeries     HERNIA REPAIR Left 1975     HYSTERECTOMY, PAP NO LONGER INDICATED      benign, endometriosis     NECK SURGERY  2000's    Cervical spine     SPLENECTOMY  1995     TONSILLECTOMY         Social History     Tobacco Use     Smoking status: Current Every Day Smoker     Packs/day: 0.50     Years: 15.00     Pack years: 7.50     Types: Cigarettes     Last attempt to quit: 11/6/2015     Years since quitting: 3.3     Smokeless tobacco: Never Used     Tobacco comment: 4/17/18   Substance Use Topics     Alcohol use: Yes     Comment: Drinks once in 4 - 5 months     Family History   Problem Relation Age of Onset     Neurologic Disorder Maternal Grandmother         migraines     Breast Cancer Maternal Grandmother      Neurologic Disorder Maternal Grandfather         migraines     Neurologic Disorder Brother         migraines     Breast Cancer Maternal Aunt         x 2     Breast Cancer Paternal Grandmother      Depression Daughter      Anxiety Disorder Daughter          Current Outpatient Medications   Medication Sig Dispense Refill     acyclovir (ZOVIRAX) 5 % external ointment APPLY EXTERNALLY TO THE AFFECTED AREA 6 TIMES DAILY 15 g 3     acyclovir (ZOVIRAX) 5 % ointment Apply 1 g topically as needed 30 g 11     albuterol (PROAIR HFA/PROVENTIL HFA/VENTOLIN HFA) 108 (90 Base) MCG/ACT Inhaler Inhale 1-2 puffs into the lungs every 4 hours as needed for shortness of breath / dyspnea or wheezing 18 g 3     betamethasone valerate (VALISONE) 0.1 % ointment Apply once a day for 2 weeks as directed       buprenorphine-naloxone (SUBOXONE) 8-2 MG SUBL sublingual tablet Place 1 tablet  under the tongue 2 times daily 44 each 0     busPIRone HCl (BUSPAR) 30 MG tablet Take 1 tablet (30 mg) by mouth 2 times daily 60 tablet 5     cetirizine (ZYRTEC) 10 MG tablet Take 1 tablet (10 mg) by mouth every evening 90 tablet 1     clobetasol (TEMOVATE) 0.05 % ointment APPLY TOPICALLY BID  1     cloNIDine (CATAPRES) 0.1 MG tablet Take 1 tablet (0.1 mg) by mouth At Bedtime Prn late withdrawal symptoms. 60 tablet 0     docusate sodium (DOK) 100 MG capsule TAKE 1 CAPSULE BY MOUTH TWICE DAILY AS NEEDED FOR CONSTIPATION 60 capsule 3     FLUoxetine (PROZAC) 20 MG capsule TAKE 1 CAPSULE BY MOUTH DAILY WITH FLUOXETINE 40MG FOR A TOTAL OF 60MG DAILY 30 capsule 5     FLUoxetine (PROZAC) 40 MG capsule TAKE 1 CAPSULE BY MOUTH DAILY WITH 20 MG CAPSULE OF FLUOXETINE TO EQUAL TOTAL DAILY DOSE OF 60MG 30 capsule 5     fluticasone (FLONASE) 50 MCG/ACT nasal spray Spray 2 sprays into both nostrils daily 1 Bottle 11     gabapentin (NEURONTIN) 300 MG capsule 300 am 600mg q hs 90 capsule 1     hydrocortisone 2.5 % cream        hydrOXYzine (ATARAX) 25 MG tablet TAKE 2 TABLETS BY MOUTH NIGHTLY AS NEEDED FOR ANXIETY 60 tablet 3     hydrOXYzine (ATARAX) 50 MG tablet TAKE 1 TABLET(50MG) BY MOUTH NIGHTLY AS NEEDED FOR ANXIETY 30 tablet 3     hydrOXYzine (VISTARIL) 25 MG capsule Take 2 capsules (50 mg) by mouth nightly as needed for anxiety 120 capsule 1     ipratropium - albuterol 0.5 mg/2.5 mg/3 mL (DUONEB) 0.5-2.5 (3) MG/3ML neb solution Take 1 vial (3 mLs) by nebulization every 6 hours as needed for shortness of breath / dyspnea or wheezing 90 vial 3     ketoconazole (NIZORAL) 2 % cream        levocetirizine (XYZAL) 5 MG tablet Take 1 tablet (5 mg) by mouth every evening 30 tablet 11     loratadine (CLARITIN) 10 MG tablet TABLET 1 TABLET BY MOUTH DAILY AS NEEDED FOR ALLERGIES 30 tablet 5     methocarbamol (ROBAXIN) 500 MG tablet Take 1 tablet (500 mg) by mouth 3 times daily 90 tablet 3     Neomycin-Bacitracin-Polymyxin ([employer] TRIPLE  ANTIBIOTIC) OINT Externally apply topically 4 times daily 60 g 3     olopatadine (PATANOL) 0.1 % ophthalmic solution Place 1 drop into both eyes 2 times daily 5 mL 3     ondansetron (ZOFRAN-ODT) 4 MG ODT tab Take 1-2 tablets (4-8 mg) by mouth every 8 hours as needed for nausea 30 tablet 1     order for DME Equipment being ordered: Left thumb brace 1 each 0     ranitidine (ZANTAC) 150 MG tablet Take 1 tablet (150 mg) by mouth 2 times daily 60 tablet 2     terbinafine (LAMISIL) 1 % external cream Apply topically 2 times daily 30 g 2     traZODone (DESYREL) 100 MG tablet Take 1 tablet (100 mg) by mouth At Bedtime 90 tablet 1     triamcinolone (KENALOG) 0.1 % ointment Apply sparingly to affected area on back two times daily til clear. 80 g 0     valACYclovir (VALTREX) 1000 mg tablet Take 1 tablet (1,000 mg) by mouth 2 times daily At the onset of skin lesions as needed 8 tablet 11     varenicline (CHANTIX CONTINUING MONTH ARA) 1 MG tablet TAKE 1 TABLET(1 MG) BY MOUTH TWICE DAILY 56 tablet 2     ZOLMitriptan (ZOMIG) 5 MG tablet TAKE 1 TABLET BY MOUTH AT ONSET OF HEADACHE FOR MIGRAINE. MAY REPEAT DOSE IN 2 HOURS. DO NOT EXCEED 10MG IN 24 HOURS 12 tablet 4     omeprazole (PRILOSEC) 40 MG DR capsule TAKE 1 CAPSULE(40 MG) BY MOUTH DAILY 30 TO 60 MINUTES BEFORE A MEAL (Patient not taking: Reported on 3/19/2019) 90 capsule 0     polyethylene glycol (MIRALAX) powder Take 17 g (1 capful) by mouth daily (Patient not taking: Reported on 1/8/2019) 510 g 3     Allergies   Allergen Reactions     Acetaminophen-Codeine Hives     Amoxicillin-Pot Clavulanate Nausea and Vomiting     Augmentin Nausea and Vomiting and Hives     Barbiturates      Bentyl [Dicyclomine] Other (See Comments)     dizziness     Compazine      Lock-jaw     Liquid Adhesive      Other reaction(s): Other, see comments  blisters     No Clinical Screening - See Comments      PN: LW Reaction: blisters  PN: LW FI1: nka  PN: LW CM1: CONTRAST- nka Reaction :     Nsaids       "D/t ITP     Prochlorperazine      Other reaction(s): Edema     Propoxyphene N-Apap Hives     Sulfa Drugs      Tramadol Hcl Hives            Adhesive Tape Rash     BP Readings from Last 3 Encounters:   03/19/19 132/66   02/05/19 111/65   01/08/19 139/73    Wt Readings from Last 3 Encounters:   03/19/19 70.8 kg (156 lb)   01/04/19 66.4 kg (146 lb 4.8 oz)   12/04/18 65 kg (143 lb 6.4 oz)                  Labs reviewed in EPIC    Reviewed and updated as needed this visit by clinical staff       Reviewed and updated as needed this visit by Provider         ROS:  Constitutional, HEENT, cardiovascular, pulmonary, gi and gu systems are negative, except as otherwise noted.    OBJECTIVE:     /66   Pulse 86   Temp 98.1  F (36.7  C) (Oral)   Resp 24   Ht 1.778 m (5' 10\")   Wt 70.8 kg (156 lb)   SpO2 99%   BMI 22.38 kg/m    Body mass index is 22.38 kg/m .  GENERAL: healthy, alert and no distress  EYES: Eyes grossly normal to inspection, PERRL and conjunctivae and sclerae normal  HENT: ear canals and TM's normal, nose and mouth without ulcers or lesions  NECK: no adenopathy, no asymmetry, masses, or scars and thyroid normal to palpation  RESP: lungs clear to auscultation - no rales, rhonchi or wheezes  CV: regular rate and rhythm, normal S1 S2, no S3 or S4, no murmur, click or rub, no peripheral edema and peripheral pulses strong  MS: no gross musculoskeletal defects noted, no edema    Diagnostic Test Results:  none     ASSESSMENT/PLAN:       1. Acute pharyngitis, unspecified etiology    - OTOLARYNGOLOGY REFERRAL    2. Gastroesophageal reflux disease without esophagitis  Patient to resume reflux meds to see if this improves pharyngitis.  She cannot afford to purchase omeprazole over the counter.    - ranitidine (ZANTAC) 150 MG tablet; Take 1 tablet (150 mg) by mouth 2 times daily  Dispense: 60 tablet; Refill: 2    3. Tobacco abuse    - varenicline (CHANTIX CONTINUING MONTH ARA) 1 MG tablet; TAKE 1 TABLET(1 MG) BY " MOUTH TWICE DAILY  Dispense: 56 tablet; Refill: 2    4. Screen for colon cancer  Patient has colonoscopy scheduled.      5. Bronchitis  Patient to return in 1 month and will consider spirometry at that time to evaluate for COPD given findings on CXR.  Symptoms resolved at this time.        FUTURE APPOINTMENTS:       - Follow-up visit in 1 month     NINOSKA Smith [facility]

## 2019-03-20 NOTE — TELEPHONE ENCOUNTER
Patient called RN hotline.  She has a colonoscopy scheduled on 4/8/19 with Dr. Hernandez ([facility])  She stated that Dr. Hernandez is not on her list of providers and she is unable to get the prescription for prep solutions/meds from him to start the prep the week prior to the colonoscopy  She is requesting referral get done asap    A referral was already placed on 3/4/19  Routing to referrals  Please call patient back with updates    Val Teran RN

## 2019-03-21 NOTE — PROGRESS NOTES
History of Present Illness - Coretta Tovar is a 48 year old female seen in consultation at the request of Sarah Montemayor for acute pharyngitis. However, her complaint today is mainly that she is concerned that she needs something in her throat biopsied. She reports undergoing some imaging that suggested a throat mass, and this has her concerned. She is a longtime smoker, but has cut back in recent weeks. She denies trouble breathing or swallowing. She does feel she is a bit hoarse.    Past Medical History -   Patient Active Problem List   Diagnosis     Tobacco abuse     S/P splenectomy     Migraines     Idiopathic thrombocytopenic purpura (HCC)     Chemical dependency (H)     Insomnia     Mary-mary disease     Vomiting     Abdominal pain, acute     Disorder of stomach     Cervical pain     Vertigo     Gastroesophageal reflux disease     Herpetic gingivostomatitis     Immune thrombocytopenic purpura (H)     Muscle pain     Personality disorder (H)     Nondependent cannabis abuse, episodic     Atopic rhinitis     Abnormal weight loss     Mixed, or nondependent drug abuse     Moderate episode of recurrent major depressive disorder (H)     Generalized anxiety disorder     Protein-calorie malnutrition (H)     Primary insomnia     History of cocaine dependence       Current Medications -   Current Outpatient Medications:      acyclovir (ZOVIRAX) 5 % external ointment, APPLY EXTERNALLY TO THE AFFECTED AREA 6 TIMES DAILY, Disp: 15 g, Rfl: 3     acyclovir (ZOVIRAX) 5 % ointment, Apply 1 g topically as needed, Disp: 30 g, Rfl: 11     albuterol (PROAIR HFA/PROVENTIL HFA/VENTOLIN HFA) 108 (90 Base) MCG/ACT Inhaler, Inhale 1-2 puffs into the lungs every 4 hours as needed for shortness of breath / dyspnea or wheezing, Disp: 18 g, Rfl: 3     betamethasone valerate (VALISONE) 0.1 % ointment, Apply once a day for 2 weeks as directed, Disp: , Rfl:      buprenorphine-naloxone (SUBOXONE) 8-2 MG SUBL sublingual tablet,  Place 1 tablet under the tongue 2 times daily, Disp: 44 each, Rfl: 0     busPIRone HCl (BUSPAR) 30 MG tablet, Take 1 tablet (30 mg) by mouth 2 times daily, Disp: 60 tablet, Rfl: 5     cetirizine (ZYRTEC) 10 MG tablet, Take 1 tablet (10 mg) by mouth every evening, Disp: 90 tablet, Rfl: 1     clobetasol (TEMOVATE) 0.05 % ointment, APPLY TOPICALLY BID, Disp: , Rfl: 1     cloNIDine (CATAPRES) 0.1 MG tablet, Take 1 tablet (0.1 mg) by mouth At Bedtime Prn late withdrawal symptoms., Disp: 60 tablet, Rfl: 0     docusate sodium (DOK) 100 MG capsule, TAKE 1 CAPSULE BY MOUTH TWICE DAILY AS NEEDED FOR CONSTIPATION, Disp: 60 capsule, Rfl: 3     FLUoxetine (PROZAC) 20 MG capsule, TAKE 1 CAPSULE BY MOUTH DAILY WITH FLUOXETINE 40MG FOR A TOTAL OF 60MG DAILY, Disp: 30 capsule, Rfl: 5     FLUoxetine (PROZAC) 40 MG capsule, TAKE 1 CAPSULE BY MOUTH DAILY WITH 20 MG CAPSULE OF FLUOXETINE TO EQUAL TOTAL DAILY DOSE OF 60MG, Disp: 30 capsule, Rfl: 5     fluticasone (FLONASE) 50 MCG/ACT nasal spray, Spray 2 sprays into both nostrils daily, Disp: 1 Bottle, Rfl: 11     gabapentin (NEURONTIN) 300 MG capsule, 300 am 600mg q hs, Disp: 90 capsule, Rfl: 1     hydrocortisone 2.5 % cream, , Disp: , Rfl:      hydrOXYzine (ATARAX) 25 MG tablet, TAKE 2 TABLETS BY MOUTH NIGHTLY AS NEEDED FOR ANXIETY, Disp: 60 tablet, Rfl: 3     hydrOXYzine (ATARAX) 50 MG tablet, TAKE 1 TABLET(50MG) BY MOUTH NIGHTLY AS NEEDED FOR ANXIETY, Disp: 30 tablet, Rfl: 3     hydrOXYzine (VISTARIL) 25 MG capsule, Take 2 capsules (50 mg) by mouth nightly as needed for anxiety, Disp: 120 capsule, Rfl: 1     ipratropium - albuterol 0.5 mg/2.5 mg/3 mL (DUONEB) 0.5-2.5 (3) MG/3ML neb solution, Take 1 vial (3 mLs) by nebulization every 6 hours as needed for shortness of breath / dyspnea or wheezing, Disp: 90 vial, Rfl: 3     ketoconazole (NIZORAL) 2 % cream, , Disp: , Rfl:      levocetirizine (XYZAL) 5 MG tablet, Take 1 tablet (5 mg) by mouth every evening, Disp: 30 tablet, Rfl: 11      loratadine (CLARITIN) 10 MG tablet, TABLET 1 TABLET BY MOUTH DAILY AS NEEDED FOR ALLERGIES, Disp: 30 tablet, Rfl: 5     methocarbamol (ROBAXIN) 500 MG tablet, Take 1 tablet (500 mg) by mouth 3 times daily, Disp: 90 tablet, Rfl: 3     Neomycin-Bacitracin-Polymyxin ([employer] TRIPLE ANTIBIOTIC) OINT, Externally apply topically 4 times daily, Disp: 60 g, Rfl: 3     olopatadine (PATANOL) 0.1 % ophthalmic solution, Place 1 drop into both eyes 2 times daily, Disp: 5 mL, Rfl: 3     omeprazole (PRILOSEC) 40 MG DR capsule, TAKE 1 CAPSULE(40 MG) BY MOUTH DAILY 30 TO 60 MINUTES BEFORE A MEAL, Disp: 90 capsule, Rfl: 0     ondansetron (ZOFRAN-ODT) 4 MG ODT tab, Take 1-2 tablets (4-8 mg) by mouth every 8 hours as needed for nausea, Disp: 30 tablet, Rfl: 1     order for DME, Equipment being ordered: Left thumb brace, Disp: 1 each, Rfl: 0     ranitidine (ZANTAC) 150 MG tablet, Take 1 tablet (150 mg) by mouth 2 times daily, Disp: 60 tablet, Rfl: 2     terbinafine (LAMISIL) 1 % external cream, Apply topically 2 times daily, Disp: 30 g, Rfl: 2     traZODone (DESYREL) 100 MG tablet, Take 1 tablet (100 mg) by mouth At Bedtime, Disp: 90 tablet, Rfl: 1     triamcinolone (KENALOG) 0.1 % ointment, Apply sparingly to affected area on back two times daily til clear., Disp: 80 g, Rfl: 0     valACYclovir (VALTREX) 1000 mg tablet, Take 1 tablet (1,000 mg) by mouth 2 times daily At the onset of skin lesions as needed, Disp: 8 tablet, Rfl: 11     varenicline (CHANTIX CONTINUING MONTH ARA) 1 MG tablet, TAKE 1 TABLET(1 MG) BY MOUTH TWICE DAILY, Disp: 56 tablet, Rfl: 2     ZOLMitriptan (ZOMIG) 5 MG tablet, TAKE 1 TABLET BY MOUTH AT ONSET OF HEADACHE FOR MIGRAINE. MAY REPEAT DOSE IN 2 HOURS. DO NOT EXCEED 10MG IN 24 HOURS, Disp: 12 tablet, Rfl: 4     polyethylene glycol (MIRALAX/GLYCOLAX) powder, TAKE 17 GRAMS(1 CAPFUL) BY MOUTH DAILY, Disp: 527 g, Rfl: 3    Allergies -   Allergies   Allergen Reactions     Acetaminophen-Codeine Hives     Amoxicillin-Pot  Clavulanate Nausea and Vomiting     Augmentin Nausea and Vomiting and Hives     Barbiturates      Bentyl [Dicyclomine] Other (See Comments)     dizziness     Compazine      Lock-jaw     Liquid Adhesive      Other reaction(s): Other, see comments  blisters     No Clinical Screening - See Comments      PN: LW Reaction: blisters  PN: LW FI1: nka  PN: LW CM1: CONTRAST- nka Reaction :     Nsaids      D/t ITP     Prochlorperazine      Other reaction(s): Edema     Propoxyphene N-Apap Hives     Sulfa Drugs      Tramadol Hcl Hives            Adhesive Tape Rash       Social History -   Social History     Socioeconomic History     Marital status:      Spouse name: Not on file     Number of children: 2     Years of education: 12     Highest education level: Not on file   Occupational History     Occupation: Sales tech     Employer: [employer]     Comment: [employer]   Social Needs     Financial resource strain: Not on file     Food insecurity:     Worry: Not on file     Inability: Not on file     Transportation needs:     Medical: Not on file     Non-medical: Not on file   Tobacco Use     Smoking status: Current Every Day Smoker     Packs/day: 0.50     Years: 15.00     Pack years: 7.50     Types: Cigarettes     Last attempt to quit: 11/6/2015     Years since quitting: 3.3     Smokeless tobacco: Never Used     Tobacco comment: 4/17/18   Substance and Sexual Activity     Alcohol use: Yes     Comment: Drinks once in 4 - 5 months     Drug use: No     Comment: Last exposure to cocaine 2010     Sexual activity: No     Partners: Male     Birth control/protection: Surgical   Lifestyle     Physical activity:     Days per week: Not on file     Minutes per session: Not on file     Stress: Not on file   Relationships     Social connections:     Talks on phone: Not on file     Gets together: Not on file     Attends Faith service: Not on file     Active member of club or organization: Not on  "file     Attends meetings of clubs or organizations: Not on file     Relationship status: Not on file     Intimate partner violence:     Fear of current or ex partner: Not on file     Emotionally abused: Not on file     Physically abused: Not on file     Forced sexual activity: Not on file   Other Topics Concern     Parent/sibling w/ CABG, MI or angioplasty before 65F 55M? No   Social History Narrative    Lives with dad and brother in [place].      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      Daughter with recent DWI (she has child).        Family History -   Family History   Problem Relation Age of Onset     Neurologic Disorder Maternal Grandmother         migraines     Breast Cancer Maternal Grandmother      Neurologic Disorder Maternal Grandfather         migraines     Neurologic Disorder Brother         migraines     Breast Cancer Maternal Aunt         x 2     Breast Cancer Paternal Grandmother      Depression Daughter      Anxiety Disorder Daughter        Review of Systems - As per HPI and PMHx, otherwise 7 system review of the head and neck negative. 10+ system review negative.    Physical Exam  /80   Pulse 61   Ht 1.778 m (5' 10\")   Wt 70.8 kg (156 lb)   SpO2 98%   BMI 22.38 kg/m    General - The patient is well nourished and well developed, and appears to have good nutritional status.  Alert and oriented to person and place, answers questions and cooperates with examination appropriately.   Head and Face - Normocephalic and atraumatic, with no gross asymmetry noted of the contour of the facial features.  The facial nerve is intact, with strong symmetric movements.  Voice and Breathing - voice is lower pitch than expected, and moderately breathy.    Ears - Bilateral pinna and EACs with normal appearing overlying skin. Tympanic membrane intact with good mobility on pneumatic otoscopy bilaterally. Bony landmarks of the ossicular chain are normal. The tympanic membranes are normal in appearance. No retraction, " perforation, or masses.  No fluid or purulence was seen in the external canal or the middle ear.   Eyes - Extraocular movements intact.  Sclera were not icteric or injected, conjunctiva were pink and moist.  Mouth - Examination of the oral cavity showed pink, healthy oral mucosa. No lesions or ulcerations noted.  The tongue was mobile and midline. Edentulous.   Throat - The walls of the oropharynx were smooth, pink, moist, symmetric, and had no lesions or ulcerations.  The tonsillar pillars and soft palate were symmetric.  The uvula was midline on elevation.  Neck - Normal midline excursion of the laryngotracheal complex during swallowing.  Full range of motion on passive movement.  Palpation of the occipital, submental, submandibular, internal jugular chain, and supraclavicular nodes did not demonstrate any abnormal lymph nodes or masses.  The carotid pulse was palpable bilaterally.  Palpation of the thyroid was soft and smooth, with no nodules or goiter appreciated.  The trachea was mobile and midline.  Nose - External contour is symmetric, no gross deflection or scars.  Nasal mucosa is pink and moist with no abnormal mucus.  The septum was midline and non-obstructive, turbinates of normal size and position.  No polyps, masses, or purulence noted on examination.  Heart:  Regular rate and rhythm, no murmurs.  Lungs:  Chest clear to auscultation bilaterally    Procedure: Flexible Endoscopy  Indication: hoarseness, throat pain    Attempts at mirror laryngoscopy were not possible due to gag reflex.  Therefore I proceeded with a fiberoptic examination.  First I sprayed both sides of the nose with a mixture of lidocaine and neosynephrine.  I then passed the scope through the nasal cavity.  The nasal cavity was unremarkable.  The nasopharynx was mucosally covered and symmetric.  The Eustachian tube openings were unobstructed.  Going further down I had a clear view of the base of tongue which had normal appearing lingual  tonsillar tissue.  The base of tongue was free of lesions, and the vallecula was open.  The epiglottis was smooth and mucosally covered.  The supraglottic larynx was then clearly visualized.  The vocal cords moved smoothly and symmetrically, but the right vocal fold had leukoplakia on the superior surface. The left vocal fold was difficult to fully visualize.  The pyriform sinuses were open, and the limited view of the postcricoid region did not show any lesions.          Assessment - Coretta Tovar is a 48 year old female with right true vocal fold leukoplakia. Given her smoking history, I think this would benefit from biopsy. I recommended direct laryngoscopy with biopsy. I discussed the likely sore throat for 24-48hrs, but otherwise mild discomfort. I explained that the reason for the procedure is to determine whether this finding on her voice box may be related to a malignancy. She expressed understanding, and is interested in proceeding.       Dr. Adrienne Segura MD  Otolaryngology  [facility]

## 2019-03-21 NOTE — TELEPHONE ENCOUNTER
ISAIAH   Sent referral to [employer] restricted program on 03/21/2019. Called patient and met her know.

## 2019-03-21 NOTE — TELEPHONE ENCOUNTER
Patient left message on RN hotline stating she has an appointment with ENT today with Dr. Segura and she needs referral from PCP.  Noted Otolaryngology referral was placed on 3/19/19.   Routing to referrals due to patient being restricted  Please advise   Tita Brown RN

## 2019-03-21 NOTE — PATIENT INSTRUCTIONS
Scheduling Information  To schedule your CT/MRI scan, please contact [facility] at [phone] OR [facility] at [phone]    To schedule your Surgery, please contact our Specialty Schedulers at [phone]    ** If a CT scan or biopsy were ordered/done, Dr. Segura will need to see you back in clinic to go over your biopsy results or CT/MRI scan. He will go over the images from your scan with you and discuss treatment based on your results.     ENT Clinic Locations Clinic Hours Telephone Number     [facility]  [address]  [place] [address]   E/O Thursday:      7:30am -- 4:00pm   To schedule/reschedule an appointment with   Dr. Segura,   please contact our   Specialty Scheduling Department at:     [phone]       [place]  [address] [place] [address]     Monday:              12:00pm -- 4:00pm    Tuesday:               8:30am -- 4:30pm    Wednesday:        12:00pm -- 4:00pm    E/O Thursday:        8:00am - 4:30pm           Urgent Care Locations Clinic Hours Telephone Numbers     [facility] [facility] [place] [place]  [address]  [place] [address]     Monday-Friday:     11:00am - 9:00pm    Saturday-Sunday:  9:00am - 5:00pm   [phone]     [facility]  [address] [place] [address]     Monday-Friday:      5:00pm - 9:00pm     Saturday-Sunday:  9:00am - 5:00pm   [phone]

## 2019-03-21 NOTE — LETTER
3/21/2019         RE: Coretta Tovar  [address] [facility] [address]        Dear Colleague,    Thank you for referring your patient, Coretta Tovar, to the [facility]. Please see a copy of my visit note below.        History of Present Illness - Coretta Tovar is a 48 year old female seen in consultation at the request of Sarah Montemayor for acute pharyngitis. However, her complaint today is mainly that she is concerned that she needs something in her throat biopsied. She reports undergoing some imaging that suggested a throat mass, and this has her concerned. She is a longtime smoker, but has cut back in recent weeks. She denies trouble breathing or swallowing. She does feel she is a bit hoarse.    Past Medical History -   Patient Active Problem List   Diagnosis     Tobacco abuse     S/P splenectomy     Migraines     Idiopathic thrombocytopenic purpura (HCC)     Chemical dependency (H)     Insomnia     Mary-mary disease     Vomiting     Abdominal pain, acute     Disorder of stomach     Cervical pain     Vertigo     Gastroesophageal reflux disease     Herpetic gingivostomatitis     Immune thrombocytopenic purpura (H)     Muscle pain     Personality disorder (H)     Nondependent cannabis abuse, episodic     Atopic rhinitis     Abnormal weight loss     Mixed, or nondependent drug abuse     Moderate episode of recurrent major depressive disorder (H)     Generalized anxiety disorder     Protein-calorie malnutrition (H)     Primary insomnia     History of cocaine dependence       Current Medications -   Current Outpatient Medications:      acyclovir (ZOVIRAX) 5 % external ointment, APPLY EXTERNALLY TO THE AFFECTED AREA 6 TIMES DAILY, Disp: 15 g, Rfl: 3     acyclovir (ZOVIRAX) 5 % ointment, Apply 1 g topically as needed, Disp: 30 g, Rfl: 11     albuterol (PROAIR HFA/PROVENTIL HFA/VENTOLIN HFA) 108 (90 Base) MCG/ACT Inhaler, Inhale 1-2 puffs into the lungs every 4 hours as needed for  shortness of breath / dyspnea or wheezing, Disp: 18 g, Rfl: 3     betamethasone valerate (VALISONE) 0.1 % ointment, Apply once a day for 2 weeks as directed, Disp: , Rfl:      buprenorphine-naloxone (SUBOXONE) 8-2 MG SUBL sublingual tablet, Place 1 tablet under the tongue 2 times daily, Disp: 44 each, Rfl: 0     busPIRone HCl (BUSPAR) 30 MG tablet, Take 1 tablet (30 mg) by mouth 2 times daily, Disp: 60 tablet, Rfl: 5     cetirizine (ZYRTEC) 10 MG tablet, Take 1 tablet (10 mg) by mouth every evening, Disp: 90 tablet, Rfl: 1     clobetasol (TEMOVATE) 0.05 % ointment, APPLY TOPICALLY BID, Disp: , Rfl: 1     cloNIDine (CATAPRES) 0.1 MG tablet, Take 1 tablet (0.1 mg) by mouth At Bedtime Prn late withdrawal symptoms., Disp: 60 tablet, Rfl: 0     docusate sodium (DOK) 100 MG capsule, TAKE 1 CAPSULE BY MOUTH TWICE DAILY AS NEEDED FOR CONSTIPATION, Disp: 60 capsule, Rfl: 3     FLUoxetine (PROZAC) 20 MG capsule, TAKE 1 CAPSULE BY MOUTH DAILY WITH FLUOXETINE 40MG FOR A TOTAL OF 60MG DAILY, Disp: 30 capsule, Rfl: 5     FLUoxetine (PROZAC) 40 MG capsule, TAKE 1 CAPSULE BY MOUTH DAILY WITH 20 MG CAPSULE OF FLUOXETINE TO EQUAL TOTAL DAILY DOSE OF 60MG, Disp: 30 capsule, Rfl: 5     fluticasone (FLONASE) 50 MCG/ACT nasal spray, Spray 2 sprays into both nostrils daily, Disp: 1 Bottle, Rfl: 11     gabapentin (NEURONTIN) 300 MG capsule, 300 am 600mg q hs, Disp: 90 capsule, Rfl: 1     hydrocortisone 2.5 % cream, , Disp: , Rfl:      hydrOXYzine (ATARAX) 25 MG tablet, TAKE 2 TABLETS BY MOUTH NIGHTLY AS NEEDED FOR ANXIETY, Disp: 60 tablet, Rfl: 3     hydrOXYzine (ATARAX) 50 MG tablet, TAKE 1 TABLET(50MG) BY MOUTH NIGHTLY AS NEEDED FOR ANXIETY, Disp: 30 tablet, Rfl: 3     hydrOXYzine (VISTARIL) 25 MG capsule, Take 2 capsules (50 mg) by mouth nightly as needed for anxiety, Disp: 120 capsule, Rfl: 1     ipratropium - albuterol 0.5 mg/2.5 mg/3 mL (DUONEB) 0.5-2.5 (3) MG/3ML neb solution, Take 1 vial (3 mLs) by nebulization every 6 hours as  needed for shortness of breath / dyspnea or wheezing, Disp: 90 vial, Rfl: 3     ketoconazole (NIZORAL) 2 % cream, , Disp: , Rfl:      levocetirizine (XYZAL) 5 MG tablet, Take 1 tablet (5 mg) by mouth every evening, Disp: 30 tablet, Rfl: 11     loratadine (CLARITIN) 10 MG tablet, TABLET 1 TABLET BY MOUTH DAILY AS NEEDED FOR ALLERGIES, Disp: 30 tablet, Rfl: 5     methocarbamol (ROBAXIN) 500 MG tablet, Take 1 tablet (500 mg) by mouth 3 times daily, Disp: 90 tablet, Rfl: 3     Neomycin-Bacitracin-Polymyxin ([employer] TRIPLE ANTIBIOTIC) OINT, Externally apply topically 4 times daily, Disp: 60 g, Rfl: 3     olopatadine (PATANOL) 0.1 % ophthalmic solution, Place 1 drop into both eyes 2 times daily, Disp: 5 mL, Rfl: 3     omeprazole (PRILOSEC) 40 MG DR capsule, TAKE 1 CAPSULE(40 MG) BY MOUTH DAILY 30 TO 60 MINUTES BEFORE A MEAL, Disp: 90 capsule, Rfl: 0     ondansetron (ZOFRAN-ODT) 4 MG ODT tab, Take 1-2 tablets (4-8 mg) by mouth every 8 hours as needed for nausea, Disp: 30 tablet, Rfl: 1     order for DME, Equipment being ordered: Left thumb brace, Disp: 1 each, Rfl: 0     ranitidine (ZANTAC) 150 MG tablet, Take 1 tablet (150 mg) by mouth 2 times daily, Disp: 60 tablet, Rfl: 2     terbinafine (LAMISIL) 1 % external cream, Apply topically 2 times daily, Disp: 30 g, Rfl: 2     traZODone (DESYREL) 100 MG tablet, Take 1 tablet (100 mg) by mouth At Bedtime, Disp: 90 tablet, Rfl: 1     triamcinolone (KENALOG) 0.1 % ointment, Apply sparingly to affected area on back two times daily til clear., Disp: 80 g, Rfl: 0     valACYclovir (VALTREX) 1000 mg tablet, Take 1 tablet (1,000 mg) by mouth 2 times daily At the onset of skin lesions as needed, Disp: 8 tablet, Rfl: 11     varenicline (CHANTIX CONTINUING MONTH ARA) 1 MG tablet, TAKE 1 TABLET(1 MG) BY MOUTH TWICE DAILY, Disp: 56 tablet, Rfl: 2     ZOLMitriptan (ZOMIG) 5 MG tablet, TAKE 1 TABLET BY MOUTH AT ONSET OF HEADACHE FOR MIGRAINE. MAY REPEAT DOSE IN 2 HOURS. DO NOT EXCEED 10MG IN 24  HOURS, Disp: 12 tablet, Rfl: 4     polyethylene glycol (MIRALAX/GLYCOLAX) powder, TAKE 17 GRAMS(1 CAPFUL) BY MOUTH DAILY, Disp: 527 g, Rfl: 3    Allergies -   Allergies   Allergen Reactions     Acetaminophen-Codeine Hives     Amoxicillin-Pot Clavulanate Nausea and Vomiting     Augmentin Nausea and Vomiting and Hives     Barbiturates      Bentyl [Dicyclomine] Other (See Comments)     dizziness     Compazine      Lock-jaw     Liquid Adhesive      Other reaction(s): Other, see comments  blisters     No Clinical Screening - See Comments      PN: LW Reaction: blisters  PN: LW FI1: nka  PN: LW CM1: CONTRAST- nka Reaction :     Nsaids      D/t ITP     Prochlorperazine      Other reaction(s): Edema     Propoxyphene N-Apap Hives     Sulfa Drugs      Tramadol Hcl Hives            Adhesive Tape Rash       Social History -   Social History     Socioeconomic History     Marital status:      Spouse name: Not on file     Number of children: 2     Years of education: 12     Highest education level: Not on file   Occupational History     Occupation: Sales tech     Employer: [employer]     Comment: [employer]   Social Needs     Financial resource strain: Not on file     Food insecurity:     Worry: Not on file     Inability: Not on file     Transportation needs:     Medical: Not on file     Non-medical: Not on file   Tobacco Use     Smoking status: Current Every Day Smoker     Packs/day: 0.50     Years: 15.00     Pack years: 7.50     Types: Cigarettes     Last attempt to quit: 11/6/2015     Years since quitting: 3.3     Smokeless tobacco: Never Used     Tobacco comment: 4/17/18   Substance and Sexual Activity     Alcohol use: Yes     Comment: Drinks once in 4 - 5 months     Drug use: No     Comment: Last exposure to cocaine 2010     Sexual activity: No     Partners: Male     Birth control/protection: Surgical   Lifestyle     Physical activity:     Days per week: Not on file     Minutes per  "session: Not on file     Stress: Not on file   Relationships     Social connections:     Talks on phone: Not on file     Gets together: Not on file     Attends Mosque service: Not on file     Active member of club or organization: Not on file     Attends meetings of clubs or organizations: Not on file     Relationship status: Not on file     Intimate partner violence:     Fear of current or ex partner: Not on file     Emotionally abused: Not on file     Physically abused: Not on file     Forced sexual activity: Not on file   Other Topics Concern     Parent/sibling w/ CABG, MI or angioplasty before 65F 55M? No   Social History Narrative    Lives with dad and brother in [place].      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      Daughter with recent DWI (she has child).        Family History -   Family History   Problem Relation Age of Onset     Neurologic Disorder Maternal Grandmother         migraines     Breast Cancer Maternal Grandmother      Neurologic Disorder Maternal Grandfather         migraines     Neurologic Disorder Brother         migraines     Breast Cancer Maternal Aunt         x 2     Breast Cancer Paternal Grandmother      Depression Daughter      Anxiety Disorder Daughter        Review of Systems - As per HPI and PMHx, otherwise 7 system review of the head and neck negative. 10+ system review negative.    Physical Exam  /80   Pulse 61   Ht 1.778 m (5' 10\")   Wt 70.8 kg (156 lb)   SpO2 98%   BMI 22.38 kg/m     General - The patient is well nourished and well developed, and appears to have good nutritional status.  Alert and oriented to person and place, answers questions and cooperates with examination appropriately.   Head and Face - Normocephalic and atraumatic, with no gross asymmetry noted of the contour of the facial features.  The facial nerve is intact, with strong symmetric movements.  Voice and Breathing - voice is lower pitch than expected, and moderately breathy.    Ears - Bilateral pinna " and EACs with normal appearing overlying skin. Tympanic membrane intact with good mobility on pneumatic otoscopy bilaterally. Bony landmarks of the ossicular chain are normal. The tympanic membranes are normal in appearance. No retraction, perforation, or masses.  No fluid or purulence was seen in the external canal or the middle ear.   Eyes - Extraocular movements intact.  Sclera were not icteric or injected, conjunctiva were pink and moist.  Mouth - Examination of the oral cavity showed pink, healthy oral mucosa. No lesions or ulcerations noted.  The tongue was mobile and midline. Edentulous.   Throat - The walls of the oropharynx were smooth, pink, moist, symmetric, and had no lesions or ulcerations.  The tonsillar pillars and soft palate were symmetric.  The uvula was midline on elevation.  Neck - Normal midline excursion of the laryngotracheal complex during swallowing.  Full range of motion on passive movement.  Palpation of the occipital, submental, submandibular, internal jugular chain, and supraclavicular nodes did not demonstrate any abnormal lymph nodes or masses.  The carotid pulse was palpable bilaterally.  Palpation of the thyroid was soft and smooth, with no nodules or goiter appreciated.  The trachea was mobile and midline.  Nose - External contour is symmetric, no gross deflection or scars.  Nasal mucosa is pink and moist with no abnormal mucus.  The septum was midline and non-obstructive, turbinates of normal size and position.  No polyps, masses, or purulence noted on examination.  Heart:  Regular rate and rhythm, no murmurs.  Lungs:  Chest clear to auscultation bilaterally    Procedure: Flexible Endoscopy  Indication: hoarseness, throat pain    Attempts at mirror laryngoscopy were not possible due to gag reflex.  Therefore I proceeded with a fiberoptic examination.  First I sprayed both sides of the nose with a mixture of lidocaine and neosynephrine.  I then passed the scope through the nasal  cavity.  The nasal cavity was unremarkable.  The nasopharynx was mucosally covered and symmetric.  The Eustachian tube openings were unobstructed.  Going further down I had a clear view of the base of tongue which had normal appearing lingual tonsillar tissue.  The base of tongue was free of lesions, and the vallecula was open.  The epiglottis was smooth and mucosally covered.  The supraglottic larynx was then clearly visualized.  The vocal cords moved smoothly and symmetrically, but the right vocal fold had leukoplakia on the superior surface. The left vocal fold was difficult to fully visualize.  The pyriform sinuses were open, and the limited view of the postcricoid region did not show any lesions.          Assessment - Coretta Tovar is a 48 year old female with right true vocal fold leukoplakia. Given her smoking history, I think this would benefit from biopsy. I recommended direct laryngoscopy with biopsy. I discussed the likely sore throat for 24-48hrs, but otherwise mild discomfort. I explained that the reason for the procedure is to determine whether this finding on her voice box may be related to a malignancy. She expressed understanding, and is interested in proceeding.       Dr. Adrienne Segura MD  Otolaryngology  [facility]        Again, thank you for allowing me to participate in the care of your patient.        Sincerely,        Adrienne Segura MD

## 2019-03-21 NOTE — TELEPHONE ENCOUNTER
ISAIAH     Refaxed the paperwork to [facility]'s new fax number. Will call Dr. Hernandez's office. Will call patient.

## 2019-03-25 NOTE — TELEPHONE ENCOUNTER
Pt contacted Antonio to get surgery scheduled.     Please contact pt @:  [phone]    Denise Behrendt  [facility]

## 2019-03-25 NOTE — TELEPHONE ENCOUNTER
Patient scheduled for surgery 3-27-19. Given instructions and told her SDS will call with time and more information.  Pre op is scheduled for tomorrow.  Mavis Santana CMA

## 2019-03-26 NOTE — LETTER
My Asthma Action Plan  Name: Coretta Tovar   YOB: 1970  Date: 3/26/2019   My doctor: Vlad Del Angel MD   My clinic: [facility]        My Control Medicine: None  My Rescue Medicine: Albuterol (Proair/Ventolin/Proventil) inhaler .   My Asthma Severity: intermittent  Avoid your asthma triggers: pollens               GREEN ZONE   Good Control    I feel good    No cough or wheeze    Can work, sleep and play without asthma symptoms       Take your asthma control medicine every day.     1. If exercise triggers your asthma, take your rescue medication    15 minutes before exercise or sports, and    During exercise if you have asthma symptoms  2. Spacer to use with inhaler: If you have a spacer, make sure to use it with your inhaler             YELLOW ZONE Getting Worse  I have ANY of these:    I do not feel good    Cough or wheeze    Chest feels tight    Wake up at night   1. Keep taking your Green Zone medications  2. Start taking your rescue medicine:    every 20 minutes for up to 1 hour. Then every 4 hours for 24-48 hours.  3. If you stay in the Yellow Zone for more than 12-24 hours, contact your doctor.  4. If you do not return to the Green Zone in 12-24 hours or you get worse, start taking your oral steroid medicine if prescribed by your provider.           RED ZONE Medical Alert - Get Help  I have ANY of these:    I feel awful    Medicine is not helping    Breathing getting harder    Trouble walking or talking    Nose opens wide to breathe       1. Take your rescue medicine NOW  2. If your provider has prescribed an oral steroid medicine, start taking it NOW  3. Call your doctor NOW  4. If you are still in the Red Zone after 20 minutes and you have not reached your doctor:    Take your rescue medicine again and    Call 911 or go to the emergency room right away    See your regular doctor within 2 weeks of an Emergency Room or Urgent Care visit for follow-up treatment.           Annual Reminders:  Meet with Asthma Educator,  Flu Shot in the Fall, consider Pneumonia Vaccination for patients with asthma (aged 19 and older).    Pharmacy:    [id number] - [place], [place] - [address] [id number] - [place], [place] - [address]  AT [id number] - [place], [place] - [address] [facility] AT [facility]                      Asthma Triggers  How To Control Things That Make Your Asthma Worse    Triggers are things that make your asthma worse.  Look at the list below to help you find your triggers and what you can do about them.  You can help prevent asthma flare-ups by staying away from your triggers.      Trigger                                                          What you can do   Cigarette Smoke  Tobacco smoke can make asthma worse. Do not allow smoking in your home, car or around you.  Be sure no one smokes at a child s day care or school.  If you smoke, ask your health care provider for ways to help you quit.  Ask family members to quit too.  Ask your health care provider for a referral to Quit Plan to help you quit smoking, or call 6-717-045-PLAN.     Colds, Flu, Bronchitis  These are common triggers of asthma. Wash your hands often.  Don t touch your eyes, nose or mouth.  Get a flu shot every year.     Dust Mites  These are tiny bugs that live in cloth or carpet. They are too small to see. Wash sheets and blankets in hot water every week.   Encase pillows and mattress in dust mite proof covers.  Avoid having carpet if you can. If you have carpet, vacuum weekly.   Use a dust mask and HEPA vacuum.   Pollen and Outdoor Mold  Some people are allergic to trees, grass, or weed pollen, or molds. Try to keep your windows closed.  Limit time out doors when pollen count is high.   Ask you health care provider about taking medicine during allergy season.     Animal Dander  Some people are allergic to skin  flakes, urine or saliva from pets with fur or feathers. Keep pets with fur or feathers out of your home.    If you can t keep the pet outdoors, then keep the pet out of your bedroom.  Keep the bedroom door closed.  Keep pets off cloth furniture and away from stuffed toys.     Mice, Rats, and Cockroaches  Some people are allergic to the waste from these pests.   Cover food and garbage.  Clean up spills and food crumbs.  Store grease in the refrigerator.   Keep food out of the bedroom.   Indoor Mold  This can be a trigger if your home has high moisture. Fix leaking faucets, pipes, or other sources of water.   Clean moldy surfaces.  Dehumidify basement if it is damp and smelly.   Smoke, Strong Odors, and Sprays  These can reduce air quality. Stay away from strong odors and sprays, such as perfume, powder, hair spray, paints, smoke incense, paint, cleaning products, candles and new carpet.   Exercise or Sports  Some people with asthma have this trigger. Be active!  Ask your doctor about taking medicine before sports or exercise to prevent symptoms.    Warm up for 5-10 minutes before and after sports or exercise.     Other Triggers of Asthma  Cold air:  Cover your nose and mouth with a scarf.  Sometimes laughing or crying can be a trigger.  Some medicines and food can trigger asthma.

## 2019-03-26 NOTE — TELEPHONE ENCOUNTER
Spoke to patient in regards to message below. Patient states understanding.    Sharmila Tabor, SANDY

## 2019-03-26 NOTE — LETTER
March 26, 2019      Coretta ROB Tovar  [address] [facility] [address]        To Whom It May Concern:    Coretta Tovar was seen in our clinic today.       Sincerely,        Vlad Del Angel MD

## 2019-03-26 NOTE — TELEPHONE ENCOUNTER
Patient come in today to see Dr. Del Angel for a pre-op. Patient stated that Sarah Rohit Roberts needs to put in a referral order for Dr. Del Angel so her office visit today will be pay for by her insurance.    Digna Camarillo MA

## 2019-03-26 NOTE — TELEPHONE ENCOUNTER
Referral placed in Epic, please send to restricted program.    Cyndi Watson RN  [facility]

## 2019-03-26 NOTE — TELEPHONE ENCOUNTER
[facility] is where patient is having surgery tomorrow with Dr. Colton SOSA. They also need a referral from Sarah CADENA  [phone]    [facility] [email or web address]

## 2019-03-26 NOTE — PATIENT INSTRUCTIONS
Before Your Surgery      Call your surgeon if there is any change in your health. This includes signs of a cold or flu (such as a sore throat, runny nose, cough, rash or fever).    Do not smoke, drink alcohol or take over the counter medicine (unless your surgeon or primary care doctor tells you to) for the 24 hours before and after surgery.    If you take prescribed drugs: Follow your doctor s orders about which medicines to take and which to stop until after surgery.    Eating and drinking prior to surgery: follow the instructions from your surgeon    Take a shower or bath the night before surgery. Use the soap your surgeon gave you to gently clean your skin. If you do not have soap from your surgeon, use your regular soap. Do not shave or scrub the surgery site.  Wear clean pajamas and have clean sheets on your bed.     Recommendation:  --Patient is to take all scheduled medications on the day of surgery EXCEPT for modifications listed below.  None.  APPROVAL GIVEN to proceed with proposed procedure, without further diagnostic evaluation  Signed Electronically by: Vlad Del Angel MD

## 2019-03-26 NOTE — H&P (VIEW-ONLY)
[facility]  [address] [place] [id number]  [phone]  Dept: [phone]    PRE-OP EVALUATION:  Today's date: 3/26/2019    Coretta Tovar (: 1970) presents for pre-operative evaluation assessment as requested by Dr. Segura.  She requires evaluation and anesthesia risk assessment prior to undergoing surgery/procedure for treatment of Direct Laryngoscopy with Biopsy .    Proposed Surgery/ Procedure:  Direct Laryngoscopy with Biopsy   Date of Surgery/ Procedure: 2018  Time of Surgery/ Procedure: 9:10 AM  Hospital/Surgical Facility: [facility]   Fax number for surgical facility:   Primary Physician: Sarah Montemayor  Type of Anesthesia Anticipated: General    Patient has a Health Care Directive or Living Will:  NO    1. NO - Do you have a history of heart attack, stroke, stent, bypass or surgery on an artery in the head, neck, heart or legs?  2. NO - Do you ever have any pain or discomfort in your chest?  3. NO - Do you have a history of  Heart Failure?  4. NO - Are you troubled by shortness of breath when: walking on the level, up a slight hill or at night?  5. NO - Do you currently have a cold, bronchitis or other respiratory infection?  6. NO - Do you have a cough, shortness of breath or wheezing?  7. NO - Do you sometimes get pains in the calves of your legs when you walk?  8. YES - DO YOU OR ANYONE IN YOUR FAMILY HAVE PREVIOUS HISTORY OF BLOOD CLOTS? Has ITP; in remission  9. NO - Do you or does anyone in your family have a serious bleeding problem such as prolonged bleeding following surgeries or cuts?  10. NO - Have you ever had problems with anemia or been told to take iron pills?  11. NO - Have you had any abnormal blood loss such as black, tarry or bloody stools, or abnormal vaginal bleeding?  12. YES - HAVE YOU EVER HAD A BLOOD TRANSFUSION? Blood and platelets around  when diagnosed with ITP  13. NO - Have you or any of your  relatives ever had problems with anesthesia?  14. NO - Do you have sleep apnea, excessive snoring or daytime drowsiness?  15. NO - Do you have any prosthetic heart valves?  16. NO - Do you have prosthetic joints?  17. NO - Is there any chance that you may be pregnant?      HPI:     HPI related to upcoming procedure: Direct Laryngoscopy      See problem list for active medical problems.     MEDICAL HISTORY:     Patient Active Problem List    Diagnosis Date Noted     History of cocaine dependence 09/07/2017     Priority: Medium     Moderate episode of recurrent major depressive disorder (H) 03/22/2017     Priority: Medium     Generalized anxiety disorder 03/22/2017     Priority: Medium     Protein-calorie malnutrition (H) 03/22/2017     Priority: Medium     Primary insomnia 03/22/2017     Priority: Medium     Vertigo 03/14/2017     Priority: Medium     Cervical pain 03/08/2017     Priority: Medium     Disorder of stomach 11/20/2016     Priority: Medium     Overview:   Per Biopsy obtained on EGD 11/22/2016. Reactive gastropathy was quoted to be frequently associated with ongoing non-inflammatory type mucosal injury due to a chemical type of injury; this may be due to ingestion of non-steroidal anti-inflammatory drugs, aspirin, excess alcohol, or corticosteroids.        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     Priority: Medium     Chemical dependency (H) 02/24/2015     Priority: Medium     Insomnia 02/24/2015     Priority: Medium     S/P splenectomy 01/13/2015     Priority: Medium     Migraines      Priority: Medium     Idiopathic thrombocytopenic purpura (HCC)      Priority: Medium     s/p splenectomy       Tobacco abuse 05/30/2014     Priority: Medium     Immune thrombocytopenic purpura (H) 01/14/2008     Priority: Medium     Personality disorder (H) 01/14/2008     Priority: Medium     Overview:   cluster B  traits  borderline, antisocial       Nondependent cannabis abuse, episodic 01/14/2008     Priority: Medium     Mixed, or nondependent drug abuse 01/26/2007     Priority: Medium     Overview:   recurrent issues with opioids.  History of crack cocaine abuse.       Gastroesophageal reflux disease 12/20/2006     Priority: Medium     Herpetic gingivostomatitis 12/20/2006     Priority: Medium     Muscle pain 12/20/2006     Priority: Medium     Atopic rhinitis 12/20/2006     Priority: Medium     Abnormal weight loss 12/20/2006     Priority: Medium      Past Medical History:   Diagnosis Date     Endometriosis      Mary-mary disease      ITP (idiopathic thrombocytopaenic purpura) 1994    s/p splenectomy     Migraines      Past Surgical History:   Procedure Laterality Date     ANKLE SURGERY      Numerous right ankle surgeries     HERNIA REPAIR Left 1975     HYSTERECTOMY, PAP NO LONGER INDICATED      benign, endometriosis     NECK SURGERY  2000's    Cervical spine     SPLENECTOMY  1995     TONSILLECTOMY       Current Outpatient Medications   Medication Sig Dispense Refill     acyclovir (ZOVIRAX) 5 % ointment Apply 1 g topically as needed 30 g 11     albuterol (PROAIR HFA/PROVENTIL HFA/VENTOLIN HFA) 108 (90 Base) MCG/ACT Inhaler Inhale 1-2 puffs into the lungs every 4 hours as needed for shortness of breath / dyspnea or wheezing 18 g 3     betamethasone valerate (VALISONE) 0.1 % ointment Apply once a day for 2 weeks as directed       buprenorphine-naloxone (SUBOXONE) 8-2 MG SUBL sublingual tablet Place 1 tablet under the tongue 2 times daily 44 each 0     busPIRone HCl (BUSPAR) 30 MG tablet Take 1 tablet (30 mg) by mouth 2 times daily 60 tablet 5     cetirizine (ZYRTEC) 10 MG tablet Take 1 tablet (10 mg) by mouth every evening 90 tablet 1     clobetasol (TEMOVATE) 0.05 % ointment APPLY TOPICALLY BID  1     cloNIDine (CATAPRES) 0.1 MG tablet Take 1 tablet (0.1 mg) by mouth At Bedtime Prn late withdrawal symptoms. 60 tablet 0      docusate sodium (DOK) 100 MG capsule TAKE 1 CAPSULE BY MOUTH TWICE DAILY AS NEEDED FOR CONSTIPATION 60 capsule 3     FLUoxetine (PROZAC) 20 MG capsule TAKE 1 CAPSULE BY MOUTH DAILY WITH FLUOXETINE 40MG FOR A TOTAL OF 60MG DAILY 30 capsule 5     FLUoxetine (PROZAC) 40 MG capsule TAKE 1 CAPSULE BY MOUTH DAILY WITH 20 MG CAPSULE OF FLUOXETINE TO EQUAL TOTAL DAILY DOSE OF 60MG 30 capsule 5     fluticasone (FLONASE) 50 MCG/ACT nasal spray Spray 2 sprays into both nostrils daily 1 Bottle 11     gabapentin (NEURONTIN) 300 MG capsule 300 am 600mg q hs 90 capsule 1     hydrocortisone 2.5 % cream        hydrOXYzine (ATARAX) 25 MG tablet TAKE 2 TABLETS BY MOUTH NIGHTLY AS NEEDED FOR ANXIETY 60 tablet 3     hydrOXYzine (ATARAX) 50 MG tablet TAKE 1 TABLET(50MG) BY MOUTH NIGHTLY AS NEEDED FOR ANXIETY 30 tablet 3     hydrOXYzine (VISTARIL) 25 MG capsule Take 2 capsules (50 mg) by mouth nightly as needed for anxiety 120 capsule 1     ipratropium - albuterol 0.5 mg/2.5 mg/3 mL (DUONEB) 0.5-2.5 (3) MG/3ML neb solution Take 1 vial (3 mLs) by nebulization every 6 hours as needed for shortness of breath / dyspnea or wheezing 90 vial 3     ketoconazole (NIZORAL) 2 % cream        levocetirizine (XYZAL) 5 MG tablet Take 1 tablet (5 mg) by mouth every evening 30 tablet 11     loratadine (CLARITIN) 10 MG tablet TABLET 1 TABLET BY MOUTH DAILY AS NEEDED FOR ALLERGIES 30 tablet 5     methocarbamol (ROBAXIN) 500 MG tablet Take 1 tablet (500 mg) by mouth 3 times daily 90 tablet 3     Neomycin-Bacitracin-Polymyxin ([employer] TRIPLE ANTIBIOTIC) OINT Externally apply topically 4 times daily 60 g 3     olopatadine (PATANOL) 0.1 % ophthalmic solution Place 1 drop into both eyes 2 times daily 5 mL 3     omeprazole (PRILOSEC) 40 MG DR capsule TAKE 1 CAPSULE(40 MG) BY MOUTH DAILY 30 TO 60 MINUTES BEFORE A MEAL 90 capsule 0     ondansetron (ZOFRAN-ODT) 4 MG ODT tab Take 1-2 tablets (4-8 mg) by mouth every 8 hours as needed for nausea 30 tablet 1     order  for DME Equipment being ordered: Left thumb brace 1 each 0     polyethylene glycol (MIRALAX/GLYCOLAX) powder TAKE 17 GRAMS(1 CAPFUL) BY MOUTH DAILY 527 g 3     ranitidine (ZANTAC) 150 MG tablet Take 1 tablet (150 mg) by mouth 2 times daily 60 tablet 2     terbinafine (LAMISIL) 1 % external cream Apply topically 2 times daily 30 g 2     traZODone (DESYREL) 100 MG tablet Take 1 tablet (100 mg) by mouth At Bedtime 90 tablet 1     triamcinolone (KENALOG) 0.1 % ointment Apply sparingly to affected area on back two times daily til clear. 80 g 0     valACYclovir (VALTREX) 1000 mg tablet Take 1 tablet (1,000 mg) by mouth 2 times daily At the onset of skin lesions as needed 8 tablet 11     varenicline (CHANTIX CONTINUING MONTH ARA) 1 MG tablet TAKE 1 TABLET(1 MG) BY MOUTH TWICE DAILY 56 tablet 2     ZOLMitriptan (ZOMIG) 5 MG tablet TAKE 1 TABLET BY MOUTH AT ONSET OF HEADACHE FOR MIGRAINE. MAY REPEAT DOSE IN 2 HOURS. DO NOT EXCEED 10MG IN 24 HOURS 12 tablet 4     acyclovir (ZOVIRAX) 5 % external ointment APPLY EXTERNALLY TO THE AFFECTED AREA 6 TIMES DAILY (Patient not taking: Reported on 3/26/2019) 15 g 3     OTC products: None, except as noted above    Allergies   Allergen Reactions     Acetaminophen-Codeine Hives     Amoxicillin-Pot Clavulanate Nausea and Vomiting     Augmentin Nausea and Vomiting and Hives     Barbiturates      Bentyl [Dicyclomine] Other (See Comments)     dizziness     Compazine      Lock-jaw     Liquid Adhesive      Other reaction(s): Other, see comments  blisters     No Clinical Screening - See Comments      PN: LW Reaction: blisters  PN: LW FI1: nka  PN: LW CM1: CONTRAST- nka Reaction :     Nsaids      D/t ITP     Prochlorperazine      Other reaction(s): Edema     Propoxyphene N-Apap Hives     Sulfa Drugs      Tramadol Hcl Hives            Adhesive Tape Rash      Latex Allergy: NO    Social History     Tobacco Use     Smoking status: Current Every Day Smoker     Packs/day: 0.50     Years: 15.00      "Pack years: 7.50     Types: Cigarettes     Last attempt to quit: 11/6/2015     Years since quitting: 3.3     Smokeless tobacco: Never Used     Tobacco comment: 4/17/18   Substance Use Topics     Alcohol use: Yes     Comment: Drinks once in 4 - 5 months     History   Drug Use No     Comment: Last exposure to cocaine 2010       REVIEW OF SYSTEMS:   Constitutional, cardiovascular, pulmonary, gi and gu systems are negative, except as otherwise noted.    EXAM:   BP 98/60   Pulse 85   Temp 97.3  F (36.3  C) (Oral)   Resp 14   Ht 1.778 m (5' 10\")   Wt 68.9 kg (152 lb)   SpO2 99%   BMI 21.81 kg/m      GENERAL APPEARANCE: healthy, alert and no distress     EYES: EOMI, PERRL     HENT: ear canals and TM's normal and nose and mouth without ulcers or lesions     NECK: no adenopathy and thyroid normal to palpation     RESP: lungs clear to auscultation - no rales, rhonchi or wheezes     CV: regular rates and rhythm, normal S1 S2, no S3 or S4 and no murmur, click or rub     ABDOMEN:  soft, nontender, no HSM or masses and bowel sounds odette     SKIN: no suspicious lesions or rashes     NEURO: Normal strength and tone, mentation intact and speech normal     PSYCH: mentation appears normal. and affect normal/bright    DIAGNOSTICS:   EKG: Not indicated due to non-vascular surgery and low risk of event (age <65 and without cardiac risk factors)  CBC WITH AUTO DIFFERENTIAL   Order: [id number]       Ref Range & Units Value   WHITE BLOOD COUNT         4.5 - 11.0 thou/cu mm 7.7    RED BLOOD COUNT           4.00 - 5.20 mil/cu mm 3.62 Abnormally low     HEMOGLOBIN                12.0 - 16.0 g/dL 11.3 Abnormally low     HEMATOCRIT                33.0 - 51.0 % 33.6    MCV                       80 - 100 fL 93    MCH                       26.0 - 34.0 pg 31.2    MCHC                      32.0 - 36.0 g/dL 33.6    RDW                       11.5 - 15.5 % 14.8    PLATELET COUNT            140 - 440 thou/cu mm 424    MPV                       6.5 " - 11.0 fL 8.9    NEUTROPHILS                % 27.5    LYMPHOCYTES                % 53.3    MONOCYTES                  % 10.7    EOSINOPHILS                % 6.9    BASOPHILS                  % 1.3    IMMATURE GRANULOCYTES(METAS,MYELOS,PROS)  % 0.3    ABSOLUTE NEUTROPHILS      1.7 - 7.0 thou/cu mm 2.1    ABSOLUTE LYMPHOCYTES      0.9 - 2.9 thou/cu mm 4.1 Abnormally high     ABSOLUTE MONOCYTES        <0.9 thou/cu mm 0.8    ABSOLUTE EOSINOPHILS      <0.5 thou/cu mm 0.5 Abnormally high     ABSOLUTE BASOPHILS        <0.3 thou/cu mm 0.1    ABSOLUTE IMMATURE GRANULOCYTES(METAS,MYELOS,PROS) <0.3 thou/cu mm 0.0      IMPRESSION:   Reason for surgery/procedure: Leukoplakia of Larynx/Direct Laryngoscopy with Biopsy   Diagnosis/reason for consult: Leukoplakia of Larynx/Pre OP    The proposed surgical procedure is considered LOW risk.    REVISED CARDIAC RISK INDEX  The patient has the following serious cardiovascular risks for perioperative complications such as (MI, PE, VFib and 3  AV Block):  No serious cardiac risks  INTERPRETATION: 0 risks: Class I (very low risk - 0.4% complication rate)    The patient has the following additional risks for perioperative complications:  On Suboxone       ICD-10-CM    1. Preop general physical exam Z01.818    2. 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 History of ITP Z86.2    4. Chemical dependency (H) F19.20    5. Tobacco use disorder F17.200      RECOMMENDATIONS:     --Patient is to take all scheduled medications on the day of surgery EXCEPT for modifications listed below.  None.    APPROVAL GIVEN to proceed with proposed procedure, without further diagnostic evaluation       Signed Electronically by: Vlad Del Angel MD    Copy of this evaluation report is provided to requesting physician.    [place] Preop Guidelines    Revised Cardiac Risk Index

## 2019-03-26 NOTE — TELEPHONE ENCOUNTER
Routing to referrals as patient is restricted.  Does referral placed on 2/26/19 cover patient for visit with Dr. Del Angel today or does new referral need to be placed?  Tita Brown RN

## 2019-03-26 NOTE — ANESTHESIA PREPROCEDURE EVALUATION
Anesthesia Pre-Procedure Evaluation    Patient: Coretta Tovar   MRN: [id number] : 1970          Preoperative Diagnosis: Leukoplakia of larynx    Procedure(s):  DIRECT LARYNGOSCOPY WITH BIOPSY(IES)    Past Medical History:   Diagnosis Date     Endometriosis      Mary-mary disease      ITP (idiopathic thrombocytopaenic purpura)     s/p splenectomy     Migraines      Past Surgical History:   Procedure Laterality Date     ANKLE SURGERY      Numerous right ankle surgeries     HERNIA REPAIR Left      HYSTERECTOMY, PAP NO LONGER INDICATED      benign, endometriosis     NECK SURGERY  's    Cervical spine     SPLENECTOMY       TONSILLECTOMY         Anesthesia Evaluation     . Pt has had prior anesthetic. Type: General           ROS/MED HX    ENT/Pulmonary:     (+)other ENT- Vertigo, tobacco use, Current use 0.5 packs/day  , recent URI resolved Bronchitis 2 weeks ago (3/19): . .    Neurologic:     (+)migraines,     Cardiovascular:  - neg cardiovascular ROS   (+) ----. : . . . :. . Previous cardiac testing date:results:date: results:ECG reviewed date:8/3/18 results:Sinus Bradycardia   - Negative precordial T-waves.     WITHIN NORMAL LIMITS   date: results:          METS/Exercise Tolerance:  >4 METS   Hematologic:     (+) Other Hematologic Disorder-ITP      Musculoskeletal:   (+) , , other musculoskeletal- S/P cervical disc surgery      GI/Hepatic:     (+) GERD Other GI/Hepatic S/P splenectomy      Renal/Genitourinary:  - ROS Renal section negative       Endo:  - neg endo ROS       Psychiatric:     (+) psychiatric history other (comment), anxiety and depression (Personality disorder)      Infectious Disease:  - neg infectious disease ROS       Malignancy:      - no malignancy   Other: Comment: Hx of cocaine abuse  Cannabis abuse  Mary-mary disease                         Physical Exam  Normal systems: cardiovascular and pulmonary    Airway   Mallampati: II  TM distance: >3 FB  Neck ROM:  "full    Dental   (+) upper dentures and lower dentures    Cardiovascular       Pulmonary             Lab Results   Component Value Date    WBC 7.1 10/29/2018    HGB 11.7 10/29/2018    HCT 35.9 10/29/2018     10/29/2018    CRP <2.9 10/29/2018    SED 26 (H) 10/29/2018     10/29/2018    POTASSIUM 3.4 10/29/2018    CHLORIDE 107 10/29/2018    CO2 32 10/29/2018    BUN 7 10/29/2018    CR 0.68 10/29/2018    GLC 70 10/29/2018    HEVER 8.8 10/29/2018    MAG 2.2 11/20/2016    ALBUMIN 3.7 10/29/2018    PROTTOTAL 7.5 10/29/2018    ALT 22 10/29/2018    AST 21 10/29/2018    ALKPHOS 89 10/29/2018    BILITOTAL 0.2 10/29/2018    LIPASE 203 11/20/2016    AMYLASE 47 09/20/2006    INR 1.14 09/20/2006    HCG Negative 09/05/2005    HCGS Negative 12/30/2007       Preop Vitals  BP Readings from Last 3 Encounters:   03/26/19 98/60   03/21/19 123/80   03/19/19 132/66    Pulse Readings from Last 3 Encounters:   03/26/19 85   03/21/19 61   03/19/19 86      Resp Readings from Last 3 Encounters:   03/26/19 14   03/19/19 24   01/04/19 18    SpO2 Readings from Last 3 Encounters:   03/26/19 99%   03/21/19 98%   03/19/19 99%      Temp Readings from Last 1 Encounters:   03/26/19 36.3  C (97.3  F) (Oral)    Ht Readings from Last 1 Encounters:   03/26/19 1.778 m (5' 10\")      Wt Readings from Last 1 Encounters:   03/26/19 68.9 kg (152 lb)    Estimated body mass index is 21.81 kg/m  as calculated from the following:    Height as of 3/26/19: 1.778 m (5' 10\").    Weight as of 3/26/19: 68.9 kg (152 lb).       Anesthesia Plan      History & Physical Review  History and physical reviewed and following examination; no interval change.    ASA Status:  2 .    NPO Status:  > 6 hours    Plan for General and ETT with Intravenous and Propofol induction. Maintenance will be Balanced.    PONV prophylaxis:  Ondansetron (or other 5HT-3) and Dexamethasone or Solumedrol  Additional equipment: Videolaryngoscope      Postoperative Care  Postoperative pain " management:  IV analgesics, Oral pain medications and Multi-modal analgesia.      Consents  Anesthetic plan, risks, benefits and alternatives discussed with:  Patient..                 NINOSKA Corbin CRNA

## 2019-03-26 NOTE — PROGRESS NOTES
[facility]  [address] [place] [id number]  [phone]  Dept: [phone]    PRE-OP EVALUATION:  Today's date: 3/26/2019    Coretta Tovar (: 1970) presents for pre-operative evaluation assessment as requested by Dr. Segura.  She requires evaluation and anesthesia risk assessment prior to undergoing surgery/procedure for treatment of Direct Laryngoscopy with Biopsy .    Proposed Surgery/ Procedure:  Direct Laryngoscopy with Biopsy   Date of Surgery/ Procedure: 2018  Time of Surgery/ Procedure: 9:10 AM  Hospital/Surgical Facility: [facility]   Fax number for surgical facility:   Primary Physician: Sarah Montemayor  Type of Anesthesia Anticipated: General    Patient has a Health Care Directive or Living Will:  NO    1. NO - Do you have a history of heart attack, stroke, stent, bypass or surgery on an artery in the head, neck, heart or legs?  2. NO - Do you ever have any pain or discomfort in your chest?  3. NO - Do you have a history of  Heart Failure?  4. NO - Are you troubled by shortness of breath when: walking on the level, up a slight hill or at night?  5. NO - Do you currently have a cold, bronchitis or other respiratory infection?  6. NO - Do you have a cough, shortness of breath or wheezing?  7. NO - Do you sometimes get pains in the calves of your legs when you walk?  8. YES - DO YOU OR ANYONE IN YOUR FAMILY HAVE PREVIOUS HISTORY OF BLOOD CLOTS? Has ITP; in remission  9. NO - Do you or does anyone in your family have a serious bleeding problem such as prolonged bleeding following surgeries or cuts?  10. NO - Have you ever had problems with anemia or been told to take iron pills?  11. NO - Have you had any abnormal blood loss such as black, tarry or bloody stools, or abnormal vaginal bleeding?  12. YES - HAVE YOU EVER HAD A BLOOD TRANSFUSION? Blood and platelets around  when diagnosed with ITP  13. NO - Have you or any of your  relatives ever had problems with anesthesia?  14. NO - Do you have sleep apnea, excessive snoring or daytime drowsiness?  15. NO - Do you have any prosthetic heart valves?  16. NO - Do you have prosthetic joints?  17. NO - Is there any chance that you may be pregnant?      HPI:     HPI related to upcoming procedure: Direct Laryngoscopy      See problem list for active medical problems.     MEDICAL HISTORY:     Patient Active Problem List    Diagnosis Date Noted     History of cocaine dependence 09/07/2017     Priority: Medium     Moderate episode of recurrent major depressive disorder (H) 03/22/2017     Priority: Medium     Generalized anxiety disorder 03/22/2017     Priority: Medium     Protein-calorie malnutrition (H) 03/22/2017     Priority: Medium     Primary insomnia 03/22/2017     Priority: Medium     Vertigo 03/14/2017     Priority: Medium     Cervical pain 03/08/2017     Priority: Medium     Disorder of stomach 11/20/2016     Priority: Medium     Overview:   Per Biopsy obtained on EGD 11/22/2016. Reactive gastropathy was quoted to be frequently associated with ongoing non-inflammatory type mucosal injury due to a chemical type of injury; this may be due to ingestion of non-steroidal anti-inflammatory drugs, aspirin, excess alcohol, or corticosteroids.        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     Priority: Medium     Chemical dependency (H) 02/24/2015     Priority: Medium     Insomnia 02/24/2015     Priority: Medium     S/P splenectomy 01/13/2015     Priority: Medium     Migraines      Priority: Medium     Idiopathic thrombocytopenic purpura (HCC)      Priority: Medium     s/p splenectomy       Tobacco abuse 05/30/2014     Priority: Medium     Immune thrombocytopenic purpura (H) 01/14/2008     Priority: Medium     Personality disorder (H) 01/14/2008     Priority: Medium     Overview:   cluster B  traits  borderline, antisocial       Nondependent cannabis abuse, episodic 01/14/2008     Priority: Medium     Mixed, or nondependent drug abuse 01/26/2007     Priority: Medium     Overview:   recurrent issues with opioids.  History of crack cocaine abuse.       Gastroesophageal reflux disease 12/20/2006     Priority: Medium     Herpetic gingivostomatitis 12/20/2006     Priority: Medium     Muscle pain 12/20/2006     Priority: Medium     Atopic rhinitis 12/20/2006     Priority: Medium     Abnormal weight loss 12/20/2006     Priority: Medium      Past Medical History:   Diagnosis Date     Endometriosis      Mary-mary disease      ITP (idiopathic thrombocytopaenic purpura) 1994    s/p splenectomy     Migraines      Past Surgical History:   Procedure Laterality Date     ANKLE SURGERY      Numerous right ankle surgeries     HERNIA REPAIR Left 1975     HYSTERECTOMY, PAP NO LONGER INDICATED      benign, endometriosis     NECK SURGERY  2000's    Cervical spine     SPLENECTOMY  1995     TONSILLECTOMY       Current Outpatient Medications   Medication Sig Dispense Refill     acyclovir (ZOVIRAX) 5 % ointment Apply 1 g topically as needed 30 g 11     albuterol (PROAIR HFA/PROVENTIL HFA/VENTOLIN HFA) 108 (90 Base) MCG/ACT Inhaler Inhale 1-2 puffs into the lungs every 4 hours as needed for shortness of breath / dyspnea or wheezing 18 g 3     betamethasone valerate (VALISONE) 0.1 % ointment Apply once a day for 2 weeks as directed       buprenorphine-naloxone (SUBOXONE) 8-2 MG SUBL sublingual tablet Place 1 tablet under the tongue 2 times daily 44 each 0     busPIRone HCl (BUSPAR) 30 MG tablet Take 1 tablet (30 mg) by mouth 2 times daily 60 tablet 5     cetirizine (ZYRTEC) 10 MG tablet Take 1 tablet (10 mg) by mouth every evening 90 tablet 1     clobetasol (TEMOVATE) 0.05 % ointment APPLY TOPICALLY BID  1     cloNIDine (CATAPRES) 0.1 MG tablet Take 1 tablet (0.1 mg) by mouth At Bedtime Prn late withdrawal symptoms. 60 tablet 0      docusate sodium (DOK) 100 MG capsule TAKE 1 CAPSULE BY MOUTH TWICE DAILY AS NEEDED FOR CONSTIPATION 60 capsule 3     FLUoxetine (PROZAC) 20 MG capsule TAKE 1 CAPSULE BY MOUTH DAILY WITH FLUOXETINE 40MG FOR A TOTAL OF 60MG DAILY 30 capsule 5     FLUoxetine (PROZAC) 40 MG capsule TAKE 1 CAPSULE BY MOUTH DAILY WITH 20 MG CAPSULE OF FLUOXETINE TO EQUAL TOTAL DAILY DOSE OF 60MG 30 capsule 5     fluticasone (FLONASE) 50 MCG/ACT nasal spray Spray 2 sprays into both nostrils daily 1 Bottle 11     gabapentin (NEURONTIN) 300 MG capsule 300 am 600mg q hs 90 capsule 1     hydrocortisone 2.5 % cream        hydrOXYzine (ATARAX) 25 MG tablet TAKE 2 TABLETS BY MOUTH NIGHTLY AS NEEDED FOR ANXIETY 60 tablet 3     hydrOXYzine (ATARAX) 50 MG tablet TAKE 1 TABLET(50MG) BY MOUTH NIGHTLY AS NEEDED FOR ANXIETY 30 tablet 3     hydrOXYzine (VISTARIL) 25 MG capsule Take 2 capsules (50 mg) by mouth nightly as needed for anxiety 120 capsule 1     ipratropium - albuterol 0.5 mg/2.5 mg/3 mL (DUONEB) 0.5-2.5 (3) MG/3ML neb solution Take 1 vial (3 mLs) by nebulization every 6 hours as needed for shortness of breath / dyspnea or wheezing 90 vial 3     ketoconazole (NIZORAL) 2 % cream        levocetirizine (XYZAL) 5 MG tablet Take 1 tablet (5 mg) by mouth every evening 30 tablet 11     loratadine (CLARITIN) 10 MG tablet TABLET 1 TABLET BY MOUTH DAILY AS NEEDED FOR ALLERGIES 30 tablet 5     methocarbamol (ROBAXIN) 500 MG tablet Take 1 tablet (500 mg) by mouth 3 times daily 90 tablet 3     Neomycin-Bacitracin-Polymyxin ([employer] TRIPLE ANTIBIOTIC) OINT Externally apply topically 4 times daily 60 g 3     olopatadine (PATANOL) 0.1 % ophthalmic solution Place 1 drop into both eyes 2 times daily 5 mL 3     omeprazole (PRILOSEC) 40 MG DR capsule TAKE 1 CAPSULE(40 MG) BY MOUTH DAILY 30 TO 60 MINUTES BEFORE A MEAL 90 capsule 0     ondansetron (ZOFRAN-ODT) 4 MG ODT tab Take 1-2 tablets (4-8 mg) by mouth every 8 hours as needed for nausea 30 tablet 1     order  for DME Equipment being ordered: Left thumb brace 1 each 0     polyethylene glycol (MIRALAX/GLYCOLAX) powder TAKE 17 GRAMS(1 CAPFUL) BY MOUTH DAILY 527 g 3     ranitidine (ZANTAC) 150 MG tablet Take 1 tablet (150 mg) by mouth 2 times daily 60 tablet 2     terbinafine (LAMISIL) 1 % external cream Apply topically 2 times daily 30 g 2     traZODone (DESYREL) 100 MG tablet Take 1 tablet (100 mg) by mouth At Bedtime 90 tablet 1     triamcinolone (KENALOG) 0.1 % ointment Apply sparingly to affected area on back two times daily til clear. 80 g 0     valACYclovir (VALTREX) 1000 mg tablet Take 1 tablet (1,000 mg) by mouth 2 times daily At the onset of skin lesions as needed 8 tablet 11     varenicline (CHANTIX CONTINUING MONTH ARA) 1 MG tablet TAKE 1 TABLET(1 MG) BY MOUTH TWICE DAILY 56 tablet 2     ZOLMitriptan (ZOMIG) 5 MG tablet TAKE 1 TABLET BY MOUTH AT ONSET OF HEADACHE FOR MIGRAINE. MAY REPEAT DOSE IN 2 HOURS. DO NOT EXCEED 10MG IN 24 HOURS 12 tablet 4     acyclovir (ZOVIRAX) 5 % external ointment APPLY EXTERNALLY TO THE AFFECTED AREA 6 TIMES DAILY (Patient not taking: Reported on 3/26/2019) 15 g 3     OTC products: None, except as noted above    Allergies   Allergen Reactions     Acetaminophen-Codeine Hives     Amoxicillin-Pot Clavulanate Nausea and Vomiting     Augmentin Nausea and Vomiting and Hives     Barbiturates      Bentyl [Dicyclomine] Other (See Comments)     dizziness     Compazine      Lock-jaw     Liquid Adhesive      Other reaction(s): Other, see comments  blisters     No Clinical Screening - See Comments      PN: LW Reaction: blisters  PN: LW FI1: nka  PN: LW CM1: CONTRAST- nka Reaction :     Nsaids      D/t ITP     Prochlorperazine      Other reaction(s): Edema     Propoxyphene N-Apap Hives     Sulfa Drugs      Tramadol Hcl Hives            Adhesive Tape Rash      Latex Allergy: NO    Social History     Tobacco Use     Smoking status: Current Every Day Smoker     Packs/day: 0.50     Years: 15.00      "Pack years: 7.50     Types: Cigarettes     Last attempt to quit: 11/6/2015     Years since quitting: 3.3     Smokeless tobacco: Never Used     Tobacco comment: 4/17/18   Substance Use Topics     Alcohol use: Yes     Comment: Drinks once in 4 - 5 months     History   Drug Use No     Comment: Last exposure to cocaine 2010       REVIEW OF SYSTEMS:   Constitutional, cardiovascular, pulmonary, gi and gu systems are negative, except as otherwise noted.    EXAM:   BP 98/60   Pulse 85   Temp 97.3  F (36.3  C) (Oral)   Resp 14   Ht 1.778 m (5' 10\")   Wt 68.9 kg (152 lb)   SpO2 99%   BMI 21.81 kg/m      GENERAL APPEARANCE: healthy, alert and no distress     EYES: EOMI, PERRL     HENT: ear canals and TM's normal and nose and mouth without ulcers or lesions     NECK: no adenopathy and thyroid normal to palpation     RESP: lungs clear to auscultation - no rales, rhonchi or wheezes     CV: regular rates and rhythm, normal S1 S2, no S3 or S4 and no murmur, click or rub     ABDOMEN:  soft, nontender, no HSM or masses and bowel sounds odette     SKIN: no suspicious lesions or rashes     NEURO: Normal strength and tone, mentation intact and speech normal     PSYCH: mentation appears normal. and affect normal/bright    DIAGNOSTICS:   EKG: Not indicated due to non-vascular surgery and low risk of event (age <65 and without cardiac risk factors)  CBC WITH AUTO DIFFERENTIAL   Order: [id number]       Ref Range & Units Value   WHITE BLOOD COUNT         4.5 - 11.0 thou/cu mm 7.7    RED BLOOD COUNT           4.00 - 5.20 mil/cu mm 3.62 Abnormally low     HEMOGLOBIN                12.0 - 16.0 g/dL 11.3 Abnormally low     HEMATOCRIT                33.0 - 51.0 % 33.6    MCV                       80 - 100 fL 93    MCH                       26.0 - 34.0 pg 31.2    MCHC                      32.0 - 36.0 g/dL 33.6    RDW                       11.5 - 15.5 % 14.8    PLATELET COUNT            140 - 440 thou/cu mm 424    MPV                       6.5 " - 11.0 fL 8.9    NEUTROPHILS                % 27.5    LYMPHOCYTES                % 53.3    MONOCYTES                  % 10.7    EOSINOPHILS                % 6.9    BASOPHILS                  % 1.3    IMMATURE GRANULOCYTES(METAS,MYELOS,PROS)  % 0.3    ABSOLUTE NEUTROPHILS      1.7 - 7.0 thou/cu mm 2.1    ABSOLUTE LYMPHOCYTES      0.9 - 2.9 thou/cu mm 4.1 Abnormally high     ABSOLUTE MONOCYTES        <0.9 thou/cu mm 0.8    ABSOLUTE EOSINOPHILS      <0.5 thou/cu mm 0.5 Abnormally high     ABSOLUTE BASOPHILS        <0.3 thou/cu mm 0.1    ABSOLUTE IMMATURE GRANULOCYTES(METAS,MYELOS,PROS) <0.3 thou/cu mm 0.0      IMPRESSION:   Reason for surgery/procedure: Leukoplakia of Larynx/Direct Laryngoscopy with Biopsy   Diagnosis/reason for consult: Leukoplakia of Larynx/Pre OP    The proposed surgical procedure is considered LOW risk.    REVISED CARDIAC RISK INDEX  The patient has the following serious cardiovascular risks for perioperative complications such as (MI, PE, VFib and 3  AV Block):  No serious cardiac risks  INTERPRETATION: 0 risks: Class I (very low risk - 0.4% complication rate)    The patient has the following additional risks for perioperative complications:  On Suboxone       ICD-10-CM    1. Preop general physical exam Z01.818    2. 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 History of ITP Z86.2    4. Chemical dependency (H) F19.20    5. Tobacco use disorder F17.200      RECOMMENDATIONS:     --Patient is to take all scheduled medications on the day of surgery EXCEPT for modifications listed below.  None.    APPROVAL GIVEN to proceed with proposed procedure, without further diagnostic evaluation       Signed Electronically by: Vlad Del Angel MD    Copy of this evaluation report is provided to requesting physician.    [place] Preop Guidelines    Revised Cardiac Risk Index

## 2019-03-27 NOTE — DISCHARGE INSTRUCTIONS
Instructions for Vocal Cord Surgery    Recovery - Everyone recovers differently from a general anesthetic.  Symptoms such as fatigue, nausea, lightheadedness, and sometimes a low grade fever (up to 100 degrees) are not unusual.  As your body removes the anesthetic drugs from circulation, these symptoms will resolve.  Your throat will be sore for several days, and possibly up to a week after surgery.    Medications - You were sent home with narcotic pain medication.  If you can tolerate the discomfort during your recovery by using just plain Tylenol or ibuprofen (advil), please do so.  However, do not hesitate to use the stronger pain medication if needed.  If you were sent home with an antibiotic, it is primarily used to help the healing process.  If it causes loose bowel movements or other signs of intolerance, it is appropriate to discontinue it.  By far the most important measure you can take to speed recovery, and maximize the chances of long-term success of vocal cord surgery is voice rest for 3-5 days.  Only speak when absolutely necessary.  And when you do speak, whisper as quietly as you can.  Avoid conversations in noisy environments such as automobiles. Also, plenty of non-alcoholic beverages are essential to stay hydrated.  Smoking is also extremely damaging to the healing process of vocal cords.    Complications - Problems related to vocal cord surgery are rare.  The most feared complication is scarring of the vocal cords, which can happen weeks to months later.  The most important thing to prevent this is voice rest during the immediate post-operative period.  Other possible complications such as infection and pneumonia are unusual.    Follow up - The follow up will consist of another fiberoptic exam to evaluate the healing of the vocal cords.    If there are any questions or issues with the above, or if there are other issues that concern you, always feel free to call the clinic and I am happy to speak  with you as soon as I can.    Adrienne Segura  Otolaryngology  [facility]  [phone] After hours, [facility] option                        Same Day Surgery Discharge Instructions  Special Precautions After Surgery - Adult    1. It is not unusual to feel lightheaded or faint, up to 24 hours after surgery or while taking pain medication.  If you have these symptoms; sit for a few minutes before standing and have someone assist you when getting up.  2. You should rest and relax for the next 24 hours and must have someone stay with you for at least 24 hours after your discharge.  3. DO NOT DRIVE any vehicle or operate mechanical equipment for 24 hours following the end of your surgery.  DO NOT DRIVE while taking narcotic pain medications that have been prescribed by your physician.  If you had a limb operated on, you must be able to use it fully to drive.  4. DO NOT drink alcoholic beverages for 24 hours following surgery or while taking prescription pain medication.  5. Drink clear liquids (apple juice, ginger ale, broth, 7-Up, etc.).  Progress to your regular diet as you feel able.  6. Any questions call your physician and do not make important decisions for 24 hours.    __________________________________________________________________________________________________________________________________  IMPORTANT NUMBERS:    [facility] [place] Main Number:  [phone], [phone]  Pharmacy:  [phone]  Same Day Surgery:  [phone], Monday - Friday until 8:30 p.m.  Surgery Specialty Clinic:  [phone]

## 2019-03-27 NOTE — OR NURSING
Patient in phase 2 recovery for 20 minutes, insists on going home. Climbed over side rails with assistance of her daughter to stand at sink and brush dentures. Appeared off balance, assisted patient back to bed. Patient then got dressed and walked with daughter to restroom. Ambulated without difficulty. Insisted on leaving at this time. Gave discharge instructions to daughter, pt. Ambulated to car.

## 2019-03-27 NOTE — ANESTHESIA POSTPROCEDURE EVALUATION
Patient: Coretta Tovar    Procedure(s):  DIRECT LARYNGOSCOPY WITH BIOPSY(IES)    Diagnosis:Leukoplakia of larynx  Diagnosis Additional Information: No value filed.    Anesthesia Type:  General, ETT    Note:  Anesthesia Post Evaluation    Patient location during evaluation: Phase 2  Patient participation: Able to fully participate in evaluation  Level of consciousness: awake and alert  Pain management: adequate  Airway patency: patent  Cardiovascular status: acceptable and hemodynamically stable  Respiratory status: acceptable and room air  Hydration status: acceptable  PONV: none     Anesthetic complications: None          Last vitals:  Vitals:    03/27/19 1115 03/27/19 1130 03/27/19 1154   BP: 111/78 107/68 115/72   Pulse: 61 65    Resp: 21 30 20   Temp:      SpO2: 96% 98% 97%         Electronically Signed By: NINOSKA Corbin CRNA  March 27, 2019  12:03 PM

## 2019-03-27 NOTE — OP NOTE
PREOPERATIVE DIAGNOSES:   1. Right vocal fold lesion    POSTOPERATIVE DIAGNOSES:   1. Right vocal fold lesion    PROCEDURE PERFORMED: Direct Laryngoscopy with biopsies  SURGEON: Adrienne Segura MD  ASSISTANT: None   BLOOD LOSS: 1 mL.   COMPLICATIONS: None  SPECIMENS: Right vocal fold biopsy sent for permanent section    ANESTHESIA: GETA.     INDICATIONS: Coretta Tovar presented to me with persistent hoarseness and throat pain.  On clinical evaluation with flexible endoscopy, there was a suspicious mass on the larynx.  Therefore, my recommendation was for biopsy with examination under anesthesia.  Preoperatively risks discussed included the risks on infection, bleeding, damage to the voice, and possible alteration of swallowing.  In addition the risk of injury to the teeth and oral cavity was also discussed.  The patient understood and wished to proceed.    PROCEDURE: After induction of general anesthesia and intubation, the patient's bed was rotated 90 degrees and a head turban and shoulder roll were placed.  I placed a moist gauze over the upper and lower alveolus.  An anterior commissure scope was passed into the mouth.  The oropharynx was mucosally covered and symmetric, the tonsil fossa were normal in appearance.  Going further down I had a clear view of the base of tongue which had normal appearing lingual tonsillar tissue.  The base of tongue was free of lesions, and the vallecula was open.  The epiglottis was smooth and mucosally covered.  The supraglottic larynx was then clearly visualized.  The pyriform sinuses were open, and the limited view of the postcricoid region did not show any lesions.  I gently pushed the larynx forward with the tip of the scope, and the post cricoid region of the cervical esophagus was normal in appearance.  The vocal cords moved smoothly and symmetrically on palpation with a blunt tipped probe, they were pearly white.  The previously noted lesion was seen along the superior  surface of the right true vocal fold.  Prior to performing biopsies, I placed a pledget soaked with 1:10,000 epinephrine and 4% lidocaine onto the area for several minutes.  I used a straight biopsy cup forceps and took multiple biopsies passed off as specimen.    After my biopsies was complete I removed the laryngoscope.  The dental guard and pad were removed and the procedure was complete. The patient was awakened, extubated, and sent to the recovery room in good condition.    Dr. Adrienne Segura

## 2019-03-27 NOTE — ANESTHESIA CARE TRANSFER NOTE
Patient: Coretta Tovar    Procedure(s):  DIRECT LARYNGOSCOPY WITH BIOPSY(IES)    Diagnosis: Leukoplakia of larynx  Diagnosis Additional Information: No value filed.    Anesthesia Type:   General, ETT     Note:  Airway :Face Mask  Patient transferred to:PACU  Handoff Report: Identifed the Patient, Identified the Reponsible Provider, Reviewed the pertinent medical history, Discussed the surgical course, Reviewed Intra-OP anesthesia mangement and issues during anesthesia, Set expectations for post-procedure period and Allowed opportunity for questions and acknowledgement of understanding      Vitals: (Last set prior to Anesthesia Care Transfer)    CRNA VITALS  3/27/2019 1018 - 3/27/2019 1055      3/27/2019             Pulse:  95    SpO2:  97 %    Resp Rate (observed):  1  (Abnormal)                 Electronically Signed By: NINOSKA Corbin CRNA  March 27, 2019  10:55 AM

## 2019-04-02 NOTE — PROGRESS NOTES
SUBJECTIVE:                                                    BUPRENORPHINE FOLLOW UP:    Coretta Tovar is a 48 year old female who presents to clinic today for follow up of Buprenorphine.      Date of last visit:  2/5/2019    [place] [facility] Data Base Reviewed:    YES;     3/4/19 Suboxone 8mg tab #60  Gabapentin 300mg #180         Brief History:   Hx of multiple surgeries (>15).   Since age 14  9 surgeries on right foot in past 15months for cpmplex fx ankle and foot.  C spine fusion many years ago.   Hx migraine headache.  S/p hysterectomy, tonsillectomy, splenectomy, ear surgeries.  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           HPI:    Kept getting sick with URI sx. was seen in the emergency room and subsequently evaluated for throat pain.  Has had several biopsies taken and is waiting for results but there is some concern about precancerous or cancerous lesions.  She is struggling with discomfort after procedure and as a primary symptom.  She has understandable anxiety prior to receiving her biopsy results.  Currently doing .  Currently living at father's.  Smoking currently about 5-6 cig /days.  Taking Chantix.        Was being evaluated for possibleseronegative rheumatoid arthritis or DARON associated autoimmune connective tissue disorder.    Went to rheumatology in October and has been on prednisone taper and now off.  No current sx. Missed follow up in December.    Has not rescheduled Still has not followed up with Neurology about chronic headaches.    They have been stable  Taking Chantix again.  Currently about 1/2 pk.    Robaxin prn.  Need refill.  Gabapentin 300mg am and 600mg q hs.  Trazodone at hs.     Not attending any meetings or recovery support.        Social History     Social History Narrative    Lives with dad and brother in [place].      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      Daughter with recent DWI (she has child).        Patient Active Problem List    Diagnosis Date  Noted     History of cocaine dependence 09/07/2017     Priority: Medium     Moderate episode of recurrent major depressive disorder (H) 03/22/2017     Priority: Medium     Generalized anxiety disorder 03/22/2017     Priority: Medium     Protein-calorie malnutrition (H) 03/22/2017     Priority: Medium     Primary insomnia 03/22/2017     Priority: Medium     Vertigo 03/14/2017     Priority: Medium     Cervical pain 03/08/2017     Priority: Medium     Disorder of stomach 11/20/2016     Priority: Medium     Overview:   Per Biopsy obtained on EGD 11/22/2016. Reactive gastropathy was quoted to be frequently associated with ongoing non-inflammatory type mucosal injury due to a chemical type of injury; this may be due to ingestion of non-steroidal anti-inflammatory drugs, aspirin, excess alcohol, or corticosteroids.        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     Priority: Medium     Chemical dependency (H) 02/24/2015     Priority: Medium     Insomnia 02/24/2015     Priority: Medium     S/P splenectomy 01/13/2015     Priority: Medium     Migraines      Priority: Medium     Idiopathic thrombocytopenic purpura (HCC)      Priority: Medium     s/p splenectomy       Tobacco abuse 05/30/2014     Priority: Medium     Immune thrombocytopenic purpura (H) 01/14/2008     Priority: Medium     Personality disorder (H) 01/14/2008     Priority: Medium     Overview:   cluster B traits  borderline, antisocial       Nondependent cannabis abuse, episodic 01/14/2008     Priority: Medium     Mixed, or nondependent drug abuse 01/26/2007     Priority: Medium     Overview:   recurrent issues with opioids.  History of crack cocaine abuse.       Gastroesophageal reflux disease 12/20/2006     Priority: Medium     Herpetic gingivostomatitis 12/20/2006     Priority: Medium     Muscle pain 12/20/2006     Priority: Medium     Atopic rhinitis 12/20/2006      Priority: Medium     Abnormal weight loss 12/20/2006     Priority: Medium       Problem list and histories reviewed & adjusted, as indicated.  Additional history: as documented        Current Outpatient Medications on File Prior to Visit:  acetaminophen (TYLENOL) 325 MG tablet Take 650 mg by mouth   acyclovir (ZOVIRAX) 5 % external ointment APPLY EXTERNALLY TO THE AFFECTED AREA 6 TIMES DAILY   acyclovir (ZOVIRAX) 5 % ointment Apply 1 g topically as needed   albuterol (PROAIR HFA/PROVENTIL HFA/VENTOLIN HFA) 108 (90 Base) MCG/ACT Inhaler Inhale 1-2 puffs into the lungs every 4 hours as needed for shortness of breath / dyspnea or wheezing   betamethasone valerate (VALISONE) 0.1 % ointment Apply once a day for 2 weeks as directed   buprenorphine-naloxone (SUBOXONE) 8-2 MG SUBL sublingual tablet Place 1 tablet under the tongue 2 times daily   busPIRone HCl (BUSPAR) 30 MG tablet Take 1 tablet (30 mg) by mouth 2 times daily   cetirizine (ZYRTEC) 10 MG tablet Take 1 tablet (10 mg) by mouth every evening   clobetasol (TEMOVATE) 0.05 % ointment APPLY TOPICALLY BID   cloNIDine (CATAPRES) 0.1 MG tablet Take 1 tablet (0.1 mg) by mouth At Bedtime Prn late withdrawal symptoms.   docusate sodium (DOK) 100 MG capsule TAKE 1 CAPSULE BY MOUTH TWICE DAILY AS NEEDED FOR CONSTIPATION   FLUoxetine (PROZAC) 20 MG capsule TAKE 1 CAPSULE BY MOUTH DAILY WITH FLUOXETINE 40MG FOR A TOTAL OF 60MG DAILY   FLUoxetine (PROZAC) 40 MG capsule TAKE 1 CAPSULE BY MOUTH DAILY WITH 20 MG CAPSULE OF FLUOXETINE TO EQUAL TOTAL DAILY DOSE OF 60MG   fluticasone (FLONASE) 50 MCG/ACT nasal spray Spray 2 sprays into both nostrils daily   gabapentin (NEURONTIN) 300 MG capsule 300 am 600mg q hs   hydrocortisone 2.5 % cream    hydrOXYzine (ATARAX) 25 MG tablet TAKE 2 TABLETS BY MOUTH NIGHTLY AS NEEDED FOR ANXIETY   hydrOXYzine (ATARAX) 50 MG tablet TAKE 1 TABLET(50MG) BY MOUTH NIGHTLY AS NEEDED FOR ANXIETY   hydrOXYzine (VISTARIL) 25 MG capsule Take 2 capsules (50  mg) by mouth nightly as needed for anxiety   ipratropium - albuterol 0.5 mg/2.5 mg/3 mL (DUONEB) 0.5-2.5 (3) MG/3ML neb solution Take 1 vial (3 mLs) by nebulization every 6 hours as needed for shortness of breath / dyspnea or wheezing   ketoconazole (NIZORAL) 2 % cream    levocetirizine (XYZAL) 5 MG tablet Take 1 tablet (5 mg) by mouth every evening   loratadine (CLARITIN) 10 MG tablet TABLET 1 TABLET BY MOUTH DAILY AS NEEDED FOR ALLERGIES   methocarbamol (ROBAXIN) 500 MG tablet Take 1 tablet (500 mg) by mouth 3 times daily   Neomycin-Bacitracin-Polymyxin ([employer] TRIPLE ANTIBIOTIC) OINT Externally apply topically 4 times daily   olopatadine (PATANOL) 0.1 % ophthalmic solution Place 1 drop into both eyes 2 times daily   omeprazole (PRILOSEC) 40 MG DR capsule TAKE 1 CAPSULE(40 MG) BY MOUTH DAILY 30 TO 60 MINUTES BEFORE A MEAL   ondansetron (ZOFRAN-ODT) 4 MG ODT tab Take 1-2 tablets (4-8 mg) by mouth every 8 hours as needed for nausea   order for DME Equipment being ordered: Left thumb brace   polyethylene glycol (MIRALAX/GLYCOLAX) powder TAKE 17 GRAMS(1 CAPFUL) BY MOUTH DAILY   ranitidine (ZANTAC) 150 MG tablet Take 1 tablet (150 mg) by mouth 2 times daily   terbinafine (LAMISIL) 1 % external cream Apply topically 2 times daily   traZODone (DESYREL) 100 MG tablet Take 1 tablet (100 mg) by mouth At Bedtime   triamcinolone (KENALOG) 0.1 % ointment Apply sparingly to affected area on back two times daily til clear.   valACYclovir (VALTREX) 1000 mg tablet Take 1 tablet (1,000 mg) by mouth 2 times daily At the onset of skin lesions as needed   varenicline (CHANTIX CONTINUING MONTH ARA) 1 MG tablet TAKE 1 TABLET(1 MG) BY MOUTH TWICE DAILY   ZOLMitriptan (ZOMIG) 5 MG tablet TAKE 1 TABLET BY MOUTH AT ONSET OF HEADACHE FOR MIGRAINE. MAY REPEAT DOSE IN 2 HOURS. DO NOT EXCEED 10MG IN 24 HOURS     No current facility-administered medications on file prior to visit.     Allergies   Allergen Reactions     Acetaminophen-Codeine Hives      Amoxicillin-Pot Clavulanate Nausea and Vomiting     Augmentin Nausea and Vomiting and Hives     Barbiturates      Bentyl [Dicyclomine] Other (See Comments)     dizziness     Compazine      Lock-jaw     Liquid Adhesive      Other reaction(s): Other, see comments  blisters     No Clinical Screening - See Comments      PN: LW Reaction: blisters  PN: LW FI1: nka  PN: LW CM1: CONTRAST- nka Reaction :     Nsaids      D/t ITP     Prochlorperazine      Other reaction(s): Edema     Propoxyphene N-Apap Hives     Sulfa Drugs      Tramadol Hcl Hives            Adhesive Tape Rash         REVIEW OF SYSTEMS:  General: No acute withdrawal symptoms.  No recent infections or fever  Resp: No coughing, wheezing or shortness of breath  CV: No chest pains or palpitations  GI: No nausea, vomiting, abdominal pain, diarrhea, constipation  : No urinary frequency or dysuria,     Musculoskeletal: No significant muscle or joint pains, No edema  Neurologic: No numbness, tingling, weakness, problems with balance or coordination  Psychiatric: No acute concerns  Skin: No rashes    OBJECTIVE:    PHYSICAL EXAM:  /75   Pulse 82   Temp 98.5  F (36.9  C)   SpO2 96%     GENERAL APPEARANCE:  alert, comfortable appearing  EYES:Eyes grossly normal to inspection  NEURO:  Gait normal.  No tremor. Coordination intact.   MENTAL STATUS EXAM:  Appearance/Behavior: No appearant distress  Speech: Normal  Mood/Affect: normal affect  Insight: Adequate      Results for orders placed or performed in visit on 02/05/19   Urine Drugs of Abuse Screen Panel 13   Result Value Ref Range    Cannabinoids (07-vrn-8-carboxy-9-THC) Not Detected NDET^Not Detected ng/mL    Phencyclidine (Phencyclidine) Not Detected NDET^Not Detected ng/mL    Cocaine (Benzoylecgonine) Not Detected NDET^Not Detected ng/mL    Methamphetamine (d-Methamphetamine) Not Detected NDET^Not Detected ng/mL    Opiates (Morphine) Not Detected NDET^Not Detected ng/mL    Amphetamine (d-Amphetamine)  Not Detected NDET^Not Detected ng/mL    Benzodiazepines (Nordiazepam) Not Detected NDET^Not Detected ng/mL    Tricyclic Antidepressants (Desipramine) Not Detected NDET^Not Detected ng/mL    Methadone (Methadone) Not Detected NDET^Not Detected ng/mL    Barbiturates (Butalbital) Not Detected NDET^Not Detected ng/mL    Oxycodone (Oxycodone) Detected, Abnormal Result (A) NDET^Not Detected ng/mL    Propoxyphene (Norpropoxyphene) Not Detected NDET^Not Detected ng/mL    Buprenorphine (Buprenorphine) Detected, Abnormal Result (A) NDET^Not Detected ng/mL           ASSESSMENT/PLAN:          (F11.20) Opioid use disorder, severe, dependence (H)  (primary encounter diagnosis)   Suboxone  8mg will trial increase to tid with current pain issues.  .  Rx #90      Try split dosing for better pain control  Discussed pain management in the event surgical procedures are necessary.  Invited consultation from other providers.  Safe storage reviewed.   Follow up 4 wk             Counseled the patient on the importance of having a recovery program in addition to Medication assisted treatment.  Components include having some type of sober network, avoiding isolating, having willingness  to change, avoiding triggers and managing cravings.  Strongly recommended abstaining from alcohol, benzodiazepines, THC, opioids and other drugs of abuse.       Opioid warning reviewed.  Risk of overdose following a period of abstinence due to decrease tolerance was discussed including risk of death.   Risk of overdose if using Suboxone with other substances particuarly benzodiazepines/alcohol was reviewed.           (F14.21) History of cocaine dependence (H)  Plan: no recent use.  Encourage ongoing sobriety      (F33.1) Moderate episode of recurrent major depressive disorder (H)  (F41.1) Generalized anxiety disorder  Plan: Recommendations as above.   Follow with PCP/psychiatry needed.   Encouraged to schedule.   Encouraged mental health counseling.         (F17.200) Nicotine use disorder  Comment: not ready to quit but has cut down.   Plan: Encouraged Abstinence.  Increase risk of relapse with other substances with return to nicotine use discussed.  Risk of Ecig/Vaping also reviewed.    Continue Chantix.            (G43.009) Hx of chronic headache/  (V89.2XXD) .   Robaxin 500mg q six hour prn.   Encourage stretching, massage, warm moist heat as able.     Refer to G neurology placed previously.       (F51.01)  Insomnia  Trazodone as rx previously.   Continue gabapentin 300mg am and 600mg pm.    Risk of polypharmacy discussed.  Other sleep hygeine, caffeine intake, non smoking also reviewed.    Follow up with PCP     (Z79.022) High risk medication use     ENCOUNTER FOR LONG TERM USE OF HIGH RISK MEDICATION   High Risk Drug Monitoring?  YES   Drug being monitored: Buprenorphine   Reason for drug: Opioid Use Disorder   What is being monitored?: Dosage, Cravings, Trigger, side effects, and continued abstinence.      Opioid warning reviewed.  Risk of overdose following a period of abstinence due to decrease tolerance was discussed including risk of death.   Risk of overdose if using Suboxone with other substances particuarly benzodiazepines/alcohol was reviewed.        Jessy Brenner MD  [facility]  [phone]

## 2019-04-04 NOTE — TELEPHONE ENCOUNTER
Reason for call:  Results   Name of test or procedure: Biopsy throat  Date of test or procedure: 03/27/2019  Location of test or procedure: [place]    Additional comments: na    Phone number to reach patient:  Home number on file [phone] (home)    Best Time:  any    Can we leave a detailed message on this number?  YES

## 2019-04-04 NOTE — TELEPHONE ENCOUNTER
Spoke with patient and discussed that Dr. Segura wants her to follow up in clinic to discuss path results. There are no openings next week. Patient was very anxious about her results stating she cannot wait 2 weeks to get an appointment. Forwarded to Dr. Segura.    Hebert Townsend RN....4/4/2019 6:13 PM

## 2019-04-05 NOTE — TELEPHONE ENCOUNTER
Called and spoke with patient - told her that there is no cancer - but that this is not a normal biopsy and that she will at least need long term observation but likely would be recommended to have more procedures to removal the abnormal and pre-cancerous tissue from the surface of her vocal cord.    She was grateful to get the call and will follow up with Dr Segura to talk about next steps.

## 2019-04-10 NOTE — TELEPHONE ENCOUNTER
Automatic refill request. Patient is not out.   Refill for: Gabapentin      Last Appointment: 4/2/19    Next Appointment: 4/30/19    No Shows/Cancellations since last appointment:  none    Last Refill in Epic (date and amount/how many days):   Disp Refills Start End RAUL   gabapentin (NEURONTIN) 300 MG capsule 90 capsule 1 2/5/2019          Most Recent UDS results: 4/2/19 POS for buprenorphine     reviewed and summarized below:   Fill Date ID Written    Drug   Qty Days Prescriber   04/02/2019 1 04/02/2019 Buprenorphin-Naloxon 8-2 Mg Sl 90 30 Ei Bur   03/29/2019 1 02/05/2019 Gabapentin 300 Mg Capsule  90 30 Ei Bur

## 2019-04-10 NOTE — TELEPHONE ENCOUNTER
Requested Prescriptions   Pending Prescriptions Disp Refills     gabapentin (NEURONTIN) 300 MG capsule  Last Written Prescription Date:  19  Last Fill Quantity: 90,  # refills: 1   Last office visit: 3/19/2018 with prescribing provider:     Future Office Visit:   Next 5 appointments (look out 90 days)    2019  5:40 PM CDT  SHORT with NINOSKA Mitchell CNP  [facility] [place] ([facility]) [address]  [place] [address]  [phone]   2019  8:20 AM CDT  Return Visit with Jessy Brenner MD  [facility] [place] ([facility] [place]) [address]  [place] [address] [phone]          90 capsule 1     Si am 600mg q hs       There is no refill protocol information for this order

## 2019-04-12 NOTE — TELEPHONE ENCOUNTER
Patient notified of Provider's message as written.  Patient verbalized understanding.    Val Teran RN

## 2019-04-12 NOTE — TELEPHONE ENCOUNTER
Patient left message on RN hotline stating she has thrush and would like PCP to call in medication for her.  She has had symptoms for 3-4 days, her mouth has white patches on the inside and a little on her lips.  Her mouth is painful and she has loss of taste.  She took antibiotics recently but finished over a week ago.  Denies inhaler use during the last few weeks.  [facility] [place]  Please advise   Tita Brown RN

## 2019-04-12 NOTE — TELEPHONE ENCOUNTER
Reason for call:  Questions on appointment  Patient called regarding (reason for call): appointment  Additional comments: Please call to discuss future appointment questions with patient prior to coming in to see you.    Phone number to reach patient:  Home number on file [phone] (home)    Best Time:  any    Can we leave a detailed message on this number?  YES

## 2019-04-15 NOTE — TELEPHONE ENCOUNTER
Spoke with patient who reports she scheduled an apt for 4/18/19 and will discuss all of her questions at that visit with Dr. Segura.    Hebert Townsend RN....4/15/2019 3:48 PM

## 2019-04-18 NOTE — LETTER
4/18/2019         RE: Coretta Tovar  [address] [address]        Dear Colleague,    Thank you for referring your patient, Coretta Tovar, to the [facility]. Please see a copy of my visit note below.    History of Present Illness - Coretta Tovar is a 48 year old female who returns s/p DL and biopsy of a right true vocal fold lesion on 3/27/19. She reported severe sore throat for 4 days afterwards. She feels her voice is unchanged. She continues to smoke, although continues to try to cut back.    Past Medical History -   Patient Active Problem List   Diagnosis     Tobacco abuse     S/P splenectomy     Migraines     Idiopathic thrombocytopenic purpura (HCC)     Chemical dependency (H)     Insomnia     Mary-mary disease     Vomiting     Abdominal pain, acute     Disorder of stomach     Cervical pain     Vertigo     Gastroesophageal reflux disease     Herpetic gingivostomatitis     Immune thrombocytopenic purpura (H)     Muscle pain     Personality disorder (H)     Nondependent cannabis abuse, episodic     Atopic rhinitis     Abnormal weight loss     Mixed, or nondependent drug abuse     Moderate episode of recurrent major depressive disorder (H)     Generalized anxiety disorder     Protein-calorie malnutrition (H)     Primary insomnia     History of cocaine dependence       Current Medications -   Current Outpatient Medications:      acetaminophen (TYLENOL) 325 MG tablet, Take 650 mg by mouth, Disp: , Rfl:      acyclovir (ZOVIRAX) 5 % external ointment, APPLY EXTERNALLY TO THE AFFECTED AREA 6 TIMES DAILY, Disp: 15 g, Rfl: 3     acyclovir (ZOVIRAX) 5 % ointment, Apply 1 g topically as needed, Disp: 30 g, Rfl: 11     albuterol (PROAIR HFA/PROVENTIL HFA/VENTOLIN HFA) 108 (90 Base) MCG/ACT Inhaler, Inhale 1-2 puffs into the lungs every 4 hours as needed for shortness of breath / dyspnea or wheezing, Disp: 18 g, Rfl: 3     betamethasone valerate (VALISONE) 0.1 % ointment, Apply once  a day for 2 weeks as directed, Disp: , Rfl:      buprenorphine-naloxone (SUBOXONE) 8-2 MG SUBL sublingual tablet, Place 1 tablet under the tongue 3 times daily [id number], Disp: 90 each, Rfl: 0     busPIRone HCl (BUSPAR) 30 MG tablet, Take 1 tablet (30 mg) by mouth 2 times daily, Disp: 60 tablet, Rfl: 5     cetirizine (ZYRTEC) 10 MG tablet, Take 1 tablet (10 mg) by mouth every evening, Disp: 90 tablet, Rfl: 1     clobetasol (TEMOVATE) 0.05 % ointment, APPLY TOPICALLY BID, Disp: , Rfl: 1     cloNIDine (CATAPRES) 0.1 MG tablet, Take 1 tablet (0.1 mg) by mouth At Bedtime Prn late withdrawal symptoms., Disp: 60 tablet, Rfl: 0     docusate sodium (DOK) 100 MG capsule, TAKE 1 CAPSULE BY MOUTH TWICE DAILY AS NEEDED FOR CONSTIPATION, Disp: 60 capsule, Rfl: 3     FLUoxetine (PROZAC) 20 MG capsule, TAKE 1 CAPSULE BY MOUTH DAILY WITH FLUOXETINE 40MG FOR A TOTAL OF 60MG DAILY, Disp: 30 capsule, Rfl: 5     FLUoxetine (PROZAC) 40 MG capsule, TAKE 1 CAPSULE BY MOUTH DAILY WITH 20 MG CAPSULE OF FLUOXETINE TO EQUAL TOTAL DAILY DOSE OF 60MG, Disp: 30 capsule, Rfl: 5     fluticasone (FLONASE) 50 MCG/ACT nasal spray, Spray 2 sprays into both nostrils daily, Disp: 1 Bottle, Rfl: 11     gabapentin (NEURONTIN) 300 MG capsule, 300 am 600mg q hs, Disp: 90 capsule, Rfl: 0     hydrocortisone 2.5 % cream, , Disp: , Rfl:      hydrOXYzine (ATARAX) 25 MG tablet, TAKE 2 TABLETS BY MOUTH NIGHTLY AS NEEDED FOR ANXIETY, Disp: 60 tablet, Rfl: 3     hydrOXYzine (ATARAX) 50 MG tablet, TAKE 1 TABLET(50MG) BY MOUTH NIGHTLY AS NEEDED FOR ANXIETY, Disp: 30 tablet, Rfl: 3     hydrOXYzine (VISTARIL) 25 MG capsule, Take 2 capsules (50 mg) by mouth nightly as needed for anxiety, Disp: 120 capsule, Rfl: 1     ipratropium - albuterol 0.5 mg/2.5 mg/3 mL (DUONEB) 0.5-2.5 (3) MG/3ML neb solution, Take 1 vial (3 mLs) by nebulization every 6 hours as needed for shortness of breath / dyspnea or wheezing, Disp: 90 vial, Rfl: 3     ketoconazole (NIZORAL) 2 % cream, ,  Disp: , Rfl:      levocetirizine (XYZAL) 5 MG tablet, Take 1 tablet (5 mg) by mouth every evening, Disp: 30 tablet, Rfl: 11     loratadine (CLARITIN) 10 MG tablet, TABLET 1 TABLET BY MOUTH DAILY AS NEEDED FOR ALLERGIES, Disp: 30 tablet, Rfl: 5     methocarbamol (ROBAXIN) 500 MG tablet, Take 1 tablet (500 mg) by mouth 3 times daily, Disp: 90 tablet, Rfl: 3     Neomycin-Bacitracin-Polymyxin ([employer] TRIPLE ANTIBIOTIC) OINT, Externally apply topically 4 times daily, Disp: 60 g, Rfl: 3     nystatin (MYCOSTATIN) 303885 UNIT/ML suspension, Take 5 mLs (500,000 Units) by mouth 4 times daily, Disp: 140 mL, Rfl: 0     olopatadine (PATANOL) 0.1 % ophthalmic solution, Place 1 drop into both eyes 2 times daily, Disp: 5 mL, Rfl: 3     omeprazole (PRILOSEC) 40 MG DR capsule, TAKE 1 CAPSULE(40 MG) BY MOUTH DAILY 30 TO 60 MINUTES BEFORE A MEAL, Disp: 90 capsule, Rfl: 0     ondansetron (ZOFRAN-ODT) 4 MG ODT tab, Take 1-2 tablets (4-8 mg) by mouth every 8 hours as needed for nausea, Disp: 30 tablet, Rfl: 1     order for DME, Equipment being ordered: Left thumb brace, Disp: 1 each, Rfl: 0     polyethylene glycol (MIRALAX/GLYCOLAX) powder, TAKE 17 GRAMS(1 CAPFUL) BY MOUTH DAILY, Disp: 527 g, Rfl: 3     ranitidine (ZANTAC) 150 MG tablet, Take 1 tablet (150 mg) by mouth 2 times daily, Disp: 60 tablet, Rfl: 2     terbinafine (LAMISIL) 1 % external cream, Apply topically 2 times daily, Disp: 30 g, Rfl: 2     traZODone (DESYREL) 100 MG tablet, Take 1 tablet (100 mg) by mouth At Bedtime, Disp: 90 tablet, Rfl: 1     triamcinolone (KENALOG) 0.1 % ointment, Apply sparingly to affected area on back two times daily til clear., Disp: 80 g, Rfl: 0     valACYclovir (VALTREX) 1000 mg tablet, Take 1 tablet (1,000 mg) by mouth 2 times daily At the onset of skin lesions as needed, Disp: 8 tablet, Rfl: 11     varenicline (CHANTIX CONTINUING MONTH ARA) 1 MG tablet, TAKE 1 TABLET(1 MG) BY MOUTH TWICE DAILY, Disp: 56 tablet, Rfl: 2     ZOLMitriptan (ZOMIG) 5 MG  tablet, TAKE 1 TABLET BY MOUTH AT ONSET OF HEADACHE FOR MIGRAINE. MAY REPEAT DOSE IN 2 HOURS. DO NOT EXCEED 10MG IN 24 HOURS, Disp: 12 tablet, Rfl: 4    Allergies -   Allergies   Allergen Reactions     Acetaminophen-Codeine Hives     Amoxicillin-Pot Clavulanate Nausea and Vomiting     Augmentin Nausea and Vomiting and Hives     Barbiturates      Bentyl [Dicyclomine] Other (See Comments)     dizziness     Compazine      Lock-jaw     Liquid Adhesive      Other reaction(s): Other, see comments  blisters     No Clinical Screening - See Comments      PN: LW Reaction: blisters  PN: LW FI1: nka  PN: LW CM1: CONTRAST- nka Reaction :     Nsaids      D/t ITP     Prochlorperazine      Other reaction(s): Edema     Propoxyphene N-Apap Hives     Sulfa Drugs      Tramadol Hcl Hives            Adhesive Tape Rash       Social History -   Social History     Socioeconomic History     Marital status:      Spouse name: None     Number of children: 2     Years of education: 12     Highest education level: None   Occupational History     Occupation: Sales tech     Employer: [employer]     Comment: [employer]   Social Needs     Financial resource strain: None     Food insecurity:     Worry: None     Inability: None     Transportation needs:     Medical: None     Non-medical: None   Tobacco Use     Smoking status: Current Every Day Smoker     Packs/day: 0.50     Years: 15.00     Pack years: 7.50     Types: Cigarettes     Last attempt to quit: 11/6/2015     Years since quitting: 3.4     Smokeless tobacco: Never Used   Substance and Sexual Activity     Alcohol use: Yes     Comment: Drinks once in 4 - 5 months     Drug use: No     Comment: Last exposure to cocaine 2010     Sexual activity: Never     Partners: Male     Birth control/protection: Surgical   Lifestyle     Physical activity:     Days per week: None     Minutes per session: None     Stress: None   Relationships     Social connections:      "Talks on phone: None     Gets together: None     Attends Rastafari service: None     Active member of club or organization: None     Attends meetings of clubs or organizations: None     Relationship status: None     Intimate partner violence:     Fear of current or ex partner: None     Emotionally abused: None     Physically abused: None     Forced sexual activity: None   Other Topics Concern     Parent/sibling w/ CABG, MI or angioplasty before 65F 55M? No   Social History Narrative    Lives with dad and brother in [place].      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      Daughter with recent DWI (she has child).        Family History -   Family History   Problem Relation Age of Onset     Neurologic Disorder Maternal Grandmother         migraines     Breast Cancer Maternal Grandmother      Neurologic Disorder Maternal Grandfather         migraines     Neurologic Disorder Brother         migraines     Breast Cancer Maternal Aunt         x 2     Breast Cancer Paternal Grandmother      Depression Daughter      Anxiety Disorder Daughter        Review of Systems - As per HPI and PMHx, otherwise 7 system review of the head and neck negative. 10+ system review negative.    Exam:  /66   Pulse 73   Ht 1.778 m (5' 10\")   Wt 68.9 kg (152 lb)   SpO2 98%   BMI 21.81 kg/m     General - The patient is well nourished and well developed, and appears to have good nutritional status.  Alert and oriented to person and place, answers questions and cooperates with examination appropriately.   Head and Face - Normocephalic and atraumatic, with no gross asymmetry noted of the contour of the facial features.  The facial nerve is intact, with strong symmetric movements.  Eyes - Extraocular movements intact.  Sclera were not icteric or injected, conjunctiva were pink and moist.  Voice : good volume, minimal breathiness, but with coarseness and a lower pitch than expected for gender.    Pathology:  Consensus from [facility] is moderate " "dysplasia. No evidence of carcinoma or invasion mentioned.      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 I recommended f/u in 2 months with laryngoscopy to following the lesion, reserving surgical treatment for progression or more worrisome findings. I also recommended absolutely stopping smoke exposure as sometimes this can allow dysplasia to improve. She is rather concerned about the notion of having a premalignant lesion and would like to \"have it lasered off\". I offered a second opinion with potential management with the [facility] l[facility]ology team as they have access to treatment modalities that are not available to me locally.      Dr. Adrienne Segura MD  Otolaryngology  [facility]      Again, thank you for allowing me to participate in the care of your patient.        Sincerely,        Adrienne Segura MD    "

## 2019-04-18 NOTE — PATIENT INSTRUCTIONS
Scheduling Information  To schedule your CT/MRI scan, please contact [facility] at [phone] OR [facility] at [phone]    To schedule your Surgery, please contact our Specialty Schedulers at [phone]    ** If a CT scan or biopsy were ordered/done, Dr. Segura will need to see you back in clinic to go over your biopsy results or CT/MRI scan. He will go over the images from your scan with you and discuss treatment based on your results.     ENT Clinic Locations Clinic Hours Telephone Number     [facility]  [address]  [place] [address]   E/O Thursday:      7:30am -- 4:00pm   To schedule/reschedule an appointment with   Dr. Segura,   please contact our   Specialty Scheduling Department at:     [phone]       [facility] [place]  [address] [place] [address]     Monday:              12:00pm -- 4:00pm    Tuesday:               8:30am -- 4:30pm    Wednesday:        12:00pm -- 4:00pm    E/O Thursday:        8:00am - 4:30pm           Urgent Care Locations Clinic Hours Telephone Numbers     [facility] [place]  [address]  [place] [address]     Monday-Friday:     11:00am - 9:00pm    Saturday-Sunday:  9:00am - 5:00pm   [phone]     [facility]  [address] [place] [address]     Monday-Friday:      5:00pm - 9:00pm     Saturday-Sunday:  9:00am - 5:00pm   [phone]

## 2019-04-18 NOTE — PROGRESS NOTES
History of Present Illness - Coretta Tovar is a 48 year old female who returns s/p DL and biopsy of a right true vocal fold lesion on 3/27/19. She reported severe sore throat for 4 days afterwards. She feels her voice is unchanged. She continues to smoke, although continues to try to cut back.    Past Medical History -   Patient Active Problem List   Diagnosis     Tobacco abuse     S/P splenectomy     Migraines     Idiopathic thrombocytopenic purpura (HCC)     Chemical dependency (H)     Insomnia     Mary-mary disease     Vomiting     Abdominal pain, acute     Disorder of stomach     Cervical pain     Vertigo     Gastroesophageal reflux disease     Herpetic gingivostomatitis     Immune thrombocytopenic purpura (H)     Muscle pain     Personality disorder (H)     Nondependent cannabis abuse, episodic     Atopic rhinitis     Abnormal weight loss     Mixed, or nondependent drug abuse     Moderate episode of recurrent major depressive disorder (H)     Generalized anxiety disorder     Protein-calorie malnutrition (H)     Primary insomnia     History of cocaine dependence       Current Medications -   Current Outpatient Medications:      acetaminophen (TYLENOL) 325 MG tablet, Take 650 mg by mouth, Disp: , Rfl:      acyclovir (ZOVIRAX) 5 % external ointment, APPLY EXTERNALLY TO THE AFFECTED AREA 6 TIMES DAILY, Disp: 15 g, Rfl: 3     acyclovir (ZOVIRAX) 5 % ointment, Apply 1 g topically as needed, Disp: 30 g, Rfl: 11     albuterol (PROAIR HFA/PROVENTIL HFA/VENTOLIN HFA) 108 (90 Base) MCG/ACT Inhaler, Inhale 1-2 puffs into the lungs every 4 hours as needed for shortness of breath / dyspnea or wheezing, Disp: 18 g, Rfl: 3     betamethasone valerate (VALISONE) 0.1 % ointment, Apply once a day for 2 weeks as directed, Disp: , Rfl:      buprenorphine-naloxone (SUBOXONE) 8-2 MG SUBL sublingual tablet, Place 1 tablet under the tongue 3 times daily [id number], Disp: 90 each, Rfl: 0     busPIRone HCl (BUSPAR) 30 MG tablet, Take  1 tablet (30 mg) by mouth 2 times daily, Disp: 60 tablet, Rfl: 5     cetirizine (ZYRTEC) 10 MG tablet, Take 1 tablet (10 mg) by mouth every evening, Disp: 90 tablet, Rfl: 1     clobetasol (TEMOVATE) 0.05 % ointment, APPLY TOPICALLY BID, Disp: , Rfl: 1     cloNIDine (CATAPRES) 0.1 MG tablet, Take 1 tablet (0.1 mg) by mouth At Bedtime Prn late withdrawal symptoms., Disp: 60 tablet, Rfl: 0     docusate sodium (DOK) 100 MG capsule, TAKE 1 CAPSULE BY MOUTH TWICE DAILY AS NEEDED FOR CONSTIPATION, Disp: 60 capsule, Rfl: 3     FLUoxetine (PROZAC) 20 MG capsule, TAKE 1 CAPSULE BY MOUTH DAILY WITH FLUOXETINE 40MG FOR A TOTAL OF 60MG DAILY, Disp: 30 capsule, Rfl: 5     FLUoxetine (PROZAC) 40 MG capsule, TAKE 1 CAPSULE BY MOUTH DAILY WITH 20 MG CAPSULE OF FLUOXETINE TO EQUAL TOTAL DAILY DOSE OF 60MG, Disp: 30 capsule, Rfl: 5     fluticasone (FLONASE) 50 MCG/ACT nasal spray, Spray 2 sprays into both nostrils daily, Disp: 1 Bottle, Rfl: 11     gabapentin (NEURONTIN) 300 MG capsule, 300 am 600mg q hs, Disp: 90 capsule, Rfl: 0     hydrocortisone 2.5 % cream, , Disp: , Rfl:      hydrOXYzine (ATARAX) 25 MG tablet, TAKE 2 TABLETS BY MOUTH NIGHTLY AS NEEDED FOR ANXIETY, Disp: 60 tablet, Rfl: 3     hydrOXYzine (ATARAX) 50 MG tablet, TAKE 1 TABLET(50MG) BY MOUTH NIGHTLY AS NEEDED FOR ANXIETY, Disp: 30 tablet, Rfl: 3     hydrOXYzine (VISTARIL) 25 MG capsule, Take 2 capsules (50 mg) by mouth nightly as needed for anxiety, Disp: 120 capsule, Rfl: 1     ipratropium - albuterol 0.5 mg/2.5 mg/3 mL (DUONEB) 0.5-2.5 (3) MG/3ML neb solution, Take 1 vial (3 mLs) by nebulization every 6 hours as needed for shortness of breath / dyspnea or wheezing, Disp: 90 vial, Rfl: 3     ketoconazole (NIZORAL) 2 % cream, , Disp: , Rfl:      levocetirizine (XYZAL) 5 MG tablet, Take 1 tablet (5 mg) by mouth every evening, Disp: 30 tablet, Rfl: 11     loratadine (CLARITIN) 10 MG tablet, TABLET 1 TABLET BY MOUTH DAILY AS NEEDED FOR ALLERGIES, Disp: 30 tablet, Rfl:  5     methocarbamol (ROBAXIN) 500 MG tablet, Take 1 tablet (500 mg) by mouth 3 times daily, Disp: 90 tablet, Rfl: 3     Neomycin-Bacitracin-Polymyxin ([employer]S TRIPLE ANTIBIOTIC) OINT, Externally apply topically 4 times daily, Disp: 60 g, Rfl: 3     nystatin (MYCOSTATIN) [id number] UNIT/ML suspension, Take 5 mLs (500,000 Units) by mouth 4 times daily, Disp: 140 mL, Rfl: 0     olopatadine (PATANOL) 0.1 % ophthalmic solution, Place 1 drop into both eyes 2 times daily, Disp: 5 mL, Rfl: 3     omeprazole (PRILOSEC) 40 MG DR capsule, TAKE 1 CAPSULE(40 MG) BY MOUTH DAILY 30 TO 60 MINUTES BEFORE A MEAL, Disp: 90 capsule, Rfl: 0     ondansetron (ZOFRAN-ODT) 4 MG ODT tab, Take 1-2 tablets (4-8 mg) by mouth every 8 hours as needed for nausea, Disp: 30 tablet, Rfl: 1     order for DME, Equipment being ordered: Left thumb brace, Disp: 1 each, Rfl: 0     polyethylene glycol (MIRALAX/GLYCOLAX) powder, TAKE 17 GRAMS(1 CAPFUL) BY MOUTH DAILY, Disp: 527 g, Rfl: 3     ranitidine (ZANTAC) 150 MG tablet, Take 1 tablet (150 mg) by mouth 2 times daily, Disp: 60 tablet, Rfl: 2     terbinafine (LAMISIL) 1 % external cream, Apply topically 2 times daily, Disp: 30 g, Rfl: 2     traZODone (DESYREL) 100 MG tablet, Take 1 tablet (100 mg) by mouth At Bedtime, Disp: 90 tablet, Rfl: 1     triamcinolone (KENALOG) 0.1 % ointment, Apply sparingly to affected area on back two times daily til clear., Disp: 80 g, Rfl: 0     valACYclovir (VALTREX) 1000 mg tablet, Take 1 tablet (1,000 mg) by mouth 2 times daily At the onset of skin lesions as needed, Disp: 8 tablet, Rfl: 11     varenicline (CHANTIX CONTINUING MONTH ARA) 1 MG tablet, TAKE 1 TABLET(1 MG) BY MOUTH TWICE DAILY, Disp: 56 tablet, Rfl: 2     ZOLMitriptan (ZOMIG) 5 MG tablet, TAKE 1 TABLET BY MOUTH AT ONSET OF HEADACHE FOR MIGRAINE. MAY REPEAT DOSE IN 2 HOURS. DO NOT EXCEED 10MG IN 24 HOURS, Disp: 12 tablet, Rfl: 4    Allergies -   Allergies   Allergen Reactions     Acetaminophen-Codeine Hives      Amoxicillin-Pot Clavulanate Nausea and Vomiting     Augmentin Nausea and Vomiting and Hives     Barbiturates      Bentyl [Dicyclomine] Other (See Comments)     dizziness     Compazine      Lock-jaw     Liquid Adhesive      Other reaction(s): Other, see comments  blisters     No Clinical Screening - See Comments      PN: LW Reaction: blisters  PN: LW FI1: nka  PN: LW CM1: CONTRAST- nka Reaction :     Nsaids      D/t ITP     Prochlorperazine      Other reaction(s): Edema     Propoxyphene N-Apap Hives     Sulfa Drugs      Tramadol Hcl Hives            Adhesive Tape Rash       Social History -   Social History     Socioeconomic History     Marital status:      Spouse name: None     Number of children: 2     Years of education: 12     Highest education level: None   Occupational History     Occupation: Sales tech     Employer: [employer]     Comment: [employer]   Social Needs     Financial resource strain: None     Food insecurity:     Worry: None     Inability: None     Transportation needs:     Medical: None     Non-medical: None   Tobacco Use     Smoking status: Current Every Day Smoker     Packs/day: 0.50     Years: 15.00     Pack years: 7.50     Types: Cigarettes     Last attempt to quit: 11/6/2015     Years since quitting: 3.4     Smokeless tobacco: Never Used   Substance and Sexual Activity     Alcohol use: Yes     Comment: Drinks once in 4 - 5 months     Drug use: No     Comment: Last exposure to cocaine 2010     Sexual activity: Never     Partners: Male     Birth control/protection: Surgical   Lifestyle     Physical activity:     Days per week: None     Minutes per session: None     Stress: None   Relationships     Social connections:     Talks on phone: None     Gets together: None     Attends Sikhism service: None     Active member of club or organization: None     Attends meetings of clubs or organizations: None     Relationship status: None     Intimate partner  "violence:     Fear of current or ex partner: None     Emotionally abused: None     Physically abused: None     Forced sexual activity: None   Other Topics Concern     Parent/sibling w/ CABG, MI or angioplasty before 65F 55M? No   Social History Narrative    Lives with dad and brother in [place].      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      Daughter with recent DWI (she has child).        Family History -   Family History   Problem Relation Age of Onset     Neurologic Disorder Maternal Grandmother         migraines     Breast Cancer Maternal Grandmother      Neurologic Disorder Maternal Grandfather         migraines     Neurologic Disorder Brother         migraines     Breast Cancer Maternal Aunt         x 2     Breast Cancer Paternal Grandmother      Depression Daughter      Anxiety Disorder Daughter        Review of Systems - As per HPI and PMHx, otherwise 7 system review of the head and neck negative. 10+ system review negative.    Exam:  /66   Pulse 73   Ht 1.778 m (5' 10\")   Wt 68.9 kg (152 lb)   SpO2 98%   BMI 21.81 kg/m    General - The patient is well nourished and well developed, and appears to have good nutritional status.  Alert and oriented to person and place, answers questions and cooperates with examination appropriately.   Head and Face - Normocephalic and atraumatic, with no gross asymmetry noted of the contour of the facial features.  The facial nerve is intact, with strong symmetric movements.  Eyes - Extraocular movements intact.  Sclera were not icteric or injected, conjunctiva were pink and moist.  Voice : good volume, minimal breathiness, but with coarseness and a lower pitch than expected for gender.    Pathology:  Consensus from [facility] is moderate dysplasia. No evidence of carcinoma or invasion mentioned.      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 I recommended f/u in 2 months with laryngoscopy to following the lesion, " "reserving surgical treatment for progression or more worrisome findings. I also recommended absolutely stopping smoke exposure as sometimes this can allow dysplasia to improve. She is rather concerned about the notion of having a premalignant lesion and would like to \"have it lasered off\". I offered a second opinion with potential management with the [facility] laryngology team as they have access to treatment modalities that are not available to me locally.      Dr. Adrienne Segura MD  Otolaryngology  [facility]    "

## 2019-04-22 NOTE — TELEPHONE ENCOUNTER
Patient LM on RN hotline asking for updates on her prescription  Called her back and updated her that prescription was refilled    Val Teran RN

## 2019-04-22 NOTE — TELEPHONE ENCOUNTER
Patient called and left VM on RN Hotline.   Patient is requesting a refill on clobetasol (temovate) 0.05 % ointment as her Nela-Nela is flaring up.     Routing refill request to provider for review/approval because:  Medication is reported/historical    Deedee Mott RN

## 2019-04-23 NOTE — PROGRESS NOTES
"  SUBJECTIVE:     HPI  Coretta Tovar is a 48 year old female who presents to clinic today for the following health issues:  RESPIRATORY SYMPTOMS    Duration: ***    Description  { :024385}    Severity: {MILD, MODERATE, SEVERE LOW:948148}    Accompanying signs and symptoms: {NONE DEFAULTED:988087::\"None\"}    History (predisposing factors):  { :079526::\"none\"}    Precipitating or alleviating factors: {NONE DEFAULTED:050112::\"None\"}    Therapies tried and outcome:  { :813023::\"none\"}       Reviewed PMH, FMH and SOH.  Patient Active Problem List   Diagnosis     Tobacco abuse     S/P splenectomy     Migraines     Idiopathic thrombocytopenic purpura (HCC)     Chemical dependency (H)     Insomnia     Mary-mary disease     Vomiting     Abdominal pain, acute     Disorder of stomach     Cervical pain     Vertigo     Gastroesophageal reflux disease     Herpetic gingivostomatitis     Immune thrombocytopenic purpura (H)     Muscle pain     Personality disorder (H)     Nondependent cannabis abuse, episodic     Atopic rhinitis     Abnormal weight loss     Mixed, or nondependent drug abuse     Moderate episode of recurrent major depressive disorder (H)     Generalized anxiety disorder     Protein-calorie malnutrition (H)     Primary insomnia     History of cocaine dependence     Current Outpatient Medications   Medication Sig Dispense Refill     acetaminophen (TYLENOL) 325 MG tablet Take 650 mg by mouth       acyclovir (ZOVIRAX) 5 % external ointment APPLY EXTERNALLY TO THE AFFECTED AREA 6 TIMES DAILY 15 g 3     acyclovir (ZOVIRAX) 5 % ointment Apply 1 g topically as needed 30 g 11     albuterol (PROAIR HFA/PROVENTIL HFA/VENTOLIN HFA) 108 (90 Base) MCG/ACT Inhaler Inhale 1-2 puffs into the lungs every 4 hours as needed for shortness of breath / dyspnea or wheezing 18 g 3     betamethasone valerate (VALISONE) 0.1 % ointment Apply once a day for 2 weeks as directed       buprenorphine-naloxone (SUBOXONE) 8-2 MG SUBL sublingual " tablet Place 1 tablet under the tongue 3 times daily [id number] 90 each 0     busPIRone HCl (BUSPAR) 30 MG tablet Take 1 tablet (30 mg) by mouth 2 times daily 60 tablet 5     cetirizine (ZYRTEC) 10 MG tablet Take 1 tablet (10 mg) by mouth every evening 90 tablet 1     clobetasol (TEMOVATE) 0.05 % external ointment APPLY TOPICALLY BID 80 g 1     cloNIDine (CATAPRES) 0.1 MG tablet Take 1 tablet (0.1 mg) by mouth At Bedtime Prn late withdrawal symptoms. 60 tablet 0     docusate sodium (DOK) 100 MG capsule TAKE 1 CAPSULE BY MOUTH TWICE DAILY AS NEEDED FOR CONSTIPATION 60 capsule 3     FLUoxetine (PROZAC) 20 MG capsule TAKE 1 CAPSULE BY MOUTH DAILY WITH FLUOXETINE 40MG FOR A TOTAL OF 60MG DAILY 30 capsule 5     FLUoxetine (PROZAC) 40 MG capsule TAKE 1 CAPSULE BY MOUTH DAILY WITH 20 MG CAPSULE OF FLUOXETINE TO EQUAL TOTAL DAILY DOSE OF 60MG 30 capsule 5     fluticasone (FLONASE) 50 MCG/ACT nasal spray Spray 2 sprays into both nostrils daily 1 Bottle 11     gabapentin (NEURONTIN) 300 MG capsule 300 am 600mg q hs 90 capsule 0     hydrocortisone 2.5 % cream        hydrOXYzine (ATARAX) 25 MG tablet TAKE 2 TABLETS BY MOUTH NIGHTLY AS NEEDED FOR ANXIETY 60 tablet 3     hydrOXYzine (ATARAX) 50 MG tablet TAKE 1 TABLET(50MG) BY MOUTH NIGHTLY AS NEEDED FOR ANXIETY 30 tablet 3     hydrOXYzine (VISTARIL) 25 MG capsule Take 2 capsules (50 mg) by mouth nightly as needed for anxiety 120 capsule 1     ipratropium - albuterol 0.5 mg/2.5 mg/3 mL (DUONEB) 0.5-2.5 (3) MG/3ML neb solution Take 1 vial (3 mLs) by nebulization every 6 hours as needed for shortness of breath / dyspnea or wheezing 90 vial 3     ketoconazole (NIZORAL) 2 % cream        levocetirizine (XYZAL) 5 MG tablet Take 1 tablet (5 mg) by mouth every evening 30 tablet 11     loratadine (CLARITIN) 10 MG tablet TABLET 1 TABLET BY MOUTH DAILY AS NEEDED FOR ALLERGIES 30 tablet 5     methocarbamol (ROBAXIN) 500 MG tablet Take 1 tablet (500 mg) by mouth 3 times daily 90 tablet 3      Neomycin-Bacitracin-Polymyxin ([employer] TRIPLE ANTIBIOTIC) OINT Externally apply topically 4 times daily 60 g 3     nystatin (MYCOSTATIN) 507360 UNIT/ML suspension Take 5 mLs (500,000 Units) by mouth 4 times daily 140 mL 0     olopatadine (PATANOL) 0.1 % ophthalmic solution Place 1 drop into both eyes 2 times daily 5 mL 3     omeprazole (PRILOSEC) 40 MG DR capsule TAKE 1 CAPSULE(40 MG) BY MOUTH DAILY 30 TO 60 MINUTES BEFORE A MEAL 90 capsule 0     ondansetron (ZOFRAN-ODT) 4 MG ODT tab Take 1-2 tablets (4-8 mg) by mouth every 8 hours as needed for nausea 30 tablet 1     order for DME Equipment being ordered: Left thumb brace 1 each 0     polyethylene glycol (MIRALAX/GLYCOLAX) powder TAKE 17 GRAMS(1 CAPFUL) BY MOUTH DAILY 527 g 3     ranitidine (ZANTAC) 150 MG tablet Take 1 tablet (150 mg) by mouth 2 times daily 60 tablet 2     terbinafine (LAMISIL) 1 % external cream Apply topically 2 times daily 30 g 2     traZODone (DESYREL) 100 MG tablet Take 1 tablet (100 mg) by mouth At Bedtime 90 tablet 1     triamcinolone (KENALOG) 0.1 % ointment Apply sparingly to affected area on back two times daily til clear. 80 g 0     valACYclovir (VALTREX) 1000 mg tablet Take 1 tablet (1,000 mg) by mouth 2 times daily At the onset of skin lesions as needed 8 tablet 11     varenicline (CHANTIX CONTINUING MONTH ARA) 1 MG tablet TAKE 1 TABLET(1 MG) BY MOUTH TWICE DAILY 56 tablet 2     ZOLMitriptan (ZOMIG) 5 MG tablet TAKE 1 TABLET BY MOUTH AT ONSET OF HEADACHE FOR MIGRAINE. MAY REPEAT DOSE IN 2 HOURS. DO NOT EXCEED 10MG IN 24 HOURS 12 tablet 4     Allergies   Allergen Reactions     Acetaminophen-Codeine Hives     Amoxicillin-Pot Clavulanate Nausea and Vomiting     Augmentin Nausea and Vomiting and Hives     Barbiturates      Bentyl [Dicyclomine] Other (See Comments)     dizziness     Compazine      Lock-jaw     Liquid Adhesive      Other reaction(s): Other, see comments  blisters     No Clinical Screening - See Comments      PN: LW Reaction:  blisters  PN: LW FI1: nka  PN: LW CM1: CONTRAST- nka Reaction :     Nsaids      D/t ITP     Prochlorperazine      Other reaction(s): Edema     Propoxyphene N-Apap Hives     Sulfa Drugs      Tramadol Hcl Hives            Adhesive Tape Rash       ROS    /75   Pulse 96   Temp 98.4  F (36.9  C) (Oral)   Resp 16   Wt 64.4 kg (142 lb)   SpO2 99%   Breastfeeding? No   BMI 20.37 kg/m    Physical Exam      Assessment/Plan:  There are no diagnoses linked to this encounter.      Gosia Cabrera PA-C

## 2019-04-23 NOTE — NURSING NOTE
"Chief Complaint   Patient presents with     Fatigue     pt c/o fatigue, decreased appetite, nausea and body aches. States Zofran is not helping       Initial /75   Pulse 96   Temp 98.4  F (36.9  C) (Oral)   Resp 16   Wt 64.4 kg (142 lb)   SpO2 99%   Breastfeeding? No   BMI 20.37 kg/m   Estimated body mass index is 20.37 kg/m  as calculated from the following:    Height as of 4/18/19: 1.778 m (5' 10\").    Weight as of this encounter: 64.4 kg (142 lb).  Medication Reconciliation: complete  Megan Paul MA    "

## 2019-04-23 NOTE — PROGRESS NOTES
SUBJECTIVE:     HPI  Coretta Tovar is a 48 year old female who presents to clinic today for the following health issues:  Abdominal Pain    Duration: 2weeks    Description (location/character/radiation): generalized with radiation into her low back       Associated flank pain: None    Intensity:  severe, 8/10    Accompanying signs and symptoms:        Fever/Chills: YES       Gas/Bloating: no        Nausea/vomitting: YES- but no blood present.  Cant seem to hold down any liquids or solids.       Diarrhea: no but reports constipation with last BM today and then pencil stools.  Prior to today she has not had a BM for 3weeks or so which is unusual for her.  No diarrhea, bloody or black tarry stools.       Dysuria or Hematuria: No dysuria, urinary frequency, urgency or hematuria.  No vaginal d/c, bleeding, rashes and irritation.  No new partners.     History (previous similar pain/trauma/previous testing): None    Precipitating or alleviating factors:       Pain worse with eating/BM/urination: Yes       Pain relieved by BM: YES    Therapies tried and outcome: zofran, phenergen, stool softener without any relief    LMP:  Unsure, does not recall    Reviewed PMH, FMH and SOH.  Patient Active Problem List   Diagnosis     Tobacco abuse     S/P splenectomy     Migraines     Idiopathic thrombocytopenic purpura (HCC)     Chemical dependency (H)     Insomnia     Mary-mary disease     Vomiting     Abdominal pain, acute     Disorder of stomach     Cervical pain     Vertigo     Gastroesophageal reflux disease     Herpetic gingivostomatitis     Immune thrombocytopenic purpura (H)     Muscle pain     Personality disorder (H)     Nondependent cannabis abuse, episodic     Atopic rhinitis     Abnormal weight loss     Mixed, or nondependent drug abuse     Moderate episode of recurrent major depressive disorder (H)     Generalized anxiety disorder     Protein-calorie malnutrition (H)     Primary insomnia     History of cocaine  dependence     Current Outpatient Medications   Medication Sig Dispense Refill     acetaminophen (TYLENOL) 325 MG tablet Take 650 mg by mouth       acyclovir (ZOVIRAX) 5 % external ointment APPLY EXTERNALLY TO THE AFFECTED AREA 6 TIMES DAILY 15 g 3     acyclovir (ZOVIRAX) 5 % ointment Apply 1 g topically as needed 30 g 11     albuterol (PROAIR HFA/PROVENTIL HFA/VENTOLIN HFA) 108 (90 Base) MCG/ACT Inhaler Inhale 1-2 puffs into the lungs every 4 hours as needed for shortness of breath / dyspnea or wheezing 18 g 3     betamethasone valerate (VALISONE) 0.1 % ointment Apply once a day for 2 weeks as directed       buprenorphine-naloxone (SUBOXONE) 8-2 MG SUBL sublingual tablet Place 1 tablet under the tongue 3 times daily [id number] 90 each 0     busPIRone HCl (BUSPAR) 30 MG tablet Take 1 tablet (30 mg) by mouth 2 times daily 60 tablet 5     cetirizine (ZYRTEC) 10 MG tablet Take 1 tablet (10 mg) by mouth every evening 90 tablet 1     clobetasol (TEMOVATE) 0.05 % external ointment APPLY TOPICALLY BID 80 g 1     cloNIDine (CATAPRES) 0.1 MG tablet Take 1 tablet (0.1 mg) by mouth At Bedtime Prn late withdrawal symptoms. 60 tablet 0     docusate sodium (DOK) 100 MG capsule TAKE 1 CAPSULE BY MOUTH TWICE DAILY AS NEEDED FOR CONSTIPATION 60 capsule 3     FLUoxetine (PROZAC) 20 MG capsule TAKE 1 CAPSULE BY MOUTH DAILY WITH FLUOXETINE 40MG FOR A TOTAL OF 60MG DAILY 30 capsule 5     FLUoxetine (PROZAC) 40 MG capsule TAKE 1 CAPSULE BY MOUTH DAILY WITH 20 MG CAPSULE OF FLUOXETINE TO EQUAL TOTAL DAILY DOSE OF 60MG 30 capsule 5     fluticasone (FLONASE) 50 MCG/ACT nasal spray Spray 2 sprays into both nostrils daily 1 Bottle 11     gabapentin (NEURONTIN) 300 MG capsule 300 am 600mg q hs 90 capsule 0     hydrocortisone 2.5 % cream        hydrOXYzine (ATARAX) 25 MG tablet TAKE 2 TABLETS BY MOUTH NIGHTLY AS NEEDED FOR ANXIETY 60 tablet 3     hydrOXYzine (ATARAX) 50 MG tablet TAKE 1 TABLET(50MG) BY MOUTH NIGHTLY AS NEEDED FOR ANXIETY 30  tablet 3     hydrOXYzine (VISTARIL) 25 MG capsule Take 2 capsules (50 mg) by mouth nightly as needed for anxiety 120 capsule 1     ipratropium - albuterol 0.5 mg/2.5 mg/3 mL (DUONEB) 0.5-2.5 (3) MG/3ML neb solution Take 1 vial (3 mLs) by nebulization every 6 hours as needed for shortness of breath / dyspnea or wheezing 90 vial 3     ketoconazole (NIZORAL) 2 % cream        levocetirizine (XYZAL) 5 MG tablet Take 1 tablet (5 mg) by mouth every evening 30 tablet 11     loratadine (CLARITIN) 10 MG tablet TABLET 1 TABLET BY MOUTH DAILY AS NEEDED FOR ALLERGIES 30 tablet 5     methocarbamol (ROBAXIN) 500 MG tablet Take 1 tablet (500 mg) by mouth 3 times daily 90 tablet 3     Neomycin-Bacitracin-Polymyxin ([employer] TRIPLE ANTIBIOTIC) OINT Externally apply topically 4 times daily 60 g 3     nystatin (MYCOSTATIN) 992656 UNIT/ML suspension Take 5 mLs (500,000 Units) by mouth 4 times daily 140 mL 0     olopatadine (PATANOL) 0.1 % ophthalmic solution Place 1 drop into both eyes 2 times daily 5 mL 3     omeprazole (PRILOSEC) 40 MG DR capsule TAKE 1 CAPSULE(40 MG) BY MOUTH DAILY 30 TO 60 MINUTES BEFORE A MEAL 90 capsule 0     ondansetron (ZOFRAN-ODT) 4 MG ODT tab Take 1-2 tablets (4-8 mg) by mouth every 8 hours as needed for nausea 30 tablet 1     order for DME Equipment being ordered: Left thumb brace 1 each 0     polyethylene glycol (MIRALAX/GLYCOLAX) powder TAKE 17 GRAMS(1 CAPFUL) BY MOUTH DAILY 527 g 3     ranitidine (ZANTAC) 150 MG tablet Take 1 tablet (150 mg) by mouth 2 times daily 60 tablet 2     terbinafine (LAMISIL) 1 % external cream Apply topically 2 times daily 30 g 2     traZODone (DESYREL) 100 MG tablet Take 1 tablet (100 mg) by mouth At Bedtime 90 tablet 1     triamcinolone (KENALOG) 0.1 % ointment Apply sparingly to affected area on back two times daily til clear. 80 g 0     valACYclovir (VALTREX) 1000 mg tablet Take 1 tablet (1,000 mg) by mouth 2 times daily At the onset of skin lesions as needed 8 tablet 11      varenicline (CHANTIX CONTINUING MONTH ARA) 1 MG tablet TAKE 1 TABLET(1 MG) BY MOUTH TWICE DAILY 56 tablet 2     ZOLMitriptan (ZOMIG) 5 MG tablet TAKE 1 TABLET BY MOUTH AT ONSET OF HEADACHE FOR MIGRAINE. MAY REPEAT DOSE IN 2 HOURS. DO NOT EXCEED 10MG IN 24 HOURS 12 tablet 4     Allergies   Allergen Reactions     Acetaminophen-Codeine Hives     Amoxicillin-Pot Clavulanate Nausea and Vomiting     Augmentin Nausea and Vomiting and Hives     Barbiturates      Bentyl [Dicyclomine] Other (See Comments)     dizziness     Compazine      Lock-jaw     Liquid Adhesive      Other reaction(s): Other, see comments  blisters     No Clinical Screening - See Comments      PN: LW Reaction: blisters  PN: LW FI1: nka  PN: LW CM1: CONTRAST- nka Reaction :     Nsaids      D/t ITP     Prochlorperazine      Other reaction(s): Edema     Propoxyphene N-Apap Hives     Sulfa Drugs      Tramadol Hcl Hives            Adhesive Tape Rash       Review of Systems   Constitutional: Positive for chills, fever and malaise/fatigue. Negative for diaphoresis and weight loss.   HENT: Negative.    Eyes: Negative for pain.   Respiratory: Negative.  Negative for cough and hemoptysis.    Cardiovascular: Negative.  Negative for chest pain and palpitations.   Gastrointestinal: Positive for abdominal pain, constipation, nausea and vomiting. Negative for blood in stool, diarrhea and melena.   Genitourinary: Negative.    Skin: Negative.    All other systems reviewed and are negative.      /75   Pulse 96   Temp 98.4  F (36.9  C) (Oral)   Resp 16   Wt 64.4 kg (142 lb)   SpO2 99%   Breastfeeding? No   BMI 20.37 kg/m    Physical Exam   Constitutional: She is oriented to person, place, and time. She appears well-developed and well-nourished. She appears ill. No distress.   She is bending over in pain in the chair.   Cardiovascular: Normal rate, regular rhythm, normal heart sounds and intact distal pulses. Exam reveals no gallop and no friction rub.   No  murmur heard.  Pulmonary/Chest: Effort normal and breath sounds normal. No respiratory distress. She has no wheezes. She has no rales.   Abdominal: Soft. Normal appearance, normal aorta and bowel sounds are normal. She exhibits no mass. There is no hepatosplenomegaly. There is generalized tenderness. There is rebound, guarding, CVA tenderness, tenderness at McBurney's point and positive Duran's sign. No hernia.   Neurological: She is alert and oriented to person, place, and time.   Skin: Skin is warm and dry.   Psychiatric: She has a normal mood and affect. Judgment normal.   Nursing note and vitals reviewed.        Assessment/Plan:  Abdominal pain, generalized:  Along with constipation, n/v and fever/chills.  H&P is concerning for bowel obstruction vs diverticulitis vs appendicitis vs kidney stone/infection.  Due to the severity of her pain, recommend further evaluation and management in the ER.  Will most likely need further workup with labs and/or imaging.  Patient has declined transportation via ambulance and will have family drive her.  Understands risks and benefits of ambulance transfer and she has declined.  Call 911 if worsening symptoms.  She is not sure which ER she will be going to.  F/u with PCP after ER visit.     Nausea and vomiting, intractability of vomiting not specified, unspecified vomiting type    Constipation, unspecified constipation type          Gosia See JENNY Cabrera

## 2019-04-30 NOTE — PROGRESS NOTES
SUBJECTIVE:                                                    BUPRENORPHINE FOLLOW UP:    Coretta Tovar is a 48 year old female who presents to clinic today for follow up of Buprenorphine.      Date of last visit:  4/2/2019    [place] Board of Pharmacy Data Base Reviewed:    YES;     4/2/2019 Suboxone 8 mg tab number 93/29/19 gabapentin 300 mg #90        Brief History:     Hx of multiple surgeries (>15).   Since age 14  9 surgeries on right foot in past 15months for cpmplex fx ankle and foot.  C spine fusion many years ago.   Hx migraine headache.  S/p hysterectomy, tonsillectomy, splenectomy, ear surgeries.  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          HPI:   4/30/2019  Was in hospital over Easter weekend.   Was having constipation and abdominal pain as well as ongoing fatigue.  Hydromorphone x 3.    Will be seeing ENT and referred to [facility] [place] for follow up (July)   continues to complain of sore throat pain of significance.  Took some pain medication in the last day or 2 from old supply due to this discomfort.  Continues buprenorphine 8 mg 3x  Daily.  Is not attending meeting or other recovery supports.  Reports continuing BuSpar Prozac gabapentin hydroxyzine and Robaxin.  Denies misuse.  Continues to smoke but is trying to quit.    Social History     Social History Narrative    Lives with dad and brother in [place].      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      Daughter with recent DWI (she has child).        Patient Active Problem List    Diagnosis Date Noted     History of cocaine dependence 09/07/2017     Priority: Medium     Moderate episode of recurrent major depressive disorder (H) 03/22/2017     Priority: Medium     Generalized anxiety disorder 03/22/2017     Priority: Medium     Protein-calorie malnutrition (H) 03/22/2017     Priority: Medium     Primary insomnia 03/22/2017     Priority: Medium     Vertigo 03/14/2017     Priority: Medium     Cervical pain 03/08/2017     Priority: Medium     Disorder of stomach  11/20/2016     Priority: Medium     Overview:   Per Biopsy obtained on EGD 11/22/2016. Reactive gastropathy was quoted to be frequently associated with ongoing non-inflammatory type mucosal injury due to a chemical type of injury; this may be due to ingestion of non-steroidal anti-inflammatory drugs, aspirin, excess alcohol, or corticosteroids.        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     Priority: Medium     Chemical dependency (H) 02/24/2015     Priority: Medium     Insomnia 02/24/2015     Priority: Medium     S/P splenectomy 01/13/2015     Priority: Medium     Migraines      Priority: Medium     Idiopathic thrombocytopenic purpura (HCC)      Priority: Medium     s/p splenectomy       Tobacco abuse 05/30/2014     Priority: Medium     Immune thrombocytopenic purpura (H) 01/14/2008     Priority: Medium     Personality disorder (H) 01/14/2008     Priority: Medium     Overview:   cluster B traits  borderline, antisocial       Nondependent cannabis abuse, episodic 01/14/2008     Priority: Medium     Mixed, or nondependent drug abuse 01/26/2007     Priority: Medium     Overview:   recurrent issues with opioids.  History of crack cocaine abuse.       Gastroesophageal reflux disease 12/20/2006     Priority: Medium     Herpetic gingivostomatitis 12/20/2006     Priority: Medium     Muscle pain 12/20/2006     Priority: Medium     Atopic rhinitis 12/20/2006     Priority: Medium     Abnormal weight loss 12/20/2006     Priority: Medium       Problem list and histories reviewed & adjusted, as indicated.  Additional history: as documented        Current Outpatient Medications on File Prior to Visit:  acetaminophen (TYLENOL) 325 MG tablet Take 650 mg by mouth   acyclovir (ZOVIRAX) 5 % external ointment APPLY EXTERNALLY TO THE AFFECTED AREA 6 TIMES DAILY   acyclovir (ZOVIRAX) 5 % ointment Apply 1 g topically as needed   albuterol  (PROAIR HFA/PROVENTIL HFA/VENTOLIN HFA) 108 (90 Base) MCG/ACT Inhaler Inhale 1-2 puffs into the lungs every 4 hours as needed for shortness of breath / dyspnea or wheezing   betamethasone valerate (VALISONE) 0.1 % ointment Apply once a day for 2 weeks as directed   buprenorphine-naloxone (SUBOXONE) 8-2 MG SUBL sublingual tablet Place 1 tablet under the tongue 3 times daily [id number]   busPIRone HCl (BUSPAR) 30 MG tablet Take 1 tablet (30 mg) by mouth 2 times daily   cetirizine (ZYRTEC) 10 MG tablet Take 1 tablet (10 mg) by mouth every evening   clobetasol (TEMOVATE) 0.05 % external ointment APPLY TOPICALLY BID   cloNIDine (CATAPRES) 0.1 MG tablet Take 1 tablet (0.1 mg) by mouth At Bedtime Prn late withdrawal symptoms.   docusate sodium (DOK) 100 MG capsule TAKE 1 CAPSULE BY MOUTH TWICE DAILY AS NEEDED FOR CONSTIPATION   FLUoxetine (PROZAC) 20 MG capsule TAKE 1 CAPSULE BY MOUTH DAILY WITH FLUOXETINE 40MG FOR A TOTAL OF 60MG DAILY   FLUoxetine (PROZAC) 40 MG capsule TAKE 1 CAPSULE BY MOUTH DAILY WITH 20 MG CAPSULE OF FLUOXETINE TO EQUAL TOTAL DAILY DOSE OF 60MG   fluticasone (FLONASE) 50 MCG/ACT nasal spray Spray 2 sprays into both nostrils daily   gabapentin (NEURONTIN) 300 MG capsule 300 am 600mg q hs   hydrocortisone 2.5 % cream    hydrOXYzine (ATARAX) 25 MG tablet TAKE 2 TABLETS BY MOUTH NIGHTLY AS NEEDED FOR ANXIETY   hydrOXYzine (ATARAX) 50 MG tablet TAKE 1 TABLET(50MG) BY MOUTH NIGHTLY AS NEEDED FOR ANXIETY   hydrOXYzine (VISTARIL) 25 MG capsule Take 2 capsules (50 mg) by mouth nightly as needed for anxiety   ipratropium - albuterol 0.5 mg/2.5 mg/3 mL (DUONEB) 0.5-2.5 (3) MG/3ML neb solution Take 1 vial (3 mLs) by nebulization every 6 hours as needed for shortness of breath / dyspnea or wheezing   ketoconazole (NIZORAL) 2 % cream    levocetirizine (XYZAL) 5 MG tablet Take 1 tablet (5 mg) by mouth every evening   loratadine (CLARITIN) 10 MG tablet TABLET 1 TABLET BY MOUTH DAILY AS NEEDED FOR ALLERGIES    methocarbamol (ROBAXIN) 500 MG tablet Take 1 tablet (500 mg) by mouth 3 times daily   Neomycin-Bacitracin-Polymyxin ([employer] TRIPLE ANTIBIOTIC) OINT Externally apply topically 4 times daily   nystatin (MYCOSTATIN) 094022 UNIT/ML suspension Take 5 mLs (500,000 Units) by mouth 4 times daily   olopatadine (PATANOL) 0.1 % ophthalmic solution Place 1 drop into both eyes 2 times daily   omeprazole (PRILOSEC) 40 MG DR capsule TAKE 1 CAPSULE(40 MG) BY MOUTH DAILY 30 TO 60 MINUTES BEFORE A MEAL   ondansetron (ZOFRAN-ODT) 4 MG ODT tab Take 1-2 tablets (4-8 mg) by mouth every 8 hours as needed for nausea   order for DME Equipment being ordered: Left thumb brace   polyethylene glycol (MIRALAX/GLYCOLAX) powder TAKE 17 GRAMS(1 CAPFUL) BY MOUTH DAILY   ranitidine (ZANTAC) 150 MG tablet Take 1 tablet (150 mg) by mouth 2 times daily   terbinafine (LAMISIL) 1 % external cream Apply topically 2 times daily   traZODone (DESYREL) 100 MG tablet Take 1 tablet (100 mg) by mouth At Bedtime   triamcinolone (KENALOG) 0.1 % ointment Apply sparingly to affected area on back two times daily til clear.   valACYclovir (VALTREX) 1000 mg tablet Take 1 tablet (1,000 mg) by mouth 2 times daily At the onset of skin lesions as needed   varenicline (CHANTIX CONTINUING MONTH ARA) 1 MG tablet TAKE 1 TABLET(1 MG) BY MOUTH TWICE DAILY   ZOLMitriptan (ZOMIG) 5 MG tablet TAKE 1 TABLET BY MOUTH AT ONSET OF HEADACHE FOR MIGRAINE. MAY REPEAT DOSE IN 2 HOURS. DO NOT EXCEED 10MG IN 24 HOURS     No current facility-administered medications on file prior to visit.     Allergies   Allergen Reactions     Acetaminophen-Codeine Hives     Amoxicillin-Pot Clavulanate Nausea and Vomiting     Augmentin Nausea and Vomiting and Hives     Barbiturates      Bentyl [Dicyclomine] Other (See Comments)     dizziness     Compazine      Lock-jaw     Liquid Adhesive      Other reaction(s): Other, see comments  blisters     No Clinical Screening - See Comments      PN: LW Reaction:  blisters  PN: LW FI1: nka  PN: LW CM1: CONTRAST- nka Reaction :     Nsaids      D/t ITP     Prochlorperazine      Other reaction(s): Edema     Propoxyphene N-Apap Hives     Sulfa Drugs      Tramadol Hcl Hives            Adhesive Tape Rash         REVIEW OF SYSTEMS:  General: No acute withdrawal symptoms.  No recent infections or fever  Resp: No coughing, wheezing or shortness of breath  CV: No chest pains or palpitations  GI: No nausea, vomiting, abdominal pain, diarrhea, constipation  : No urinary frequency or dysuria,     Musculoskeletal: No significant muscle or joint pains, No edema  Neurologic: No numbness, tingling, weakness, problems with balance or coordination  Psychiatric: No acute concerns  Skin: No rashes    OBJECTIVE:    PHYSICAL EXAM:  /78   Pulse 86   Temp 97.3  F (36.3  C)   SpO2 98%     GENERAL APPEARANCE: Tired appearing  EYES:Eyes grossly normal to inspection  NEURO:  Gait normal.  No tremor. Coordination intact.   MENTAL STATUS EXAM:  Appearance/Behavior: No appearant distress  Speech: Normal  Mood/Affect: normal affect  Insight: Fair      Results for orders placed or performed in visit on 04/30/19   Urine Drugs of Abuse Screen Panel 13   Result Value Ref Range    Cannabinoids (34-yju-4-carboxy-9-THC) Not Detected NDET^Not Detected ng/mL    Phencyclidine (Phencyclidine) Not Detected NDET^Not Detected ng/mL    Cocaine (Benzoylecgonine) Detected, Abnormal Result (A) NDET^Not Detected ng/mL    Methamphetamine (d-Methamphetamine) Not Detected NDET^Not Detected ng/mL    Opiates (Morphine) Not Detected NDET^Not Detected ng/mL    Amphetamine (d-Amphetamine) Not Detected NDET^Not Detected ng/mL    Benzodiazepines (Nordiazepam) Not Detected NDET^Not Detected ng/mL    Tricyclic Antidepressants (Desipramine) Not Detected NDET^Not Detected ng/mL    Methadone (Methadone) Not Detected NDET^Not Detected ng/mL    Barbiturates (Butalbital) Not Detected NDET^Not Detected ng/mL    Oxycodone (Oxycodone)  Not Detected NDET^Not Detected ng/mL    Propoxyphene (Norpropoxyphene) Not Detected NDET^Not Detected ng/mL    Buprenorphine (Buprenorphine) Detected, Abnormal Result (A) NDET^Not Detected ng/mL       U tox positive for cocaine after visit.  Confirmation pending if positive will need further discussion    ASSESSMENT/PLAN:         (F11.20) Opioid use disorder, severe, dependence (H)  (primary encounter diagnosis)   Suboxone  8mg 3 times daily .  Rx #90      Try split dosing for better pain control  Discussed pain management in the event surgical procedures are necessary.  Invited consultation from other providers.  Safe storage reviewed.   Follow up 4 wk    Strongly advised no additional doses of other opioids unless reviewed with provider.           Counseled the patient on the importance of having a recovery program in addition to Medication assisted treatment.  Components include having some type of sober network, avoiding isolating, having willingness  to change, avoiding triggers and managing cravings.  Strongly recommended abstaining from alcohol, benzodiazepines, THC, opioids and other drugs of abuse.       Opioid warning reviewed.  Risk of overdose following a period of abstinence due to decrease tolerance was discussed including risk of death.   Risk of overdose if using Suboxone with other substances particuarly benzodiazepines/alcohol was reviewed.           (F14.21) History of cocaine dependence (H)  Plan: Possible positive screen today.  If confirmation positive will need further discussion.  Encourage ongoing sobriety      (F33.1) Moderate episode of recurrent major depressive disorder (H)  (F41.1) Generalized anxiety disorder  Plan: Recommendations as above.   Follow with PCP/psychiatry needed.   Encouraged again to schedule.   Encouraged mental health counseling.        (F17.200) Nicotine use disorder  Comment: not ready to quit but has cut down.   Plan: Encouraged Abstinence.  Increase risk of  relapse with other substances with return to nicotine use discussed.  Risk of Ecig/Vaping also reviewed.             (G43.009) Hx of chronic headache/  (V89.2XXD) .   Robaxin 500mg q six hour prn.   Encourage stretching, massage, warm moist heat as able.     Refer to [facility] neurology placed previously.    Has not followed through as yet.    Vocal cord abnormalities  Follow-up with ENT.  Avoid use of additional opioid.     (F51.01)  Insomnia  Trazodone as rx previously.   Continue gabapentin 300mg am and 600mg pm.    Risk of polypharmacy discussed.  Other sleep hygeine, caffeine intake, non smoking also reviewed.    Follow up with PCP     (Z79.899) High risk medication use         ENCOUNTER FOR LONG TERM USE OF HIGH RISK MEDICATION   High Risk Drug Monitoring?  YES   Drug being monitored: Buprenorphine   Reason for drug: Opioid Use Disorder   What is being monitored?: Dosage, Cravings, Trigger, side effects, and continued abstinence.      Opioid warning reviewed.  Risk of overdose following a period of abstinence due to decrease tolerance was discussed including risk of death.   Risk of overdose if using Suboxone with other substances particuarly benzodiazepines/alcohol was reviewed.        Jessy Brenner MD  [facility]  [phone]

## 2019-05-01 NOTE — TELEPHONE ENCOUNTER
FUTURE VISIT INFORMATION      FUTURE VISIT INFORMATION:    Date: 7/23/19    Time: 10:30am    Location: [facility] ENT  REFERRAL INFORMATION:    Referring provider:  Sarah Montemayor CNP    Referring providers clinic:  [facility]    Reason for visit/diagnosis:  Dysplasia of larynx.     RECORDS REQUESTED FROM:       Clinic name Comments Records Status Imaging Status   [facility] - ENT 4/18/19, 3/21/19 - Office Visit - Dr. Adrienne Segura   3/27/19 - Laryngoscopy   3/19/19 - Office Visit - Sarah Montemayor CNP (FP) [facility]  3/13/19 - ER Admission  11/22/16 - EGD Care Everywhere    [facility] - Pulmonology 2/5/13 - Office Visit - Dr. Manuel Macedo Care Everywhere    [facility] - Imaging 3/13/19 - CT Neck Soft Tissue  3/13/19 - XR Neck Soft Tissue Care Everywhere PACS           5/1/19 1:18pm - Faxed request for imaging to [facility].  5/1/19 3:28pm - Received fax from [facility] that they pushed imaging. Resolved in PACS.

## 2019-05-06 NOTE — TELEPHONE ENCOUNTER
Patient called RN Hotline stating she thinks she has a bladder infection that started a couple days ago but doesn't have insurance right now so she can't come in for an appointment.   Reports she is having urgency, frequency, pain in lower abdomen, and slight lower back pain  Patient denies hematuria, fever, cloudy urine, odorous urine, or pain/burning with urination.  Patient has been using Azo since 5/5/19 but hasn't helping improve symptoms.   Patient wondering if she can have an antibiotic sent to pharmacy without having to be seen.      Deedee Mott RN

## 2019-05-09 NOTE — TELEPHONE ENCOUNTER
Refill for: Catapres    Last Appointment: 4/30/19    Next Appointment: 5/28/19    No Shows/Cancellations since last appointment:  none    Last Refill in Epic (date and amount/how many days):    Disp Refills Start End RAUL   cloNIDine (CATAPRES) 0.1 MG tablet 60 tablet 0 3/13/2019  No   Sig - Route: Take 1 tablet (0.1 mg) by mouth At Bedtime Prn late withdrawal symptoms. - Oral     Writer called patient and she confirmed she is taking the medication as ordered and is in need a refill.     RN refill protocol passed. Refill request approved for 30 days.

## 2019-05-09 NOTE — TELEPHONE ENCOUNTER
"Requested Prescriptions   Pending Prescriptions Disp Refills     cloNIDine (CATAPRES) 0.1 MG tablet  Last Written Prescription Date:  3/13/19  Last Fill Quantity: 60,  # refills: 0   Last office visit: 3/19/2018 with prescribing provider:     Future Office Visit:   Next 5 appointments (look out 90 days)    May 28, 2019  8:20 AM CDT  [facility] Visit with Jessy Brenner MD  [facility] [address]  [place] [id number] [phone]          60 tablet 0     Sig: Take 1 tablet (0.1 mg) by mouth At Bedtime Prn late withdrawal symptoms.       Central Acting Antiadrenergic Agents Passed - 5/9/2019 10:03 AM        Passed - Blood pressure under 140/90 in past 12 months     BP Readings from Last 3 Encounters:   04/30/19 116/78   04/23/19 106/75   04/18/19 104/66                 Passed - Patient is 6 years of age or older        Passed - Recent (12 mo) or future (30 days) visit within the authorizing provider's specialty     Patient had office visit in the last 12 months or has a visit in the next 30 days with authorizing provider or within the authorizing provider's specialty.  See \"Patient Info\" tab in inbasket, or \"Choose Columns\" in Meds & Orders section of the refill encounter.              Passed - Medication is active on med list        Passed - Patient not pregnant        Passed - Normal serum creatinine on file within past 12 months     Recent Labs   Lab Test 10/29/18  0957   CR 0.68             Passed - No positive pregnancy test on file in past 12 months        ]  "

## 2019-05-22 NOTE — PROGRESS NOTES
Clinic Care Coordination Contact -Social Work  Referral Source: PCP/ help with insurance    Second outreach to pt to assist with insurance issues.   She said it was not a good time and either she will call me next week after receiving my letter , or I will contact her  In 3-5 business days. Care Coordinator mailed out care coordination introduction letter on 5/22/19    Niurka Rivers [facility]  , Clinic Care Coordination  Clinics:  [facility] [place], [place]  [phone]   5/24/2019   9:38 AM    Clinic Care Coordination Contact  [facility]/Voicemail -Social Work     Clinical Data: Care Coordinator Outreach  Outreach attempted x 1.  Left message on IRI with call back information and requested return call.    Plan: Care Coordinator will mail out care coordination introduction letter with care coordinator contact information and explanation of care coordination services. Care Coordinator will try to reach patient again in 1-2 business days.    Niurka Rivers [facility]  , Clinic Care Coordination  Clinics:  [facility] [place], [place]  [phone]   5/22/2019   2:01 PM

## 2019-05-22 NOTE — LETTER
[place] CARE COORDINATION  [facility]  [address] [place] [address]  [phone]    May 22, 2019    Coretta Tovar  [address] [place] [address]      Dear Coretta,    I am a clinic care coordinator who works with NINOSKA Smith CNP at the [facility].  After your recent call to the clinic I was asked to contact you to see if I could be of any assistance with insurance.     I have been trying to reach you recently to introduce Clinic Care Coordination and to see if there was anything I could assist you with.  I wanted to introduce myself and provide you with my contact information so that you can call me with questions or concerns about your health care. Below is a description of clinic care coordination and how I can further assist you.     The clinic care coordinator is a registered nurse and/or  who understand the health care system. The goal of clinic care coordination is to help you manage your health and improve access to the [facility] system in the most efficient manner. The registered nurse can assist you in meeting your health care goals by providing education, coordinating services, and strengthening the communication among your providers. The  can assist you with financial, behavioral, psychosocial, chemical dependency, counseling, and/or psychiatric resources.    Please feel free to contact me at [phone], with any questions or concerns. We at [facility] are focused on providing you with the highest-quality healthcare experience possible and that all starts with you.     Sincerely,       Niurka Rivers [facility]  , Clinic Care Coordination  Clinics:  [facility] [place], [place]  [phone]   5/22/2019   2:10 PM    Enclosed: I have enclosed a copy of a 24 Hour Access Plan. This has helpful phone numbers for you to call when needed. Please keep this in an easy to access  place to use as needed.

## 2019-05-22 NOTE — TELEPHONE ENCOUNTER
Patient will need to be seen.  I've already treated a UTI without seeing her and she will need evaluation for her fever to ensure appropriate antibiotic.  I would recommend Urgent Care evaluation.    Sarah Montemayor, CNP

## 2019-05-22 NOTE — TELEPHONE ENCOUNTER
Patient called RN Hotline stating that she has had a fever of 101-102 all night, a very sore throat, mild sinus pain and migraine and wondering if a prescription can be sent in. Reports she doesn't have insurance so she cannot go into doctors.   States she has been taking Tylenol, apple cidar vinegar pills, cough drops, and her migraine medication.   Reports throat is red but no yellow/white pus visible and has been coughing up yellow phlegm and some mild SOB with exertion.  States she feels like she's been getting sick for a couple of days but last night it started getting worse.   Patient denies CP, ear pain, wheezing.  Advised patient to continue taking Tylenol as needed for fever/pain, drink fluids, and monitor symptoms.     Please advise on Rx request.     Deedee Mott RN

## 2019-05-22 NOTE — TELEPHONE ENCOUNTER
Patient notified of Provider's message as written.  Patient verbalized understanding.    She stated that she does not currently have insurance and was open to having Care Coordination referral placed to assist   Care coordination referral placed    Val Teran RN

## 2019-05-22 NOTE — LETTER
Health Care Home - Access Care Plan    About Me:    Patient Name:  Coretta Tovar    YOB: 1970  Age:                      48 year old   [place] MRN:     [id number] Telephone Information:   Home Phone [phone]   Mobile [phone]       Address:  [facility] [place] [address] Email address:  No e-mail address on record      Emergency Contact(s)   Name Relationship Lgl Grd Work Phone Home Phone Mobile Phone   1. RICH PINO Mother   [phone]   2. LATOSHA LAMAR Daughter   [phone]              Health Maintenance:      My Access Plan  Medical Emergency 911   Questions or concerns during clinic hours Primary Clinic Line, I will call the clinic directly: [facility] [phone]   24 Hour Appointment Line [phone] or  [phone]) (toll free)   24 Hour Nurse Line [phone] (toll free)   Questions or concerns outside clinic hours 24 Hour Appointment Line, I will call the after-hours on-call line:   [facility] [phone] or [phone] ([phone]) (toll-free)   Preferred Urgent Care     Preferred Hospital     Preferred Pharmacy [id number] - [place] [id number] [facility]     Behavioral Health Crisis Line The National Suicide Prevention Lifeline at 1-366.480.8817 or 911                 My Care Team Members  Patient Care Team       Relationship Specialty Notifications Start End    Sarah Montemayor APRN CNP PCP - General Nurse Practitioner - Family  12/12/17     Phone: [phone] Fax: [phone] [address] [facility] [address] [place] [address]    Sarah Montemayor APRN CNP Assigned PCP   7/22/18     Phone: [phone] Fax: [phone] [address] [facility] [address]    Izzy Rivers LSW Clinic Care Coordinator Primary Care - CC  5/22/19     (Niurka ACOSTA)    Phone: [phone] Fax: [phone]               My Medical and Care Information  Problem List    Patient Active Problem List   Diagnosis     Tobacco abuse     S/P splenectomy     Migraines     Idiopathic thrombocytopenic purpura (HCC)     Chemical dependency (H)     Insomnia     Mary-mary disease     Vomiting     Abdominal pain, acute     Disorder of stomach     Cervical pain     Vertigo     Gastroesophageal reflux disease     Herpetic gingivostomatitis     Immune thrombocytopenic purpura (H)     Muscle pain     Personality disorder (H)     Nondependent cannabis abuse, episodic     Atopic rhinitis     Abnormal weight loss     Mixed, or nondependent drug abuse     Moderate episode of recurrent major depressive disorder (H)     Generalized anxiety disorder     Protein-calorie malnutrition (H)     Primary insomnia     History of cocaine dependence      Current Medications and Allergies:  See printed Medication Report

## 2019-05-28 NOTE — PROGRESS NOTES
SUBJECTIVE:                                                    BUPRENORPHINE FOLLOW UP:    Coretta Tovar is a 48 year old female who presents to clinic today for follow up of Buprenorphine.      Date of last visit:  4/30/2019    [place] Board of Pharmacy Data Base Reviewed:    Yes ;     4/30/19 Suboxone 8mg film #90  4/26/19 Gabapentin 300mg #90      Brief History:    Hx of multiple surgeries (>15).   Since age 14  9 surgeries on right foot in past 15months for cpmplex fx ankle and foot.  C spine fusion many years ago.   Hx migraine headache.  S/p hysterectomy, tonsillectomy, splenectomy, ear surgeries.  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             HPI:   4/30/2019  Was in hospital over Easter weekend.   Was having constipation and abdominal pain as well as ongoing fatigue.  Hydromorphone x 3.    Will be seeing ENT and referred to [facility] for follow up (July)   continues to complain of sore throat pain of significance.  Took some pain medication in the last day or 2 from old supply due to this discomfort.  Continues buprenorphine 8 mg 3x  Daily.  Is not attending meeting or other recovery supports.  Reports continuing BuSpar Prozac gabapentin hydroxyzine and Robaxin.  Denies misuse.  Continues to smoke but is trying to quit.           HPI:    5/28/2019    Has about one day left of med.  Insurance has lapsed .  She is in the process of trying to get it reinstated.  Current dose of Suboxone 8 mg 3 times daily.  Feeling sick last week having sore throat, stomach hurt, feeling tired.    Has zofran that wasn't working.  Did use some phenergan from a friend that helps a little.    Will be seeing ENT for follow-up of moderate vocal cord dysplasia. in July and is on a wait list.  Taking ranitadine bid.    Smoking 2-3 cig /day.  Minimal vaping.  She has been advised to discontinue smoking.  Caffeine use six can /day  Having more migraine headaches.  Has a history of chronic headache and migraine headache.  She has had  neurology referral suggested many times and initial referral was placed in January 2018.  She has not pursued.  Referral is again printed today.  No use since last visit.  U tox at last visit positive for cocaine.  No specific recovery support.    Social History     Social History Narrative    Lives with dad and brother in [place].      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      Daughter with recent DWI (she has child).        Patient Active Problem List    Diagnosis Date Noted     History of cocaine dependence 09/07/2017     Priority: Medium     Moderate episode of recurrent major depressive disorder (H) 03/22/2017     Priority: Medium     Generalized anxiety disorder 03/22/2017     Priority: Medium     Protein-calorie malnutrition (H) 03/22/2017     Priority: Medium     Primary insomnia 03/22/2017     Priority: Medium     Vertigo 03/14/2017     Priority: Medium     Cervical pain 03/08/2017     Priority: Medium     Disorder of stomach 11/20/2016     Priority: Medium     Overview:   Per Biopsy obtained on EGD 11/22/2016. Reactive gastropathy was quoted to be frequently associated with ongoing non-inflammatory type mucosal injury due to a chemical type of injury; this may be due to ingestion of non-steroidal anti-inflammatory drugs, aspirin, excess alcohol, or corticosteroids.        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     Priority: Medium     Chemical dependency (H) 02/24/2015     Priority: Medium     Insomnia 02/24/2015     Priority: Medium     S/P splenectomy 01/13/2015     Priority: Medium     Migraines      Priority: Medium     Idiopathic thrombocytopenic purpura (HCC)      Priority: Medium     s/p splenectomy       Tobacco abuse 05/30/2014     Priority: Medium     Immune thrombocytopenic purpura (H) 01/14/2008     Priority: Medium     Personality disorder (H) 01/14/2008     Priority: Medium     Overview:   cluster B  traits  borderline, antisocial       Nondependent cannabis abuse, episodic 01/14/2008     Priority: Medium     Mixed, or nondependent drug abuse 01/26/2007     Priority: Medium     Overview:   recurrent issues with opioids.  History of crack cocaine abuse.       Gastroesophageal reflux disease 12/20/2006     Priority: Medium     Herpetic gingivostomatitis 12/20/2006     Priority: Medium     Muscle pain 12/20/2006     Priority: Medium     Atopic rhinitis 12/20/2006     Priority: Medium     Abnormal weight loss 12/20/2006     Priority: Medium       Problem list and histories reviewed & adjusted, as indicated.  Additional history: as documented        Current Outpatient Medications on File Prior to Visit:  acetaminophen (TYLENOL) 325 MG tablet Take 650 mg by mouth   acyclovir (ZOVIRAX) 5 % external ointment APPLY EXTERNALLY TO THE AFFECTED AREA 6 TIMES DAILY   acyclovir (ZOVIRAX) 5 % ointment Apply 1 g topically as needed   albuterol (PROAIR HFA/PROVENTIL HFA/VENTOLIN HFA) 108 (90 Base) MCG/ACT Inhaler Inhale 1-2 puffs into the lungs every 4 hours as needed for shortness of breath / dyspnea or wheezing   betamethasone valerate (VALISONE) 0.1 % ointment Apply once a day for 2 weeks as directed   buprenorphine-naloxone (SUBOXONE) 8-2 MG SUBL sublingual tablet Place 1 tablet under the tongue 3 times daily [id number]  Ok for early fill today   busPIRone HCl (BUSPAR) 30 MG tablet Take 1 tablet (30 mg) by mouth 2 times daily   cetirizine (ZYRTEC) 10 MG tablet Take 1 tablet (10 mg) by mouth every evening   clobetasol (TEMOVATE) 0.05 % external ointment APPLY TOPICALLY BID   cloNIDine (CATAPRES) 0.1 MG tablet Take 1 tablet (0.1 mg) by mouth At Bedtime Prn late withdrawal symptoms.   docusate sodium (DOK) 100 MG capsule TAKE 1 CAPSULE BY MOUTH TWICE DAILY AS NEEDED FOR CONSTIPATION   FLUoxetine (PROZAC) 20 MG capsule TAKE 1 CAPSULE BY MOUTH DAILY WITH FLUOXETINE 40MG FOR A TOTAL OF 60MG DAILY   FLUoxetine (PROZAC) 40 MG capsule  TAKE 1 CAPSULE BY MOUTH DAILY WITH 20 MG CAPSULE OF FLUOXETINE TO EQUAL TOTAL DAILY DOSE OF 60MG   fluticasone (FLONASE) 50 MCG/ACT nasal spray Spray 2 sprays into both nostrils daily   gabapentin (NEURONTIN) 300 MG capsule 300 am 600mg q hs   hydrocortisone 2.5 % cream    hydrOXYzine (ATARAX) 25 MG tablet TAKE 2 TABLETS BY MOUTH NIGHTLY AS NEEDED FOR ANXIETY   hydrOXYzine (ATARAX) 50 MG tablet Take 1 tablet (50 mg) by mouth nightly as needed for itching   hydrOXYzine (VISTARIL) 25 MG capsule Take 2 capsules (50 mg) by mouth nightly as needed for anxiety   ipratropium - albuterol 0.5 mg/2.5 mg/3 mL (DUONEB) 0.5-2.5 (3) MG/3ML neb solution Take 1 vial (3 mLs) by nebulization every 6 hours as needed for shortness of breath / dyspnea or wheezing   ketoconazole (NIZORAL) 2 % cream    levocetirizine (XYZAL) 5 MG tablet Take 1 tablet (5 mg) by mouth every evening   loratadine (CLARITIN) 10 MG tablet TABLET 1 TABLET BY MOUTH DAILY AS NEEDED FOR ALLERGIES   methocarbamol (ROBAXIN) 500 MG tablet Take 1 tablet (500 mg) by mouth 3 times daily   naloxone (NARCAN) 4 MG/0.1ML nasal spray Spray 1 spray (4 mg) into one nostril alternating nostrils once as needed for opioid reversal every 2-3 minutes until assistance arrives   Neomycin-Bacitracin-Polymyxin ([employer] TRIPLE ANTIBIOTIC) OINT Externally apply topically 4 times daily   [] nitroFURantoin macrocrystal-monohydrate (MACROBID) 100 MG capsule Take 1 capsule (100 mg) by mouth 2 times daily for 7 days   nystatin (MYCOSTATIN) 038175 UNIT/ML suspension Take 5 mLs (500,000 Units) by mouth 4 times daily   olopatadine (PATANOL) 0.1 % ophthalmic solution Place 1 drop into both eyes 2 times daily   omeprazole (PRILOSEC) 40 MG DR capsule TAKE 1 CAPSULE(40 MG) BY MOUTH DAILY 30 TO 60 MINUTES BEFORE A MEAL   ondansetron (ZOFRAN-ODT) 4 MG ODT tab Take 1-2 tablets (4-8 mg) by mouth every 8 hours as needed for nausea   order for DME Equipment being ordered: Left thumb brace   polyethylene  glycol (MIRALAX/GLYCOLAX) powder TAKE 17 GRAMS(1 CAPFUL) BY MOUTH DAILY   ranitidine (ZANTAC) 150 MG tablet Take 1 tablet (150 mg) by mouth 2 times daily   terbinafine (LAMISIL) 1 % external cream Apply topically 2 times daily   traZODone (DESYREL) 100 MG tablet Take 1 tablet (100 mg) by mouth At Bedtime   triamcinolone (KENALOG) 0.1 % ointment Apply sparingly to affected area on back two times daily til clear.   valACYclovir (VALTREX) 1000 mg tablet Take 1 tablet (1,000 mg) by mouth 2 times daily At the onset of skin lesions as needed   varenicline (CHANTIX CONTINUING MONTH ARA) 1 MG tablet TAKE 1 TABLET(1 MG) BY MOUTH TWICE DAILY   ZOLMitriptan (ZOMIG) 5 MG tablet TAKE 1 TABLET BY MOUTH AT ONSET OF HEADACHE FOR MIGRAINE. MAY REPEAT DOSE IN 2 HOURS. DO NOT EXCEED 10MG IN 24 HOURS     No current facility-administered medications on file prior to visit.     Allergies   Allergen Reactions     Acetaminophen-Codeine Hives     Amoxicillin-Pot Clavulanate Nausea and Vomiting     Augmentin Nausea and Vomiting and Hives     Barbiturates      Bentyl [Dicyclomine] Other (See Comments)     dizziness     Compazine      Lock-jaw     Liquid Adhesive      Other reaction(s): Other, see comments  blisters     No Clinical Screening - See Comments      PN: LW Reaction: blisters  PN: LW FI1: nka  PN: LW CM1: CONTRAST- nka Reaction :     Nsaids      D/t ITP     Prochlorperazine      Other reaction(s): Edema     Propoxyphene N-Apap Hives     Sulfa Drugs      Tramadol Hcl Hives            Adhesive Tape Rash           REVIEW OF SYSTEMS:  General:  No acute withdrawal symptoms.  No recent infections or fever but has not been feeling well.  Eyes:  No vision concerns.  No double vision.    ENT: Significant sore throat symptoms continue.  Resp: No coughing, wheezing or shortness of breath  CV: No chest pains or palpitations  GI: Midepigastric abdominal pain intermittently.  Denies constipation.  : No urinary frequency or dysuria     Musculoskeletal: No significant muscle or joint pains other than as above.  No edema  Neurologic: No numbness, tingling, weakness, problems with balance or coordination  Psychiatric: No acute concerns other than as above.   Skin: No rashes or areas of acute infection    OBJECTIVE:    PHYSICAL EXAM:  BP 98/60   Pulse 72   Temp 97.7  F (36.5  C)   SpO2 98%     GENERAL APPEARANCE: Tired appearing  EYES:Eyes grossly normal to inspection  NEURO:  Gait normal.  No tremor. Coordination intact.   MENTAL STATUS EXAM:  Appearance/Behavior: No appearant distress  Speech: Normal  Mood/Affect: normal affect  Insight: Adequate      Results for orders placed or performed in visit on 05/28/19   Urine Drugs of Abuse Screen Panel 13   Result Value Ref Range    Cannabinoids (10-vbx-0-carboxy-9-THC) Not Detected NDET^Not Detected ng/mL    Phencyclidine (Phencyclidine) Not Detected NDET^Not Detected ng/mL    Cocaine (Benzoylecgonine) Not Detected NDET^Not Detected ng/mL    Methamphetamine (d-Methamphetamine) Not Detected NDET^Not Detected ng/mL    Opiates (Morphine) Not Detected NDET^Not Detected ng/mL    Amphetamine (d-Amphetamine) Not Detected NDET^Not Detected ng/mL    Benzodiazepines (Nordiazepam) Not Detected NDET^Not Detected ng/mL    Tricyclic Antidepressants (Desipramine) Not Detected NDET^Not Detected ng/mL    Methadone (Methadone) Not Detected NDET^Not Detected ng/mL    Barbiturates (Butalbital) Not Detected NDET^Not Detected ng/mL    Oxycodone (Oxycodone) Not Detected NDET^Not Detected ng/mL    Propoxyphene (Norpropoxyphene) Not Detected NDET^Not Detected ng/mL    Buprenorphine (Buprenorphine) Detected, Abnormal Result (A) NDET^Not Detected ng/mL           ASSESSMENT/PLAN:      (F11.20) Opioid use disorder, severe, dependence (H)  (primary encounter diagnosis)   Suboxone  8mg 3 times daily .  Rx #90      Try split dosing for better pain control  Discussed pain management in the event surgical procedures are necessary.   Invited consultation from other providers.  Safe storage reviewed.   Follow up 4 wk    Strongly advised no additional doses of other opioids unless reviewed with provider.           Counseled the patient on the importance of having a recovery program in addition to Medication assisted treatment.  Components include having some type of sober network, avoiding isolating, having willingness  to change, avoiding triggers and managing cravings.  Strongly recommended abstaining from alcohol, benzodiazepines, THC, opioids and other drugs of abuse.       Opioid warning reviewed.  Risk of overdose following a period of abstinence due to decrease tolerance was discussed including risk of death.   Risk of overdose if using Suboxone with other substances particuarly benzodiazepines/alcohol was reviewed.           (F14.21) History of cocaine dependence (H)  Plan:  Encourage ongoing sobriety      (F33.1) Moderate episode of recurrent major depressive disorder (H)  (F41.1) Generalized anxiety disorder  Plan: Recommendations as above.   Follow with PCP/psychiatry needed.   Encouraged again to schedule.   Encouraged mental health counseling.        (F17.200) Nicotine use disorder  Comment: not ready to quit but has cut down.   Plan: Encouraged Abstinence.  Increase risk of relapse with other substances with return to nicotine use discussed.  Risk of Ecig/Vaping also reviewed.              (G43.009) Hx of chronic headache/  (V89.2XXD) .   Robaxin 500mg q six hour prn.   Encourage stretching, massage, warm moist heat as able.     Refer to [facility] neurology placed previously.    Has not followed through as yet.  Printed again and encouraged to schedule.     Vocal cord abnormalities  Follow-up with ENT.  Avoid use of additional opioid.  Only advised quitting smoking as well as limiting caffeine intake which may be causing GERD symptoms and additional sore throat symptoms.  Carafate also prescribed as needed.     (F51.01)   Insomnia  Trazodone as rx previously.   Continue gabapentin 300mg am and 600mg pm.    Risk of polypharmacy discussed.  Other sleep hygeine, caffeine intake, non smoking also reviewed.    Follow up with PCP     (Z79.899) High risk medication use     Care coordination offered for insurance assistance.  Good Rx and Suboxone prescription coupon printed        ENCOUNTER FOR LONG TERM USE OF HIGH RISK MEDICATION   High Risk Drug Monitoring?  YES   Drug being monitored: Buprenorphine   Reason for drug: Opioid use disorder   What is being monitored?: Dosage, Cravings, Trigger, side effects, and continued abstinence.      Opioid warning reviewed.  Risk of overdose following a period of abstinence due to decrease tolerance was discussed including risk of death.   Risk of overdose if using Suboxone with other substances particuarly benzodiazepines/alcohol was reviewed.        Jessy Brenner MD  [facility]  [phone]

## 2019-05-28 NOTE — TELEPHONE ENCOUNTER
Patient called [place] as well-- see that encounter for follow up.    Irina Callahan RN  05/28/19  12:39 PM

## 2019-05-28 NOTE — TELEPHONE ENCOUNTER
Please call patient and ask her what pharmacy she would like.   Please also remind her to call the [phone] number with issues in future.  She was given correct number at visit.

## 2019-05-28 NOTE — LETTER
[facility]  [id number] [facility]  [place] [address]  [phone]        May 28, 2019    Regarding:  Coretta Tovar  [facility] [facility] [address]              To Whom It May Concern;    Please excuse from work last week 5/22/19, 5/23/19 and 5/24/19 due to illness.   If you have any questions please feel free to call my office at number above.          Sincerely,        Jessy Brenner MD

## 2019-05-28 NOTE — TELEPHONE ENCOUNTER
Call from pt her Subx Rx was send to [facility] in [place], but pt has no insurance and want Rx re-send to [facility] in [place] tel: [phone]    Pt tel: [phone]      Samm Alcala

## 2019-05-28 NOTE — TELEPHONE ENCOUNTER
Patient notified to call [phone] for any issues.     Medication pended with correct pharmacy.    Irina Callahan RN  05/28/19  12:36 PM

## 2019-06-14 NOTE — PROGRESS NOTES
Clinic Care Coordination Contact  [facility] Social Work    Clinical Data: Care Coordinator Outreach  Outreach attempted x 3.  Left message on [facility] with call back information and requested return call.    Plan: Care Coordinator mailed out care coordination introduction letter on 5/22/19. Spoke briefly once. No response to letter or messages,.  If no response, CC will plan to close in 1 week     Niurka Rivers [facility]  , Clinic Care Coordination  Clinics:  [facility] [place], [place]  [phone]   6/14/2019   8:26 AM

## 2019-06-19 NOTE — PROGRESS NOTES
Clinic Care Coordination Contact  Social Work   Situation: Patient chart reviewed by care coordinator.    Background:  Financial No insurance, addiction.    Assessment: No response to last 3 calls and letter    Plan/Recommendations: No further outreach planned    Niurka Rivers [facility]  , Clinic Care Coordination  Clinics:  [facility] [place], [place]  [phone]   6/19/2019   8:07 AM

## 2019-06-24 NOTE — TELEPHONE ENCOUNTER
Reason for Call:  Medication or medication refill:    Do you use a [facility]?  Name of the pharmacy and phone number for the current request:  [facility] tel: [phone]    Name of the medication requested: buprenorphine-naloxone (SUBOXONE) 8-2 MG SUBL sublingual tablet    Other request:   Last appointment: 5/28/19  Cancelled appointments: 6/26/19  No Shows/missed appointments: N/A  Scheduled appointment: 7/2/19 in [place] @ 8:40  None to take as of: 6/26/19  Date/Time/ amount of last dose: Full Dose 6/25/19    Pt informed to follow up with pharmacy for status of refill as addiction RN will only reach out if there are any issues or questions and will be addressed within one business day.    Can we leave a detailed message on this number? YES    Phone number patient can be reached at: Home number on file [phone] (home)    Best Time: anytime    Call taken on 6/24/2019 at 4:44 PM by Flori Sinclair

## 2019-06-25 NOTE — TELEPHONE ENCOUNTER
Refill for: buprenorphine-naloxone (SUBOXONE) 8-2 MG SUBL sublingual tablet    Last Appointment: 5/28/19    Next Appointment: 7/2/19    No Shows/Cancellations since last appointment: cancelled 6/26    Last Refill in Epic (date and amount/how many days):    Disp Refills Start End RAUL   buprenorphine-naloxone (SUBOXONE) 8-2 MG SUBL sublingual tablet 90 each 0 5/28/2019  No   Sig - Route: Place 1 tablet under the tongue 3 times daily [id number] - Sublingual     Most Recent UDS results: POS buprenorphine on 5/28     reviewed and summarized below:   Fill Date  Drug      Qty  Days  Prescriber   05/28/2019 Buprenorphin-Naloxon 8-2 Mg Sl  3  1 Ei Bur   05/28/2019 Buprenorphin-Naloxon 8-2 Mg Sl  84  30  Ei Bur  04/30/2019 Buprenorphin-Naloxon 8-2 Mg Sl  90  30  Ei Bur     Patient will be out on 6/25 with none to take as of 6/26-- per , patient was only given a total of #87 which would be 1 day short of 30 day supply.     Medication reviewed and pended for 6 day bridge.      Irina Callahan RN  06/25/19  11:22 AM

## 2019-07-03 ENCOUNTER — TELEPHONE (OUTPATIENT)
Dept: FAMILY MEDICINE | Facility: CLINIC | Age: 49
End: 2019-07-03

## 2019-07-03 NOTE — TELEPHONE ENCOUNTER
VM left for patient to return call to clinic. Please help complete PHQ-9 if patient returns call. Call back number given.  RICHARD Martin

## 2019-07-05 ENCOUNTER — TELEPHONE (OUTPATIENT)
Dept: FAMILY MEDICINE | Facility: CLINIC | Age: 49
End: 2019-07-05

## 2019-07-05 NOTE — TELEPHONE ENCOUNTER
Reason for Call:  Other call back    Detailed comments: PATIENT DAUGHTER WOULD LIKE BEBETO TO CALL HER ASAP SHE HAS SOME THINGS TO ASK HER     Phone Number Patient can be reached at: Home number on file [phone] (home)    Best Time: ANY    Can we leave a detailed message on this number? YES    Call taken on 7/5/2019 at 12:48 PM by Araceli Love     No significant ST-T abnormalities

## 2019-07-05 NOTE — TELEPHONE ENCOUNTER
Left message for patient's daughter Tameka to call RN hotline [phone].   No consent to communicate on file for patient's daughter Tameka.   Advised her we cannot give any information regarding patient without a form signed but are able to take information.     Deedee Mott RN

## 2019-07-08 NOTE — TELEPHONE ENCOUNTER
"Called Tameka, daughter  She stated that patient passed away on 19 after being in and out of the ED for a week  Patient  the same day she was released from the ED that day   She is trying to obtain medical records to review as things were strange and \"did not seem to add up towards the end\" and that was why she wanted to talk with Sarah Montemayor NP to try to figure out what really happened  Explained that Sarah Montemayor NP would not be able to help on her end and she would need to go through medical records dept   Call got disconnected and Tameka called back  Gave her number to medical records [phone] and explained an ROSSI would need to be filled out and signed   Explained that writer is not sure what else needs to be done for her to obtain records but the med rec dept may be able to assist and advise further    Sarah Montemayor NP updated on patient's death    Val Teran RN    "

## 2019-07-23 ENCOUNTER — PRE VISIT (OUTPATIENT)
Dept: OTOLARYNGOLOGY | Facility: CLINIC | Age: 49
End: 2019-07-23

## 2019-11-08 NOTE — TELEPHONE ENCOUNTER
CHANTIX 1 MG tablet     Last Written Prescription Date: 1/23/17  Last Fill Quantity: 56, # refills: 0  Last Office Visit with [facility], [facility] or [facility] prescribing provider: 3/13/17   Next 5 appointments (look out 90 days)     Apr 04, 2017  2:00 PM CDT   [facility] with NINOSKA Samuel CNP   [facility]    [address]  [place] [address]   [phone]                   BP Readings from Last 3 Encounters:   03/13/17 (!) 88/58   02/10/17 106/74   01/05/17 130/72        done

## 2021-05-29 ENCOUNTER — RECORDS - HEALTHEAST (OUTPATIENT)
Dept: ADMINISTRATIVE | Facility: CLINIC | Age: 51
End: 2021-05-29

## 2021-06-15 NOTE — NURSING NOTE
"Chief Complaint   Patient presents with     Gastrointestinal Problem     Cramping with Vomitting.     Knee Pain     would like a referral for Steriod injection     Shoulder Pain     left shoulder pain.  Know non injury.  Constant aching pain for 3 weeks       Initial BP 96/60  Pulse 94  Temp 98.2  F (36.8  C) (Oral)  Wt 131 lb (59.4 kg)  SpO2 100%  BMI 18.28 kg/m2 Estimated body mass index is 18.28 kg/(m^2) as calculated from the following:    Height as of 8/4/17: 5' 10.98\" (1.803 m).    Weight as of this encounter: 131 lb (59.4 kg).  Medication Reconciliation: complete   Sandy Rivers MA    " Asymptomatic, use p r n  over-the-counter medication

## 2022-04-27 NOTE — PROGRESS NOTES
"  SUBJECTIVE:                                                    Coretta Tovar is a 46 year old female who presents to clinic today for the following health issues:    HPI    Medication Followup of Buspirone    Taking Medication as prescribed: yes    Side Effects:  None    Medication Helping Symptoms:  yes     Medication Followup of Fluoxetine    Taking Medication as prescribed: yes    Side Effects:  None    Medication Helping Symptoms:  yes       Medication Followup of Mirtazapine    Taking Medication as prescribed: yes    Side Effects:  Not remembering events that happened    Medication Helping Symptoms:  Yes    Would like to discontinue this medication and restart trazodone        Depression and Anxiety Follow-Up    Status since last visit: Improved     Other associated symptoms:None    Complicating factors:     Significant life event: Yes-  Moving out of boyfriend's house into her own.     Current substance abuse: None    PHQ-9 SCORE 2/10/2017 3/13/2017 4/7/2017   Total Score - - -   Total Score MyChart - 18 (Moderately severe depression) -   Total Score 14 - 1     KATHI-7 SCORE 2/10/2017 3/13/2017 4/7/2017   Total Score - 17 (severe anxiety) -   Total Score 14 - 8       PHQ-9  English  PHQ-9   Any Language  GAD7    Nausea, upper endoscopy - [facility]. Has been using Zofran as needed for nausea. Is unable to describe any triggers for nausea. Denies vomiting, fevers, abdominal pain, reflux.    Migraine - no change in frequency, duration, or character of migraines. Is using Zomig approximately 12 times per month which is effective for treating her migraines.    Anxiety: Patient is wondering if Provider can start prescribing her valium. Pt states it helps with her anxiety.    Insomnia: Pt states she is unable to sleep when she's not taking mirtazapine.   Pt states she no longer wants to take mirtazapine. Pt would like to stay on trazadone and so she \"can break the pills when she wants.\" Pt states her and boyfriend went " "for a bike ride and after she had a heat stroke. Pt states her boyfriend stated they sat argued for 10 minutes and then another 30 minutes and she has no recollection. Pt does not want to continue mirtazapine.     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 She says that her dermatologist was going to order this but then did not. She is unable to determine why he did not order this medication. It appears she's been using topical steroid for treatment of this.    Problem list and histories reviewed & adjusted, as indicated.  Additional history: as documented    Labs reviewed in EPIC    ROS:  Constitutional, HEENT, cardiovascular, pulmonary, gi and gu systems are negative, except as otherwise noted.    OBJECTIVE:     /72 (BP Location: Right arm, Patient Position: Chair, Cuff Size: Adult Regular)  Pulse 90  Temp 98.9  F (37.2  C) (Oral)  Resp 16  Ht 5' 9.61\" (1.768 m)  Wt 140 lb 9.6 oz (63.8 kg)  SpO2 97%  BMI 20.4 kg/m2  Body mass index is 20.4 kg/(m^2).  GENERAL: alert and no acute distress  NECK: no adenopathy, no asymmetry, masses, or scars and thyroid normal to palpation  RESP: lungs clear to auscultation - no rales, rhonchi or wheezes  CV: regular rate and rhythm, normal S1 S2, no S3 or S4, no murmur, click or rub, no peripheral edema   ABDOMEN: soft, nontender, no hepatosplenomegaly, no masses and bowel sounds normal  MS: no gross musculoskeletal defects noted, no edema  SKIN: Patchy areas of dry scaled hyperkeratotic skin under bilateral axillae, no erythema  NEURO: Normal strength and tone, mentation intact and speech normal  PSYCH: mentation appears normal, inattentive, affect flat, anxious, fatigued and appearance disheveled    Diagnostic Test Results:  none     ASSESSMENT/PLAN:     1. Primary insomnia  Patient reports of time that she did not recall events and feels that this is related to Remeron. We will discontinue this and restart " trazodone.  - traZODone (DESYREL) 100 MG tablet; Take 1-3 tablets (100-300 mg) by mouth nightly as needed for sleep  Dispense: 90 tablet; Refill: 1    2. Generalized anxiety disorder  Buspirone seems to be helping with generalized anxiety. Patient did request Valium for anxiety. I declined her request as I am concerned about her history of drug dependence and risk for dependence on benzodiazepines. Recommended continuing buspirone and giving it some time while her home life cycles as she is moving out of her ex-boyfriend's home. Refills given. Follow-up in 6 months.  - BusPIRone HCl 30 MG TABS; Take 30 mg by mouth 2 times daily  Dispense: 60 tablet; Refill: 3    3. Migraine without status migrainosus, not intractable, unspecified migraine type  Patient is using approximately 12 tablets of Zomig per month. Refills given.  - ondansetron (ZOFRAN ODT) 4 MG ODT tab; Take 1-2 tablets (4-8 mg) by mouth 3 times daily (before meals)  Dispense: 12 tablet; Refill: 0  - ZOLMitriptan (ZOMIG) 5 MG tablet; TAKE 1 TABLET BY MOUTH AT ONSET OF HEADACHE FOR MIGRAINE. MAY REPEAT DOSE IN 2 HOURS. DO NOT EXCEED 10MG IN 24 HOURS  Dispense: 12 tablet; Refill: 4    4. Gastroesophageal reflux disease without esophagitis/8. Abnormal weight loss  Patient has history of nausea without vomiting. She has history of upper endoscopy that was normal. She had been losing weight for a period of time but her weight is no increase seen. This could be a side effect of Remeron. Continue to monitor weight.  - sucralfate (CARAFATE) 1 GM tablet; Take 1 tablet (1 g) by mouth 4 times daily  Dispense: 40 tablet; Refill: 1    6. Tobacco abuse  No interest in smoking cessation at this time.    7. Mary-mary disease  Patient requesting Retin-A prescription for Mary-Mary disease. Patient states her dermatologist was going to order this but then he did not. She is unclear as to why this was not ordered. I am not familiar with Mary-Mary disease treatment  and did some research. I did not find that Retin-A is a common treatment for this condition so I asked her to follow-up with her dermatologist.    Greater than 50% of 30 minute visit were spent on counseling or coordination of care regarding insomnia, anxiety, migraine, GERD, tobacco abuse, Nela-Nela disease.     NINOSKA [facility]   none

## 2023-12-19 NOTE — NURSING NOTE
"Chief Complaint   Patient presents with     RECHECK     depression/anxiety     Panel Management     tdap, pneumovax, migraine and asthma action plans, lipids, ACT        Initial /80 (BP Location: Left arm, Patient Position: Chair, Cuff Size: Adult Regular)  Pulse 90  Temp 99.7  F (37.6  C) (Temporal)  Resp 16  Ht 5' 9.61\" (1.768 m)  Wt 132 lb 12.8 oz (60.2 kg)  SpO2 98%  Breastfeeding? No  BMI 19.27 kg/m2 Estimated body mass index is 19.27 kg/(m^2) as calculated from the following:    Height as of this encounter: 5' 9.61\" (1.768 m).    Weight as of this encounter: 132 lb 12.8 oz (60.2 kg).  Medication Reconciliation: complete   Chari Simpson CMA      " No

## (undated) DEVICE — SOL WATER IRRIG 1000ML BOTTLE 07139-09

## (undated) DEVICE — PEN MARKING SKIN W/LABELS 31145884

## (undated) DEVICE — SOL NACL 0.9% IRRIG 1000ML BOTTLE 2F7124

## (undated) DEVICE — BASIN SET MINOR DISP

## (undated) DEVICE — PACK BASIC 9103

## (undated) DEVICE — SYR 03ML 22GA 1.5" ECLIPSE

## (undated) DEVICE — VASELINE PETROLEUM JELLY 5GM 8884433200

## (undated) DEVICE — DRSG GAUZE 4X4" 3033

## (undated) DEVICE — TUBING SUCTION 12"X1/4" N612

## (undated) DEVICE — SYR 10ML LL W/O NDL

## (undated) DEVICE — DRSG TELFA 2X3"

## (undated) DEVICE — GLOVE PROTEXIS W/NEU-THERA 8.0  2D73TE80

## (undated) DEVICE — SPONGE COTTONOID 1/4X1/4" 20-01SW

## (undated) RX ORDER — GLYCOPYRROLATE 0.2 MG/ML
INJECTION, SOLUTION INTRAMUSCULAR; INTRAVENOUS
Status: DISPENSED
Start: 2019-01-01

## (undated) RX ORDER — KETAMINE HCL IN 0.9 % NACL 50 MG/5 ML
SYRINGE (ML) INTRAVENOUS
Status: DISPENSED
Start: 2019-01-01

## (undated) RX ORDER — LIDOCAINE HYDROCHLORIDE 10 MG/ML
INJECTION, SOLUTION EPIDURAL; INFILTRATION; INTRACAUDAL; PERINEURAL
Status: DISPENSED
Start: 2019-01-01

## (undated) RX ORDER — ONDANSETRON 2 MG/ML
INJECTION INTRAMUSCULAR; INTRAVENOUS
Status: DISPENSED
Start: 2019-01-01

## (undated) RX ORDER — DEXAMETHASONE SODIUM PHOSPHATE 10 MG/ML
INJECTION, SOLUTION INTRAMUSCULAR; INTRAVENOUS
Status: DISPENSED
Start: 2019-01-01

## (undated) RX ORDER — GABAPENTIN 300 MG/1
CAPSULE ORAL
Status: DISPENSED
Start: 2019-01-01

## (undated) RX ORDER — FENTANYL CITRATE 50 UG/ML
INJECTION, SOLUTION INTRAMUSCULAR; INTRAVENOUS
Status: DISPENSED
Start: 2019-01-01

## (undated) RX ORDER — ACETAMINOPHEN 325 MG/1
TABLET ORAL
Status: DISPENSED
Start: 2019-01-01

## (undated) RX ORDER — PROPOFOL 10 MG/ML
INJECTION, EMULSION INTRAVENOUS
Status: DISPENSED
Start: 2019-01-01

## (undated) RX ORDER — EPINEPHRINE 1 MG/ML(1)
AMPUL (ML) INJECTION
Status: DISPENSED
Start: 2019-01-01

## (undated) RX ORDER — NEOSTIGMINE METHYLSULFATE 1 MG/ML
VIAL (ML) INJECTION
Status: DISPENSED
Start: 2019-01-01